# Patient Record
Sex: FEMALE | Race: BLACK OR AFRICAN AMERICAN | NOT HISPANIC OR LATINO | Employment: UNEMPLOYED | URBAN - METROPOLITAN AREA
[De-identification: names, ages, dates, MRNs, and addresses within clinical notes are randomized per-mention and may not be internally consistent; named-entity substitution may affect disease eponyms.]

---

## 2018-03-06 ENCOUNTER — HOSPITAL ENCOUNTER (EMERGENCY)
Facility: HOSPITAL | Age: 23
Discharge: HOME | End: 2018-03-07
Attending: EMERGENCY MEDICINE
Payer: MEDICAID

## 2018-03-06 DIAGNOSIS — D69.6 THROMBOCYTOPENIA: ICD-10-CM

## 2018-03-06 DIAGNOSIS — D64.9 ANEMIA, UNSPECIFIED TYPE: Primary | ICD-10-CM

## 2018-03-06 LAB
ABO + RH BLD: NORMAL
ACANTHOCYTES BLD QL SMEAR: PRESENT
ALBUMIN SERPL BCP-MCNC: 4 G/DL
ALP SERPL-CCNC: 48 U/L
ALT SERPL W/O P-5'-P-CCNC: 9 U/L
ANION GAP SERPL CALC-SCNC: 10 MMOL/L
ANISOCYTOSIS BLD QL SMEAR: SLIGHT
APTT BLDCRRT: 24.7 SEC
AST SERPL-CCNC: 8 U/L
BASOPHILS # BLD AUTO: 0.05 K/UL
BASOPHILS NFR BLD: 0.3 %
BILIRUB SERPL-MCNC: 0.3 MG/DL
BLD GP AB SCN CELLS X3 SERPL QL: NORMAL
BLD PROD TYP BPU: NORMAL
BLOOD UNIT EXPIRATION DATE: NORMAL
BLOOD UNIT TYPE CODE: 9500
BLOOD UNIT TYPE: NORMAL
BUN SERPL-MCNC: 11 MG/DL
CALCIUM SERPL-MCNC: 9 MG/DL
CHLORIDE SERPL-SCNC: 109 MMOL/L
CK SERPL-CCNC: 54 U/L
CO2 SERPL-SCNC: 23 MMOL/L
CODING SYSTEM: NORMAL
CREAT SERPL-MCNC: 0.8 MG/DL
DACRYOCYTES BLD QL SMEAR: ABNORMAL
DAT IGG-SP REAG RBC-IMP: NORMAL
DIFFERENTIAL METHOD: ABNORMAL
DISPENSE STATUS: NORMAL
EOSINOPHIL # BLD AUTO: 0.1 K/UL
EOSINOPHIL NFR BLD: 0.3 %
ERYTHROCYTE [DISTWIDTH] IN BLOOD BY AUTOMATED COUNT: 19.9 %
EST. GFR  (AFRICAN AMERICAN): >60 ML/MIN/1.73 M^2
EST. GFR  (NON AFRICAN AMERICAN): >60 ML/MIN/1.73 M^2
GLUCOSE SERPL-MCNC: 109 MG/DL
HCT VFR BLD AUTO: 25.3 %
HGB BLD-MCNC: 6.5 G/DL
HGB S BLD QL SOLY: NEGATIVE
HYPOCHROMIA BLD QL SMEAR: ABNORMAL
INR PPP: 1.1
LDH SERPL L TO P-CCNC: 283 U/L
LYMPHOCYTES # BLD AUTO: 4.8 K/UL
LYMPHOCYTES NFR BLD: 27.6 %
MCH RBC QN AUTO: 14.5 PG
MCHC RBC AUTO-ENTMCNC: 25.7 G/DL
MCV RBC AUTO: 57 FL
MONOCYTES # BLD AUTO: 1 K/UL
MONOCYTES NFR BLD: 5.8 %
NEUTROPHILS # BLD AUTO: 11.4 K/UL
NEUTROPHILS NFR BLD: 66.8 %
NUM UNITS TRANS PACKED RBC: NORMAL
OVALOCYTES BLD QL SMEAR: ABNORMAL
PLATELET # BLD AUTO: 66 K/UL
PLATELET BLD QL SMEAR: ABNORMAL
PMV BLD AUTO: ABNORMAL FL
POIKILOCYTOSIS BLD QL SMEAR: SLIGHT
POLYCHROMASIA BLD QL SMEAR: ABNORMAL
POTASSIUM SERPL-SCNC: 3.3 MMOL/L
PROT SERPL-MCNC: 7.4 G/DL
PROTHROMBIN TIME: 11.1 SEC
RBC # BLD AUTO: 4.48 M/UL
RETICS/RBC NFR AUTO: 2.8 %
SCHISTOCYTES BLD QL SMEAR: PRESENT
SODIUM SERPL-SCNC: 142 MMOL/L
SPHEROCYTES BLD QL SMEAR: ABNORMAL
STOMATOCYTES BLD QL SMEAR: PRESENT
TARGETS BLD QL SMEAR: ABNORMAL
WBC # BLD AUTO: 17.28 K/UL

## 2018-03-06 PROCEDURE — 85610 PROTHROMBIN TIME: CPT

## 2018-03-06 PROCEDURE — 85025 COMPLETE CBC W/AUTO DIFF WBC: CPT

## 2018-03-06 PROCEDURE — 86850 RBC ANTIBODY SCREEN: CPT

## 2018-03-06 PROCEDURE — 85660 RBC SICKLE CELL TEST: CPT

## 2018-03-06 PROCEDURE — 82746 ASSAY OF FOLIC ACID SERUM: CPT

## 2018-03-06 PROCEDURE — 80053 COMPREHEN METABOLIC PANEL: CPT

## 2018-03-06 PROCEDURE — 83540 ASSAY OF IRON: CPT

## 2018-03-06 PROCEDURE — P9016 RBC LEUKOCYTES REDUCED: HCPCS

## 2018-03-06 PROCEDURE — 82607 VITAMIN B-12: CPT

## 2018-03-06 PROCEDURE — 83615 LACTATE (LD) (LDH) ENZYME: CPT

## 2018-03-06 PROCEDURE — 85730 THROMBOPLASTIN TIME PARTIAL: CPT

## 2018-03-06 PROCEDURE — 99283 EMERGENCY DEPT VISIT LOW MDM: CPT

## 2018-03-06 PROCEDURE — 82550 ASSAY OF CK (CPK): CPT

## 2018-03-06 PROCEDURE — 86880 COOMBS TEST DIRECT: CPT

## 2018-03-06 PROCEDURE — 36415 COLL VENOUS BLD VENIPUNCTURE: CPT

## 2018-03-06 PROCEDURE — 85045 AUTOMATED RETICULOCYTE COUNT: CPT

## 2018-03-06 PROCEDURE — 86920 COMPATIBILITY TEST SPIN: CPT

## 2018-03-06 RX ORDER — PREDNISONE 20 MG/1
80 TABLET ORAL DAILY
Status: ON HOLD | COMMUNITY
End: 2018-08-22 | Stop reason: HOSPADM

## 2018-03-06 RX ORDER — HYDROCODONE BITARTRATE AND ACETAMINOPHEN 500; 5 MG/1; MG/1
TABLET ORAL
Status: DISCONTINUED | OUTPATIENT
Start: 2018-03-06 | End: 2018-03-07 | Stop reason: HOSPADM

## 2018-03-07 VITALS
BODY MASS INDEX: 43.32 KG/M2 | OXYGEN SATURATION: 100 % | TEMPERATURE: 98 F | RESPIRATION RATE: 18 BRPM | HEART RATE: 87 BPM | SYSTOLIC BLOOD PRESSURE: 147 MMHG | HEIGHT: 62 IN | DIASTOLIC BLOOD PRESSURE: 65 MMHG | WEIGHT: 235.44 LBS

## 2018-03-07 LAB
FOLATE SERPL-MCNC: 5.9 NG/ML
IRON SERPL-MCNC: 15 UG/DL
SATURATED IRON: 3 %
TOTAL IRON BINDING CAPACITY: 462 UG/DL
TRANSFERRIN SERPL-MCNC: 312 MG/DL
VIT B12 SERPL-MCNC: 353 PG/ML

## 2018-03-07 NOTE — ED NOTES
Pt. Noted sitting up in bed resting to comfort, respirations even and unlabored, remains on continuous monitor, blood infusing as ordered. No signs of allergic reaction noted. Tolerates infusion well. Plan of care continued.

## 2018-03-07 NOTE — ED NOTES
IV discontinued upon discharge. Catheter intact, pt. Tolerates well. Dressing applied to site. No active bleeding noted.

## 2018-03-07 NOTE — ED PROVIDER NOTES
SCRIBE #1 NOTE: I, Corinne Mack, am scribing for, and in the presence of, Miah Lambert Jr., MD. I have scribed the HPI, ROS, and PEx.     SCRIBE #2 NOTE: I, Kaden Escalante, am scribing for, and in the presence of,  Apoorva Munguia MD. I have scribed the remaining portions of the note not scribed by Scribe #1.     History      Chief Complaint   Patient presents with    Abnormal Lab     hx of thrombocytopenia. states platelets and hct low.       Review of patient's allergies indicates:  No Known Allergies     HPI   HPI    3/6/2018, 7:21 PM   History obtained from the patient      History of Present Illness: Mari Etienne is a 22 y.o. female patient with PMHx of thrombocytopenia who presents to the Emergency Department for further evaluation of abnormal labs. Pt reports she had blood work done last week and it resulted yesterday. Pt was instructed by her PCP to present to the ED after the pt's platelets and HCT were low. Pt otherwise has no complaints at this time. Patient denies any fever, chills, fatigue, diaphoresis, CP, SOB, N/V, back pain, neck pain, pallor, HA, dizziness, numbness, weakness, and all other sxs at this time. No prior Tx reported. No further complaints or concerns at this time.     Arrival mode: Personal vehicle      PCP: Ifeanyi Tello NP       Past Medical History:  Past Medical History:   Diagnosis Date    Thrombocytopenia        Past Surgical History:  History reviewed. No pertinent surgical history.      Family History:  History reviewed. No pertinent family history.    Social History:  Social History     Social History Main Topics    Smoking status: Not on file    Smokeless tobacco: Not on file    Alcohol use Not on file    Drug use: Unknown    Sexual activity: Not on file       ROS   Review of Systems   Constitutional: Negative for chills, diaphoresis, fatigue and fever.   Respiratory: Negative for cough and shortness of breath.    Cardiovascular: Negative for chest pain  "and leg swelling.   Gastrointestinal: Negative for nausea and vomiting.   Musculoskeletal: Negative for back pain, neck pain and neck stiffness.   Skin: Negative for pallor, rash and wound.   Neurological: Negative for dizziness, weakness, light-headedness, numbness and headaches.   All other systems reviewed and are negative.    Physical Exam      Initial Vitals [03/06/18 1913]   BP Pulse Resp Temp SpO2   (!) 176/92 (!) 124 20 97.6 °F (36.4 °C) 100 %      MAP       120          Physical Exam  Nursing Notes and Vital Signs Reviewed.  Constitutional: Patient is in no apparent distress. Well-developed and well-nourished. Obese.  Head: Atraumatic. Normocephalic.  Eyes: PERRL. EOM intact. Conjunctivae are pale. No scleral icterus.  ENT: Mucous membranes are moist. Oropharynx is clear and symmetric.    Neck: Supple. Full ROM. No lymphadenopathy.  Cardiovascular: Tachycardic. Regular rhythm. No murmurs, rubs, or gallops. Distal pulses are 2+ and symmetric.  Pulmonary/Chest: No respiratory distress. Clear to auscultation bilaterally. No wheezing or rales.  Abdominal: Soft and non-distended.  There is no tenderness.  No rebound, guarding, or rigidity.   Musculoskeletal: Moves all extremities. No obvious deformities. No edema. No calf tenderness.  Skin: Warm and dry.  Neurological:  Alert, awake, and appropriate.  Normal speech.  No acute focal neurological deficits are appreciated.  Psychiatric: Normal affect. Good eye contact. Appropriate in content.    ED Course    Procedures  ED Vital Signs:  Vitals:    03/06/18 1913 03/06/18 2000 03/06/18 2005 03/06/18 2010   BP: (!) 176/92 130/60 (!) 153/64 (!) 152/69   Pulse: (!) 124 86 92 99   Resp: 20      Temp: 97.6 °F (36.4 °C)      TempSrc: Oral      SpO2: 100%      Weight: 106.8 kg (235 lb 7.2 oz)      Height: 5' 2" (1.575 m)       03/06/18 2245 03/06/18 2350 03/06/18 2355 03/07/18 0015   BP: 135/75 135/75 124/67 124/67   Pulse: 99 99 87 97   Resp: 18  18 18   Temp: 98.6 °F (37 " °C)  99.1 °F (37.3 °C) 98.7 °F (37.1 °C)   TempSrc: Oral  Oral Oral   SpO2: 100% 100% 100% 100%   Weight:       Height:        03/07/18 0030 03/07/18 0045 03/07/18 0100 03/07/18 0130   BP: 136/72 137/77 127/66 137/73   Pulse: 92 94 94 85   Resp: 18 18 18 18   Temp: 99 °F (37.2 °C) 98.8 °F (37.1 °C) 98.6 °F (37 °C) 98.4 °F (36.9 °C)   TempSrc: Oral Oral  Oral   SpO2: 100% 100% 100% 100%   Weight:       Height:        03/07/18 0200   BP: (!) 147/65   Pulse: 87   Resp: 18   Temp: 98.3 °F (36.8 °C)   TempSrc: Oral   SpO2: 100%   Weight:    Height:        Abnormal Lab Results:  Labs Reviewed   CBC W/ AUTO DIFFERENTIAL - Abnormal; Notable for the following:        Result Value    WBC 17.28 (*)     Hemoglobin 6.5 (*)     Hematocrit 25.3 (*)     MCV 57 (*)     MCH 14.5 (*)     MCHC 25.7 (*)     RDW 19.9 (*)     Platelets 66 (*)     Gran # (ANC) 11.4 (*)     Platelet Estimate Decreased (*)     All other components within normal limits   COMPREHENSIVE METABOLIC PANEL - Abnormal; Notable for the following:     Potassium 3.3 (*)     Alkaline Phosphatase 48 (*)     AST 8 (*)     ALT 9 (*)     All other components within normal limits   RETICULOCYTES - Abnormal; Notable for the following:     Retic 2.8 (*)     All other components within normal limits   LACTATE DEHYDROGENASE - Abnormal; Notable for the following:      (*)     All other components within normal limits   PROTIME-INR   APTT   CK   SICKLE CELL SCREEN   FOLATE   VITAMIN B12   IRON AND TIBC   TYPE & SCREEN   DIRECT ANTIGLOBULIN TEST   PREPARE RBC SOFT        All Lab Results:  Results for orders placed or performed during the hospital encounter of 03/06/18   CBC auto differential   Result Value Ref Range    WBC 17.28 (H) 3.90 - 12.70 K/uL    RBC 4.48 4.00 - 5.40 M/uL    Hemoglobin 6.5 (L) 12.0 - 16.0 g/dL    Hematocrit 25.3 (L) 37.0 - 48.5 %    MCV 57 (L) 82 - 98 fL    MCH 14.5 (L) 27.0 - 31.0 pg    MCHC 25.7 (L) 32.0 - 36.0 g/dL    RDW 19.9 (H) 11.5 - 14.5 %     Platelets 66 (L) 150 - 350 K/uL    MPV SEE COMMENT 9.2 - 12.9 fL    Gran # (ANC) 11.4 (H) 1.8 - 7.7 K/uL    Lymph # 4.8 1.0 - 4.8 K/uL    Mono # 1.0 0.3 - 1.0 K/uL    Eos # 0.1 0.0 - 0.5 K/uL    Baso # 0.05 0.00 - 0.20 K/uL    Gran% 66.8 38.0 - 73.0 %    Lymph% 27.6 18.0 - 48.0 %    Mono% 5.8 4.0 - 15.0 %    Eosinophil% 0.3 0.0 - 8.0 %    Basophil% 0.3 0.0 - 1.9 %    Platelet Estimate Decreased (A)     Aniso Slight     Poik Slight     Poly Occasional     Hypo Moderate     Ovalocytes Occasional     Target Cells Occasional     Tear Drop Cells Occasional     Stomatocytes Present     Spherocytes Occasional     Acanthocytes Present     Schistocytes Present     Differential Method Automated    Comprehensive metabolic panel   Result Value Ref Range    Sodium 142 136 - 145 mmol/L    Potassium 3.3 (L) 3.5 - 5.1 mmol/L    Chloride 109 95 - 110 mmol/L    CO2 23 23 - 29 mmol/L    Glucose 109 70 - 110 mg/dL    BUN, Bld 11 6 - 20 mg/dL    Creatinine 0.8 0.5 - 1.4 mg/dL    Calcium 9.0 8.7 - 10.5 mg/dL    Total Protein 7.4 6.0 - 8.4 g/dL    Albumin 4.0 3.5 - 5.2 g/dL    Total Bilirubin 0.3 0.1 - 1.0 mg/dL    Alkaline Phosphatase 48 (L) 55 - 135 U/L    AST 8 (L) 10 - 40 U/L    ALT 9 (L) 10 - 44 U/L    Anion Gap 10 8 - 16 mmol/L    eGFR if African American >60 >60 mL/min/1.73 m^2    eGFR if non African American >60 >60 mL/min/1.73 m^2   Protime-INR   Result Value Ref Range    Prothrombin Time 11.1 9.0 - 12.5 sec    INR 1.1 0.8 - 1.2   APTT   Result Value Ref Range    aPTT 24.7 21.0 - 32.0 sec   Reticulocytes   Result Value Ref Range    Retic 2.8 (H) 0.5 - 2.5 %   Lactate dehydrogenase   Result Value Ref Range     (H) 110 - 260 U/L   CK   Result Value Ref Range    CPK 54 20 - 180 U/L   Sickle cell screen   Result Value Ref Range    Sickle Cell Screen Negative Negative   Type & Screen   Result Value Ref Range    Group & Rh O POS     Indirect Richard NEG    Direct antiglobulin test   Result Value Ref Range    Direct Richard (ADELA)  NEG    Prepare RBC 1 Unit   Result Value Ref Range    UNIT NUMBER [id number]     PRODUCT CODE R9763V20     DISPENSE STATUS TRANSFUSED     CODING SYSTEM [id number]     Unit Blood Type Code 9500     Unit Blood Type O NEG     Unit Expiration [id number]          Imaging Results:  Imaging Results    None                 The Emergency Provider reviewed the vital signs and test results, which are outlined above.    ED Discussion     8:00 PM: Dr. Lambert transfers care of pt to Dr. Munguia, pending lab results.    9:51 PM: Dr. Munguia re-evaluated pt. Pt is resting comfortably and is in no acute distress.  Pt denies any sxs at this time.  D/w pt all pertinent results. D/w pt any concerns expressed at this time. Answered all questions. Pt expresses understanding at this time.    9:55 PM: Re-evaluated pt. I have discussed test results, shared treatment plan, and the need for admission with patient and family at bedside. Pt and family express understanding at this time and agree with all information. All questions answered. Pt and family have no further questions or concerns at this time. Pt is ready for admit.    2:01 AM: Reassessed pt at this time. Pt states her condition has improved at this time. Pt is laying comfortably in ED bed and in NAD. Pt is awake, alert, and oriented. Discussed with pt all pertinent ED information and results. Discussed pt dx and plan of tx. Gave pt all f/u and return to the ED instructions. All questions and concerns were addressed at this time. Pt expresses understanding of information and instructions, and is comfortable with plan to discharge. Pt is stable for discharge.    I discussed with patient and/or family/caretaker that evaluation in the ED does not suggest any emergent or life threatening medical conditions requiring immediate intervention beyond what was provided in the ED, and I believe patient is safe for discharge.  Regardless, an unremarkable evaluation in the ED does not preclude  the development or presence of a serious of life threatening condition. As such, patient was instructed to return immediately for any worsening or change in current symptoms.      ED Medication(s):  Medications - No data to display    Discharge Medication List as of 3/7/2018  1:34 AM          Follow-up Information     [facility]  HEMATOLOGY/ONCOLOGY In 2 days.    Specialty:  Hematology and Oncology  Contact information:  [address] [place] [address] [id number] [phone]           [facility] - .    Specialty:  Emergency Medicine  Why:  As needed, If symptoms worsen  Contact information:  [address] [place] [id number] [phone]                   Medical Decision Making    Medical Decision Making:   Clinical Tests:   Lab Tests: Reviewed and Ordered           Scribe Attestation:   Scribe #1: I performed the above scribed service and the documentation accurately describes the services I performed. I attest to the accuracy of the note.    Attending:   Physician Attestation Statement for Scribe #1: I, Miah Lambert Jr., MD, personally performed the services described in this documentation, as scribed by Corinne Mack, in my presence, and it is both accurate and complete.       Scribe Attestation:   Scribe #2: I performed the above scribed service and the documentation accurately describes the services I performed. I attest to the accuracy of the note.    Attending Attestation:           Physician Attestation for Scribe:    Physician Attestation Statement for Scribe #2: I, Apoorva Munguia MD, reviewed documentation, as scribed by Kaden Escalante in my presence, and it is both accurate and complete. I also acknowledge and confirm the content of the note done by Scribe #1.          Clinical Impression       ICD-10-CM ICD-9-CM   1. Anemia, unspecified type D64.9 285.9   2. Thrombocytopenia D69.6 287.5       Disposition:   Disposition: Discharged  Condition:  Stable         Si SUZY Munguia MD  03/07/18 9160

## 2018-03-07 NOTE — ED NOTES
X2 attempts to establish IV access, unsuccessful. Request IV support/access from charge nurse Neil Ching. Plan of care continued.

## 2018-03-07 NOTE — ED NOTES
Transfusion continues to infuse as ordered. ~75 ml remain to be infused. Pt. Denies any concerns at this time. Plan of care continued.

## 2018-03-07 NOTE — ED NOTES
Pt. Presents to ED AAXO3 with male visitor at bedside with complaint of abnormal lab values (platelets) per her provider and was sent to the ED for further evaluation. Pt. Denies any symptoms at this time. Denies Cp, SOB, HA or dizziness. BBS CTA, skin warm and dry, intact, noted bruising throughout. Last platelet transfusion Jan 2017. Pt. Positioned to comfort, awaiting MD assessment. Plan of care continued.

## 2018-03-07 NOTE — ED NOTES
Consent for blood transfusion signed by patient and placed onto chart. MD signature noted also. Plan of care continued.

## 2018-08-20 ENCOUNTER — HOSPITAL ENCOUNTER (INPATIENT)
Facility: HOSPITAL | Age: 23
LOS: 2 days | Discharge: HOME | DRG: 812 | End: 2018-08-22
Attending: EMERGENCY MEDICINE | Admitting: INTERNAL MEDICINE
Payer: MEDICAID

## 2018-08-20 DIAGNOSIS — D64.9 SYMPTOMATIC ANEMIA: Primary | ICD-10-CM

## 2018-08-20 DIAGNOSIS — R42 ORTHOSTATIC DIZZINESS: ICD-10-CM

## 2018-08-20 DIAGNOSIS — Z86.2 HISTORY OF ITP: ICD-10-CM

## 2018-08-20 PROBLEM — D69.3 ACUTE ITP: Status: ACTIVE | Noted: 2018-08-20

## 2018-08-20 PROBLEM — D69.3 CHRONIC ITP (IDIOPATHIC THROMBOCYTOPENIC PURPURA): Status: ACTIVE | Noted: 2018-08-20

## 2018-08-20 LAB
ABO + RH BLD: NORMAL
ACANTHOCYTES BLD QL SMEAR: PRESENT
ALBUMIN SERPL BCP-MCNC: 4.1 G/DL
ALP SERPL-CCNC: 49 U/L
ALT SERPL W/O P-5'-P-CCNC: 10 U/L
ANION GAP SERPL CALC-SCNC: 11 MMOL/L
ANISOCYTOSIS BLD QL SMEAR: ABNORMAL
AST SERPL-CCNC: 11 U/L
B-HCG UR QL: NEGATIVE
BACTERIA #/AREA URNS HPF: ABNORMAL /HPF
BASOPHILS # BLD AUTO: 0.03 K/UL
BASOPHILS NFR BLD: 0.3 %
BILIRUB SERPL-MCNC: 0.5 MG/DL
BILIRUB UR QL STRIP: ABNORMAL
BLD GP AB SCN CELLS X3 SERPL QL: NORMAL
BUN SERPL-MCNC: 11 MG/DL
CALCIUM SERPL-MCNC: 9.4 MG/DL
CHLORIDE SERPL-SCNC: 108 MMOL/L
CLARITY UR: CLEAR
CO2 SERPL-SCNC: 19 MMOL/L
COLOR UR: YELLOW
CREAT SERPL-MCNC: 0.8 MG/DL
DACRYOCYTES BLD QL SMEAR: ABNORMAL
DIFFERENTIAL METHOD: ABNORMAL
EOSINOPHIL # BLD AUTO: 0 K/UL
EOSINOPHIL NFR BLD: 0.2 %
ERYTHROCYTE [DISTWIDTH] IN BLOOD BY AUTOMATED COUNT: 19.8 %
EST. GFR  (AFRICAN AMERICAN): >60 ML/MIN/1.73 M^2
EST. GFR  (NON AFRICAN AMERICAN): >60 ML/MIN/1.73 M^2
GLUCOSE SERPL-MCNC: 108 MG/DL
GLUCOSE UR QL STRIP: NEGATIVE
HCT VFR BLD AUTO: 18.2 %
HGB BLD-MCNC: 4.6 G/DL
HGB UR QL STRIP: ABNORMAL
HYALINE CASTS #/AREA URNS LPF: 0 /LPF
HYPOCHROMIA BLD QL SMEAR: ABNORMAL
KETONES UR QL STRIP: NEGATIVE
LEUKOCYTE ESTERASE UR QL STRIP: ABNORMAL
LYMPHOCYTES # BLD AUTO: 1.8 K/UL
LYMPHOCYTES NFR BLD: 16.5 %
MCH RBC QN AUTO: 14.9 PG
MCHC RBC AUTO-ENTMCNC: 25.3 G/DL
MCV RBC AUTO: 59 FL
MICROSCOPIC COMMENT: ABNORMAL
MONOCYTES # BLD AUTO: 0.9 K/UL
MONOCYTES NFR BLD: 8 %
NEUTROPHILS # BLD AUTO: 8.3 K/UL
NEUTROPHILS NFR BLD: 76.4 %
NITRITE UR QL STRIP: NEGATIVE
PH UR STRIP: 7 [PH] (ref 5–8)
PLATELET # BLD AUTO: 26 K/UL
PLATELET BLD QL SMEAR: ABNORMAL
PMV BLD AUTO: ABNORMAL FL
POIKILOCYTOSIS BLD QL SMEAR: SLIGHT
POLYCHROMASIA BLD QL SMEAR: ABNORMAL
POTASSIUM SERPL-SCNC: 3.8 MMOL/L
PROT SERPL-MCNC: 7.4 G/DL
PROT UR QL STRIP: ABNORMAL
RBC # BLD AUTO: 3.09 M/UL
RBC #/AREA URNS HPF: 30 /HPF (ref 0–4)
SODIUM SERPL-SCNC: 138 MMOL/L
SP GR UR STRIP: 1.02 (ref 1–1.03)
SQUAMOUS #/AREA URNS HPF: 20 /HPF
URN SPEC COLLECT METH UR: ABNORMAL
UROBILINOGEN UR STRIP-ACNC: 1 EU/DL
WBC # BLD AUTO: 11.05 K/UL
WBC #/AREA URNS HPF: 8 /HPF (ref 0–5)

## 2018-08-20 PROCEDURE — 36430 TRANSFUSION BLD/BLD COMPNT: CPT

## 2018-08-20 PROCEDURE — 25000003 PHARM REV CODE 250: Performed by: NURSE PRACTITIONER

## 2018-08-20 PROCEDURE — 36415 COLL VENOUS BLD VENIPUNCTURE: CPT

## 2018-08-20 PROCEDURE — 86922 COMPATIBILITY TEST ANTIGLOB: CPT

## 2018-08-20 PROCEDURE — 86901 BLOOD TYPING SEROLOGIC RH(D): CPT

## 2018-08-20 PROCEDURE — 86905 BLOOD TYPING RBC ANTIGENS: CPT

## 2018-08-20 PROCEDURE — 83540 ASSAY OF IRON: CPT

## 2018-08-20 PROCEDURE — 81000 URINALYSIS NONAUTO W/SCOPE: CPT

## 2018-08-20 PROCEDURE — 81025 URINE PREGNANCY TEST: CPT

## 2018-08-20 PROCEDURE — 85025 COMPLETE CBC W/AUTO DIFF WBC: CPT

## 2018-08-20 PROCEDURE — 11000001 HC ACUTE MED/SURG PRIVATE ROOM

## 2018-08-20 PROCEDURE — 99285 EMERGENCY DEPT VISIT HI MDM: CPT | Mod: 25

## 2018-08-20 PROCEDURE — 80053 COMPREHEN METABOLIC PANEL: CPT

## 2018-08-20 PROCEDURE — 63600175 PHARM REV CODE 636 W HCPCS: Performed by: NURSE PRACTITIONER

## 2018-08-20 PROCEDURE — 86870 RBC ANTIBODY IDENTIFICATION: CPT

## 2018-08-20 PROCEDURE — 86880 COOMBS TEST DIRECT: CPT

## 2018-08-20 PROCEDURE — 82728 ASSAY OF FERRITIN: CPT

## 2018-08-20 RX ORDER — HYDROCODONE BITARTRATE AND ACETAMINOPHEN 500; 5 MG/1; MG/1
TABLET ORAL
Status: DISCONTINUED | OUTPATIENT
Start: 2018-08-20 | End: 2018-08-22 | Stop reason: HOSPADM

## 2018-08-20 RX ORDER — FAMOTIDINE 20 MG/1
20 TABLET, FILM COATED ORAL 2 TIMES DAILY
Status: DISCONTINUED | OUTPATIENT
Start: 2018-08-20 | End: 2018-08-22 | Stop reason: HOSPADM

## 2018-08-20 RX ORDER — DEXAMETHASONE 4 MG/1
40 TABLET ORAL DAILY
Status: DISCONTINUED | OUTPATIENT
Start: 2018-08-20 | End: 2018-08-22 | Stop reason: HOSPADM

## 2018-08-20 RX ADMIN — IRON SUCROSE 200 MG: 20 INJECTION, SOLUTION INTRAVENOUS at 09:08

## 2018-08-20 RX ADMIN — DEXAMETHASONE 40 MG: 4 TABLET ORAL at 09:08

## 2018-08-20 RX ADMIN — FAMOTIDINE 20 MG: 20 TABLET ORAL at 09:08

## 2018-08-20 NOTE — SUBJECTIVE & OBJECTIVE
Past Medical History:   Diagnosis Date    Thrombocytopenia        History reviewed. No pertinent surgical history.    Review of patient's allergies indicates:  No Known Allergies    No current facility-administered medications on file prior to encounter.      Current Outpatient Medications on File Prior to Encounter   Medication Sig    predniSONE (DELTASONE) 20 MG tablet Take 80 mg by mouth once daily.     Family History     Reviewed with none pertinent        Tobacco Use    Smoking status: Not on file   Substance and Sexual Activity    Alcohol use: Not on file    Drug use: Not on file    Sexual activity: Not on file     Review of Systems   Constitutional: Positive for activity change and fatigue. Negative for appetite change, chills, fever and unexpected weight change.   HENT: Negative for congestion, postnasal drip, sinus pressure, sore throat and trouble swallowing.    Eyes: Negative for redness, itching and visual disturbance.   Respiratory: Positive for shortness of breath. Negative for cough.    Cardiovascular: Negative for chest pain, palpitations and leg swelling.   Gastrointestinal: Negative for abdominal distention, abdominal pain, diarrhea, nausea and vomiting.   Endocrine: Positive for polydipsia. Negative for cold intolerance and heat intolerance.   Genitourinary: Positive for vaginal bleeding (menstrual cycle). Negative for difficulty urinating, dysuria, flank pain, frequency and urgency.   Musculoskeletal: Negative for arthralgias, back pain and myalgias.   Skin: Negative for color change and wound.   Allergic/Immunologic: Negative for environmental allergies and food allergies.   Neurological: Positive for dizziness, weakness and light-headedness. Negative for syncope.   Hematological: Bruises/bleeds easily.   Psychiatric/Behavioral: Negative for behavioral problems, decreased concentration and sleep disturbance. The patient is not nervous/anxious.      Objective:     Vital Signs (Most  Recent):  Temp: 98.8 °F (37.1 °C) (08/20/18 1845)  Pulse: (!) 122 (08/20/18 1845)  Resp: 17 (08/20/18 1845)  BP: (!) 141/74 (08/20/18 1845)  SpO2: 100 % (08/20/18 1845) Vital Signs (24h Range):  Temp:  [98.8 °F (37.1 °C)] 98.8 °F (37.1 °C)  Pulse:  [110-130] 122  Resp:  [17-22] 17  SpO2:  [100 %] 100 %  BP: (134-155)/(74-93) 141/74     Weight: 101.2 kg (223 lb 1.7 oz)  Body mass index is 40.81 kg/m².    Physical Exam   Constitutional: She is oriented to person, place, and time. She appears well-developed and well-nourished.   HENT:   Head: Normocephalic.   Nose: Nose normal.   Mouth/Throat: Oropharynx is clear and moist.   Eyes: Conjunctivae are normal.   Neck: Normal range of motion. Neck supple.   Cardiovascular: Normal rate, regular rhythm, normal heart sounds and intact distal pulses. Exam reveals no friction rub.   No murmur heard.  Pulmonary/Chest: Effort normal and breath sounds normal. No respiratory distress. She has no wheezes. She has no rales.   Abdominal: Soft. She exhibits no distension. There is no tenderness.   Genitourinary:   Genitourinary Comments: Deferred-menstrual cycle in progress   Musculoskeletal: Normal range of motion.   Neurological: She is alert and oriented to person, place, and time.   Skin: Skin is warm and dry.   Petechiae scattered to BUE/BLE.  Ecchymosis to bilateral ankles.             Significant Labs:   CBC:   Recent Labs   Lab  08/20/18   1451   WBC  11.05   HGB  4.6*   HCT  18.2*   PLT  26*     CMP:   Recent Labs   Lab  08/20/18   1451   NA  138   K  3.8   CL  108   CO2  19*   GLU  108   BUN  11   CREATININE  0.8   CALCIUM  9.4   PROT  7.4   ALBUMIN  4.1   BILITOT  0.5   ALKPHOS  49*   AST  11   ALT  10   ANIONGAP  11   EGFRNONAA  >60       Significant Imaging:   Imaging Results    None

## 2018-08-20 NOTE — ASSESSMENT & PLAN NOTE
-h/o  -Platelets 26  -Heme/Onc consulted- MD discussed case with Dr. Cota (Heme/Onc)  -will transfuse platelets if < 20  -will repeat CBC in am   -Decadron initiated

## 2018-08-20 NOTE — ASSESSMENT & PLAN NOTE
-in the setting of ITP and menstrual cycle  -H/H 4.6/18.2  -Type and screen   -2 units of PRBCs tranfused  -+ orthostatics  -will repeat H/H post transfusion and consider additional units if needed  -IV Venofer  -iron studies results pending

## 2018-08-20 NOTE — HPI
Mari Etienne is a 22 y.o. female patient w/ a PMHx of thrombocytopenia and ITP presents to the Emergency Department for generalized weakness which progressively worsened over 10 days. Pt also c/o lightheadedness, fatigue, weakness, dizziness, polydipsia, activity change, SOB, and bruising. Pt states she frequently receives blood transfusions and denies history of reaction. Pt also reports vaginal bleeding due to menstrual cycle x 4 days.  Symptoms are constant and moderate in severity. No mitigating or exacerbating factors reported. Patient denies any fever, chills, diaphoresis, abdominal pain, N/V/D, dysuria, headache, LOC, numbness, myalgias, and all other sxs at this time. No prior Tx included. No further complaints or concerns at this time.

## 2018-08-20 NOTE — ED PROVIDER NOTES
"SCRIBE #1 NOTE: I, Marjorie Garrido, am scribing for, and in the presence of, Houston Sullivan Jr., MD. I have scribed the HPI, ROS, and PEx.     SCRIBE #2 NOTE: I, Corinne Mack, am scribing for, and in the presence of,  Carlos Morel MD. I have scribed the remaining portions of the note not scribed by Scribe #1.     History      Chief Complaint   Patient presents with    Weakness     Pt states, "I have been feeling weak and I have petechiae all over."       Review of patient's allergies indicates:  No Known Allergies     HPI   HPI    8/20/2018, 2:26 PM   History obtained from the patient      History of Present Illness: Mari Etienne is a 22 y.o. female patient w/ a PMHx of thrombocytopenia and ITP presents to the Emergency Department for generalized weakness which onset suddenly. Pt also c/o lightheadedness, polydipsia, and petechiae to her bilateral arms and legs. Pt states she frequently receives blood transfusions. Symptoms are constant and moderate in severity. No mitigating or exacerbating factors reported. Patient denies any fever, chills, diaphoresis, abdominal pain, N/V/D, dysuria, headache, LOC, numbness, myalgias, and all other sxs at this time. No prior Tx included. No further complaints or concerns at this time.         Arrival mode: Personal vehicle      PCP: Ifeanyi Tello NP       Past Medical History:  Past Medical History:   Diagnosis Date    Thrombocytopenia        Past Surgical History:  History reviewed. No pertinent surgical history.      Family History:  History reviewed. No pertinent family history.    Social History:  Social History     Tobacco Use    Smoking status: Never Smoker   Substance and Sexual Activity    Alcohol use: No     Frequency: Never    Drug use: Not on file    Sexual activity: Not on file       ROS   Review of Systems   Constitutional: Negative for chills, diaphoresis, fatigue and fever.   HENT: Negative for congestion and sore throat.    Respiratory: Negative " for shortness of breath.    Cardiovascular: Negative for chest pain.   Gastrointestinal: Negative for abdominal pain, constipation, diarrhea, nausea and vomiting.   Endocrine: Positive for polydipsia.   Genitourinary: Negative for dysuria.   Musculoskeletal: Negative for back pain and myalgias.        (+) petechiae to bilateral arms and legs   Skin: Negative for rash.   Neurological: Positive for weakness (generalized) and light-headedness. Negative for dizziness, syncope, numbness and headaches.   Hematological: Does not bruise/bleed easily.     Physical Exam      Initial Vitals   BP Pulse Resp Temp SpO2   08/20/18 1418 08/20/18 1418 08/20/18 1418 08/20/18 1845 08/20/18 1418   (!) 151/88 (!) 129 (!) 22 98.8 °F (37.1 °C) 100 %      MAP       --                 Physical Exam  Nursing Notes and Vital Signs Reviewed.  Constitutional: Patient is in no acute distress. Well-developed and well-nourished.  Head: Atraumatic. Normocephalic.  Eyes: PERRL. EOM intact. Conjunctivae are not pale. No scleral icterus.  ENT: Mucous membranes are moist. Oropharynx is clear and symmetric.    Neck: Supple. Full ROM. No lymphadenopathy.  Cardiovascular: Regular rate. Regular rhythm. No murmurs, rubs, or gallops.   Pulmonary/Chest: No respiratory distress. Clear to auscultation bilaterally. No wheezing or rales.  Abdominal: Soft and non-distended.  There is no tenderness.  No rebound, guarding, or rigidity.   Musculoskeletal: Moves all extremities. No obvious deformities.  Skin: Warm and dry. Petechiae to L bicep/tricep area.  Neurological:  Alert, awake, and appropriate.  Normal speech.  No acute focal neurological deficits are appreciated.  Psychiatric: Normal affect. Good eye contact. Appropriate in content.    ED Course    Critical Care  Date/Time: 8/20/2018 5:33 PM  Performed by: Carlos Morel MD  Authorized by: Carlos Morel MD   Direct patient critical care time: 12 minutes  Additional history critical care time: 6  "minutes  Ordering / reviewing critical care time: 5 minutes  Documentation critical care time: 8 minutes  Consulting other physicians critical care time: 4 minutes  Total critical care time (exclusive of procedural time) : 35 minutes  Critical care time was exclusive of separately billable procedures and treating other patients and teaching time.  Critical care was necessary to treat or prevent imminent or life-threatening deterioration of the following conditions: symptomatic anemia.  Critical care was time spent personally by me on the following activities: blood draw for specimens, development of treatment plan with patient or surrogate, discussions with consultants, evaluation of patient's response to treatment, examination of patient, obtaining history from patient or surrogate, ordering and performing treatments and interventions, ordering and review of laboratory studies, pulse oximetry, re-evaluation of patient's condition and review of old charts.  Subsequent provider of critical care: I assumed direction of critical care for this patient from another provider of my specialty.        ED Vital Signs:  Vitals:    08/20/18 1418 08/20/18 1725 08/20/18 1816 08/20/18 1818   BP: (!) 151/88 134/82 (!) 151/80 (!) 155/88   Pulse: (!) 129 110 110 (!) 125   Resp: (!) 22 20     Temp:       TempSrc:       SpO2: 100% 100%     Weight: 101.2 kg (223 lb 1.7 oz)      Height: 5' 2" (1.575 m)       08/20/18 1820 08/20/18 1845 08/20/18 1902   BP: (!) 150/93 (!) 141/74 138/76   Pulse: (!) 130 (!) 122 (!) 118   Resp:  17 18   Temp:  98.8 °F (37.1 °C) 98.7 °F (37.1 °C)   TempSrc:   Oral   SpO2:  100% 100%   Weight:      Height:          Abnormal Lab Results:  Labs Reviewed   CBC W/ AUTO DIFFERENTIAL - Abnormal; Notable for the following components:       Result Value    RBC 3.09 (*)     Hemoglobin 4.6 (*)     Hematocrit 18.2 (*)     MCV 59 (*)     MCH 14.9 (*)     MCHC 25.3 (*)     RDW 19.8 (*)     Platelets 26 (*)     Gran # (ANC) " 8.3 (*)     Gran% 76.4 (*)     Lymph% 16.5 (*)     Platelet Estimate Decreased (*)     All other components within normal limits    Narrative:        HGB, HCT, PLT critical result(s) called and verbal readback   obtained from MARGARETH RUIZ, 08/20/2018 16:02   COMPREHENSIVE METABOLIC PANEL - Abnormal; Notable for the following components:    CO2 19 (*)     Alkaline Phosphatase 49 (*)     All other components within normal limits   URINALYSIS - Abnormal; Notable for the following components:    Protein, UA 1+ (*)     Bilirubin (UA) 1+ (*)     Occult Blood UA 3+ (*)     Leukocytes, UA 1+ (*)     All other components within normal limits   URINALYSIS MICROSCOPIC - Abnormal; Notable for the following components:    RBC, UA 30 (*)     WBC, UA 8 (*)     All other components within normal limits   PREGNANCY TEST, URINE RAPID   TYPE & SCREEN   ANTIBODY IDENTIFICATION   PREPARE RBC SOFT        All Lab Results:  Results for orders placed or performed during the hospital encounter of 08/20/18   CBC auto differential   Result Value Ref Range    WBC 11.05 3.90 - 12.70 K/uL    RBC 3.09 (L) 4.00 - 5.40 M/uL    Hemoglobin 4.6 (LL) 12.0 - 16.0 g/dL    Hematocrit 18.2 (LL) 37.0 - 48.5 %    MCV 59 (L) 82 - 98 fL    MCH 14.9 (L) 27.0 - 31.0 pg    MCHC 25.3 (L) 32.0 - 36.0 g/dL    RDW 19.8 (H) 11.5 - 14.5 %    Platelets 26 (LL) 150 - 350 K/uL    MPV SEE COMMENT 9.2 - 12.9 fL    Gran # (ANC) 8.3 (H) 1.8 - 7.7 K/uL    Lymph # 1.8 1.0 - 4.8 K/uL    Mono # 0.9 0.3 - 1.0 K/uL    Eos # 0.0 0.0 - 0.5 K/uL    Baso # 0.03 0.00 - 0.20 K/uL    Gran% 76.4 (H) 38.0 - 73.0 %    Lymph% 16.5 (L) 18.0 - 48.0 %    Mono% 8.0 4.0 - 15.0 %    Eosinophil% 0.2 0.0 - 8.0 %    Basophil% 0.3 0.0 - 1.9 %    Platelet Estimate Decreased (A)     Aniso Moderate     Poik Slight     Poly Occasional     Hypo Marked     Tear Drop Cells Occasional     Acanthocytes Present     Differential Method Automated    Comprehensive metabolic panel   Result Value Ref Range    Sodium  138 136 - 145 mmol/L    Potassium 3.8 3.5 - 5.1 mmol/L    Chloride 108 95 - 110 mmol/L    CO2 19 (L) 23 - 29 mmol/L    Glucose 108 70 - 110 mg/dL    BUN, Bld 11 6 - 20 mg/dL    Creatinine 0.8 0.5 - 1.4 mg/dL    Calcium 9.4 8.7 - 10.5 mg/dL    Total Protein 7.4 6.0 - 8.4 g/dL    Albumin 4.1 3.5 - 5.2 g/dL    Total Bilirubin 0.5 0.1 - 1.0 mg/dL    Alkaline Phosphatase 49 (L) 55 - 135 U/L    AST 11 10 - 40 U/L    ALT 10 10 - 44 U/L    Anion Gap 11 8 - 16 mmol/L    eGFR if African American >60 >60 mL/min/1.73 m^2    eGFR if non African American >60 >60 mL/min/1.73 m^2   Urinalysis   Result Value Ref Range    Specimen UA Urine, Clean Catch     Color, UA Yellow Yellow, Straw, Tati    Appearance, UA Clear Clear    pH, UA 7.0 5.0 - 8.0    Specific Gravity, UA 1.020 1.005 - 1.030    Protein, UA 1+ (A) Negative    Glucose, UA Negative Negative    Ketones, UA Negative Negative    Bilirubin (UA) 1+ (A) Negative    Occult Blood UA 3+ (A) Negative    Nitrite, UA Negative Negative    Urobilinogen, UA 1.0 <2.0 EU/dL    Leukocytes, UA 1+ (A) Negative   Pregnancy, urine rapid   Result Value Ref Range    Preg Test, Ur Negative    Urinalysis Microscopic   Result Value Ref Range    RBC, UA 30 (H) 0 - 4 /hpf    WBC, UA 8 (H) 0 - 5 /hpf    Bacteria, UA Rare None-Occ /hpf    Squam Epithel, UA 20 /hpf    Hyaline Casts, UA 0 0-1/lpf /lpf    Microscopic Comment SEE COMMENT    Type & Screen   Result Value Ref Range    Group & Rh O POS     Indirect Richard POS        Imaging Results:  Imaging Results    None             The Emergency Provider reviewed the vital signs and test results, which are outlined above.    ED Discussion     4:00 PM: Dr. Sullivan transfers care of pt to Dr. Morel, pending lab results.    5:26 PM: Re-evaluated pt. I have discussed test results, shared treatment plan, and the need for admission with patient and family at bedside. Pt and family express understanding at this time and agree with all information. All questions  answered. Pt and family have no further questions or concerns at this time. Pt is ready for admit.    5:31 PM: Discussed case with Betina Silva NP (Hospital Medicine). Dr. Borjas agrees with current care and management of pt and accepts admission.   Admitting Service: Hospital medicine   Admitting Physician: Dr. Borjas  Admit to: In-pt Med-Surg        ED Medication(s):  Medications   0.9%  NaCl infusion (for blood administration) (not administered)   dexamethasone tablet 40 mg (40 mg Oral Given 8/20/18 2115)   famotidine tablet 20 mg (20 mg Oral Given 8/20/18 2115)   iron sucrose (VENOFER) 200 mg in sodium chloride 0.9% 100 mL IVPB (200 mg Intravenous New Bag 8/20/18 2115)       Current Discharge Medication List                Medical Decision Making    Medical Decision Making:   Clinical Tests:   Lab Tests: Ordered and Reviewed           Scribe Attestation:   Scribe #1: I performed the above scribed service and the documentation accurately describes the services I performed. I attest to the accuracy of the note.    Attending:   Physician Attestation Statement for Scribe #1: I, Houston Sullivan Jr., MD, personally performed the services described in this documentation, as scribed by Marjorie Garrido, in my presence, and it is both accurate and complete.       Scribe Attestation:   Scribe #2: I performed the above scribed service and the documentation accurately describes the services I performed. I attest to the accuracy of the note.    Attending Attestation:           Physician Attestation for Scribe:    Physician Attestation Statement for Scribe #2: I, Carlos Morel MD, reviewed documentation, as scribed by Corinne Mack in my presence, and it is both accurate and complete. I also acknowledge and confirm the content of the note done by Scribe #1.          Clinical Impression       ICD-10-CM ICD-9-CM   1. Symptomatic anemia D64.9 285.9   2. History of ITP Z86.2 V12.3   3. Orthostatic dizziness R42 780.4        Disposition:   Disposition: Admitted  Condition: Fair         Carlos Morel MD  08/20/18 3339

## 2018-08-20 NOTE — ED NOTES
Level of Consciousness: The patient is awake, alert, and oriented with appropriate affect and speech; oriented to person, place and time.  Appearance: Sitting up in bed with no acute distress noted. Clothing and hygiene are clean and worn appropriately.  Skin: Skin is warm and dry with good skin turgor; intact; color consistent with ethnicity.  Mucous membranes are moist.   Musculoskeletal: Moves all extremities well in full range of motion. No obvious deformities or swelling noted.  Respiratory: Airway open and patent, respirations spontaneous, even and unlabored. No accessory muscles in use.   Cardiac: Tachycardiac, good pulses palpated peripherally, capillary refill < 3 seconds.  Abdomen: Soft, non-tender to palpation. No distention noted.  Neurologic: PERRLA, face exhibits symmetrical expression, hand grasps equal and even bilaterally, normal sensation noted to all extremities and face when touched by a finger.

## 2018-08-21 LAB
ALBUMIN SERPL BCP-MCNC: 4.2 G/DL
ALP SERPL-CCNC: 51 U/L
ALT SERPL W/O P-5'-P-CCNC: 10 U/L
ANION GAP SERPL CALC-SCNC: 13 MMOL/L
ANISOCYTOSIS BLD QL SMEAR: ABNORMAL
AST SERPL-CCNC: 16 U/L
BASOPHILS # BLD AUTO: 0.04 K/UL
BASOPHILS NFR BLD: 0.3 %
BILIRUB SERPL-MCNC: 0.4 MG/DL
BLD PROD TYP BPU: NORMAL
BLD PROD TYP BPU: NORMAL
BLOOD GROUP ANTIBODIES SERPL: NORMAL
BLOOD UNIT EXPIRATION DATE: NORMAL
BLOOD UNIT EXPIRATION DATE: NORMAL
BLOOD UNIT TYPE CODE: 5100
BLOOD UNIT TYPE CODE: 5100
BLOOD UNIT TYPE: NORMAL
BLOOD UNIT TYPE: NORMAL
BUN SERPL-MCNC: 12 MG/DL
CALCIUM SERPL-MCNC: 9.7 MG/DL
CHLORIDE SERPL-SCNC: 109 MMOL/L
CO2 SERPL-SCNC: 14 MMOL/L
CODING SYSTEM: NORMAL
CODING SYSTEM: NORMAL
CREAT SERPL-MCNC: 0.8 MG/DL
DACRYOCYTES BLD QL SMEAR: ABNORMAL
DAT IGG-SP REAG RBC-IMP: NORMAL
DIFFERENTIAL METHOD: ABNORMAL
DISPENSE STATUS: NORMAL
DISPENSE STATUS: NORMAL
EOSINOPHIL # BLD AUTO: 0 K/UL
EOSINOPHIL NFR BLD: 0.1 %
ERYTHROCYTE [DISTWIDTH] IN BLOOD BY AUTOMATED COUNT: 19.7 %
EST. GFR  (AFRICAN AMERICAN): >60 ML/MIN/1.73 M^2
EST. GFR  (NON AFRICAN AMERICAN): >60 ML/MIN/1.73 M^2
FERRITIN SERPL-MCNC: 6 NG/ML
GLUCOSE SERPL-MCNC: 140 MG/DL
HCT VFR BLD AUTO: 19.2 %
HCT VFR BLD AUTO: 29.2 %
HGB BLD-MCNC: 4.9 G/DL
HGB BLD-MCNC: 8.4 G/DL
HYPOCHROMIA BLD QL SMEAR: ABNORMAL
IRON SERPL-MCNC: 16 UG/DL
LYMPHOCYTES # BLD AUTO: 1 K/UL
LYMPHOCYTES NFR BLD: 7.1 %
MCH RBC QN AUTO: 14.8 PG
MCHC RBC AUTO-ENTMCNC: 25.5 G/DL
MCV RBC AUTO: 58 FL
MONOCYTES # BLD AUTO: 0.1 K/UL
MONOCYTES NFR BLD: 0.8 %
NEUTROPHILS # BLD AUTO: 12.6 K/UL
NEUTROPHILS NFR BLD: 93.2 %
OVALOCYTES BLD QL SMEAR: ABNORMAL
PLATELET # BLD AUTO: 34 K/UL
PLATELET BLD QL SMEAR: ABNORMAL
PMV BLD AUTO: ABNORMAL FL
POIKILOCYTOSIS BLD QL SMEAR: ABNORMAL
POTASSIUM SERPL-SCNC: 4.9 MMOL/L
PROT SERPL-MCNC: 7.8 G/DL
RBC # BLD AUTO: 3.31 M/UL
SATURATED IRON: 3 %
SODIUM SERPL-SCNC: 136 MMOL/L
STOMATOCYTES BLD QL SMEAR: PRESENT
TARGETS BLD QL SMEAR: ABNORMAL
TOTAL IRON BINDING CAPACITY: 480 UG/DL
TRANS ERYTHROCYTES VOL PATIENT: NORMAL ML
TRANS ERYTHROCYTES VOL PATIENT: NORMAL ML
TRANSFERRIN SERPL-MCNC: 324 MG/DL
TRANSFERRIN SERPL-MCNC: 324 MG/DL
WBC # BLD AUTO: 13.72 K/UL

## 2018-08-21 PROCEDURE — 36430 TRANSFUSION BLD/BLD COMPNT: CPT

## 2018-08-21 PROCEDURE — 85014 HEMATOCRIT: CPT

## 2018-08-21 PROCEDURE — 36415 COLL VENOUS BLD VENIPUNCTURE: CPT

## 2018-08-21 PROCEDURE — 63600175 PHARM REV CODE 636 W HCPCS: Performed by: NURSE PRACTITIONER

## 2018-08-21 PROCEDURE — 25000003 PHARM REV CODE 250: Performed by: NURSE PRACTITIONER

## 2018-08-21 PROCEDURE — 85025 COMPLETE CBC W/AUTO DIFF WBC: CPT

## 2018-08-21 PROCEDURE — 99232 SBSQ HOSP IP/OBS MODERATE 35: CPT | Mod: ,,, | Performed by: INTERNAL MEDICINE

## 2018-08-21 PROCEDURE — 11000001 HC ACUTE MED/SURG PRIVATE ROOM

## 2018-08-21 PROCEDURE — 80053 COMPREHEN METABOLIC PANEL: CPT

## 2018-08-21 PROCEDURE — 85018 HEMOGLOBIN: CPT

## 2018-08-21 PROCEDURE — P9021 RED BLOOD CELLS UNIT: HCPCS

## 2018-08-21 RX ADMIN — FAMOTIDINE 20 MG: 20 TABLET ORAL at 09:08

## 2018-08-21 RX ADMIN — DEXAMETHASONE 40 MG: 4 TABLET ORAL at 08:08

## 2018-08-21 RX ADMIN — FAMOTIDINE 20 MG: 20 TABLET ORAL at 08:08

## 2018-08-21 NOTE — ED NOTES
Patient in NAD. Given blankets and socks. Denies needs at this time. Call light in reach. Pt updated on POC and she verbalizes understanding.

## 2018-08-21 NOTE — PLAN OF CARE
Problem: Patient Care Overview  Goal: Plan of Care Review  Outcome: Ongoing (interventions implemented as appropriate)  Fall prevention precautions maintained, pt remained free of falls throughout shift, call bell and personal items within reach, pt to get 2 units RBC (has antibodies and waiting for blood from main campus). 24 hour chart check completed. Will continue to monitor

## 2018-08-21 NOTE — H&P
"[facility] Hospital Medicine  History & Physical    Patient Name: Mari Etienne  MRN: [id number]  Admission Date: 8/20/2018  Attending Physician: Hussein Borjas MD   Primary Care Provider: Ifeanyi Tello NP         Patient information was obtained from patient and ER records.     Subjective:     Principal Problem: Symptomatic Anemia     Chief Complaint:   Chief Complaint   Patient presents with    Weakness     Pt states, "I have been feeling weak and I have petechiae all over."        HPI: Mari Etienne is a 22 y.o. female patient w/ a PMHx of thrombocytopenia and ITP presents to the Emergency Department for generalized weakness which progressively worsened over 10 days. Pt also c/o lightheadedness, fatigue, weakness, dizziness, polydipsia, activity change, SOB, and bruising. Pt states she frequently receives blood transfusions and denies history of reaction.  Pt also reports vaginal bleeding due to menstrual cycle x 4 days.  Symptoms are constant and moderate in severity. No mitigating or exacerbating factors reported. Patient denies any fever, chills, diaphoresis, abdominal pain, N/V/D, dysuria, headache, LOC, numbness, myalgias, and all other sxs at this time. No prior Tx included. No further complaints or concerns at this time.         Past Medical History:   Diagnosis Date    Thrombocytopenia        History reviewed. No pertinent surgical history.    Review of patient's allergies indicates:  No Known Allergies    No current facility-administered medications on file prior to encounter.      Current Outpatient Medications on File Prior to Encounter   Medication Sig    predniSONE (DELTASONE) 20 MG tablet Take 80 mg by mouth once daily.     Family History     Reviewed with none pertinent        Tobacco Use    Smoking status: Not on file   Substance and Sexual Activity    Alcohol use: Not on file    Drug use: Not on file    Sexual activity: Not on file     Review " of Systems   Constitutional: Positive for activity change and fatigue. Negative for appetite change, chills, fever and unexpected weight change.   HENT: Negative for congestion, postnasal drip, sinus pressure, sore throat and trouble swallowing.    Eyes: Negative for redness, itching and visual disturbance.   Respiratory: Positive for shortness of breath. Negative for cough.    Cardiovascular: Negative for chest pain, palpitations and leg swelling.   Gastrointestinal: Negative for abdominal distention, abdominal pain, diarrhea, nausea and vomiting.   Endocrine: Positive for polydipsia. Negative for cold intolerance and heat intolerance.   Genitourinary: Positive for vaginal bleeding (menstrual cycle). Negative for difficulty urinating, dysuria, flank pain, frequency and urgency.   Musculoskeletal: Negative for arthralgias, back pain and myalgias.   Skin: Negative for color change and wound.   Allergic/Immunologic: Negative for environmental allergies and food allergies.   Neurological: Positive for dizziness, weakness and light-headedness. Negative for syncope.   Hematological: Bruises/bleeds easily.   Psychiatric/Behavioral: Negative for behavioral problems, decreased concentration and sleep disturbance. The patient is not nervous/anxious.      Objective:     Vital Signs (Most Recent):  Temp: 98.8 °F (37.1 °C) (08/20/18 1845)  Pulse: (!) 122 (08/20/18 1845)  Resp: 17 (08/20/18 1845)  BP: (!) 141/74 (08/20/18 1845)  SpO2: 100 % (08/20/18 1845) Vital Signs (24h Range):  Temp:  [98.8 °F (37.1 °C)] 98.8 °F (37.1 °C)  Pulse:  [110-130] 122  Resp:  [17-22] 17  SpO2:  [100 %] 100 %  BP: (134-155)/(74-93) 141/74     Weight: 101.2 kg (223 lb 1.7 oz)  Body mass index is 40.81 kg/m².    Physical Exam   Constitutional: She is oriented to person, place, and time. She appears well-developed and well-nourished.   HENT:   Head: Normocephalic.   Nose: Nose normal.   Mouth/Throat: Oropharynx is clear and moist.   Eyes: Conjunctivae  are normal.   Neck: Normal range of motion. Neck supple.   Cardiovascular: Normal rate, regular rhythm, normal heart sounds and intact distal pulses. Exam reveals no friction rub.   No murmur heard.  Pulmonary/Chest: Effort normal and breath sounds normal. No respiratory distress. She has no wheezes. She has no rales.   Abdominal: Soft. She exhibits no distension. There is no tenderness.   Genitourinary:   Genitourinary Comments: Deferred-menstrual cycle in progress   Musculoskeletal: Normal range of motion.   Neurological: She is alert and oriented to person, place, and time.   Skin: Skin is warm and dry.   Petechiae scattered to BUE/BLE.  Ecchymosis to bilateral ankles.             Significant Labs:   CBC:   Recent Labs   Lab  08/20/18   1451   WBC  11.05   HGB  4.6*   HCT  18.2*   PLT  26*     CMP:   Recent Labs   Lab  08/20/18   1451   NA  138   K  3.8   CL  108   CO2  19*   GLU  108   BUN  11   CREATININE  0.8   CALCIUM  9.4   PROT  7.4   ALBUMIN  4.1   BILITOT  0.5   ALKPHOS  49*   AST  11   ALT  10   ANIONGAP  11   EGFRNONAA  >60       Significant Imaging:   Imaging Results    None       Assessment/Plan:     Chronic ITP (idiopathic thrombocytopenia)    -h/o  -Platelets 26  -Heme/Onc consulted- MD discussed case with Dr. Cota (Heme/Onc)  -will transfuse platelets if < 20  -will repeat CBC in am   -Decadron initiated           Symptomatic anemia    -in the setting of ITP and menstrual cycle  -H/H 4.6/18.2  -Type and screen   -2 units of PRBCs tranfused  -+ orthostatics  -will repeat H/H post transfusion and consider additional units if needed  -IV Venofer  -iron studies results pending             VTE Risk Mitigation (From admission, onward)        Ordered     IP VTE HIGH RISK PATIENT  Once      08/20/18 1814     Place sequential compression device  Until discontinued      08/20/18 1814             Betina Silva NP  Department of Hospital Medicine   [facility]

## 2018-08-21 NOTE — PLAN OF CARE
Problem: Patient Care Overview  Goal: Plan of Care Review  Outcome: Ongoing (interventions implemented as appropriate)  Fall precautions maintained, pt free from injuries/fall.  Repositions independently.  Ambulates with stand by assist due to some generalized weakness.  Denies pain, nausea, vomiting. No s/s bleeding. Pt is currently on day 7 of period.  Currently receiving 1 unit of blood, total RBC to given - 2 units.  POC and meds discussed with pt, pt verbalized understanding.  Side rails x 2, bed locked and low, phone and call light w/in reach.  Chart check done. Will cont to monitor.

## 2018-08-21 NOTE — PLAN OF CARE
CM met with patient at the bedside to assess for discharge needs.  Patient states that she was independent and has help at home.  She does not anticipate any discharge needs at this time.  CM provided a transitional care folder, information on advanced directives, information on pharmacy bedside delivery, and discharge planning begins on admission with contact information for any needs/questions.     D/C Plan:  Home with no needs      [employer] [id number] [place], [place] [address] [facility]  [address] [facility] [address]  Phone: [phone] Fax: [phone]    Ifeanyi Tello NP  Payor: MEDICAID / Plan: [facility] COMMUNITY PLAN [facility] ([place] MEDICAID) / Product Type: Managed Medicaid /      08/21/18 1320   Discharge Assessment   Assessment Type Discharge Planning Assessment   Confirmed/corrected address and phone number on facesheet? Yes   Assessment information obtained from? Patient;Medical Record   Expected Length of Stay (days) (TBD)   Communicated expected length of stay with patient/caregiver yes   Prior to hospitilization cognitive status: Alert/Oriented   Prior to hospitalization functional status: Independent   Current cognitive status: Alert/Oriented   Current Functional Status: Independent   Facility Arrived From: home   Lives With other (see comments)   Able to Return to Prior Arrangements yes   Is patient able to care for self after discharge? Yes   Who are your caregiver(s) and their phone number(s)? Sravan Kaur, friend [phone]   Patient's perception of discharge disposition home or selfcare   Readmission Within The Last 30 Days no previous admission in last 30 days   Patient currently being followed by outpatient case management? No   Patient currently receives any other outside agency services? No   Equipment Currently Used at Home none   Do you have any problems affording any of your prescribed medications? No   Is the patient taking  medications as prescribed? yes   Does the patient have transportation home? Yes   Transportation Available family or friend will provide   Dialysis Name and Scheduled days NA   Does the patient receive services at the Coumadin Clinic? (NA)   Discharge Plan A Home   Patient/Family In Agreement With Plan yes

## 2018-08-21 NOTE — HPI
22-year-old female admitted to the hospital with pancytopenia profound anemia MCV of 52 and low platelet count reportedly has been diagnosed with ITP since the age of 12 states that she has run out of her prescription of prednisone previously taking 20 mg of prednisone per day I was asked the patient for further evaluation

## 2018-08-21 NOTE — CONSULTS
[facility] -   Hematology/Oncology  Consult Note    Patient Name: Mari Etienne  MRN: [id number]  Admission Date: 8/20/2018  Hospital Length of Stay: 1 days  Code Status: Full Code   Attending Provider: Hussein Borjas MD  Consulting Provider: Nash Cota MD  Primary Care Physician: Ifeanyi Tello NP  Principal Problem:<principal problem not specified>    Consults  Subjective:     HPI:  22-year-old female admitted to the hospital with pancytopenia profound anemia MCV of 52 and low platelet count reportedly has been diagnosed with ITP since the age of 12 states that she has run out of her prescription of prednisone previously taking 20 mg of prednisone per day I was asked the patient for further evaluation    Oncology Treatment Plan:   [No treatment plan]    Medications:  Continuous Infusions:  Scheduled Meds:   dexamethasone  40 mg Oral Daily    famotidine  20 mg Oral BID     PRN Meds:sodium chloride     Review of patient's allergies indicates:  No Known Allergies     Past Medical History:   Diagnosis Date    Thrombocytopenia      History reviewed. No pertinent surgical history.  Family History     None        Tobacco Use    Smoking status: Never Smoker   Substance and Sexual Activity    Alcohol use: No     Frequency: Never    Drug use: Not on file    Sexual activity: Not on file       Review of Systems   Constitutional: Positive for activity change, appetite change and fatigue. Negative for chills, diaphoresis, fever and unexpected weight change.   HENT: Negative for congestion, dental problem, drooling, ear discharge, ear pain, facial swelling, hearing loss, mouth sores, nosebleeds, postnasal drip, rhinorrhea, sinus pressure, sneezing, sore throat, tinnitus, trouble swallowing and voice change.    Eyes: Negative for photophobia, pain, discharge, redness, itching and visual disturbance.   Respiratory: Negative for cough, choking, chest tightness, shortness of breath,  wheezing and stridor.    Cardiovascular: Negative for chest pain, palpitations and leg swelling.   Gastrointestinal: Negative for abdominal distention, abdominal pain, anal bleeding, blood in stool, constipation, diarrhea, nausea, rectal pain and vomiting.   Endocrine: Negative for cold intolerance, heat intolerance, polydipsia, polyphagia and polyuria.   Genitourinary: Positive for menstrual problem and vaginal bleeding. Negative for decreased urine volume, difficulty urinating, dyspareunia, dysuria, enuresis, flank pain, frequency, genital sores, hematuria, pelvic pain, urgency, vaginal discharge and vaginal pain.   Musculoskeletal: Negative for arthralgias, back pain, gait problem, joint swelling, myalgias, neck pain and neck stiffness.   Skin: Negative for color change, pallor and rash.   Allergic/Immunologic: Negative for environmental allergies, food allergies and immunocompromised state.   Neurological: Positive for weakness. Negative for dizziness, tremors, seizures, syncope, facial asymmetry, speech difficulty, light-headedness, numbness and headaches.   Hematological: Negative for adenopathy. Does not bruise/bleed easily.   Psychiatric/Behavioral: Positive for dysphoric mood. Negative for agitation, behavioral problems, confusion, decreased concentration, hallucinations, self-injury, sleep disturbance and suicidal ideas. The patient is nervous/anxious. The patient is not hyperactive.      Objective:     Vital Signs (Most Recent):  Temp: 98.5 °F (36.9 °C) (08/21/18 0411)  Pulse: (!) 112 (08/21/18 0411)  Resp: 16 (08/21/18 0411)  BP: 136/77 (08/21/18 0411)  SpO2: 100 % (08/21/18 0411) Vital Signs (24h Range):  Temp:  [98.5 °F (36.9 °C)-98.9 °F (37.2 °C)] 98.5 °F (36.9 °C)  Pulse:  [110-130] 112  Resp:  [16-22] 16  SpO2:  [100 %] 100 %  BP: (130-155)/(64-93) 136/77     Weight: 101.2 kg (223 lb 1.7 oz)  Body mass index is 40.81 kg/m².  Body surface area is 2.1 meters squared.      Intake/Output Summary (Last  24 hours) at 8/21/2018 0846  Last data filed at 8/21/2018 0500  Gross per 24 hour   Intake 700 ml   Output --   Net 700 ml       Physical Exam   Constitutional: She is oriented to person, place, and time. She appears well-developed and well-nourished. She appears distressed.   HENT:   Head: Normocephalic and atraumatic.   Right Ear: External ear normal.   Left Ear: External ear normal.   Nose: Nose normal. Right sinus exhibits no maxillary sinus tenderness and no frontal sinus tenderness. Left sinus exhibits no maxillary sinus tenderness and no frontal sinus tenderness.   Mouth/Throat: Oropharynx is clear and moist. No oropharyngeal exudate.   Eyes: Conjunctivae, EOM and lids are normal. Pupils are equal, round, and reactive to light. Right eye exhibits no discharge. Left eye exhibits no discharge. Right conjunctiva is not injected. Right conjunctiva has no hemorrhage. Left conjunctiva is not injected. Left conjunctiva has no hemorrhage. No scleral icterus.   Neck: Normal range of motion. Neck supple. No JVD present. No tracheal deviation present. No thyromegaly present.   Cardiovascular: Normal rate, regular rhythm, normal heart sounds and intact distal pulses.   Pulmonary/Chest: Effort normal and breath sounds normal. No stridor. No respiratory distress. She exhibits no tenderness.   Abdominal: Soft. Bowel sounds are normal. She exhibits no distension and no mass. There is no splenomegaly or hepatomegaly. There is no tenderness. There is no rebound.   Musculoskeletal: Normal range of motion. She exhibits no edema or tenderness.   Lymphadenopathy:     She has no cervical adenopathy.     She has no axillary adenopathy.        Right: No supraclavicular adenopathy present.        Left: No supraclavicular adenopathy present.   Neurological: She is alert and oriented to person, place, and time. No cranial nerve deficit. Coordination normal.   Skin: Skin is dry. No rash noted. She is not diaphoretic. No erythema.    Psychiatric: She has a normal mood and affect. Her behavior is normal. Judgment and thought content normal.   Vitals reviewed.      Significant Labs:   BMP:   Recent Labs   Lab  08/20/18   1451  08/21/18   0428   GLU  108  140*   NA  138  136   K  3.8  4.9   CL  108  109   CO2  19*  14*   BUN  11  12   CREATININE  0.8  0.8   CALCIUM  9.4  9.7   , CBC:   Recent Labs   Lab  08/20/18   1451  08/21/18   0428   WBC  11.05  13.72*   HGB  4.6*  4.9*   HCT  18.2*  19.2*   PLT  26*  34*    and CMP:   Recent Labs   Lab  08/20/18   1451  08/21/18   0428   NA  138  136   K  3.8  4.9   CL  108  109   CO2  19*  14*   GLU  108  140*   BUN  11  12   CREATININE  0.8  0.8   CALCIUM  9.4  9.7   PROT  7.4  7.8   ALBUMIN  4.1  4.2   BILITOT  0.5  0.4   ALKPHOS  49*  51*   AST  11  16   ALT  10  10   ANIONGAP  11  13   EGFRNONAA  >60  >60       Diagnostic Results:  I have reviewed all pertinent imaging results/findings within the past 24 hours.  None    Assessment/Plan:     Chronic ITP (idiopathic thrombocytopenia)    Reported history of ITP since age 12 spoke to emergency room last night recommend patient be treated with dexamethasone 40 mg q.day x4 days with CBCs daily        Symptomatic anemia    Reported heavy periods hemoglobin 4.9 iron status to be checked agree with treatment with Venofer 200 mg 1 time dose continue to follow            Thank you for your consult. I will follow-up with patient. Please contact us if you have any additional questions.    Nash Cota MD  Hematology/Oncology  [facility] -

## 2018-08-21 NOTE — ASSESSMENT & PLAN NOTE
Reported heavy periods hemoglobin 4.9 iron status to be checked agree with treatment with Venofer 200 mg 1 time dose continue to follow

## 2018-08-21 NOTE — PROGRESS NOTES
Have spoken with Lexy in lab several times throughout shift, still waiting for blood from main campus due to multiple antibodies. Lab will notify floor when blood has arrived. Pt is in stable condition and asymptomatic

## 2018-08-21 NOTE — SUBJECTIVE & OBJECTIVE
Oncology Treatment Plan:   [No treatment plan]    Medications:  Continuous Infusions:  Scheduled Meds:   dexamethasone  40 mg Oral Daily    famotidine  20 mg Oral BID     PRN Meds:sodium chloride     Review of patient's allergies indicates:  No Known Allergies     Past Medical History:   Diagnosis Date    Thrombocytopenia      History reviewed. No pertinent surgical history.  Family History     None        Tobacco Use    Smoking status: Never Smoker   Substance and Sexual Activity    Alcohol use: No     Frequency: Never    Drug use: Not on file    Sexual activity: Not on file       Review of Systems   Constitutional: Positive for activity change, appetite change and fatigue. Negative for chills, diaphoresis, fever and unexpected weight change.   HENT: Negative for congestion, dental problem, drooling, ear discharge, ear pain, facial swelling, hearing loss, mouth sores, nosebleeds, postnasal drip, rhinorrhea, sinus pressure, sneezing, sore throat, tinnitus, trouble swallowing and voice change.    Eyes: Negative for photophobia, pain, discharge, redness, itching and visual disturbance.   Respiratory: Negative for cough, choking, chest tightness, shortness of breath, wheezing and stridor.    Cardiovascular: Negative for chest pain, palpitations and leg swelling.   Gastrointestinal: Negative for abdominal distention, abdominal pain, anal bleeding, blood in stool, constipation, diarrhea, nausea, rectal pain and vomiting.   Endocrine: Negative for cold intolerance, heat intolerance, polydipsia, polyphagia and polyuria.   Genitourinary: Positive for menstrual problem and vaginal bleeding. Negative for decreased urine volume, difficulty urinating, dyspareunia, dysuria, enuresis, flank pain, frequency, genital sores, hematuria, pelvic pain, urgency, vaginal discharge and vaginal pain.   Musculoskeletal: Negative for arthralgias, back pain, gait problem, joint swelling, myalgias, neck pain and neck stiffness.   Skin:  Negative for color change, pallor and rash.   Allergic/Immunologic: Negative for environmental allergies, food allergies and immunocompromised state.   Neurological: Positive for weakness. Negative for dizziness, tremors, seizures, syncope, facial asymmetry, speech difficulty, light-headedness, numbness and headaches.   Hematological: Negative for adenopathy. Does not bruise/bleed easily.   Psychiatric/Behavioral: Positive for dysphoric mood. Negative for agitation, behavioral problems, confusion, decreased concentration, hallucinations, self-injury, sleep disturbance and suicidal ideas. The patient is nervous/anxious. The patient is not hyperactive.      Objective:     Vital Signs (Most Recent):  Temp: 98.5 °F (36.9 °C) (08/21/18 0411)  Pulse: (!) 112 (08/21/18 0411)  Resp: 16 (08/21/18 0411)  BP: 136/77 (08/21/18 0411)  SpO2: 100 % (08/21/18 0411) Vital Signs (24h Range):  Temp:  [98.5 °F (36.9 °C)-98.9 °F (37.2 °C)] 98.5 °F (36.9 °C)  Pulse:  [110-130] 112  Resp:  [16-22] 16  SpO2:  [100 %] 100 %  BP: (130-155)/(64-93) 136/77     Weight: 101.2 kg (223 lb 1.7 oz)  Body mass index is 40.81 kg/m².  Body surface area is 2.1 meters squared.      Intake/Output Summary (Last 24 hours) at 8/21/2018 0846  Last data filed at 8/21/2018 0500  Gross per 24 hour   Intake 700 ml   Output --   Net 700 ml       Physical Exam   Constitutional: She is oriented to person, place, and time. She appears well-developed and well-nourished. She appears distressed.   HENT:   Head: Normocephalic and atraumatic.   Right Ear: External ear normal.   Left Ear: External ear normal.   Nose: Nose normal. Right sinus exhibits no maxillary sinus tenderness and no frontal sinus tenderness. Left sinus exhibits no maxillary sinus tenderness and no frontal sinus tenderness.   Mouth/Throat: Oropharynx is clear and moist. No oropharyngeal exudate.   Eyes: Conjunctivae, EOM and lids are normal. Pupils are equal, round, and reactive to light. Right eye  exhibits no discharge. Left eye exhibits no discharge. Right conjunctiva is not injected. Right conjunctiva has no hemorrhage. Left conjunctiva is not injected. Left conjunctiva has no hemorrhage. No scleral icterus.   Neck: Normal range of motion. Neck supple. No JVD present. No tracheal deviation present. No thyromegaly present.   Cardiovascular: Normal rate, regular rhythm, normal heart sounds and intact distal pulses.   Pulmonary/Chest: Effort normal and breath sounds normal. No stridor. No respiratory distress. She exhibits no tenderness.   Abdominal: Soft. Bowel sounds are normal. She exhibits no distension and no mass. There is no splenomegaly or hepatomegaly. There is no tenderness. There is no rebound.   Musculoskeletal: Normal range of motion. She exhibits no edema or tenderness.   Lymphadenopathy:     She has no cervical adenopathy.     She has no axillary adenopathy.        Right: No supraclavicular adenopathy present.        Left: No supraclavicular adenopathy present.   Neurological: She is alert and oriented to person, place, and time. No cranial nerve deficit. Coordination normal.   Skin: Skin is dry. No rash noted. She is not diaphoretic. No erythema.   Psychiatric: She has a normal mood and affect. Her behavior is normal. Judgment and thought content normal.   Vitals reviewed.      Significant Labs:   BMP:   Recent Labs   Lab  08/20/18   1451  08/21/18   0428   GLU  108  140*   NA  138  136   K  3.8  4.9   CL  108  109   CO2  19*  14*   BUN  11  12   CREATININE  0.8  0.8   CALCIUM  9.4  9.7   , CBC:   Recent Labs   Lab  08/20/18   1451  08/21/18   0428   WBC  11.05  13.72*   HGB  4.6*  4.9*   HCT  18.2*  19.2*   PLT  26*  34*    and CMP:   Recent Labs   Lab  08/20/18   1451  08/21/18   0428   NA  138  136   K  3.8  4.9   CL  108  109   CO2  19*  14*   GLU  108  140*   BUN  11  12   CREATININE  0.8  0.8   CALCIUM  9.4  9.7   PROT  7.4  7.8   ALBUMIN  4.1  4.2   BILITOT  0.5  0.4   ALKPHOS  49*  51*    AST  11  16   ALT  10  10   ANIONGAP  11  13   EGFRNONAA  >60  >60       Diagnostic Results:  I have reviewed all pertinent imaging results/findings within the past 24 hours.  None

## 2018-08-22 ENCOUNTER — TELEPHONE (OUTPATIENT)
Dept: HEMATOLOGY/ONCOLOGY | Facility: CLINIC | Age: 23
End: 2018-08-22

## 2018-08-22 VITALS
DIASTOLIC BLOOD PRESSURE: 66 MMHG | HEART RATE: 78 BPM | BODY MASS INDEX: 41.06 KG/M2 | HEIGHT: 62 IN | WEIGHT: 223.13 LBS | RESPIRATION RATE: 16 BRPM | SYSTOLIC BLOOD PRESSURE: 116 MMHG | TEMPERATURE: 98 F | OXYGEN SATURATION: 100 %

## 2018-08-22 DIAGNOSIS — D69.3 CHRONIC ITP (IDIOPATHIC THROMBOCYTOPENIA): Primary | ICD-10-CM

## 2018-08-22 DIAGNOSIS — D64.9 SYMPTOMATIC ANEMIA: ICD-10-CM

## 2018-08-22 LAB
ACANTHOCYTES BLD QL SMEAR: PRESENT
ALBUMIN SERPL BCP-MCNC: 3.7 G/DL
ALP SERPL-CCNC: 46 U/L
ALT SERPL W/O P-5'-P-CCNC: 7 U/L
ANION GAP SERPL CALC-SCNC: 8 MMOL/L
ANISOCYTOSIS BLD QL SMEAR: ABNORMAL
AST SERPL-CCNC: 7 U/L
BASOPHILS # BLD AUTO: 0.02 K/UL
BASOPHILS NFR BLD: 0.1 %
BILIRUB SERPL-MCNC: 0.4 MG/DL
BUN SERPL-MCNC: 14 MG/DL
CALCIUM SERPL-MCNC: 9.2 MG/DL
CHLORIDE SERPL-SCNC: 108 MMOL/L
CO2 SERPL-SCNC: 22 MMOL/L
CREAT SERPL-MCNC: 0.7 MG/DL
DACRYOCYTES BLD QL SMEAR: ABNORMAL
DIFFERENTIAL METHOD: ABNORMAL
EOSINOPHIL # BLD AUTO: 0 K/UL
EOSINOPHIL NFR BLD: 0 %
ERYTHROCYTE [DISTWIDTH] IN BLOOD BY AUTOMATED COUNT: 28.4 %
EST. GFR  (AFRICAN AMERICAN): >60 ML/MIN/1.73 M^2
EST. GFR  (NON AFRICAN AMERICAN): >60 ML/MIN/1.73 M^2
GLUCOSE SERPL-MCNC: 111 MG/DL
HCT VFR BLD AUTO: 27.3 %
HGB BLD-MCNC: 7.8 G/DL
HYPOCHROMIA BLD QL SMEAR: ABNORMAL
LYMPHOCYTES # BLD AUTO: 2.1 K/UL
LYMPHOCYTES NFR BLD: 8.4 %
MCH RBC QN AUTO: 19.8 PG
MCHC RBC AUTO-ENTMCNC: 28.6 G/DL
MCV RBC AUTO: 69 FL
MONOCYTES # BLD AUTO: 1.9 K/UL
MONOCYTES NFR BLD: 7.7 %
NEUTROPHILS # BLD AUTO: 21.1 K/UL
NEUTROPHILS NFR BLD: 85 %
OVALOCYTES BLD QL SMEAR: ABNORMAL
PLATELET # BLD AUTO: 48 K/UL
PMV BLD AUTO: ABNORMAL FL
POIKILOCYTOSIS BLD QL SMEAR: ABNORMAL
POLYCHROMASIA BLD QL SMEAR: ABNORMAL
POTASSIUM SERPL-SCNC: 4.2 MMOL/L
PROT SERPL-MCNC: 6.8 G/DL
RBC # BLD AUTO: 3.94 M/UL
SODIUM SERPL-SCNC: 138 MMOL/L
STOMATOCYTES BLD QL SMEAR: PRESENT
TARGETS BLD QL SMEAR: ABNORMAL
WBC # BLD AUTO: 25.17 K/UL

## 2018-08-22 PROCEDURE — 63600175 PHARM REV CODE 636 W HCPCS: Performed by: NURSE PRACTITIONER

## 2018-08-22 PROCEDURE — 85025 COMPLETE CBC W/AUTO DIFF WBC: CPT

## 2018-08-22 PROCEDURE — 36415 COLL VENOUS BLD VENIPUNCTURE: CPT

## 2018-08-22 PROCEDURE — 25000003 PHARM REV CODE 250: Performed by: NURSE PRACTITIONER

## 2018-08-22 PROCEDURE — 99232 SBSQ HOSP IP/OBS MODERATE 35: CPT | Mod: ,,, | Performed by: INTERNAL MEDICINE

## 2018-08-22 PROCEDURE — 80053 COMPREHEN METABOLIC PANEL: CPT

## 2018-08-22 RX ORDER — SYRING-NEEDL,DISP,INSUL,0.3 ML 29 G X1/2"
296 SYRINGE, EMPTY DISPOSABLE MISCELLANEOUS ONCE
Status: COMPLETED | OUTPATIENT
Start: 2018-08-22 | End: 2018-08-22

## 2018-08-22 RX ORDER — DOCUSATE SODIUM 100 MG/1
100 CAPSULE, LIQUID FILLED ORAL 2 TIMES DAILY
Refills: 0 | COMMUNITY
Start: 2018-08-22 | End: 2018-09-25 | Stop reason: ALTCHOICE

## 2018-08-22 RX ORDER — FERROUS SULFATE 325(65) MG
325 TABLET ORAL
Refills: 0 | COMMUNITY
Start: 2018-08-22 | End: 2018-09-25 | Stop reason: ALTCHOICE

## 2018-08-22 RX ORDER — DEXAMETHASONE 4 MG/1
40 TABLET ORAL DAILY
Qty: 10 TABLET | Refills: 0 | Status: SHIPPED | OUTPATIENT
Start: 2018-08-23 | End: 2018-08-24

## 2018-08-22 RX ADMIN — DEXAMETHASONE 40 MG: 4 TABLET ORAL at 08:08

## 2018-08-22 RX ADMIN — FAMOTIDINE 20 MG: 20 TABLET ORAL at 08:08

## 2018-08-22 RX ADMIN — Medication 296 ML: at 09:08

## 2018-08-22 NOTE — SUBJECTIVE & OBJECTIVE
Interval History:  Patient is feeling better counts improved    Oncology Treatment Plan:   [No treatment plan]    Medications:  Continuous Infusions:  Scheduled Meds:   dexamethasone  40 mg Oral Daily    famotidine  20 mg Oral BID     PRN Meds:sodium chloride     Review of Systems   Constitutional: Negative for activity change, appetite change, chills, diaphoresis, fatigue, fever and unexpected weight change.   HENT: Negative for congestion, dental problem, drooling, ear discharge, ear pain, facial swelling, hearing loss, mouth sores, nosebleeds, postnasal drip, rhinorrhea, sinus pressure, sneezing, sore throat, tinnitus, trouble swallowing and voice change.    Eyes: Negative for photophobia, pain, discharge, redness, itching and visual disturbance.   Respiratory: Negative for cough, choking, chest tightness, shortness of breath, wheezing and stridor.    Cardiovascular: Negative for chest pain, palpitations and leg swelling.   Gastrointestinal: Negative for abdominal distention, abdominal pain, anal bleeding, blood in stool, constipation, diarrhea, nausea, rectal pain and vomiting.   Endocrine: Negative for cold intolerance, heat intolerance, polydipsia, polyphagia and polyuria.   Genitourinary: Negative for decreased urine volume, difficulty urinating, dyspareunia, dysuria, enuresis, flank pain, frequency, genital sores, hematuria, menstrual problem, pelvic pain, urgency, vaginal bleeding, vaginal discharge and vaginal pain.   Musculoskeletal: Negative for arthralgias, back pain, gait problem, joint swelling, myalgias, neck pain and neck stiffness.   Skin: Negative for color change, pallor and rash.   Allergic/Immunologic: Negative for environmental allergies, food allergies and immunocompromised state.   Neurological: Negative for dizziness, tremors, seizures, syncope, facial asymmetry, speech difficulty, weakness, light-headedness, numbness and headaches.   Hematological: Negative for adenopathy. Does not  bruise/bleed easily.   Psychiatric/Behavioral: Positive for dysphoric mood. Negative for agitation, behavioral problems, confusion, decreased concentration, hallucinations, self-injury, sleep disturbance and suicidal ideas. The patient is nervous/anxious. The patient is not hyperactive.      Objective:     Vital Signs (Most Recent):  Temp: 98.3 °F (36.8 °C) (08/22/18 0714)  Pulse: 78 (08/22/18 0714)  Resp: 16 (08/22/18 0714)  BP: 116/66 (08/22/18 0714)  SpO2: 100 % (08/22/18 0714) Vital Signs (24h Range):  Temp:  [97.9 °F (36.6 °C)-98.9 °F (37.2 °C)] 98.3 °F (36.8 °C)  Pulse:  [] 78  Resp:  [16-20] 16  SpO2:  [99 %-100 %] 100 %  BP: (106-142)/(56-93) 116/66     Weight: 101.2 kg (223 lb 1.7 oz)  Body mass index is 40.81 kg/m².  Body surface area is 2.1 meters squared.      Intake/Output Summary (Last 24 hours) at 8/22/2018 0759  Last data filed at 8/22/2018 0638  Gross per 24 hour   Intake 1822.84 ml   Output --   Net 1822.84 ml       Physical Exam   Constitutional: She is oriented to person, place, and time. She appears well-developed and well-nourished. She appears distressed.   HENT:   Head: Normocephalic and atraumatic.   Right Ear: External ear normal.   Left Ear: External ear normal.   Nose: Nose normal. Right sinus exhibits no maxillary sinus tenderness and no frontal sinus tenderness. Left sinus exhibits no maxillary sinus tenderness and no frontal sinus tenderness.   Mouth/Throat: Oropharynx is clear and moist. No oropharyngeal exudate.   Eyes: Conjunctivae, EOM and lids are normal. Pupils are equal, round, and reactive to light. Right eye exhibits no discharge. Left eye exhibits no discharge. Right conjunctiva is not injected. Right conjunctiva has no hemorrhage. Left conjunctiva is not injected. Left conjunctiva has no hemorrhage. No scleral icterus.   Neck: Normal range of motion. Neck supple. No JVD present. No tracheal deviation present. No thyromegaly present.   Cardiovascular: Normal rate and  regular rhythm.   Pulmonary/Chest: Effort normal. No stridor. No respiratory distress. She exhibits no tenderness.   Abdominal: Soft. She exhibits no distension and no mass. There is no splenomegaly or hepatomegaly. There is no tenderness. There is no rebound.   Musculoskeletal: Normal range of motion. She exhibits no edema or tenderness.   Lymphadenopathy:     She has no cervical adenopathy.     She has no axillary adenopathy.        Right: No supraclavicular adenopathy present.        Left: No supraclavicular adenopathy present.   Neurological: She is alert and oriented to person, place, and time. No cranial nerve deficit. Coordination normal.   Skin: Skin is dry. No rash noted. She is not diaphoretic. No erythema.   Psychiatric: She has a normal mood and affect. Her behavior is normal. Judgment and thought content normal.   Vitals reviewed.      Significant Labs:   BMP:   Recent Labs   Lab  08/20/18   1451  08/21/18   0428  08/22/18   0534   GLU  108  140*  111*   NA  138  136  138   K  3.8  4.9  4.2   CL  108  109  108   CO2  19*  14*  22*   BUN  11  12  14   CREATININE  0.8  0.8  0.7   CALCIUM  9.4  9.7  9.2   , CBC:   Recent Labs   Lab  08/20/18   1451  08/21/18   0428  08/21/18   1825  08/22/18   0534   WBC  11.05  13.72*   --   25.17*   HGB  4.6*  4.9*  8.4*  7.8*   HCT  18.2*  19.2*  29.2*  27.3*   PLT  26*  34*   --   48*    and CMP:   Recent Labs   Lab  08/20/18   1451  08/21/18   0428  08/22/18   0534   NA  138  136  138   K  3.8  4.9  4.2   CL  108  109  108   CO2  19*  14*  22*   GLU  108  140*  111*   BUN  11  12  14   CREATININE  0.8  0.8  0.7   CALCIUM  9.4  9.7  9.2   PROT  7.4  7.8  6.8   ALBUMIN  4.1  4.2  3.7   BILITOT  0.5  0.4  0.4   ALKPHOS  49*  51*  46*   AST  11  16  7*   ALT  10  10  7*   ANIONGAP  11  13  8   EGFRNONAA  >60  >60  >60       Diagnostic Results:  I have reviewed and interpreted all pertinent imaging results/findings within the past 24 hours.

## 2018-08-22 NOTE — ASSESSMENT & PLAN NOTE
-in the setting of ITP and menstrual cycle  -H/H 4.6/18.2 on admission  -Type and screen   -2 units of PRBCs tranfused  -+ orthostatics  -will repeat H/H post transfusion and consider additional units if needed  -IV Venofer  -iron studies results pending

## 2018-08-22 NOTE — DISCHARGE SUMMARY
[facility]  Hospital Medicine  Discharge Summary      Patient Name: Mari Etienne  MRN: [id number]  Admission Date: 8/20/2018  Hospital Length of Stay: 2 days  Discharge Date and Time: 8/22/2018 10:51 AM  Attending Physician: Virginie Cortes MD   Discharging Provider: George Wells NP  Primary Care Provider: Ifeanyi Tello NP      HPI:   Mari Etienne is a 22 y.o. female patient w/ a PMHx of thrombocytopenia and ITP presents to the Emergency Department for generalized weakness which progressively worsened over 10 days. Pt also c/o lightheadedness, fatigue, weakness, dizziness, polydipsia, activity change, SOB, and bruising. Pt states she frequently receives blood transfusions and denies history of reaction.  Pt also reports vaginal bleeding due to menstrual cycle x 4 days.  Symptoms are constant and moderate in severity. No mitigating or exacerbating factors reported. Patient denies any fever, chills, diaphoresis, abdominal pain, N/V/D, dysuria, headache, LOC, numbness, myalgias, and all other sxs at this time. No prior Tx included. No further complaints or concerns at this time.         * No surgery found *      Hospital Course:   Ms Etienne is a 22 year old female who presented to [facility] with symptomatic anemia related to chronic ITP. She reported running out of her prednisone medication.  Patient Hemoglobin level on admission was 4.6, with symptoms of fatigue and shortness of breath on exertion.  Consulted Hematology/Oncology, who recommend Dexamethasone 40 mg X 4 days.  She was transfused 2 units of PRBC and received Venofer 200 mg X 1. Iron studies were also ordered. Today platelet count increased to 48,000, HGB 7.8.   She has been evaluated today by Hematology/Oncology, received 3 doses of dexamethasone and will be discharged with one dose  for total of four. She will follow up with Dr Cota in 3-5 days for CBC and further evaluation. Patient reports have  constipation, magnesium citrate given.  She was seen, examined and deemed suitable for discharge.       Consults:     No new Assessment & Plan notes have been filed under this hospital service since the last note was generated.  Service: Hospital Medicine    Final Active Diagnoses:    Diagnosis Date Noted POA    PRINCIPAL PROBLEM:  Symptomatic anemia [D64.9] 08/20/2018 Yes    Chronic ITP (idiopathic thrombocytopenia) [D69.3] 08/20/2018 Yes      Problems Resolved During this Admission:       Discharged Condition: stable    Disposition: Home or Self Care    Follow Up:  Follow-up Information     Nash Cota MD. Schedule an appointment as soon as possible for a visit in 3 days.    Specialty:  Hematology and Oncology  Why:  hospital follow up to recheck complete blood count   Contact information:  [address]  [place] [address] [phone]                 Patient Instructions:      Diet Adult Regular     Activity as tolerated       Significant Diagnostic Studies: Labs:   CMP   Recent Labs   Lab  08/20/18   1451  08/21/18   0428  08/22/18   0534   NA  138  136  138   K  3.8  4.9  4.2   CL  108  109  108   CO2  19*  14*  22*   GLU  108  140*  111*   BUN  11  12  14   CREATININE  0.8  0.8  0.7   CALCIUM  9.4  9.7  9.2   PROT  7.4  7.8  6.8   ALBUMIN  4.1  4.2  3.7   BILITOT  0.5  0.4  0.4   ALKPHOS  49*  51*  46*   AST  11  16  7*   ALT  10  10  7*   ANIONGAP  11  13  8   ESTGFRAFRICA  >60  >60  >60   EGFRNONAA  >60  >60  >60    and CBC   Recent Labs   Lab  08/20/18   1451  08/21/18   0428  08/21/18   1825  08/22/18   0534   WBC  11.05  13.72*   --   25.17*   HGB  4.6*  4.9*  8.4*  7.8*   HCT  18.2*  19.2*  29.2*  27.3*   PLT  26*  34*   --   48*       Pending Diagnostic Studies:     None         Medications:  Reconciled Home Medications:      Medication List      START taking these medications    dexamethasone 4 MG Tab  Commonly known as:  DECADRON  Take 10 tablets (40 mg total) by mouth once daily. for  1 day     docusate sodium 100 MG capsule  Commonly known as:  COLACE  Take 1 capsule (100 mg total) by mouth 2 (two) times daily.     ferrous sulfate 325 mg (65 mg iron) Tab tablet  Take 1 tablet (325 mg total) by mouth daily with breakfast.        STOP taking these medications    predniSONE 20 MG tablet  Commonly known as:  DELTASONE            Indwelling Lines/Drains at time of discharge:   Lines/Drains/Airways          None          Time spent on the discharge of patient: 40 minutes  Patient was seen and examined on the date of discharge and determined to be suitable for discharge.         George Wells NP  Department of Hospital Medicine  [facility]

## 2018-08-22 NOTE — ASSESSMENT & PLAN NOTE
Reported history of ITP since age 12 spoke to emergency room last night recommend patient be treated with dexamethasone 40 mg q.day x4 days with CBCs daily.  08/22/2018 platelet count greater than 40,000 continue with dexamethasone 40 mg daily x4 days intravenous iron given recommend follow-up with me in the next 3-5 days with repeat CBC

## 2018-08-22 NOTE — NURSING
Discharge instructions, medications, and appointments reviewed with patient and fiance. Work excuse provided. Both verbalized understanding.    Patient aware that staff will call for f/u with Dr Cota in 3 days. No further needs at this time.

## 2018-08-22 NOTE — TELEPHONE ENCOUNTER
----- Message from Diane Iyer RN sent at 8/22/2018  9:30 AM CDT -----  Patient needs an appt with Dr. oCta in 3 days for hospital follow up and to recheck CBC. Pt is getting discharged today. Pt is aware that she will be contacted. Thank you!

## 2018-08-22 NOTE — PROGRESS NOTES
[facility]   Hematology/Oncology  Progress Note    Patient Name: Mari Etienne  Admission Date: 8/20/2018  Hospital Length of Stay: 2 days  Code Status: Full Code     Subjective:     HPI:  22-year-old female admitted to the hospital with pancytopenia profound anemia MCV of 52 and low platelet count reportedly has been diagnosed with ITP since the age of 12 states that she has run out of her prescription of prednisone previously taking 20 mg of prednisone per day I was asked the patient for further evaluation    Interval History:  Patient is feeling better counts improved    Oncology Treatment Plan:   [No treatment plan]    Medications:  Continuous Infusions:  Scheduled Meds:   dexamethasone  40 mg Oral Daily    famotidine  20 mg Oral BID     PRN Meds:sodium chloride     Review of Systems   Constitutional: Negative for activity change, appetite change, chills, diaphoresis, fatigue, fever and unexpected weight change.   HENT: Negative for congestion, dental problem, drooling, ear discharge, ear pain, facial swelling, hearing loss, mouth sores, nosebleeds, postnasal drip, rhinorrhea, sinus pressure, sneezing, sore throat, tinnitus, trouble swallowing and voice change.    Eyes: Negative for photophobia, pain, discharge, redness, itching and visual disturbance.   Respiratory: Negative for cough, choking, chest tightness, shortness of breath, wheezing and stridor.    Cardiovascular: Negative for chest pain, palpitations and leg swelling.   Gastrointestinal: Negative for abdominal distention, abdominal pain, anal bleeding, blood in stool, constipation, diarrhea, nausea, rectal pain and vomiting.   Endocrine: Negative for cold intolerance, heat intolerance, polydipsia, polyphagia and polyuria.   Genitourinary: Negative for decreased urine volume, difficulty urinating, dyspareunia, dysuria, enuresis, flank pain, frequency, genital sores, hematuria, menstrual problem, pelvic pain, urgency,  vaginal bleeding, vaginal discharge and vaginal pain.   Musculoskeletal: Negative for arthralgias, back pain, gait problem, joint swelling, myalgias, neck pain and neck stiffness.   Skin: Negative for color change, pallor and rash.   Allergic/Immunologic: Negative for environmental allergies, food allergies and immunocompromised state.   Neurological: Negative for dizziness, tremors, seizures, syncope, facial asymmetry, speech difficulty, weakness, light-headedness, numbness and headaches.   Hematological: Negative for adenopathy. Does not bruise/bleed easily.   Psychiatric/Behavioral: Positive for dysphoric mood. Negative for agitation, behavioral problems, confusion, decreased concentration, hallucinations, self-injury, sleep disturbance and suicidal ideas. The patient is nervous/anxious. The patient is not hyperactive.      Objective:     Vital Signs (Most Recent):  Temp: 98.3 °F (36.8 °C) (08/22/18 0714)  Pulse: 78 (08/22/18 0714)  Resp: 16 (08/22/18 0714)  BP: 116/66 (08/22/18 0714)  SpO2: 100 % (08/22/18 0714) Vital Signs (24h Range):  Temp:  [97.9 °F (36.6 °C)-98.9 °F (37.2 °C)] 98.3 °F (36.8 °C)  Pulse:  [] 78  Resp:  [16-20] 16  SpO2:  [99 %-100 %] 100 %  BP: (106-142)/(56-93) 116/66     Weight: 101.2 kg (223 lb 1.7 oz)  Body mass index is 40.81 kg/m².  Body surface area is 2.1 meters squared.      Intake/Output Summary (Last 24 hours) at 8/22/2018 0759  Last data filed at 8/22/2018 0638  Gross per 24 hour   Intake 1822.84 ml   Output --   Net 1822.84 ml       Physical Exam   Constitutional: She is oriented to person, place, and time. She appears well-developed and well-nourished. She appears distressed.   HENT:   Head: Normocephalic and atraumatic.   Right Ear: External ear normal.   Left Ear: External ear normal.   Nose: Nose normal. Right sinus exhibits no maxillary sinus tenderness and no frontal sinus tenderness. Left sinus exhibits no maxillary sinus tenderness and no frontal sinus tenderness.    Mouth/Throat: Oropharynx is clear and moist. No oropharyngeal exudate.   Eyes: Conjunctivae, EOM and lids are normal. Pupils are equal, round, and reactive to light. Right eye exhibits no discharge. Left eye exhibits no discharge. Right conjunctiva is not injected. Right conjunctiva has no hemorrhage. Left conjunctiva is not injected. Left conjunctiva has no hemorrhage. No scleral icterus.   Neck: Normal range of motion. Neck supple. No JVD present. No tracheal deviation present. No thyromegaly present.   Cardiovascular: Normal rate and regular rhythm.   Pulmonary/Chest: Effort normal. No stridor. No respiratory distress. She exhibits no tenderness.   Abdominal: Soft. She exhibits no distension and no mass. There is no splenomegaly or hepatomegaly. There is no tenderness. There is no rebound.   Musculoskeletal: Normal range of motion. She exhibits no edema or tenderness.   Lymphadenopathy:     She has no cervical adenopathy.     She has no axillary adenopathy.        Right: No supraclavicular adenopathy present.        Left: No supraclavicular adenopathy present.   Neurological: She is alert and oriented to person, place, and time. No cranial nerve deficit. Coordination normal.   Skin: Skin is dry. No rash noted. She is not diaphoretic. No erythema.   Psychiatric: She has a normal mood and affect. Her behavior is normal. Judgment and thought content normal.   Vitals reviewed.      Significant Labs:   BMP:   Recent Labs   Lab  08/20/18   1451  08/21/18   0428  08/22/18   0534   GLU  108  140*  111*   NA  138  136  138   K  3.8  4.9  4.2   CL  108  109  108   CO2  19*  14*  22*   BUN  11  12  14   CREATININE  0.8  0.8  0.7   CALCIUM  9.4  9.7  9.2   , CBC:   Recent Labs   Lab  08/20/18   1451  08/21/18   0428  08/21/18   1825  08/22/18   0534   WBC  11.05  13.72*   --   25.17*   HGB  4.6*  4.9*  8.4*  7.8*   HCT  18.2*  19.2*  29.2*  27.3*   PLT  26*  34*   --   48*    and CMP:   Recent Labs   Lab  08/20/18   1451   08/21/18   0428  08/22/18   0534   NA  138  136  138   K  3.8  4.9  4.2   CL  108  109  108   CO2  19*  14*  22*   GLU  108  140*  111*   BUN  11  12  14   CREATININE  0.8  0.8  0.7   CALCIUM  9.4  9.7  9.2   PROT  7.4  7.8  6.8   ALBUMIN  4.1  4.2  3.7   BILITOT  0.5  0.4  0.4   ALKPHOS  49*  51*  46*   AST  11  16  7*   ALT  10  10  7*   ANIONGAP  11  13  8   EGFRNONAA  >60  >60  >60       Diagnostic Results:  I have reviewed and interpreted all pertinent imaging results/findings within the past 24 hours.    Assessment/Plan:     * Symptomatic anemia    Reported heavy periods hemoglobin 4.9 iron status to be checked agree with treatment with Venofer 200 mg 1 time dose continue to follow        Chronic ITP (idiopathic thrombocytopenia)    Reported history of ITP since age 12 spoke to emergency room last night recommend patient be treated with dexamethasone 40 mg q.day x4 days with CBCs daily.  08/22/2018 platelet count greater than 40,000 continue with dexamethasone 40 mg daily x4 days intravenous iron given recommend follow-up with me in the next 3-5 days with repeat CBC            Thank you for your consult. I will follow-up with patient. Please contact us if you have any additional questions.     Nash Cota MD  Hematology/Oncology  [facility]

## 2018-08-22 NOTE — PROGRESS NOTES
[facility] Hospital Medicine  Progress Note    Patient Name: Mari Etienne  MRN: [id number]  Patient Class: IP- Inpatient   Admission Date: 8/20/2018  Length of Stay: 1 days  Attending Physician: Hussein Borjas MD  Primary Care Provider: Ifeanyi Tello NP        Subjective:     Principal Problem:Symptomatic anemia    HPI:  Mari Etienne is a 22 y.o. female patient w/ a PMHx of thrombocytopenia and ITP presents to the Emergency Department for generalized weakness which progressively worsened over 10 days. Pt also c/o lightheadedness, fatigue, weakness, dizziness, polydipsia, activity change, SOB, and bruising. Pt states she frequently receives blood transfusions and denies history of reaction.  Pt also reports vaginal bleeding due to menstrual cycle x 4 days.  Symptoms are constant and moderate in severity. No mitigating or exacerbating factors reported. Patient denies any fever, chills, diaphoresis, abdominal pain, N/V/D, dysuria, headache, LOC, numbness, myalgias, and all other sxs at this time. No prior Tx included. No further complaints or concerns at this time.         Hospital Course:  Admitted for symptomatic anemia related to chronic ITP.  Hemoglobin level on admission was 4.6 with symptoms of fatigue and shortness of breath on exertion.  Consulted Hematology.  Started daily Dexamethasone 40 mg by tablet.  Transfuse 2 units PRBC.  Transfusion delayed due to difficulty with crossmatch.  Ordered Iron studies and 200 mg Venofer.    Interval History:  Mild shortness of breath and fatigue.    Review of Systems   Constitutional: Positive for fatigue. Negative for chills and fever.   HENT: Negative for congestion and sore throat.    Eyes: Negative for visual disturbance.   Respiratory: Positive for shortness of breath. Negative for cough and wheezing.    Cardiovascular: Negative for chest pain, palpitations and leg swelling.   Gastrointestinal: Negative for abdominal  pain, blood in stool, constipation, diarrhea, nausea and vomiting.   Genitourinary: Negative for dysuria and hematuria.   Musculoskeletal: Negative for arthralgias and back pain.   Skin: Negative for rash and wound.   Neurological: Negative for dizziness, weakness, light-headedness and numbness.   Hematological: Negative for adenopathy.     Objective:     Vital Signs (Most Recent):  Temp: 98.3 °F (36.8 °C) (08/21/18 1938)  Pulse: 103 (08/21/18 1938)  Resp: 18 (08/21/18 1938)  BP: (!) 125/59 (08/21/18 1938)  SpO2: 100 % (08/21/18 1938) Vital Signs (24h Range):  Temp:  [98.1 °F (36.7 °C)-98.9 °F (37.2 °C)] 98.3 °F (36.8 °C)  Pulse:  [100-120] 103  Resp:  [16-20] 18  SpO2:  [99 %-100 %] 100 %  BP: (117-142)/(58-93) 125/59     Weight: 101.2 kg (223 lb 1.7 oz)  Body mass index is 40.81 kg/m².    Intake/Output Summary (Last 24 hours) at 8/21/2018 2007  Last data filed at 8/21/2018 1800  Gross per 24 hour   Intake 1922.84 ml   Output --   Net 1922.84 ml      Physical Exam   Constitutional: She is oriented to person, place, and time. She appears well-developed and well-nourished. No distress.   HENT:   Head: Normocephalic and atraumatic.   Mouth/Throat: Oropharynx is clear and moist.   Eyes: Conjunctivae and EOM are normal. Pupils are equal, round, and reactive to light.   Neck: Neck supple. No JVD present. No thyromegaly present.   Cardiovascular: Normal rate and regular rhythm. Exam reveals no gallop and no friction rub.   No murmur heard.  Pulmonary/Chest: Effort normal and breath sounds normal. She has no wheezes. She has no rales.   Abdominal: Soft. Bowel sounds are normal. She exhibits no distension. There is no tenderness. There is no rebound and no guarding.   Musculoskeletal: Normal range of motion. She exhibits no edema or deformity.   Lymphadenopathy:     She has no cervical adenopathy.   Neurological: She is alert and oriented to person, place, and time. She has normal reflexes.   Skin: Skin is warm and dry. No  rash noted.   Scattered pale lower extremity ecchymosis.   Psychiatric: She has a normal mood and affect. Her behavior is normal. Judgment and thought content normal.   Nursing note and vitals reviewed.      Significant Labs: All pertinent labs within the past 24 hours have been reviewed.    Significant Imaging: I have reviewed all pertinent imaging results/findings within the past 24 hours.    Assessment/Plan:      * Symptomatic anemia    -in the setting of ITP and menstrual cycle  -H/H 4.6/18.2 on admission  -Type and screen   -2 units of PRBCs tranfused  -+ orthostatics  -will repeat H/H post transfusion and consider additional units if needed  -IV Venofer  -iron studies results pending         Chronic ITP (idiopathic thrombocytopenia)    -h/o  -Platelets 26  -Heme/Onc consulted- MD discussed case with Dr. Cota (Heme/Onc)  -will transfuse platelets if < 20  -will repeat CBC in am   -Decadron initiated           VTE Risk Mitigation (From admission, onward)        Ordered     IP VTE HIGH RISK PATIENT  Once      08/20/18 1814     Place sequential compression device  Until discontinued      08/20/18 1814              Hussein Borjas MD  Department of Hospital Medicine   [facility]

## 2018-08-22 NOTE — PLAN OF CARE
Problem: Patient Care Overview  Goal: Plan of Care Review  Outcome: Ongoing (interventions implemented as appropriate)  Fall prevention precautions maintained, pt remained free of falls throughout shift, call bell and personal items within reach, pt reports feeling much better after blood transfusion. 24 hour chart check completed. Will continue to monitor

## 2018-08-22 NOTE — PLAN OF CARE
08/22/18 1311   Final Note   Assessment Type Final Discharge Note   Discharge Disposition Home   Hospital Follow Up  Appt(s) scheduled? (Office will call paitent per primary nurse)   Right Care Referral Info   Post Acute Recommendation No Care

## 2018-08-22 NOTE — HOSPITAL COURSE
Ms Etienne is a 22 year old female who presented to [facility] with symptomatic anemia related to chronic ITP. She reported running out of her prednisone medication.  Patient Hemoglobin level on admission was 4.6, with symptoms of fatigue and shortness of breath on exertion.  Consulted Hematology/Oncology, who recommend Dexamethasone 40 mg X 4 days.  She was transfused 2 units of PRBC and received Venofer 200 mg X 1. Iron studies were also ordered. Today platelet count increased to 48,000, HGB 7.8.   She has been evaluated today by Hematology/Oncology, received 3 doses of dexamethasone and will be discharged with one dose  for total of four. She will follow up with Dr Cota in 3-5 days for CBC and further evaluation. Patient reports have constipation, magnesium citrate given.  She was seen, examined and deemed suitable for discharge.

## 2018-09-25 ENCOUNTER — HOSPITAL ENCOUNTER (INPATIENT)
Facility: HOSPITAL | Age: 23
LOS: 2 days | Discharge: HOME | DRG: 813 | End: 2018-09-27
Attending: EMERGENCY MEDICINE | Admitting: HOSPITALIST
Payer: MEDICAID

## 2018-09-25 ENCOUNTER — OFFICE VISIT (OUTPATIENT)
Dept: HEMATOLOGY/ONCOLOGY | Facility: CLINIC | Age: 23
DRG: 813 | End: 2018-09-25
Payer: MEDICAID

## 2018-09-25 VITALS
HEART RATE: 117 BPM | BODY MASS INDEX: 41.94 KG/M2 | HEIGHT: 62 IN | TEMPERATURE: 99 F | OXYGEN SATURATION: 100 % | RESPIRATION RATE: 20 BRPM | SYSTOLIC BLOOD PRESSURE: 112 MMHG | WEIGHT: 227.94 LBS | DIASTOLIC BLOOD PRESSURE: 78 MMHG

## 2018-09-25 DIAGNOSIS — D69.3 ACUTE ITP: ICD-10-CM

## 2018-09-25 DIAGNOSIS — D50.0 IRON DEFICIENCY ANEMIA DUE TO CHRONIC BLOOD LOSS: ICD-10-CM

## 2018-09-25 DIAGNOSIS — D69.3 CHRONIC ITP (IDIOPATHIC THROMBOCYTOPENIA): Primary | ICD-10-CM

## 2018-09-25 DIAGNOSIS — D64.9 SYMPTOMATIC ANEMIA: ICD-10-CM

## 2018-09-25 PROBLEM — D50.9 IRON DEFICIENCY ANEMIA: Status: ACTIVE | Noted: 2018-09-25

## 2018-09-25 LAB
ALBUMIN SERPL BCP-MCNC: 4.2 G/DL
ALP SERPL-CCNC: 51 U/L
ALT SERPL W/O P-5'-P-CCNC: 12 U/L
ANION GAP SERPL CALC-SCNC: 14 MMOL/L
ANISOCYTOSIS BLD QL SMEAR: ABNORMAL
APTT BLDCRRT: 27.3 SEC
AST SERPL-CCNC: 14 U/L
BACTERIA #/AREA URNS HPF: ABNORMAL /HPF
BASOPHILS # BLD AUTO: 0.03 K/UL
BASOPHILS NFR BLD: 0.3 %
BILIRUB SERPL-MCNC: 0.5 MG/DL
BILIRUB UR QL STRIP: ABNORMAL
BUN SERPL-MCNC: 9 MG/DL
BURR CELLS BLD QL SMEAR: ABNORMAL
CALCIUM SERPL-MCNC: 9.2 MG/DL
CHLORIDE SERPL-SCNC: 107 MMOL/L
CLARITY UR: CLEAR
CO2 SERPL-SCNC: 18 MMOL/L
COLOR UR: YELLOW
CREAT SERPL-MCNC: 0.7 MG/DL
DACRYOCYTES BLD QL SMEAR: ABNORMAL
DIFFERENTIAL METHOD: ABNORMAL
EOSINOPHIL # BLD AUTO: 0.1 K/UL
EOSINOPHIL NFR BLD: 1.1 %
ERYTHROCYTE [DISTWIDTH] IN BLOOD BY AUTOMATED COUNT: 21 %
EST. GFR  (AFRICAN AMERICAN): >60 ML/MIN/1.73 M^2
EST. GFR  (NON AFRICAN AMERICAN): >60 ML/MIN/1.73 M^2
GLUCOSE SERPL-MCNC: 85 MG/DL
GLUCOSE UR QL STRIP: NEGATIVE
HCT VFR BLD AUTO: 24.4 %
HGB BLD-MCNC: 6.7 G/DL
HGB UR QL STRIP: ABNORMAL
HYALINE CASTS #/AREA URNS LPF: 0 /LPF
HYPOCHROMIA BLD QL SMEAR: ABNORMAL
INR PPP: 1.1
KETONES UR QL STRIP: ABNORMAL
LDH SERPL L TO P-CCNC: 434 U/L
LEUKOCYTE ESTERASE UR QL STRIP: NEGATIVE
LYMPHOCYTES # BLD AUTO: 1.8 K/UL
LYMPHOCYTES NFR BLD: 17.3 %
MCH RBC QN AUTO: 17.9 PG
MCHC RBC AUTO-ENTMCNC: 27.5 G/DL
MCV RBC AUTO: 65 FL
MICROSCOPIC COMMENT: ABNORMAL
MONOCYTES # BLD AUTO: 0.8 K/UL
MONOCYTES NFR BLD: 7.4 %
NEUTROPHILS # BLD AUTO: 7.6 K/UL
NEUTROPHILS NFR BLD: 74.9 %
NITRITE UR QL STRIP: NEGATIVE
OVALOCYTES BLD QL SMEAR: ABNORMAL
PH UR STRIP: 6 [PH] (ref 5–8)
PLATELET # BLD AUTO: 7 K/UL
PLATELET BLD QL SMEAR: ABNORMAL
PMV BLD AUTO: ABNORMAL FL
POIKILOCYTOSIS BLD QL SMEAR: SLIGHT
POLYCHROMASIA BLD QL SMEAR: ABNORMAL
POTASSIUM SERPL-SCNC: 3.8 MMOL/L
PROT SERPL-MCNC: 7.4 G/DL
PROT UR QL STRIP: ABNORMAL
PROTHROMBIN TIME: 11.1 SEC
RBC # BLD AUTO: 3.74 M/UL
RBC #/AREA URNS HPF: >100 /HPF (ref 0–4)
SODIUM SERPL-SCNC: 139 MMOL/L
SP GR UR STRIP: >=1.03 (ref 1–1.03)
SQUAMOUS #/AREA URNS HPF: 3 /HPF
URN SPEC COLLECT METH UR: ABNORMAL
UROBILINOGEN UR STRIP-ACNC: 1 EU/DL
WBC # BLD AUTO: 10.29 K/UL
WBC #/AREA URNS HPF: 0 /HPF (ref 0–5)

## 2018-09-25 PROCEDURE — 83615 LACTATE (LD) (LDH) ENZYME: CPT | Mod: 91

## 2018-09-25 PROCEDURE — 99213 OFFICE O/P EST LOW 20 MIN: CPT | Mod: PBBFAC,PO,25 | Performed by: INTERNAL MEDICINE

## 2018-09-25 PROCEDURE — 86850 RBC ANTIBODY SCREEN: CPT

## 2018-09-25 PROCEDURE — 99284 EMERGENCY DEPT VISIT MOD MDM: CPT | Mod: 27

## 2018-09-25 PROCEDURE — 85730 THROMBOPLASTIN TIME PARTIAL: CPT

## 2018-09-25 PROCEDURE — 63600175 PHARM REV CODE 636 W HCPCS: Performed by: EMERGENCY MEDICINE

## 2018-09-25 PROCEDURE — 81000 URINALYSIS NONAUTO W/SCOPE: CPT

## 2018-09-25 PROCEDURE — 86880 COOMBS TEST DIRECT: CPT

## 2018-09-25 PROCEDURE — 86870 RBC ANTIBODY IDENTIFICATION: CPT

## 2018-09-25 PROCEDURE — 80053 COMPREHEN METABOLIC PANEL: CPT

## 2018-09-25 PROCEDURE — 86922 COMPATIBILITY TEST ANTIGLOB: CPT

## 2018-09-25 PROCEDURE — 85610 PROTHROMBIN TIME: CPT

## 2018-09-25 PROCEDURE — 99215 OFFICE O/P EST HI 40 MIN: CPT | Mod: S$PBB,,, | Performed by: INTERNAL MEDICINE

## 2018-09-25 PROCEDURE — 85025 COMPLETE CBC W/AUTO DIFF WBC: CPT | Mod: 91

## 2018-09-25 PROCEDURE — 99999 PR PBB SHADOW E&M-EST. PATIENT-LVL III: CPT | Mod: PBBFAC,,, | Performed by: INTERNAL MEDICINE

## 2018-09-25 PROCEDURE — 11000001 HC ACUTE MED/SURG PRIVATE ROOM

## 2018-09-25 RX ORDER — SODIUM CHLORIDE 0.9 % (FLUSH) 0.9 %
10 SYRINGE (ML) INJECTION
Status: CANCELLED | OUTPATIENT
Start: 2018-10-01

## 2018-09-25 RX ORDER — IBUPROFEN 200 MG
24 TABLET ORAL
Status: DISCONTINUED | OUTPATIENT
Start: 2018-09-25 | End: 2018-09-27 | Stop reason: HOSPADM

## 2018-09-25 RX ORDER — HEPARIN 100 UNIT/ML
500 SYRINGE INTRAVENOUS
Status: CANCELLED | OUTPATIENT
Start: 2018-09-25

## 2018-09-25 RX ORDER — GLUCAGON 1 MG
1 KIT INJECTION
Status: DISCONTINUED | OUTPATIENT
Start: 2018-09-25 | End: 2018-09-27 | Stop reason: HOSPADM

## 2018-09-25 RX ORDER — ONDANSETRON 2 MG/ML
4 INJECTION INTRAMUSCULAR; INTRAVENOUS EVERY 8 HOURS PRN
Status: DISCONTINUED | OUTPATIENT
Start: 2018-09-25 | End: 2018-09-27 | Stop reason: HOSPADM

## 2018-09-25 RX ORDER — IBUPROFEN 200 MG
16 TABLET ORAL
Status: DISCONTINUED | OUTPATIENT
Start: 2018-09-25 | End: 2018-09-27 | Stop reason: HOSPADM

## 2018-09-25 RX ORDER — ACETAMINOPHEN 325 MG/1
650 TABLET ORAL
Status: CANCELLED | OUTPATIENT
Start: 2018-10-01

## 2018-09-25 RX ORDER — HEPARIN 100 UNIT/ML
500 SYRINGE INTRAVENOUS
Status: CANCELLED | OUTPATIENT
Start: 2018-10-02

## 2018-09-25 RX ORDER — HEPARIN 100 UNIT/ML
500 SYRINGE INTRAVENOUS
Status: CANCELLED | OUTPATIENT
Start: 2018-10-01

## 2018-09-25 RX ORDER — SODIUM CHLORIDE 0.9 % (FLUSH) 0.9 %
10 SYRINGE (ML) INJECTION
Status: CANCELLED | OUTPATIENT
Start: 2018-09-25

## 2018-09-25 RX ORDER — MEPERIDINE HYDROCHLORIDE 25 MG/ML
25 INJECTION INTRAMUSCULAR; INTRAVENOUS; SUBCUTANEOUS
Status: CANCELLED | OUTPATIENT
Start: 2018-10-01

## 2018-09-25 RX ORDER — SODIUM CHLORIDE 0.9 % (FLUSH) 0.9 %
10 SYRINGE (ML) INJECTION
Status: CANCELLED | OUTPATIENT
Start: 2018-10-02

## 2018-09-25 RX ORDER — FAMOTIDINE 10 MG/ML
20 INJECTION INTRAVENOUS
Status: CANCELLED | OUTPATIENT
Start: 2018-10-01

## 2018-09-25 RX ORDER — DEXAMETHASONE 4 MG/1
40 TABLET ORAL
Status: COMPLETED | OUTPATIENT
Start: 2018-09-25 | End: 2018-09-25

## 2018-09-25 RX ORDER — DEXAMETHASONE 4 MG/1
40 TABLET ORAL DAILY
Status: COMPLETED | OUTPATIENT
Start: 2018-09-26 | End: 2018-09-26

## 2018-09-25 RX ORDER — SODIUM CHLORIDE 0.9 % (FLUSH) 0.9 %
5 SYRINGE (ML) INJECTION
Status: DISCONTINUED | OUTPATIENT
Start: 2018-09-25 | End: 2018-09-27 | Stop reason: HOSPADM

## 2018-09-25 RX ADMIN — DEXAMETHASONE 40 MG: 4 TABLET ORAL at 10:09

## 2018-09-25 NOTE — PROGRESS NOTES
Subjective:       Patient ID: Mari Etienne is a 22 y.o. female.    Chief Complaint: Coagulation Disorder; Results; and Anemia    HPI 22-year-old female history of ITP patient treated with 4 days of dexamethasone worse response with platelet count of [id number] discharge patient returns for evaluation and treatment recommended    Past Medical History:   Diagnosis Date    Thrombocytopenia      History reviewed. No pertinent family history.  Social History     Socioeconomic History    Marital status: Single     Spouse name: Not on file    Number of children: Not on file    Years of education: Not on file    Highest education level: Not on file   Social Needs    Financial resource strain: Not on file    Food insecurity - worry: Not on file    Food insecurity - inability: Not on file    Transportation needs - medical: Not on file    Transportation needs - non-medical: Not on file   Occupational History    Not on file   Tobacco Use    Smoking status: Never Smoker   Substance and Sexual Activity    Alcohol use: No     Frequency: Never    Drug use: Not on file    Sexual activity: Not on file   Other Topics Concern    Not on file   Social History Narrative    Not on file     History reviewed. No pertinent surgical history.    Labs:  Lab Results   Component Value Date    WBC 25.17 (H) 08/22/2018    HGB 7.8 (L) 08/22/2018    HCT 27.3 (L) 08/22/2018    MCV 69 (L) 08/22/2018    PLT 48 (L) 08/22/2018     BMP  Lab Results   Component Value Date     08/22/2018    K 4.2 08/22/2018     08/22/2018    CO2 22 (L) 08/22/2018    BUN 14 08/22/2018    CREATININE 0.7 08/22/2018    CALCIUM 9.2 08/22/2018    ANIONGAP 8 08/22/2018    ESTGFRAFRICA >60 08/22/2018    EGFRNONAA >60 08/22/2018     Lab Results   Component Value Date    ALT 7 (L) 08/22/2018    AST 7 (L) 08/22/2018    ALKPHOS 46 (L) 08/22/2018    BILITOT 0.4 08/22/2018       Lab Results   Component Value Date    IRON 16 (L) 08/20/2018    TIBC 480  (H) 08/20/2018    FERRITIN 6 (L) 08/20/2018     Lab Results   Component Value Date    OQILNOPI39 353 03/06/2018     Lab Results   Component Value Date    FOLATE 5.9 03/06/2018     No results found for: TSH      Review of Systems   Constitutional: Positive for fatigue. Negative for activity change, appetite change, chills, diaphoresis, fever and unexpected weight change.   HENT: Negative for congestion, dental problem, drooling, ear discharge, ear pain, facial swelling, hearing loss, mouth sores, nosebleeds, postnasal drip, rhinorrhea, sinus pressure, sneezing, sore throat, tinnitus, trouble swallowing and voice change.    Eyes: Negative for photophobia, pain, discharge, redness, itching and visual disturbance.   Respiratory: Negative for cough, choking, chest tightness, shortness of breath, wheezing and stridor.    Cardiovascular: Negative for chest pain, palpitations and leg swelling.   Gastrointestinal: Negative for abdominal distention, abdominal pain, anal bleeding, blood in stool, constipation, diarrhea, nausea, rectal pain and vomiting.   Endocrine: Negative for cold intolerance, heat intolerance, polydipsia, polyphagia and polyuria.   Genitourinary: Positive for menstrual problem and vaginal bleeding. Negative for decreased urine volume, difficulty urinating, dyspareunia, dysuria, enuresis, flank pain, frequency, genital sores, hematuria, pelvic pain, urgency, vaginal discharge and vaginal pain.   Musculoskeletal: Negative for arthralgias, back pain, gait problem, joint swelling, myalgias, neck pain and neck stiffness.   Skin: Positive for rash. Negative for color change and pallor.   Allergic/Immunologic: Negative for environmental allergies, food allergies and immunocompromised state.   Neurological: Positive for weakness. Negative for dizziness, tremors, seizures, syncope, facial asymmetry, speech difficulty, light-headedness, numbness and headaches.   Hematological: Negative for adenopathy. Does not  bruise/bleed easily.   Psychiatric/Behavioral: Negative for agitation, behavioral problems, confusion, decreased concentration, dysphoric mood, hallucinations, self-injury, sleep disturbance and suicidal ideas. The patient is not nervous/anxious and is not hyperactive.        Objective:      Physical Exam   Constitutional: She is oriented to person, place, and time. She appears well-developed and well-nourished. She appears distressed.   HENT:   Head: Normocephalic and atraumatic.   Right Ear: External ear normal.   Left Ear: External ear normal.   Nose: Nose normal. Right sinus exhibits no maxillary sinus tenderness and no frontal sinus tenderness. Left sinus exhibits no maxillary sinus tenderness and no frontal sinus tenderness.   Mouth/Throat: Oropharynx is clear and moist. No oropharyngeal exudate.   Eyes: Conjunctivae, EOM and lids are normal. Pupils are equal, round, and reactive to light. Right eye exhibits no discharge. Left eye exhibits no discharge. Right conjunctiva is not injected. Right conjunctiva has no hemorrhage. Left conjunctiva is not injected. Left conjunctiva has no hemorrhage. No scleral icterus.   Neck: Normal range of motion. Neck supple. No JVD present. No tracheal deviation present. No thyromegaly present.   Cardiovascular: Normal rate and regular rhythm.   Pulmonary/Chest: Effort normal. No stridor. No respiratory distress. She exhibits no tenderness.   Abdominal: Soft. She exhibits no distension and no mass. There is no splenomegaly or hepatomegaly. There is no tenderness. There is no rebound.   Musculoskeletal: Normal range of motion. She exhibits no edema or tenderness.   Lymphadenopathy:     She has no cervical adenopathy.     She has no axillary adenopathy.        Right: No supraclavicular adenopathy present.        Left: No supraclavicular adenopathy present.   Neurological: She is alert and oriented to person, place, and time. No cranial nerve deficit. Coordination normal.   Skin:  Skin is dry. No rash noted. She is not diaphoretic. No erythema.   Psychiatric: She has a normal mood and affect. Her behavior is normal. Judgment and thought content normal.   Vitals reviewed.          Assessment:      1. Chronic ITP (idiopathic thrombocytopenia)    2. Symptomatic anemia    3. Iron deficiency anemia due to chronic blood loss           Plan:   Laboratory studies checked today including HIV status hepatitis status most likely will need Rituxan orders have been placed in addition profoundly iron deficient will most likely need intravenous iron not tolerant of oral iron orders written reviewed return 48 hr for my review him communicate results through patient portal 40 min face-to-face time coordination of care [id number]          Nash Cota Jr, MD FACP

## 2018-09-26 LAB
ABO + RH BLD: NORMAL
ALBUMIN SERPL BCP-MCNC: 4.1 G/DL
ANION GAP SERPL CALC-SCNC: 11 MMOL/L
ANISOCYTOSIS BLD QL SMEAR: ABNORMAL
BASO STIPL BLD QL SMEAR: ABNORMAL
BASOPHILS # BLD AUTO: 0.02 K/UL
BASOPHILS NFR BLD: 0.2 %
BLD GP AB SCN CELLS X3 SERPL QL: NORMAL
BLD PROD TYP BPU: NORMAL
BLOOD GROUP ANTIBODIES SERPL: NORMAL
BLOOD UNIT EXPIRATION DATE: NORMAL
BLOOD UNIT TYPE CODE: 5100
BLOOD UNIT TYPE: NORMAL
BUN SERPL-MCNC: 12 MG/DL
CALCIUM SERPL-MCNC: 9.8 MG/DL
CHLORIDE SERPL-SCNC: 109 MMOL/L
CO2 SERPL-SCNC: 18 MMOL/L
CODING SYSTEM: NORMAL
CREAT SERPL-MCNC: 0.8 MG/DL
DACRYOCYTES BLD QL SMEAR: ABNORMAL
DAT IGG-SP REAG RBC-IMP: NORMAL
DIFFERENTIAL METHOD: ABNORMAL
DISPENSE STATUS: NORMAL
EOSINOPHIL # BLD AUTO: 0 K/UL
EOSINOPHIL NFR BLD: 0.1 %
ERYTHROCYTE [DISTWIDTH] IN BLOOD BY AUTOMATED COUNT: 21 %
EST. GFR  (AFRICAN AMERICAN): >60 ML/MIN/1.73 M^2
EST. GFR  (NON AFRICAN AMERICAN): >60 ML/MIN/1.73 M^2
GLUCOSE SERPL-MCNC: 150 MG/DL
HCT VFR BLD AUTO: 25.6 %
HGB BLD-MCNC: 7 G/DL
HYPOCHROMIA BLD QL SMEAR: ABNORMAL
LYMPHOCYTES # BLD AUTO: 0.7 K/UL
LYMPHOCYTES NFR BLD: 5.9 %
MAGNESIUM SERPL-MCNC: 2.3 MG/DL
MCH RBC QN AUTO: 17.9 PG
MCHC RBC AUTO-ENTMCNC: 27.3 G/DL
MCV RBC AUTO: 66 FL
MONOCYTES # BLD AUTO: 0.1 K/UL
MONOCYTES NFR BLD: 0.7 %
NEUTROPHILS # BLD AUTO: 11.1 K/UL
NEUTROPHILS NFR BLD: 94.1 %
NUM UNITS TRANS PACKED RBC: NORMAL
OVALOCYTES BLD QL SMEAR: ABNORMAL
PHOSPHATE SERPL-MCNC: 3.2 MG/DL
PHOSPHATE SERPL-MCNC: 3.2 MG/DL
PLATELET # BLD AUTO: 15 K/UL
PLATELET BLD QL SMEAR: ABNORMAL
PMV BLD AUTO: ABNORMAL FL
POIKILOCYTOSIS BLD QL SMEAR: SLIGHT
POLYCHROMASIA BLD QL SMEAR: ABNORMAL
POTASSIUM SERPL-SCNC: 4.6 MMOL/L
RBC # BLD AUTO: 3.91 M/UL
SCHISTOCYTES BLD QL SMEAR: PRESENT
SODIUM SERPL-SCNC: 138 MMOL/L
STOMATOCYTES BLD QL SMEAR: PRESENT
TARGETS BLD QL SMEAR: ABNORMAL
WBC # BLD AUTO: 11.86 K/UL

## 2018-09-26 PROCEDURE — 85025 COMPLETE CBC W/AUTO DIFF WBC: CPT

## 2018-09-26 PROCEDURE — 86902 BLOOD TYPE ANTIGEN DONOR EA: CPT

## 2018-09-26 PROCEDURE — 36415 COLL VENOUS BLD VENIPUNCTURE: CPT

## 2018-09-26 PROCEDURE — 25000003 PHARM REV CODE 250: Performed by: NURSE PRACTITIONER

## 2018-09-26 PROCEDURE — 80069 RENAL FUNCTION PANEL: CPT

## 2018-09-26 PROCEDURE — 86901 BLOOD TYPING SEROLOGIC RH(D): CPT

## 2018-09-26 PROCEDURE — 86039 ANTINUCLEAR ANTIBODIES (ANA): CPT

## 2018-09-26 PROCEDURE — 86850 RBC ANTIBODY SCREEN: CPT

## 2018-09-26 PROCEDURE — 86900 BLOOD TYPING SEROLOGIC ABO: CPT

## 2018-09-26 PROCEDURE — P9016 RBC LEUKOCYTES REDUCED: HCPCS

## 2018-09-26 PROCEDURE — 11000001 HC ACUTE MED/SURG PRIVATE ROOM

## 2018-09-26 PROCEDURE — 63600175 PHARM REV CODE 636 W HCPCS: Performed by: HOSPITALIST

## 2018-09-26 PROCEDURE — 86038 ANTINUCLEAR ANTIBODIES: CPT

## 2018-09-26 PROCEDURE — 99233 SBSQ HOSP IP/OBS HIGH 50: CPT | Mod: ,,, | Performed by: INTERNAL MEDICINE

## 2018-09-26 PROCEDURE — 86922 COMPATIBILITY TEST ANTIGLOB: CPT

## 2018-09-26 PROCEDURE — 86235 NUCLEAR ANTIGEN ANTIBODY: CPT | Mod: 59

## 2018-09-26 PROCEDURE — 83735 ASSAY OF MAGNESIUM: CPT

## 2018-09-26 RX ORDER — PANTOPRAZOLE SODIUM 40 MG/1
40 TABLET, DELAYED RELEASE ORAL DAILY
Status: DISCONTINUED | OUTPATIENT
Start: 2018-09-26 | End: 2018-09-27 | Stop reason: HOSPADM

## 2018-09-26 RX ORDER — HYDROCODONE BITARTRATE AND ACETAMINOPHEN 500; 5 MG/1; MG/1
TABLET ORAL
Status: DISCONTINUED | OUTPATIENT
Start: 2018-09-26 | End: 2018-09-27 | Stop reason: HOSPADM

## 2018-09-26 RX ADMIN — DEXAMETHASONE 40 MG: 4 TABLET ORAL at 08:09

## 2018-09-26 RX ADMIN — PANTOPRAZOLE SODIUM 40 MG: 40 TABLET, DELAYED RELEASE ORAL at 11:09

## 2018-09-26 NOTE — ASSESSMENT & PLAN NOTE
- Hb 6.7  - 2/2 iron deficiency from acute blood loss from menstruation  - type and screen  - follow up hem/onc when appropriate to transfuse

## 2018-09-26 NOTE — PLAN OF CARE
Problem: Patient Care Overview  Goal: Plan of Care Review  Outcome: Ongoing (interventions implemented as appropriate)  Pt remains free from injury and falls. Fall and bleeding precautions in place. Independent. Tolerating regular diet. Denies pain and nausea. Meds given as ordered. Pt able to verbalize wants and needs. Bed in low, locked position, call light and personal items within reach of pt. POC discussed with pt. Verbalized understanding. VSS. Will continue to monitor.

## 2018-09-26 NOTE — ASSESSMENT & PLAN NOTE
- Plt > 10 k   - 4 on IV steroid   - monitor CBC daily  - ABD US did not show any HSM  - HIV and hepatitis panel pending

## 2018-09-26 NOTE — HPI
22F h/o ITP and anemia  sent by Dr. Cota (hem/onc) to ER for abnormal labs. Pt had routine labs done with Dr. Cota today which showed low platelet level. Symptoms are constant and moderate in severity. No mitigating or exacerbating factors reported. Associated sxs include bruising and petechia to bilateral upper and lower extremeties.  Pt reports being admitted to hospital last month for anemia and was given 2 units of blood. Pt reports she is on her menstrual period at this time. Patient denies any CP, SOB, fever, chills, n/v/d, and all other sxs at this time. Prior Tx includes prednisone. No further complaints or concerns at this time.  In ER, patient is tachycardic.  She was given 1L bolus by ER.  Platelet count is 7,000.  Hem/onc consulted and recommends giving patient decadron 40mg IV daily, first dose now.  Hospital medicine called for admission.    Hematology/oncology consulted for further management of ITP.

## 2018-09-26 NOTE — H&P
[facility] Hospital Medicine  History & Physical    Patient Name: Mari Etienne  MRN: [id number]  Admission Date: 9/25/2018  Attending Physician: Jayson Downs MD  Primary Care Provider: Ifeanyi Tello NP         Patient information was obtained from patient and ER records.     Subjective:     Principal Problem:Acute ITP    Chief Complaint:   Chief Complaint   Patient presents with    Abnormal Lab     pt instructed to come to ED for low platelet level. Hx of ITP        HPI: 22F h/o ITP and anemia  sent by Dr. Cota (hem/onc) to ER for abnormal labs. Pt had routine labs done with Dr. Cota today which showed low platelet level. Symptoms are constant and moderate in severity. No mitigating or exacerbating factors reported. Associated sxs include bruising and petechia to bilateral upper and lower extremeties.  Pt reports being admitted to hospital last month for anemia and was given 2 units of blood. Pt reports she is on her menstrual period at this time. Patient denies any CP, SOB, fever, chills, n/v/d, and all other sxs at this time. Prior Tx includes prednisone. No further complaints or concerns at this time.  In ER, patient is tachycardic.  She was given 1L bolus by ER.  Platelet count is 7,000.  Hem/onc consulted and recommends giving patient decadron 40mg IV daily, first dose now.  Hospital medicine called for admission.        Past Medical History:   Diagnosis Date    Thrombocytopenia        History reviewed. No pertinent surgical history.    Review of patient's allergies indicates:   Allergen Reactions    Ibuprofen        No current facility-administered medications on file prior to encounter.      Current Outpatient Medications on File Prior to Encounter   Medication Sig    [DISCONTINUED] docusate sodium (COLACE) 100 MG capsule Take 1 capsule (100 mg total) by mouth 2 (two) times daily.    [DISCONTINUED] ferrous sulfate 325 mg (65 mg iron) Tab tablet Take 1 tablet (325 mg  total) by mouth daily with breakfast.     Family History     Reviewed and not Pertinent           Tobacco Use    Smoking status: Never Smoker    Smokeless tobacco: Never Used   Substance and Sexual Activity    Alcohol use: No     Frequency: Never    Drug use: No    Sexual activity: Not on file     Review of Systems   Constitutional: Negative for chills, diaphoresis, fatigue, fever and unexpected weight change.   HENT: Negative for congestion, facial swelling, sore throat and trouble swallowing.    Eyes: Negative for photophobia, redness and visual disturbance.   Respiratory: Negative for apnea, cough, chest tightness, shortness of breath and wheezing.    Cardiovascular: Negative for chest pain, palpitations and leg swelling.   Gastrointestinal: Negative for abdominal distention, abdominal pain, blood in stool, constipation, diarrhea, nausea and vomiting.   Endocrine: Negative for polydipsia, polyphagia and polyuria.   Genitourinary: Positive for vaginal bleeding. Negative for difficulty urinating, dysuria, flank pain, frequency, hematuria and urgency.   Musculoskeletal: Negative for arthralgias, back pain, joint swelling, myalgias and neck stiffness.   Skin: Positive for color change (petecchiae of body and extremities). Negative for pallor and rash.   Allergic/Immunologic: Negative for immunocompromised state.   Neurological: Negative for dizziness, tremors, syncope, weakness, light-headedness and headaches.   Psychiatric/Behavioral: Negative for agitation, confusion and suicidal ideas.   All other systems reviewed and are negative.    Objective:     Vital Signs (Most Recent):  Temp: 98.2 °F (36.8 °C) (09/25/18 2232)  Pulse: 101 (09/25/18 2232)  Resp: 18 (09/25/18 2232)  BP: 138/70 (09/25/18 2232)  SpO2: 100 % (09/25/18 2232) Vital Signs (24h Range):  Temp:  [98.2 °F (36.8 °C)-99.6 °F (37.6 °C)] 98.2 °F (36.8 °C)  Pulse:  [101-117] 101  Resp:  [18-20] 18  SpO2:  [100 %] 100 %  BP: (112-138)/(70-83) 138/70      Weight: 103.1 kg (227 lb 4.7 oz)  Body mass index is 41.57 kg/m².    Physical Exam   Constitutional: She is oriented to person, place, and time. She appears well-developed and well-nourished. No distress.   HENT:   Head: Normocephalic and atraumatic.   Mouth/Throat: Oropharynx is clear and moist.   Eyes: Conjunctivae and EOM are normal. Pupils are equal, round, and reactive to light. No scleral icterus.   Neck: Normal range of motion. Neck supple. No JVD present. No thyromegaly present.   Cardiovascular: Normal rate, regular rhythm and intact distal pulses. Exam reveals no gallop and no friction rub.   No murmur heard.  Pulmonary/Chest: Effort normal and breath sounds normal. No respiratory distress. She has no wheezes. She has no rales. She exhibits no tenderness.   Abdominal: Soft. Bowel sounds are normal. She exhibits no distension and no mass. There is no tenderness. There is no guarding.   Musculoskeletal: Normal range of motion. She exhibits no edema, tenderness or deformity.   Neurological: She is alert and oriented to person, place, and time. No cranial nerve deficit.   Skin: Skin is warm and dry. Capillary refill takes 2 to 3 seconds. Rash (petecchiae and ecchymosis of bilateral upper/lower extremities) noted. She is not diaphoretic. No erythema.   Psychiatric: She has a normal mood and affect.   Nursing note and vitals reviewed.        CRANIAL NERVES     CN III, IV, VI   Pupils are equal, round, and reactive to light.  Extraocular motions are normal.        Significant Labs:   CBC:   Recent Labs   Lab  09/25/18   1705  09/25/18   2105   WBC  9.03  10.29   HGB  6.8*  6.7*   HCT  24.5*  24.4*   PLT  6*  7*     CMP:   Recent Labs   Lab  09/25/18   2105   NA  139   K  3.8   CL  107   CO2  18*   GLU  85   BUN  9   CREATININE  0.7   CALCIUM  9.2   PROT  7.4   ALBUMIN  4.2   BILITOT  0.5   ALKPHOS  51*   AST  14   ALT  12   ANIONGAP  14   EGFRNONAA  >60     Lactic Acid: No results for input(s): LACTATE in the  last 48 hours.  Magnesium: No results for input(s): MG in the last 48 hours.  Troponin: No results for input(s): TROPONINI in the last 48 hours.  Urine Studies: No results for input(s): COLORU, APPEARANCEUA, PHUR, SPECGRAV, PROTEINUA, GLUCUA, KETONESU, BILIRUBINUA, OCCULTUA, NITRITE, UROBILINOGEN, LEUKOCYTESUR, RBCUA, WBCUA, BACTERIA, SQUAMEPITHEL, HYALINECASTS in the last 48 hours.    Invalid input(s): WRIGHTSUR  All pertinent labs within the past 24 hours have been reviewed.    Significant Imaging: I have reviewed all pertinent imaging results/findings within the past 24 hours.   Imaging Results    None           Assessment/Plan:     * Acute ITP    - Plt 7  - 40 mg decadron IV daily per Oncology, first dose now  - monitor CBC daily            Iron deficiency anemia    - Hb 6.7  - 2/2 iron deficiency from acute blood loss from menstruation  - type and screen  - follow up hem/onc when appropriate to transfuse            VTE Risk Mitigation (From admission, onward)        Ordered     Reason for No Pharmacological VTE Prophylaxis  Once      09/25/18 2147     Place sequential compression device  Until discontinued      09/25/18 2147     IP VTE HIGH RISK PATIENT  Once      09/25/18 2147             Jayson Downs MD  Department of Hospital Medicine   [facility]

## 2018-09-26 NOTE — SUBJECTIVE & OBJECTIVE
Past Medical History:   Diagnosis Date    Thrombocytopenia        History reviewed. No pertinent surgical history.    Review of patient's allergies indicates:   Allergen Reactions    Ibuprofen        No current facility-administered medications on file prior to encounter.      Current Outpatient Medications on File Prior to Encounter   Medication Sig    [DISCONTINUED] docusate sodium (COLACE) 100 MG capsule Take 1 capsule (100 mg total) by mouth 2 (two) times daily.    [DISCONTINUED] ferrous sulfate 325 mg (65 mg iron) Tab tablet Take 1 tablet (325 mg total) by mouth daily with breakfast.     Family History     Reviewed and not Pertinent           Tobacco Use    Smoking status: Never Smoker    Smokeless tobacco: Never Used   Substance and Sexual Activity    Alcohol use: No     Frequency: Never    Drug use: No    Sexual activity: Not on file     Review of Systems   Constitutional: Negative for chills, diaphoresis, fatigue, fever and unexpected weight change.   HENT: Negative for congestion, facial swelling, sore throat and trouble swallowing.    Eyes: Negative for photophobia, redness and visual disturbance.   Respiratory: Negative for apnea, cough, chest tightness, shortness of breath and wheezing.    Cardiovascular: Negative for chest pain, palpitations and leg swelling.   Gastrointestinal: Negative for abdominal distention, abdominal pain, blood in stool, constipation, diarrhea, nausea and vomiting.   Endocrine: Negative for polydipsia, polyphagia and polyuria.   Genitourinary: Positive for vaginal bleeding. Negative for difficulty urinating, dysuria, flank pain, frequency, hematuria and urgency.   Musculoskeletal: Negative for arthralgias, back pain, joint swelling, myalgias and neck stiffness.   Skin: Positive for color change (petecchiae of body and extremities). Negative for pallor and rash.   Allergic/Immunologic: Negative for immunocompromised state.   Neurological: Negative for dizziness, tremors,  syncope, weakness, light-headedness and headaches.   Psychiatric/Behavioral: Negative for agitation, confusion and suicidal ideas.   All other systems reviewed and are negative.    Objective:     Vital Signs (Most Recent):  Temp: 98.2 °F (36.8 °C) (09/25/18 2232)  Pulse: 101 (09/25/18 2232)  Resp: 18 (09/25/18 2232)  BP: 138/70 (09/25/18 2232)  SpO2: 100 % (09/25/18 2232) Vital Signs (24h Range):  Temp:  [98.2 °F (36.8 °C)-99.6 °F (37.6 °C)] 98.2 °F (36.8 °C)  Pulse:  [101-117] 101  Resp:  [18-20] 18  SpO2:  [100 %] 100 %  BP: (112-138)/(70-83) 138/70     Weight: 103.1 kg (227 lb 4.7 oz)  Body mass index is 41.57 kg/m².    Physical Exam   Constitutional: She is oriented to person, place, and time. She appears well-developed and well-nourished. No distress.   HENT:   Head: Normocephalic and atraumatic.   Mouth/Throat: Oropharynx is clear and moist.   Eyes: Conjunctivae and EOM are normal. Pupils are equal, round, and reactive to light. No scleral icterus.   Neck: Normal range of motion. Neck supple. No JVD present. No thyromegaly present.   Cardiovascular: Normal rate, regular rhythm and intact distal pulses. Exam reveals no gallop and no friction rub.   No murmur heard.  Pulmonary/Chest: Effort normal and breath sounds normal. No respiratory distress. She has no wheezes. She has no rales. She exhibits no tenderness.   Abdominal: Soft. Bowel sounds are normal. She exhibits no distension and no mass. There is no tenderness. There is no guarding.   Musculoskeletal: Normal range of motion. She exhibits no edema, tenderness or deformity.   Neurological: She is alert and oriented to person, place, and time. No cranial nerve deficit.   Skin: Skin is warm and dry. Capillary refill takes 2 to 3 seconds. Rash (petecchiae and ecchymosis of bilateral upper/lower extremities) noted. She is not diaphoretic. No erythema.   Psychiatric: She has a normal mood and affect.   Nursing note and vitals reviewed.        CRANIAL NERVES      CN III, IV, VI   Pupils are equal, round, and reactive to light.  Extraocular motions are normal.        Significant Labs:   CBC:   Recent Labs   Lab  09/25/18   1705  09/25/18   2105   WBC  9.03  10.29   HGB  6.8*  6.7*   HCT  24.5*  24.4*   PLT  6*  7*     CMP:   Recent Labs   Lab  09/25/18   2105   NA  139   K  3.8   CL  107   CO2  18*   GLU  85   BUN  9   CREATININE  0.7   CALCIUM  9.2   PROT  7.4   ALBUMIN  4.2   BILITOT  0.5   ALKPHOS  51*   AST  14   ALT  12   ANIONGAP  14   EGFRNONAA  >60     Lactic Acid: No results for input(s): LACTATE in the last 48 hours.  Magnesium: No results for input(s): MG in the last 48 hours.  Troponin: No results for input(s): TROPONINI in the last 48 hours.  Urine Studies: No results for input(s): COLORU, APPEARANCEUA, PHUR, SPECGRAV, PROTEINUA, GLUCUA, KETONESU, BILIRUBINUA, OCCULTUA, NITRITE, UROBILINOGEN, LEUKOCYTESUR, RBCUA, WBCUA, BACTERIA, SQUAMEPITHEL, HYALINECASTS in the last 48 hours.    Invalid input(s): WRIGHTSUR  All pertinent labs within the past 24 hours have been reviewed.    Significant Imaging: I have reviewed all pertinent imaging results/findings within the past 24 hours.   Imaging Results    None

## 2018-09-26 NOTE — ASSESSMENT & PLAN NOTE
09/26/18 - Hemoglobin today 7.0 (6.7).  Patient denies shortness of breath, chest pain, heart palpitations, or lightheadedness/dizziness.  She complains of fatigue.  She states that she has had light menstrual bleeding on/off for the past 2 weeks.  Dr. Cota ordered iron studies that were drawn yesterday with clinic appointment.  Will transfuse iron accordingly once resulted.  - CBC daily  - Venofer 200 mg IV if iron studies warrant upon resulting  - Monitor for increased bleeding.

## 2018-09-26 NOTE — SUBJECTIVE & OBJECTIVE
Oncology Treatment Plan:   OP RITUXIMAB QW    Medications:  Continuous Infusions:  Scheduled Meds:  PRN Meds:dextrose 50%, dextrose 50%, glucagon (human recombinant), glucose, glucose, ondansetron, promethazine (PHENERGAN) IVPB, sodium chloride 0.9%     Review of patient's allergies indicates:   Allergen Reactions    Ibuprofen         Past Medical History:   Diagnosis Date    Thrombocytopenia      History reviewed. No pertinent surgical history.  Family History     None        Tobacco Use    Smoking status: Never Smoker    Smokeless tobacco: Never Used   Substance and Sexual Activity    Alcohol use: No     Frequency: Never    Drug use: No    Sexual activity: Not on file       Review of Systems   Constitutional: Positive for fatigue. Negative for chills and fever.   HENT: Negative for mouth sores and nosebleeds.    Respiratory: Negative for chest tightness and shortness of breath.    Cardiovascular: Negative for chest pain and palpitations.   Gastrointestinal: Negative for abdominal pain, anal bleeding, blood in stool, nausea and vomiting.   Endocrine: Negative for cold intolerance and heat intolerance.   Genitourinary: Positive for menstrual problem and vaginal bleeding. Negative for dysuria and hematuria.   Skin: Positive for rash. Negative for pallor.   Neurological: Negative for dizziness, syncope and light-headedness.   Hematological: Negative for adenopathy. Bruises/bleeds easily.   Psychiatric/Behavioral: Negative for confusion and decreased concentration. The patient is nervous/anxious.      Objective:     Vital Signs (Most Recent):  Temp: 98.5 °F (36.9 °C) (09/26/18 0756)  Pulse: 96 (09/26/18 0756)  Resp: 17 (09/26/18 0756)  BP: 130/83 (09/26/18 0756)  SpO2: 99 % (09/26/18 0756) Vital Signs (24h Range):  Temp:  [98.2 °F (36.8 °C)-99.6 °F (37.6 °C)] 98.5 °F (36.9 °C)  Pulse:  [] 96  Resp:  [17-20] 17  SpO2:  [99 %-100 %] 99 %  BP: (112-138)/(63-83) 130/83     Weight: 103 kg (227 lb 1.2 oz)  Body  mass index is 41.53 kg/m².  Body surface area is 2.12 meters squared.      Intake/Output Summary (Last 24 hours) at 9/26/2018 0954  Last data filed at 9/26/2018 0817  Gross per 24 hour   Intake 120 ml   Output --   Net 120 ml       Physical Exam   Constitutional: She is oriented to person, place, and time. Vital signs are normal. She appears well-developed and well-nourished.  Non-toxic appearance. She does not have a sickly appearance. She appears ill. No distress.   HENT:   Head: Normocephalic and atraumatic.   Eyes: Conjunctivae, EOM and lids are normal. No scleral icterus.   Cardiovascular: Normal rate, regular rhythm, S1 normal, S2 normal and normal heart sounds. Exam reveals no gallop and no friction rub.   No murmur heard.  Pulmonary/Chest: Effort normal and breath sounds normal. No accessory muscle usage or stridor. No apnea, no tachypnea and no bradypnea. No respiratory distress. She has no wheezes. She has no rhonchi. She has no rales.   Abdominal: Soft. Normal appearance.   Musculoskeletal: Normal range of motion.   Neurological: She is alert and oriented to person, place, and time.   Skin: Skin is warm, dry and intact. Bruising and petechiae (arms and legs) noted.   Psychiatric: Her speech is normal and behavior is normal. Judgment and thought content normal. Her mood appears anxious. Cognition and memory are normal. She is attentive.   Vitals reviewed.      Significant Labs:   BMP:   Recent Labs   Lab  09/25/18 2105 09/26/18   0459   GLU  85  150*   NA  139  138   K  3.8  4.6   CL  107  109   CO2  18*  18*   BUN  9  12   CREATININE  0.7  0.8   CALCIUM  9.2  9.8   MG   --   2.3   , CBC:   Recent Labs   Lab  09/25/18   1705  09/25/18 2105 09/26/18   0459   WBC  9.03  10.29  11.86   HGB  6.8*  6.7*  7.0*   HCT  24.5*  24.4*  25.6*   PLT  6*  7*  15*   , CMP:   Recent Labs   Lab  09/25/18 2105 09/26/18   0459   NA  139  138   K  3.8  4.6   CL  107  109   CO2  18*  18*   GLU  85  150*   BUN  9  12    CREATININE  0.7  0.8   CALCIUM  9.2  9.8   PROT  7.4   --    ALBUMIN  4.2  4.1   BILITOT  0.5   --    ALKPHOS  51*   --    AST  14   --    ALT  12   --    ANIONGAP  14  11   EGFRNONAA  >60  >60   , Coagulation:   Recent Labs   Lab  09/25/18 2105   INR  1.1   APTT  27.3   , LDH: No results for input(s): LDHCSF, BFSOURCE in the last 48 hours., LFTs:   Recent Labs   Lab  09/25/18 2105 09/26/18   0459   ALT  12   --    AST  14   --    ALKPHOS  51*   --    BILITOT  0.5   --    PROT  7.4   --    ALBUMIN  4.2  4.1   , Reticulocytes: No results for input(s): RETIC in the last 48 hours., Urine Studies:   Recent Labs   Lab  09/25/18   2249   COLORU  Yellow   APPEARANCEUA  Clear   PHUR  6.0   SPECGRAV  >=1.030*   PROTEINUA  1+*   GLUCUA  Negative   KETONESU  1+*   BILIRUBINUA  1+*   OCCULTUA  3+*   NITRITE  Negative   UROBILINOGEN  1.0   LEUKOCYTESUR  Negative   RBCUA  >100*   WBCUA  0   BACTERIA  Rare   SQUAMEPITHEL  3   HYALINECASTS  0    and All pertinent labs from the last 24 hours have been reviewed.    Diagnostic Results:  I have reviewed all pertinent imaging results/findings within the past 24 hours.

## 2018-09-26 NOTE — CONSULTS
[facility] -   Hematology/Oncology  Consult Note    Patient Name: Mari Etienne  MRN: [id number]  Admission Date: 9/25/2018  Hospital Length of Stay: 1 days  Code Status: Full Code   Attending Provider: Champ Randhawa, *  Consulting Provider: Nelida Johns NP  Primary Care Physician: Ifeanyi Tello NP  Principal Problem:Acute ITP    Consults  Subjective:     HPI:  22F h/o ITP and anemia  sent by Dr. Cota (hem/onc) to ER for abnormal labs. Pt had routine labs done with Dr. Cota today which showed low platelet level. Symptoms are constant and moderate in severity. No mitigating or exacerbating factors reported. Associated sxs include bruising and petechia to bilateral upper and lower extremeties.  Pt reports being admitted to hospital last month for anemia and was given 2 units of blood. Pt reports she is on her menstrual period at this time. Patient denies any CP, SOB, fever, chills, n/v/d, and all other sxs at this time. Prior Tx includes prednisone. No further complaints or concerns at this time.  In ER, patient is tachycardic.  She was given 1L bolus by ER.  Platelet count is 7,000.  Hem/onc consulted and recommends giving patient decadron 40mg IV daily, first dose now.  Hospital medicine called for admission.    Hematology/oncology consulted for further management of ITP.      Oncology Treatment Plan:   OP RITUXIMAB QW    Medications:  Continuous Infusions:  Scheduled Meds:  PRN Meds:dextrose 50%, dextrose 50%, glucagon (human recombinant), glucose, glucose, ondansetron, promethazine (PHENERGAN) IVPB, sodium chloride 0.9%     Review of patient's allergies indicates:   Allergen Reactions    Ibuprofen         Past Medical History:   Diagnosis Date    Thrombocytopenia      History reviewed. No pertinent surgical history.  Family History     None        Tobacco Use    Smoking status: Never Smoker    Smokeless tobacco: Never Used   Substance and Sexual Activity    Alcohol  use: No     Frequency: Never    Drug use: No    Sexual activity: Not on file       Review of Systems   Constitutional: Positive for fatigue. Negative for chills and fever.   HENT: Negative for mouth sores and nosebleeds.    Respiratory: Negative for chest tightness and shortness of breath.    Cardiovascular: Negative for chest pain and palpitations.   Gastrointestinal: Negative for abdominal pain, anal bleeding, blood in stool, nausea and vomiting.   Endocrine: Negative for cold intolerance and heat intolerance.   Genitourinary: Positive for menstrual problem and vaginal bleeding. Negative for dysuria and hematuria.   Skin: Positive for rash. Negative for pallor.   Neurological: Negative for dizziness, syncope and light-headedness.   Hematological: Negative for adenopathy. Bruises/bleeds easily.   Psychiatric/Behavioral: Negative for confusion and decreased concentration. The patient is nervous/anxious.      Objective:     Vital Signs (Most Recent):  Temp: 98.5 °F (36.9 °C) (09/26/18 0756)  Pulse: 96 (09/26/18 0756)  Resp: 17 (09/26/18 0756)  BP: 130/83 (09/26/18 0756)  SpO2: 99 % (09/26/18 0756) Vital Signs (24h Range):  Temp:  [98.2 °F (36.8 °C)-99.6 °F (37.6 °C)] 98.5 °F (36.9 °C)  Pulse:  [] 96  Resp:  [17-20] 17  SpO2:  [99 %-100 %] 99 %  BP: (112-138)/(63-83) 130/83     Weight: 103 kg (227 lb 1.2 oz)  Body mass index is 41.53 kg/m².  Body surface area is 2.12 meters squared.      Intake/Output Summary (Last 24 hours) at 9/26/2018 0954  Last data filed at 9/26/2018 0817  Gross per 24 hour   Intake 120 ml   Output --   Net 120 ml       Physical Exam   Constitutional: She is oriented to person, place, and time. Vital signs are normal. She appears well-developed and well-nourished.  Non-toxic appearance. She does not have a sickly appearance. She appears ill. No distress.   HENT:   Head: Normocephalic and atraumatic.   Eyes: Conjunctivae, EOM and lids are normal. No scleral icterus.   Cardiovascular:  Normal rate, regular rhythm, S1 normal, S2 normal and normal heart sounds. Exam reveals no gallop and no friction rub.   No murmur heard.  Pulmonary/Chest: Effort normal and breath sounds normal. No accessory muscle usage or stridor. No apnea, no tachypnea and no bradypnea. No respiratory distress. She has no wheezes. She has no rhonchi. She has no rales.   Abdominal: Soft. Normal appearance.   Musculoskeletal: Normal range of motion.   Neurological: She is alert and oriented to person, place, and time.   Skin: Skin is warm, dry and intact. Bruising and petechiae (arms and legs) noted.   Psychiatric: Her speech is normal and behavior is normal. Judgment and thought content normal. Her mood appears anxious. Cognition and memory are normal. She is attentive.   Vitals reviewed.      Significant Labs:   BMP:   Recent Labs   Lab  09/25/18 2105 09/26/18 0459   GLU  85  150*   NA  139  138   K  3.8  4.6   CL  107  109   CO2  18*  18*   BUN  9  12   CREATININE  0.7  0.8   CALCIUM  9.2  9.8   MG   --   2.3   , CBC:   Recent Labs   Lab  09/25/18   1705  09/25/18 2105 09/26/18 0459   WBC  9.03  10.29  11.86   HGB  6.8*  6.7*  7.0*   HCT  24.5*  24.4*  25.6*   PLT  6*  7*  15*   , CMP:   Recent Labs   Lab  09/25/18 2105 09/26/18 0459   NA  139  138   K  3.8  4.6   CL  107  109   CO2  18*  18*   GLU  85  150*   BUN  9  12   CREATININE  0.7  0.8   CALCIUM  9.2  9.8   PROT  7.4   --    ALBUMIN  4.2  4.1   BILITOT  0.5   --    ALKPHOS  51*   --    AST  14   --    ALT  12   --    ANIONGAP  14  11   EGFRNONAA  >60  >60   , Coagulation:   Recent Labs   Lab  09/25/18 2105   INR  1.1   APTT  27.3   , LDH: No results for input(s): LDHCSF, BFSOURCE in the last 48 hours., LFTs:   Recent Labs   Lab  09/25/18 2105 09/26/18 0459   ALT  12   --    AST  14   --    ALKPHOS  51*   --    BILITOT  0.5   --    PROT  7.4   --    ALBUMIN  4.2  4.1   , Reticulocytes: No results for input(s): RETIC in the last 48 hours., Urine  Studies:   Recent Labs   Lab  09/25/18   2249   COLORU  Yellow   APPEARANCEUA  Clear   PHUR  6.0   SPECGRAV  >=1.030*   PROTEINUA  1+*   GLUCUA  Negative   KETONESU  1+*   BILIRUBINUA  1+*   OCCULTUA  3+*   NITRITE  Negative   UROBILINOGEN  1.0   LEUKOCYTESUR  Negative   RBCUA  >100*   WBCUA  0   BACTERIA  Rare   SQUAMEPITHEL  3   HYALINECASTS  0    and All pertinent labs from the last 24 hours have been reviewed.    Diagnostic Results:  I have reviewed all pertinent imaging results/findings within the past 24 hours.    Assessment/Plan:     * Acute ITP    Platelet count today 15K, up from yesterday 7K.  She has received 2 doses of dexamethasone 40mg.  Dr. Cota ordered HIV testing as well as hepatitis panel.  Awaiting results  - Will order ADRIANA today  - Complete US of abdomen  - Possible Rituxan  -Continue dexamethasone 40 mg PO daily X 4 days  - Add PPI       Iron deficiency anemia    09/26/18 - Hemoglobin today 7.0 (6.7).  Patient denies shortness of breath, chest pain, heart palpitations, or lightheadedness/dizziness.  She complains of fatigue.  She states that she has had light menstrual bleeding on/off for the past 2 weeks.  Dr. Cota ordered iron studies that were drawn yesterday with clinic appointment.  Will transfuse iron accordingly once resulted.  - CBC daily  - Venofer 200 mg IV if iron studies warrant upon resulting  - Monitor for increased bleeding.    - Transfuse 1 unit PRBCs            Agree with assessment and plan.  Check plt count in am and home if continues to improve.       Thank you for your consult. I will follow-up with patient. Please contact us if you have any additional questions.    Nelida Johns NP  Hematology/Oncology  [facility]

## 2018-09-26 NOTE — ASSESSMENT & PLAN NOTE
Platelet count today 15K, up from yesterday 7K.  She has received 2 doses of dexamethasone 40mg.  Dr. Cota ordered HIV testing as well as hepatitis panel.  Awaiting results  - Will order ADRIANA today  - Complete US of abdomen  - Possible Rituxan  -Continue dexamethasone 40 mg PO daily

## 2018-09-26 NOTE — HOSPITAL COURSE
23 y/o aaf admitted from Dr Cota office  with a dx of ITP . She was started on IV steroid .  The abdominal US did not show  Any HSM .  The H/H  Remain low and 1 PRBC was ordered .  The H/H  Remain stable . The PLT went up to 20 k .  The case was discussed with Hem/onc which agree to d/c home today  On prednisone 40 mg  Po x 4 days . Pt was seen and examined at bedside .  She was determined to be suitable for d/c

## 2018-09-26 NOTE — PLAN OF CARE
Problem: Patient Care Overview  Goal: Plan of Care Review  Outcome: Ongoing (interventions implemented as appropriate)  Pt had no adverse events during shift. Applied ice to swollen area on R forearm. Pt denies pain at this time. Bleeding precautions maintained. Pt ambulates to bathroom and repositions in bed independently. VSS. Chart reviewed, will continue to monitor.

## 2018-09-26 NOTE — ED PROVIDER NOTES
SCRIBE #1 NOTE: I, Drea Lim, am scribing for, and in the presence of, Apoorva Munguia MD. I have scribed the entire note.      History      Chief Complaint   Patient presents with    Abnormal Lab     pt instructed to come to ED for low platelet level. Hx of ITP       Review of patient's allergies indicates:   Allergen Reactions    Ibuprofen         HPI   HPI    9/25/2018, 8:22 PM   History obtained from the patient      History of Present Illness: Mari Etienne is a 22 y.o. female patient with hx of ITP and anemia who presents to the Emergency Department for abnormal labs. Pt had routine labs done with Dr. Cota today and was instructed to come to ED for low platelet level. Symptoms are constant and moderate in severity. No mitigating or exacerbating factors reported. Associated sxs include bruising and petechia to bilateral upper and lower extremeties.  Pt reports being admitted to hospital last month for anemia and was given 2 units of blood. Pt reports she is on her menstrual period at this time. Patient denies any CP, SOB, fever, chills, n/v/d, and all other sxs at this time. Prior Tx includes prednisone. No further complaints or concerns at this time.         Arrival mode: Personal vehicle    PCP: Ifeanyi Tello NP       Past Medical History:  Past Medical History:   Diagnosis Date    Thrombocytopenia        Past Surgical History:  History reviewed. No pertinent surgical history.      Family History:  History reviewed. No pertinent family history.    Social History:  Social History     Tobacco Use    Smoking status: Never Smoker    Smokeless tobacco: Never Used   Substance and Sexual Activity    Alcohol use: No     Frequency: Never    Drug use: No    Sexual activity: Not given       ROS   Review of Systems   Constitutional: Negative for chills and fever.   HENT: Negative for sore throat.    Respiratory: Negative for shortness of breath.    Cardiovascular: Negative for chest pain.    Gastrointestinal: Negative for abdominal pain, diarrhea, nausea and vomiting.   Genitourinary: Negative for dysuria.   Musculoskeletal: Negative for back pain and neck pain.   Skin: Negative for rash.        (+) Bruising and petechia to extremities    Neurological: Negative for dizziness, syncope, weakness, numbness and headaches.   Hematological: Does not bruise/bleed easily.   All other systems reviewed and are negative.    Physical Exam      Initial Vitals [09/25/18 1904]   BP Pulse Resp Temp SpO2   133/71 110 18 99.6 °F (37.6 °C) 100 %      MAP       --          Physical Exam  Nursing Notes and Vital Signs Reviewed.  Constitutional: Patient is in no acute distress. Well-developed and well-nourished.  Head: Atraumatic. Normocephalic.  Eyes: PERRL. EOM intact. Conjunctivae are pale. No scleral icterus.  ENT: Mucous membranes are moist. Oropharynx is clear and symmetric.    Neck: Supple. Full ROM. No lymphadenopathy.  Cardiovascular: Regular rate. Regular rhythm. No murmurs, rubs, or gallops. Distal pulses are 2+ and symmetric.  Pulmonary/Chest: No respiratory distress. Clear to auscultation bilaterally. No wheezing or rales.  Abdominal: Soft and non-distended.  There is no tenderness.  No rebound, guarding, or rigidity. Good bowel sounds.  Genitourinary: No CVA tenderness  Musculoskeletal: Moves all extremities. No obvious deformities. No edema. No calf tenderness.  Skin: Warm and dry. Ecchymosis and petechiae to abdomen and bilateral upper/lower extremities.   Neurological:  Alert, awake, and appropriate.  Normal speech.  No acute focal neurological deficits are appreciated.  Psychiatric: Normal affect. Good eye contact. Appropriate in content.    ED Course    Procedures  ED Vital Signs:  Vitals:    09/25/18 1904 09/25/18 2216 09/25/18 2228 09/25/18 2232   BP: 133/71 135/83  138/70   Pulse: 110 105  101   Resp: 18 18 18   Temp: 99.6 °F (37.6 °C) 98.9 °F (37.2 °C)  98.2 °F (36.8 °C)   TempSrc: Oral Oral  Oral  "  SpO2: 100% 100%  100%   Weight: 103.1 kg (227 lb 4.7 oz)  103 kg (227 lb 1.2 oz)    Height: 5' 2" (1.575 m)       09/26/18 0318   BP: 135/63   Pulse: 97   Resp: 20   Temp: 98.5 °F (36.9 °C)   TempSrc: Oral   SpO2: 99%   Weight:    Height:        Abnormal Lab Results:  Labs Reviewed   COMPREHENSIVE METABOLIC PANEL - Abnormal; Notable for the following components:       Result Value    CO2 18 (*)     Alkaline Phosphatase 51 (*)     All other components within normal limits   LACTATE DEHYDROGENASE - Abnormal; Notable for the following components:     (*)     All other components within normal limits   PROTIME-INR   APTT   TYPE & SCREEN        All Lab Results:  Results for orders placed or performed during the hospital encounter of 09/25/18   CBC auto differential   Result Value Ref Range    WBC 10.29 3.90 - 12.70 K/uL    RBC 3.74 (L) 4.00 - 5.40 M/uL    Hemoglobin 6.7 (L) 12.0 - 16.0 g/dL    Hematocrit 24.4 (L) 37.0 - 48.5 %    MCV 65 (L) 82 - 98 fL    MCH 17.9 (L) 27.0 - 31.0 pg    MCHC 27.5 (L) 32.0 - 36.0 g/dL    RDW 21.0 (H) 11.5 - 14.5 %    Platelets 7 (LL) 150 - 350 K/uL    MPV SEE COMMENT 9.2 - 12.9 fL    Gran # (ANC) 7.6 1.8 - 7.7 K/uL    Lymph # 1.8 1.0 - 4.8 K/uL    Mono # 0.8 0.3 - 1.0 K/uL    Eos # 0.1 0.0 - 0.5 K/uL    Baso # 0.03 0.00 - 0.20 K/uL    Gran% 74.9 (H) 38.0 - 73.0 %    Lymph% 17.3 (L) 18.0 - 48.0 %    Mono% 7.4 4.0 - 15.0 %    Eosinophil% 1.1 0.0 - 8.0 %    Basophil% 0.3 0.0 - 1.9 %    Platelet Estimate Decreased (A)     Aniso Moderate     Poik Slight     Poly Occasional     Hypo Moderate     Ovalocytes Occasional     Tear Drop Cells Occasional     Amelia Cells Occasional     Differential Method Automated    Comprehensive metabolic panel   Result Value Ref Range    Sodium 139 136 - 145 mmol/L    Potassium 3.8 3.5 - 5.1 mmol/L    Chloride 107 95 - 110 mmol/L    CO2 18 (L) 23 - 29 mmol/L    Glucose 85 70 - 110 mg/dL    BUN, Bld 9 6 - 20 mg/dL    Creatinine 0.7 0.5 - 1.4 mg/dL    Calcium " 9.2 8.7 - 10.5 mg/dL    Total Protein 7.4 6.0 - 8.4 g/dL    Albumin 4.2 3.5 - 5.2 g/dL    Total Bilirubin 0.5 0.1 - 1.0 mg/dL    Alkaline Phosphatase 51 (L) 55 - 135 U/L    AST 14 10 - 40 U/L    ALT 12 10 - 44 U/L    Anion Gap 14 8 - 16 mmol/L    eGFR if African American >60 >60 mL/min/1.73 m^2    eGFR if non African American >60 >60 mL/min/1.73 m^2   Protime-INR   Result Value Ref Range    Prothrombin Time 11.1 9.0 - 12.5 sec    INR 1.1 0.8 - 1.2   APTT   Result Value Ref Range    aPTT 27.3 21.0 - 32.0 sec   Urinalysis   Result Value Ref Range    Specimen UA Urine, Clean Catch     Color, UA Yellow Yellow, Straw, Tati    Appearance, UA Clear Clear    pH, UA 6.0 5.0 - 8.0    Specific Gravity, UA >=1.030 (A) 1.005 - 1.030    Protein, UA 1+ (A) Negative    Glucose, UA Negative Negative    Ketones, UA 1+ (A) Negative    Bilirubin (UA) 1+ (A) Negative    Occult Blood UA 3+ (A) Negative    Nitrite, UA Negative Negative    Urobilinogen, UA 1.0 <2.0 EU/dL    Leukocytes, UA Negative Negative   Lactate dehydrogenase   Result Value Ref Range     (H) 110 - 260 U/L   Urinalysis Microscopic   Result Value Ref Range    RBC, UA >100 (H) 0 - 4 /hpf    WBC, UA 0 0 - 5 /hpf    Bacteria, UA Rare None-Occ /hpf    Squam Epithel, UA 3 /hpf    Hyaline Casts, UA 0 0-1/lpf /lpf    Microscopic Comment SEE COMMENT          Imaging Results:  Imaging Results    None                 The Emergency Provider reviewed the vital signs and test results, which are outlined above.    ED Discussion     9:34 PM: Dr. Munguia discussed the pt's case with Dr. Donato (Hem/Onc) who recommends admitting pt to hospital and 40 mg of decadron per day.    9:38 PM: Discussed case with Dr. Downs ([facility]). Dr. Downs agrees with current care and management of pt and accepts admission.   Admitting Service: Hospital medicine   Admitting Physician: Dr. Downs  Admit to: Med/Surg    9:38 PM: Re-evaluated pt. I have discussed test results, shared treatment plan,  and the need for admission with patient at bedside. Pt express understanding at this time and agree with all information. All questions answered. Pt have no further questions or concerns at this time. Pt is ready for admit.      ED Medication(s):  Medications   sodium chloride 0.9% flush 5 mL (not administered)   glucose chewable tablet 16 g (not administered)   glucose chewable tablet 24 g (not administered)   dextrose 50% injection 12.5 g (not administered)   dextrose 50% injection 25 g (not administered)   glucagon (human recombinant) injection 1 mg (not administered)   ondansetron injection 4 mg (not administered)   promethazine (PHENERGAN) 6.25 mg in dextrose 5 % 50 mL IVPB (not administered)   dexamethasone tablet 40 mg (not administered)   dexamethasone tablet 40 mg (40 mg Oral Given 9/25/18 2210)             Medical Decision Making    Medical Decision Making:   Clinical Tests:   Lab Tests: Ordered and Reviewed           Scribe Attestation:   Scribe #1: I performed the above scribed service and the documentation accurately describes the services I performed. I attest to the accuracy of the note.    Attending:   Physician Attestation Statement for Scribe #1: I, Apoorva Munguia MD, personally performed the services described in this documentation, as scribed by Drea Lim, in my presence, and it is both accurate and complete.          Clinical Impression       ICD-10-CM ICD-9-CM   1. Acute ITP D69.3 287.31       Disposition:   Disposition: Admitted  Condition: Fair         Apoorva Munguia MD  09/26/18 0554

## 2018-09-26 NOTE — PLAN OF CARE
Pt remains free from injury and falls. Fall and bleeding precautions in place. Independent. Tolerating regular diet. Denies pain and nausea. Meds given as ordered. Ultrasound of abd completed. Pt to receive 1 unit PRBC's when ready. Pt able to verbalize wants and needs. Bed in low, locked position, call light and personal items within reach of pt. POC discussed with pt. Verbalized understanding. VSS. Will continue to monitor.

## 2018-09-26 NOTE — PLAN OF CARE
Sw met with pt at bedside to complete assessment. No family was at bedside. Sw explained role/purpose of visit. Transitional navigator was provided to pt. Sw placed contact information on white board. Pt denies any dc needs at this time. Pt's pcp is Ifeanyi Tello NP. Pt uses     [employer] [id number] [id number] [facility]  [address] [facility] [address]  Phone: [phone] Fax: [phone]       09/26/18 1500   Discharge Assessment   Assessment Type Discharge Planning Assessment   Confirmed/corrected address and phone number on facesheet? Yes   Assessment information obtained from? Patient   Prior to hospitilization cognitive status: Alert/Oriented   Prior to hospitalization functional status: Independent   Current cognitive status: Alert/Oriented   Current Functional Status: Independent   Lives With significant other   Able to Return to Prior Arrangements yes   Is patient able to care for self after discharge? Yes   Patient's perception of discharge disposition home or selfcare   Patient currently being followed by outpatient case management? No   Patient currently receives any other outside agency services? No   Equipment Currently Used at Home none   Do you have any problems affording any of your prescribed medications? No   Is the patient taking medications as prescribed? yes   Does the patient have transportation home? Yes   Transportation Available family or friend will provide   Does the patient receive services at the [facility]? No   Discharge Plan A Home with family   Discharge Plan B Home with family   Patient/Family In Agreement With Plan yes

## 2018-09-26 NOTE — HPI
22F h/o ITP and anemia sent by Dr. Cota (hem/onc) to ER for abnormal labs. Pt had routine labs done with Dr. Cota today which showed low platelet level. Symptoms are constant and moderate in severity. No mitigating or exacerbating factors reported. Associated sxs include bruising and petechia to bilateral upper and lower extremeties.  Pt reports being admitted to hospital last month for anemia and was given 2 units of blood. Pt reports she is on her menstrual period at this time. Patient denies any CP, SOB, fever, chills, n/v/d, and all other sxs at this time. Prior Tx includes prednisone. No further complaints or concerns at this time. In ER, patient is tachycardic.  She was given 1L bolus by ER.  Platelet count is 7,000.  Hem/onc consulted and recommends giving patient decadron 40mg IV daily, first dose now.  Hospital medicine called for admission.

## 2018-09-26 NOTE — PROGRESS NOTES
[facility] Hospital Medicine  Progress Note    Patient Name: Mari Etienne  MRN: [id number]  Patient Class: IP- Inpatient   Admission Date: 9/25/2018  Length of Stay: 1 days  Attending Physician: Champ Randhawa, *  Primary Care Provider: Ifeanyi Tello NP        Subjective:     Principal Problem:Acute ITP    HPI:  22F h/o ITP and anemia  sent by Dr. Cota (hem/onc) to ER for abnormal labs. Pt had routine labs done with Dr. Cota today which showed low platelet level. Symptoms are constant and moderate in severity. No mitigating or exacerbating factors reported. Associated sxs include bruising and petechia to bilateral upper and lower extremeties.  Pt reports being admitted to hospital last month for anemia and was given 2 units of blood. Pt reports she is on her menstrual period at this time. Patient denies any CP, SOB, fever, chills, n/v/d, and all other sxs at this time. Prior Tx includes prednisone. No further complaints or concerns at this time.  In ER, patient is tachycardic.  She was given 1L bolus by ER.  Platelet count is 7,000.  Hem/onc consulted and recommends giving patient decadron 40mg IV daily, first dose now.  Hospital medicine called for admission.        Hospital Course:  21 y/o aaf admitted from Dr Cota office  with a dx of ITP . She was started on IV steroid .  The abdominal US did not show  Any HSM .  The H/H  Remain low and 1 PRBC was ordered .     Interval History: Pt was seen and examined at bedside . She denies any complaint for today .     Review of Systems   Constitutional: Positive for fatigue. Negative for chills and fever.   HENT: Negative for mouth sores and nosebleeds.    Respiratory: Negative for chest tightness and shortness of breath.    Cardiovascular: Negative for chest pain and palpitations.   Gastrointestinal: Negative for abdominal pain, anal bleeding, blood in stool, nausea and vomiting.   Endocrine: Negative for cold intolerance and  heat intolerance.   Genitourinary: Positive for menstrual problem and vaginal bleeding. Negative for dysuria and hematuria.   Skin: Positive for rash. Negative for pallor.   Neurological: Negative for dizziness, syncope and light-headedness.   Hematological: Negative for adenopathy. Bruises/bleeds easily.   Psychiatric/Behavioral: Negative for confusion and decreased concentration. The patient is nervous/anxious.      Objective:     Vital Signs (Most Recent):  Temp: 98.6 °F (37 °C) (09/26/18 1213)  Pulse: 102 (09/26/18 1213)  Resp: 16 (09/26/18 1213)  BP: 119/62 (09/26/18 1213)  SpO2: 100 % (09/26/18 1213) Vital Signs (24h Range):  Temp:  [98.2 °F (36.8 °C)-99.6 °F (37.6 °C)] 98.6 °F (37 °C)  Pulse:  [] 102  Resp:  [16-20] 16  SpO2:  [99 %-100 %] 100 %  BP: (112-138)/(62-83) 119/62     Weight: 103 kg (227 lb 1.2 oz)  Body mass index is 41.53 kg/m².    Intake/Output Summary (Last 24 hours) at 9/26/2018 1505  Last data filed at 9/26/2018 1451  Gross per 24 hour   Intake 240 ml   Output --   Net 240 ml      Physical Exam   Constitutional: She is oriented to person, place, and time. Vital signs are normal. She appears well-developed and well-nourished.  Non-toxic appearance. She does not have a sickly appearance. She appears ill. No distress.   HENT:   Head: Normocephalic and atraumatic.   Eyes: Conjunctivae, EOM and lids are normal. No scleral icterus.   Cardiovascular: Normal rate, regular rhythm, S1 normal, S2 normal and normal heart sounds. Exam reveals no gallop and no friction rub.   No murmur heard.  Pulmonary/Chest: Effort normal and breath sounds normal. No accessory muscle usage or stridor. No apnea, no tachypnea and no bradypnea. No respiratory distress. She has no wheezes. She has no rhonchi. She has no rales.   Abdominal: Soft. Normal appearance.   Musculoskeletal: Normal range of motion.   Neurological: She is alert and oriented to person, place, and time.   Skin: Skin is warm, dry and intact.  Bruising and petechiae (arms and legs) noted.   Psychiatric: Her speech is normal and behavior is normal. Judgment and thought content normal. Her mood appears anxious. Cognition and memory are normal. She is attentive.   Vitals reviewed.      Significant Labs: All pertinent labs within the past 24 hours have been reviewed.    Significant Imaging: I have reviewed all pertinent imaging results/findings within the past 24 hours.    Assessment/Plan:      * Acute ITP    - Plt > 10 k   - 4 on IV steroid   - monitor CBC daily  - ABD US did not show any HSM  - HIV and hepatitis panel pending             Iron deficiency anemia    - Hb  7   - 2/2 iron deficiency from acute blood loss from menstruation  - transfuse 1 PRBC             VTE Risk Mitigation (From admission, onward)        Ordered     Reason for No Pharmacological VTE Prophylaxis  Once      09/25/18 2147     Place sequential compression device  Until discontinued      09/25/18 2147     IP VTE HIGH RISK PATIENT  Once      09/25/18 2147              Champ Randhawa MD  Department of Hospital Medicine   [facility]

## 2018-09-26 NOTE — SUBJECTIVE & OBJECTIVE
Interval History: Pt was seen and examined at bedside . She denies any complaint for today .     Review of Systems   Constitutional: Positive for fatigue. Negative for chills and fever.   HENT: Negative for mouth sores and nosebleeds.    Respiratory: Negative for chest tightness and shortness of breath.    Cardiovascular: Negative for chest pain and palpitations.   Gastrointestinal: Negative for abdominal pain, anal bleeding, blood in stool, nausea and vomiting.   Endocrine: Negative for cold intolerance and heat intolerance.   Genitourinary: Positive for menstrual problem and vaginal bleeding. Negative for dysuria and hematuria.   Skin: Positive for rash. Negative for pallor.   Neurological: Negative for dizziness, syncope and light-headedness.   Hematological: Negative for adenopathy. Bruises/bleeds easily.   Psychiatric/Behavioral: Negative for confusion and decreased concentration. The patient is nervous/anxious.      Objective:     Vital Signs (Most Recent):  Temp: 98.6 °F (37 °C) (09/26/18 1213)  Pulse: 102 (09/26/18 1213)  Resp: 16 (09/26/18 1213)  BP: 119/62 (09/26/18 1213)  SpO2: 100 % (09/26/18 1213) Vital Signs (24h Range):  Temp:  [98.2 °F (36.8 °C)-99.6 °F (37.6 °C)] 98.6 °F (37 °C)  Pulse:  [] 102  Resp:  [16-20] 16  SpO2:  [99 %-100 %] 100 %  BP: (112-138)/(62-83) 119/62     Weight: 103 kg (227 lb 1.2 oz)  Body mass index is 41.53 kg/m².    Intake/Output Summary (Last 24 hours) at 9/26/2018 1505  Last data filed at 9/26/2018 1451  Gross per 24 hour   Intake 240 ml   Output --   Net 240 ml      Physical Exam   Constitutional: She is oriented to person, place, and time. Vital signs are normal. She appears well-developed and well-nourished.  Non-toxic appearance. She does not have a sickly appearance. She appears ill. No distress.   HENT:   Head: Normocephalic and atraumatic.   Eyes: Conjunctivae, EOM and lids are normal. No scleral icterus.   Cardiovascular: Normal rate, regular rhythm, S1 normal,  S2 normal and normal heart sounds. Exam reveals no gallop and no friction rub.   No murmur heard.  Pulmonary/Chest: Effort normal and breath sounds normal. No accessory muscle usage or stridor. No apnea, no tachypnea and no bradypnea. No respiratory distress. She has no wheezes. She has no rhonchi. She has no rales.   Abdominal: Soft. Normal appearance.   Musculoskeletal: Normal range of motion.   Neurological: She is alert and oriented to person, place, and time.   Skin: Skin is warm, dry and intact. Bruising and petechiae (arms and legs) noted.   Psychiatric: Her speech is normal and behavior is normal. Judgment and thought content normal. Her mood appears anxious. Cognition and memory are normal. She is attentive.   Vitals reviewed.      Significant Labs: All pertinent labs within the past 24 hours have been reviewed.    Significant Imaging: I have reviewed all pertinent imaging results/findings within the past 24 hours.

## 2018-09-27 VITALS
WEIGHT: 227.06 LBS | OXYGEN SATURATION: 100 % | TEMPERATURE: 98 F | SYSTOLIC BLOOD PRESSURE: 124 MMHG | HEIGHT: 62 IN | BODY MASS INDEX: 41.78 KG/M2 | HEART RATE: 81 BPM | DIASTOLIC BLOOD PRESSURE: 70 MMHG | RESPIRATION RATE: 17 BRPM

## 2018-09-27 LAB
ALBUMIN SERPL BCP-MCNC: 3.8 G/DL
ANA SER QL IF: POSITIVE
ANA TITR SER IF: NORMAL {TITER}
ANION GAP SERPL CALC-SCNC: 10 MMOL/L
ANISOCYTOSIS BLD QL SMEAR: SLIGHT
BASOPHILS # BLD AUTO: 0.01 K/UL
BASOPHILS NFR BLD: 0 %
BUN SERPL-MCNC: 12 MG/DL
CALCIUM SERPL-MCNC: 9.2 MG/DL
CHLORIDE SERPL-SCNC: 108 MMOL/L
CO2 SERPL-SCNC: 19 MMOL/L
CREAT SERPL-MCNC: 0.7 MG/DL
DACRYOCYTES BLD QL SMEAR: ABNORMAL
DIFFERENTIAL METHOD: ABNORMAL
EOSINOPHIL # BLD AUTO: 0 K/UL
EOSINOPHIL NFR BLD: 0 %
ERYTHROCYTE [DISTWIDTH] IN BLOOD BY AUTOMATED COUNT: 23.3 %
EST. GFR  (AFRICAN AMERICAN): >60 ML/MIN/1.73 M^2
EST. GFR  (NON AFRICAN AMERICAN): >60 ML/MIN/1.73 M^2
GLUCOSE SERPL-MCNC: 204 MG/DL
HCT VFR BLD AUTO: 27.1 %
HGB BLD-MCNC: 7.9 G/DL
HYPOCHROMIA BLD QL SMEAR: ABNORMAL
LYMPHOCYTES # BLD AUTO: 1.1 K/UL
LYMPHOCYTES NFR BLD: 5.2 %
MAGNESIUM SERPL-MCNC: 1.9 MG/DL
MCH RBC QN AUTO: 20.4 PG
MCHC RBC AUTO-ENTMCNC: 29.2 G/DL
MCV RBC AUTO: 70 FL
MONOCYTES # BLD AUTO: 1.5 K/UL
MONOCYTES NFR BLD: 6.9 %
NEUTROPHILS # BLD AUTO: 18.9 K/UL
NEUTROPHILS NFR BLD: 87.9 %
OVALOCYTES BLD QL SMEAR: ABNORMAL
PHOSPHATE SERPL-MCNC: 3.2 MG/DL
PHOSPHATE SERPL-MCNC: 3.2 MG/DL
PLATELET # BLD AUTO: 20 K/UL
PMV BLD AUTO: ABNORMAL FL
POIKILOCYTOSIS BLD QL SMEAR: ABNORMAL
POLYCHROMASIA BLD QL SMEAR: ABNORMAL
POTASSIUM SERPL-SCNC: 4 MMOL/L
RBC # BLD AUTO: 3.87 M/UL
SCHISTOCYTES BLD QL SMEAR: PRESENT
SODIUM SERPL-SCNC: 137 MMOL/L
SPHEROCYTES BLD QL SMEAR: ABNORMAL
STOMATOCYTES BLD QL SMEAR: PRESENT
TARGETS BLD QL SMEAR: ABNORMAL
WBC # BLD AUTO: 21.55 K/UL

## 2018-09-27 PROCEDURE — 63600175 PHARM REV CODE 636 W HCPCS: Performed by: INTERNAL MEDICINE

## 2018-09-27 PROCEDURE — 85025 COMPLETE CBC W/AUTO DIFF WBC: CPT

## 2018-09-27 PROCEDURE — 80069 RENAL FUNCTION PANEL: CPT

## 2018-09-27 PROCEDURE — 25000003 PHARM REV CODE 250: Performed by: NURSE PRACTITIONER

## 2018-09-27 PROCEDURE — 83735 ASSAY OF MAGNESIUM: CPT

## 2018-09-27 PROCEDURE — 99233 SBSQ HOSP IP/OBS HIGH 50: CPT | Mod: ,,, | Performed by: INTERNAL MEDICINE

## 2018-09-27 PROCEDURE — 36415 COLL VENOUS BLD VENIPUNCTURE: CPT

## 2018-09-27 PROCEDURE — 36430 TRANSFUSION BLD/BLD COMPNT: CPT

## 2018-09-27 RX ORDER — PANTOPRAZOLE SODIUM 40 MG/1
40 TABLET, DELAYED RELEASE ORAL DAILY
Status: DISCONTINUED | OUTPATIENT
Start: 2018-09-27 | End: 2018-09-27 | Stop reason: SDUPTHER

## 2018-09-27 RX ORDER — DEXAMETHASONE 4 MG/1
40 TABLET ORAL DAILY
Qty: 10 TABLET | Refills: 0 | Status: SHIPPED | OUTPATIENT
Start: 2018-09-27 | End: 2018-09-28

## 2018-09-27 RX ORDER — DEXAMETHASONE 4 MG/1
40 TABLET ORAL DAILY
Status: COMPLETED | OUTPATIENT
Start: 2018-09-27 | End: 2018-09-27

## 2018-09-27 RX ADMIN — DEXAMETHASONE 40 MG: 4 TABLET ORAL at 09:09

## 2018-09-27 RX ADMIN — PANTOPRAZOLE SODIUM 40 MG: 40 TABLET, DELAYED RELEASE ORAL at 08:09

## 2018-09-27 NOTE — DISCHARGE SUMMARY
[facility] Hospital Medicine  Discharge Summary      Patient Name: Mari Etienne  MRN: [id number]  Admission Date: 9/25/2018  Hospital Length of Stay: 2 days  Discharge Date and Time:  09/27/2018 9:10 AM  Attending Physician: Champ Randhawa, *   Discharging Provider: Champ Randhawa MD  Primary Care Provider: Ifeanyi Tello NP      HPI:   22F h/o ITP and anemia  sent by Dr. Cota (hem/onc) to ER for abnormal labs. Pt had routine labs done with Dr. Cota today which showed low platelet level. Symptoms are constant and moderate in severity. No mitigating or exacerbating factors reported. Associated sxs include bruising and petechia to bilateral upper and lower extremeties.  Pt reports being admitted to hospital last month for anemia and was given 2 units of blood. Pt reports she is on her menstrual period at this time. Patient denies any CP, SOB, fever, chills, n/v/d, and all other sxs at this time. Prior Tx includes prednisone. No further complaints or concerns at this time.  In ER, patient is tachycardic.  She was given 1L bolus by ER.  Platelet count is 7,000.  Hem/onc consulted and recommends giving patient decadron 40mg IV daily, first dose now.  Hospital medicine called for admission.        * No surgery found *      Hospital Course:   23 y/o aaf admitted from Dr Cota office  with a dx of ITP . She was started on IV steroid .  The abdominal US did not show  Any HSM .  The H/H  Remain low and 1 PRBC was ordered .  The H/H  Remain stable . The PLT went up to 20 k .  The case was discussed with Hem/onc which agree to d/c home today  On prednisone 40 mg  Po x 4 days . Pt was seen and examined at bedside .  She was determined to be suitable for d/c      Consults:   Consults (From admission, onward)        Status Ordering Provider     Inpatient consult to Hematology/Oncology  Once     Provider:  Bob Fuentes Jr., MD    Acknowledged ABIGAIL JACOBSON     Inpatient  consult to Hospitalist  Once     Provider:  Jayson Downs MD    Acknowledged LUISANA LANDRY.          * Acute ITP    - Plt > 10 k   - Dexamethasone 40 mg po  X 4 days   - monitor CBC daily  - ABD US did not show any HSM  - HIV and hepatitis panel pending             Iron deficiency anemia    - Hb  7   - 2/2 iron deficiency from acute blood loss from menstruation  - S/P 1 PRBC             Final Active Diagnoses:    Diagnosis Date Noted POA    PRINCIPAL PROBLEM:  Acute ITP [D69.3] 09/25/2018 Yes    Iron deficiency anemia [D50.9] 09/25/2018 Yes      Problems Resolved During this Admission:       Discharged Condition: stable    Disposition: Home or Self Care    Follow Up:  Follow-up Information     Ifeanyi Tello NP In 1 week.    Specialty:  Family Medicine  Contact information:  [address]  [place] [address] [phone]             Nash Cota MD In 1 week.    Specialty:  Hematology and Oncology  Contact information:  [address] [address] [phone]                 Patient Instructions:      Diet Cardiac     Notify your health care provider if you experience any of the following:  temperature >100.4     Activity as tolerated       Significant Diagnostic Studies: Labs:   BMP:   Recent Labs   Lab  09/25/18   2105  09/26/18   0459  09/27/18   0525   GLU  85  150*  204*   NA  139  138  137   K  3.8  4.6  4.0   CL  107  109  108   CO2  18*  18*  19*   BUN  9  12  12   CREATININE  0.7  0.8  0.7   CALCIUM  9.2  9.8  9.2   MG   --   2.3  1.9       Pending Diagnostic Studies:     Procedure Component Value Units Date/Time    ADRIANA [[id number]] Collected:  09/26/18 1023    Order Status:  Sent Lab Status:  In process Updated:  09/26/18 1434    Specimen:  Blood          Medications:  Reconciled Home Medications:      Medication List      START taking these medications    dexamethasone 4 MG Tab  Commonly known as:  DECADRON  Take 10 tablets (40 mg total) by mouth once daily. for 1 dose             Indwelling Lines/Drains at time of discharge:   Lines/Drains/Airways          None          Time spent on the discharge of patient: 35 minutes  Patient was seen and examined on the date of discharge and determined to be suitable for discharge.         Champ Randhawa MD  Department of Hospital Medicine  [facility]

## 2018-09-27 NOTE — PLAN OF CARE
Problem: Patient Care Overview  Goal: Plan of Care Review  Outcome: Ongoing (interventions implemented as appropriate)  Pt had no adverse events during shift. Administered 1u PRBC per order. Pt tolerated well w/ stable VS. Pt denies pain and nausea. Monitored for bleeding. Chart reviewed, will continue to monitor.

## 2018-09-27 NOTE — NURSING
3rd IV attempted by [facility] with no success. MILADIS Johns NP with [facility] made aware. IV Venofer d/c'ed. NP to set up appt for infusion tomorrow after her appt with Dr. Cota.

## 2018-09-27 NOTE — SUBJECTIVE & OBJECTIVE
Interval History:  Uneventful evening.  Patient states that she only has dark colored blood on toilet paper after wiping due to old menstrual blood but none is on her pad.      Oncology Treatment Plan:   OP RITUXIMAB QW    Medications:  Continuous Infusions:  Scheduled Meds:   iron sucrose (VENOFER) IVPB  200 mg Intravenous Once    pantoprazole  40 mg Oral Daily     PRN Meds:sodium chloride, dextrose 50%, dextrose 50%, glucagon (human recombinant), glucose, glucose, ondansetron, promethazine (PHENERGAN) IVPB, sodium chloride 0.9%     Review of patient's allergies indicates:   Allergen Reactions    Ibuprofen         Past Medical History:   Diagnosis Date    Thrombocytopenia      History reviewed. No pertinent surgical history.  Family History     None        Tobacco Use    Smoking status: Never Smoker    Smokeless tobacco: Never Used   Substance and Sexual Activity    Alcohol use: No     Frequency: Never    Drug use: No    Sexual activity: Not on file       Review of Systems   Constitutional: Positive for fatigue. Negative for chills and fever.   HENT: Negative for mouth sores and nosebleeds.    Respiratory: Negative for chest tightness and shortness of breath.    Cardiovascular: Negative for chest pain and palpitations.   Gastrointestinal: Negative for abdominal pain, anal bleeding, blood in stool, nausea and vomiting.   Endocrine: Negative for cold intolerance and heat intolerance.   Genitourinary: Positive for menstrual problem and vaginal bleeding (slight only on toilet paper when wiping). Negative for dysuria and hematuria.   Skin: Positive for rash. Negative for pallor.   Neurological: Negative for dizziness, syncope and light-headedness.   Hematological: Negative for adenopathy. Bruises/bleeds easily.   Psychiatric/Behavioral: Negative for confusion and decreased concentration. The patient is nervous/anxious.      Objective:     Vital Signs (Most Recent):  Temp: 97.8 °F (36.6 °C) (09/27/18  0730)  Pulse: 81 (09/27/18 0730)  Resp: 17 (09/27/18 0730)  BP: 124/70 (09/27/18 0730)  SpO2: 100 % (09/27/18 0730) Vital Signs (24h Range):  Temp:  [97.8 °F (36.6 °C)-98.7 °F (37.1 °C)] 97.8 °F (36.6 °C)  Pulse:  [] 81  Resp:  [16-18] 17  SpO2:  [99 %-100 %] 100 %  BP: (119-162)/(62-92) 124/70     Weight: 103 kg (227 lb 1.2 oz)  Body mass index is 41.53 kg/m².  Body surface area is 2.12 meters squared.      Intake/Output Summary (Last 24 hours) at 9/27/2018 0946  Last data filed at 9/27/2018 0836  Gross per 24 hour   Intake 600 ml   Output --   Net 600 ml       Physical Exam   Constitutional: She is oriented to person, place, and time. Vital signs are normal. She appears well-developed and well-nourished.  Non-toxic appearance. She does not have a sickly appearance. She appears ill. No distress.   HENT:   Head: Normocephalic and atraumatic.   Eyes: Conjunctivae, EOM and lids are normal. No scleral icterus.   Cardiovascular: Normal rate, regular rhythm, S1 normal, S2 normal and normal heart sounds. Exam reveals no gallop and no friction rub.   No murmur heard.  Pulmonary/Chest: Effort normal and breath sounds normal. No accessory muscle usage or stridor. No apnea, no tachypnea and no bradypnea. No respiratory distress. She has no wheezes. She has no rhonchi. She has no rales.   Abdominal: Soft. Normal appearance.   Musculoskeletal: Normal range of motion.   Neurological: She is alert and oriented to person, place, and time.   Skin: Skin is warm, dry and intact. Bruising and petechiae (arms and legs) noted.   Psychiatric: Her speech is normal and behavior is normal. Judgment and thought content normal. Her mood appears anxious. Cognition and memory are normal. She is attentive.   Vitals reviewed.      Significant Labs:   BMP:   Recent Labs   Lab  09/25/18   3605  09/26/18   5399  09/27/18   0525   GLU  85  150*  204*   NA  139  138  137   K  3.8  4.6  4.0   CL  107  109  108   CO2  18*  18*  19*   BUN  9  12   12   CREATININE  0.7  0.8  0.7   CALCIUM  9.2  9.8  9.2   MG   --   2.3  1.9   , CBC:   Recent Labs   Lab  09/25/18 2105 09/26/18 0459 09/27/18   0525   WBC  10.29  11.86  21.55*   HGB  6.7*  7.0*  7.9*   HCT  24.4*  25.6*  27.1*   PLT  7*  15*  20*   , CMP:   Recent Labs   Lab  09/25/18 2105 09/26/18 0459 09/27/18   0525   NA  139  138  137   K  3.8  4.6  4.0   CL  107  109  108   CO2  18*  18*  19*   GLU  85  150*  204*   BUN  9  12  12   CREATININE  0.7  0.8  0.7   CALCIUM  9.2  9.8  9.2   PROT  7.4   --    --    ALBUMIN  4.2  4.1  3.8   BILITOT  0.5   --    --    ALKPHOS  51*   --    --    AST  14   --    --    ALT  12   --    --    ANIONGAP  14  11  10   EGFRNONAA  >60  >60  >60   , Coagulation:   Recent Labs   Lab  09/25/18 2105   INR  1.1   APTT  27.3   , LDH: No results for input(s): LDHCSF, BFSOURCE in the last 48 hours., LFTs:   Recent Labs   Lab  09/25/18 2105 09/26/18 0459 09/27/18   0525   ALT  12   --    --    AST  14   --    --    ALKPHOS  51*   --    --    BILITOT  0.5   --    --    PROT  7.4   --    --    ALBUMIN  4.2  4.1  3.8   , Reticulocytes: No results for input(s): RETIC in the last 48 hours., Urine Studies:   Recent Labs   Lab  09/25/18 2249   COLORU  Yellow   APPEARANCEUA  Clear   PHUR  6.0   SPECGRAV  >=1.030*   PROTEINUA  1+*   GLUCUA  Negative   KETONESU  1+*   BILIRUBINUA  1+*   OCCULTUA  3+*   NITRITE  Negative   UROBILINOGEN  1.0   LEUKOCYTESUR  Negative   RBCUA  >100*   WBCUA  0   BACTERIA  Rare   SQUAMEPITHEL  3   HYALINECASTS  0    and All pertinent labs from the last 24 hours have been reviewed.    Diagnostic Results:  I have reviewed all pertinent imaging results/findings within the past 24 hours.

## 2018-09-27 NOTE — NURSING
D/C instructions given to pt. Paper RX given to pt to have filled at a pharmacy of her choosing. Pt demonstrated understanding by teach back method. Pt hs follow up appt tomorrow with Dr. Cota and has infusions for 10/3/18 and 10/10/18, where IV venofer will be given. Pt dressed and walked down to awaiting vehicle. No acute distress noted.

## 2018-09-27 NOTE — ASSESSMENT & PLAN NOTE
Platelet count today 15K, up from yesterday 7K.  She has received 2 doses of dexamethasone 40mg.  Dr. Cota ordered HIV testing as well as hepatitis panel.  Awaiting results  - Will order ADRIANA today  - Complete US of abdomen  - Possible Rituxan  -Continue dexamethasone 40 mg PO daily    09/27/18 Platelet count up to 20K (15).  She will receive dexamethasone 40 mg PO today and then again tomorrow.  Waiting on HIV, Hepatitis panel, and ADRIANA results.  US of abdomen negative.  She states only a small amount of old menstrual blood seen on toilet paper after wiping and none on pad.  Reports bleeding greatly slowed.    - Follow up outpatient with Dr. Cota for Rituxan  -Dexamethasone 40 mg PO today and then again tomorrow.  -

## 2018-09-27 NOTE — NURSING
2 nurses attempted to obtain IV access for pt's dose of IV venofer before d/c. Charge nurse made aware. Call into ER for someone to try and start one.

## 2018-09-27 NOTE — PROGRESS NOTES
[facility] -   Hematology/Oncology  Progress Note    Patient Name: Mari Etienne  Admission Date: 9/25/2018  Hospital Length of Stay: 2 days  Code Status: Full Code     Subjective:     HPI:  22F h/o ITP and anemia  sent by Dr. Cota (hem/onc) to ER for abnormal labs. Pt had routine labs done with Dr. Cota today which showed low platelet level. Symptoms are constant and moderate in severity. No mitigating or exacerbating factors reported. Associated sxs include bruising and petechia to bilateral upper and lower extremeties.  Pt reports being admitted to hospital last month for anemia and was given 2 units of blood. Pt reports she is on her menstrual period at this time. Patient denies any CP, SOB, fever, chills, n/v/d, and all other sxs at this time. Prior Tx includes prednisone. No further complaints or concerns at this time.  In ER, patient is tachycardic.  She was given 1L bolus by ER.  Platelet count is 7,000.  Hem/onc consulted and recommends giving patient decadron 40mg IV daily, first dose now.  Hospital medicine called for admission.    Hematology/oncology consulted for further management of ITP.      Interval History:  Uneventful evening.  Patient states that she only has dark colored blood on toilet paper after wiping due to old menstrual blood but none is on her pad.      Oncology Treatment Plan:   OP RITUXIMAB QW    Medications:  Continuous Infusions:  Scheduled Meds:   iron sucrose (VENOFER) IVPB  200 mg Intravenous Once    pantoprazole  40 mg Oral Daily     PRN Meds:sodium chloride, dextrose 50%, dextrose 50%, glucagon (human recombinant), glucose, glucose, ondansetron, promethazine (PHENERGAN) IVPB, sodium chloride 0.9%     Review of patient's allergies indicates:   Allergen Reactions    Ibuprofen         Past Medical History:   Diagnosis Date    Thrombocytopenia      History reviewed. No pertinent surgical history.  Family History     None        Tobacco Use     Smoking status: Never Smoker    Smokeless tobacco: Never Used   Substance and Sexual Activity    Alcohol use: No     Frequency: Never    Drug use: No    Sexual activity: Not on file       Review of Systems   Constitutional: Positive for fatigue. Negative for chills and fever.   HENT: Negative for mouth sores and nosebleeds.    Respiratory: Negative for chest tightness and shortness of breath.    Cardiovascular: Negative for chest pain and palpitations.   Gastrointestinal: Negative for abdominal pain, anal bleeding, blood in stool, nausea and vomiting.   Endocrine: Negative for cold intolerance and heat intolerance.   Genitourinary: Positive for menstrual problem and vaginal bleeding (slight only on toilet paper when wiping). Negative for dysuria and hematuria.   Skin: Positive for rash. Negative for pallor.   Neurological: Negative for dizziness, syncope and light-headedness.   Hematological: Negative for adenopathy. Bruises/bleeds easily.   Psychiatric/Behavioral: Negative for confusion and decreased concentration. The patient is nervous/anxious.      Objective:     Vital Signs (Most Recent):  Temp: 97.8 °F (36.6 °C) (09/27/18 0730)  Pulse: 81 (09/27/18 0730)  Resp: 17 (09/27/18 0730)  BP: 124/70 (09/27/18 0730)  SpO2: 100 % (09/27/18 0730) Vital Signs (24h Range):  Temp:  [97.8 °F (36.6 °C)-98.7 °F (37.1 °C)] 97.8 °F (36.6 °C)  Pulse:  [] 81  Resp:  [16-18] 17  SpO2:  [99 %-100 %] 100 %  BP: (119-162)/(62-92) 124/70     Weight: 103 kg (227 lb 1.2 oz)  Body mass index is 41.53 kg/m².  Body surface area is 2.12 meters squared.      Intake/Output Summary (Last 24 hours) at 9/27/2018 0946  Last data filed at 9/27/2018 0836  Gross per 24 hour   Intake 600 ml   Output --   Net 600 ml       Physical Exam   Constitutional: She is oriented to person, place, and time. Vital signs are normal. She appears well-developed and well-nourished.  Non-toxic appearance. She does not have a sickly appearance. She appears  ill. No distress.   HENT:   Head: Normocephalic and atraumatic.   Eyes: Conjunctivae, EOM and lids are normal. No scleral icterus.   Cardiovascular: Normal rate, regular rhythm, S1 normal, S2 normal and normal heart sounds. Exam reveals no gallop and no friction rub.   No murmur heard.  Pulmonary/Chest: Effort normal and breath sounds normal. No accessory muscle usage or stridor. No apnea, no tachypnea and no bradypnea. No respiratory distress. She has no wheezes. She has no rhonchi. She has no rales.   Abdominal: Soft. Normal appearance.   Musculoskeletal: Normal range of motion.   Neurological: She is alert and oriented to person, place, and time.   Skin: Skin is warm, dry and intact. Bruising and petechiae (arms and legs) noted.   Psychiatric: Her speech is normal and behavior is normal. Judgment and thought content normal. Her mood appears anxious. Cognition and memory are normal. She is attentive.   Vitals reviewed.      Significant Labs:   BMP:   Recent Labs   Lab  09/25/18 2105 09/26/18 0459  09/27/18   0525   GLU  85  150*  204*   NA  139  138  137   K  3.8  4.6  4.0   CL  107  109  108   CO2  18*  18*  19*   BUN  9  12  12   CREATININE  0.7  0.8  0.7   CALCIUM  9.2  9.8  9.2   MG   --   2.3  1.9   , CBC:   Recent Labs   Lab  09/25/18 2105 09/26/18 0459  09/27/18   0525   WBC  10.29  11.86  21.55*   HGB  6.7*  7.0*  7.9*   HCT  24.4*  25.6*  27.1*   PLT  7*  15*  20*   , CMP:   Recent Labs   Lab  09/25/18 2105 09/26/18 0459  09/27/18   0525   NA  139  138  137   K  3.8  4.6  4.0   CL  107  109  108   CO2  18*  18*  19*   GLU  85  150*  204*   BUN  9  12  12   CREATININE  0.7  0.8  0.7   CALCIUM  9.2  9.8  9.2   PROT  7.4   --    --    ALBUMIN  4.2  4.1  3.8   BILITOT  0.5   --    --    ALKPHOS  51*   --    --    AST  14   --    --    ALT  12   --    --    ANIONGAP  14  11  10   EGFRNONAA  >60  >60  >60   , Coagulation:   Recent Labs   Lab  09/25/18   2105   INR  1.1   APTT  27.3   , LDH: No  results for input(s): LDHCSF, BFSOURCE in the last 48 hours., LFTs:   Recent Labs   Lab  09/25/18   2105  09/26/18   0459  09/27/18   0525   ALT  12   --    --    AST  14   --    --    ALKPHOS  51*   --    --    BILITOT  0.5   --    --    PROT  7.4   --    --    ALBUMIN  4.2  4.1  3.8   , Reticulocytes: No results for input(s): RETIC in the last 48 hours., Urine Studies:   Recent Labs   Lab  09/25/18   2249   COLORU  Yellow   APPEARANCEUA  Clear   PHUR  6.0   SPECGRAV  >=1.030*   PROTEINUA  1+*   GLUCUA  Negative   KETONESU  1+*   BILIRUBINUA  1+*   OCCULTUA  3+*   NITRITE  Negative   UROBILINOGEN  1.0   LEUKOCYTESUR  Negative   RBCUA  >100*   WBCUA  0   BACTERIA  Rare   SQUAMEPITHEL  3   HYALINECASTS  0    and All pertinent labs from the last 24 hours have been reviewed.    Diagnostic Results:  I have reviewed all pertinent imaging results/findings within the past 24 hours.    Assessment/Plan:     * Acute ITP    Platelet count today 15K, up from yesterday 7K.  She has received 2 doses of dexamethasone 40mg.  Dr. Cota ordered HIV testing as well as hepatitis panel.  Awaiting results  - Will order ADRIANA today  - Complete US of abdomen  - Possible Rituxan  -Continue dexamethasone 40 mg PO daily    09/27/18 Platelet count up to 20K (15).  She will receive dexamethasone 40 mg PO today and then again tomorrow.  Waiting on HIV, Hepatitis panel, and ADRIANA results.  US of abdomen negative.  She states only a small amount of old menstrual blood seen on toilet paper after wiping and none on pad.  Reports bleeding greatly slowed.    - Follow up outpatient with Dr. Cota for Rituxan  -Dexamethasone 40 mg PO today and then again tomorrow.  -         Iron deficiency anemia    09/26/18 - Hemoglobin today 7.0 (6.7).  Patient denies shortness of breath, chest pain, heart palpitations, or lightheadedness/dizziness.  She complains of fatigue.  She states that she has had light menstrual bleeding on/off for the past 2 weeks.  Dr. Cota  ordered iron studies that were drawn yesterday with clinic appointment.  Will transfuse iron accordingly once resulted.  - CBC daily  - Venofer 200 mg IV if iron studies warrant upon resulting  - Monitor for increased bleeding.    09/27/18  Hemoglobin today 7.9 (7.0).  Received 1 unit of PRBCs.  Iron studies resulted showing BAILEY.  She will receive a dose of venofer 200 mg IV today before being discharged and will follow up outpatient with Dr. Cota.                Thank you for your consult. I will follow-up with patient. Please contact us if you have any additional questions.     Nelida Johns NP  Hematology/Oncology  [facility] [place]

## 2018-09-27 NOTE — ASSESSMENT & PLAN NOTE
09/26/18 - Hemoglobin today 7.0 (6.7).  Patient denies shortness of breath, chest pain, heart palpitations, or lightheadedness/dizziness.  She complains of fatigue.  She states that she has had light menstrual bleeding on/off for the past 2 weeks.  Dr. Cota ordered iron studies that were drawn yesterday with clinic appointment.  Will transfuse iron accordingly once resulted.  - CBC daily  - Venofer 200 mg IV if iron studies warrant upon resulting  - Monitor for increased bleeding.    09/27/18  Hemoglobin today 7.9 (7.0).  Received 1 unit of PRBCs.  Iron studies resulted showing BAILEY.  She will receive a dose of venofer 200 mg IV today before being discharged and will follow up outpatient with Dr. Cota.

## 2018-09-28 ENCOUNTER — OFFICE VISIT (OUTPATIENT)
Dept: HEMATOLOGY/ONCOLOGY | Facility: CLINIC | Age: 23
DRG: 813 | End: 2018-09-28
Payer: MEDICAID

## 2018-09-28 ENCOUNTER — PATIENT MESSAGE (OUTPATIENT)
Dept: HEMATOLOGY/ONCOLOGY | Facility: CLINIC | Age: 23
End: 2018-09-28

## 2018-09-28 ENCOUNTER — DOCUMENTATION ONLY (OUTPATIENT)
Dept: INFUSION THERAPY | Facility: HOSPITAL | Age: 23
End: 2018-09-28

## 2018-09-28 VITALS
HEIGHT: 62 IN | RESPIRATION RATE: 20 BRPM | BODY MASS INDEX: 41.34 KG/M2 | DIASTOLIC BLOOD PRESSURE: 93 MMHG | SYSTOLIC BLOOD PRESSURE: 134 MMHG | WEIGHT: 224.63 LBS | HEART RATE: 83 BPM | TEMPERATURE: 98 F | OXYGEN SATURATION: 100 %

## 2018-09-28 DIAGNOSIS — D69.3 CHRONIC ITP (IDIOPATHIC THROMBOCYTOPENIA): Primary | ICD-10-CM

## 2018-09-28 LAB
ANTI SM ANTIBODY: 0.37 EU
ANTI SM/RNP ANTIBODY: 0.67 EU
ANTI-SM INTERPRETATION: NEGATIVE
ANTI-SM/RNP INTERPRETATION: NEGATIVE
ANTI-SSA ANTIBODY: 0.54 EU
ANTI-SSA INTERPRETATION: NEGATIVE
ANTI-SSB ANTIBODY: 0 EU
ANTI-SSB INTERPRETATION: NEGATIVE
DSDNA AB SER-ACNC: NORMAL [IU]/ML

## 2018-09-28 PROCEDURE — 99215 OFFICE O/P EST HI 40 MIN: CPT | Mod: S$PBB,,, | Performed by: INTERNAL MEDICINE

## 2018-09-28 PROCEDURE — 99213 OFFICE O/P EST LOW 20 MIN: CPT | Mod: PBBFAC | Performed by: INTERNAL MEDICINE

## 2018-09-28 PROCEDURE — 99999 PR PBB SHADOW E&M-EST. PATIENT-LVL III: CPT | Mod: PBBFAC,,, | Performed by: INTERNAL MEDICINE

## 2018-09-28 RX ORDER — DEXAMETHASONE 4 MG/1
40 TABLET ORAL DAILY
Qty: 40 TABLET | Refills: 11 | Status: SHIPPED | OUTPATIENT
Start: 2018-09-28 | End: 2018-10-04 | Stop reason: ALTCHOICE

## 2018-09-28 NOTE — PROGRESS NOTES
Patient to receive Rituxan (first dose) on Monday, Oct. 1, 2018. Reviewed medication: potential side effects and prevention, how given, and how medication works. Handout given to patient. Patient asked appropriate questions and verbalized understanding of information given.

## 2018-09-28 NOTE — PROGRESS NOTES
Subjective:       Patient ID: Mari Etienne is a 22 y.o. female.    Chief Complaint: Results (Acute ITP)    HPI 22-year-old female seen urgently recently hospitalized for acute ITP patient presents for further evaluation    Past Medical History:   Diagnosis Date    Thrombocytopenia      History reviewed. No pertinent family history.  Social History     Socioeconomic History    Marital status: Single     Spouse name: Not on file    Number of children: Not on file    Years of education: Not on file    Highest education level: Not on file   Social Needs    Financial resource strain: Not on file    Food insecurity - worry: Not on file    Food insecurity - inability: Not on file    Transportation needs - medical: Not on file    Transportation needs - non-medical: Not on file   Occupational History    Not on file   Tobacco Use    Smoking status: Never Smoker    Smokeless tobacco: Never Used   Substance and Sexual Activity    Alcohol use: No     Frequency: Never    Drug use: No    Sexual activity: Not on file   Other Topics Concern    Not on file   Social History Narrative    Not on file     History reviewed. No pertinent surgical history.    Labs:  Lab Results   Component Value Date    WBC 21.55 (H) 09/27/2018    HGB 7.9 (L) 09/27/2018    HCT 27.1 (L) 09/27/2018    MCV 70 (L) 09/27/2018    PLT 20 (LL) 09/27/2018     BMP  Lab Results   Component Value Date     09/27/2018    K 4.0 09/27/2018     09/27/2018    CO2 19 (L) 09/27/2018    BUN 12 09/27/2018    CREATININE 0.7 09/27/2018    CALCIUM 9.2 09/27/2018    ANIONGAP 10 09/27/2018    ESTGFRAFRICA >60 09/27/2018    EGFRNONAA >60 09/27/2018     Lab Results   Component Value Date    ALT 12 09/25/2018    AST 14 09/25/2018    ALKPHOS 51 (L) 09/25/2018    BILITOT 0.5 09/25/2018       Lab Results   Component Value Date    IRON 18 (L) 09/25/2018    TIBC 411 09/25/2018    FERRITIN 15 (L) 09/25/2018     Lab Results   Component Value Date     IOVBFWQL39 353 03/06/2018     Lab Results   Component Value Date    FOLATE 5.9 03/06/2018     No results found for: TSH      Review of Systems   Constitutional: Positive for activity change, appetite change and fatigue. Negative for chills, diaphoresis, fever and unexpected weight change.   HENT: Negative for congestion, dental problem, drooling, ear discharge, ear pain, facial swelling, hearing loss, mouth sores, nosebleeds, postnasal drip, rhinorrhea, sinus pressure, sneezing, sore throat, tinnitus, trouble swallowing and voice change.    Eyes: Negative for photophobia, pain, discharge, redness, itching and visual disturbance.   Respiratory: Negative for cough, choking, chest tightness, shortness of breath, wheezing and stridor.    Cardiovascular: Negative for chest pain, palpitations and leg swelling.   Gastrointestinal: Negative for abdominal distention, abdominal pain, anal bleeding, blood in stool, constipation, diarrhea, nausea, rectal pain and vomiting.   Endocrine: Negative for cold intolerance, heat intolerance, polydipsia, polyphagia and polyuria.   Genitourinary: Negative for decreased urine volume, difficulty urinating, dyspareunia, dysuria, enuresis, flank pain, frequency, genital sores, hematuria, menstrual problem, pelvic pain, urgency, vaginal bleeding, vaginal discharge and vaginal pain.   Musculoskeletal: Negative for arthralgias, back pain, gait problem, joint swelling, myalgias, neck pain and neck stiffness.   Skin: Negative for color change, pallor and rash.   Allergic/Immunologic: Negative for environmental allergies, food allergies and immunocompromised state.   Neurological: Positive for weakness. Negative for dizziness, tremors, seizures, syncope, facial asymmetry, speech difficulty, light-headedness, numbness and headaches.   Hematological: Negative for adenopathy. Does not bruise/bleed easily.   Psychiatric/Behavioral: Positive for dysphoric mood. Negative for agitation, behavioral  problems, confusion, decreased concentration, hallucinations, self-injury, sleep disturbance and suicidal ideas. The patient is nervous/anxious. The patient is not hyperactive.        Objective:      Physical Exam   Constitutional: She is oriented to person, place, and time. She appears well-developed and well-nourished. She appears distressed.   HENT:   Head: Normocephalic and atraumatic.   Right Ear: External ear normal.   Left Ear: External ear normal.   Nose: Nose normal. Right sinus exhibits no maxillary sinus tenderness and no frontal sinus tenderness. Left sinus exhibits no maxillary sinus tenderness and no frontal sinus tenderness.   Mouth/Throat: Oropharynx is clear and moist. No oropharyngeal exudate.   Eyes: Conjunctivae, EOM and lids are normal. Pupils are equal, round, and reactive to light. Right eye exhibits no discharge. Left eye exhibits no discharge. Right conjunctiva is not injected. Right conjunctiva has no hemorrhage. Left conjunctiva is not injected. Left conjunctiva has no hemorrhage. No scleral icterus.   Neck: Normal range of motion. Neck supple. No JVD present. No tracheal deviation present. No thyromegaly present.   Cardiovascular: Normal rate and regular rhythm.   Pulmonary/Chest: Effort normal. No stridor. No respiratory distress. She exhibits no tenderness.   Abdominal: Soft. She exhibits no distension and no mass. There is no splenomegaly or hepatomegaly. There is no tenderness. There is no rebound.   Musculoskeletal: Normal range of motion. She exhibits no edema or tenderness.   Lymphadenopathy:     She has no cervical adenopathy.     She has no axillary adenopathy.        Right: No supraclavicular adenopathy present.        Left: No supraclavicular adenopathy present.   Neurological: She is alert and oriented to person, place, and time. No cranial nerve deficit. Coordination normal.   Skin: Skin is dry. No rash noted. She is not diaphoretic. No erythema.   Psychiatric: She has a  normal mood and affect. Her behavior is normal. Judgment and thought content normal.   Vitals reviewed.          Assessment:      1. Chronic ITP (idiopathic thrombocytopenia)           Plan:     Patient with recurrent exacerbations of ITP extensive conversation with patient nursing staff would recommend patient be started on Rituxan ASAP as well as continue with dexamethasone pulse.  Patient will also be treated with intravenous iron also orders have been written reviewed will see back day 1st of Rituxan consent form signed explain nature to her will feel much better once hemoglobin is increased stabilization with platelet count.  Pulse dexamethasone appears to be working at this particular 0.40 min face-to-face time coordination of care 1400-14 40 risk complications of Rituxan therapy explained consent form signed      Nash Cota Jr, MD FACP

## 2018-09-28 NOTE — PLAN OF CARE
09/28/18 0817   Final Note   Assessment Type Final Discharge Note   Discharge Disposition Home   Right Care Referral Info   Post Acute Recommendation No Care

## 2018-10-01 ENCOUNTER — INFUSION (OUTPATIENT)
Dept: INFUSION THERAPY | Facility: HOSPITAL | Age: 23
End: 2018-10-01
Attending: INTERNAL MEDICINE
Payer: MEDICAID

## 2018-10-01 ENCOUNTER — OFFICE VISIT (OUTPATIENT)
Dept: HEMATOLOGY/ONCOLOGY | Facility: CLINIC | Age: 23
End: 2018-10-01
Payer: MEDICAID

## 2018-10-01 VITALS
HEART RATE: 86 BPM | BODY MASS INDEX: 42.52 KG/M2 | HEIGHT: 62 IN | OXYGEN SATURATION: 100 % | DIASTOLIC BLOOD PRESSURE: 88 MMHG | WEIGHT: 231.06 LBS | TEMPERATURE: 98 F | SYSTOLIC BLOOD PRESSURE: 124 MMHG

## 2018-10-01 VITALS — SYSTOLIC BLOOD PRESSURE: 131 MMHG | HEART RATE: 86 BPM | DIASTOLIC BLOOD PRESSURE: 92 MMHG | OXYGEN SATURATION: 100 %

## 2018-10-01 DIAGNOSIS — D69.3 CHRONIC ITP (IDIOPATHIC THROMBOCYTOPENIA): Primary | ICD-10-CM

## 2018-10-01 DIAGNOSIS — D50.9 IRON DEFICIENCY ANEMIA: ICD-10-CM

## 2018-10-01 DIAGNOSIS — D50.0 IRON DEFICIENCY ANEMIA DUE TO CHRONIC BLOOD LOSS: ICD-10-CM

## 2018-10-01 DIAGNOSIS — D69.3 ACUTE ITP: ICD-10-CM

## 2018-10-01 PROBLEM — D64.9 SYMPTOMATIC ANEMIA: Status: RESOLVED | Noted: 2018-08-20 | Resolved: 2018-10-01

## 2018-10-01 PROCEDURE — 63600175 PHARM REV CODE 636 W HCPCS: Performed by: INTERNAL MEDICINE

## 2018-10-01 PROCEDURE — 99213 OFFICE O/P EST LOW 20 MIN: CPT | Mod: PBBFAC | Performed by: INTERNAL MEDICINE

## 2018-10-01 PROCEDURE — 25000003 PHARM REV CODE 250: Performed by: INTERNAL MEDICINE

## 2018-10-01 PROCEDURE — 99215 OFFICE O/P EST HI 40 MIN: CPT | Mod: 25,S$PBB,, | Performed by: INTERNAL MEDICINE

## 2018-10-01 PROCEDURE — 96413 CHEMO IV INFUSION 1 HR: CPT

## 2018-10-01 PROCEDURE — 96375 TX/PRO/DX INJ NEW DRUG ADDON: CPT

## 2018-10-01 PROCEDURE — 96415 CHEMO IV INFUSION ADDL HR: CPT

## 2018-10-01 PROCEDURE — 99999 PR PBB SHADOW E&M-EST. PATIENT-LVL III: CPT | Mod: PBBFAC,,, | Performed by: INTERNAL MEDICINE

## 2018-10-01 PROCEDURE — S0028 INJECTION, FAMOTIDINE, 20 MG: HCPCS | Performed by: INTERNAL MEDICINE

## 2018-10-01 PROCEDURE — 96367 TX/PROPH/DG ADDL SEQ IV INF: CPT

## 2018-10-01 RX ORDER — METHYLPREDNISOLONE SOD SUCC 125 MG
125 VIAL (EA) INJECTION
Status: CANCELLED
Start: 2018-10-01

## 2018-10-01 RX ORDER — HEPARIN 100 UNIT/ML
500 SYRINGE INTRAVENOUS
Status: DISCONTINUED | OUTPATIENT
Start: 2018-10-01 | End: 2018-10-01 | Stop reason: HOSPADM

## 2018-10-01 RX ORDER — SODIUM CHLORIDE 0.9 % (FLUSH) 0.9 %
10 SYRINGE (ML) INJECTION
Status: DISCONTINUED | OUTPATIENT
Start: 2018-10-01 | End: 2018-10-01 | Stop reason: HOSPADM

## 2018-10-01 RX ORDER — FAMOTIDINE 10 MG/ML
20 INJECTION INTRAVENOUS
Status: COMPLETED | OUTPATIENT
Start: 2018-10-01 | End: 2018-10-01

## 2018-10-01 RX ORDER — ACETAMINOPHEN 325 MG/1
650 TABLET ORAL
Status: COMPLETED | OUTPATIENT
Start: 2018-10-01 | End: 2018-10-01

## 2018-10-01 RX ORDER — METHYLPREDNISOLONE SOD SUCC 125 MG
125 VIAL (EA) INJECTION
Status: COMPLETED | OUTPATIENT
Start: 2018-10-01 | End: 2018-10-01

## 2018-10-01 RX ORDER — MEPERIDINE HYDROCHLORIDE 25 MG/ML
25 INJECTION INTRAMUSCULAR; INTRAVENOUS; SUBCUTANEOUS
Status: DISCONTINUED | OUTPATIENT
Start: 2018-10-01 | End: 2018-10-01 | Stop reason: HOSPADM

## 2018-10-01 RX ADMIN — METHYLPREDNISOLONE SODIUM SUCCINATE 125 MG: 125 INJECTION, POWDER, FOR SOLUTION INTRAMUSCULAR; INTRAVENOUS at 01:10

## 2018-10-01 RX ADMIN — FAMOTIDINE 20 MG: 10 INJECTION, SOLUTION INTRAVENOUS at 10:10

## 2018-10-01 RX ADMIN — FERUMOXYTOL 510 MG: 510 INJECTION INTRAVENOUS at 11:10

## 2018-10-01 RX ADMIN — ACETAMINOPHEN 650 MG: 325 TABLET ORAL at 10:10

## 2018-10-01 RX ADMIN — RITUXIMAB 799 MG: 10 INJECTION, SOLUTION INTRAVENOUS at 11:10

## 2018-10-01 RX ADMIN — DIPHENHYDRAMINE HYDROCHLORIDE 50 MG: 50 INJECTION, SOLUTION INTRAMUSCULAR; INTRAVENOUS at 10:10

## 2018-10-01 NOTE — NURSING
@ 150ml/hr pt c/o itchy throat.  Stopped infusion. Notified Dr. Cota who ordered Solumedrol 125 mg IVP; gave shortly after and almost immediately symptoms resolved.  Restarted infusion at half rate per protocol 30 min later.  Patient tolerated remainder without issues.

## 2018-10-01 NOTE — PATIENT INSTRUCTIONS
[facility]  [address]  [address] [address]  [phone] phone     [phone] fax  Hours of Operation: Monday- Friday 8:00am- 5:00pm  After hours phone  [phone]  Hematology / Oncology Physicians on call      Dr. Cipriano Jerry    Please call with any concerns regarding your appointment today.  Ferumoxytol injection  What is this medicine?  FERUMOXYTOL is an iron complex. Iron is used to make healthy red blood cells, which carry oxygen and nutrients throughout the body. This medicine is used to treat iron deficiency anemia in people with chronic kidney disease.  How should I use this medicine?  This medicine is for injection into a vein. It is given by a health care professional in a hospital or clinic setting.  Talk to your pediatrician regarding the use of this medicine in children. Special care may be needed.  What side effects may I notice from receiving this medicine?  Side effects that you should report to your doctor or health care professional as soon as possible:  · allergic reactions like skin rash, itching or hives, swelling of the face, lips, or tongue  · breathing problems  · changes in blood pressure  · feeling faint or lightheaded, falls  · fever or chills  · flushing, sweating, or hot feelings  · swelling of the ankles or feet  Side effects that usually do not require medical attention (Report these to your doctor or health care professional if they continue or are bothersome.):  · diarrhea  · headache  · nausea, vomiting  · stomach pain  What may interact with this medicine?  This medicine may interact with the following medications:  · other iron products  What if I miss a dose?  It is important not to miss your dose. Call your doctor or health care professional if you are unable to keep an appointment.  Where should I keep my medicine?  This drug is given in a hospital or clinic and will not be stored at  home.  What should I tell my health care provider before I take this medicine?  They need to know if you have any of these conditions:  · anemia not caused by low iron levels  · high levels of iron in the blood  · magnetic resonance imaging (MRI) test scheduled  · an unusual or allergic reaction to iron, other medicines, foods, dyes, or preservatives  · pregnant or trying to get pregnant  · breast-feeding  What should I watch for while using this medicine?  Visit your doctor or healthcare professional regularly. Tell your doctor or healthcare professional if your symptoms do not start to get better or if they get worse. You may need blood work done while you are taking this medicine.  You may need to follow a special diet. Talk to your doctor. Foods that contain iron include: whole grains/cereals, dried fruits, beans, or peas, leafy green vegetables, and organ meats (liver, kidney).  NOTE:This sheet is a summary. It may not cover all possible information. If you have questions about this medicine, talk to your doctor, pharmacist, or health care provider. Copyright© 2017 [employer]        Rituximab injection  What is this medicine?  RITUXIMAB (ri TUX i mab) is a monoclonal antibody. It is used commonly to treat non-Hodgkin lymphoma and other conditions. It is also used to treat rheumatoid arthritis (RA). In RA, this medicine slows the inflammatory process and help reduce joint pain and swelling. This medicine is often used with other cancer or arthritis medications.  How should I use this medicine?  This medicine is for infusion into a vein. It is administered in a hospital or clinic by a specially trained health care professional.  A special MedGuide will be given to you by the pharmacist with each prescription and refill. Be sure to read this information carefully each time.  Talk to your pediatrician regarding the use of this medicine in children. This medicine is not approved for use in children.  What side  effects may I notice from receiving this medicine?  Side effects that you should report to your doctor or health care professional as soon as possible:  · allergic reactions like skin rash, itching or hives, swelling of the face, lips, or tongue  · low blood counts - this medicine may decrease the number of white blood cells, red blood cells and platelets. You may be at increased risk for infections and bleeding.  · signs of infection - fever or chills, cough, sore throat, pain or difficulty passing urine  · signs of decreased platelets or bleeding - bruising, pinpoint red spots on the skin, black, tarry stools, blood in the urine  · signs of decreased red blood cells - unusually weak or tired, fainting spells, lightheadedness  · breathing problems  · confused, not responsive  · chest pain  · fast, irregular heartbeat  · feeling faint or lightheaded, falls  · mouth sores  · redness, blistering, peeling or loosening of the skin, including inside the mouth  · stomach pain  · swelling of the ankles, feet, or hands  · trouble passing urine or change in the amount of urine  Side effects that usually do not require medical attention (report to your doctor or other health care professional if they continue or are bothersome):  · anxiety  · headache  · loss of appetite  · muscle aches  · nausea  · night sweats  What may interact with this medicine?  · cisplatin  · medicines for blood pressure  · some other medicines for arthritis  · vaccines  What if I miss a dose?  It is important not to miss a dose. Call your doctor or health care professional if you are unable to keep an appointment.  Where should I keep my medicine?  This drug is given in a hospital or clinic and will not be stored at home.  What should I tell my health care provider before I take this medicine?  They need to know if you have any of these conditions:  · blood disorders  · heart disease  · history of hepatitis B  · infection (especially a virus infection  such as chickenpox, cold sores, or herpes)  · irregular heartbeat  · kidney disease  · lung or breathing disease, like asthma  · lupus  · an unusual or allergic reaction to rituximab, mouse proteins, other medicines, foods, dyes, or preservatives  · pregnant or trying to get pregnant  · breast-feeding  What should I watch for while using this medicine?  Report any side effects that you notice during your treatment right away, such as changes in your breathing, fever, chills, dizziness or lightheadedness. These effects are more common with the first dose.  Visit your prescriber or health care professional for checks on your progress. You will need to have regular blood work. Report any other side effects. The side effects of this medicine can continue after you finish your treatment. Continue your course of treatment even though you feel ill unless your doctor tells you to stop.  Call your doctor or health care professional for advice if you get a fever, chills or sore throat, or other symptoms of a cold or flu. Do not treat yourself. This drug decreases your body's ability to fight infections. Try to avoid being around people who are sick.  This medicine may increase your risk to bruise or bleed. Call your doctor or health care professional if you notice any unusual bleeding.  Be careful brushing and flossing your teeth or using a toothpick because you may get an infection or bleed more easily. If you have any dental work done, tell your dentist you are receiving this medicine.  Avoid taking products that contain aspirin, acetaminophen, ibuprofen, naproxen, or ketoprofen unless instructed by your doctor. These medicines may hide a fever.  Do not become pregnant while taking this medicine. Women should inform their doctor if they wish to become pregnant or think they might be pregnant. There is a potential for serious side effects to an unborn child. Talk to your health care professional or pharmacist for more  information. Do not breast-feed an infant while taking this medicine.  NOTE:This sheet is a summary. It may not cover all possible information. If you have questions about this medicine, talk to your doctor, pharmacist, or health care provider. Copyright© 2017 [employer]

## 2018-10-01 NOTE — PLAN OF CARE
"Problem: Patient Care Overview  Goal: Plan of Care Review  Outcome: Ongoing (interventions implemented as appropriate)  "I'm good, no complaints."      "

## 2018-10-01 NOTE — PROGRESS NOTES
Subjective:       Patient ID: Mari Etienne is a 22 y.o. female.    Chief Complaint: Results and Coagulation Disorder    HPI 22-year-old female failure of dexamethasone pulse in x2 patient denies nausea vomiting fevers chills platelet count returns 16,000 today to initiate Rituxan therapy    Past Medical History:   Diagnosis Date    Thrombocytopenia      History reviewed. No pertinent family history.  Social History     Socioeconomic History    Marital status: Single     Spouse name: Not on file    Number of children: Not on file    Years of education: Not on file    Highest education level: Not on file   Social Needs    Financial resource strain: Not on file    Food insecurity - worry: Not on file    Food insecurity - inability: Not on file    Transportation needs - medical: Not on file    Transportation needs - non-medical: Not on file   Occupational History    Not on file   Tobacco Use    Smoking status: Never Smoker    Smokeless tobacco: Never Used   Substance and Sexual Activity    Alcohol use: No     Frequency: Never    Drug use: No    Sexual activity: Not on file   Other Topics Concern    Not on file   Social History Narrative    Not on file     History reviewed. No pertinent surgical history.    Labs:  Lab Results   Component Value Date    WBC 14.63 (H) 10/01/2018    HGB 9.2 (L) 10/01/2018    HCT 32.2 (L) 10/01/2018    MCV 68 (L) 10/01/2018    PLT 16 (LL) 10/01/2018     BMP  Lab Results   Component Value Date     09/27/2018    K 4.0 09/27/2018     09/27/2018    CO2 19 (L) 09/27/2018    BUN 12 09/27/2018    CREATININE 0.7 09/27/2018    CALCIUM 9.2 09/27/2018    ANIONGAP 10 09/27/2018    ESTGFRAFRICA >60 09/27/2018    EGFRNONAA >60 09/27/2018     Lab Results   Component Value Date    ALT 12 09/25/2018    AST 14 09/25/2018    ALKPHOS 51 (L) 09/25/2018    BILITOT 0.5 09/25/2018       Lab Results   Component Value Date    IRON 18 (L) 09/25/2018    TIBC 411 09/25/2018     FERRITIN 15 (L) 09/25/2018     Lab Results   Component Value Date    LEEVRUSO97 353 03/06/2018     Lab Results   Component Value Date    FOLATE 5.9 03/06/2018     No results found for: TSH      Review of Systems   Constitutional: Positive for activity change and fatigue. Negative for appetite change, chills, diaphoresis, fever and unexpected weight change.   HENT: Negative for congestion, dental problem, drooling, ear discharge, ear pain, facial swelling, hearing loss, mouth sores, nosebleeds, postnasal drip, rhinorrhea, sinus pressure, sneezing, sore throat, tinnitus, trouble swallowing and voice change.    Eyes: Negative for photophobia, pain, discharge, redness, itching and visual disturbance.   Respiratory: Negative for cough, choking, chest tightness, shortness of breath, wheezing and stridor.    Cardiovascular: Negative for chest pain, palpitations and leg swelling.   Gastrointestinal: Negative for abdominal distention, abdominal pain, anal bleeding, blood in stool, constipation, diarrhea, nausea, rectal pain and vomiting.   Endocrine: Negative for cold intolerance, heat intolerance, polydipsia, polyphagia and polyuria.   Genitourinary: Negative for decreased urine volume, difficulty urinating, dyspareunia, dysuria, enuresis, flank pain, frequency, genital sores, hematuria, menstrual problem, pelvic pain, urgency, vaginal bleeding, vaginal discharge and vaginal pain.   Musculoskeletal: Negative for arthralgias, back pain, gait problem, joint swelling, myalgias, neck pain and neck stiffness.   Skin: Negative for color change, pallor and rash.        Bruising on skin   Allergic/Immunologic: Negative for environmental allergies, food allergies and immunocompromised state.   Neurological: Positive for weakness. Negative for dizziness, tremors, seizures, syncope, facial asymmetry, speech difficulty, light-headedness, numbness and headaches.   Hematological: Negative for adenopathy. Does not bruise/bleed easily.    Psychiatric/Behavioral: Positive for dysphoric mood. Negative for agitation, behavioral problems, confusion, decreased concentration, hallucinations, self-injury, sleep disturbance and suicidal ideas. The patient is nervous/anxious. The patient is not hyperactive.        Objective:      Physical Exam   Constitutional: She is oriented to person, place, and time. She appears well-developed and well-nourished. She appears distressed.   HENT:   Head: Normocephalic and atraumatic.   Right Ear: External ear normal.   Left Ear: External ear normal.   Nose: Nose normal. Right sinus exhibits no maxillary sinus tenderness and no frontal sinus tenderness. Left sinus exhibits no maxillary sinus tenderness and no frontal sinus tenderness.   Mouth/Throat: Oropharynx is clear and moist. No oropharyngeal exudate.   Eyes: Conjunctivae, EOM and lids are normal. Pupils are equal, round, and reactive to light. Right eye exhibits no discharge. Left eye exhibits no discharge. Right conjunctiva is not injected. Right conjunctiva has no hemorrhage. Left conjunctiva is not injected. Left conjunctiva has no hemorrhage. No scleral icterus.   Neck: Normal range of motion. Neck supple. No JVD present. No tracheal deviation present. No thyromegaly present.   Cardiovascular: Normal rate and regular rhythm.   Pulmonary/Chest: Effort normal. No stridor. No respiratory distress. She exhibits no tenderness.   Abdominal: Soft. She exhibits no distension and no mass. There is no splenomegaly or hepatomegaly. There is no tenderness. There is no rebound.   Musculoskeletal: Normal range of motion. She exhibits no edema or tenderness.   Lymphadenopathy:     She has no cervical adenopathy.     She has no axillary adenopathy.        Right: No supraclavicular adenopathy present.        Left: No supraclavicular adenopathy present.   Neurological: She is alert and oriented to person, place, and time. No cranial nerve deficit. Coordination normal.   Skin: Skin  is dry. No rash noted. She is not diaphoretic. No erythema.   Bruises on extremity   Psychiatric: She has a normal mood and affect. Her behavior is normal. Judgment and thought content normal.   Vitals reviewed.          Assessment:      1. Acute ITP    2. Iron deficiency anemia due to chronic blood loss           Plan:   ITP failure pulse dexamethasone will start on weekly Rituxan return in 3 days with repeat CBC iron studies will be given today hopefully Rituxan will help to increase treatment and goals of therapy you in platelet count          Nash Cota Jr, MD FACP

## 2018-10-04 ENCOUNTER — OFFICE VISIT (OUTPATIENT)
Dept: HEMATOLOGY/ONCOLOGY | Facility: CLINIC | Age: 23
End: 2018-10-04
Payer: MEDICAID

## 2018-10-04 VITALS
SYSTOLIC BLOOD PRESSURE: 129 MMHG | WEIGHT: 227.06 LBS | DIASTOLIC BLOOD PRESSURE: 81 MMHG | HEIGHT: 62 IN | HEART RATE: 91 BPM | BODY MASS INDEX: 41.78 KG/M2 | TEMPERATURE: 98 F | OXYGEN SATURATION: 99 %

## 2018-10-04 DIAGNOSIS — D69.3 ACUTE ITP: ICD-10-CM

## 2018-10-04 DIAGNOSIS — D50.0 IRON DEFICIENCY ANEMIA DUE TO CHRONIC BLOOD LOSS: ICD-10-CM

## 2018-10-04 DIAGNOSIS — D69.3 CHRONIC ITP (IDIOPATHIC THROMBOCYTOPENIA): Primary | ICD-10-CM

## 2018-10-04 PROCEDURE — 99214 OFFICE O/P EST MOD 30 MIN: CPT | Mod: S$PBB,,, | Performed by: INTERNAL MEDICINE

## 2018-10-04 PROCEDURE — 99999 PR PBB SHADOW E&M-EST. PATIENT-LVL III: CPT | Mod: PBBFAC,,, | Performed by: INTERNAL MEDICINE

## 2018-10-04 PROCEDURE — 99213 OFFICE O/P EST LOW 20 MIN: CPT | Mod: PBBFAC | Performed by: INTERNAL MEDICINE

## 2018-10-04 NOTE — PROGRESS NOTES
Subjective:       Patient ID: Mari Etienne is a 22 y.o. female.    Chief Complaint: Results (Thrombocytopenia)    HPI 22-year-old female history of acute ITP.  Documented iron deficiency returns 4 days after 1st dose of Rituxan for repeat CBC    Past Medical History:   Diagnosis Date    Thrombocytopenia      History reviewed. No pertinent family history.  Social History     Socioeconomic History    Marital status: Single     Spouse name: Not on file    Number of children: Not on file    Years of education: Not on file    Highest education level: Not on file   Social Needs    Financial resource strain: Not on file    Food insecurity - worry: Not on file    Food insecurity - inability: Not on file    Transportation needs - medical: Not on file    Transportation needs - non-medical: Not on file   Occupational History    Not on file   Tobacco Use    Smoking status: Never Smoker    Smokeless tobacco: Never Used   Substance and Sexual Activity    Alcohol use: No     Frequency: Never    Drug use: No    Sexual activity: Not on file   Other Topics Concern    Not on file   Social History Narrative    Not on file     History reviewed. No pertinent surgical history.    Labs:  Lab Results   Component Value Date    WBC 18.41 (H) 10/04/2018    HGB 8.7 (L) 10/04/2018    HCT 29.8 (L) 10/04/2018    MCV 70 (L) 10/04/2018    PLT 15 (LL) 10/04/2018     BMP  Lab Results   Component Value Date     09/27/2018    K 4.0 09/27/2018     09/27/2018    CO2 19 (L) 09/27/2018    BUN 12 09/27/2018    CREATININE 0.7 09/27/2018    CALCIUM 9.2 09/27/2018    ANIONGAP 10 09/27/2018    ESTGFRAFRICA >60 09/27/2018    EGFRNONAA >60 09/27/2018     Lab Results   Component Value Date    ALT 12 09/25/2018    AST 14 09/25/2018    ALKPHOS 51 (L) 09/25/2018    BILITOT 0.5 09/25/2018       Lab Results   Component Value Date    IRON 18 (L) 09/25/2018    TIBC 411 09/25/2018    FERRITIN 15 (L) 09/25/2018     Lab Results    Component Value Date    EWRCEUMO29 353 03/06/2018     Lab Results   Component Value Date    FOLATE 5.9 03/06/2018     No results found for: TSH      Review of Systems   Constitutional: Positive for activity change, appetite change and fatigue. Negative for chills, diaphoresis, fever and unexpected weight change.   HENT: Negative for congestion, dental problem, drooling, ear discharge, ear pain, facial swelling, hearing loss, mouth sores, nosebleeds, postnasal drip, rhinorrhea, sinus pressure, sneezing, sore throat, tinnitus, trouble swallowing and voice change.    Eyes: Negative for photophobia, pain, discharge, redness, itching and visual disturbance.   Respiratory: Negative for cough, choking, chest tightness, shortness of breath, wheezing and stridor.    Cardiovascular: Negative for chest pain, palpitations and leg swelling.   Gastrointestinal: Negative for abdominal distention, abdominal pain, anal bleeding, blood in stool, constipation, diarrhea, nausea, rectal pain and vomiting.   Endocrine: Negative for cold intolerance, heat intolerance, polydipsia, polyphagia and polyuria.   Genitourinary: Negative for decreased urine volume, difficulty urinating, dyspareunia, dysuria, enuresis, flank pain, frequency, genital sores, hematuria, menstrual problem, pelvic pain, urgency, vaginal bleeding, vaginal discharge and vaginal pain.   Musculoskeletal: Negative for arthralgias, back pain, gait problem, joint swelling, myalgias, neck pain and neck stiffness.   Skin: Negative for color change, pallor and rash.   Allergic/Immunologic: Negative for environmental allergies, food allergies and immunocompromised state.   Neurological: Positive for weakness. Negative for dizziness, tremors, seizures, syncope, facial asymmetry, speech difficulty, light-headedness, numbness and headaches.   Hematological: Negative for adenopathy. Does not bruise/bleed easily.   Psychiatric/Behavioral: Positive for dysphoric mood. Negative for  agitation, behavioral problems, confusion, decreased concentration, hallucinations, self-injury, sleep disturbance and suicidal ideas. The patient is nervous/anxious. The patient is not hyperactive.        Objective:      Physical Exam   Constitutional: She is oriented to person, place, and time. She appears well-developed and well-nourished. She appears distressed.   HENT:   Head: Normocephalic and atraumatic.   Right Ear: External ear normal.   Left Ear: External ear normal.   Nose: Nose normal. Right sinus exhibits no maxillary sinus tenderness and no frontal sinus tenderness. Left sinus exhibits no maxillary sinus tenderness and no frontal sinus tenderness.   Mouth/Throat: Oropharynx is clear and moist. No oropharyngeal exudate.   Eyes: Conjunctivae, EOM and lids are normal. Pupils are equal, round, and reactive to light. Right eye exhibits no discharge. Left eye exhibits no discharge. Right conjunctiva is not injected. Right conjunctiva has no hemorrhage. Left conjunctiva is not injected. Left conjunctiva has no hemorrhage. No scleral icterus.   Neck: Normal range of motion. Neck supple. No JVD present. No tracheal deviation present. No thyromegaly present.   Cardiovascular: Normal rate and regular rhythm.   Pulmonary/Chest: Effort normal. No stridor. No respiratory distress. She exhibits no tenderness.   Abdominal: Soft. She exhibits no distension and no mass. There is no splenomegaly or hepatomegaly. There is no tenderness. There is no rebound.   Musculoskeletal: Normal range of motion. She exhibits no edema or tenderness.   Lymphadenopathy:     She has no cervical adenopathy.     She has no axillary adenopathy.        Right: No supraclavicular adenopathy present.        Left: No supraclavicular adenopathy present.   Neurological: She is alert and oriented to person, place, and time. No cranial nerve deficit. Coordination normal.   Skin: Skin is dry. No rash noted. She is not diaphoretic. No erythema.    Psychiatric: She has a normal mood and affect. Her behavior is normal. Judgment and thought content normal.   Vitals reviewed.          Assessment:      1. Chronic ITP (idiopathic thrombocytopenia)    2. Acute ITP    3. Iron deficiency anemia due to chronic blood loss           Plan:     The patient's platelet count is stable return in 5 days for 2nd dose of Rituxan told family that usually platelet count will begin to increase if she is going to respond to Rituxan between the 2nd and 3rd dose continue with intravenous iron is ordered.  Discussed implications no active bleeding at present        Nash Cota Jr, MD FACP

## 2018-10-08 ENCOUNTER — OFFICE VISIT (OUTPATIENT)
Dept: HEMATOLOGY/ONCOLOGY | Facility: CLINIC | Age: 23
End: 2018-10-08
Payer: MEDICAID

## 2018-10-08 ENCOUNTER — INFUSION (OUTPATIENT)
Dept: INFUSION THERAPY | Facility: HOSPITAL | Age: 23
End: 2018-10-08
Attending: INTERNAL MEDICINE
Payer: MEDICAID

## 2018-10-08 VITALS
SYSTOLIC BLOOD PRESSURE: 131 MMHG | TEMPERATURE: 99 F | WEIGHT: 228.81 LBS | OXYGEN SATURATION: 100 % | HEART RATE: 94 BPM | HEIGHT: 62 IN | BODY MASS INDEX: 42.11 KG/M2 | DIASTOLIC BLOOD PRESSURE: 92 MMHG

## 2018-10-08 VITALS
HEIGHT: 62 IN | BODY MASS INDEX: 42.11 KG/M2 | DIASTOLIC BLOOD PRESSURE: 89 MMHG | HEART RATE: 97 BPM | SYSTOLIC BLOOD PRESSURE: 138 MMHG | WEIGHT: 228.81 LBS | OXYGEN SATURATION: 100 % | RESPIRATION RATE: 18 BRPM

## 2018-10-08 DIAGNOSIS — D69.3 CHRONIC ITP (IDIOPATHIC THROMBOCYTOPENIA): ICD-10-CM

## 2018-10-08 DIAGNOSIS — D69.3 ACUTE ITP: ICD-10-CM

## 2018-10-08 DIAGNOSIS — D50.9 IRON DEFICIENCY ANEMIA: Primary | ICD-10-CM

## 2018-10-08 DIAGNOSIS — D50.0 IRON DEFICIENCY ANEMIA DUE TO CHRONIC BLOOD LOSS: ICD-10-CM

## 2018-10-08 DIAGNOSIS — D69.3 ACUTE ITP: Primary | ICD-10-CM

## 2018-10-08 PROCEDURE — 96413 CHEMO IV INFUSION 1 HR: CPT

## 2018-10-08 PROCEDURE — 96375 TX/PRO/DX INJ NEW DRUG ADDON: CPT

## 2018-10-08 PROCEDURE — 96415 CHEMO IV INFUSION ADDL HR: CPT

## 2018-10-08 PROCEDURE — 96367 TX/PROPH/DG ADDL SEQ IV INF: CPT

## 2018-10-08 PROCEDURE — 63600175 PHARM REV CODE 636 W HCPCS: Mod: JG | Performed by: INTERNAL MEDICINE

## 2018-10-08 PROCEDURE — S0028 INJECTION, FAMOTIDINE, 20 MG: HCPCS | Performed by: INTERNAL MEDICINE

## 2018-10-08 PROCEDURE — 99215 OFFICE O/P EST HI 40 MIN: CPT | Mod: 25,S$PBB,, | Performed by: INTERNAL MEDICINE

## 2018-10-08 PROCEDURE — 25000003 PHARM REV CODE 250: Performed by: INTERNAL MEDICINE

## 2018-10-08 PROCEDURE — 99999 PR PBB SHADOW E&M-EST. PATIENT-LVL III: CPT | Mod: PBBFAC,,, | Performed by: INTERNAL MEDICINE

## 2018-10-08 PROCEDURE — 99213 OFFICE O/P EST LOW 20 MIN: CPT | Mod: PBBFAC,25 | Performed by: INTERNAL MEDICINE

## 2018-10-08 RX ORDER — SODIUM CHLORIDE 0.9 % (FLUSH) 0.9 %
10 SYRINGE (ML) INJECTION
Status: DISCONTINUED | OUTPATIENT
Start: 2018-10-08 | End: 2018-10-08 | Stop reason: HOSPADM

## 2018-10-08 RX ORDER — ACETAMINOPHEN 325 MG/1
650 TABLET ORAL
Status: CANCELLED | OUTPATIENT
Start: 2018-10-08

## 2018-10-08 RX ORDER — ACETAMINOPHEN 325 MG/1
650 TABLET ORAL
Status: COMPLETED | OUTPATIENT
Start: 2018-10-08 | End: 2018-10-08

## 2018-10-08 RX ORDER — FAMOTIDINE 10 MG/ML
20 INJECTION INTRAVENOUS
Status: COMPLETED | OUTPATIENT
Start: 2018-10-08 | End: 2018-10-08

## 2018-10-08 RX ORDER — SODIUM CHLORIDE 0.9 % (FLUSH) 0.9 %
10 SYRINGE (ML) INJECTION
Status: CANCELLED | OUTPATIENT
Start: 2018-10-08

## 2018-10-08 RX ORDER — METHYLPREDNISOLONE SOD SUCC 125 MG
125 VIAL (EA) INJECTION
Status: COMPLETED | OUTPATIENT
Start: 2018-10-08 | End: 2018-10-08

## 2018-10-08 RX ORDER — METHYLPREDNISOLONE SOD SUCC 125 MG
125 VIAL (EA) INJECTION
Status: CANCELLED
Start: 2018-10-08

## 2018-10-08 RX ORDER — HEPARIN 100 UNIT/ML
500 SYRINGE INTRAVENOUS
Status: CANCELLED | OUTPATIENT
Start: 2018-10-08

## 2018-10-08 RX ORDER — FAMOTIDINE 10 MG/ML
20 INJECTION INTRAVENOUS
Status: CANCELLED | OUTPATIENT
Start: 2018-10-08

## 2018-10-08 RX ORDER — MEPERIDINE HYDROCHLORIDE 25 MG/ML
25 INJECTION INTRAMUSCULAR; INTRAVENOUS; SUBCUTANEOUS
Status: CANCELLED | OUTPATIENT
Start: 2018-10-08 | End: 2018-10-08

## 2018-10-08 RX ADMIN — DIPHENHYDRAMINE HYDROCHLORIDE 50 MG: 50 INJECTION, SOLUTION INTRAMUSCULAR; INTRAVENOUS at 10:10

## 2018-10-08 RX ADMIN — ACETAMINOPHEN 650 MG: 325 TABLET ORAL at 10:10

## 2018-10-08 RX ADMIN — RITUXIMAB 799 MG: 10 INJECTION, SOLUTION INTRAVENOUS at 11:10

## 2018-10-08 RX ADMIN — METHYLPREDNISOLONE SODIUM SUCCINATE 125 MG: 125 INJECTION, POWDER, FOR SOLUTION INTRAMUSCULAR; INTRAVENOUS at 10:10

## 2018-10-08 RX ADMIN — FAMOTIDINE 20 MG: 10 INJECTION, SOLUTION INTRAVENOUS at 11:10

## 2018-10-08 RX ADMIN — FERUMOXYTOL 510 MG: 510 INJECTION INTRAVENOUS at 09:10

## 2018-10-08 NOTE — PROGRESS NOTES
Subjective:       Patient ID: Mari Etienne is a 22 y.o. female.    Chief Complaint: No chief complaint on file.    HPI 22-year-old female ITP patient treated with Rituxan today week 2 patient states the bleeding has decreased platelet count 9000 hemoglobin increased after intravenous iron    Past Medical History:   Diagnosis Date    Thrombocytopenia      History reviewed. No pertinent family history.  Social History     Socioeconomic History    Marital status: Single     Spouse name: Not on file    Number of children: Not on file    Years of education: Not on file    Highest education level: Not on file   Social Needs    Financial resource strain: Not on file    Food insecurity - worry: Not on file    Food insecurity - inability: Not on file    Transportation needs - medical: Not on file    Transportation needs - non-medical: Not on file   Occupational History    Not on file   Tobacco Use    Smoking status: Never Smoker    Smokeless tobacco: Never Used   Substance and Sexual Activity    Alcohol use: No     Frequency: Never    Drug use: No    Sexual activity: Not on file   Other Topics Concern    Not on file   Social History Narrative    Not on file     History reviewed. No pertinent surgical history.    Labs:  Lab Results   Component Value Date    WBC 13.82 (H) 10/08/2018    HGB 9.6 (L) 10/08/2018    HCT 33.5 (L) 10/08/2018    MCV 75 (L) 10/08/2018    PLT 9 (LL) 10/08/2018     BMP  Lab Results   Component Value Date     09/27/2018    K 4.0 09/27/2018     09/27/2018    CO2 19 (L) 09/27/2018    BUN 12 09/27/2018    CREATININE 0.7 09/27/2018    CALCIUM 9.2 09/27/2018    ANIONGAP 10 09/27/2018    ESTGFRAFRICA >60 09/27/2018    EGFRNONAA >60 09/27/2018     Lab Results   Component Value Date    ALT 12 09/25/2018    AST 14 09/25/2018    ALKPHOS 51 (L) 09/25/2018    BILITOT 0.5 09/25/2018       Lab Results   Component Value Date    IRON 18 (L) 09/25/2018    TIBC 411 09/25/2018     FERRITIN 15 (L) 09/25/2018     Lab Results   Component Value Date    [id number] 353 03/06/2018     Lab Results   Component Value Date    FOLATE 5.9 03/06/2018     No results found for: TSH      Review of Systems   Constitutional: Positive for activity change, appetite change and fatigue. Negative for chills, diaphoresis, fever and unexpected weight change.   HENT: Negative for congestion, dental problem, drooling, ear discharge, ear pain, facial swelling, hearing loss, mouth sores, nosebleeds, postnasal drip, rhinorrhea, sinus pressure, sneezing, sore throat, tinnitus, trouble swallowing and voice change.    Eyes: Negative for photophobia, pain, discharge, redness, itching and visual disturbance.   Respiratory: Negative for cough, choking, chest tightness, shortness of breath, wheezing and stridor.    Cardiovascular: Negative for chest pain, palpitations and leg swelling.   Gastrointestinal: Negative for abdominal distention, abdominal pain, anal bleeding, blood in stool, constipation, diarrhea, nausea, rectal pain and vomiting.   Endocrine: Negative for cold intolerance, heat intolerance, polydipsia, polyphagia and polyuria.   Genitourinary: Negative for decreased urine volume, difficulty urinating, dyspareunia, dysuria, enuresis, flank pain, frequency, genital sores, hematuria, menstrual problem, pelvic pain, urgency, vaginal bleeding, vaginal discharge and vaginal pain.   Musculoskeletal: Negative for arthralgias, back pain, gait problem, joint swelling, myalgias, neck pain and neck stiffness.   Skin: Negative for color change, pallor and rash.   Allergic/Immunologic: Negative for environmental allergies, food allergies and immunocompromised state.   Neurological: Negative for dizziness, tremors, seizures, syncope, facial asymmetry, speech difficulty, weakness, light-headedness, numbness and headaches.   Hematological: Negative for adenopathy. Does not bruise/bleed easily.   Psychiatric/Behavioral: Positive for  dysphoric mood. Negative for agitation, behavioral problems, confusion, decreased concentration, hallucinations, self-injury, sleep disturbance and suicidal ideas. The patient is nervous/anxious. The patient is not hyperactive.        Objective:      Physical Exam   Constitutional: She is oriented to person, place, and time. She appears well-developed and well-nourished. She appears distressed.   HENT:   Head: Normocephalic and atraumatic.   Right Ear: External ear normal.   Left Ear: External ear normal.   Nose: Nose normal. Right sinus exhibits no maxillary sinus tenderness and no frontal sinus tenderness. Left sinus exhibits no maxillary sinus tenderness and no frontal sinus tenderness.   Mouth/Throat: Oropharynx is clear and moist. No oropharyngeal exudate.   Eyes: Conjunctivae, EOM and lids are normal. Pupils are equal, round, and reactive to light. Right eye exhibits no discharge. Left eye exhibits no discharge. Right conjunctiva is not injected. Right conjunctiva has no hemorrhage. Left conjunctiva is not injected. Left conjunctiva has no hemorrhage. No scleral icterus.   Neck: Normal range of motion. Neck supple. No JVD present. No tracheal deviation present. No thyromegaly present.   Cardiovascular: Normal rate and regular rhythm.   Pulmonary/Chest: Effort normal. No stridor. No respiratory distress. She exhibits no tenderness.   Abdominal: Soft. She exhibits no distension and no mass. There is no splenomegaly or hepatomegaly. There is no tenderness. There is no rebound.   Musculoskeletal: Normal range of motion. She exhibits no edema or tenderness.   Lymphadenopathy:     She has no cervical adenopathy.     She has no axillary adenopathy.        Right: No supraclavicular adenopathy present.        Left: No supraclavicular adenopathy present.   Neurological: She is alert and oriented to person, place, and time. No cranial nerve deficit. Coordination normal.   Skin: Skin is dry. No rash noted. She is not  diaphoretic. No erythema.   Psychiatric: Her behavior is normal. Judgment and thought content normal. Her mood appears anxious.   Vitals reviewed.          Assessment:      Recurrent ITP      Plan:     Proceed with the 2nd dose of Rituxan today will check CBC back on Thursday return in 1 week for repeat Rituxan dosing hopefully platelet count will begin to increase on between 2nd and 3rd week of Rituxan therapy hemoglobin is definitely increased in patient has an increased sense of well-being with marked improvement in hemoglobin after intravenous iron        Nash Cota Jr, MD FACP

## 2018-10-08 NOTE — PATIENT INSTRUCTIONS
Support Groups/Classes    Support groups and classes are being offered at the   [facility] and [facility]!!    Nutrition Class:  Second Wednesday of each month   at noon at the Cancer [facility].  Metastatic Support Group:  Third Tuesday of each month   at noon at the [facility] [place].  Next Steps Class/Group: Second Thursday and Last Wednesday   of each month at noon at the [facility].  [facility] (Breast Cancer Support Group): First Tuesday of each month   at 5:30pm at the [facility] location.    If you are interested in attending or would like more information please ask our social workers or your nurse!

## 2018-10-08 NOTE — PLAN OF CARE
Problem: Patient Care Overview  Goal: Plan of Care Review  Outcome: Ongoing (interventions implemented as appropriate)  Pt states she is a little tired

## 2018-10-15 ENCOUNTER — OFFICE VISIT (OUTPATIENT)
Dept: HEMATOLOGY/ONCOLOGY | Facility: CLINIC | Age: 23
End: 2018-10-15
Payer: MEDICAID

## 2018-10-15 ENCOUNTER — INFUSION (OUTPATIENT)
Dept: INFUSION THERAPY | Facility: HOSPITAL | Age: 23
End: 2018-10-15
Attending: INTERNAL MEDICINE
Payer: MEDICAID

## 2018-10-15 VITALS — HEART RATE: 102 BPM | SYSTOLIC BLOOD PRESSURE: 134 MMHG | DIASTOLIC BLOOD PRESSURE: 89 MMHG

## 2018-10-15 VITALS
HEIGHT: 62 IN | OXYGEN SATURATION: 100 % | SYSTOLIC BLOOD PRESSURE: 119 MMHG | RESPIRATION RATE: 18 BRPM | WEIGHT: 232.13 LBS | TEMPERATURE: 99 F | HEART RATE: 100 BPM | DIASTOLIC BLOOD PRESSURE: 90 MMHG | BODY MASS INDEX: 42.72 KG/M2

## 2018-10-15 DIAGNOSIS — D69.3 CHRONIC ITP (IDIOPATHIC THROMBOCYTOPENIA): Primary | ICD-10-CM

## 2018-10-15 PROCEDURE — S0028 INJECTION, FAMOTIDINE, 20 MG: HCPCS | Mod: PO | Performed by: INTERNAL MEDICINE

## 2018-10-15 PROCEDURE — 63600175 PHARM REV CODE 636 W HCPCS: Mod: PO | Performed by: INTERNAL MEDICINE

## 2018-10-15 PROCEDURE — 90472 IMMUNIZATION ADMIN EACH ADD: CPT | Mod: ,,, | Performed by: INTERNAL MEDICINE

## 2018-10-15 PROCEDURE — 96415 CHEMO IV INFUSION ADDL HR: CPT | Mod: PO

## 2018-10-15 PROCEDURE — 99215 OFFICE O/P EST HI 40 MIN: CPT | Mod: 25,S$PBB,, | Performed by: INTERNAL MEDICINE

## 2018-10-15 PROCEDURE — 96367 TX/PROPH/DG ADDL SEQ IV INF: CPT | Mod: PO

## 2018-10-15 PROCEDURE — 90734 MENACWYD/MENACWYCRM VACC IM: CPT | Mod: ,,, | Performed by: INTERNAL MEDICINE

## 2018-10-15 PROCEDURE — 90471 IMMUNIZATION ADMIN: CPT | Mod: ,,, | Performed by: INTERNAL MEDICINE

## 2018-10-15 PROCEDURE — 90670 PCV13 VACCINE IM: CPT | Mod: ,,, | Performed by: INTERNAL MEDICINE

## 2018-10-15 PROCEDURE — 25000003 PHARM REV CODE 250: Mod: PO | Performed by: INTERNAL MEDICINE

## 2018-10-15 PROCEDURE — 96375 TX/PRO/DX INJ NEW DRUG ADDON: CPT | Mod: PO

## 2018-10-15 PROCEDURE — 90648 HIB PRP-T VACCINE 4 DOSE IM: CPT | Mod: SL,,, | Performed by: INTERNAL MEDICINE

## 2018-10-15 PROCEDURE — 99213 OFFICE O/P EST LOW 20 MIN: CPT | Mod: PBBFAC,PO,25 | Performed by: INTERNAL MEDICINE

## 2018-10-15 PROCEDURE — 96413 CHEMO IV INFUSION 1 HR: CPT | Mod: PO

## 2018-10-15 PROCEDURE — 99999 PR PBB SHADOW E&M-EST. PATIENT-LVL III: CPT | Mod: PBBFAC,,, | Performed by: INTERNAL MEDICINE

## 2018-10-15 RX ORDER — ACETAMINOPHEN 325 MG/1
650 TABLET ORAL
Status: COMPLETED | OUTPATIENT
Start: 2018-10-15 | End: 2018-10-15

## 2018-10-15 RX ORDER — FAMOTIDINE 20 MG/50ML
20 INJECTION, SOLUTION INTRAVENOUS
Status: COMPLETED | OUTPATIENT
Start: 2018-10-15 | End: 2018-10-15

## 2018-10-15 RX ORDER — SODIUM CHLORIDE 0.9 % (FLUSH) 0.9 %
10 SYRINGE (ML) INJECTION
Status: CANCELLED | OUTPATIENT
Start: 2018-10-15

## 2018-10-15 RX ORDER — HEPARIN 100 UNIT/ML
500 SYRINGE INTRAVENOUS
Status: CANCELLED | OUTPATIENT
Start: 2018-10-15

## 2018-10-15 RX ORDER — FAMOTIDINE 10 MG/ML
20 INJECTION INTRAVENOUS
Status: DISCONTINUED | OUTPATIENT
Start: 2018-10-15 | End: 2018-10-15

## 2018-10-15 RX ORDER — ACETAMINOPHEN 325 MG/1
650 TABLET ORAL
Status: CANCELLED | OUTPATIENT
Start: 2018-10-15

## 2018-10-15 RX ORDER — METHYLPREDNISOLONE SOD SUCC 125 MG
125 VIAL (EA) INJECTION
Status: COMPLETED | OUTPATIENT
Start: 2018-10-15 | End: 2018-10-15

## 2018-10-15 RX ORDER — MEPERIDINE HYDROCHLORIDE 25 MG/ML
25 INJECTION INTRAMUSCULAR; INTRAVENOUS; SUBCUTANEOUS
Status: CANCELLED | OUTPATIENT
Start: 2018-10-15 | End: 2018-10-15

## 2018-10-15 RX ORDER — METHYLPREDNISOLONE SOD SUCC 125 MG
125 VIAL (EA) INJECTION
Status: CANCELLED
Start: 2018-10-15

## 2018-10-15 RX ORDER — FAMOTIDINE 10 MG/ML
20 INJECTION INTRAVENOUS
Status: CANCELLED | OUTPATIENT
Start: 2018-10-15

## 2018-10-15 RX ORDER — SODIUM CHLORIDE 0.9 % (FLUSH) 0.9 %
10 SYRINGE (ML) INJECTION
Status: DISCONTINUED | OUTPATIENT
Start: 2018-10-15 | End: 2018-10-15 | Stop reason: HOSPADM

## 2018-10-15 RX ADMIN — FAMOTIDINE 20 MG: 20 INJECTION, SOLUTION INTRAVENOUS at 09:10

## 2018-10-15 RX ADMIN — DIPHENHYDRAMINE HYDROCHLORIDE 50 MG: 50 INJECTION INTRAMUSCULAR; INTRAVENOUS at 09:10

## 2018-10-15 RX ADMIN — RITUXIMAB 799 MG: 10 INJECTION, SOLUTION INTRAVENOUS at 10:10

## 2018-10-15 RX ADMIN — METHYLPREDNISOLONE SODIUM SUCCINATE 125 MG: 125 INJECTION, POWDER, FOR SOLUTION INTRAMUSCULAR; INTRAVENOUS at 10:10

## 2018-10-15 RX ADMIN — ACETAMINOPHEN 650 MG: 325 TABLET ORAL at 09:10

## 2018-10-15 NOTE — PROGRESS NOTES
Subjective:       Patient ID: Mari Etienne is a 22 y.o. female.    Chief Complaint: Results (Chronic ITP)    HPI 22-year-old female history of ITP patient presents for week 3 of Rituxan states that she is having increasing bruising    Past Medical History:   Diagnosis Date    Thrombocytopenia      History reviewed. No pertinent family history.  Social History     Socioeconomic History    Marital status: Single     Spouse name: Not on file    Number of children: Not on file    Years of education: Not on file    Highest education level: Not on file   Social Needs    Financial resource strain: Not on file    Food insecurity - worry: Not on file    Food insecurity - inability: Not on file    Transportation needs - medical: Not on file    Transportation needs - non-medical: Not on file   Occupational History    Not on file   Tobacco Use    Smoking status: Never Smoker    Smokeless tobacco: Never Used   Substance and Sexual Activity    Alcohol use: No     Frequency: Never    Drug use: No    Sexual activity: Not on file   Other Topics Concern    Not on file   Social History Narrative    Not on file     History reviewed. No pertinent surgical history.    Labs:  Lab Results   Component Value Date    WBC 10.80 10/15/2018    HGB 9.8 (L) 10/15/2018    HCT 33.2 (L) 10/15/2018    MCV 77 (L) 10/15/2018    PLT 6 (LL) 10/15/2018     BMP  Lab Results   Component Value Date     09/27/2018    K 4.0 09/27/2018     09/27/2018    CO2 19 (L) 09/27/2018    BUN 12 09/27/2018    CREATININE 0.7 09/27/2018    CALCIUM 9.2 09/27/2018    ANIONGAP 10 09/27/2018    ESTGFRAFRICA >60 09/27/2018    EGFRNONAA >60 09/27/2018     Lab Results   Component Value Date    ALT 12 09/25/2018    AST 14 09/25/2018    ALKPHOS 51 (L) 09/25/2018    BILITOT 0.5 09/25/2018       Lab Results   Component Value Date    IRON 18 (L) 09/25/2018    TIBC 411 09/25/2018    FERRITIN 15 (L) 09/25/2018     Lab Results   Component Value  Date    [id number] 353 03/06/2018     Lab Results   Component Value Date    FOLATE 5.9 03/06/2018     No results found for: TSH      Review of Systems   Constitutional: Negative for activity change, appetite change, chills, diaphoresis, fatigue, fever and unexpected weight change.   HENT: Negative for congestion, dental problem, drooling, ear discharge, ear pain, facial swelling, hearing loss, mouth sores, nosebleeds, postnasal drip, rhinorrhea, sinus pressure, sneezing, sore throat, tinnitus, trouble swallowing and voice change.    Eyes: Negative for photophobia, pain, discharge, redness, itching and visual disturbance.   Respiratory: Negative for cough, choking, chest tightness, shortness of breath, wheezing and stridor.    Cardiovascular: Negative for chest pain, palpitations and leg swelling.   Gastrointestinal: Negative for abdominal distention, abdominal pain, anal bleeding, blood in stool, constipation, diarrhea, nausea, rectal pain and vomiting.   Endocrine: Negative for cold intolerance, heat intolerance, polydipsia, polyphagia and polyuria.   Genitourinary: Negative for decreased urine volume, difficulty urinating, dyspareunia, dysuria, enuresis, flank pain, frequency, genital sores, hematuria, menstrual problem, pelvic pain, urgency, vaginal bleeding, vaginal discharge and vaginal pain.   Musculoskeletal: Negative for arthralgias, back pain, gait problem, joint swelling, myalgias, neck pain and neck stiffness.   Skin: Negative for color change, pallor and rash.         bruising   Allergic/Immunologic: Negative for environmental allergies, food allergies and immunocompromised state.   Neurological: Negative for dizziness, tremors, seizures, syncope, facial asymmetry, speech difficulty, weakness, light-headedness, numbness and headaches.   Hematological: Negative for adenopathy. Does not bruise/bleed easily.   Psychiatric/Behavioral: Negative for agitation, behavioral problems, confusion, decreased  concentration, dysphoric mood, hallucinations, self-injury, sleep disturbance and suicidal ideas. The patient is not nervous/anxious and is not hyperactive.        Objective:      Physical Exam   Constitutional: She is oriented to person, place, and time. She appears well-developed and well-nourished. She appears distressed.   HENT:   Head: Normocephalic and atraumatic.   Right Ear: External ear normal.   Left Ear: External ear normal.   Nose: Nose normal. Right sinus exhibits no maxillary sinus tenderness and no frontal sinus tenderness. Left sinus exhibits no maxillary sinus tenderness and no frontal sinus tenderness.   Mouth/Throat: Oropharynx is clear and moist. No oropharyngeal exudate.   Eyes: Conjunctivae, EOM and lids are normal. Pupils are equal, round, and reactive to light. Right eye exhibits no discharge. Left eye exhibits no discharge. Right conjunctiva is not injected. Right conjunctiva has no hemorrhage. Left conjunctiva is not injected. Left conjunctiva has no hemorrhage. No scleral icterus.   Neck: Normal range of motion. Neck supple. No JVD present. No tracheal deviation present. No thyromegaly present.   Cardiovascular: Normal rate and regular rhythm.   Pulmonary/Chest: Effort normal. No stridor. No respiratory distress. She exhibits no tenderness.   Abdominal: Soft. She exhibits no distension and no mass. There is no splenomegaly or hepatomegaly. There is no tenderness. There is no rebound.   Musculoskeletal: Normal range of motion. She exhibits no edema or tenderness.   Lymphadenopathy:     She has no cervical adenopathy.     She has no axillary adenopathy.        Right: No supraclavicular adenopathy present.        Left: No supraclavicular adenopathy present.   Neurological: She is alert and oriented to person, place, and time. No cranial nerve deficit. Coordination normal.   Skin: Skin is dry. No rash noted. She is not diaphoretic. No erythema.   Psychiatric: She has a normal mood and affect.  Her behavior is normal. Judgment and thought content normal.   Vitals reviewed.          Assessment:      1. Chronic ITP (idiopathic thrombocytopenia)           Plan:     Hemoglobin has improved after intravenous iron.  At this point I am concerned about recurrent ITP nonresponsive will proceed with 3rd dose of Rituxan ask surgery to see patient for potential splenectomy vaccination today with Prevnar, meningococcus vaccine and H influenzae.  Follow-up with me in 1 week with CBC        Nash Cota Jr, MD FACP

## 2018-10-15 NOTE — PLAN OF CARE
Problem: Patient Care Overview  Goal: Plan of Care Review  Outcome: Ongoing (interventions implemented as appropriate)  Pt. Said she was feeling ok today

## 2018-10-23 ENCOUNTER — TELEPHONE (OUTPATIENT)
Dept: SURGERY | Facility: CLINIC | Age: 23
End: 2018-10-23

## 2018-10-23 ENCOUNTER — TELEPHONE (OUTPATIENT)
Dept: PHARMACY | Facility: CLINIC | Age: 23
End: 2018-10-23

## 2018-10-23 ENCOUNTER — OFFICE VISIT (OUTPATIENT)
Dept: HEMATOLOGY/ONCOLOGY | Facility: CLINIC | Age: 23
End: 2018-10-23
Payer: MEDICAID

## 2018-10-23 ENCOUNTER — INFUSION (OUTPATIENT)
Dept: INFUSION THERAPY | Facility: HOSPITAL | Age: 23
End: 2018-10-23
Attending: INTERNAL MEDICINE
Payer: MEDICAID

## 2018-10-23 VITALS
TEMPERATURE: 98 F | HEIGHT: 62 IN | HEART RATE: 89 BPM | BODY MASS INDEX: 41.86 KG/M2 | OXYGEN SATURATION: 100 % | WEIGHT: 227.5 LBS | SYSTOLIC BLOOD PRESSURE: 125 MMHG | DIASTOLIC BLOOD PRESSURE: 88 MMHG

## 2018-10-23 VITALS — HEART RATE: 87 BPM | DIASTOLIC BLOOD PRESSURE: 96 MMHG | SYSTOLIC BLOOD PRESSURE: 134 MMHG

## 2018-10-23 DIAGNOSIS — D69.3 CHRONIC ITP (IDIOPATHIC THROMBOCYTOPENIA): Primary | ICD-10-CM

## 2018-10-23 LAB
ALBUMIN SERPL BCP-MCNC: 3.7 G/DL
ALP SERPL-CCNC: 44 U/L
ALT SERPL W/O P-5'-P-CCNC: 13 U/L
ANION GAP SERPL CALC-SCNC: 10 MMOL/L
AST SERPL-CCNC: 13 U/L
BILIRUB SERPL-MCNC: 0.4 MG/DL
BUN SERPL-MCNC: 9 MG/DL
CALCIUM SERPL-MCNC: 8.9 MG/DL
CHLORIDE SERPL-SCNC: 110 MMOL/L
CO2 SERPL-SCNC: 22 MMOL/L
CREAT SERPL-MCNC: 0.7 MG/DL
EST. GFR  (AFRICAN AMERICAN): >60 ML/MIN/1.73 M^2
EST. GFR  (NON AFRICAN AMERICAN): >60 ML/MIN/1.73 M^2
GLUCOSE SERPL-MCNC: 99 MG/DL
POTASSIUM SERPL-SCNC: 3.9 MMOL/L
PROT SERPL-MCNC: 6.6 G/DL
SODIUM SERPL-SCNC: 142 MMOL/L

## 2018-10-23 PROCEDURE — 96367 TX/PROPH/DG ADDL SEQ IV INF: CPT

## 2018-10-23 PROCEDURE — 99213 OFFICE O/P EST LOW 20 MIN: CPT | Mod: PBBFAC,25 | Performed by: INTERNAL MEDICINE

## 2018-10-23 PROCEDURE — 96415 CHEMO IV INFUSION ADDL HR: CPT

## 2018-10-23 PROCEDURE — 99999 PR PBB SHADOW E&M-EST. PATIENT-LVL III: CPT | Mod: PBBFAC,,, | Performed by: INTERNAL MEDICINE

## 2018-10-23 PROCEDURE — 96375 TX/PRO/DX INJ NEW DRUG ADDON: CPT

## 2018-10-23 PROCEDURE — S0028 INJECTION, FAMOTIDINE, 20 MG: HCPCS | Performed by: INTERNAL MEDICINE

## 2018-10-23 PROCEDURE — 25000003 PHARM REV CODE 250: Performed by: INTERNAL MEDICINE

## 2018-10-23 PROCEDURE — 80053 COMPREHEN METABOLIC PANEL: CPT

## 2018-10-23 PROCEDURE — 63600175 PHARM REV CODE 636 W HCPCS: Mod: JG | Performed by: INTERNAL MEDICINE

## 2018-10-23 PROCEDURE — 99215 OFFICE O/P EST HI 40 MIN: CPT | Mod: 25,S$PBB,, | Performed by: INTERNAL MEDICINE

## 2018-10-23 PROCEDURE — 96413 CHEMO IV INFUSION 1 HR: CPT

## 2018-10-23 RX ORDER — METHYLPREDNISOLONE SOD SUCC 125 MG
125 VIAL (EA) INJECTION
Status: COMPLETED | OUTPATIENT
Start: 2018-10-23 | End: 2018-10-23

## 2018-10-23 RX ORDER — MEPERIDINE HYDROCHLORIDE 25 MG/ML
25 INJECTION INTRAMUSCULAR; INTRAVENOUS; SUBCUTANEOUS
Status: CANCELLED | OUTPATIENT
Start: 2018-10-23 | End: 2018-10-23

## 2018-10-23 RX ORDER — SODIUM CHLORIDE 0.9 % (FLUSH) 0.9 %
10 SYRINGE (ML) INJECTION
Status: CANCELLED | OUTPATIENT
Start: 2018-10-23

## 2018-10-23 RX ORDER — METHYLPREDNISOLONE SOD SUCC 125 MG
125 VIAL (EA) INJECTION
Status: CANCELLED
Start: 2018-10-23

## 2018-10-23 RX ORDER — FAMOTIDINE 10 MG/ML
20 INJECTION INTRAVENOUS
Status: CANCELLED | OUTPATIENT
Start: 2018-10-23

## 2018-10-23 RX ORDER — ACETAMINOPHEN 325 MG/1
650 TABLET ORAL
Status: COMPLETED | OUTPATIENT
Start: 2018-10-23 | End: 2018-10-23

## 2018-10-23 RX ORDER — ACETAMINOPHEN 325 MG/1
650 TABLET ORAL
Status: CANCELLED | OUTPATIENT
Start: 2018-10-23

## 2018-10-23 RX ORDER — FAMOTIDINE 10 MG/ML
20 INJECTION INTRAVENOUS
Status: COMPLETED | OUTPATIENT
Start: 2018-10-23 | End: 2018-10-23

## 2018-10-23 RX ORDER — HEPARIN 100 UNIT/ML
500 SYRINGE INTRAVENOUS
Status: CANCELLED | OUTPATIENT
Start: 2018-10-23

## 2018-10-23 RX ORDER — SODIUM CHLORIDE 0.9 % (FLUSH) 0.9 %
10 SYRINGE (ML) INJECTION
Status: DISCONTINUED | OUTPATIENT
Start: 2018-10-23 | End: 2018-10-23 | Stop reason: HOSPADM

## 2018-10-23 RX ADMIN — FAMOTIDINE 20 MG: 10 INJECTION, SOLUTION INTRAVENOUS at 10:10

## 2018-10-23 RX ADMIN — DIPHENHYDRAMINE HYDROCHLORIDE 50 MG: 50 INJECTION, SOLUTION INTRAMUSCULAR; INTRAVENOUS at 11:10

## 2018-10-23 RX ADMIN — RITUXIMAB 799 MG: 10 INJECTION, SOLUTION INTRAVENOUS at 11:10

## 2018-10-23 RX ADMIN — ACETAMINOPHEN 650 MG: 325 TABLET ORAL at 10:10

## 2018-10-23 RX ADMIN — METHYLPREDNISOLONE SODIUM SUCCINATE 125 MG: 125 INJECTION, POWDER, FOR SOLUTION INTRAMUSCULAR; INTRAVENOUS at 10:10

## 2018-10-23 NOTE — PROGRESS NOTES
Subjective:       Patient ID: Mari Etienne is a 22 y.o. female.    Chief Complaint: Results (ITP)    HPI 22-year-old female returns for evaluation of ITP cycle 4 day 1 of Rituxan.  Patient has received vaccinations in preparation for potential splenectomy was scheduled to see surgery last week missed appointment    Past Medical History:   Diagnosis Date    Thrombocytopenia      History reviewed. No pertinent family history.  Social History     Socioeconomic History    Marital status: Single     Spouse name: Not on file    Number of children: Not on file    Years of education: Not on file    Highest education level: Not on file   Social Needs    Financial resource strain: Not on file    Food insecurity - worry: Not on file    Food insecurity - inability: Not on file    Transportation needs - medical: Not on file    Transportation needs - non-medical: Not on file   Occupational History    Not on file   Tobacco Use    Smoking status: Never Smoker    Smokeless tobacco: Never Used   Substance and Sexual Activity    Alcohol use: No     Frequency: Never    Drug use: No    Sexual activity: Not on file   Other Topics Concern    Not on file   Social History Narrative    Not on file     History reviewed. No pertinent surgical history.    Labs:  Lab Results   Component Value Date    WBC 6.26 10/23/2018    HGB 9.5 (L) 10/23/2018    HCT 32.1 (L) 10/23/2018    MCV 78 (L) 10/23/2018    PLT 3 (LL) 10/23/2018     BMP  Lab Results   Component Value Date     09/27/2018    K 4.0 09/27/2018     09/27/2018    CO2 19 (L) 09/27/2018    BUN 12 09/27/2018    CREATININE 0.7 09/27/2018    CALCIUM 9.2 09/27/2018    ANIONGAP 10 09/27/2018    ESTGFRAFRICA >60 09/27/2018    EGFRNONAA >60 09/27/2018     Lab Results   Component Value Date    ALT 12 09/25/2018    AST 14 09/25/2018    ALKPHOS 51 (L) 09/25/2018    BILITOT 0.5 09/25/2018       Lab Results   Component Value Date    IRON 18 (L) 09/25/2018    TIBC  411 09/25/2018    FERRITIN 15 (L) 09/25/2018     Lab Results   Component Value Date    [id number] 353 03/06/2018     Lab Results   Component Value Date    FOLATE 5.9 03/06/2018     No results found for: TSH      Review of Systems   Constitutional: Negative for activity change, appetite change, chills, diaphoresis, fatigue, fever and unexpected weight change.   HENT: Negative for congestion, dental problem, drooling, ear discharge, ear pain, facial swelling, hearing loss, mouth sores, nosebleeds, postnasal drip, rhinorrhea, sinus pressure, sneezing, sore throat, tinnitus, trouble swallowing and voice change.    Eyes: Negative for photophobia, pain, discharge, redness, itching and visual disturbance.   Respiratory: Negative for cough, choking, chest tightness, shortness of breath, wheezing and stridor.    Cardiovascular: Negative for chest pain, palpitations and leg swelling.   Gastrointestinal: Negative for abdominal distention, abdominal pain, anal bleeding, blood in stool, constipation, diarrhea, nausea, rectal pain and vomiting.   Endocrine: Negative for cold intolerance, heat intolerance, polydipsia, polyphagia and polyuria.   Genitourinary: Negative for decreased urine volume, difficulty urinating, dyspareunia, dysuria, enuresis, flank pain, frequency, genital sores, hematuria, menstrual problem, pelvic pain, urgency, vaginal bleeding, vaginal discharge and vaginal pain.   Musculoskeletal: Negative for arthralgias, back pain, gait problem, joint swelling, myalgias, neck pain and neck stiffness.   Skin: Negative for color change, pallor and rash.   Allergic/Immunologic: Negative for environmental allergies, food allergies and immunocompromised state.   Neurological: Negative for dizziness, tremors, seizures, syncope, facial asymmetry, speech difficulty, weakness, light-headedness, numbness and headaches.   Hematological: Negative for adenopathy. Does not bruise/bleed easily.   Psychiatric/Behavioral: Positive for  dysphoric mood. Negative for agitation, behavioral problems, confusion, decreased concentration, hallucinations, self-injury, sleep disturbance and suicidal ideas. The patient is nervous/anxious. The patient is not hyperactive.        Objective:      Physical Exam   Constitutional: She is oriented to person, place, and time. She appears well-developed and well-nourished. She appears distressed.   HENT:   Head: Normocephalic and atraumatic.   Right Ear: External ear normal.   Left Ear: External ear normal.   Nose: Nose normal. Right sinus exhibits no maxillary sinus tenderness and no frontal sinus tenderness. Left sinus exhibits no maxillary sinus tenderness and no frontal sinus tenderness.   Mouth/Throat: Oropharynx is clear and moist. No oropharyngeal exudate.   Eyes: Conjunctivae, EOM and lids are normal. Pupils are equal, round, and reactive to light. Right eye exhibits no discharge. Left eye exhibits no discharge. Right conjunctiva is not injected. Right conjunctiva has no hemorrhage. Left conjunctiva is not injected. Left conjunctiva has no hemorrhage. No scleral icterus.   Neck: Normal range of motion. Neck supple. No JVD present. No tracheal deviation present. No thyromegaly present.   Cardiovascular: Normal rate and regular rhythm.   Pulmonary/Chest: Effort normal. No stridor. No respiratory distress. She exhibits no tenderness.   Abdominal: Soft. She exhibits no distension and no mass. There is no splenomegaly or hepatomegaly. There is no tenderness. There is no rebound.   Musculoskeletal: Normal range of motion. She exhibits no edema or tenderness.   Lymphadenopathy:     She has no cervical adenopathy.     She has no axillary adenopathy.        Right: No supraclavicular adenopathy present.        Left: No supraclavicular adenopathy present.   Neurological: She is alert and oriented to person, place, and time. No cranial nerve deficit. Coordination normal.   Skin: Skin is dry. No rash noted. She is not  diaphoretic. No erythema.   Psychiatric: Her behavior is normal. Judgment and thought content normal. Her mood appears anxious. She exhibits a depressed mood.   Vitals reviewed.          Assessment:      1. Chronic ITP (idiopathic thrombocytopenia)           Plan:     Persistent ITP non responsive to Rituxan at this point will contact surgery to reassess latency patient will give dose of Rituxan today I have placed an order for Promacta to start as soon as patient is available with drug to try to increased platelet count.  I to more acceptable level prior to splenectomy discussed implications with patient reviewed information with her CBC CMP ordered today        Nash Cota Jr, MD FACP

## 2018-10-23 NOTE — TELEPHONE ENCOUNTER
----- Message from Rima Pacheco MA sent at 10/23/2018 10:24 AM CDT -----  Patient need a consult ASAP Possibly Monday to discuss splenectomy per Dr Cota.  I think Dr Lara knows about the case.  Thanks for your help/[phone]

## 2018-10-23 NOTE — PLAN OF CARE
"Problem: Patient Care Overview  Goal: Plan of Care Review  Outcome: Ongoing (interventions implemented as appropriate)  "I feel no different, just easily bruised."      "

## 2018-10-23 NOTE — PATIENT INSTRUCTIONS
[facility]  [address]  [address] [address]  [phone] phone     [phone] fax  Hours of Operation: Monday- Friday 8:00am- 5:00pm  After hours phone  [phone]  Hematology / Oncology Physicians on call      Dr. Cipriano Fuentes    Please call with any concerns regarding your appointment today.

## 2018-10-24 NOTE — TELEPHONE ENCOUNTER
DOCUMENTATION ONLY:  Prior authorization for Promacta approved from 10/24/2018 to 10/23/2019  Case ID: [id number]    Co-pay: $3.00    Patient Assistance IS NOT required  KAYLENE

## 2018-10-24 NOTE — TELEPHONE ENCOUNTER
Patient returned call in regards to Promacta initial shipment and consult. Patient would like medication to be shipped 10/24 for patient to receive 10/25. $3.00 copay, obtained CC information. Confirmed address. Patient would like to start medication upon receipt on 10/25.    Initial Promacta consult completed on 10/24. Promacta will be shipped on 10/24 to arrive at patient's home on 10/25 via [employer]. Patient plans to start Promacta on 10/25. Address confirmed, CC on file. Confirmed 2 patient identifiers - name and . Therapy Appropriate.    Patient was counseled on the administration directions:  -Take 2 tablets (50 mg) by mouth once daily on an empty stomach, 1 hour before or 2 hours after a meal.    -Do not chew, crush, or break the tablets.   If possible, patient was instructed tip the tablets from the RX bottle to the cap, and take directly from the cap to the mouth.  Patient may handle the medication with their hands if they wear with a latex or nitrile glove and wash their hands before and after handling the tablets.  - Do not administer concurrently with antacids, foods high in calcium, or minerals (eg, iron, calcium, aluminum, magnesium, selenium, zinc). Do not administer more than one dose within 24 hours.     Patient was counseled on the following possible side effects, which include, but are not limited to: nausea/vomiting; diarrhea, fatigue, headache, hepatotoxicity; upper respiratory infection; muscle aches/pains; rash; urinary tract infections.  Patient was specifically advised regarding the s/sx of blood clots which can occur when platelets get too high    DDI: Medication list reviewed and potential DDIs addressed. Patient is taking Iron supplementation. He is advised to separate this medication from his Promacta by 4hr as previously mentioned. Patient MUST contact myself or provider prior to starting any new OTC, herbal, or prescription drugs to avoid potential DDIs.    Discussed the importance of  staying well hydrated while on therapy. Compliance stressed - patient to take missed doses as soon as remembered, but NOT to take 2 doses in one day. Patient will report questions or concerns to myself or practitioner. Patient verbalizes understanding. Patient plans to start Promacta on 10/25. Consultation included: indication; goals of treatment; administration; storage and handling; side effects; how to handle side effects; the importance of compliance; how to handle missed doses; the importance of laboratory monitoring; the importance of keeping all follow up appointments.  Patient understands to report any medication changes to [facility] and provider. All questions answered and addressed to patients satisfaction.  I will f/u with patient in 1 week from start, and [facility] will contact patient in 3 weeks to coordinate next refill.

## 2018-10-29 ENCOUNTER — OFFICE VISIT (OUTPATIENT)
Dept: SURGERY | Facility: CLINIC | Age: 23
End: 2018-10-29
Payer: MEDICAID

## 2018-10-29 VITALS
DIASTOLIC BLOOD PRESSURE: 89 MMHG | BODY MASS INDEX: 41.91 KG/M2 | HEART RATE: 87 BPM | HEIGHT: 62 IN | TEMPERATURE: 98 F | SYSTOLIC BLOOD PRESSURE: 123 MMHG | WEIGHT: 227.75 LBS

## 2018-10-29 DIAGNOSIS — D69.3 CHRONIC ITP (IDIOPATHIC THROMBOCYTOPENIA): Primary | ICD-10-CM

## 2018-10-29 PROCEDURE — 99204 OFFICE O/P NEW MOD 45 MIN: CPT | Mod: S$PBB,,, | Performed by: SURGERY

## 2018-10-29 PROCEDURE — 99999 PR PBB SHADOW E&M-EST. PATIENT-LVL IV: CPT | Mod: PBBFAC,,, | Performed by: SURGERY

## 2018-10-29 PROCEDURE — 99214 OFFICE O/P EST MOD 30 MIN: CPT | Mod: PBBFAC,PO | Performed by: SURGERY

## 2018-10-29 RX ORDER — ACETAMINOPHEN 10 MG/ML
1000 INJECTION, SOLUTION INTRAVENOUS
Status: CANCELLED | OUTPATIENT
Start: 2018-10-29 | End: 2018-10-29

## 2018-10-29 RX ORDER — LIDOCAINE HYDROCHLORIDE 10 MG/ML
1 INJECTION, SOLUTION EPIDURAL; INFILTRATION; INTRACAUDAL; PERINEURAL ONCE
Status: CANCELLED | OUTPATIENT
Start: 2018-10-29 | End: 2018-10-29

## 2018-10-29 RX ORDER — HYDROCODONE BITARTRATE AND ACETAMINOPHEN 500; 5 MG/1; MG/1
TABLET ORAL
Status: CANCELLED | OUTPATIENT
Start: 2018-10-29

## 2018-10-29 RX ORDER — SODIUM CHLORIDE 9 MG/ML
INJECTION, SOLUTION INTRAVENOUS CONTINUOUS
Status: CANCELLED | OUTPATIENT
Start: 2018-10-29

## 2018-10-29 NOTE — LETTER
October 29, 2018      Nash Cota MD  [address] [facility] [place] [address]           [facility]  [address] [facility] [facility] [place] [address]  Phone: [phone]  Fax: [phone]          Patient: Mari Etienne   MR Number: [id number]   YOB: 1995   Date of Visit: 10/29/2018       Dear Dr. Nash Cota:    Thank you for referring Mari Etienne to me for evaluation. Attached you will find relevant portions of my assessment and plan of care.    If you have questions, please do not hesitate to call me. I look forward to following Mari Etienne along with you.    Sincerely,    Yobani Lara MD    Enclosure  CC:  No Recipients    If you would like to receive this communication electronically, please contact [email or web address] or [phone] to request more information on [employer] access.    For providers and/or their staff who would like to refer a patient to [facility], please contact us through our one-stop-shop provider referral line, [facility], at [phone].    If you feel you have received this communication in error or would no longer like to receive these types of communications, please e-mail [email or web address]

## 2018-10-29 NOTE — H&P (VIEW-ONLY)
History & Physical    SUBJECTIVE:     History of Present Illness:  Patient is a 22 y.o. female referred for chronic ITP. She has failed initial medical therapy currently now of rituxan with continued severe thrombocytopenia. She has undergone immunization in preparation for splenectomy.    Chief Complaint   Patient presents with    Consult       Review of patient's allergies indicates:   Allergen Reactions    Ibuprofen        Current Outpatient Medications   Medication Sig Dispense Refill    eltrombopag (PROMACTA) 25 MG Tab Take 2 tablets (50 mg total) by mouth once daily. 60 tablet 11     Current Facility-Administered Medications   Medication Dose Route Frequency Provider Last Rate Last Dose    meningococcal polysaccharide injection 0.5 mL  0.5 mL Intramuscular Prior to discharge Nash Cota MD           Past Medical History:   Diagnosis Date    Thrombocytopenia      History reviewed. No pertinent surgical history.  History reviewed. No pertinent family history.  Social History     Tobacco Use    Smoking status: Never Smoker    Smokeless tobacco: Never Used   Substance Use Topics    Alcohol use: No     Frequency: Never    Drug use: No        Review of Systems:  Review of Systems   Constitutional: Negative for activity change, appetite change, chills, diaphoresis, fatigue, fever and unexpected weight change.   HENT: Negative for congestion, dental problem, rhinorrhea and sore throat.    Eyes: Negative for visual disturbance.   Respiratory: Negative for cough, chest tightness, shortness of breath and wheezing.    Cardiovascular: Negative for chest pain, palpitations and leg swelling.   Gastrointestinal: Negative for abdominal distention, abdominal pain, constipation, diarrhea, nausea and vomiting.   Endocrine: Negative for cold intolerance, heat intolerance, polydipsia, polyphagia and polyuria.   Genitourinary: Negative for difficulty urinating, dysuria, frequency, hematuria and urgency.  "  Musculoskeletal: Negative for arthralgias, gait problem, myalgias and neck pain.   Skin: Negative for color change, pallor, rash and wound.   Neurological: Negative for dizziness, syncope, weakness, light-headedness, numbness and headaches.   Hematological: Negative for adenopathy. Does not bruise/bleed easily.   Psychiatric/Behavioral: Negative for confusion, decreased concentration and sleep disturbance. The patient is not nervous/anxious.        OBJECTIVE:     Vital Signs (Most Recent)  Temp: 98.4 °F (36.9 °C) (10/29/18 1417)  Pulse: 87 (10/29/18 1417)  BP: 123/89 (10/29/18 1417)  5' 2" (1.575 m)  103.3 kg (227 lb 11.8 oz)     Physical Exam:  Physical Exam   Constitutional: She is oriented to person, place, and time. She appears well-developed and well-nourished. No distress.   HENT:   Head: Normocephalic and atraumatic.   Right Ear: External ear normal.   Left Ear: External ear normal.   Eyes: Conjunctivae and EOM are normal. Pupils are equal, round, and reactive to light. No scleral icterus.   Neck: Normal range of motion. Neck supple. No tracheal deviation present. No thyromegaly present.   Cardiovascular: Normal rate, regular rhythm, normal heart sounds and intact distal pulses. Exam reveals no gallop and no friction rub.   No murmur heard.  Pulmonary/Chest: Effort normal and breath sounds normal. No respiratory distress. She has no wheezes. She has no rales. She exhibits no tenderness.   Abdominal: Soft. Bowel sounds are normal. She exhibits no distension. There is no tenderness. No hernia.   Musculoskeletal: Normal range of motion. She exhibits no edema, tenderness or deformity.   Lymphadenopathy:     She has no cervical adenopathy.   Neurological: She is alert and oriented to person, place, and time.   Skin: Skin is warm and dry. No rash noted. She is not diaphoretic. No erythema. No pallor.   Psychiatric: She has a normal mood and affect. Her behavior is normal. Judgment and thought content normal. "   Vitals reviewed.        ASSESSMENT/PLAN:     21 y/o female with chronic ITP refractory to medical therapy    PLAN:Plan     Splenectomy 11/13/18  Preop: cbc, bmp reviewed, type and screen, pt will need platelet transfusion intraop during splenectomy  Risks and benefits discussed with patient including: pain, bleeding, infection, injury to underlying abdominal organs, possible open surgery, increased risk of infection, need for further procedure, continued ITP, transfusion associated risks

## 2018-10-29 NOTE — PROGRESS NOTES
History & Physical    SUBJECTIVE:     History of Present Illness:  Patient is a 22 y.o. female referred for chronic ITP. She has failed initial medical therapy currently now of rituxan with continued severe thrombocytopenia. She has undergone immunization in preparation for splenectomy.    Chief Complaint   Patient presents with    Consult       Review of patient's allergies indicates:   Allergen Reactions    Ibuprofen        Current Outpatient Medications   Medication Sig Dispense Refill    eltrombopag (PROMACTA) 25 MG Tab Take 2 tablets (50 mg total) by mouth once daily. 60 tablet 11     Current Facility-Administered Medications   Medication Dose Route Frequency Provider Last Rate Last Dose    meningococcal polysaccharide injection 0.5 mL  0.5 mL Intramuscular Prior to discharge Nash Cota MD           Past Medical History:   Diagnosis Date    Thrombocytopenia      History reviewed. No pertinent surgical history.  History reviewed. No pertinent family history.  Social History     Tobacco Use    Smoking status: Never Smoker    Smokeless tobacco: Never Used   Substance Use Topics    Alcohol use: No     Frequency: Never    Drug use: No        Review of Systems:  Review of Systems   Constitutional: Negative for activity change, appetite change, chills, diaphoresis, fatigue, fever and unexpected weight change.   HENT: Negative for congestion, dental problem, rhinorrhea and sore throat.    Eyes: Negative for visual disturbance.   Respiratory: Negative for cough, chest tightness, shortness of breath and wheezing.    Cardiovascular: Negative for chest pain, palpitations and leg swelling.   Gastrointestinal: Negative for abdominal distention, abdominal pain, constipation, diarrhea, nausea and vomiting.   Endocrine: Negative for cold intolerance, heat intolerance, polydipsia, polyphagia and polyuria.   Genitourinary: Negative for difficulty urinating, dysuria, frequency, hematuria and urgency.  "  Musculoskeletal: Negative for arthralgias, gait problem, myalgias and neck pain.   Skin: Negative for color change, pallor, rash and wound.   Neurological: Negative for dizziness, syncope, weakness, light-headedness, numbness and headaches.   Hematological: Negative for adenopathy. Does not bruise/bleed easily.   Psychiatric/Behavioral: Negative for confusion, decreased concentration and sleep disturbance. The patient is not nervous/anxious.        OBJECTIVE:     Vital Signs (Most Recent)  Temp: 98.4 °F (36.9 °C) (10/29/18 1417)  Pulse: 87 (10/29/18 1417)  BP: 123/89 (10/29/18 1417)  5' 2" (1.575 m)  103.3 kg (227 lb 11.8 oz)     Physical Exam:  Physical Exam   Constitutional: She is oriented to person, place, and time. She appears well-developed and well-nourished. No distress.   HENT:   Head: Normocephalic and atraumatic.   Right Ear: External ear normal.   Left Ear: External ear normal.   Eyes: Conjunctivae and EOM are normal. Pupils are equal, round, and reactive to light. No scleral icterus.   Neck: Normal range of motion. Neck supple. No tracheal deviation present. No thyromegaly present.   Cardiovascular: Normal rate, regular rhythm, normal heart sounds and intact distal pulses. Exam reveals no gallop and no friction rub.   No murmur heard.  Pulmonary/Chest: Effort normal and breath sounds normal. No respiratory distress. She has no wheezes. She has no rales. She exhibits no tenderness.   Abdominal: Soft. Bowel sounds are normal. She exhibits no distension. There is no tenderness. No hernia.   Musculoskeletal: Normal range of motion. She exhibits no edema, tenderness or deformity.   Lymphadenopathy:     She has no cervical adenopathy.   Neurological: She is alert and oriented to person, place, and time.   Skin: Skin is warm and dry. No rash noted. She is not diaphoretic. No erythema. No pallor.   Psychiatric: She has a normal mood and affect. Her behavior is normal. Judgment and thought content normal. "   Vitals reviewed.        ASSESSMENT/PLAN:     23 y/o female with chronic ITP refractory to medical therapy    PLAN:Plan     Splenectomy 11/13/18  Preop: cbc, bmp reviewed, type and screen, pt will need platelet transfusion intraop during splenectomy  Risks and benefits discussed with patient including: pain, bleeding, infection, injury to underlying abdominal organs, possible open surgery, increased risk of infection, need for further procedure, continued ITP, transfusion associated risks

## 2018-10-30 ENCOUNTER — OFFICE VISIT (OUTPATIENT)
Dept: HEMATOLOGY/ONCOLOGY | Facility: CLINIC | Age: 23
End: 2018-10-30
Payer: MEDICAID

## 2018-10-30 ENCOUNTER — LAB VISIT (OUTPATIENT)
Dept: LAB | Facility: HOSPITAL | Age: 23
End: 2018-10-30
Attending: INTERNAL MEDICINE
Payer: MEDICAID

## 2018-10-30 ENCOUNTER — TELEPHONE (OUTPATIENT)
Dept: HEMATOLOGY/ONCOLOGY | Facility: CLINIC | Age: 23
End: 2018-10-30

## 2018-10-30 VITALS
DIASTOLIC BLOOD PRESSURE: 96 MMHG | SYSTOLIC BLOOD PRESSURE: 146 MMHG | OXYGEN SATURATION: 100 % | HEART RATE: 86 BPM | WEIGHT: 227.5 LBS | TEMPERATURE: 99 F | BODY MASS INDEX: 41.86 KG/M2 | HEIGHT: 62 IN

## 2018-10-30 DIAGNOSIS — D69.3 ACUTE ITP: Primary | ICD-10-CM

## 2018-10-30 DIAGNOSIS — D69.3 CHRONIC ITP (IDIOPATHIC THROMBOCYTOPENIA): ICD-10-CM

## 2018-10-30 LAB
ALBUMIN SERPL BCP-MCNC: 3.9 G/DL
ALP SERPL-CCNC: 47 U/L
ALT SERPL W/O P-5'-P-CCNC: 15 U/L
ANION GAP SERPL CALC-SCNC: 9 MMOL/L
AST SERPL-CCNC: 17 U/L
BASOPHILS # BLD AUTO: 0.02 K/UL
BASOPHILS NFR BLD: 0.3 %
BILIRUB SERPL-MCNC: 0.3 MG/DL
BUN SERPL-MCNC: 10 MG/DL
CALCIUM SERPL-MCNC: 9 MG/DL
CHLORIDE SERPL-SCNC: 112 MMOL/L
CO2 SERPL-SCNC: 19 MMOL/L
CREAT SERPL-MCNC: 0.7 MG/DL
DIFFERENTIAL METHOD: ABNORMAL
EOSINOPHIL # BLD AUTO: 0.1 K/UL
EOSINOPHIL NFR BLD: 1.1 %
ERYTHROCYTE [DISTWIDTH] IN BLOOD BY AUTOMATED COUNT: 19.5 %
EST. GFR  (AFRICAN AMERICAN): >60 ML/MIN/1.73 M^2
EST. GFR  (NON AFRICAN AMERICAN): >60 ML/MIN/1.73 M^2
GLUCOSE SERPL-MCNC: 100 MG/DL
HCT VFR BLD AUTO: 34.6 %
HGB BLD-MCNC: 10.4 G/DL
LYMPHOCYTES # BLD AUTO: 1.3 K/UL
LYMPHOCYTES NFR BLD: 20.6 %
MCH RBC QN AUTO: 23.3 PG
MCHC RBC AUTO-ENTMCNC: 30.1 G/DL
MCV RBC AUTO: 78 FL
MONOCYTES # BLD AUTO: 0.6 K/UL
MONOCYTES NFR BLD: 8.8 %
NEUTROPHILS # BLD AUTO: 4.5 K/UL
NEUTROPHILS NFR BLD: 69.2 %
PLATELET # BLD AUTO: 2 K/UL
PLATELET BLD QL SMEAR: ABNORMAL
PMV BLD AUTO: ABNORMAL FL
POTASSIUM SERPL-SCNC: 3.8 MMOL/L
PROT SERPL-MCNC: 6.7 G/DL
RBC # BLD AUTO: 4.46 M/UL
SODIUM SERPL-SCNC: 140 MMOL/L
WBC # BLD AUTO: 6.5 K/UL

## 2018-10-30 PROCEDURE — 99213 OFFICE O/P EST LOW 20 MIN: CPT | Mod: PBBFAC | Performed by: INTERNAL MEDICINE

## 2018-10-30 PROCEDURE — 85025 COMPLETE CBC W/AUTO DIFF WBC: CPT

## 2018-10-30 PROCEDURE — 80053 COMPREHEN METABOLIC PANEL: CPT

## 2018-10-30 PROCEDURE — 99999 PR PBB SHADOW E&M-EST. PATIENT-LVL III: CPT | Mod: PBBFAC,,, | Performed by: INTERNAL MEDICINE

## 2018-10-30 PROCEDURE — 99215 OFFICE O/P EST HI 40 MIN: CPT | Mod: S$PBB,,, | Performed by: INTERNAL MEDICINE

## 2018-10-30 RX ORDER — HEPARIN 100 UNIT/ML
500 SYRINGE INTRAVENOUS
Status: CANCELLED | OUTPATIENT
Start: 2018-11-08

## 2018-10-30 RX ORDER — ACETAMINOPHEN 325 MG/1
650 TABLET ORAL
Status: CANCELLED | OUTPATIENT
Start: 2018-11-08

## 2018-10-30 RX ORDER — FAMOTIDINE 10 MG/ML
20 INJECTION INTRAVENOUS
Status: CANCELLED | OUTPATIENT
Start: 2018-11-08

## 2018-10-30 RX ORDER — SODIUM CHLORIDE 0.9 % (FLUSH) 0.9 %
10 SYRINGE (ML) INJECTION
Status: CANCELLED | OUTPATIENT
Start: 2018-11-08

## 2018-10-30 NOTE — PROGRESS NOTES
Subjective:       Patient ID: Mari Etienne is a 22 y.o. female.    Chief Complaint: Results and Coagulation Disorder    HPI 22-year-old female with refractory ITP having failed Rituxan.  Patient is currently day 7 of Promacta 50 mg daily.  Patient is scheduled for surgery on 11/13/2018 for laparoscopic splenectomy    Past Medical History:   Diagnosis Date    Thrombocytopenia      History reviewed. No pertinent family history.  Social History     Socioeconomic History    Marital status: Single     Spouse name: Not on file    Number of children: Not on file    Years of education: Not on file    Highest education level: Not on file   Social Needs    Financial resource strain: Not on file    Food insecurity - worry: Not on file    Food insecurity - inability: Not on file    Transportation needs - medical: Not on file    Transportation needs - non-medical: Not on file   Occupational History    Not on file   Tobacco Use    Smoking status: Never Smoker    Smokeless tobacco: Never Used   Substance and Sexual Activity    Alcohol use: No     Frequency: Never    Drug use: No    Sexual activity: Not on file   Other Topics Concern    Not on file   Social History Narrative    Not on file     History reviewed. No pertinent surgical history.    Labs:  Lab Results   Component Value Date    WBC 6.50 10/30/2018    HGB 10.4 (L) 10/30/2018    HCT 34.6 (L) 10/30/2018    MCV 78 (L) 10/30/2018    PLT 2 (LL) 10/30/2018     BMP  Lab Results   Component Value Date     10/30/2018    K 3.8 10/30/2018     (H) 10/30/2018    CO2 19 (L) 10/30/2018    BUN 10 10/30/2018    CREATININE 0.7 10/30/2018    CALCIUM 9.0 10/30/2018    ANIONGAP 9 10/30/2018    ESTGFRAFRICA >60 10/30/2018    EGFRNONAA >60 10/30/2018     Lab Results   Component Value Date    ALT 15 10/30/2018    AST 17 10/30/2018    ALKPHOS 47 (L) 10/30/2018    BILITOT 0.3 10/30/2018       Lab Results   Component Value Date    IRON 18 (L) 09/25/2018     TIBC 411 09/25/2018    FERRITIN 15 (L) 09/25/2018     Lab Results   Component Value Date    XUPLMPDF85 353 03/06/2018     Lab Results   Component Value Date    FOLATE 5.9 03/06/2018     No results found for: TSH      Review of Systems   Constitutional: Positive for activity change, appetite change and fatigue. Negative for chills, diaphoresis, fever and unexpected weight change.   HENT: Negative for congestion, dental problem, drooling, ear discharge, ear pain, facial swelling, hearing loss, mouth sores, nosebleeds, postnasal drip, rhinorrhea, sinus pressure, sneezing, sore throat, tinnitus, trouble swallowing and voice change.    Eyes: Negative for photophobia, pain, discharge, redness, itching and visual disturbance.   Respiratory: Negative for cough, choking, chest tightness, shortness of breath, wheezing and stridor.    Cardiovascular: Negative for chest pain, palpitations and leg swelling.   Gastrointestinal: Negative for abdominal distention, abdominal pain, anal bleeding, blood in stool, constipation, diarrhea, nausea, rectal pain and vomiting.   Endocrine: Negative for cold intolerance, heat intolerance, polydipsia, polyphagia and polyuria.   Genitourinary: Negative for decreased urine volume, difficulty urinating, dyspareunia, dysuria, enuresis, flank pain, frequency, genital sores, hematuria, menstrual problem, pelvic pain, urgency, vaginal bleeding, vaginal discharge and vaginal pain.   Musculoskeletal: Negative for arthralgias, back pain, gait problem, joint swelling, myalgias, neck pain and neck stiffness.   Skin: Negative for color change, pallor and rash.   Allergic/Immunologic: Negative for environmental allergies, food allergies and immunocompromised state.   Neurological: Positive for weakness. Negative for dizziness, tremors, seizures, syncope, facial asymmetry, speech difficulty, light-headedness, numbness and headaches.   Hematological: Negative for adenopathy. Does not bruise/bleed easily.    Psychiatric/Behavioral: Positive for dysphoric mood. Negative for agitation, behavioral problems, confusion, decreased concentration, hallucinations, self-injury, sleep disturbance and suicidal ideas. The patient is nervous/anxious. The patient is not hyperactive.        Objective:      Physical Exam   Constitutional: She is oriented to person, place, and time. She appears well-developed and well-nourished. She appears distressed.   HENT:   Head: Normocephalic and atraumatic.   Right Ear: External ear normal.   Left Ear: External ear normal.   Nose: Nose normal. Right sinus exhibits no maxillary sinus tenderness and no frontal sinus tenderness. Left sinus exhibits no maxillary sinus tenderness and no frontal sinus tenderness.   Mouth/Throat: Oropharynx is clear and moist. No oropharyngeal exudate.   Eyes: Conjunctivae, EOM and lids are normal. Pupils are equal, round, and reactive to light. Right eye exhibits no discharge. Left eye exhibits no discharge. Right conjunctiva is not injected. Right conjunctiva has no hemorrhage. Left conjunctiva is not injected. Left conjunctiva has no hemorrhage. No scleral icterus.   Neck: Normal range of motion. Neck supple. No JVD present. No tracheal deviation present. No thyromegaly present.   Cardiovascular: Normal rate and regular rhythm.   Pulmonary/Chest: Effort normal. No stridor. No respiratory distress. She exhibits no tenderness.   Abdominal: Soft. She exhibits no distension and no mass. There is no splenomegaly or hepatomegaly. There is no tenderness. There is no rebound.   Musculoskeletal: Normal range of motion. She exhibits no edema or tenderness.   Lymphadenopathy:     She has no cervical adenopathy.     She has no axillary adenopathy.        Right: No supraclavicular adenopathy present.        Left: No supraclavicular adenopathy present.   Neurological: She is alert and oriented to person, place, and time. No cranial nerve deficit. Coordination normal.   Skin: Skin  is dry. No rash noted. She is not diaphoretic. No erythema.   Psychiatric: Her behavior is normal. Judgment and thought content normal. Her mood appears anxious. She exhibits a depressed mood.   Vitals reviewed.          Assessment:      1. Acute ITP           Plan:   Extensive conversation planned surgery 11/13/2018 my recommendations are continue with current dose of Promacta 50 mg daily will repeat CBC in 1 week patient will be treated with 1g per kg of intravenous immunoglobulin which in review of her records have had some success in increasing her platelet count for a short period of time in preparation for surgery on 11/13/2018 orders have been placed in reviewed discussed implications with her and plan of action          Nash Cota Jr, MD FACP

## 2018-11-02 ENCOUNTER — TELEPHONE (OUTPATIENT)
Dept: PHARMACY | Facility: CLINIC | Age: 23
End: 2018-11-02

## 2018-11-08 ENCOUNTER — OFFICE VISIT (OUTPATIENT)
Dept: HEMATOLOGY/ONCOLOGY | Facility: CLINIC | Age: 23
End: 2018-11-08
Payer: MEDICAID

## 2018-11-08 ENCOUNTER — LAB VISIT (OUTPATIENT)
Dept: LAB | Facility: HOSPITAL | Age: 23
End: 2018-11-08
Attending: INTERNAL MEDICINE
Payer: MEDICAID

## 2018-11-08 ENCOUNTER — INFUSION (OUTPATIENT)
Dept: INFUSION THERAPY | Facility: HOSPITAL | Age: 23
End: 2018-11-08
Attending: INTERNAL MEDICINE
Payer: MEDICAID

## 2018-11-08 VITALS
HEIGHT: 62 IN | BODY MASS INDEX: 42.52 KG/M2 | SYSTOLIC BLOOD PRESSURE: 134 MMHG | TEMPERATURE: 98 F | WEIGHT: 231.06 LBS | OXYGEN SATURATION: 99 % | RESPIRATION RATE: 18 BRPM | DIASTOLIC BLOOD PRESSURE: 84 MMHG | HEART RATE: 90 BPM

## 2018-11-08 VITALS
SYSTOLIC BLOOD PRESSURE: 120 MMHG | TEMPERATURE: 99 F | DIASTOLIC BLOOD PRESSURE: 84 MMHG | BODY MASS INDEX: 42.52 KG/M2 | WEIGHT: 231.06 LBS | HEART RATE: 76 BPM | OXYGEN SATURATION: 99 % | RESPIRATION RATE: 18 BRPM | HEIGHT: 62 IN

## 2018-11-08 DIAGNOSIS — D64.9 SYMPTOMATIC ANEMIA: ICD-10-CM

## 2018-11-08 DIAGNOSIS — D50.0 IRON DEFICIENCY ANEMIA DUE TO CHRONIC BLOOD LOSS: ICD-10-CM

## 2018-11-08 DIAGNOSIS — D69.3 CHRONIC ITP (IDIOPATHIC THROMBOCYTOPENIA): ICD-10-CM

## 2018-11-08 DIAGNOSIS — D69.3 ACUTE ITP: Primary | ICD-10-CM

## 2018-11-08 LAB
BASOPHILS # BLD AUTO: 0.02 K/UL
BASOPHILS NFR BLD: 0.3 %
DIFFERENTIAL METHOD: ABNORMAL
EOSINOPHIL # BLD AUTO: 0.1 K/UL
EOSINOPHIL NFR BLD: 1.6 %
ERYTHROCYTE [DISTWIDTH] IN BLOOD BY AUTOMATED COUNT: 17.2 %
HCT VFR BLD AUTO: 36.7 %
HGB BLD-MCNC: 11.2 G/DL
LYMPHOCYTES # BLD AUTO: 1.7 K/UL
LYMPHOCYTES NFR BLD: 23.6 %
MCH RBC QN AUTO: 23.4 PG
MCHC RBC AUTO-ENTMCNC: 30.5 G/DL
MCV RBC AUTO: 77 FL
MONOCYTES # BLD AUTO: 0.6 K/UL
MONOCYTES NFR BLD: 8.3 %
NEUTROPHILS # BLD AUTO: 4.7 K/UL
NEUTROPHILS NFR BLD: 66.3 %
PLATELET # BLD AUTO: 6 K/UL
PLATELET BLD QL SMEAR: ABNORMAL
PMV BLD AUTO: ABNORMAL FL
RBC # BLD AUTO: 4.79 M/UL
WBC # BLD AUTO: 7.07 K/UL

## 2018-11-08 PROCEDURE — S0028 INJECTION, FAMOTIDINE, 20 MG: HCPCS | Mod: PO | Performed by: INTERNAL MEDICINE

## 2018-11-08 PROCEDURE — 85025 COMPLETE CBC W/AUTO DIFF WBC: CPT | Mod: PO

## 2018-11-08 PROCEDURE — 99215 OFFICE O/P EST HI 40 MIN: CPT | Mod: 25,S$PBB,, | Performed by: INTERNAL MEDICINE

## 2018-11-08 PROCEDURE — 36415 COLL VENOUS BLD VENIPUNCTURE: CPT | Mod: PO

## 2018-11-08 PROCEDURE — 99999 PR PBB SHADOW E&M-EST. PATIENT-LVL III: CPT | Mod: PBBFAC,,, | Performed by: INTERNAL MEDICINE

## 2018-11-08 PROCEDURE — 96367 TX/PROPH/DG ADDL SEQ IV INF: CPT | Mod: PO

## 2018-11-08 PROCEDURE — 96368 THER/DIAG CONCURRENT INF: CPT | Mod: PO

## 2018-11-08 PROCEDURE — 96366 THER/PROPH/DIAG IV INF ADDON: CPT | Mod: PO

## 2018-11-08 PROCEDURE — 25000003 PHARM REV CODE 250: Mod: PO | Performed by: INTERNAL MEDICINE

## 2018-11-08 PROCEDURE — 96365 THER/PROPH/DIAG IV INF INIT: CPT | Mod: PO

## 2018-11-08 PROCEDURE — A4216 STERILE WATER/SALINE, 10 ML: HCPCS | Mod: PO | Performed by: INTERNAL MEDICINE

## 2018-11-08 PROCEDURE — 99213 OFFICE O/P EST LOW 20 MIN: CPT | Mod: PBBFAC,PO | Performed by: INTERNAL MEDICINE

## 2018-11-08 PROCEDURE — 63600175 PHARM REV CODE 636 W HCPCS: Mod: PO | Performed by: INTERNAL MEDICINE

## 2018-11-08 RX ORDER — SODIUM CHLORIDE 0.9 % (FLUSH) 0.9 %
10 SYRINGE (ML) INJECTION
Status: DISCONTINUED | OUTPATIENT
Start: 2018-11-08 | End: 2018-11-08 | Stop reason: HOSPADM

## 2018-11-08 RX ORDER — FAMOTIDINE 20 MG/50ML
20 INJECTION, SOLUTION INTRAVENOUS
Status: COMPLETED | OUTPATIENT
Start: 2018-11-08 | End: 2018-11-08

## 2018-11-08 RX ORDER — FAMOTIDINE 10 MG/ML
20 INJECTION INTRAVENOUS
Status: DISCONTINUED | OUTPATIENT
Start: 2018-11-08 | End: 2018-11-08

## 2018-11-08 RX ORDER — ACETAMINOPHEN 325 MG/1
650 TABLET ORAL
Status: COMPLETED | OUTPATIENT
Start: 2018-11-08 | End: 2018-11-08

## 2018-11-08 RX ADMIN — SODIUM CHLORIDE, PRESERVATIVE FREE 10 ML: 5 INJECTION INTRAVENOUS at 09:11

## 2018-11-08 RX ADMIN — ACETAMINOPHEN 650 MG: 325 TABLET ORAL at 09:11

## 2018-11-08 RX ADMIN — FAMOTIDINE 20 MG: 20 INJECTION, SOLUTION INTRAVENOUS at 09:11

## 2018-11-08 RX ADMIN — DIPHENHYDRAMINE HYDROCHLORIDE 50 MG: 50 INJECTION, SOLUTION INTRAMUSCULAR; INTRAVENOUS at 09:11

## 2018-11-08 RX ADMIN — HUMAN IMMUNOGLOBULIN G 105 G: 40 LIQUID INTRAVENOUS at 09:11

## 2018-11-08 NOTE — PLAN OF CARE
"Problem: Patient Care Overview  Goal: Plan of Care Review  Outcome: Ongoing (interventions implemented as appropriate)  Pt. Stated,"I'm doing all right."      "

## 2018-11-08 NOTE — PATIENT INSTRUCTIONS
[facility]  [address]  [phone] phone     [phone] fax  Hours of Operation: Monday- Friday 8:00am- 5:00pm  After hours phone  [phone]  Hematology / Oncology Physicians on call      Dr. Cipriano Fuentes                        Please call with any concerns regarding your appointment today.

## 2018-11-08 NOTE — PROGRESS NOTES
Subjective:       Patient ID: Mari Etienne is a 22 y.o. female.    Chief Complaint: Follow-up; Results; Anemia; and Coagulation Disorder    HPI 22-year-old female history refractory ITP to dexamethasone Rituxan patient is being given I GG in preparation for elective splenectomy next week vaccinations completed ECOG status 1    Past Medical History:   Diagnosis Date    Thrombocytopenia      History reviewed. No pertinent family history.  Social History     Socioeconomic History    Marital status: Single     Spouse name: Not on file    Number of children: Not on file    Years of education: Not on file    Highest education level: Not on file   Social Needs    Financial resource strain: Not on file    Food insecurity - worry: Not on file    Food insecurity - inability: Not on file    Transportation needs - medical: Not on file    Transportation needs - non-medical: Not on file   Occupational History    Not on file   Tobacco Use    Smoking status: Never Smoker    Smokeless tobacco: Never Used   Substance and Sexual Activity    Alcohol use: No     Frequency: Never    Drug use: No    Sexual activity: Not on file   Other Topics Concern    Not on file   Social History Narrative    Not on file     History reviewed. No pertinent surgical history.    Labs:  Lab Results   Component Value Date    WBC 7.07 11/08/2018    HGB 11.2 (L) 11/08/2018    HCT 36.7 (L) 11/08/2018    MCV 77 (L) 11/08/2018    PLT 6 (LL) 11/08/2018     BMP  Lab Results   Component Value Date     10/30/2018    K 3.8 10/30/2018     (H) 10/30/2018    CO2 19 (L) 10/30/2018    BUN 10 10/30/2018    CREATININE 0.7 10/30/2018    CALCIUM 9.0 10/30/2018    ANIONGAP 9 10/30/2018    ESTGFRAFRICA >60 10/30/2018    EGFRNONAA >60 10/30/2018     Lab Results   Component Value Date    ALT 15 10/30/2018    AST 17 10/30/2018    ALKPHOS 47 (L) 10/30/2018    BILITOT 0.3 10/30/2018       Lab Results   Component Value Date    IRON 18 (L)  09/25/2018    TIBC 411 09/25/2018    FERRITIN 15 (L) 09/25/2018     Lab Results   Component Value Date    RJCEDPLF78 353 03/06/2018     Lab Results   Component Value Date    FOLATE 5.9 03/06/2018     No results found for: TSH      Review of Systems   Constitutional: Negative for activity change, appetite change, chills, diaphoresis, fatigue, fever and unexpected weight change.   HENT: Negative for congestion, dental problem, drooling, ear discharge, ear pain, facial swelling, hearing loss, mouth sores, nosebleeds, postnasal drip, rhinorrhea, sinus pressure, sneezing, sore throat, tinnitus, trouble swallowing and voice change.    Eyes: Negative for photophobia, pain, discharge, redness, itching and visual disturbance.   Respiratory: Negative for cough, choking, chest tightness, shortness of breath, wheezing and stridor.    Cardiovascular: Negative for chest pain, palpitations and leg swelling.   Gastrointestinal: Negative for abdominal distention, abdominal pain, anal bleeding, blood in stool, constipation, diarrhea, nausea, rectal pain and vomiting.   Endocrine: Negative for cold intolerance, heat intolerance, polydipsia, polyphagia and polyuria.   Genitourinary: Negative for decreased urine volume, difficulty urinating, dyspareunia, dysuria, enuresis, flank pain, frequency, genital sores, hematuria, menstrual problem, pelvic pain, urgency, vaginal bleeding, vaginal discharge and vaginal pain.   Musculoskeletal: Negative for arthralgias, back pain, gait problem, joint swelling, myalgias, neck pain and neck stiffness.   Skin: Negative for color change, pallor and rash.   Allergic/Immunologic: Negative for environmental allergies, food allergies and immunocompromised state.   Neurological: Negative for dizziness, tremors, seizures, syncope, facial asymmetry, speech difficulty, weakness, light-headedness, numbness and headaches.   Hematological: Negative for adenopathy. Does not bruise/bleed easily.    Psychiatric/Behavioral: Positive for dysphoric mood. Negative for agitation, behavioral problems, confusion, decreased concentration, hallucinations, self-injury, sleep disturbance and suicidal ideas. The patient is nervous/anxious. The patient is not hyperactive.        Objective:      Physical Exam   Constitutional: She is oriented to person, place, and time. She appears well-developed and well-nourished. She appears distressed.   HENT:   Head: Normocephalic and atraumatic.   Right Ear: External ear normal.   Left Ear: External ear normal.   Nose: Nose normal. Right sinus exhibits no maxillary sinus tenderness and no frontal sinus tenderness. Left sinus exhibits no maxillary sinus tenderness and no frontal sinus tenderness.   Mouth/Throat: Oropharynx is clear and moist. No oropharyngeal exudate.   Eyes: Conjunctivae, EOM and lids are normal. Pupils are equal, round, and reactive to light. Right eye exhibits no discharge. Left eye exhibits no discharge. Right conjunctiva is not injected. Right conjunctiva has no hemorrhage. Left conjunctiva is not injected. Left conjunctiva has no hemorrhage. No scleral icterus.   Neck: Normal range of motion. Neck supple. No JVD present. No tracheal deviation present. No thyromegaly present.   Cardiovascular: Normal rate and regular rhythm.   Pulmonary/Chest: Effort normal. No stridor. No respiratory distress. She exhibits no tenderness.   Abdominal: Soft. She exhibits no distension and no mass. There is no splenomegaly or hepatomegaly. There is no tenderness. There is no rebound.   Musculoskeletal: Normal range of motion. She exhibits no edema or tenderness.   Lymphadenopathy:     She has no cervical adenopathy.     She has no axillary adenopathy.        Right: No supraclavicular adenopathy present.        Left: No supraclavicular adenopathy present.   Neurological: She is alert and oriented to person, place, and time. No cranial nerve deficit. Coordination normal.   Skin: Skin  is dry. No rash noted. She is not diaphoretic. No erythema.   Psychiatric: Her behavior is normal. Judgment and thought content normal. Her mood appears anxious. She exhibits a depressed mood.   Vitals reviewed.          Assessment:      1. Acute ITP    2. Chronic ITP (idiopathic thrombocytopenia)    3. Iron deficiency anemia due to chronic blood loss           Plan:     Refractory ITP IgG given previously IgG has worked to increased platelet count 50 60,000 over next several days in preparation for elective splenectomy next week.  Discussed implications with patient she is stop bleeding from her GYN blood loss hemoglobin is 11.3 platelet count of 6000 today        Nash Cota Jr, MD FACP

## 2018-11-09 ENCOUNTER — ANESTHESIA EVENT (OUTPATIENT)
Dept: SURGERY | Facility: HOSPITAL | Age: 23
End: 2018-11-09
Payer: MEDICAID

## 2018-11-09 NOTE — TELEPHONE ENCOUNTER
Third attempt to reach patient to f/u with her after initiating Promacta. No answer, LVM. Will attempt to f/u with patient at next refill.

## 2018-11-12 ENCOUNTER — LAB VISIT (OUTPATIENT)
Dept: LAB | Facility: HOSPITAL | Age: 23
End: 2018-11-12
Attending: STUDENT IN AN ORGANIZED HEALTH CARE EDUCATION/TRAINING PROGRAM
Payer: MEDICAID

## 2018-11-12 DIAGNOSIS — Z01.818 PRE-OP TESTING: ICD-10-CM

## 2018-11-12 DIAGNOSIS — Z01.818 PRE-OP TESTING: Primary | ICD-10-CM

## 2018-11-12 LAB
ABO + RH BLD: NORMAL
BLD GP AB SCN CELLS X3 SERPL QL: NORMAL
BLOOD GROUP ANTIBODIES SERPL: NORMAL

## 2018-11-12 PROCEDURE — 86870 RBC ANTIBODY IDENTIFICATION: CPT

## 2018-11-12 PROCEDURE — 36415 COLL VENOUS BLD VENIPUNCTURE: CPT

## 2018-11-12 PROCEDURE — 86901 BLOOD TYPING SEROLOGIC RH(D): CPT

## 2018-11-12 NOTE — PRE-PROCEDURE INSTRUCTIONS
Pre op instructions reviewed with patient per phone:    To confirm, Your surgeon has instructed you:  Surgery is scheduled 11/13/18 at 1030.  Pre admit office to call afternoon prior to surgery with final arrival time.  If surgery is on Monday, Pre admit office to call Friday afternoon with with final arrival time      Please report to [facility] 1st floor main lobby by 0900.      INSTRUCTIONS IMPORTANT!!!  ¨ No smoking after 12 midnight, the night before surgery.  ¨ No solid food after 12 midnight, but you may have clear liquids up until 3 hours prior to surgery.  This includes: grape, cranberry, and apple juice (not orange, and no coffee.)   ¨ OK to brush teeth, but no gum, candy or mints!    ¨ Take only these medicines with a small swallow of water-morning of surgery.  None    T&S appt 11/12/18 @ 1100. Do not remove the red blood band for any reason.     ____  Do not wear makeup, including mascara.  ____  No powder, lotions or creams to surgical area.  ____  Please remove all jewelry, including piercings and leave at home.  ____  No money or valuables needed. Please leave at home.  ____  Please bring identification and insurance information to hospital.  ____  If going home the same day, arrange for a ride home. You will not be able to   drive if Anesthesia was used.  ____  Children, under 12 years old, must remain in the waiting room with an adult.  They are not allowed in patient areas.  ____  Wear loose fitting clothing. Allow for dressings, bandages.  ____  Stop Aspirin, Ibuprofen, Motrin and Aleve at least 5-7 days before surgery, unless otherwise instructed by your doctor, or the nurse.   You MAY use Tylenol/acetaminophen until day of surgery.  ____  If you take diabetic medication, do not take am of surgery unless instructed by   Doctor.  ____ Stop taking any Fish Oil supplement or any Vitamins that contain Vitamin E at least 5 days prior to surgery.          Bathing Instructions--  The night before surgery and the morning prior to coming to the hospital:   -Do not shave the surgical area.   -Shower and wash your hair and body as usual with your regular soap and shampoo.   -Rinse your hair and body completely.   -Use one packet of hibiclens to wash the surgical site (using your hand) gently for 5 minutes.  Do not scrub you skin too hard.   -Do not use hibiclens on your head, face, or genitals.   -Do not wash with regular soap after you use the hibiclens.   -Rinse your body thoroughly.   -Dry with clean, soft towel.  Do not use lotion, cream, deodorant, or powders on   the surgical site.    Use antibacterial soap in place of hibiclens if your surgery is on the head, face or genitals.         Surgical Site Infection    Prevention of surgical site infections:     -Keep incisions clean and dry.   -Do not soak/submerge incisions in water until completely healed.   -Do not apply lotions, powders, creams, or deodorants to site.   -Always make sure hands are cleaned with antibacterial soap/ alcohol-based   prior to touching the surgical site.  (This includes doctors, nurses, staff, and yourself.)    Signs and symptoms:   -Redness and pain around the area where you had surgery   -Drainage of cloudy fluid from your surgical wound   -Fever over 100.4  I have read or had read and explained to me, and understand the above information.

## 2018-11-13 ENCOUNTER — ANESTHESIA (OUTPATIENT)
Dept: SURGERY | Facility: HOSPITAL | Age: 23
End: 2018-11-13
Payer: MEDICAID

## 2018-11-13 ENCOUNTER — HOSPITAL ENCOUNTER (OUTPATIENT)
Facility: HOSPITAL | Age: 23
Discharge: HOME | End: 2018-11-14
Attending: SURGERY | Admitting: SURGERY
Payer: MEDICAID

## 2018-11-13 DIAGNOSIS — D69.3 CHRONIC ITP (IDIOPATHIC THROMBOCYTOPENIA): Primary | ICD-10-CM

## 2018-11-13 DIAGNOSIS — Z90.81 S/P LAPAROSCOPIC SPLENECTOMY: ICD-10-CM

## 2018-11-13 LAB
ANION GAP SERPL CALC-SCNC: 8 MMOL/L
ANISOCYTOSIS BLD QL SMEAR: SLIGHT
B-HCG UR QL: NEGATIVE
BASOPHILS # BLD AUTO: 0.03 K/UL
BASOPHILS NFR BLD: 0.2 %
BLD PROD TYP BPU: NORMAL
BLD PROD TYP BPU: NORMAL
BLOOD UNIT EXPIRATION DATE: NORMAL
BLOOD UNIT EXPIRATION DATE: NORMAL
BLOOD UNIT TYPE CODE: 5100
BLOOD UNIT TYPE CODE: 8400
BLOOD UNIT TYPE: NORMAL
BLOOD UNIT TYPE: NORMAL
BUN SERPL-MCNC: 6 MG/DL
CALCIUM SERPL-MCNC: 8.7 MG/DL
CHLORIDE SERPL-SCNC: 106 MMOL/L
CO2 SERPL-SCNC: 21 MMOL/L
CODING SYSTEM: NORMAL
CODING SYSTEM: NORMAL
CREAT SERPL-MCNC: 0.7 MG/DL
CTP QC/QA: YES
DACRYOCYTES BLD QL SMEAR: ABNORMAL
DIFFERENTIAL METHOD: ABNORMAL
DISPENSE STATUS: NORMAL
DISPENSE STATUS: NORMAL
EOSINOPHIL # BLD AUTO: 0 K/UL
EOSINOPHIL NFR BLD: 0.1 %
ERYTHROCYTE [DISTWIDTH] IN BLOOD BY AUTOMATED COUNT: 17.2 %
EST. GFR  (AFRICAN AMERICAN): >60 ML/MIN/1.73 M^2
EST. GFR  (NON AFRICAN AMERICAN): >60 ML/MIN/1.73 M^2
GIANT PLATELETS BLD QL SMEAR: PRESENT
GLUCOSE SERPL-MCNC: 127 MG/DL
HCT VFR BLD AUTO: 34.9 %
HGB BLD-MCNC: 10.7 G/DL
HYPOCHROMIA BLD QL SMEAR: ABNORMAL
LYMPHOCYTES # BLD AUTO: 1.8 K/UL
LYMPHOCYTES NFR BLD: 11.2 %
MCH RBC QN AUTO: 23.3 PG
MCHC RBC AUTO-ENTMCNC: 30.7 G/DL
MCV RBC AUTO: 76 FL
MONOCYTES # BLD AUTO: 1.7 K/UL
MONOCYTES NFR BLD: 10.3 %
NEUTROPHILS # BLD AUTO: 12.9 K/UL
NEUTROPHILS NFR BLD: 78.5 %
OVALOCYTES BLD QL SMEAR: ABNORMAL
PLATELET # BLD AUTO: 206 K/UL
PLATELET BLD QL SMEAR: ABNORMAL
PMV BLD AUTO: ABNORMAL FL
POIKILOCYTOSIS BLD QL SMEAR: SLIGHT
POLYCHROMASIA BLD QL SMEAR: ABNORMAL
POTASSIUM SERPL-SCNC: 3.6 MMOL/L
RBC # BLD AUTO: 4.59 M/UL
SODIUM SERPL-SCNC: 135 MMOL/L
SPHEROCYTES BLD QL SMEAR: ABNORMAL
STOMATOCYTES BLD QL SMEAR: PRESENT
TARGETS BLD QL SMEAR: ABNORMAL
TRANS PLATPHERESIS VOL PATIENT: NORMAL ML
TRANS PLATPHERESIS VOL PATIENT: NORMAL ML
WBC # BLD AUTO: 16.42 K/UL

## 2018-11-13 PROCEDURE — 85025 COMPLETE CBC W/AUTO DIFF WBC: CPT

## 2018-11-13 PROCEDURE — 00790 ANES IPER UPR ABD NOS: CPT | Performed by: SURGERY

## 2018-11-13 PROCEDURE — 81025 URINE PREGNANCY TEST: CPT | Performed by: ANESTHESIOLOGY

## 2018-11-13 PROCEDURE — 88305 TISSUE EXAM BY PATHOLOGIST: CPT | Performed by: PATHOLOGY

## 2018-11-13 PROCEDURE — S0020 INJECTION, BUPIVICAINE HYDRO: HCPCS | Performed by: SURGERY

## 2018-11-13 PROCEDURE — 63600175 PHARM REV CODE 636 W HCPCS: Performed by: NURSE ANESTHETIST, CERTIFIED REGISTERED

## 2018-11-13 PROCEDURE — 36000712 HC OR TIME LEV V 1ST 15 MIN: Performed by: SURGERY

## 2018-11-13 PROCEDURE — 88305 TISSUE EXAM BY PATHOLOGIST: CPT | Mod: 26,,, | Performed by: PATHOLOGY

## 2018-11-13 PROCEDURE — 25000003 PHARM REV CODE 250: Performed by: SURGERY

## 2018-11-13 PROCEDURE — 63600175 PHARM REV CODE 636 W HCPCS: Performed by: SURGERY

## 2018-11-13 PROCEDURE — 38120 LAPAROSCOPY SPLENECTOMY: CPT | Mod: 80,,, | Performed by: SURGERY

## 2018-11-13 PROCEDURE — 37000008 HC ANESTHESIA 1ST 15 MINUTES: Performed by: SURGERY

## 2018-11-13 PROCEDURE — 94799 UNLISTED PULMONARY SVC/PX: CPT

## 2018-11-13 PROCEDURE — P9035 PLATELET PHERES LEUKOREDUCED: HCPCS

## 2018-11-13 PROCEDURE — 25000003 PHARM REV CODE 250: Performed by: ANESTHESIOLOGY

## 2018-11-13 PROCEDURE — 25000003 PHARM REV CODE 250: Performed by: NURSE ANESTHETIST, CERTIFIED REGISTERED

## 2018-11-13 PROCEDURE — 71000039 HC RECOVERY, EACH ADD'L HOUR: Performed by: SURGERY

## 2018-11-13 PROCEDURE — 11000001 HC ACUTE MED/SURG PRIVATE ROOM

## 2018-11-13 PROCEDURE — 37000009 HC ANESTHESIA EA ADD 15 MINS: Performed by: SURGERY

## 2018-11-13 PROCEDURE — 80048 BASIC METABOLIC PNL TOTAL CA: CPT

## 2018-11-13 PROCEDURE — 94760 N-INVAS EAR/PLS OXIMETRY 1: CPT

## 2018-11-13 PROCEDURE — 71000033 HC RECOVERY, INTIAL HOUR: Performed by: SURGERY

## 2018-11-13 PROCEDURE — 27201423 OPTIME MED/SURG SUP & DEVICES STERILE SUPPLY: Performed by: SURGERY

## 2018-11-13 PROCEDURE — 36000713 HC OR TIME LEV V EA ADD 15 MIN: Performed by: SURGERY

## 2018-11-13 PROCEDURE — 63600175 PHARM REV CODE 636 W HCPCS: Performed by: ANESTHESIOLOGY

## 2018-11-13 PROCEDURE — 38120 LAPAROSCOPY SPLENECTOMY: CPT | Mod: ,,, | Performed by: SURGERY

## 2018-11-13 RX ORDER — MIDAZOLAM HYDROCHLORIDE 1 MG/ML
INJECTION, SOLUTION INTRAMUSCULAR; INTRAVENOUS
Status: DISCONTINUED | OUTPATIENT
Start: 2018-11-13 | End: 2018-11-13

## 2018-11-13 RX ORDER — OXYCODONE HYDROCHLORIDE 5 MG/1
5 TABLET ORAL ONCE
Status: COMPLETED | OUTPATIENT
Start: 2018-11-13 | End: 2018-11-13

## 2018-11-13 RX ORDER — PROPOFOL 10 MG/ML
VIAL (ML) INTRAVENOUS
Status: DISCONTINUED | OUTPATIENT
Start: 2018-11-13 | End: 2018-11-13

## 2018-11-13 RX ORDER — LIDOCAINE HYDROCHLORIDE 20 MG/ML
INJECTION, SOLUTION EPIDURAL; INFILTRATION; INTRACAUDAL; PERINEURAL
Status: DISCONTINUED | OUTPATIENT
Start: 2018-11-13 | End: 2018-11-13

## 2018-11-13 RX ORDER — ACETAMINOPHEN 10 MG/ML
1000 INJECTION, SOLUTION INTRAVENOUS
Status: DISCONTINUED | OUTPATIENT
Start: 2018-11-13 | End: 2018-11-13 | Stop reason: HOSPADM

## 2018-11-13 RX ORDER — FENTANYL CITRATE 50 UG/ML
INJECTION, SOLUTION INTRAMUSCULAR; INTRAVENOUS
Status: DISCONTINUED | OUTPATIENT
Start: 2018-11-13 | End: 2018-11-13

## 2018-11-13 RX ORDER — ONDANSETRON 2 MG/ML
4 INJECTION INTRAMUSCULAR; INTRAVENOUS EVERY 8 HOURS PRN
Status: DISCONTINUED | OUTPATIENT
Start: 2018-11-13 | End: 2018-11-14 | Stop reason: HOSPADM

## 2018-11-13 RX ORDER — HYDROMORPHONE HYDROCHLORIDE 2 MG/ML
0.2 INJECTION, SOLUTION INTRAMUSCULAR; INTRAVENOUS; SUBCUTANEOUS EVERY 5 MIN PRN
Status: DISCONTINUED | OUTPATIENT
Start: 2018-11-13 | End: 2018-11-13

## 2018-11-13 RX ORDER — ONDANSETRON 2 MG/ML
4 INJECTION INTRAMUSCULAR; INTRAVENOUS DAILY PRN
Status: DISCONTINUED | OUTPATIENT
Start: 2018-11-13 | End: 2019-06-20

## 2018-11-13 RX ORDER — LIDOCAINE HYDROCHLORIDE 10 MG/ML
1 INJECTION, SOLUTION EPIDURAL; INFILTRATION; INTRACAUDAL; PERINEURAL ONCE
Status: DISCONTINUED | OUTPATIENT
Start: 2018-11-13 | End: 2018-11-13 | Stop reason: HOSPADM

## 2018-11-13 RX ORDER — ROCURONIUM BROMIDE 10 MG/ML
INJECTION, SOLUTION INTRAVENOUS
Status: DISCONTINUED | OUTPATIENT
Start: 2018-11-13 | End: 2018-11-13

## 2018-11-13 RX ORDER — MEPERIDINE HYDROCHLORIDE 50 MG/ML
12.5 INJECTION INTRAMUSCULAR; INTRAVENOUS; SUBCUTANEOUS ONCE AS NEEDED
Status: DISCONTINUED | OUTPATIENT
Start: 2018-11-13 | End: 2018-11-13

## 2018-11-13 RX ORDER — HYDROCODONE BITARTRATE AND ACETAMINOPHEN 500; 5 MG/1; MG/1
TABLET ORAL
Status: DISCONTINUED | OUTPATIENT
Start: 2018-11-13 | End: 2018-11-13 | Stop reason: HOSPADM

## 2018-11-13 RX ORDER — SODIUM CHLORIDE, SODIUM LACTATE, POTASSIUM CHLORIDE, CALCIUM CHLORIDE 600; 310; 30; 20 MG/100ML; MG/100ML; MG/100ML; MG/100ML
INJECTION, SOLUTION INTRAVENOUS CONTINUOUS
Status: DISCONTINUED | OUTPATIENT
Start: 2018-11-13 | End: 2018-11-13

## 2018-11-13 RX ORDER — ACETAMINOPHEN 325 MG/1
650 TABLET ORAL EVERY 6 HOURS PRN
Status: DISCONTINUED | OUTPATIENT
Start: 2018-11-13 | End: 2018-11-14 | Stop reason: HOSPADM

## 2018-11-13 RX ORDER — FENTANYL CITRATE 50 UG/ML
25 INJECTION, SOLUTION INTRAMUSCULAR; INTRAVENOUS EVERY 5 MIN PRN
Status: DISCONTINUED | OUTPATIENT
Start: 2018-11-13 | End: 2018-11-13 | Stop reason: HOSPADM

## 2018-11-13 RX ORDER — DIPHENHYDRAMINE HCL 25 MG
25 CAPSULE ORAL EVERY 4 HOURS PRN
Status: DISCONTINUED | OUTPATIENT
Start: 2018-11-13 | End: 2018-11-14 | Stop reason: HOSPADM

## 2018-11-13 RX ORDER — BUPIVACAINE HYDROCHLORIDE 5 MG/ML
INJECTION, SOLUTION EPIDURAL; INTRACAUDAL
Status: DISCONTINUED | OUTPATIENT
Start: 2018-11-13 | End: 2018-11-13 | Stop reason: HOSPADM

## 2018-11-13 RX ORDER — LIDOCAINE HYDROCHLORIDE 10 MG/ML
INJECTION, SOLUTION EPIDURAL; INFILTRATION; INTRACAUDAL; PERINEURAL
Status: DISCONTINUED | OUTPATIENT
Start: 2018-11-13 | End: 2018-11-13 | Stop reason: HOSPADM

## 2018-11-13 RX ORDER — HYDROCODONE BITARTRATE AND ACETAMINOPHEN 5; 325 MG/1; MG/1
2 TABLET ORAL EVERY 4 HOURS PRN
Status: DISCONTINUED | OUTPATIENT
Start: 2018-11-13 | End: 2018-11-14 | Stop reason: HOSPADM

## 2018-11-13 RX ORDER — MORPHINE SULFATE 2 MG/ML
2 INJECTION, SOLUTION INTRAMUSCULAR; INTRAVENOUS
Status: DISCONTINUED | OUTPATIENT
Start: 2018-11-13 | End: 2018-11-14 | Stop reason: HOSPADM

## 2018-11-13 RX ORDER — SUCCINYLCHOLINE CHLORIDE 20 MG/ML
INJECTION INTRAMUSCULAR; INTRAVENOUS
Status: DISCONTINUED | OUTPATIENT
Start: 2018-11-13 | End: 2018-11-13

## 2018-11-13 RX ORDER — GLYCOPYRROLATE 0.2 MG/ML
INJECTION INTRAMUSCULAR; INTRAVENOUS
Status: DISCONTINUED | OUTPATIENT
Start: 2018-11-13 | End: 2018-11-13

## 2018-11-13 RX ORDER — HYDROCODONE BITARTRATE AND ACETAMINOPHEN 5; 325 MG/1; MG/1
2 TABLET ORAL EVERY 4 HOURS PRN
Status: DISCONTINUED | OUTPATIENT
Start: 2018-11-13 | End: 2018-11-13

## 2018-11-13 RX ORDER — INDOCYANINE GREEN AND WATER 25 MG
2.5 KIT INJECTION ONCE
Status: DISCONTINUED | OUTPATIENT
Start: 2018-11-13 | End: 2018-11-13 | Stop reason: HOSPADM

## 2018-11-13 RX ORDER — DIPHENHYDRAMINE HCL 25 MG
25 CAPSULE ORAL EVERY 6 HOURS PRN
Status: DISCONTINUED | OUTPATIENT
Start: 2018-11-13 | End: 2018-11-13

## 2018-11-13 RX ORDER — NEOSTIGMINE METHYLSULFATE 1 MG/ML
INJECTION, SOLUTION INTRAVENOUS
Status: DISCONTINUED | OUTPATIENT
Start: 2018-11-13 | End: 2018-11-13

## 2018-11-13 RX ORDER — SODIUM CHLORIDE 9 MG/ML
INJECTION, SOLUTION INTRAVENOUS CONTINUOUS
Status: DISCONTINUED | OUTPATIENT
Start: 2018-11-13 | End: 2018-11-13

## 2018-11-13 RX ORDER — CEFAZOLIN SODIUM 2 G/50ML
2 SOLUTION INTRAVENOUS
Status: DISCONTINUED | OUTPATIENT
Start: 2018-11-13 | End: 2018-11-13 | Stop reason: HOSPADM

## 2018-11-13 RX ORDER — CLONIDINE HYDROCHLORIDE 0.1 MG/1
0.1 TABLET ORAL EVERY 6 HOURS PRN
Status: DISCONTINUED | OUTPATIENT
Start: 2018-11-13 | End: 2018-11-14 | Stop reason: HOSPADM

## 2018-11-13 RX ORDER — CEFAZOLIN SODIUM 2 G/50ML
2 SOLUTION INTRAVENOUS
Status: COMPLETED | OUTPATIENT
Start: 2018-11-13 | End: 2018-11-14

## 2018-11-13 RX ORDER — ONDANSETRON 2 MG/ML
INJECTION INTRAMUSCULAR; INTRAVENOUS
Status: DISCONTINUED | OUTPATIENT
Start: 2018-11-13 | End: 2018-11-13

## 2018-11-13 RX ORDER — HYDROCODONE BITARTRATE AND ACETAMINOPHEN 5; 325 MG/1; MG/1
1 TABLET ORAL
Status: DISCONTINUED | OUTPATIENT
Start: 2018-11-13 | End: 2018-11-13

## 2018-11-13 RX ORDER — SODIUM CHLORIDE, SODIUM LACTATE, POTASSIUM CHLORIDE, CALCIUM CHLORIDE 600; 310; 30; 20 MG/100ML; MG/100ML; MG/100ML; MG/100ML
INJECTION, SOLUTION INTRAVENOUS CONTINUOUS
Status: DISCONTINUED | OUTPATIENT
Start: 2018-11-13 | End: 2018-11-14 | Stop reason: HOSPADM

## 2018-11-13 RX ORDER — AMOXICILLIN 250 MG
1 CAPSULE ORAL 2 TIMES DAILY
Status: DISCONTINUED | OUTPATIENT
Start: 2018-11-13 | End: 2018-11-14 | Stop reason: HOSPADM

## 2018-11-13 RX ORDER — CHLORHEXIDINE GLUCONATE ORAL RINSE 1.2 MG/ML
10 SOLUTION DENTAL 2 TIMES DAILY
Status: DISCONTINUED | OUTPATIENT
Start: 2018-11-13 | End: 2018-11-14 | Stop reason: HOSPADM

## 2018-11-13 RX ORDER — RAMELTEON 8 MG/1
8 TABLET ORAL NIGHTLY PRN
Status: DISCONTINUED | OUTPATIENT
Start: 2018-11-13 | End: 2018-11-14 | Stop reason: HOSPADM

## 2018-11-13 RX ORDER — SODIUM CHLORIDE, SODIUM LACTATE, POTASSIUM CHLORIDE, CALCIUM CHLORIDE 600; 310; 30; 20 MG/100ML; MG/100ML; MG/100ML; MG/100ML
INJECTION, SOLUTION INTRAVENOUS CONTINUOUS PRN
Status: DISCONTINUED | OUTPATIENT
Start: 2018-11-13 | End: 2018-11-13

## 2018-11-13 RX ADMIN — SUCCINYLCHOLINE CHLORIDE 100 MG: 20 INJECTION, SOLUTION INTRAMUSCULAR; INTRAVENOUS at 12:11

## 2018-11-13 RX ADMIN — CHLORHEXIDINE GLUCONATE 10 ML: 1.2 RINSE ORAL at 08:11

## 2018-11-13 RX ADMIN — STANDARDIZED SENNA CONCENTRATE AND DOCUSATE SODIUM 1 TABLET: 8.6; 5 TABLET, FILM COATED ORAL at 08:11

## 2018-11-13 RX ADMIN — ROCURONIUM BROMIDE 30 MG: 10 INJECTION, SOLUTION INTRAVENOUS at 12:11

## 2018-11-13 RX ADMIN — MORPHINE SULFATE 2 MG: 2 INJECTION, SOLUTION INTRAMUSCULAR; INTRAVENOUS at 07:11

## 2018-11-13 RX ADMIN — HYDROMORPHONE HYDROCHLORIDE 0.2 MG: 2 INJECTION INTRAMUSCULAR; INTRAVENOUS; SUBCUTANEOUS at 04:11

## 2018-11-13 RX ADMIN — SODIUM CHLORIDE, SODIUM LACTATE, POTASSIUM CHLORIDE, AND CALCIUM CHLORIDE: 600; 310; 30; 20 INJECTION, SOLUTION INTRAVENOUS at 12:11

## 2018-11-13 RX ADMIN — CEFAZOLIN SODIUM 2 G: 2 SOLUTION INTRAVENOUS at 08:11

## 2018-11-13 RX ADMIN — HYDROMORPHONE HYDROCHLORIDE 0.2 MG: 2 INJECTION INTRAMUSCULAR; INTRAVENOUS; SUBCUTANEOUS at 05:11

## 2018-11-13 RX ADMIN — FENTANYL CITRATE 50 MCG: 50 INJECTION, SOLUTION INTRAMUSCULAR; INTRAVENOUS at 02:11

## 2018-11-13 RX ADMIN — NEOSTIGMINE METHYLSULFATE 5 MG: 1 INJECTION INTRAVENOUS at 04:11

## 2018-11-13 RX ADMIN — ONDANSETRON 4 MG: 2 INJECTION, SOLUTION INTRAMUSCULAR; INTRAVENOUS at 12:11

## 2018-11-13 RX ADMIN — PROPOFOL 200 MG: 10 INJECTION, EMULSION INTRAVENOUS at 12:11

## 2018-11-13 RX ADMIN — LIDOCAINE HYDROCHLORIDE 60 MG: 20 INJECTION, SOLUTION EPIDURAL; INFILTRATION; INTRACAUDAL; PERINEURAL at 12:11

## 2018-11-13 RX ADMIN — MIDAZOLAM 2 MG: 1 INJECTION INTRAMUSCULAR; INTRAVENOUS at 12:11

## 2018-11-13 RX ADMIN — ROCURONIUM BROMIDE 15 MG: 10 INJECTION, SOLUTION INTRAVENOUS at 01:11

## 2018-11-13 RX ADMIN — ROBINUL 0.8 MG: 0.2 INJECTION INTRAMUSCULAR; INTRAVENOUS at 04:11

## 2018-11-13 RX ADMIN — FENTANYL CITRATE 100 MCG: 50 INJECTION, SOLUTION INTRAMUSCULAR; INTRAVENOUS at 12:11

## 2018-11-13 RX ADMIN — OXYCODONE HYDROCHLORIDE 5 MG: 5 TABLET ORAL at 05:11

## 2018-11-13 RX ADMIN — ROCURONIUM BROMIDE 15 MG: 10 INJECTION, SOLUTION INTRAVENOUS at 02:11

## 2018-11-13 RX ADMIN — ROCURONIUM BROMIDE 5 MG: 10 INJECTION, SOLUTION INTRAVENOUS at 12:11

## 2018-11-13 RX ADMIN — FENTANYL CITRATE 100 MCG: 50 INJECTION, SOLUTION INTRAMUSCULAR; INTRAVENOUS at 03:11

## 2018-11-13 RX ADMIN — FENTANYL CITRATE 50 MCG: 50 INJECTION, SOLUTION INTRAMUSCULAR; INTRAVENOUS at 01:11

## 2018-11-13 RX ADMIN — SODIUM CHLORIDE, SODIUM LACTATE, POTASSIUM CHLORIDE, AND CALCIUM CHLORIDE: .6; .31; .03; .02 INJECTION, SOLUTION INTRAVENOUS at 05:11

## 2018-11-13 RX ADMIN — CEFAZOLIN 2 G: 1 INJECTION, POWDER, FOR SOLUTION INTRAMUSCULAR; INTRAVENOUS at 12:11

## 2018-11-13 NOTE — INTERVAL H&P NOTE
The patient has been examined and the H&P has been reviewed:    I concur with the findings and no changes have occurred since H&P was written.    Anesthesia/Surgery risks, benefits and alternative options discussed and understood by patient/family.          Active Hospital Problems    Diagnosis  POA    Chronic ITP (idiopathic thrombocytopenia) [D69.3]  Yes      Resolved Hospital Problems   No resolved problems to display.

## 2018-11-13 NOTE — ANESTHESIA RELEASE NOTE
"Anesthesia Release from PACU Note    Patient: Mari Etienne    Procedure(s) Performed: Procedure(s) (LRB):  XI ROBOTIC SPLENECTOMY (N/A)    Anesthesia type: general    Post pain: Adequate analgesia    Post assessment: no apparent anesthetic complications, tolerated procedure well and no evidence of recall    Last Vitals:   Visit Vitals  BP (!) 161/98 (BP Location: Left arm, Patient Position: Sitting)   Pulse 86   Temp 36.7 °C (98.1 °F) (Tympanic)   Resp 20   Ht 5' 2" (1.575 m)   Wt 102.5 kg (225 lb 15.5 oz)   LMP 09/01/2018 (Approximate)   SpO2 100%   Breastfeeding? No   BMI 41.33 kg/m²       Post vital signs: stable    Level of consciousness: awake, alert  and oriented    Nausea/Vomiting: no nausea/no vomiting    Complications: none    Airway Patency: patent    Respiratory: unassisted, spontaneous ventilation, room air    Cardiovascular: stable and blood pressure at baseline    Hydration: euvolemic  "

## 2018-11-13 NOTE — ANESTHESIA PREPROCEDURE EVALUATION
11/13/2018  Mari Etienne is a 22 y.o., female.    Anesthesia Evaluation    I have reviewed the Patient Summary Reports.    I have reviewed the Nursing Notes.   I have reviewed the Medications.     Review of Systems  Anesthesia Hx:  No problems with previous Anesthesia  Denies Family Hx of Anesthesia complications.   Denies Personal Hx of Anesthesia complications.   Social:  Non-Smoker    Hematology/Oncology:     Oncology Normal   Hematology Comments: ITP   EENT/Dental:EENT/Dental Normal   Cardiovascular:  Cardiovascular Normal     Pulmonary:  Pulmonary Normal    Renal/:  Renal/ Normal     Hepatic/GI:  Hepatic/GI Normal    Musculoskeletal:  Musculoskeletal Normal    Neurological:  Neurology Normal    Endocrine:  Endocrine Normal        Physical Exam   Airway/Jaw/Neck:  Airway Findings: Mouth Opening: Normal Tongue: Normal  Mallampati: II  TM Distance: Normal, at least 6 cm  Jaw/Neck Findings:  Neck ROM: Normal ROM       Chest/Lungs:  Chest/Lungs Findings: Clear to auscultation, Normal Respiratory Rate     Heart/Vascular:  Heart Findings: Rate: Normal  Rhythm: Regular Rhythm             Anesthesia Plan  Type of Anesthesia, risks & benefits discussed:  Anesthesia Type:  general  Patient's Preference:   Intra-op Monitoring Plan:   Intra-op Monitoring Plan Comments:   Post Op Pain Control Plan:   Post Op Pain Control Plan Comments:   Induction:   IV  Beta Blocker:  Patient is not currently on a Beta-Blocker (No further documentation required).       Informed Consent: Patient understands risks and agrees with Anesthesia plan.  Questions answered. Anesthesia consent signed with patient.  ASA Score: 2     Day of Surgery Review of History & Physical: I have interviewed and examined the patient. I have reviewed the patient's H&P dated:  There are no significant changes.  H&P update referred to the  surgeon.         Ready For Surgery From Anesthesia Perspective.

## 2018-11-13 NOTE — TRANSFER OF CARE
"Anesthesia Transfer of Care Note    Patient: Mari Etienne    Procedure(s) Performed: Procedure(s) (LRB):  XI ROBOTIC SPLENECTOMY (N/A)    Patient location: PACU    Anesthesia Type: general    Transport from OR: Transported from OR on room air with adequate spontaneous ventilation    Post pain: adequate analgesia    Post assessment: no apparent anesthetic complications and tolerated procedure well    Post vital signs: stable    Level of consciousness: awake and responds to stimulation    Nausea/Vomiting: no nausea/vomiting    Complications: none    Transfer of care protocol was followed      Last vitals:   Visit Vitals  BP (!) 161/98 (BP Location: Left arm, Patient Position: Sitting)   Pulse 86   Temp 36.7 °C (98.1 °F) (Tympanic)   Resp 20   Ht 5' 2" (1.575 m)   Wt 102.5 kg (225 lb 15.5 oz)   LMP 09/01/2018 (Approximate)   SpO2 100%   Breastfeeding? No   BMI 41.33 kg/m²     "

## 2018-11-13 NOTE — OP NOTE
[facility]  Surgery Department  Operative Note    SUMMARY     Date of Procedure: 11/13/2018     Procedure: Procedure(s) (LRB):  XI ROBOTIC SPLENECTOMY (N/A)     Surgeon(s) and Role:     * Yobani Lara MD - Primary    Assisting Surgeon:  Dirk German    Pre-Operative Diagnosis: Chronic ITP (idiopathic thrombocytopenia) [D69.3]    Post-Operative Diagnosis: Post-Op Diagnosis Codes:     * Chronic ITP (idiopathic thrombocytopenia) [D69.3]    Anesthesia: General    Technical Procedures Used:  Robotic splenectomy    Description of the Findings of the Procedure: splenectomy    Significant Surgical Tasks Conducted by the Assistant(s), if Applicable: retraction    Complications: No    Estimated Blood Loss (EBL): 350 mL           Implants: * No implants in log *    Specimens:   Specimen (12h ago, onward)    Start     Ordered    11/13/18 1554  Specimen to Pathology - Surgery  Once     Comments:  1.) Spleen (PERM)DX: Chronic ITP (Idiopathic Thrombocytopenia)     Start Status   11/13/18 1554 Collected (11/13/18 1554)       11/13/18 1554                  Condition: Good    Disposition: PACU - hemodynamically stable.    Procedure in Detail:  Patient was brought to the OR and underwent general anesthesia. Her abdomen was prepped and draped in the usual sterile fashion.  She was placed in the right lateral decubitus position approximately 45°.  A supraumbilical incision was made to the right of midline.  Fascia was grasped and elevated and a Veress needle was inserted and insufflation obtained.  An 8 mm Opti View port was placed at this site.  There was no injury noted upon entering the abdomen. Two more 8 mm ports were placed in 1 in the lrft lateral abdomen and 1 in the rightmid abdomen. A 12 mm port was placed in the left mid abdomen. The robot was docked.  The spleen was mobilized from the splenic colic ligament hand peritoneal adhesions were mobilized. Status section was carried to the hilum where the  splenic artery and vein were identified and dissected free.  We divided both these structures with a white load on the robotic stapler. We then continued to mobilize the lateral attachments to the spleen using the vessel sealer.  Short gastric as we progressed medially.  While attempting to control these with clips and the vessel sealer there was some bleeding from the vessels.  A 5 mm assistant port was placed to help assist with retraction. The vessels were then controlled with the vessel sealer and placement of clips.  The remaining short gastrics were divided.  The splenic fossa was irrigated thoroughly noting no further bleeding. The patient was then given platelets for her ITP.  The spleen was placed in a large Endo-Catch bag.  This was brought up through the 12 mm port site the spleen was morcellated in the Endo-Catch bag and removed in pieces taking care not to cause any spillage.  The remaining port sites were then removed under direct visualization. The incisions were injected with local anesthetic and closed with 4 Monocryl. Patient tolerated procedure in stable and satisfactory condition was transferred to recovery postoperatively

## 2018-11-14 VITALS
DIASTOLIC BLOOD PRESSURE: 77 MMHG | TEMPERATURE: 98 F | HEART RATE: 75 BPM | WEIGHT: 226 LBS | RESPIRATION RATE: 18 BRPM | HEIGHT: 62 IN | SYSTOLIC BLOOD PRESSURE: 128 MMHG | BODY MASS INDEX: 41.59 KG/M2 | OXYGEN SATURATION: 99 %

## 2018-11-14 LAB
ANION GAP SERPL CALC-SCNC: 6 MMOL/L
BUN SERPL-MCNC: 5 MG/DL
CALCIUM SERPL-MCNC: 8.6 MG/DL
CHLORIDE SERPL-SCNC: 106 MMOL/L
CO2 SERPL-SCNC: 23 MMOL/L
CREAT SERPL-MCNC: 0.6 MG/DL
ERYTHROCYTE [DISTWIDTH] IN BLOOD BY AUTOMATED COUNT: 17.1 %
EST. GFR  (AFRICAN AMERICAN): >60 ML/MIN/1.73 M^2
EST. GFR  (NON AFRICAN AMERICAN): >60 ML/MIN/1.73 M^2
GLUCOSE SERPL-MCNC: 98 MG/DL
HCT VFR BLD AUTO: 31.4 %
HCT VFR BLD AUTO: 32.4 %
HGB BLD-MCNC: 9.7 G/DL
HGB BLD-MCNC: 9.8 G/DL
MCH RBC QN AUTO: 23.2 PG
MCHC RBC AUTO-ENTMCNC: 30.9 G/DL
MCV RBC AUTO: 75 FL
PLATELET # BLD AUTO: 205 K/UL
PMV BLD AUTO: ABNORMAL FL
POTASSIUM SERPL-SCNC: 3.3 MMOL/L
RBC # BLD AUTO: 4.18 M/UL
SODIUM SERPL-SCNC: 135 MMOL/L
WBC # BLD AUTO: 9.84 K/UL

## 2018-11-14 PROCEDURE — 99900035 HC TECH TIME PER 15 MIN (STAT)

## 2018-11-14 PROCEDURE — G8978 MOBILITY CURRENT STATUS: HCPCS | Mod: CH

## 2018-11-14 PROCEDURE — 36415 COLL VENOUS BLD VENIPUNCTURE: CPT

## 2018-11-14 PROCEDURE — 94799 UNLISTED PULMONARY SVC/PX: CPT

## 2018-11-14 PROCEDURE — 25000003 PHARM REV CODE 250: Performed by: SURGERY

## 2018-11-14 PROCEDURE — 97116 GAIT TRAINING THERAPY: CPT

## 2018-11-14 PROCEDURE — 97161 PT EVAL LOW COMPLEX 20 MIN: CPT

## 2018-11-14 PROCEDURE — 85014 HEMATOCRIT: CPT

## 2018-11-14 PROCEDURE — G8980 MOBILITY D/C STATUS: HCPCS | Mod: CH

## 2018-11-14 PROCEDURE — 85018 HEMOGLOBIN: CPT

## 2018-11-14 PROCEDURE — G8979 MOBILITY GOAL STATUS: HCPCS | Mod: CH

## 2018-11-14 PROCEDURE — 80048 BASIC METABOLIC PNL TOTAL CA: CPT

## 2018-11-14 PROCEDURE — 85027 COMPLETE CBC AUTOMATED: CPT

## 2018-11-14 PROCEDURE — 63600175 PHARM REV CODE 636 W HCPCS: Performed by: SURGERY

## 2018-11-14 RX ORDER — SIMETHICONE 80 MG
1 TABLET,CHEWABLE ORAL 3 TIMES DAILY PRN
Status: DISCONTINUED | OUTPATIENT
Start: 2018-11-14 | End: 2018-11-14 | Stop reason: HOSPADM

## 2018-11-14 RX ORDER — HYDROCODONE BITARTRATE AND ACETAMINOPHEN 5; 325 MG/1; MG/1
2 TABLET ORAL EVERY 6 HOURS PRN
Qty: 30 TABLET | Refills: 0 | Status: SHIPPED | OUTPATIENT
Start: 2018-11-14 | End: 2019-01-11 | Stop reason: ALTCHOICE

## 2018-11-14 RX ADMIN — CEFAZOLIN SODIUM 2 G: 2 SOLUTION INTRAVENOUS at 05:11

## 2018-11-14 RX ADMIN — CHLORHEXIDINE GLUCONATE 10 ML: 1.2 RINSE ORAL at 08:11

## 2018-11-14 RX ADMIN — HYDROCODONE BITARTRATE AND ACETAMINOPHEN 2 TABLET: 5; 325 TABLET ORAL at 07:11

## 2018-11-14 RX ADMIN — HYDROCODONE BITARTRATE AND ACETAMINOPHEN 2 TABLET: 5; 325 TABLET ORAL at 01:11

## 2018-11-14 RX ADMIN — HYDROCODONE BITARTRATE AND ACETAMINOPHEN 2 TABLET: 5; 325 TABLET ORAL at 03:11

## 2018-11-14 RX ADMIN — STANDARDIZED SENNA CONCENTRATE AND DOCUSATE SODIUM 1 TABLET: 8.6; 5 TABLET, FILM COATED ORAL at 08:11

## 2018-11-14 NOTE — ANESTHESIA POSTPROCEDURE EVALUATION
"Anesthesia Post Evaluation    Patient: Mari Etienne    Procedure(s) Performed: Procedure(s) (LRB):  XI ROBOTIC SPLENECTOMY (N/A)    Final Anesthesia Type: general  Patient location during evaluation: PACU  Patient participation: Yes- Able to Participate  Level of consciousness: awake and alert  Post-procedure vital signs: reviewed and stable  Pain management: adequate  Airway patency: patent  PONV status at discharge: No PONV  Anesthetic complications: no      Cardiovascular status: blood pressure returned to baseline  Respiratory status: unassisted and spontaneous ventilation  Hydration status: euvolemic  Follow-up not needed.        Visit Vitals  /66 (BP Location: Left arm, Patient Position: Lying)   Pulse 77   Temp 37.1 °C (98.7 °F) (Oral)   Resp 18   Ht 5' 2" (1.575 m)   Wt 102.5 kg (225 lb 15.5 oz)   LMP 09/01/2018 (Approximate)   SpO2 100%   Breastfeeding? No   BMI 41.33 kg/m²       Pain/Lito Score: Pain Assessment Performed: Yes (11/13/2018  7:15 PM)  Presence of Pain: complains of pain/discomfort (11/13/2018  7:15 PM)  Pain Rating Prior to Med Admin: 8 (11/13/2018  7:55 PM)  Pain Rating Post Med Admin: 9 (11/13/2018  5:38 PM)  Lito Score: 9 (11/13/2018  5:10 PM)        "

## 2018-11-14 NOTE — PLAN OF CARE
Problem: Patient Care Overview  Goal: Plan of Care Review  Outcome: Ongoing (interventions implemented as appropriate)  Pt arrived from PACU at 1736. Pt remained free of injury during shift, stable condition, no acute distress, receiving IV fluids, incisions to abdomen CDI, and will continue to monitor. 12hr chart review performed.

## 2018-11-14 NOTE — PLAN OF CARE
Problem: Patient Care Overview  Goal: Plan of Care Review  Outcome: Ongoing (interventions implemented as appropriate)   IV fluid infusing as ordered by Md. ABT administered as ordered. RN educated patient the importance of body mobility after surgery .   Wily light and personal items within reach. Pt free from injury during shift. Educated pt on side effects of medications administered. Pt verbalized understanding. 24 chart check done.   Will continue to monitor.

## 2018-11-14 NOTE — NURSING
Patient received written and verbal discharge instructions. Patient verbalized understanding. Discharging home.

## 2018-11-14 NOTE — PLAN OF CARE
Met with patient to complete discharge planning assessment. Patient's boyfriend, Sravan Kaur, at bedside for support. Confirmed patient demographics and updated emergency contact list to include patient's mother, Dk Hill [phone], who is providing transportation home from the hospital at time of discharge. Patient reports that she lives in a one story home with her boyfriend who provides any support needed. Patient reports that her mother lives close by as well and provides any additional support when needed. Patient denies any post hospital needs or services at this time and reports that she was informed by both doctors this morning that she would be discharged this evening. MSW provided Transitional Care Folder with information on Advance Directives, Discharge Planning Begins on Admission pamphlet, and [facility] Bedside Delivery pamphlet. Informed patient that on-going CM would be Nithya PEÑA and encouraged patient to contact CM at [phone] if any d/c questions or concerns arise. Patient expressed understanding. Updated patient whiteboard with current d/c plan and CM contact information.         [facility] [address]  [facility] [address]  Phone: [phone] Fax: [phone]    Ifeanyi Tello NP  Payor: MEDICAID / Plan: [facility] ([place] MEDICAID) / Product Type: Managed Medicaid /         11/14/18 1042   Discharge Assessment   Assessment Type Discharge Planning Assessment   Confirmed/corrected address and phone number on facesheet? Yes   Assessment information obtained from? Patient   Communicated expected length of stay with patient/caregiver yes   Prior to hospitilization cognitive status: Alert/Oriented   Prior to hospitalization functional status: Independent   Current cognitive status: Alert/Oriented   Current Functional Status: Independent   Lives With significant other   Able to Return to Prior Arrangements yes   Is  patient able to care for self after discharge? Yes   Who are your caregiver(s) and their phone number(s)? Sravan Kaur, boyfriend [phone]; Dk Hill, mother [phone]   Readmission Within The Last 30 Days no previous admission in last 30 days   Patient currently being followed by outpatient case management? No   Patient currently receives any other outside agency services? No   Equipment Currently Used at Home none   Do you have any problems affording any of your prescribed medications? No   Does the patient have transportation home? Yes   Transportation Available car;family or friend will provide  (mother, Dk Hill)   Does the patient receive services at the [facility]? No   Discharge Plan A Home;Home with family   Patient/Family In Agreement With Plan yes   Does the patient have transportation to healthcare appointments? Yes

## 2018-11-14 NOTE — DISCHARGE SUMMARY
[facility] -   General Surgery  Discharge Summary      Patient Name: Mari Etienne  MRN: [id number]  Admission Date: 11/13/2018  Hospital Length of Stay: 1 days  Discharge Date and Time:  11/14/2018 2:00 PM  Attending Physician: Frances Coronado MD   Discharging Provider: Nithya Shaikh PA-C  Primary Care Provider: Ifeanyi Tello NP    HPI:   No notes on file    Procedure(s) (LRB):  XI ROBOTIC SPLENECTOMY (N/A)      Indwelling Lines/Drains at time of discharge:   Lines/Drains/Airways          None        Hospital Course: .name underwent robotic assisted splenectomy. She had an uneventful post op course. On POD 1 she was examined and found to be fit for discharge.   Consults:   Consults (From admission, onward)        Status Ordering Provider     Consult to Case Management/Social Work  Once     Provider:  (Not yet assigned)    Completed FRANCES CORONADO          Significant Diagnostic Studies: Labs:   CBC   Recent Labs   Lab 11/13/18  1617 11/14/18  0508 11/14/18  1305   WBC 16.42* 9.84  --    HGB 10.7* 9.7* 9.8*   HCT 34.9* 31.4* 32.4*    205  --        Pending Diagnostic Studies:     None        Final Active Diagnoses:    Diagnosis Date Noted POA    PRINCIPAL PROBLEM:  Chronic ITP (idiopathic thrombocytopenia) [D69.3] 08/20/2018 Yes      Problems Resolved During this Admission:      Discharged Condition: good    Disposition:     Follow Up:    Patient Instructions:      Diet Adult Regular     Lifting restrictions   Order Comments: 20lb limit     No driving until:   Order Comments: No longer taking narcotic pain medication     Notify your health care provider if you experience any of the following:  temperature >100.4     Notify your health care provider if you experience any of the following:  persistent nausea and vomiting or diarrhea     Notify your health care provider if you experience any of the following:  severe uncontrolled pain     Notify your health care provider if you  experience any of the following:  redness, tenderness, or signs of infection (pain, swelling, redness, odor or green/yellow discharge around incision site)     Notify your health care provider if you experience any of the following:  difficulty breathing or increased cough     No dressing needed     Activity as tolerated     Medications:  Reconciled Home Medications:      Medication List      START taking these medications    HYDROcodone-acetaminophen 5-325 mg per tablet  Commonly known as:  NORCO  Take 2 tablets by mouth every 6 (six) hours as needed for Pain.     nozaseptin nasal   Commonly known as:  NOZIN  1 each by Each Nare route 2 (two) times daily.        CONTINUE taking these medications    PROMACTA 25 MG Tab  Generic drug:  eltrombopag  Take 2 tablets (50 mg total) by mouth once daily.          Time spent on the discharge of patient: 20 minutes    Nithya Shaikh PA-C  General Surgery  [facility] -

## 2018-11-14 NOTE — PLAN OF CARE
Problem: Patient Care Overview  Goal: Plan of Care Review  Outcome: Outcome(s) achieved Date Met: 11/14/18  Fall precautions maintained. Pt free from falls/injuries.  Patient complains of pain. Pain controlled with PRN meds.  Antibiotics given as prescribed.  Ambulates and repositions independently.   Plan of care and medications discussed with patient.  Patient verbalized understanding.  Bed locked and low, call bell within reach.  Chart check done. Will continue to monitor.

## 2018-11-15 ENCOUNTER — TELEPHONE (OUTPATIENT)
Dept: PHARMACY | Facility: CLINIC | Age: 23
End: 2018-11-15

## 2018-11-15 ENCOUNTER — TELEPHONE (OUTPATIENT)
Dept: HEMATOLOGY/ONCOLOGY | Facility: CLINIC | Age: 23
End: 2018-11-15

## 2018-11-15 NOTE — TELEPHONE ENCOUNTER
Patient uncertain if she should continue Promacta s/p splenectomy. She has medication on hand if she is to continue.

## 2018-11-15 NOTE — TELEPHONE ENCOUNTER
----- Message from Nash Cota MD sent at 11/15/2018  2:09 PM CST -----  Regarding: RE: Promacta status  Stop her medicines and I probably need to see her back in a week or 2 with a CBC  ----- Message -----  From: Yanet Kerns LPN  Sent: 11/15/2018   2:05 PM  To: Nash Cota MD  Subject: FW: Promacta status                                  ----- Message -----  From: Hermilo Fofana, PharmD  Sent: 11/15/2018   1:30 PM  To: Theresa Bolanos, PharmD, Milka Millan, PharmD, #  Subject: Promacta status                                  Patient was uncertain if she should continue Promacta s/p splenectomy.  Please advise.  Thanks!.

## 2018-11-15 NOTE — TELEPHONE ENCOUNTER
Spoke with patient, advised to hold Promacta. Scheduled two week follow-up with labs. Confirmed appointment time and location. Patient verbalized understanding.

## 2018-11-15 NOTE — PT/OT/SLP EVAL
Physical Therapy Evaluation    Patient Name:  Mari Miller   MRN:  [id number]    Recommendations:     Discharge Recommendations:  home   Discharge Equipment Recommendations: none   Barriers to discharge: None    Assessment:     Mari Miller is a 22 y.o. female admitted with a medical diagnosis of Chronic ITP (idiopathic thrombocytopenia).  She presents with the following impairments/functional limitations:   .      Recent Surgery: Procedure(s) (LRB):  XI ROBOTIC SPLENECTOMY (N/A) 1 Day Post-Op    Plan:    PT WILL BE D/C FROM P.T. TO MOBILITY PROGRAM      Plan of Care Reviewed with: patient    Subjective     Communicated with NURSE AND Epic CHART REVIEW prior to session.  Patient found SUP IN BED  upon PT entry to room, agreeable to evaluation.      Chief Complaint: PAIN  Patient comments/goals: GO HOME  Pain/Comfort:  · Pain Rating 1: 5/10  · Location 1: abdomen  · Pain Addressed 1: Pre-medicate for activity  · Pain Rating Post-Intervention 1: 5/10    Patients cultural, spiritual, Adventist conflicts given the current situation:      Living Environment:  PT LIVES WITH BOYFREIND  Prior to admission, patients level of function was IND AND [address].  Patient has the following equipment: none.  DME owned (not currently used): none.  Upon discharge, patient will have assistance from BOY FRIEND.    Objective:     Patient found with: peripheral IV     General Precautions: Standard,     Orthopedic Precautions:N/A   Braces: N/A     Exams:  · RLE ROM: WNL  · RLE Strength: WNL  · LLE ROM: WNL  · LLE Strength: WNL    Functional Mobility:  · PT LOG ROLLED AND SEATED EOB ING. PT STOOD WITH  NO AD AND GT TRAINED X 300' IND. PT RETURNED TO RM T/F TO CHAIR IND. PT LEFT SEATED IN CHAIR WITH ALL NEEDS MET.     AM-PAC 6 CLICK MOBILITY  Total Score:24     Patient left up in chair with call button in reach.    GOALS:   Multidisciplinary Problems     Physical Therapy Goals     Not on file                History:     Past  Medical History:   Diagnosis Date    Encounter for blood transfusion     Thrombocytopenia        Past Surgical History:   Procedure Laterality Date     SECTION      x1    ROBOT-ASSISTED SURGICAL REMOVAL OF SPLEEN USING DA MAXIM XI N/A 2018    Procedure: XI ROBOTIC SPLENECTOMY;  Surgeon: Yobani Lara MD;  Location: [facility];  Service: General;  Laterality: N/A;    XI ROBOTIC SPLENECTOMY N/A 2018    Performed by Yobani Lara MD at [facility] OR       Clinical Decision Making:     History  Co-morbidities and personal factors that may impact the plan of care Examination  Body Structures and Functions, activity limitations and participation restrictions that may impact the plan of care Clinical Presentation   Decision Making/ Complexity Score   Co-morbidities:   [] Time since onset of injury / illness / exacerbation  [] Status of current condition  []Patient's cognitive status and safety concerns    [] Multiple Medical Problems (see med hx)  Personal Factors:   [] Patient's age  [] Prior Level of function   [] Patient's home situation (environment and family support)  [] Patient's level of motivation  [] Expected progression of patient      HISTORY:(criteria)    [] 12310 - no personal factors/history    [] 45131 - has 1-2 personal factor/comorbidity     [] 80627 - has >3 personal factor/comorbidity     Body Regions:  [] Objective examination findings  [] Head     []  Neck  [] Trunk   [] Upper Extremity  [] Lower Extremity    Body Systems:  [] For communication ability, affect, cognition, language, and learning style: the assessment of the ability to make needs known, consciousness, orientation (person, place, and time), expected emotional /behavioral responses, and learning preferences (eg, learning barriers, education  needs)  [] For the neuromuscular system: a general assessment of gross coordinated movement (eg, balance, gait, locomotion, transfers, and transitions) and motor function  (motor  control and motor learning)  [] For the musculoskeletal system: the assessment of gross symmetry, gross range of motion, gross strength, height, and weight  [] For the integumentary system: the assessment of pliability(texture), presence of scar formation, skin color, and skin integrity  [] For cardiovascular/pulmonary system: the assessment of heart rate, respiratory rate, blood pressure, and edema     Activity limitations:    [] Patient's cognitive status and saf ety concerns          [] Status of current condition      [] Weight bearing restriction  [] Cardiopulmunary Restriction    Participation Restrictions:   [] Goals and goal agreement with the patient     [] Rehab potential (prognosis) and probable outcome      Examination of Body System: (criteria)    [] 71762 - addressing 1-2 elements    [] 02785 - addressing a total of 3 or more elements     [] 53536 -  Addressing a total of 4 or more elements         Clinical Presentation: (criteria)  Choose one     On examination of body system using standardized tests and measures patient presents with (CHOOSE ONE) elements from any of the following: body structures and functions, activity limitations, and/or participation restrictions.  Leading to a clinical presentation that is considered (CHOOSE ONE)                              Clinical Decision Making  (Eval Complexity):  Choose One     Time Tracking:     PT Received On: 11/14/18  PT Start Time: 1000     PT Stop Time: 1025  PT Total Time (min): 25 min     Billable Minutes: Evaluation 15 and Gait Training 10      Helen Clemens, PT  11/14/2018

## 2018-11-16 NOTE — PLAN OF CARE
11/16/18 1349   Final Note   Assessment Type Final Discharge Note   Anticipated Discharge Disposition Home   Right Care Referral Info   Post Acute Recommendation No Care

## 2018-11-30 ENCOUNTER — TELEPHONE (OUTPATIENT)
Dept: HEMATOLOGY/ONCOLOGY | Facility: CLINIC | Age: 23
End: 2018-11-30

## 2018-11-30 NOTE — TELEPHONE ENCOUNTER
Attempted to contact patient X3 about missed appointment today with Dr. Cota. No answer, left voicemail with callback number.

## 2018-12-03 NOTE — TELEPHONE ENCOUNTER
Patient was previously instructed to hold Promacta, then f/u in two weeks with labs. Patient no-showed to appt. LVM for patient to reinforce importance of rescheduling appt. Will continue to follow chart for update in Promacta status.

## 2018-12-10 ENCOUNTER — TELEPHONE (OUTPATIENT)
Dept: PHARMACY | Facility: CLINIC | Age: 23
End: 2018-12-10

## 2018-12-10 NOTE — TELEPHONE ENCOUNTER
Attempted to reach patient to follow up regarding Promacta therapy.  She did not show for last appointment and labs.  [facility] has attempted to reach patient 4x for follow up and 2x for refill.  Will attempt 2 more times, then send postcard to address on file.

## 2018-12-18 ENCOUNTER — OFFICE VISIT (OUTPATIENT)
Dept: HEMATOLOGY/ONCOLOGY | Facility: CLINIC | Age: 23
End: 2018-12-18
Payer: MEDICAID

## 2018-12-18 ENCOUNTER — LAB VISIT (OUTPATIENT)
Dept: LAB | Facility: HOSPITAL | Age: 23
End: 2018-12-18
Attending: INTERNAL MEDICINE
Payer: MEDICAID

## 2018-12-18 ENCOUNTER — TELEPHONE (OUTPATIENT)
Dept: HEMATOLOGY/ONCOLOGY | Facility: CLINIC | Age: 23
End: 2018-12-18

## 2018-12-18 ENCOUNTER — PATIENT MESSAGE (OUTPATIENT)
Dept: HEMATOLOGY/ONCOLOGY | Facility: CLINIC | Age: 23
End: 2018-12-18

## 2018-12-18 VITALS
TEMPERATURE: 99 F | WEIGHT: 223.19 LBS | HEART RATE: 89 BPM | HEIGHT: 62 IN | OXYGEN SATURATION: 99 % | DIASTOLIC BLOOD PRESSURE: 78 MMHG | SYSTOLIC BLOOD PRESSURE: 120 MMHG | BODY MASS INDEX: 41.07 KG/M2

## 2018-12-18 DIAGNOSIS — D50.0 IRON DEFICIENCY ANEMIA DUE TO CHRONIC BLOOD LOSS: ICD-10-CM

## 2018-12-18 DIAGNOSIS — D69.3 CHRONIC ITP (IDIOPATHIC THROMBOCYTOPENIA): Primary | ICD-10-CM

## 2018-12-18 DIAGNOSIS — D69.3 CHRONIC ITP (IDIOPATHIC THROMBOCYTOPENIA): ICD-10-CM

## 2018-12-18 DIAGNOSIS — Z90.81 H/O SPLENECTOMY: ICD-10-CM

## 2018-12-18 LAB
ALBUMIN SERPL BCP-MCNC: 3.8 G/DL
ALP SERPL-CCNC: 55 U/L
ALT SERPL W/O P-5'-P-CCNC: 22 U/L
ANION GAP SERPL CALC-SCNC: 9 MMOL/L
ANISOCYTOSIS BLD QL SMEAR: SLIGHT
AST SERPL-CCNC: 19 U/L
BASOPHILS # BLD AUTO: 0.12 K/UL
BASOPHILS NFR BLD: 1.4 %
BILIRUB SERPL-MCNC: 0.3 MG/DL
BUN SERPL-MCNC: 10 MG/DL
CALCIUM SERPL-MCNC: 9.2 MG/DL
CHLORIDE SERPL-SCNC: 108 MMOL/L
CO2 SERPL-SCNC: 21 MMOL/L
CREAT SERPL-MCNC: 0.7 MG/DL
DACRYOCYTES BLD QL SMEAR: ABNORMAL
DIFFERENTIAL METHOD: ABNORMAL
EOSINOPHIL # BLD AUTO: 0.1 K/UL
EOSINOPHIL NFR BLD: 1.5 %
ERYTHROCYTE [DISTWIDTH] IN BLOOD BY AUTOMATED COUNT: 17.1 %
EST. GFR  (AFRICAN AMERICAN): >60 ML/MIN/1.73 M^2
EST. GFR  (NON AFRICAN AMERICAN): >60 ML/MIN/1.73 M^2
FERRITIN SERPL-MCNC: 16 NG/ML
GLUCOSE SERPL-MCNC: 103 MG/DL
HCT VFR BLD AUTO: 33.7 %
HGB BLD-MCNC: 10.1 G/DL
HYPOCHROMIA BLD QL SMEAR: ABNORMAL
IRON SERPL-MCNC: 44 UG/DL
LYMPHOCYTES # BLD AUTO: 2.2 K/UL
LYMPHOCYTES NFR BLD: 25.1 %
MCH RBC QN AUTO: 22.5 PG
MCHC RBC AUTO-ENTMCNC: 30 G/DL
MCV RBC AUTO: 75 FL
MONOCYTES # BLD AUTO: 1.1 K/UL
MONOCYTES NFR BLD: 12.1 %
NEUTROPHILS # BLD AUTO: 5.2 K/UL
NEUTROPHILS NFR BLD: 59.9 %
PLATELET # BLD AUTO: 4 K/UL
PLATELET BLD QL SMEAR: ABNORMAL
PMV BLD AUTO: ABNORMAL FL
POIKILOCYTOSIS BLD QL SMEAR: SLIGHT
POLYCHROMASIA BLD QL SMEAR: ABNORMAL
POTASSIUM SERPL-SCNC: 4 MMOL/L
PROT SERPL-MCNC: 7.4 G/DL
RBC # BLD AUTO: 4.49 M/UL
SATURATED IRON: 10 %
SMUDGE CELLS BLD QL SMEAR: PRESENT
SODIUM SERPL-SCNC: 138 MMOL/L
TARGETS BLD QL SMEAR: ABNORMAL
TOTAL IRON BINDING CAPACITY: 423 UG/DL
TRANSFERRIN SERPL-MCNC: 286 MG/DL
WBC # BLD AUTO: 8.69 K/UL

## 2018-12-18 PROCEDURE — 99999 PR PBB SHADOW E&M-EST. PATIENT-LVL III: CPT | Mod: PBBFAC,,, | Performed by: INTERNAL MEDICINE

## 2018-12-18 PROCEDURE — 99213 OFFICE O/P EST LOW 20 MIN: CPT | Mod: PBBFAC,PO | Performed by: INTERNAL MEDICINE

## 2018-12-18 PROCEDURE — 80053 COMPREHEN METABOLIC PANEL: CPT | Mod: PO

## 2018-12-18 PROCEDURE — 85025 COMPLETE CBC W/AUTO DIFF WBC: CPT | Mod: PO

## 2018-12-18 PROCEDURE — 82728 ASSAY OF FERRITIN: CPT

## 2018-12-18 PROCEDURE — 99214 OFFICE O/P EST MOD 30 MIN: CPT | Mod: S$PBB,,, | Performed by: INTERNAL MEDICINE

## 2018-12-18 PROCEDURE — 83540 ASSAY OF IRON: CPT

## 2018-12-18 PROCEDURE — 36415 COLL VENOUS BLD VENIPUNCTURE: CPT | Mod: PO

## 2018-12-18 NOTE — TELEPHONE ENCOUNTER
Attempted to contact patient to schedule appointment with Dr. Cota for tomorrow per Dr. Cota to discuss lab results. No answer, left detailed voicemail with callback number.

## 2018-12-18 NOTE — PROGRESS NOTES
Subjective:       Patient ID: Mari Miller is a 23 y.o. female.    Chief Complaint: Coagulation Disorder ( ITP) and Results    HPI 23-year-old female refractory ITP status post splenectomy.  Patient returns doing remarkably well feels good    Past Medical History:   Diagnosis Date    Encounter for blood transfusion     Thrombocytopenia      History reviewed. No pertinent family history.  Social History     Socioeconomic History    Marital status: Single     Spouse name: Not on file    Number of children: Not on file    Years of education: Not on file    Highest education level: Not on file   Social Needs    Financial resource strain: Not on file    Food insecurity - worry: Not on file    Food insecurity - inability: Not on file    Transportation needs - medical: Not on file    Transportation needs - non-medical: Not on file   Occupational History    Not on file   Tobacco Use    Smoking status: Never Smoker    Smokeless tobacco: Never Used   Substance and Sexual Activity    Alcohol use: No     Frequency: Never    Drug use: No    Sexual activity: Not on file   Other Topics Concern    Not on file   Social History Narrative    Not on file     Past Surgical History:   Procedure Laterality Date     SECTION      x1    ROBOT-ASSISTED SURGICAL REMOVAL OF SPLEEN USING DA MAXIM XI N/A 2018    Procedure: XI ROBOTIC SPLENECTOMY;  Surgeon: Yobani Lara MD;  Location: [facility] OR;  Service: General;  Laterality: N/A;    XI ROBOTIC SPLENECTOMY N/A 2018    Performed by Yobani Lara MD at [facility] OR       Labs:  Lab Results   Component Value Date    WBC 9.84 2018    HGB 9.8 (L) 2018    HCT 32.4 (L) 2018    MCV 75 (L) 2018     2018     BMP  Lab Results   Component Value Date     (L) 2018    K 3.3 (L) 2018     2018    CO2 23 2018    BUN 5 (L) 2018    CREATININE 0.6 2018    CALCIUM 8.6 (L) 2018    ANIONGAP  6 (L) 11/14/2018    ESTGFRAFRICA >60 11/14/2018    EGFRNONAA >60 11/14/2018     Lab Results   Component Value Date    ALT 15 10/30/2018    AST 17 10/30/2018    ALKPHOS 47 (L) 10/30/2018    BILITOT 0.3 10/30/2018       Lab Results   Component Value Date    IRON 18 (L) 09/25/2018    TIBC 411 09/25/2018    FERRITIN 15 (L) 09/25/2018     Lab Results   Component Value Date    NWEMRGWG60 353 03/06/2018     Lab Results   Component Value Date    FOLATE 5.9 03/06/2018     No results found for: TSH      Review of Systems   Constitutional: Negative for activity change, appetite change, chills, diaphoresis, fatigue, fever and unexpected weight change.   HENT: Negative for congestion, dental problem, drooling, ear discharge, ear pain, facial swelling, hearing loss, mouth sores, nosebleeds, postnasal drip, rhinorrhea, sinus pressure, sneezing, sore throat, tinnitus, trouble swallowing and voice change.    Eyes: Negative for photophobia, pain, discharge, redness, itching and visual disturbance.   Respiratory: Negative for cough, choking, chest tightness, shortness of breath, wheezing and stridor.    Cardiovascular: Negative for chest pain, palpitations and leg swelling.   Gastrointestinal: Negative for abdominal distention, abdominal pain, anal bleeding, blood in stool, constipation, diarrhea, nausea, rectal pain and vomiting.   Endocrine: Negative for cold intolerance, heat intolerance, polydipsia, polyphagia and polyuria.   Genitourinary: Negative for decreased urine volume, difficulty urinating, dyspareunia, dysuria, enuresis, flank pain, frequency, genital sores, hematuria, menstrual problem, pelvic pain, urgency, vaginal bleeding, vaginal discharge and vaginal pain.   Musculoskeletal: Negative for arthralgias, back pain, gait problem, joint swelling, myalgias, neck pain and neck stiffness.   Skin: Negative for color change, pallor and rash.   Allergic/Immunologic: Negative for environmental allergies, food allergies and  immunocompromised state.   Neurological: Negative for dizziness, tremors, seizures, syncope, facial asymmetry, speech difficulty, weakness, light-headedness, numbness and headaches.   Hematological: Negative for adenopathy. Does not bruise/bleed easily.   Psychiatric/Behavioral: Negative for agitation, behavioral problems, confusion, decreased concentration, dysphoric mood, hallucinations, self-injury, sleep disturbance and suicidal ideas. The patient is not nervous/anxious and is not hyperactive.        Objective:      Physical Exam   Constitutional: She is oriented to person, place, and time. She appears well-developed and well-nourished. No distress.   HENT:   Head: Normocephalic and atraumatic.   Right Ear: External ear normal.   Left Ear: External ear normal.   Nose: Nose normal. Right sinus exhibits no maxillary sinus tenderness and no frontal sinus tenderness. Left sinus exhibits no maxillary sinus tenderness and no frontal sinus tenderness.   Mouth/Throat: Oropharynx is clear and moist. No oropharyngeal exudate.   Eyes: Conjunctivae, EOM and lids are normal. Pupils are equal, round, and reactive to light. Right eye exhibits no discharge. Left eye exhibits no discharge. Right conjunctiva is not injected. Right conjunctiva has no hemorrhage. Left conjunctiva is not injected. Left conjunctiva has no hemorrhage. No scleral icterus.   Neck: Normal range of motion. Neck supple. No JVD present. No tracheal deviation present. No thyromegaly present.   Cardiovascular: Normal rate and regular rhythm.   Pulmonary/Chest: Effort normal. No stridor. No respiratory distress. She exhibits no tenderness.   Abdominal: Soft. She exhibits no distension and no mass. There is no splenomegaly or hepatomegaly. There is no tenderness. There is no rebound.   Musculoskeletal: Normal range of motion. She exhibits no edema or tenderness.   Lymphadenopathy:     She has no cervical adenopathy.     She has no axillary adenopathy.         Right: No supraclavicular adenopathy present.        Left: No supraclavicular adenopathy present.   Neurological: She is alert and oriented to person, place, and time. No cranial nerve deficit. Coordination normal.   Skin: Skin is dry. No rash noted. She is not diaphoretic. No erythema.   Psychiatric: She has a normal mood and affect. Her behavior is normal. Judgment and thought content normal.   Vitals reviewed.          Assessment:      1. Chronic ITP (idiopathic thrombocytopenia)    2. Iron deficiency anemia due to chronic blood loss    3. H/O splenectomy           Plan:      patient is doing remarkably well feels good no evidence of any bruising at this point CBC today with iron status.  Than CBCs monthly communicate through portal will see nurse practitioner if patient's platelet counts have remained normal will move to every 2 months with repeat CBCs and then recheck in 6-9 months.  One of fever precautions with splenectomy patient is aware she has temperature more than 100.5 to see healthcare facility urgently vaccinations pre splenectomy noted        Nash Cota Jr, MD FACP

## 2018-12-20 ENCOUNTER — TELEPHONE (OUTPATIENT)
Dept: PHARMACY | Facility: CLINIC | Age: 23
End: 2018-12-20

## 2018-12-26 ENCOUNTER — PATIENT MESSAGE (OUTPATIENT)
Dept: PHARMACY | Facility: CLINIC | Age: 23
End: 2018-12-26

## 2019-01-04 ENCOUNTER — TELEPHONE (OUTPATIENT)
Dept: PHARMACY | Facility: CLINIC | Age: 24
End: 2019-01-04

## 2019-01-04 NOTE — TELEPHONE ENCOUNTER
MELITAM for callback to discuss contacting Dr Pinedo's office to schedule follow up.  She has labwork scheduled on the 18th.  Advised that she call the [facility] to determine if she needs to be seen then or sooner to discuss lab results.  Promacta remains on hold at this time.

## 2019-01-09 ENCOUNTER — TELEPHONE (OUTPATIENT)
Dept: HEMATOLOGY/ONCOLOGY | Facility: CLINIC | Age: 24
End: 2019-01-09

## 2019-01-09 NOTE — TELEPHONE ENCOUNTER
----- Message from Bernarda Clay sent at 1/9/2019  4:03 PM CST -----  Patient needs call back to schedule appointment..[phone] (home)

## 2019-01-10 ENCOUNTER — TELEPHONE (OUTPATIENT)
Dept: HEMATOLOGY/ONCOLOGY | Facility: CLINIC | Age: 24
End: 2019-01-10

## 2019-01-10 NOTE — TELEPHONE ENCOUNTER
Spoke with patient, let her know we were trying to get in touch with her on 12/18/18 to follow-up with Dr. Cota about her labs. Patient stated her phone was broken and she just got it fixed. Scheduled patient for tomorrow with labs before. Confirmed appointment time and location.

## 2019-01-10 NOTE — TELEPHONE ENCOUNTER
----- Message from Glenis Pickett sent at 1/10/2019  1:36 PM CST -----  Contact: self   Returning call., Please call back at  [phone].        Thanks,  Glenis Pickett

## 2019-01-11 ENCOUNTER — LAB VISIT (OUTPATIENT)
Dept: LAB | Facility: HOSPITAL | Age: 24
End: 2019-01-11
Attending: INTERNAL MEDICINE
Payer: MEDICAID

## 2019-01-11 ENCOUNTER — TELEPHONE (OUTPATIENT)
Dept: PHARMACY | Facility: CLINIC | Age: 24
End: 2019-01-11

## 2019-01-11 ENCOUNTER — OFFICE VISIT (OUTPATIENT)
Dept: HEMATOLOGY/ONCOLOGY | Facility: CLINIC | Age: 24
End: 2019-01-11
Payer: MEDICAID

## 2019-01-11 VITALS
SYSTOLIC BLOOD PRESSURE: 134 MMHG | HEART RATE: 92 BPM | WEIGHT: 227.75 LBS | OXYGEN SATURATION: 100 % | RESPIRATION RATE: 20 BRPM | BODY MASS INDEX: 41.91 KG/M2 | DIASTOLIC BLOOD PRESSURE: 81 MMHG | HEIGHT: 62 IN | TEMPERATURE: 99 F

## 2019-01-11 DIAGNOSIS — D69.3 CHRONIC ITP (IDIOPATHIC THROMBOCYTOPENIA): ICD-10-CM

## 2019-01-11 DIAGNOSIS — D69.3 CHRONIC ITP (IDIOPATHIC THROMBOCYTOPENIA): Primary | ICD-10-CM

## 2019-01-11 LAB
ALBUMIN SERPL BCP-MCNC: 4.1 G/DL
ALP SERPL-CCNC: 62 U/L
ALT SERPL W/O P-5'-P-CCNC: 12 U/L
ANION GAP SERPL CALC-SCNC: 11 MMOL/L
AST SERPL-CCNC: 14 U/L
BASOPHILS # BLD AUTO: 0.11 K/UL
BASOPHILS NFR BLD: 0.7 %
BILIRUB SERPL-MCNC: 0.3 MG/DL
BUN SERPL-MCNC: 11 MG/DL
CALCIUM SERPL-MCNC: 9.1 MG/DL
CHLORIDE SERPL-SCNC: 110 MMOL/L
CO2 SERPL-SCNC: 20 MMOL/L
CREAT SERPL-MCNC: 0.8 MG/DL
DACRYOCYTES BLD QL SMEAR: ABNORMAL
DIFFERENTIAL METHOD: ABNORMAL
EOSINOPHIL # BLD AUTO: 0.1 K/UL
EOSINOPHIL NFR BLD: 0.8 %
ERYTHROCYTE [DISTWIDTH] IN BLOOD BY AUTOMATED COUNT: 17 %
EST. GFR  (AFRICAN AMERICAN): >60 ML/MIN/1.73 M^2
EST. GFR  (NON AFRICAN AMERICAN): >60 ML/MIN/1.73 M^2
GLUCOSE SERPL-MCNC: 95 MG/DL
HCT VFR BLD AUTO: 33.8 %
HGB BLD-MCNC: 9.9 G/DL
HYPOCHROMIA BLD QL SMEAR: ABNORMAL
LYMPHOCYTES # BLD AUTO: 3.1 K/UL
LYMPHOCYTES NFR BLD: 20.4 %
MCH RBC QN AUTO: 21.7 PG
MCHC RBC AUTO-ENTMCNC: 29.3 G/DL
MCV RBC AUTO: 74 FL
MONOCYTES # BLD AUTO: 1.5 K/UL
MONOCYTES NFR BLD: 10.1 %
NEUTROPHILS # BLD AUTO: 10.4 K/UL
NEUTROPHILS NFR BLD: 68 %
OVALOCYTES BLD QL SMEAR: ABNORMAL
PLATELET # BLD AUTO: 24 K/UL
PLATELET BLD QL SMEAR: ABNORMAL
PMV BLD AUTO: ABNORMAL FL
POIKILOCYTOSIS BLD QL SMEAR: ABNORMAL
POTASSIUM SERPL-SCNC: 4.1 MMOL/L
PROT SERPL-MCNC: 7.6 G/DL
RBC # BLD AUTO: 4.57 M/UL
SODIUM SERPL-SCNC: 141 MMOL/L
STOMATOCYTES BLD QL SMEAR: PRESENT
TARGETS BLD QL SMEAR: ABNORMAL
WBC # BLD AUTO: 15.32 K/UL

## 2019-01-11 PROCEDURE — 99213 OFFICE O/P EST LOW 20 MIN: CPT | Mod: PBBFAC | Performed by: INTERNAL MEDICINE

## 2019-01-11 PROCEDURE — 85025 COMPLETE CBC W/AUTO DIFF WBC: CPT

## 2019-01-11 PROCEDURE — 80053 COMPREHEN METABOLIC PANEL: CPT

## 2019-01-11 PROCEDURE — 36415 COLL VENOUS BLD VENIPUNCTURE: CPT

## 2019-01-11 PROCEDURE — 99214 OFFICE O/P EST MOD 30 MIN: CPT | Mod: S$PBB,,, | Performed by: INTERNAL MEDICINE

## 2019-01-11 PROCEDURE — 99214 PR OFFICE/OUTPT VISIT, EST, LEVL IV, 30-39 MIN: ICD-10-PCS | Mod: S$PBB,,, | Performed by: INTERNAL MEDICINE

## 2019-01-11 PROCEDURE — 99999 PR PBB SHADOW E&M-EST. PATIENT-LVL III: ICD-10-PCS | Mod: PBBFAC,,, | Performed by: INTERNAL MEDICINE

## 2019-01-11 PROCEDURE — 99999 PR PBB SHADOW E&M-EST. PATIENT-LVL III: CPT | Mod: PBBFAC,,, | Performed by: INTERNAL MEDICINE

## 2019-01-11 NOTE — PROGRESS NOTES
Subjective:       Patient ID: Mari Miller is a 23 y.o. female.    Chief Complaint: Results and Coagulation Disorder    HPI 23-year-old female status post splenectomy patient returns initially 3 weeks ago patient had low platelet count was unable to contact she returns today with repeat CBC for review    Past Medical History:   Diagnosis Date    Encounter for blood transfusion     Thrombocytopenia      History reviewed. No pertinent family history.  Social History     Socioeconomic History    Marital status: Single     Spouse name: Not on file    Number of children: Not on file    Years of education: Not on file    Highest education level: Not on file   Social Needs    Financial resource strain: Not on file    Food insecurity - worry: Not on file    Food insecurity - inability: Not on file    Transportation needs - medical: Not on file    Transportation needs - non-medical: Not on file   Occupational History    Not on file   Tobacco Use    Smoking status: Never Smoker    Smokeless tobacco: Never Used   Substance and Sexual Activity    Alcohol use: No     Frequency: Never    Drug use: No    Sexual activity: Not on file   Other Topics Concern    Not on file   Social History Narrative    Not on file     Past Surgical History:   Procedure Laterality Date     SECTION      x1    XI ROBOTIC SPLENECTOMY N/A 2018    Performed by Yobani Lara MD at [facility] OR       Labs:  Lab Results   Component Value Date    WBC 15.32 (H) 2019    HGB 9.9 (L) 2019    HCT 33.8 (L) 2019    MCV 74 (L) 2019    PLT 24 (LL) 2019     BMP  Lab Results   Component Value Date     2019    K 4.1 2019     2019    CO2 20 (L) 2019    BUN 11 2019    CREATININE 0.8 2019    CALCIUM 9.1 2019    ANIONGAP 11 2019    ESTGFRAFRICA >60 2019    EGFRNONAA >60 2019     Lab Results   Component Value Date    ALT 12 2019    AST  14 01/11/2019    ALKPHOS 62 01/11/2019    BILITOT 0.3 01/11/2019       Lab Results   Component Value Date    IRON 44 12/18/2018    TIBC 423 12/18/2018    FERRITIN 16 (L) 12/18/2018     Lab Results   Component Value Date    PYBLAIKQ73 353 03/06/2018     Lab Results   Component Value Date    FOLATE 5.9 03/06/2018     No results found for: TSH      Review of Systems   Constitutional: Positive for activity change. Negative for appetite change, chills, diaphoresis, fatigue, fever and unexpected weight change.   HENT: Negative for congestion, dental problem, drooling, ear discharge, ear pain, facial swelling, hearing loss, mouth sores, nosebleeds, postnasal drip, rhinorrhea, sinus pressure, sneezing, sore throat, tinnitus, trouble swallowing and voice change.    Eyes: Negative for photophobia, pain, discharge, redness, itching and visual disturbance.   Respiratory: Negative for cough, choking, chest tightness, shortness of breath, wheezing and stridor.    Cardiovascular: Negative for chest pain, palpitations and leg swelling.   Gastrointestinal: Negative for abdominal distention, abdominal pain, anal bleeding, blood in stool, constipation, diarrhea, nausea, rectal pain and vomiting.   Endocrine: Negative for cold intolerance, heat intolerance, polydipsia, polyphagia and polyuria.   Genitourinary: Positive for vaginal bleeding. Negative for decreased urine volume, difficulty urinating, dyspareunia, dysuria, enuresis, flank pain, frequency, genital sores, hematuria, menstrual problem, pelvic pain, urgency, vaginal discharge and vaginal pain.   Musculoskeletal: Negative for arthralgias, back pain, gait problem, joint swelling, myalgias, neck pain and neck stiffness.   Skin: Negative for color change, pallor and rash.   Allergic/Immunologic: Negative for environmental allergies, food allergies and immunocompromised state.   Neurological: Negative for dizziness, tremors, seizures, syncope, facial asymmetry, speech difficulty,  weakness, light-headedness, numbness and headaches.   Hematological: Negative for adenopathy. Does not bruise/bleed easily.   Psychiatric/Behavioral: Negative for agitation, behavioral problems, confusion, decreased concentration, dysphoric mood, hallucinations, self-injury, sleep disturbance and suicidal ideas. The patient is not nervous/anxious and is not hyperactive.        Objective:      Physical Exam   Constitutional: She is oriented to person, place, and time. She appears well-developed and well-nourished. She appears distressed.   HENT:   Head: Normocephalic and atraumatic.   Right Ear: External ear normal.   Left Ear: External ear normal.   Nose: Nose normal. Right sinus exhibits no maxillary sinus tenderness and no frontal sinus tenderness. Left sinus exhibits no maxillary sinus tenderness and no frontal sinus tenderness.   Mouth/Throat: Oropharynx is clear and moist. No oropharyngeal exudate.   Eyes: Conjunctivae, EOM and lids are normal. Pupils are equal, round, and reactive to light. Right eye exhibits no discharge. Left eye exhibits no discharge. Right conjunctiva is not injected. Right conjunctiva has no hemorrhage. Left conjunctiva is not injected. Left conjunctiva has no hemorrhage. No scleral icterus.   Neck: Normal range of motion. Neck supple. No JVD present. No tracheal deviation present. No thyromegaly present.   Cardiovascular: Normal rate, regular rhythm, normal heart sounds and intact distal pulses.   Pulmonary/Chest: Effort normal and breath sounds normal. No stridor. No respiratory distress. She exhibits no tenderness.   Abdominal: Soft. Bowel sounds are normal. She exhibits no distension and no mass. There is no splenomegaly or hepatomegaly. There is no tenderness. There is no rebound.   Musculoskeletal: Normal range of motion. She exhibits no edema or tenderness.   Lymphadenopathy:     She has no cervical adenopathy.     She has no axillary adenopathy.        Right: No supraclavicular  adenopathy present.        Left: No supraclavicular adenopathy present.   Neurological: She is alert and oriented to person, place, and time. No cranial nerve deficit. Coordination normal.   Skin: Skin is dry. No rash noted. She is not diaphoretic. No erythema.   Psychiatric: She has a normal mood and affect. Her behavior is normal. Judgment and thought content normal.   Vitals reviewed.          Assessment:      1. Chronic ITP (idiopathic thrombocytopenia)           Plan:     Patient is status post splenectomy.  At this point her platelet count has risen to 24,000 which is much safer level.   we will reinstitute Promacta 50 mg daily I will check her on a weekly basis to see if there has been any activity obviously if this does not we will may try Nplate.  Discussed situation with her orders written reviewed follow-up in 1 week        Nash Cota Jr, MD FACP

## 2019-01-28 NOTE — TELEPHONE ENCOUNTER
LVM and sent [employer] message in regards to Promacta restart, refill and follow up. Fourth attempt to reach patient. Will send provider a message and mail a postcard to patient's address.

## 2019-03-03 ENCOUNTER — HOSPITAL ENCOUNTER (EMERGENCY)
Facility: HOSPITAL | Age: 24
Discharge: HOME | End: 2019-03-03
Attending: EMERGENCY MEDICINE
Payer: MEDICAID

## 2019-03-03 VITALS
HEIGHT: 62 IN | SYSTOLIC BLOOD PRESSURE: 145 MMHG | WEIGHT: 220.44 LBS | DIASTOLIC BLOOD PRESSURE: 85 MMHG | BODY MASS INDEX: 40.57 KG/M2 | TEMPERATURE: 98 F | OXYGEN SATURATION: 98 % | HEART RATE: 85 BPM | RESPIRATION RATE: 18 BRPM

## 2019-03-03 DIAGNOSIS — G43.909 MIGRAINE WITHOUT STATUS MIGRAINOSUS, NOT INTRACTABLE, UNSPECIFIED MIGRAINE TYPE: Primary | ICD-10-CM

## 2019-03-03 PROCEDURE — 99284 EMERGENCY DEPT VISIT MOD MDM: CPT

## 2019-03-03 RX ORDER — METOCLOPRAMIDE 10 MG/1
10 TABLET ORAL EVERY 6 HOURS
Qty: 30 TABLET | Refills: 0 | Status: SHIPPED | OUTPATIENT
Start: 2019-03-03 | End: 2019-05-28 | Stop reason: ALTCHOICE

## 2019-03-03 RX ORDER — BUTALBITAL, ACETAMINOPHEN AND CAFFEINE 50; 325; 40 MG/1; MG/1; MG/1
1 TABLET ORAL EVERY 4 HOURS PRN
Qty: 12 TABLET | Refills: 0 | Status: SHIPPED | OUTPATIENT
Start: 2019-03-03 | End: 2019-04-02

## 2019-03-03 NOTE — ED PROVIDER NOTES
"Encounter Date: 3/3/2019       History     Chief Complaint   Patient presents with    Headache     nausea, "hot flashes", emesis     The history is provided by the patient.   Headache    This is a new problem. The current episode started in the past 7 days. The problem occurs constantly. The problem has been unchanged. The pain is located in the right unilateral region. The pain does not radiate. The pain quality is similar to prior headaches. The quality of the pain is described as aching. The pain is at a severity of 4/10. Associated symptoms include phonophobia and photophobia. Pertinent negatives include no abdominal pain, abnormal behavior, anorexia, back pain, blurred vision, coughing, dizziness, drainage, ear pain, eye pain, eye redness, eye watering, facial sweating, fever, hearing loss, insomnia, loss of balance, muscle aches, nausea, neck pain, numbness, rhinorrhea, scalp tenderness, seizures, sinus pressure, sore throat, swollen glands, tingling, tinnitus, visual change, vomiting, weakness or weight loss. The symptoms are aggravated by noise and bright light. She has tried nothing for the symptoms. Her past medical history is significant for migraine headaches.     Review of patient's allergies indicates:   Allergen Reactions    Ibuprofen Other (See Comments)     Cannot take due to Blood Disorder     Past Medical History:   Diagnosis Date    Encounter for blood transfusion     Thrombocytopenia      Past Surgical History:   Procedure Laterality Date     SECTION      x1    XI ROBOTIC SPLENECTOMY N/A 2018    Performed by Yob[facility] Lara MD at [facility] OR     No family history on file.  Social History     Tobacco Use    Smoking status: Never Smoker    Smokeless tobacco: Never Used   Substance Use Topics    Alcohol use: No     Frequency: Never    Drug use: No     Review of Systems   Constitutional: Negative for chills, fever and weight loss.   HENT: Negative for ear pain, hearing loss, " rhinorrhea, sinus pressure, sore throat and tinnitus.    Eyes: Positive for photophobia. Negative for blurred vision, pain and redness.   Respiratory: Negative for cough and shortness of breath.    Cardiovascular: Negative for chest pain.   Gastrointestinal: Negative for abdominal pain, anorexia, diarrhea, nausea and vomiting.   Endocrine: Negative for polydipsia and polyphagia.   Genitourinary: Negative for dysuria.   Musculoskeletal: Negative for arthralgias, back pain, myalgias and neck pain.   Skin: Negative for rash.   Neurological: Positive for headaches. Negative for dizziness, tingling, seizures, weakness, numbness and loss of balance.   Hematological: Does not bruise/bleed easily.   Psychiatric/Behavioral: The patient is not nervous/anxious and does not have insomnia.    All other systems reviewed and are negative.      Physical Exam     Initial Vitals [03/03/19 0926]   BP Pulse Resp Temp SpO2   (!) 145/85 85 18 97.7 °F (36.5 °C) 98 %      MAP       --         Physical Exam    Nursing note and vitals reviewed.  Constitutional: Vital signs are normal. She appears well-developed and well-nourished. No distress.   HENT:   Head: Normocephalic and atraumatic.   Right Ear: External ear normal.   Left Ear: External ear normal.   Nose: Nose normal.   Mouth/Throat: Oropharynx is clear and moist.   Eyes: Conjunctivae, EOM and lids are normal. Pupils are equal, round, and reactive to light.   Neck: Normal range of motion and full passive range of motion without pain. Neck supple.   Cardiovascular: Normal rate, regular rhythm, S1 normal, S2 normal, normal heart sounds, intact distal pulses and normal pulses.   Pulmonary/Chest: Breath sounds normal. No respiratory distress. She has no wheezes. She has no rales.   Abdominal: Soft. Normal appearance and bowel sounds are normal. She exhibits no distension. There is no tenderness.   Musculoskeletal: Normal range of motion.   Lymphadenopathy:     She has no cervical  adenopathy.   Neurological: She is alert and oriented to person, place, and time. She has normal strength. No cranial nerve deficit or sensory deficit. Coordination and gait normal.   Skin: Skin is warm, dry and intact.   Psychiatric: She has a normal mood and affect. Her speech is normal and behavior is normal. Judgment and thought content normal. Cognition and memory are normal.         ED Course   Procedures  Labs Reviewed - No data to display       Imaging Results    None                               Clinical Impression:       ICD-10-CM ICD-9-CM   1. Migraine without status migrainosus, not intractable, unspecified migraine type G43.909 346.90         Disposition:   Disposition: Discharged  Condition: Stable                        VANESSA Armas  03/03/19 0934

## 2019-05-23 ENCOUNTER — TELEPHONE (OUTPATIENT)
Dept: PHARMACY | Facility: CLINIC | Age: 24
End: 2019-05-23

## 2019-05-23 ENCOUNTER — TELEPHONE (OUTPATIENT)
Dept: HEMATOLOGY/ONCOLOGY | Facility: CLINIC | Age: 24
End: 2019-05-23

## 2019-05-23 NOTE — TELEPHONE ENCOUNTER
Spoke with patient, scheduled labs and follow-up with Dr. Cota for next week at [facility]. Patient was scheduled too far out. Confirmed labs and follow-up and let her know that Dr. Cota will discuss promacta prescription with her at appointment.

## 2019-05-23 NOTE — TELEPHONE ENCOUNTER
----- Message from Heidi Wiggins PharmD sent at 5/23/2019  1:12 PM CDT -----  Regarding: Promacta - patient contacted  Ms. Miller reached back out to [facility] today to request a refill on her Promacta. We have not sent her a refill since October 2018 (one month supply) however she stated she restarted her Promacta in January. She was unable to verbalize how many doses she had missed or the reasoning why. Based on refill history she has only taken her Promacta for about one month over the past seven months. Her most recent labs were drawn in January.    Would you like for her to have labs prior to [facility] refilling her Promacta? Please let us know how to proceed!    Thank you,  Heidi Wiggins PharmD  [facility]  [phone]     ----- Message -----  From: Heidi Wiggins PharmD  Sent: 1/28/2019   2:27 PM  To: Manfred PortilloD, Milka Millan, ManfredD, #  Subject: Unable to Reach Patient                          Dr. Cota and Staff,    [facility] has been attempting to reach Ms. Miller regarding her Promacta refill. After numerous unsuccessful attempts, we are sending a postcard to the address on file. At this point in time, no further contact will be made from [facility] until patient responds to our mail correspondence.    If there is anything else we can do to further assist, please let us know.     Thanks,  Heidi Wiggins PharmD  [facility]  [phone]

## 2019-05-23 NOTE — TELEPHONE ENCOUNTER
Ms. Miller contacted [facility] today to request a refill on her Promacta. We have not sent her a refill since October 2018 (one month supply) however she stated she restarted her Promacta in January. She stated the reason we were unable to reach her in the past was because her phone was broken. She was unable to verbalize how many doses she had missed or the reasoning why. She stated she would adjust her Promacta dose based on platelets. However, I confirmed the patient is only receiving labs from [facility] and has not had labs since January.    Based on refill history she has only taken her Promacta for about one month over the past seven months. Her most recent labs were drawn in January. Informed her I would reach out to Dr. Cota' office to see if the patient should have labs prior to restarting or if we can go ahead and refill her medication. Will relay response to patient.

## 2019-05-28 ENCOUNTER — LAB VISIT (OUTPATIENT)
Dept: LAB | Facility: HOSPITAL | Age: 24
End: 2019-05-28
Attending: INTERNAL MEDICINE
Payer: MEDICAID

## 2019-05-28 ENCOUNTER — OFFICE VISIT (OUTPATIENT)
Dept: HEMATOLOGY/ONCOLOGY | Facility: CLINIC | Age: 24
End: 2019-05-28
Payer: MEDICAID

## 2019-05-28 VITALS
RESPIRATION RATE: 20 BRPM | DIASTOLIC BLOOD PRESSURE: 104 MMHG | SYSTOLIC BLOOD PRESSURE: 141 MMHG | BODY MASS INDEX: 40.57 KG/M2 | OXYGEN SATURATION: 100 % | HEIGHT: 62 IN | TEMPERATURE: 99 F | WEIGHT: 220.44 LBS | HEART RATE: 87 BPM

## 2019-05-28 DIAGNOSIS — D69.3 CHRONIC ITP (IDIOPATHIC THROMBOCYTOPENIA): Primary | ICD-10-CM

## 2019-05-28 DIAGNOSIS — Z90.81 H/O SPLENECTOMY: ICD-10-CM

## 2019-05-28 DIAGNOSIS — D69.3 CHRONIC ITP (IDIOPATHIC THROMBOCYTOPENIA): ICD-10-CM

## 2019-05-28 DIAGNOSIS — D50.0 IRON DEFICIENCY ANEMIA DUE TO CHRONIC BLOOD LOSS: ICD-10-CM

## 2019-05-28 LAB
BASOPHILS # BLD AUTO: 0.1 K/UL (ref 0–0.2)
BASOPHILS NFR BLD: 0.7 % (ref 0–1.9)
DACRYOCYTES BLD QL SMEAR: ABNORMAL
DIFFERENTIAL METHOD: ABNORMAL
EOSINOPHIL # BLD AUTO: 0.2 K/UL (ref 0–0.5)
EOSINOPHIL NFR BLD: 1.6 % (ref 0–8)
ERYTHROCYTE [DISTWIDTH] IN BLOOD BY AUTOMATED COUNT: 19 % (ref 11.5–14.5)
HCT VFR BLD AUTO: 26.8 % (ref 37–48.5)
HGB BLD-MCNC: 7.4 G/DL (ref 12–16)
HYPOCHROMIA BLD QL SMEAR: ABNORMAL
IMM GRANULOCYTES # BLD AUTO: 0.08 K/UL (ref 0–0.04)
IMM GRANULOCYTES NFR BLD AUTO: 0.6 % (ref 0–0.5)
LYMPHOCYTES # BLD AUTO: 3.7 K/UL (ref 1–4.8)
LYMPHOCYTES NFR BLD: 25.6 % (ref 18–48)
MCH RBC QN AUTO: 17.7 PG (ref 27–31)
MCHC RBC AUTO-ENTMCNC: 27.6 G/DL (ref 32–36)
MCV RBC AUTO: 64 FL (ref 82–98)
MONOCYTES # BLD AUTO: 1.3 K/UL (ref 0.3–1)
MONOCYTES NFR BLD: 9 % (ref 4–15)
NEUTROPHILS # BLD AUTO: 9 K/UL (ref 1.8–7.7)
NEUTROPHILS NFR BLD: 63.1 % (ref 38–73)
NRBC BLD-RTO: 1 /100 WBC
PLATELET # BLD AUTO: 6 K/UL (ref 150–350)
PLATELET BLD QL SMEAR: ABNORMAL
PMV BLD AUTO: ABNORMAL FL (ref 9.2–12.9)
RBC # BLD AUTO: 4.19 M/UL (ref 4–5.4)
SCHISTOCYTES BLD QL SMEAR: PRESENT
TARGETS BLD QL SMEAR: ABNORMAL
WBC # BLD AUTO: 14.26 K/UL (ref 3.9–12.7)

## 2019-05-28 PROCEDURE — 99213 OFFICE O/P EST LOW 20 MIN: CPT | Mod: PBBFAC | Performed by: INTERNAL MEDICINE

## 2019-05-28 PROCEDURE — 99214 OFFICE O/P EST MOD 30 MIN: CPT | Mod: S$PBB,,, | Performed by: INTERNAL MEDICINE

## 2019-05-28 PROCEDURE — 85025 COMPLETE CBC W/AUTO DIFF WBC: CPT

## 2019-05-28 PROCEDURE — 99214 PR OFFICE/OUTPT VISIT, EST, LEVL IV, 30-39 MIN: ICD-10-PCS | Mod: S$PBB,,, | Performed by: INTERNAL MEDICINE

## 2019-05-28 PROCEDURE — 99999 PR PBB SHADOW E&M-EST. PATIENT-LVL III: ICD-10-PCS | Mod: PBBFAC,,, | Performed by: INTERNAL MEDICINE

## 2019-05-28 PROCEDURE — 36415 COLL VENOUS BLD VENIPUNCTURE: CPT

## 2019-05-28 PROCEDURE — 99999 PR PBB SHADOW E&M-EST. PATIENT-LVL III: CPT | Mod: PBBFAC,,, | Performed by: INTERNAL MEDICINE

## 2019-05-28 NOTE — LETTER
May 28, 2019      [facility]  [address]  [place] [address]  Phone: [phone]  Fax: [phone]       Patient: Mari Miller   YOB: 1995  Date of Visit: 05/28/2019    To Whom It May Concern:    Hakeemarleen Vincent was at [facility] on 05/28/2019 with Mari Miller. He may return to work on 5/29/2019 without restrictions. If you have any questions or concerns, or if I can be of further assistance, please do not hesitate to contact me.    Sincerely,              Yanet ALMONTE LPN

## 2019-05-28 NOTE — PROGRESS NOTES
Subjective:       Patient ID: Mari Miller is a 23 y.o. female.    Chief Complaint: Results and Coagulation Disorder    HPI 23-year-old female history ITP status post splenectomy currently on Promacta but patient has right medication.  Patient returns for evaluation increasing fatigue and weakness some bruising    Past Medical History:   Diagnosis Date    Encounter for blood transfusion     Thrombocytopenia      History reviewed. No pertinent family history.  Social History     Socioeconomic History    Marital status: Single     Spouse name: Not on file    Number of children: Not on file    Years of education: Not on file    Highest education level: Not on file   Occupational History    Not on file   Social Needs    Financial resource strain: Not on file    Food insecurity:     Worry: Not on file     Inability: Not on file    Transportation needs:     Medical: Not on file     Non-medical: Not on file   Tobacco Use    Smoking status: Never Smoker    Smokeless tobacco: Never Used   Substance and Sexual Activity    Alcohol use: No     Frequency: Never    Drug use: No    Sexual activity: Not on file   Lifestyle    Physical activity:     Days per week: Not on file     Minutes per session: Not on file    Stress: Not on file   Relationships    Social connections:     Talks on phone: Not on file     Gets together: Not on file     Attends Baptism service: Not on file     Active member of club or organization: Not on file     Attends meetings of clubs or organizations: Not on file     Relationship status: Not on file   Other Topics Concern    Not on file   Social History Narrative    Not on file     Past Surgical History:   Procedure Laterality Date     SECTION      x1    XI ROBOTIC SPLENECTOMY N/A 2018    Performed by Yobani Lara MD at [facility] OR       Labs:  Lab Results   Component Value Date    WBC 14.26 (H) 2019    HGB 7.4 (L) 2019    HCT 26.8 (L) 2019    MCV 64  (L) 05/28/2019    PLT 6 (LL) 05/28/2019     BMP  Lab Results   Component Value Date     01/11/2019    K 4.1 01/11/2019     01/11/2019    CO2 20 (L) 01/11/2019    BUN 11 01/11/2019    CREATININE 0.8 01/11/2019    CALCIUM 9.1 01/11/2019    ANIONGAP 11 01/11/2019    ESTGFRAFRICA >60 01/11/2019    EGFRNONAA >60 01/11/2019     Lab Results   Component Value Date    ALT 12 01/11/2019    AST 14 01/11/2019    ALKPHOS 62 01/11/2019    BILITOT 0.3 01/11/2019       Lab Results   Component Value Date    IRON 44 12/18/2018    TIBC 423 12/18/2018    FERRITIN 16 (L) 12/18/2018     Lab Results   Component Value Date    [id number] 353 03/06/2018     Lab Results   Component Value Date    FOLATE 5.9 03/06/2018     No results found for: TSH      Review of Systems   Constitutional: Positive for fatigue. Negative for activity change, appetite change, chills, diaphoresis, fever and unexpected weight change.   HENT: Negative for congestion, dental problem, drooling, ear discharge, ear pain, facial swelling, hearing loss, mouth sores, nosebleeds, postnasal drip, rhinorrhea, sinus pressure, sneezing, sore throat, tinnitus, trouble swallowing and voice change.    Eyes: Negative for photophobia, pain, discharge, redness, itching and visual disturbance.   Respiratory: Negative for cough, choking, chest tightness, shortness of breath, wheezing and stridor.    Cardiovascular: Negative for chest pain, palpitations and leg swelling.   Gastrointestinal: Negative for abdominal distention, abdominal pain, anal bleeding, blood in stool, constipation, diarrhea, nausea, rectal pain and vomiting.   Endocrine: Negative for cold intolerance, heat intolerance, polydipsia, polyphagia and polyuria.   Genitourinary: Negative for decreased urine volume, difficulty urinating, dyspareunia, dysuria, enuresis, flank pain, frequency, genital sores, hematuria, menstrual problem, pelvic pain, urgency, vaginal bleeding, vaginal discharge and vaginal pain.    Musculoskeletal: Negative for arthralgias, back pain, gait problem, joint swelling, myalgias, neck pain and neck stiffness.   Skin: Negative for color change, pallor and rash.        Bruising on skin   Allergic/Immunologic: Negative for environmental allergies, food allergies and immunocompromised state.   Neurological: Positive for weakness. Negative for dizziness, tremors, seizures, syncope, facial asymmetry, speech difficulty, light-headedness, numbness and headaches.   Hematological: Negative for adenopathy. Does not bruise/bleed easily.   Psychiatric/Behavioral: Positive for dysphoric mood. Negative for agitation, behavioral problems, confusion, decreased concentration, hallucinations, self-injury, sleep disturbance and suicidal ideas. The patient is nervous/anxious. The patient is not hyperactive.        Objective:      Physical Exam   Constitutional: She is oriented to person, place, and time. She appears well-developed and well-nourished. She appears distressed.   HENT:   Head: Normocephalic and atraumatic.   Right Ear: External ear normal.   Left Ear: External ear normal.   Nose: Nose normal. Right sinus exhibits no maxillary sinus tenderness and no frontal sinus tenderness. Left sinus exhibits no maxillary sinus tenderness and no frontal sinus tenderness.   Mouth/Throat: Oropharynx is clear and moist. No oropharyngeal exudate.   Eyes: Pupils are equal, round, and reactive to light. Conjunctivae, EOM and lids are normal. Right eye exhibits no discharge. Left eye exhibits no discharge. Right conjunctiva is not injected. Right conjunctiva has no hemorrhage. Left conjunctiva is not injected. Left conjunctiva has no hemorrhage. No scleral icterus.   Neck: Normal range of motion. Neck supple. No JVD present. No tracheal deviation present. No thyromegaly present.   Cardiovascular: Normal rate and regular rhythm.   Pulmonary/Chest: Effort normal. No stridor. No respiratory distress. She exhibits no tenderness.    Abdominal: Soft. She exhibits no distension and no mass. There is no splenomegaly or hepatomegaly. There is no tenderness. There is no rebound.   Musculoskeletal: Normal range of motion. She exhibits no edema or tenderness.   Lymphadenopathy:     She has no cervical adenopathy.     She has no axillary adenopathy.        Right: No supraclavicular adenopathy present.        Left: No supraclavicular adenopathy present.   Neurological: She is alert and oriented to person, place, and time. No cranial nerve deficit. Coordination normal.   Skin: Skin is dry. No rash noted. She is not diaphoretic. No erythema.   Ecchymoses on hand   Psychiatric: She has a normal mood and affect. Her behavior is normal. Judgment and thought content normal.   Vitals reviewed.          Assessment:      1. Chronic ITP (idiopathic thrombocytopenia)    2. H/O splenectomy    3. Iron deficiency anemia due to chronic blood loss           Plan:   At this point patient has Nubia Promacta which has done well for her refill prescription through [facility] return to 3 weeks nurse practitioner myself CBC iron status to see whether not intravenous iron is warranted.  Strongly urged to maintain on Promacta in definitely patient request OB GYN evaluation general medical GYN care        Nash Cota Jr, MD FACP

## 2019-05-29 NOTE — TELEPHONE ENCOUNTER
Promacta follow up consult completed on . Promacta will be shipped on  to arrive at patient's home on  via [employer]. Patient plans to start Promacta on . Address confirmed, CC on file. Confirmed 2 patient identifiers - name and . Therapy Appropriate.    Patient was counseled on the administration directions:  -Take 2 tablets (50 mg) by mouth once daily on an empty stomach, 1 hour before or 2 hours after a meal.    - Do not administer concurrently with antacids, foods high in calcium, or minerals (eg, iron, calcium, aluminum, magnesium, selenium, zinc); administer eltrombopag at least 2 hours before and 4 hours after. Do not administer more than one dose within 24 hours.     Patient was counseled on the following possible side effects, which include, but are not limited to: nausea/vomiting; diarrhea, fatigue, headache, hepatotoxicity; upper respiratory infection; muscle aches/pains; rash; urinary tract infections.  Patient was specifically advised regarding the s/sx of blood clots which can occur when platelets get too high    Discussed importance of compliance with medication, labs, and appts. Patient reports she stopped taking her Promacta after her splenectomy and is now having creased bruising (plts 6k)    DDI: No other medications being taken    [facility] to call in 1 week after start date and periodically for f/u.

## 2019-05-31 ENCOUNTER — TELEPHONE (OUTPATIENT)
Dept: OBSTETRICS AND GYNECOLOGY | Facility: CLINIC | Age: 24
End: 2019-05-31

## 2019-06-11 ENCOUNTER — LAB VISIT (OUTPATIENT)
Dept: LAB | Facility: HOSPITAL | Age: 24
End: 2019-06-11
Attending: INTERNAL MEDICINE
Payer: MEDICAID

## 2019-06-11 ENCOUNTER — TELEPHONE (OUTPATIENT)
Dept: PHARMACY | Facility: CLINIC | Age: 24
End: 2019-06-11

## 2019-06-11 ENCOUNTER — OFFICE VISIT (OUTPATIENT)
Dept: HEMATOLOGY/ONCOLOGY | Facility: CLINIC | Age: 24
End: 2019-06-11
Payer: MEDICAID

## 2019-06-11 VITALS
TEMPERATURE: 99 F | WEIGHT: 213.38 LBS | HEIGHT: 62 IN | BODY MASS INDEX: 39.27 KG/M2 | SYSTOLIC BLOOD PRESSURE: 139 MMHG | DIASTOLIC BLOOD PRESSURE: 90 MMHG | HEART RATE: 105 BPM | RESPIRATION RATE: 18 BRPM | OXYGEN SATURATION: 98 %

## 2019-06-11 DIAGNOSIS — D50.0 IRON DEFICIENCY ANEMIA DUE TO CHRONIC BLOOD LOSS: Primary | ICD-10-CM

## 2019-06-11 DIAGNOSIS — D69.3 CHRONIC ITP (IDIOPATHIC THROMBOCYTOPENIA): ICD-10-CM

## 2019-06-11 DIAGNOSIS — D50.0 IRON DEFICIENCY ANEMIA DUE TO CHRONIC BLOOD LOSS: ICD-10-CM

## 2019-06-11 LAB
ALBUMIN SERPL BCP-MCNC: 3.8 G/DL (ref 3.5–5.2)
ALP SERPL-CCNC: 62 U/L (ref 55–135)
ALT SERPL W/O P-5'-P-CCNC: 11 U/L (ref 10–44)
ANION GAP SERPL CALC-SCNC: 9 MMOL/L (ref 8–16)
AST SERPL-CCNC: 13 U/L (ref 10–40)
BASOPHILS # BLD AUTO: 0.05 K/UL (ref 0–0.2)
BASOPHILS NFR BLD: 0.5 % (ref 0–1.9)
BILIRUB SERPL-MCNC: 0.2 MG/DL (ref 0.1–1)
BUN SERPL-MCNC: 10 MG/DL (ref 6–20)
CALCIUM SERPL-MCNC: 9.5 MG/DL (ref 8.7–10.5)
CHLORIDE SERPL-SCNC: 108 MMOL/L (ref 95–110)
CO2 SERPL-SCNC: 21 MMOL/L (ref 23–29)
CREAT SERPL-MCNC: 0.7 MG/DL (ref 0.5–1.4)
DACRYOCYTES BLD QL SMEAR: ABNORMAL
DIFFERENTIAL METHOD: ABNORMAL
EOSINOPHIL # BLD AUTO: 0.1 K/UL (ref 0–0.5)
EOSINOPHIL NFR BLD: 1.2 % (ref 0–8)
ERYTHROCYTE [DISTWIDTH] IN BLOOD BY AUTOMATED COUNT: 19.8 % (ref 11.5–14.5)
EST. GFR  (AFRICAN AMERICAN): >60 ML/MIN/1.73 M^2
EST. GFR  (NON AFRICAN AMERICAN): >60 ML/MIN/1.73 M^2
GIANT PLATELETS BLD QL SMEAR: PRESENT
GLUCOSE SERPL-MCNC: 98 MG/DL (ref 70–110)
HCT VFR BLD AUTO: 28.7 % (ref 37–48.5)
HGB BLD-MCNC: 7.6 G/DL (ref 12–16)
HYPOCHROMIA BLD QL SMEAR: ABNORMAL
IMM GRANULOCYTES # BLD AUTO: 0.06 K/UL (ref 0–0.04)
IMM GRANULOCYTES NFR BLD AUTO: 0.6 % (ref 0–0.5)
LYMPHOCYTES # BLD AUTO: 2.4 K/UL (ref 1–4.8)
LYMPHOCYTES NFR BLD: 24 % (ref 18–48)
MCH RBC QN AUTO: 17 PG (ref 27–31)
MCHC RBC AUTO-ENTMCNC: 26.5 G/DL (ref 32–36)
MCV RBC AUTO: 64 FL (ref 82–98)
MONOCYTES # BLD AUTO: 1.5 K/UL (ref 0.3–1)
MONOCYTES NFR BLD: 14.4 % (ref 4–15)
NEUTROPHILS # BLD AUTO: 6.1 K/UL (ref 1.8–7.7)
NEUTROPHILS NFR BLD: 59.9 % (ref 38–73)
NRBC BLD-RTO: 1 /100 WBC
PLATELET # BLD AUTO: 7 K/UL (ref 150–350)
PLATELET BLD QL SMEAR: ABNORMAL
PMV BLD AUTO: ABNORMAL FL (ref 9.2–12.9)
POLYCHROMASIA BLD QL SMEAR: ABNORMAL
POTASSIUM SERPL-SCNC: 3.9 MMOL/L (ref 3.5–5.1)
PROT SERPL-MCNC: 7.5 G/DL (ref 6–8.4)
RBC # BLD AUTO: 4.47 M/UL (ref 4–5.4)
SCHISTOCYTES BLD QL SMEAR: PRESENT
SODIUM SERPL-SCNC: 138 MMOL/L (ref 136–145)
STOMATOCYTES BLD QL SMEAR: PRESENT
TARGETS BLD QL SMEAR: ABNORMAL
WBC # BLD AUTO: 10.15 K/UL (ref 3.9–12.7)

## 2019-06-11 PROCEDURE — 99999 PR PBB SHADOW E&M-EST. PATIENT-LVL III: CPT | Mod: PBBFAC,,, | Performed by: NURSE PRACTITIONER

## 2019-06-11 PROCEDURE — 99214 PR OFFICE/OUTPT VISIT, EST, LEVL IV, 30-39 MIN: ICD-10-PCS | Mod: S$PBB,,, | Performed by: NURSE PRACTITIONER

## 2019-06-11 PROCEDURE — 80053 COMPREHEN METABOLIC PANEL: CPT

## 2019-06-11 PROCEDURE — 85025 COMPLETE CBC W/AUTO DIFF WBC: CPT

## 2019-06-11 PROCEDURE — 99214 OFFICE O/P EST MOD 30 MIN: CPT | Mod: S$PBB,,, | Performed by: NURSE PRACTITIONER

## 2019-06-11 PROCEDURE — 83540 ASSAY OF IRON: CPT

## 2019-06-11 PROCEDURE — 99213 OFFICE O/P EST LOW 20 MIN: CPT | Mod: PBBFAC | Performed by: NURSE PRACTITIONER

## 2019-06-11 PROCEDURE — 82728 ASSAY OF FERRITIN: CPT

## 2019-06-11 PROCEDURE — 36415 COLL VENOUS BLD VENIPUNCTURE: CPT

## 2019-06-11 PROCEDURE — 99999 PR PBB SHADOW E&M-EST. PATIENT-LVL III: ICD-10-PCS | Mod: PBBFAC,,, | Performed by: NURSE PRACTITIONER

## 2019-06-11 NOTE — ASSESSMENT & PLAN NOTE
Iron levels drawn today. F/u replete PRN. Patient reports absent menses for the past several months. If she remains iron deficient may consider repletion and monitoring versus endoscopies. Will follow up results

## 2019-06-11 NOTE — TELEPHONE ENCOUNTER
7 day post start touchbase: LVM for Promacta f/u. Patient had appt today and appears to be starting therapy. RPh will f/u at 1st refill

## 2019-06-11 NOTE — PROGRESS NOTES
Subjective:       Patient ID: Mari Miller is a 23 y.o. female.    Chief Complaint: ITP    HPI: 23 y.o female with ITP unresponsive to Rituxan. Patient currently taking Promacta for treatment of ITP. Patient was seen by Dr. Cota in 1/2019 with recommended 1 week follow up. Patient was recommended to take Promacta daily. However patient was lost to follow up for unknown reasons. Patient presented back to Heme-Onc clinic 5/28/19 with c/o fatigue and increased bruising. Patient had not been taken Promacta as she had run out of her prescription. Platelet count 6 on 5/28/19. Patient was also found to be anemic with hemoglobin 7.4. She presents today for follow up of this to determine if she has iron deficiency. However patient has not yet had labs drawn. Would like to have labs drawn on the way out and follow up via telephone or patient portal. She denies CP, dizziness, SOB, lightheadedness, hematuria, hematochezia, hemoptysis, epistaxis, bowel or urinary complaints, bruising.   Social History     Socioeconomic History    Marital status: Single     Spouse name: Not on file    Number of children: Not on file    Years of education: Not on file    Highest education level: Not on file   Occupational History    Not on file   Social Needs    Financial resource strain: Not on file    Food insecurity:     Worry: Not on file     Inability: Not on file    Transportation needs:     Medical: Not on file     Non-medical: Not on file   Tobacco Use    Smoking status: Never Smoker    Smokeless tobacco: Never Used   Substance and Sexual Activity    Alcohol use: No     Frequency: Never    Drug use: No    Sexual activity: Not on file   Lifestyle    Physical activity:     Days per week: Not on file     Minutes per session: Not on file    Stress: Not on file   Relationships    Social connections:     Talks on phone: Not on file     Gets together: Not on file     Attends Alevism service: Not on file     Active member  of club or organization: Not on file     Attends meetings of clubs or organizations: Not on file     Relationship status: Not on file   Other Topics Concern    Not on file   Social History Narrative    Not on file       Past Medical History:   Diagnosis Date    Encounter for blood transfusion     Thrombocytopenia        No family history on file.    Past Surgical History:   Procedure Laterality Date     SECTION      x1    XI ROBOTIC SPLENECTOMY N/A 2018    Performed by Yobani Lara MD at [facility] OR       Review of Systems   Constitutional: Negative for activity change, appetite change, chills, diaphoresis, fatigue, fever and unexpected weight change.   HENT: Negative for congestion, mouth sores, nosebleeds, sore throat, trouble swallowing and voice change.    Eyes: Negative for photophobia and visual disturbance.   Respiratory: Negative for cough, chest tightness, shortness of breath and wheezing.    Cardiovascular: Negative for chest pain, palpitations and leg swelling.   Gastrointestinal: Negative for abdominal distention, abdominal pain, blood in stool, constipation, diarrhea, nausea and vomiting.   Genitourinary: Negative for difficulty urinating, dysuria and hematuria.   Musculoskeletal: Negative for arthralgias, back pain and myalgias.   Skin: Negative for pallor, rash and wound.   Neurological: Negative for dizziness, syncope, weakness and headaches.   Hematological: Negative for adenopathy. Bruises/bleeds easily.   Psychiatric/Behavioral: The patient is not nervous/anxious.          Medication List with Changes/Refills   Current Medications    ELTROMBOPAG (PROMACTA) 25 MG TAB    Take 2 tablets (50 mg total) by mouth once daily.     Objective:     Vitals:    19 1407   BP: (!) 139/90   Pulse: 105   Resp: 18   Temp: 98.5 °F (36.9 °C)     Lab Results   Component Value Date    WBC 10.15 2019    HGB 7.6 (L) 2019    HCT 28.7 (L) 2019    MCV 64 (L) 2019    PLT 7 (LL)  06/11/2019     Lab Results   Component Value Date    IRON 44 12/18/2018    TIBC 423 12/18/2018    FERRITIN 16 (L) 12/18/2018       Physical Exam   Constitutional: She is oriented to person, place, and time. She appears well-developed and well-nourished. She is cooperative.   HENT:   Head: Normocephalic.   Right Ear: Hearing normal. No drainage.   Left Ear: Hearing normal. No drainage.   Nose: Nose normal.   Mouth/Throat: Oropharynx is clear and moist.   Eyes: Conjunctivae, EOM and lids are normal. Right eye exhibits no discharge. Left eye exhibits no discharge. No scleral icterus.   Neck: Normal range of motion. No thyroid mass present.   Cardiovascular: Normal rate, regular rhythm and normal heart sounds.   No murmur heard.  Pulmonary/Chest: Effort normal and breath sounds normal. No respiratory distress. She has no wheezes. She has no rhonchi. She has no rales.   Abdominal: Soft. Bowel sounds are normal. She exhibits no distension. There is no tenderness.   Genitourinary:   Genitourinary Comments: deferred   Musculoskeletal: Normal range of motion. She exhibits no edema.   Lymphadenopathy:        Head (right side): No submandibular, no preauricular and no posterior auricular adenopathy present.        Head (left side): No submandibular, no preauricular and no posterior auricular adenopathy present.        Right cervical: No superficial cervical adenopathy present.       Left cervical: No superficial cervical adenopathy present.   Neurological: She is alert and oriented to person, place, and time.   Skin: Skin is warm, dry and intact.   Psychiatric: She has a normal mood and affect. Her speech is normal and behavior is normal. Thought content normal.   Vitals reviewed.       Assessment:     Problem List Items Addressed This Visit        Hematology    Chronic ITP (idiopathic thrombocytopenia)     CBC today. Patient would like to be called with results. She states if she were to need a platelet or blood transfusion  she would need to bring her daughter to family/friends first. Continue Promacta 50 mg PO daily. Continue weekly f/u with CBC pending stabilization of labs            Oncology    Iron deficiency anemia     Iron levels drawn today. F/u replete PRN. Patient reports absent menses for the past several months. If she remains iron deficient may consider repletion and monitoring versus endoscopies. Will follow up results                 Plan:     Chronic ITP (idiopathic thrombocytopenia)    Iron deficiency anemia due to chronic blood loss          I will review assessment/plan with collaborating physician     BELA Acevedo

## 2019-06-11 NOTE — ASSESSMENT & PLAN NOTE
CBC today. Patient would like to be called with results. She states if she were to need a platelet or blood transfusion she would need to bring her daughter to family/friends first. Continue Promacta 50 mg PO daily.    F/U 2 weeks with CBC

## 2019-06-12 DIAGNOSIS — D69.3 CHRONIC ITP (IDIOPATHIC THROMBOCYTOPENIA): Primary | ICD-10-CM

## 2019-06-12 LAB
FERRITIN SERPL-MCNC: 4 NG/ML (ref 20–300)
IRON SERPL-MCNC: 13 UG/DL (ref 30–160)
SATURATED IRON: 3 % (ref 20–50)
TOTAL IRON BINDING CAPACITY: 480 UG/DL (ref 250–450)
TRANSFERRIN SERPL-MCNC: 324 MG/DL (ref 200–375)

## 2019-06-14 ENCOUNTER — TELEPHONE (OUTPATIENT)
Dept: HEMATOLOGY/ONCOLOGY | Facility: CLINIC | Age: 24
End: 2019-06-14

## 2019-06-14 RX ORDER — SODIUM CHLORIDE 0.9 % (FLUSH) 0.9 %
10 SYRINGE (ML) INJECTION
Status: CANCELLED | OUTPATIENT
Start: 2019-06-14

## 2019-06-14 RX ORDER — HEPARIN 100 UNIT/ML
5 SYRINGE INTRAVENOUS
Status: CANCELLED | OUTPATIENT
Start: 2019-06-14

## 2019-06-14 RX ORDER — SODIUM CHLORIDE 9 MG/ML
INJECTION, SOLUTION INTRAVENOUS CONTINUOUS
Status: CANCELLED | OUTPATIENT
Start: 2019-06-14

## 2019-06-14 NOTE — TELEPHONE ENCOUNTER
----- Message from Sophy Das NP sent at 6/14/2019  3:50 PM CDT -----  Will you please schedule this patient for IV injectafer x 2. If she can receive the first dose 6/18, she can receive dose two at f/u with me 6/25. Thank you

## 2019-06-19 ENCOUNTER — HOSPITAL ENCOUNTER (INPATIENT)
Facility: HOSPITAL | Age: 24
LOS: 2 days | Discharge: HOME | DRG: 812 | End: 2019-06-21
Attending: EMERGENCY MEDICINE | Admitting: FAMILY MEDICINE
Payer: MEDICAID

## 2019-06-19 DIAGNOSIS — D50.0 IRON DEFICIENCY ANEMIA DUE TO CHRONIC BLOOD LOSS: ICD-10-CM

## 2019-06-19 DIAGNOSIS — D69.3 CHRONIC ITP (IDIOPATHIC THROMBOCYTOPENIA): Primary | ICD-10-CM

## 2019-06-19 DIAGNOSIS — R07.9 CHEST PAIN: ICD-10-CM

## 2019-06-19 DIAGNOSIS — D69.6 THROMBOCYTOPENIA: ICD-10-CM

## 2019-06-19 DIAGNOSIS — D64.9 ANEMIA: ICD-10-CM

## 2019-06-19 PROBLEM — R03.0 ELEVATED BP WITHOUT DIAGNOSIS OF HYPERTENSION: Status: ACTIVE | Noted: 2019-06-19

## 2019-06-19 PROBLEM — R00.0 TACHYCARDIA: Status: ACTIVE | Noted: 2019-06-19

## 2019-06-19 PROBLEM — D72.829 LEUKOCYTOSIS: Status: ACTIVE | Noted: 2019-06-19

## 2019-06-19 LAB
ABO + RH BLD: NORMAL
ALBUMIN SERPL BCP-MCNC: 3.5 G/DL (ref 3.5–5.2)
ALP SERPL-CCNC: 45 U/L (ref 55–135)
ALT SERPL W/O P-5'-P-CCNC: 65 U/L (ref 10–44)
ANION GAP SERPL CALC-SCNC: 10 MMOL/L (ref 8–16)
AST SERPL-CCNC: 45 U/L (ref 10–40)
B-HCG UR QL: NEGATIVE
BASOPHILS # BLD AUTO: 0.06 K/UL (ref 0–0.2)
BASOPHILS NFR BLD: 0.2 % (ref 0–1.9)
BILIRUB SERPL-MCNC: 0.3 MG/DL (ref 0.1–1)
BILIRUB UR QL STRIP: NEGATIVE
BLD GP AB SCN CELLS X3 SERPL QL: NORMAL
BUN SERPL-MCNC: 15 MG/DL (ref 6–20)
CALCIUM SERPL-MCNC: 8.6 MG/DL (ref 8.7–10.5)
CHLORIDE SERPL-SCNC: 107 MMOL/L (ref 95–110)
CLARITY UR: CLEAR
CO2 SERPL-SCNC: 19 MMOL/L (ref 23–29)
COLOR UR: YELLOW
CREAT SERPL-MCNC: 0.7 MG/DL (ref 0.5–1.4)
DACRYOCYTES BLD QL SMEAR: ABNORMAL
DIFFERENTIAL METHOD: ABNORMAL
EOSINOPHIL # BLD AUTO: 0 K/UL (ref 0–0.5)
EOSINOPHIL NFR BLD: 0 % (ref 0–8)
ERYTHROCYTE [DISTWIDTH] IN BLOOD BY AUTOMATED COUNT: 20.7 % (ref 11.5–14.5)
EST. GFR  (AFRICAN AMERICAN): >60 ML/MIN/1.73 M^2
EST. GFR  (NON AFRICAN AMERICAN): >60 ML/MIN/1.73 M^2
GLUCOSE SERPL-MCNC: 122 MG/DL (ref 70–110)
GLUCOSE UR QL STRIP: NEGATIVE
HCG INTACT+B SERPL-ACNC: <1.2 MIU/ML
HCT VFR BLD AUTO: 12.6 % (ref 37–48.5)
HGB BLD-MCNC: 3.4 G/DL (ref 12–16)
HGB UR QL STRIP: NEGATIVE
HYPOCHROMIA BLD QL SMEAR: ABNORMAL
KETONES UR QL STRIP: ABNORMAL
LEUKOCYTE ESTERASE UR QL STRIP: NEGATIVE
LYMPHOCYTES # BLD AUTO: 2.9 K/UL (ref 1–4.8)
LYMPHOCYTES NFR BLD: 8.8 % (ref 18–48)
MCH RBC QN AUTO: 17.7 PG (ref 27–31)
MCHC RBC AUTO-ENTMCNC: 27 G/DL (ref 32–36)
MCV RBC AUTO: 66 FL (ref 82–98)
MONOCYTES # BLD AUTO: 2 K/UL (ref 0.3–1)
MONOCYTES NFR BLD: 5.9 % (ref 4–15)
NEUTROPHILS # BLD AUTO: 28.2 K/UL (ref 1.8–7.7)
NEUTROPHILS NFR BLD: 85.1 % (ref 38–73)
NITRITE UR QL STRIP: NEGATIVE
PH UR STRIP: 6 [PH] (ref 5–8)
PLATELET # BLD AUTO: 7 K/UL (ref 150–350)
PLATELET BLD QL SMEAR: ABNORMAL
PMV BLD AUTO: ABNORMAL FL (ref 9.2–12.9)
POLYCHROMASIA BLD QL SMEAR: ABNORMAL
POTASSIUM SERPL-SCNC: 3.4 MMOL/L (ref 3.5–5.1)
PROT SERPL-MCNC: 6.3 G/DL (ref 6–8.4)
PROT UR QL STRIP: NEGATIVE
RBC # BLD AUTO: 1.92 M/UL (ref 4–5.4)
SCHISTOCYTES BLD QL SMEAR: PRESENT
SODIUM SERPL-SCNC: 136 MMOL/L (ref 136–145)
SP GR UR STRIP: 1.01 (ref 1–1.03)
TARGETS BLD QL SMEAR: ABNORMAL
URN SPEC COLLECT METH UR: ABNORMAL
UROBILINOGEN UR STRIP-ACNC: NEGATIVE EU/DL
WBC # BLD AUTO: 33.17 K/UL (ref 3.9–12.7)

## 2019-06-19 PROCEDURE — 93010 ELECTROCARDIOGRAM REPORT: CPT | Mod: ,,, | Performed by: INTERNAL MEDICINE

## 2019-06-19 PROCEDURE — 36415 COLL VENOUS BLD VENIPUNCTURE: CPT

## 2019-06-19 PROCEDURE — 85025 COMPLETE CBC W/AUTO DIFF WBC: CPT

## 2019-06-19 PROCEDURE — 84702 CHORIONIC GONADOTROPIN TEST: CPT

## 2019-06-19 PROCEDURE — 80053 COMPREHEN METABOLIC PANEL: CPT

## 2019-06-19 PROCEDURE — 86870 RBC ANTIBODY IDENTIFICATION: CPT

## 2019-06-19 PROCEDURE — 81003 URINALYSIS AUTO W/O SCOPE: CPT

## 2019-06-19 PROCEDURE — 86880 COOMBS TEST DIRECT: CPT

## 2019-06-19 PROCEDURE — 25000003 PHARM REV CODE 250: Performed by: EMERGENCY MEDICINE

## 2019-06-19 PROCEDURE — 86901 BLOOD TYPING SEROLOGIC RH(D): CPT

## 2019-06-19 PROCEDURE — 99285 EMERGENCY DEPT VISIT HI MDM: CPT

## 2019-06-19 PROCEDURE — 81025 URINE PREGNANCY TEST: CPT

## 2019-06-19 PROCEDURE — 96360 HYDRATION IV INFUSION INIT: CPT

## 2019-06-19 PROCEDURE — 86922 COMPATIBILITY TEST ANTIGLOB: CPT

## 2019-06-19 PROCEDURE — 93010 EKG 12-LEAD: ICD-10-PCS | Mod: ,,, | Performed by: INTERNAL MEDICINE

## 2019-06-19 PROCEDURE — 93005 ELECTROCARDIOGRAM TRACING: CPT

## 2019-06-19 PROCEDURE — 21400001 HC TELEMETRY ROOM

## 2019-06-19 RX ORDER — HYDROCODONE BITARTRATE AND ACETAMINOPHEN 500; 5 MG/1; MG/1
TABLET ORAL
Status: DISCONTINUED | OUTPATIENT
Start: 2019-06-19 | End: 2019-06-21 | Stop reason: HOSPADM

## 2019-06-19 RX ORDER — PANTOPRAZOLE SODIUM 40 MG/1
40 TABLET, DELAYED RELEASE ORAL DAILY
Status: DISCONTINUED | OUTPATIENT
Start: 2019-06-20 | End: 2019-06-21 | Stop reason: HOSPADM

## 2019-06-19 RX ORDER — FERROUS SULFATE 325(65) MG
325 TABLET, DELAYED RELEASE (ENTERIC COATED) ORAL DAILY
Status: DISCONTINUED | OUTPATIENT
Start: 2019-06-20 | End: 2019-06-19

## 2019-06-19 RX ADMIN — SODIUM CHLORIDE 1000 ML: 0.9 INJECTION, SOLUTION INTRAVENOUS at 07:06

## 2019-06-19 NOTE — ED NOTES
Patient placed in a gown at this time. Placed on continuous cardiac monitor, automatic blood pressure cuff and continuous pulse oximeter. Family member at bedside.

## 2019-06-19 NOTE — ED PROVIDER NOTES
SCRIBE #1 NOTE: I, Jason Knight, am scribing for, and in the presence of, Lisa Moreno Do, MD. I have scribed the entire note.      History      Chief Complaint   Patient presents with    Shortness of Breath     patient with SOB and weakness for 3 days       Review of patient's allergies indicates:   Allergen Reactions    Aspirin      Unable to take because of illness    Ibuprofen Other (See Comments)     Cannot take due to Blood Disorder        HPI   HPI    2019, 6:14 PM   History obtained from the patient      History of Present Illness: Mari Miller is a 23 y.o. female patient with a PMHx of thrombocytopenia who presents to the Emergency Department for SOB, onset 3 days PTA after starting her menstrual period. Symptoms are constant and moderate in severity. No mitigating or exacerbating factors reported. Associated sxs include generalized weakness. Patient denies any fever, chills, n/v, CP, weakness, numbness, headache, dizziness, and all other sxs at this time. No prior Tx reported. No further complaints or concerns at this time.     Arrival mode: EMS    PCP: Ifeanyi Tello NP       Past Medical History:  Past Medical History:   Diagnosis Date    Encounter for blood transfusion     Thrombocytopenia        Past Surgical History:  Past Surgical History:   Procedure Laterality Date     SECTION      x1    XI ROBOTIC SPLENECTOMY N/A 2018    Performed by Yobani Lara MD at [facility] OR         Family History:  History reviewed. No pertinent family history.     Social History:  Social History     Tobacco Use    Smoking status: Never Smoker    Smokeless tobacco: Never Used   Substance and Sexual Activity    Alcohol use: No     Frequency: Never    Drug use: No    Sexual activity: Unknown       ROS   Review of Systems   Constitutional: Negative for chills, diaphoresis, fatigue and fever.   HENT: Negative for sore throat.    Respiratory: Positive for shortness of breath.     Cardiovascular: Negative for chest pain.   Gastrointestinal: Negative for abdominal pain, nausea and vomiting.   Genitourinary: Negative for dysuria.   Musculoskeletal: Negative for back pain.   Skin: Negative for rash and wound.   Neurological: Positive for weakness (generalized).   Hematological: Does not bruise/bleed easily.   All other systems reviewed and are negative.    Physical Exam      Initial Vitals [06/19/19 1750]   BP Pulse Resp Temp SpO2   (!) 115/56 (!) 144 20 98.6 °F (37 °C) 100 %      MAP       --          Physical Exam  Nursing Notes and Vital Signs Reviewed.  Constitutional: Patient is in no acute distress. Well-developed and well-nourished.  Head: Atraumatic. Normocephalic.  Eyes: PERRL. EOM intact. Conjunctivae are not pale. Scleral icterus present.  ENT: Mucous membranes are moist. Oropharynx is clear and symmetric. White tongue.  Neck: Supple. Full ROM. No lymphadenopathy.  Cardiovascular: Tachycardic. Regular rhythm. No murmurs, rubs, or gallops. Distal pulses are 2+ and symmetric.  Pulmonary/Chest: No respiratory distress. Clear to auscultation bilaterally. No wheezing or rales.  Abdominal: Soft and non-distended.  There is no tenderness.  No rebound, guarding, or rigidity. Good bowel sounds.  Genitourinary: No CVA tenderness  Musculoskeletal: Moves all extremities. No obvious deformities. No edema. No calf tenderness. Pallor to bilateral nails beds, cap refill 4-5 seconds.  Skin: Warm and dry.  Neurological:  Alert, awake, and appropriate.  Normal speech.  No acute focal neurological deficits are appreciated.  Psychiatric: Normal affect. Good eye contact. Appropriate in content.    ED Course    Critical Care  Date/Time: 6/19/2019 8:10 PM  Performed by: Lisa Moreno Do, MD  Authorized by: Lisa Moreno Do, MD   Direct patient critical care time: 10 minutes  Additional history critical care time: 5 minutes  Ordering / reviewing critical care time: 5 minutes  Documentation critical care  "time: 5 minutes  Consulting other physicians critical care time: 5 minutes  Consult with family critical care time: 5 minutes  Total critical care time (exclusive of procedural time) : 35 minutes  Critical care time was exclusive of separately billable procedures and treating other patients and teaching time.  Critical care was necessary to treat or prevent imminent or life-threatening deterioration of the following conditions: Anemia, SOB.  Critical care was time spent personally by me on the following activities: blood draw for specimens, development of treatment plan with patient or surrogate, discussions with consultants, interpretation of cardiac output measurements, evaluation of patient's response to treatment, examination of patient, obtaining history from patient or surrogate, ordering and performing treatments and interventions, ordering and review of laboratory studies, ordering and review of radiographic studies, pulse oximetry and re-evaluation of patient's condition.        ED Vital Signs:  Vitals:    06/19/19 1750 06/19/19 1751 06/19/19 1803 06/19/19 1806   BP: (!) 115/56 (!) 115/56 132/65    Pulse: (!) 144 (!) 132 (!) 129 (!) 125   Resp: 20 (!) 21     Temp: 98.6 °F (37 °C)      TempSrc: Oral      SpO2: 100% 100% 100%    Weight: 96.3 kg (212 lb 6.4 oz)      Height: 5' 2" (1.575 m)          Abnormal Lab Results:  Labs Reviewed   CBC W/ AUTO DIFFERENTIAL - Abnormal; Notable for the following components:       Result Value    WBC 33.17 (*)     RBC 1.92 (*)     Hemoglobin 3.4 (*)     Hematocrit 12.6 (*)     Mean Corpuscular Volume 66 (*)     Mean Corpuscular Hemoglobin 17.7 (*)     Mean Corpuscular Hemoglobin Conc 27.0 (*)     RDW 20.7 (*)     Platelets 7 (*)     Gran # (ANC) 28.2 (*)     Mono # 2.0 (*)     Gran% 85.1 (*)     Lymph% 8.8 (*)     Platelet Estimate Decreased (*)     All other components within normal limits    Narrative:      HGB, HCT, & PLT critical result(s) called and verbal readback "   obtained from Terry torre, 06/19/2019 19:44   COMPREHENSIVE METABOLIC PANEL - Abnormal; Notable for the following components:    Potassium 3.4 (*)     CO2 19 (*)     Glucose 122 (*)     Calcium 8.6 (*)     Alkaline Phosphatase 45 (*)     AST 45 (*)     ALT 65 (*)     All other components within normal limits   URINALYSIS, REFLEX TO URINE CULTURE - Abnormal; Notable for the following components:    Ketones, UA Trace (*)     All other components within normal limits    Narrative:     Preferred Collection Type->Urine, Clean Catch   HCG, QUANTITATIVE, PREGNANCY   PREGNANCY TEST, URINE RAPID   TYPE & SCREEN   ANTIBODY IDENTIFICATION   PREPARE RBC SOFT        All Lab Results:  Results for orders placed or performed during the hospital encounter of 06/19/19   CBC auto differential   Result Value Ref Range    WBC 33.17 (H) 3.90 - 12.70 K/uL    RBC 1.92 (L) 4.00 - 5.40 M/uL    Hemoglobin 3.4 (LL) 12.0 - 16.0 g/dL    Hematocrit 12.6 (LL) 37.0 - 48.5 %    Mean Corpuscular Volume 66 (L) 82 - 98 fL    Mean Corpuscular Hemoglobin 17.7 (L) 27.0 - 31.0 pg    Mean Corpuscular Hemoglobin Conc 27.0 (L) 32.0 - 36.0 g/dL    RDW 20.7 (H) 11.5 - 14.5 %    Platelets 7 (LL) 150 - 350 K/uL    MPV SEE COMMENT 9.2 - 12.9 fL    Gran # (ANC) 28.2 (H) 1.8 - 7.7 K/uL    Lymph # 2.9 1.0 - 4.8 K/uL    Mono # 2.0 (H) 0.3 - 1.0 K/uL    Eos # 0.0 0.0 - 0.5 K/uL    Baso # 0.06 0.00 - 0.20 K/uL    Gran% 85.1 (H) 38.0 - 73.0 %    Lymph% 8.8 (L) 18.0 - 48.0 %    Mono% 5.9 4.0 - 15.0 %    Eosinophil% 0.0 0.0 - 8.0 %    Basophil% 0.2 0.0 - 1.9 %    Platelet Estimate Decreased (A)     Poly Occasional     Hypo Marked     Target Cells Moderate     Tear Drop Cells Occasional     Schistocytes Present     Differential Method Automated    Comprehensive metabolic panel   Result Value Ref Range    Sodium 136 136 - 145 mmol/L    Potassium 3.4 (L) 3.5 - 5.1 mmol/L    Chloride 107 95 - 110 mmol/L    CO2 19 (L) 23 - 29 mmol/L    Glucose 122 (H) 70 - 110 mg/dL    BUN,  Bld 15 6 - 20 mg/dL    Creatinine 0.7 0.5 - 1.4 mg/dL    Calcium 8.6 (L) 8.7 - 10.5 mg/dL    Total Protein 6.3 6.0 - 8.4 g/dL    Albumin 3.5 3.5 - 5.2 g/dL    Total Bilirubin 0.3 0.1 - 1.0 mg/dL    Alkaline Phosphatase 45 (L) 55 - 135 U/L    AST 45 (H) 10 - 40 U/L    ALT 65 (H) 10 - 44 U/L    Anion Gap 10 8 - 16 mmol/L    eGFR if African American >60 >60 mL/min/1.73 m^2    eGFR if non African American >60 >60 mL/min/1.73 m^2   hCG, quantitative, pregnancy   Result Value Ref Range    hCG Quant <1.2 See Text mIU/mL   Urinalysis, Reflex to Urine Culture Urine, Clean Catch   Result Value Ref Range    Specimen UA Urine, Catheterized     Color, UA Yellow Yellow, Straw, Tati    Appearance, UA Clear Clear    pH, UA 6.0 5.0 - 8.0    Specific Gravity, UA 1.015 1.005 - 1.030    Protein, UA Negative Negative    Glucose, UA Negative Negative    Ketones, UA Trace (A) Negative    Bilirubin (UA) Negative Negative    Occult Blood UA Negative Negative    Nitrite, UA Negative Negative    Urobilinogen, UA Negative <2.0 EU/dL    Leukocytes, UA Negative Negative   Pregnancy, urine rapid   Result Value Ref Range    Preg Test, Ur Negative    Type & Screen   Result Value Ref Range    Group & Rh O POS     Indirect Richard POS      Imaging Results:  Imaging Results          X-Ray Chest AP Portable (Final result)  Result time 06/19/19 18:49:41    Final result by Mark Donovan MD (06/19/19 18:49:41)                 Impression:      Negative single view chest x-ray.      Electronically signed by: Mark Donovan MD  Date:    06/19/2019  Time:    18:49             Narrative:    EXAMINATION:  XR CHEST AP PORTABLE    CLINICAL HISTORY:  Shortness of breath and weakness., Shortness of breath;    COMPARISON:  None    FINDINGS:  Heart size is normal. The lung fields are clear. No acute cardiopulmonary infiltrate.                               The EKG was ordered, reviewed, and independently interpreted by the ED provider.  Interpretation time:  18:23  Rate: 132 BPM  Rhythm: sinus tachycardia  Interpretation: No acute ST changes. No STEMI.           The Emergency Provider reviewed the vital signs and test results, which are outlined above.    ED Discussion     8:00 PM: Discussed pt's case with Dr. Cota (Hem/Onc) who is very familiar with the patient. He does not recommends platelets, as she is currently as her baseline platelet level and is on medication to maintain that range. Dr. Cota recommends that the patient be admitted to receive 3 units of packed red cells.    8:05 PM: Discussed case with Dr. Bajwa (Hospital Medicine). Dr. Bajwa agrees with current care and management of pt and accepts admission.   Admitting Service: [facility]  Admitting Physician: Dr. Bajwa  Admit to: Med Tele    8:09 PM: Re-evaluated pt. I have discussed test results, shared treatment plan, and the need for admission with patient and family at bedside. Pt and family express understanding at this time and agree with all information. All questions answered. Pt and family have no further questions or concerns at this time. Pt is ready for admit.    ED Medication(s):  Medications   0.9%  NaCl infusion (for blood administration) (has no administration in time range)   pantoprazole EC tablet 40 mg (has no administration in time range)   eltrombopag tablet 50 mg (has no administration in time range)   sodium chloride 0.9% bolus 1,000 mL (0 mLs Intravenous Stopped 6/19/19 2020)     New Prescriptions    No medications on file           Medical Decision Making    Medical Decision Making:   Clinical Tests:   Lab Tests: Ordered and Reviewed  Radiological Study: Ordered and Reviewed  Medical Tests: Ordered and Reviewed           Scribe Attestation:   Scribe #1: I performed the above scribed service and the documentation accurately describes the services I performed. I attest to the accuracy of the note.    Attending:   Physician Attestation Statement for Scribe #1: Lisa NOYOLA  MD Kate, personally performed the services described in this documentation, as scribed by Jason Knight, in my presence, and it is both accurate and complete.          Clinical Impression       ICD-10-CM ICD-9-CM   1. Thrombocytopenia D69.6 287.5   2. Chest pain R07.9 786.50   3. Anemia D64.9 285.9       Disposition:   Disposition: Admitted  Condition: Fair         Lisa Moreno Do, MD  06/19/19 [id number]

## 2019-06-20 LAB
ANION GAP SERPL CALC-SCNC: 9 MMOL/L (ref 8–16)
ANISOCYTOSIS BLD QL SMEAR: SLIGHT
BASOPHILS # BLD AUTO: 0.05 K/UL (ref 0–0.2)
BASOPHILS NFR BLD: 0.2 % (ref 0–1.9)
BLD PROD TYP BPU: NORMAL
BLOOD GROUP ANTIBODIES SERPL: NORMAL
BLOOD UNIT EXPIRATION DATE: NORMAL
BLOOD UNIT TYPE CODE: 5100
BLOOD UNIT TYPE: NORMAL
BUN SERPL-MCNC: 13 MG/DL (ref 6–20)
CALCIUM SERPL-MCNC: 7.9 MG/DL (ref 8.7–10.5)
CHLORIDE SERPL-SCNC: 106 MMOL/L (ref 95–110)
CHOLEST SERPL-MCNC: 95 MG/DL (ref 120–199)
CHOLEST/HDLC SERPL: 5 {RATIO} (ref 2–5)
CO2 SERPL-SCNC: 20 MMOL/L (ref 23–29)
CODING SYSTEM: NORMAL
CREAT SERPL-MCNC: 0.7 MG/DL (ref 0.5–1.4)
DACRYOCYTES BLD QL SMEAR: ABNORMAL
DAT IGG-SP REAG RBC-IMP: NORMAL
DIFFERENTIAL METHOD: ABNORMAL
DISPENSE STATUS: NORMAL
EOSINOPHIL # BLD AUTO: 0 K/UL (ref 0–0.5)
EOSINOPHIL NFR BLD: 0 % (ref 0–8)
ERYTHROCYTE [DISTWIDTH] IN BLOOD BY AUTOMATED COUNT: 21 % (ref 11.5–14.5)
EST. GFR  (AFRICAN AMERICAN): >60 ML/MIN/1.73 M^2
EST. GFR  (NON AFRICAN AMERICAN): >60 ML/MIN/1.73 M^2
FERRITIN SERPL-MCNC: 4 NG/ML (ref 20–300)
FOLATE SERPL-MCNC: 7.9 NG/ML (ref 4–24)
GLUCOSE SERPL-MCNC: 109 MG/DL (ref 70–110)
HCT VFR BLD AUTO: 9.9 % (ref 37–48.5)
HDLC SERPL-MCNC: 19 MG/DL (ref 40–75)
HDLC SERPL: 20 % (ref 20–50)
HGB BLD-MCNC: 2.6 G/DL (ref 12–16)
HYPOCHROMIA BLD QL SMEAR: ABNORMAL
IRON SERPL-MCNC: 11 UG/DL (ref 30–160)
LDLC SERPL CALC-MCNC: 53 MG/DL (ref 63–159)
LYMPHOCYTES # BLD AUTO: 3.5 K/UL (ref 1–4.8)
LYMPHOCYTES NFR BLD: 10.7 % (ref 18–48)
MAGNESIUM SERPL-MCNC: 1.7 MG/DL (ref 1.6–2.6)
MCH RBC QN AUTO: 17.3 PG (ref 27–31)
MCHC RBC AUTO-ENTMCNC: 26.3 G/DL (ref 32–36)
MCV RBC AUTO: 66 FL (ref 82–98)
MONOCYTES # BLD AUTO: 3.2 K/UL (ref 0.3–1)
MONOCYTES NFR BLD: 9.7 % (ref 4–15)
NEUTROPHILS # BLD AUTO: 25.9 K/UL (ref 1.8–7.7)
NEUTROPHILS NFR BLD: 79.4 % (ref 38–73)
NONHDLC SERPL-MCNC: 76 MG/DL
NUM UNITS TRANS PACKED RBC: NORMAL
OVALOCYTES BLD QL SMEAR: ABNORMAL
PHOSPHATE SERPL-MCNC: 3.7 MG/DL (ref 2.7–4.5)
PLATELET # BLD AUTO: 8 K/UL (ref 150–350)
PLATELET BLD QL SMEAR: ABNORMAL
PMV BLD AUTO: ABNORMAL FL (ref 9.2–12.9)
POIKILOCYTOSIS BLD QL SMEAR: SLIGHT
POLYCHROMASIA BLD QL SMEAR: ABNORMAL
POTASSIUM SERPL-SCNC: 3.2 MMOL/L (ref 3.5–5.1)
RBC # BLD AUTO: 1.5 M/UL (ref 4–5.4)
SATURATED IRON: 3 % (ref 20–50)
SCHISTOCYTES BLD QL SMEAR: PRESENT
SODIUM SERPL-SCNC: 135 MMOL/L (ref 136–145)
STOMATOCYTES BLD QL SMEAR: PRESENT
TARGETS BLD QL SMEAR: ABNORMAL
TOTAL IRON BINDING CAPACITY: 400 UG/DL (ref 250–450)
TRANSFERRIN SERPL-MCNC: 270 MG/DL (ref 200–375)
TRIGL SERPL-MCNC: 115 MG/DL (ref 30–150)
TSH SERPL DL<=0.005 MIU/L-ACNC: 2.09 UIU/ML (ref 0.4–4)
VIT B12 SERPL-MCNC: 421 PG/ML (ref 210–950)
WBC # BLD AUTO: 32.63 K/UL (ref 3.9–12.7)

## 2019-06-20 PROCEDURE — 25000003 PHARM REV CODE 250: Performed by: NURSE PRACTITIONER

## 2019-06-20 PROCEDURE — 83540 ASSAY OF IRON: CPT

## 2019-06-20 PROCEDURE — 80048 BASIC METABOLIC PNL TOTAL CA: CPT

## 2019-06-20 PROCEDURE — 82607 VITAMIN B-12: CPT

## 2019-06-20 PROCEDURE — 80061 LIPID PANEL: CPT

## 2019-06-20 PROCEDURE — 85025 COMPLETE CBC W/AUTO DIFF WBC: CPT

## 2019-06-20 PROCEDURE — 25000003 PHARM REV CODE 250: Performed by: INTERNAL MEDICINE

## 2019-06-20 PROCEDURE — 36430 TRANSFUSION BLD/BLD COMPNT: CPT

## 2019-06-20 PROCEDURE — 25000003 PHARM REV CODE 250: Performed by: PHYSICIAN ASSISTANT

## 2019-06-20 PROCEDURE — 94761 N-INVAS EAR/PLS OXIMETRY MLT: CPT

## 2019-06-20 PROCEDURE — 25000003 PHARM REV CODE 250: Performed by: EMERGENCY MEDICINE

## 2019-06-20 PROCEDURE — 84443 ASSAY THYROID STIM HORMONE: CPT

## 2019-06-20 PROCEDURE — 99232 PR SUBSEQUENT HOSPITAL CARE,LEVL II: ICD-10-PCS | Mod: ,,, | Performed by: INTERNAL MEDICINE

## 2019-06-20 PROCEDURE — 83036 HEMOGLOBIN GLYCOSYLATED A1C: CPT

## 2019-06-20 PROCEDURE — 63600175 PHARM REV CODE 636 W HCPCS: Mod: JG | Performed by: INTERNAL MEDICINE

## 2019-06-20 PROCEDURE — P9016 RBC LEUKOCYTES REDUCED: HCPCS

## 2019-06-20 PROCEDURE — 36415 COLL VENOUS BLD VENIPUNCTURE: CPT

## 2019-06-20 PROCEDURE — 99232 SBSQ HOSP IP/OBS MODERATE 35: CPT | Mod: ,,, | Performed by: INTERNAL MEDICINE

## 2019-06-20 PROCEDURE — 27000221 HC OXYGEN, UP TO 24 HOURS

## 2019-06-20 PROCEDURE — 21400001 HC TELEMETRY ROOM

## 2019-06-20 PROCEDURE — 84100 ASSAY OF PHOSPHORUS: CPT

## 2019-06-20 PROCEDURE — 83735 ASSAY OF MAGNESIUM: CPT

## 2019-06-20 PROCEDURE — 82746 ASSAY OF FOLIC ACID SERUM: CPT

## 2019-06-20 PROCEDURE — 82728 ASSAY OF FERRITIN: CPT

## 2019-06-20 RX ORDER — ONDANSETRON 4 MG/1
4 TABLET, ORALLY DISINTEGRATING ORAL EVERY 6 HOURS PRN
Status: DISCONTINUED | OUTPATIENT
Start: 2019-06-20 | End: 2019-06-21 | Stop reason: HOSPADM

## 2019-06-20 RX ADMIN — PANTOPRAZOLE SODIUM 40 MG: 40 TABLET, DELAYED RELEASE ORAL at 09:06

## 2019-06-20 RX ADMIN — SODIUM CHLORIDE, SODIUM LACTATE, POTASSIUM CHLORIDE, AND CALCIUM CHLORIDE 500 ML: .6; .31; .03; .02 INJECTION, SOLUTION INTRAVENOUS at 11:06

## 2019-06-20 RX ADMIN — ONDANSETRON 4 MG: 4 TABLET, ORALLY DISINTEGRATING ORAL at 10:06

## 2019-06-20 RX ADMIN — SODIUM CHLORIDE: 0.9 INJECTION, SOLUTION INTRAVENOUS at 03:06

## 2019-06-20 NOTE — ASSESSMENT & PLAN NOTE
Continue home med  Platelet count is patients baseline per heme/onocology and only recommends blood transfusion at this time. Further consider platelets pending course  Further evaluation/diagnostics/interventions/consults pending course

## 2019-06-20 NOTE — ASSESSMENT & PLAN NOTE
Afebrile, no overt complaints/signs/symptoms of infectious process  Check ua  Chest Xray neg  Likely reactive. Monitor closely for now  Heme/oncology consult  Further evaluation/diagnostics/interventions/consults pending course

## 2019-06-20 NOTE — ASSESSMENT & PLAN NOTE
Related to IVVD  Transfuse  ekg  Further evaluation/diagnostics/interventions/consults pending course

## 2019-06-20 NOTE — ED NOTES
Pt resting in bed.  NAD.  Respirations even, non labored.  Bed locked and in low position.  SR up x 2.  Call light in reach.  Will continue to monitor.

## 2019-06-20 NOTE — ASSESSMENT & PLAN NOTE
Continue Eltrombopag per Hematology.  Expect dose to be 75 mg daily split morning and evening.  History of nausea associated with the medication.  Platelet count is patients baseline per heme/onocology and only recommends blood transfusion at this time.

## 2019-06-20 NOTE — PROGRESS NOTES
[facility] Hospital Medicine  Progress Note    Patient Name: Mari Miller  MRN: [id number]  Patient Class: IP- Inpatient   Admission Date: 6/19/2019  Length of Stay: 1 days  Attending Physician: Hussein Borjas MD  Primary Care Provider: Ifeanyi Tello NP        Subjective:     Principal Problem:Iron deficiency anemia due to chronic blood loss      HPI:  23-year-old female with history of anemia and chronic immune thrombocytopenia presents today with complaint of weakness that started over the past 3 days.  Activity worsens.  Associated symptoms include shortness of breath.  Prior treatment at home therapy.  Patient was seen in the emergency room H&H 3.4/12.6, platelet count 7.  Hematology/oncology was consulted and recommended transfusion of blood and notes patient's platelet count is at baseline.  3 U packed red blood cells ordered.  Hospital Medicine consult.  Patient admitted.    Overview/Hospital Course:  Admitted for evaluation and treatment of symptomatic anemia related to chronic Immune Thrombocytopenic Purpura.  Severe life-threatening anemia with shortness of breath on minimal exertion. Ordered 3 units PRBC for transfusion.  Difficult match due to antibody presence.  Continued home dose of Promacta(Eltrombopag) and consultation with Hematology for assistance in management.    Interval History:  Shortness of breath on minimal exertion.  No signs or reported symptoms of blood loss.    Review of Systems   Constitutional: Negative for chills and fever.   HENT: Negative for congestion and sore throat.    Eyes: Negative for visual disturbance.   Respiratory: Positive for shortness of breath. Negative for cough and wheezing.    Cardiovascular: Negative for chest pain, palpitations and leg swelling.   Gastrointestinal: Negative for abdominal pain, blood in stool, constipation, diarrhea, nausea and vomiting.   Genitourinary: Negative for dysuria and hematuria.   Musculoskeletal:  Negative for arthralgias and back pain.   Skin: Negative for rash and wound.   Neurological: Negative for dizziness, weakness, light-headedness and numbness.   Hematological: Negative for adenopathy.     Objective:     Vital Signs (Most Recent):  Temp: 97.6 °F (36.4 °C) (06/20/19 1318)  Pulse: (!) 141 (06/20/19 1318)  Resp: 18 (06/20/19 1318)  BP: 131/66 (06/20/19 1318)  SpO2: 100 % (06/20/19 1318) Vital Signs (24h Range):  Temp:  [97.4 °F (36.3 °C)-98.9 °F (37.2 °C)] 97.6 °F (36.4 °C)  Pulse:  [] 141  Resp:  [16-26] 18  SpO2:  [100 %] 100 %  BP: (115-147)/(55-67) 131/66     Weight: 99.2 kg (218 lb 11.1 oz)  Body mass index is 40 kg/m².    Intake/Output Summary (Last 24 hours) at 6/20/2019 1336  Last data filed at 6/19/2019 2209  Gross per 24 hour   Intake 1000 ml   Output 500 ml   Net 500 ml      Physical Exam   Constitutional: She is oriented to person, place, and time. She appears well-developed and well-nourished. No distress.   HENT:   Head: Normocephalic and atraumatic.   Mouth/Throat: Oropharynx is clear and moist.   Eyes: Pupils are equal, round, and reactive to light. Conjunctivae and EOM are normal.   Neck: Neck supple. No JVD present. No thyromegaly present.   Cardiovascular: Regular rhythm. Exam reveals no gallop and no friction rub.   No murmur heard.  Tachycardia   Pulmonary/Chest: Effort normal and breath sounds normal. She has no wheezes. She has no rales.   Abdominal: Soft. Bowel sounds are normal. She exhibits no distension. There is no tenderness. There is no rebound and no guarding.   Musculoskeletal: Normal range of motion. She exhibits no edema or deformity.   Lymphadenopathy:     She has no cervical adenopathy.   Neurological: She is alert and oriented to person, place, and time. She has normal reflexes.   Skin: Skin is warm and dry. No rash noted.   Psychiatric: She has a normal mood and affect. Her behavior is normal. Judgment and thought content normal.   Nursing note and vitals  reviewed.      Significant Labs: All pertinent labs within the past 24 hours have been reviewed.    Significant Imaging: I have reviewed all pertinent imaging results/findings within the past 24 hours.      Assessment/Plan:      * Iron deficiency anemia due to chronic blood loss  Type screen and transfuse 3 units.  Prolonged preparation due to antibodies.  Iron/anemia labs in am.  Last recent iron labs low...start daily iron therapy.  Heme/Oncology following.  Dr. Cota manages her outpatient.    Chronic ITP (idiopathic thrombocytopenia)  Continue Eltrombopag per Hematology.  Expect dose to be 75 mg daily split morning and evening.  History of nausea associated with the medication.  Platelet count is patients baseline per heme/onocology and only recommends blood transfusion at this time.    Elevated BP without diagnosis of hypertension  Monitor trends.  Consider daily therapy pending course.    Tachycardia  Related to IVVD and anemia. Transfuse.    Leukocytosis  Afebrile, no overt complaints/signs/symptoms of infectious process  Check ua  Chest Xray neg  Likely reactive. Monitor closely for now  Heme/oncology consult      VTE Risk Mitigation (From admission, onward)        Ordered     Place sequential compression device  Until discontinued      06/19/19 2029     Place SONNY hose  Until discontinued      06/19/19 2029                Hussein Borjas MD  Department of Hospital Medicine   [facility]

## 2019-06-20 NOTE — PLAN OF CARE
Problem: Adult Inpatient Plan of Care  Goal: Plan of Care Review  POC: Discussed plan of care to transfuse 3 units of ordered blood as soon as available. Encouraged pt to practice handwashing after bathroom use; Pt instructed to maintain bedrest until she receives blood and is more stable with mobility.   Goal: Absence of Hospital-Acquired Illness or Injury  Outcome: Ongoing (interventions implemented as appropriate)  Encouraged to practice effective handwashing to reduce the opportunity for hospital -acquired illnesses

## 2019-06-20 NOTE — SIGNIFICANT EVENT
Patient seen and examined in response to a deterioration alert. She remains SOB, but this is unchanged. Her HR increased. Discussed with Dr. Borjas. Will give 500 ml bolus. No need to transfer to the ICU at this time as patient's condition is unchanged.

## 2019-06-20 NOTE — SUBJECTIVE & OBJECTIVE
Interval History:  Shortness of breath on minimal exertion.  No signs or reported symptoms of blood loss.    Review of Systems   Constitutional: Negative for chills and fever.   HENT: Negative for congestion and sore throat.    Eyes: Negative for visual disturbance.   Respiratory: Positive for shortness of breath. Negative for cough and wheezing.    Cardiovascular: Negative for chest pain, palpitations and leg swelling.   Gastrointestinal: Negative for abdominal pain, blood in stool, constipation, diarrhea, nausea and vomiting.   Genitourinary: Negative for dysuria and hematuria.   Musculoskeletal: Negative for arthralgias and back pain.   Skin: Negative for rash and wound.   Neurological: Negative for dizziness, weakness, light-headedness and numbness.   Hematological: Negative for adenopathy.     Objective:     Vital Signs (Most Recent):  Temp: 97.6 °F (36.4 °C) (06/20/19 1318)  Pulse: (!) 141 (06/20/19 1318)  Resp: 18 (06/20/19 1318)  BP: 131/66 (06/20/19 1318)  SpO2: 100 % (06/20/19 1318) Vital Signs (24h Range):  Temp:  [97.4 °F (36.3 °C)-98.9 °F (37.2 °C)] 97.6 °F (36.4 °C)  Pulse:  [] 141  Resp:  [16-26] 18  SpO2:  [100 %] 100 %  BP: (115-147)/(55-67) 131/66     Weight: 99.2 kg (218 lb 11.1 oz)  Body mass index is 40 kg/m².    Intake/Output Summary (Last 24 hours) at 6/20/2019 1336  Last data filed at 6/19/2019 2209  Gross per 24 hour   Intake 1000 ml   Output 500 ml   Net 500 ml      Physical Exam   Constitutional: She is oriented to person, place, and time. She appears well-developed and well-nourished. No distress.   HENT:   Head: Normocephalic and atraumatic.   Mouth/Throat: Oropharynx is clear and moist.   Eyes: Pupils are equal, round, and reactive to light. Conjunctivae and EOM are normal.   Neck: Neck supple. No JVD present. No thyromegaly present.   Cardiovascular: Regular rhythm. Exam reveals no gallop and no friction rub.   No murmur heard.  Tachycardia   Pulmonary/Chest: Effort normal and  breath sounds normal. She has no wheezes. She has no rales.   Abdominal: Soft. Bowel sounds are normal. She exhibits no distension. There is no tenderness. There is no rebound and no guarding.   Musculoskeletal: Normal range of motion. She exhibits no edema or deformity.   Lymphadenopathy:     She has no cervical adenopathy.   Neurological: She is alert and oriented to person, place, and time. She has normal reflexes.   Skin: Skin is warm and dry. No rash noted.   Psychiatric: She has a normal mood and affect. Her behavior is normal. Judgment and thought content normal.   Nursing note and vitals reviewed.      Significant Labs: All pertinent labs within the past 24 hours have been reviewed.    Significant Imaging: I have reviewed all pertinent imaging results/findings within the past 24 hours.

## 2019-06-20 NOTE — H&P
[facility] Hospital Medicine  History & Physical    Patient Name: Mari Miller  MRN: [id number]  Admission Date: 2019  Attending Physician: Danette Bajwa MD   Primary Care Provider: Ifeanyi Tello NP         Patient information was obtained from patient, past medical records and ER records.     Subjective:     Principal Problem:Iron deficiency anemia due to chronic blood loss    Chief Complaint:   Chief Complaint   Patient presents with    Shortness of Breath     patient with SOB and weakness for 3 days        HPI: 23-year-old female with history of anemia and chronic immune thrombocytopenia presents today with complaint of weakness that started over the past 3 days. Anemia associated with menstrual cycles.   Activity worsens.  Associated symptoms include shortness of breath.  Prior treatment at home therapy.  Patient was seen in the emergency room H&H 3.4/12.6, platelet count 7.  Hematology/oncology was consulted and recommended transfusion of blood and notes patient's platelet count is at baseline.  3 U packed red blood cells ordered.  Hospital Medicine consult.  Patient admitted.    Past Medical History:   Diagnosis Date    Encounter for blood transfusion     Thrombocytopenia        Past Surgical History:   Procedure Laterality Date     SECTION      x1    XI ROBOTIC SPLENECTOMY N/A 2018    Performed by Yobani Lara MD at [facility] OR       Review of patient's allergies indicates:   Allergen Reactions    Aspirin      Unable to take because of illness    Ibuprofen Other (See Comments)     Cannot take due to Blood Disorder       Current Facility-Administered Medications on File Prior to Encounter   Medication    meningococcal polysaccharide injection 0.5 mL    ondansetron injection 4 mg     Current Outpatient Medications on File Prior to Encounter   Medication Sig    eltrombopag (PROMACTA) 25 MG Tab Take 2 tablets (50 mg total) by mouth once daily.     Family  History     Reviewed and not Pertinent           Tobacco Use    Smoking status: Never Smoker    Smokeless tobacco: Never Used   Substance and Sexual Activity    Alcohol use: No     Frequency: Never    Drug use: No    Sexual activity: Not on file     Review of Systems   Constitutional: Positive for activity change. Negative for chills, diaphoresis, fatigue and fever.   HENT: Negative for congestion, sore throat and voice change.    Eyes: Negative for photophobia and visual disturbance.   Respiratory: Positive for shortness of breath. Negative for cough, wheezing and stridor.    Cardiovascular: Negative for chest pain and leg swelling.   Gastrointestinal: Negative for abdominal distention, abdominal pain, constipation, diarrhea, nausea and vomiting.   Endocrine: Negative for polydipsia, polyphagia and polyuria.   Genitourinary: Negative for difficulty urinating, dysuria, flank pain, pelvic pain, urgency and vaginal discharge.   Musculoskeletal: Negative for back pain, joint swelling, neck pain and neck stiffness.   Skin: Negative for color change and rash.   Allergic/Immunologic: Negative for immunocompromised state.   Neurological: Negative for dizziness, syncope, weakness, numbness and headaches.   Hematological: Does not bruise/bleed easily.   Psychiatric/Behavioral: Negative for agitation, behavioral problems and confusion.     Objective:     Vital Signs (Most Recent):  Temp: 98.6 °F (37 °C) (06/19/19 1750)  Pulse: (!) 125 (06/19/19 1806)  Resp: (!) 21 (06/19/19 1751)  BP: 132/65 (06/19/19 1803)  SpO2: 100 % (06/19/19 1803) Vital Signs (24h Range):  Temp:  [98.6 °F (37 °C)] 98.6 °F (37 °C)  Pulse:  [125-144] 125  Resp:  [20-21] 21  SpO2:  [100 %] 100 %  BP: (115-132)/(56-65) 132/65     Weight: 96.3 kg (212 lb 6.4 oz)  Body mass index is 38.85 kg/m².    Physical Exam   Constitutional: She is oriented to person, place, and time. She appears well-developed and well-nourished. No distress.   HENT:   Head:  Normocephalic and atraumatic.   Nose: Nose normal.   Pale oral membranes     Eyes: Pupils are equal, round, and reactive to light. Conjunctivae and EOM are normal. No scleral icterus.   Neck: Normal range of motion. Neck supple. No tracheal deviation present.   Cardiovascular: increase rate, regular rhythm, normal heart sounds and intact distal pulses.   No murmur heard.  Pulmonary/Chest: Effort normal and breath sounds normal. No stridor. No respiratory distress. She has no wheezes. She has no rales.   Abdominal: Soft. Bowel sounds are normal. She exhibits no distension. There is no tenderness. There is no guarding.   Genitourinary:   Genitourinary Comments: Check ua     Musculoskeletal: Normal range of motion. She exhibits no edema or deformity.   Neurological: She is alert and oriented to person, place, and time. No cranial nerve deficit.   Skin: Skin is warm and dry. Capillary refill takes 2 to 3 seconds. No rash noted. She is not diaphoretic. There is pallor.   Psychiatric: She has a normal mood and affect. Her behavior is normal. Judgment and thought content normal.   Nursing note and vitals reviewed.        CRANIAL NERVES     CN III, IV, VI   Pupils are equal, round, and reactive to light.  Extraocular motions are normal.        Significant Labs: All pertinent labs within the past 24 hours have been reviewed.  Results for orders placed or performed during the hospital encounter of 06/19/19   CBC auto differential   Result Value Ref Range    WBC 33.17 (H) 3.90 - 12.70 K/uL    RBC 1.92 (L) 4.00 - 5.40 M/uL    Hemoglobin 3.4 (LL) 12.0 - 16.0 g/dL    Hematocrit 12.6 (LL) 37.0 - 48.5 %    Mean Corpuscular Volume 66 (L) 82 - 98 fL    Mean Corpuscular Hemoglobin 17.7 (L) 27.0 - 31.0 pg    Mean Corpuscular Hemoglobin Conc 27.0 (L) 32.0 - 36.0 g/dL    RDW 20.7 (H) 11.5 - 14.5 %    Platelets 7 (LL) 150 - 350 K/uL    MPV SEE COMMENT 9.2 - 12.9 fL    Gran # (ANC) 28.2 (H) 1.8 - 7.7 K/uL    Lymph # 2.9 1.0 - 4.8 K/uL     Mono # 2.0 (H) 0.3 - 1.0 K/uL    Eos # 0.0 0.0 - 0.5 K/uL    Baso # 0.06 0.00 - 0.20 K/uL    Gran% 85.1 (H) 38.0 - 73.0 %    Lymph% 8.8 (L) 18.0 - 48.0 %    Mono% 5.9 4.0 - 15.0 %    Eosinophil% 0.0 0.0 - 8.0 %    Basophil% 0.2 0.0 - 1.9 %    Platelet Estimate Decreased (A)     Poly Occasional     Hypo Marked     Target Cells Moderate     Tear Drop Cells Occasional     Schistocytes Present     Differential Method Automated    Comprehensive metabolic panel   Result Value Ref Range    Sodium 136 136 - 145 mmol/L    Potassium 3.4 (L) 3.5 - 5.1 mmol/L    Chloride 107 95 - 110 mmol/L    CO2 19 (L) 23 - 29 mmol/L    Glucose 122 (H) 70 - 110 mg/dL    BUN, Bld 15 6 - 20 mg/dL    Creatinine 0.7 0.5 - 1.4 mg/dL    Calcium 8.6 (L) 8.7 - 10.5 mg/dL    Total Protein 6.3 6.0 - 8.4 g/dL    Albumin 3.5 3.5 - 5.2 g/dL    Total Bilirubin 0.3 0.1 - 1.0 mg/dL    Alkaline Phosphatase 45 (L) 55 - 135 U/L    AST 45 (H) 10 - 40 U/L    ALT 65 (H) 10 - 44 U/L    Anion Gap 10 8 - 16 mmol/L    eGFR if African American >60 >60 mL/min/1.73 m^2    eGFR if non African American >60 >60 mL/min/1.73 m^2   hCG, quantitative, pregnancy   Result Value Ref Range    hCG Quant <1.2 See Text mIU/mL   Urinalysis, Reflex to Urine Culture Urine, Clean Catch   Result Value Ref Range    Specimen UA Urine, Catheterized     Color, UA Yellow Yellow, Straw, Tati    Appearance, UA Clear Clear    pH, UA 6.0 5.0 - 8.0    Specific Gravity, UA 1.015 1.005 - 1.030    Protein, UA Negative Negative    Glucose, UA Negative Negative    Ketones, UA Trace (A) Negative    Bilirubin (UA) Negative Negative    Occult Blood UA Negative Negative    Nitrite, UA Negative Negative    Urobilinogen, UA Negative <2.0 EU/dL    Leukocytes, UA Negative Negative   Pregnancy, urine rapid   Result Value Ref Range    Preg Test, Ur Negative        Significant Imaging: I have reviewed all pertinent imaging results/findings within the past 24 hours.   Imaging Results          X-Ray Chest AP  Portable (Final result)  Result time 06/19/19 18:49:41    Final result by Mark Donovan MD (06/19/19 18:49:41)                 Impression:      Negative single view chest x-ray.      Electronically signed by: Mark Donovan MD  Date:    06/19/2019  Time:    18:49             Narrative:    EXAMINATION:  XR CHEST AP PORTABLE    CLINICAL HISTORY:  Shortness of breath and weakness., Shortness of breath;    COMPARISON:  None    FINDINGS:  Heart size is normal. The lung fields are clear. No acute cardiopulmonary infiltrate.                              I have personally reviewed the patients labs, imaging, ekg () and discussed the patient case in detail with the Er provider      Assessment/Plan:     * Iron deficiency anemia due to chronic blood loss; symptomatic   Type screen  transfuse 3 units overnight  Iron/anemia labs in am  Last recent iron labs low...start daily iron therapy  Heme/Oncology consult.   Further evaluation/diagnostics/interventions/consults pending course         Leukocytosis  Afebrile, no overt complaints/signs/symptoms of infectious process  Check ua  Chest Xray neg  Likely reactive. Monitor closely for now  Heme/oncology consult  Further evaluation/diagnostics/interventions/consults pending course         Tachycardia  Related to IVVD  Transfuse  ekg  Further evaluation/diagnostics/interventions/consults pending course         Elevated BP without diagnosis of hypertension  Monitor trends  Consider daily therapy pending course  Further evaluation/diagnostics/interventions/consults pending course         Chronic ITP (idiopathic thrombocytopenia)  Continue home med  Platelet count is patients baseline per heme/onocology and only recommends blood transfusion at this time. Further consider platelets pending course  Further evaluation/diagnostics/interventions/consults pending course         VTE Risk Mitigation (From admission, onward)        Ordered     Place sequential compression device   Until discontinued      06/19/19 2029     Place SONNY hose  Until discontinued      06/19/19 2029             Terry Ray NP  Department of Hospital Medicine   [facility]

## 2019-06-20 NOTE — ED NOTES
"Assisted pt to bedside commode. Pt stated, "I am about to pass out" and was laying across the bed. Pt stood up and got back in bed. Will put pt on bedpan. RN notified.  "

## 2019-06-20 NOTE — ASSESSMENT & PLAN NOTE
Afebrile, no overt complaints/signs/symptoms of infectious process  Check ua  Chest Xray neg  Likely reactive. Monitor closely for now  Heme/oncology consult

## 2019-06-20 NOTE — SIGNIFICANT EVENT
Patient Deterioration Alert     Deterioration alert received.  Patient seen and examined, in NAD.  Denies any complaints at present.  VS remain consistent with severe anemia. Patient still awaiting blood. Per primary nurse called lab for update, blood is still in process with main campus due to antibodies.  No acute change in patient status.  Continue current treatment plan.        Terry Ray, NP

## 2019-06-20 NOTE — SUBJECTIVE & OBJECTIVE
Oncology Treatment Plan:   [No treatment plan]    Medications:  Continuous Infusions:  Scheduled Meds:   eltrombopag  50 mg Oral Daily    pantoprazole  40 mg Oral Daily     PRN Meds:sodium chloride     Review of patient's allergies indicates:   Allergen Reactions    Aspirin      Unable to take because of illness    Ibuprofen Other (See Comments)     Cannot take due to Blood Disorder        Past Medical History:   Diagnosis Date    Encounter for blood transfusion     Thrombocytopenia      Past Surgical History:   Procedure Laterality Date     SECTION      x1    XI ROBOTIC SPLENECTOMY N/A 2018    Performed by Yobani Lara MD at [facility] [place]     Family History     None        Tobacco Use    Smoking status: Never Smoker    Smokeless tobacco: Never Used   Substance and Sexual Activity    Alcohol use: No     Frequency: Never    Drug use: No    Sexual activity: Not on file       Review of Systems   Constitutional: Positive for activity change and fatigue. Negative for appetite change, chills, diaphoresis, fever and unexpected weight change.   HENT: Negative for congestion, dental problem, drooling, ear discharge, ear pain, facial swelling, hearing loss, mouth sores, nosebleeds, postnasal drip, rhinorrhea, sinus pressure, sneezing, sore throat, tinnitus, trouble swallowing and voice change.    Eyes: Negative for photophobia, pain, discharge, redness, itching and visual disturbance.   Respiratory: Positive for shortness of breath. Negative for cough, choking, chest tightness, wheezing and stridor.    Cardiovascular: Negative for chest pain, palpitations and leg swelling.   Gastrointestinal: Negative for abdominal distention, abdominal pain, anal bleeding, blood in stool, constipation, diarrhea, nausea, rectal pain and vomiting.   Endocrine: Negative for cold intolerance, heat intolerance, polydipsia, polyphagia and polyuria.   Genitourinary: Negative for decreased urine volume, difficulty urinating,  dyspareunia, dysuria, enuresis, flank pain, frequency, genital sores, hematuria, menstrual problem, pelvic pain, urgency, vaginal bleeding, vaginal discharge and vaginal pain.   Musculoskeletal: Negative for arthralgias, back pain, gait problem, joint swelling, myalgias, neck pain and neck stiffness.   Skin: Negative for color change, pallor and rash.   Allergic/Immunologic: Negative for environmental allergies, food allergies and immunocompromised state.   Neurological: Positive for weakness. Negative for dizziness, tremors, seizures, syncope, facial asymmetry, speech difficulty, light-headedness, numbness and headaches.   Hematological: Negative for adenopathy. Does not bruise/bleed easily.   Psychiatric/Behavioral: Positive for dysphoric mood. Negative for agitation, behavioral problems, confusion, decreased concentration, hallucinations, self-injury, sleep disturbance and suicidal ideas. The patient is nervous/anxious. The patient is not hyperactive.      Objective:     Vital Signs (Most Recent):  Temp: 97.4 °F (36.3 °C) (06/20/19 0723)  Pulse: (!) 127 (06/20/19 0723)  Resp: 16 (06/20/19 0723)  BP: 130/67 (06/20/19 0723)  SpO2: 100 % (06/20/19 0723) Vital Signs (24h Range):  Temp:  [97.4 °F (36.3 °C)-98.9 °F (37.2 °C)] 97.4 °F (36.3 °C)  Pulse:  [122-144] 127  Resp:  [16-26] 16  SpO2:  [100 %] 100 %  BP: (115-147)/(55-67) 130/67     Weight: 99.2 kg (218 lb 11.1 oz)  Body mass index is 40 kg/m².  Body surface area is 2.08 meters squared.      Intake/Output Summary (Last 24 hours) at 6/20/2019 0759  Last data filed at 6/19/2019 2209  Gross per 24 hour   Intake 1000 ml   Output 500 ml   Net 500 ml       Physical Exam   Constitutional: She is oriented to person, place, and time. She appears well-developed and well-nourished. She appears distressed.   HENT:   Head: Normocephalic and atraumatic.   Right Ear: External ear normal.   Left Ear: External ear normal.   Nose: Nose normal. Right sinus exhibits no maxillary  sinus tenderness and no frontal sinus tenderness. Left sinus exhibits no maxillary sinus tenderness and no frontal sinus tenderness.   Mouth/Throat: Oropharynx is clear and moist. No oropharyngeal exudate.   Eyes: Pupils are equal, round, and reactive to light. Conjunctivae, EOM and lids are normal. Right eye exhibits no discharge. Left eye exhibits no discharge. Right conjunctiva is not injected. Right conjunctiva has no hemorrhage. Left conjunctiva is not injected. Left conjunctiva has no hemorrhage. No scleral icterus.   Neck: Normal range of motion. Neck supple. No JVD present. No tracheal deviation present. No thyromegaly present.   Cardiovascular: Normal rate, regular rhythm and normal heart sounds.   Pulmonary/Chest: Effort normal and breath sounds normal. No stridor. No respiratory distress. She exhibits no tenderness.   Abdominal: Soft. Bowel sounds are normal. She exhibits no distension and no mass. There is no splenomegaly or hepatomegaly. There is no tenderness. There is no rebound.   Musculoskeletal: Normal range of motion. She exhibits no edema or tenderness.   Lymphadenopathy:     She has no cervical adenopathy.     She has no axillary adenopathy.        Right: No supraclavicular adenopathy present.        Left: No supraclavicular adenopathy present.   Neurological: She is alert and oriented to person, place, and time. No cranial nerve deficit. Coordination normal.   Skin: Skin is dry. No rash noted. She is not diaphoretic. No erythema.   Psychiatric: She has a normal mood and affect. Her behavior is normal. Judgment and thought content normal.   Vitals reviewed.      Significant Labs:   BMP:   Recent Labs   Lab 06/19/19 1910 06/20/19  0416   * 109    135*   K 3.4* 3.2*    106   CO2 19* 20*   BUN 15 13   CREATININE 0.7 0.7   CALCIUM 8.6* 7.9*   MG  --  1.7   , CBC:   Recent Labs   Lab 06/19/19 1910 06/20/19  0416   WBC 33.17* 32.63*   HGB 3.4* 2.6*   HCT 12.6* 9.9*   PLT 7* 8*   ,  CMP:   Recent Labs   Lab 06/19/19  1910 06/20/19  0416    135*   K 3.4* 3.2*    106   CO2 19* 20*   * 109   BUN 15 13   CREATININE 0.7 0.7   CALCIUM 8.6* 7.9*   PROT 6.3  --    ALBUMIN 3.5  --    BILITOT 0.3  --    ALKPHOS 45*  --    AST 45*  --    ALT 65*  --    ANIONGAP 10 9   EGFRNONAA >60 >60   , Coagulation: No results for input(s): PT, INR, APTT in the last 48 hours. and Haptoglobin: No results for input(s): HAPTOGLOBIN in the last 48 hours.    Diagnostic Results:  I have reviewed and interpreted all pertinent imaging results/findings within the past 24 hours.

## 2019-06-20 NOTE — ASSESSMENT & PLAN NOTE
Monitor trends  Consider daily therapy pending course  Further evaluation/diagnostics/interventions/consults pending course

## 2019-06-20 NOTE — ASSESSMENT & PLAN NOTE
Type screen and transfuse 3 units.  Prolonged preparation due to antibodies.  Iron/anemia labs in am.  Last recent iron labs low...start daily iron therapy.  Heme/Oncology following.  Dr. Cota manages her outpatient.

## 2019-06-20 NOTE — ED NOTES
LAb called to request two more pink top tubes due to patient having antibodies and will need to send to [facility] for identification.

## 2019-06-20 NOTE — ASSESSMENT & PLAN NOTE
Refractory ITP status post multiple treatments.  Patient has had transient responses to intravenous immunoglobulin and to splenectomy.  At this point I have had ask the pharmacy to review her medication intake on Promacta in appears that she has been taking the medicine intermittently.  Would recommend that patient resume Promacta on a daily basis to see whether not this medication will have some response very difficult situation will documented disease she has iron deficiency in treat with intravenous iron.  His no obvious bleeding at the present time but her hemoglobin has fallen to 3.2 she does state that she has not had a particularly heavy.  But this is most obvious source.  If the patient is iron deficient would recommend that patient be given Venofer 200 mg IV piggyback today while in the hospital and will have to have discussion with her in terms of her medication adherence otherwise will need to use injectable outpatient medic patient as a thrombopoetic agent to try to increase platelet count

## 2019-06-20 NOTE — PROGRESS NOTES
Clinical Pharmacy Brief Note: Medication Information     Discussed with Dr. Cota patient's filling history of Promacta (eltrombopag); prescription originally written and filled on 5/28/19.   24 days since medication filled; 48/60 tablets should be used at this point with a remainder of 12 tablets (6 day supply). Patient had 22 tablets in bottle upon arrival on 6/19/19, ~ 11 day supply.   Verified with Bnooki that patient filled medication on 5/28/19; previous fill on 1/23/19 for a 30 day supply which was not refilled.     Thank you for allowing us to participate in this patient's care.    Nkechi Starr, PharmD 6/20/2019 7:39 AM

## 2019-06-20 NOTE — PLAN OF CARE
Problem: Adult Inpatient Plan of Care  Goal: Plan of Care Review  Outcome: Ongoing (interventions implemented as appropriate)  POC reviewed with pt verbalized understanding  Pt remained free from fall, precautions in place  Pt is ST on monitor   VS monitored  Pt on bedrest using bedpan   Pt able to turn self   IV intact blood products infusing at this time  Not other complaints at this time. Call bell in belongings in reach, hourly rounding complete, reminded to call for assist will continue monitor.

## 2019-06-20 NOTE — ED NOTES
Pt report rec'd from Cristiana LOPEZ.  Pt lying in bed in NAD.  States shortness of breath with exertion began Monday and has progressively gotten worse.  Denies any chest pain.  States this has happened in the past when her platelets get low.

## 2019-06-20 NOTE — HPI
23-year-old female with history of anemia and chronic immune thrombocytopenia presents today with complaint of weakness that started over the past 3 days.  Activity worsens.  Associated symptoms include shortness of breath.  Prior treatment at home therapy.  Patient was seen in the emergency room H&H 3.4/12.6, platelet count 7.  Hematology/oncology was consulted and recommended transfusion of blood and notes patient's platelet count is at baseline.  3 U packed red blood cells ordered.  Hospital Medicine consult.  Patient admitted.

## 2019-06-20 NOTE — PLAN OF CARE
06/20/19 1129   Discharge Assessment   Assessment Type Discharge Planning Assessment   Confirmed/corrected address and phone number on facesheet? Yes   Assessment information obtained from? Patient;Caregiver   Prior to hospitilization cognitive status: Alert/Oriented   Prior to hospitalization functional status: Independent   Current cognitive status:   (Nauseated, somnolent.)   Current Functional Status: Needs Assistance   Lives With child(kathryn), dependent;spouse;parent(s)   Able to Return to Prior Arrangements yes   Is patient able to care for self after discharge? Yes   Who are your caregiver(s) and their phone number(s)? Sravan Kaur (fiance) [phone], Dk Hill (mother) [phone]   Patient's perception of discharge disposition home or selfcare   Readmission Within the Last 30 Days no previous admission in last 30 days   Patient currently being followed by outpatient case management? No   Patient currently receives any other outside agency services? No   Equipment Currently Used at Home none   Do you have any problems affording any of your prescribed medications? No   Is the patient taking medications as prescribed? no   If no, which medications is patient not taking? Masood reports that pt takes promacta and sometimes does not remember whether she's taken it due to feeding schedule. He has purchased a pill organizer for her use after DC.   Does the patient have transportation home? Yes   Transportation Anticipated family or friend will provide   Does the patient receive services at the [facility]? No   Discharge Plan A Home with family   DME Needed Upon Discharge  none   Patient/Family in Agreement with Plan yes     Met with pt and her fiance for DC assessment. Pt was nauseated and somnolent, therefore the interview was conducted primarily with her fiancee Sravan Kaur. Per Mr. Kaur, pt mostly receives her medication from [facility] and have therefore elected for bedside delivery.   Vincent stated that he and pt are living with his mother and his 3 year old child. SWer provided a transitional care folder, information on advanced directives, information on pharmacy bedside delivery, and discharge planning begins on admission with contact information for any needs/questions. Regular pharmacy information below.    [address] [place] [id number]  [phone]  Sandeep Chávez LMSW 6/20/2019 11:41 AM

## 2019-06-20 NOTE — HPI
23-year-old female history of refractory ITP status post treatment with prednisone, intravenous immunoglobulin, Rituxan,, splenectomy.  Patient has had transient responses.  Patient has been on Promacta but has been intermittent in taking medication presented to the emergency room last night the hemoglobin of 3.2 and a platelet count of 8000 patient brought her Promacta medication in with her

## 2019-06-20 NOTE — HOSPITAL COURSE
Admitted for evaluation and treatment of symptomatic anemia related to chronic Immune Thrombocytopenic Purpura.  Severe life-threatening anemia with shortness of breath on minimal exertion. Ordered 3 units PRBC for transfusion.  Difficult match due to antibody presence.  Continued home dose of Promacta(Eltrombopag) and consultation with Hematology for assistance in management.  She also received one infusion of IVIg and Iron-Sucrose 200 mg.  Hemoglobin level increased to 6.7 and platelet level increased to 33.  Daily dose of Promacta increased to 75 mg.  Discharge plan to return home on increased daily dose of Promacta and follow up with hematology in the next week.

## 2019-06-20 NOTE — SUBJECTIVE & OBJECTIVE
Interval History: ***    Oncology Treatment Plan:   [No treatment plan]    Medications:  Continuous Infusions:  Scheduled Meds:   eltrombopag  50 mg Oral Daily    pantoprazole  40 mg Oral Daily     PRN Meds:sodium chloride     Review of Systems   Constitutional: Positive for activity change and fatigue. Negative for appetite change, chills, diaphoresis, fever and unexpected weight change.   HENT: Negative for congestion, dental problem, drooling, ear discharge, ear pain, facial swelling, hearing loss, mouth sores, nosebleeds, postnasal drip, rhinorrhea, sinus pressure, sneezing, sore throat, tinnitus, trouble swallowing and voice change.    Eyes: Negative for photophobia, pain, discharge, redness, itching and visual disturbance.   Respiratory: Positive for shortness of breath. Negative for cough, choking, chest tightness, wheezing and stridor.    Cardiovascular: Negative for chest pain, palpitations and leg swelling.   Gastrointestinal: Negative for abdominal distention, abdominal pain, anal bleeding, blood in stool, constipation, diarrhea, nausea, rectal pain and vomiting.   Endocrine: Negative for cold intolerance, heat intolerance, polydipsia, polyphagia and polyuria.   Genitourinary: Negative for decreased urine volume, difficulty urinating, dyspareunia, dysuria, enuresis, flank pain, frequency, genital sores, hematuria, menstrual problem, pelvic pain, urgency, vaginal bleeding, vaginal discharge and vaginal pain.   Musculoskeletal: Negative for arthralgias, back pain, gait problem, joint swelling, myalgias, neck pain and neck stiffness.   Skin: Negative for color change, pallor and rash.   Allergic/Immunologic: Negative for environmental allergies, food allergies and immunocompromised state.   Neurological: Positive for weakness. Negative for dizziness, tremors, seizures, syncope, facial asymmetry, speech difficulty, light-headedness, numbness and headaches.   Hematological: Negative for adenopathy. Does not  bruise/bleed easily.   Psychiatric/Behavioral: Positive for dysphoric mood. Negative for agitation, behavioral problems, confusion, decreased concentration, hallucinations, self-injury, sleep disturbance and suicidal ideas. The patient is nervous/anxious. The patient is not hyperactive.      Objective:     Vital Signs (Most Recent):  Temp: 97.4 °F (36.3 °C) (06/20/19 0723)  Pulse: (!) 127 (06/20/19 0723)  Resp: 16 (06/20/19 0723)  BP: 130/67 (06/20/19 0723)  SpO2: 100 % (06/20/19 0723) Vital Signs (24h Range):  Temp:  [97.4 °F (36.3 °C)-98.9 °F (37.2 °C)] 97.4 °F (36.3 °C)  Pulse:  [122-144] 127  Resp:  [16-26] 16  SpO2:  [100 %] 100 %  BP: (115-147)/(55-67) 130/67     Weight: 99.2 kg (218 lb 11.1 oz)  Body mass index is 40 kg/m².  Body surface area is 2.08 meters squared.      Intake/Output Summary (Last 24 hours) at 6/20/2019 0749  Last data filed at 6/19/2019 2209  Gross per 24 hour   Intake 1000 ml   Output 500 ml   Net 500 ml       Physical Exam   Constitutional: She is oriented to person, place, and time. She appears well-developed and well-nourished. She appears distressed.   HENT:   Head: Normocephalic and atraumatic.   Right Ear: External ear normal.   Left Ear: External ear normal.   Nose: Nose normal. Right sinus exhibits no maxillary sinus tenderness and no frontal sinus tenderness. Left sinus exhibits no maxillary sinus tenderness and no frontal sinus tenderness.   Mouth/Throat: Oropharynx is clear and moist. No oropharyngeal exudate.   Eyes: Pupils are equal, round, and reactive to light. Conjunctivae, EOM and lids are normal. Right eye exhibits no discharge. Left eye exhibits no discharge. Right conjunctiva is not injected. Right conjunctiva has no hemorrhage. Left conjunctiva is not injected. Left conjunctiva has no hemorrhage. No scleral icterus.   Neck: Normal range of motion. Neck supple. No JVD present. No tracheal deviation present. No thyromegaly present.   Cardiovascular: Normal rate and  regular rhythm.   Pulmonary/Chest: Effort normal. No stridor. No respiratory distress. She exhibits no tenderness.   Abdominal: Soft. She exhibits no distension and no mass. There is no splenomegaly or hepatomegaly. There is no tenderness. There is no rebound.   Musculoskeletal: Normal range of motion. She exhibits no edema or tenderness.   Lymphadenopathy:     She has no cervical adenopathy.     She has no axillary adenopathy.        Right: No supraclavicular adenopathy present.        Left: No supraclavicular adenopathy present.   Neurological: She is alert and oriented to person, place, and time. No cranial nerve deficit. Coordination normal.   Skin: Skin is dry. No rash noted. She is not diaphoretic. No erythema.   Psychiatric: She has a normal mood and affect. Her behavior is normal. Judgment and thought content normal.   Vitals reviewed.      Significant Labs:   BMP:   Recent Labs   Lab 06/19/19 1910 06/20/19  0416   * 109    135*   K 3.4* 3.2*    106   CO2 19* 20*   BUN 15 13   CREATININE 0.7 0.7   CALCIUM 8.6* 7.9*   MG  --  1.7   , CBC:   Recent Labs   Lab 06/19/19 1910 06/20/19  0416   WBC 33.17* 32.63*   HGB 3.4* 2.6*   HCT 12.6* 9.9*   PLT 7* 8*   , CMP:   Recent Labs   Lab 06/19/19 1910 06/20/19  0416    135*   K 3.4* 3.2*    106   CO2 19* 20*   * 109   BUN 15 13   CREATININE 0.7 0.7   CALCIUM 8.6* 7.9*   PROT 6.3  --    ALBUMIN 3.5  --    BILITOT 0.3  --    ALKPHOS 45*  --    AST 45*  --    ALT 65*  --    ANIONGAP 10 9   EGFRNONAA >60 >60   , Coagulation: No results for input(s): PT, INR, APTT in the last 48 hours., Haptoglobin: No results for input(s): HAPTOGLOBIN in the last 48 hours., Immunology: No results for input(s): SPEP, GRISELDA, ADRIANA, FREELAMBDALI in the last 48 hours., LDH: No results for input(s): LDHCSF, BFSOURCE in the last 48 hours., LFTs:   Recent Labs   Lab 06/19/19 1910   ALT 65*   AST 45*   ALKPHOS 45*   BILITOT 0.3   PROT 6.3   ALBUMIN 3.5    and  Reticulocytes: No results for input(s): RETIC in the last 48 hours.    Diagnostic Results:  I have reviewed all pertinent imaging results/findings within the past 24 hours.

## 2019-06-20 NOTE — ASSESSMENT & PLAN NOTE
Type screen  transfuse 3 units overnight  Iron/anemia labs in am  Last recent iron labs low...start daily iron therapy  Heme/Oncology consult.   Further evaluation/diagnostics/interventions/consults pending course

## 2019-06-20 NOTE — CONSULTS
[facility] -   Hematology/Oncology  Consult Note    Patient Name: Mari Miller  MRN: [id number]  Admission Date: 2019  Hospital Length of Stay: 1 days  Code Status: Prior   Attending Provider: Hussein Borjas MD  Consulting Provider: Nash Cota MD  Primary Care Physician: Ifeanyi Tello NP  Principal Problem:Iron deficiency anemia due to chronic blood loss    Consults  Subjective:     HPI:  23-year-old female history of refractory ITP status post treatment with prednisone, intravenous immunoglobulin, Rituxan,, splenectomy.  Patient has had transient responses.  Patient has been on Promacta but has been intermittent in taking medication presented to the emergency room last night the hemoglobin of 3.2 and a platelet count of 8000 patient brought her Promacta medication in with her    Oncology Treatment Plan:   [No treatment plan]    Medications:  Continuous Infusions:  Scheduled Meds:   eltrombopag  50 mg Oral Daily    pantoprazole  40 mg Oral Daily     PRN Meds:sodium chloride     Review of patient's allergies indicates:   Allergen Reactions    Aspirin      Unable to take because of illness    Ibuprofen Other (See Comments)     Cannot take due to Blood Disorder        Past Medical History:   Diagnosis Date    Encounter for blood transfusion     Thrombocytopenia      Past Surgical History:   Procedure Laterality Date     SECTION      x1    XI ROBOTIC SPLENECTOMY N/A 2018    Performed by Yobani Lara MD at [facility]     Family History     None        Tobacco Use    Smoking status: Never Smoker    Smokeless tobacco: Never Used   Substance and Sexual Activity    Alcohol use: No     Frequency: Never    Drug use: No    Sexual activity: Not on file       Review of Systems   Constitutional: Positive for activity change and fatigue. Negative for appetite change, chills, diaphoresis, fever and unexpected weight change.   HENT: Negative for congestion, dental  problem, drooling, ear discharge, ear pain, facial swelling, hearing loss, mouth sores, nosebleeds, postnasal drip, rhinorrhea, sinus pressure, sneezing, sore throat, tinnitus, trouble swallowing and voice change.    Eyes: Negative for photophobia, pain, discharge, redness, itching and visual disturbance.   Respiratory: Positive for shortness of breath. Negative for cough, choking, chest tightness, wheezing and stridor.    Cardiovascular: Negative for chest pain, palpitations and leg swelling.   Gastrointestinal: Negative for abdominal distention, abdominal pain, anal bleeding, blood in stool, constipation, diarrhea, nausea, rectal pain and vomiting.   Endocrine: Negative for cold intolerance, heat intolerance, polydipsia, polyphagia and polyuria.   Genitourinary: Negative for decreased urine volume, difficulty urinating, dyspareunia, dysuria, enuresis, flank pain, frequency, genital sores, hematuria, menstrual problem, pelvic pain, urgency, vaginal bleeding, vaginal discharge and vaginal pain.   Musculoskeletal: Negative for arthralgias, back pain, gait problem, joint swelling, myalgias, neck pain and neck stiffness.   Skin: Negative for color change, pallor and rash.   Allergic/Immunologic: Negative for environmental allergies, food allergies and immunocompromised state.   Neurological: Positive for weakness. Negative for dizziness, tremors, seizures, syncope, facial asymmetry, speech difficulty, light-headedness, numbness and headaches.   Hematological: Negative for adenopathy. Does not bruise/bleed easily.   Psychiatric/Behavioral: Positive for dysphoric mood. Negative for agitation, behavioral problems, confusion, decreased concentration, hallucinations, self-injury, sleep disturbance and suicidal ideas. The patient is nervous/anxious. The patient is not hyperactive.      Objective:     Vital Signs (Most Recent):  Temp: 97.4 °F (36.3 °C) (06/20/19 0723)  Pulse: (!) 127 (06/20/19 0723)  Resp: 16 (06/20/19  0723)  BP: 130/67 (06/20/19 0723)  SpO2: 100 % (06/20/19 0723) Vital Signs (24h Range):  Temp:  [97.4 °F (36.3 °C)-98.9 °F (37.2 °C)] 97.4 °F (36.3 °C)  Pulse:  [122-144] 127  Resp:  [16-26] 16  SpO2:  [100 %] 100 %  BP: (115-147)/(55-67) 130/67     Weight: 99.2 kg (218 lb 11.1 oz)  Body mass index is 40 kg/m².  Body surface area is 2.08 meters squared.      Intake/Output Summary (Last 24 hours) at 6/20/2019 0754  Last data filed at 6/19/2019 2209  Gross per 24 hour   Intake 1000 ml   Output 500 ml   Net 500 ml       Physical Exam   Constitutional: She is oriented to person, place, and time. She appears well-developed and well-nourished. She appears distressed.   HENT:   Head: Normocephalic and atraumatic.   Right Ear: External ear normal.   Left Ear: External ear normal.   Nose: Nose normal. Right sinus exhibits no maxillary sinus tenderness and no frontal sinus tenderness. Left sinus exhibits no maxillary sinus tenderness and no frontal sinus tenderness.   Mouth/Throat: Oropharynx is clear and moist. No oropharyngeal exudate.   Eyes: Pupils are equal, round, and reactive to light. Conjunctivae, EOM and lids are normal. Right eye exhibits no discharge. Left eye exhibits no discharge. Right conjunctiva is not injected. Right conjunctiva has no hemorrhage. Left conjunctiva is not injected. Left conjunctiva has no hemorrhage. No scleral icterus.   Neck: Normal range of motion. Neck supple. No JVD present. No tracheal deviation present. No thyromegaly present.   Cardiovascular: Normal rate, regular rhythm and normal heart sounds.   Pulmonary/Chest: Effort normal and breath sounds normal. No stridor. No respiratory distress. She exhibits no tenderness.   Abdominal: Soft. Bowel sounds are normal. She exhibits no distension and no mass. There is no splenomegaly or hepatomegaly. There is no tenderness. There is no rebound.   Musculoskeletal: Normal range of motion. She exhibits no edema or tenderness.   Lymphadenopathy:      She has no cervical adenopathy.     She has no axillary adenopathy.        Right: No supraclavicular adenopathy present.        Left: No supraclavicular adenopathy present.   Neurological: She is alert and oriented to person, place, and time. No cranial nerve deficit. Coordination normal.   Skin: Skin is dry. No rash noted. She is not diaphoretic. No erythema.   Psychiatric: She has a normal mood and affect. Her behavior is normal. Judgment and thought content normal.   Vitals reviewed.      Significant Labs:   BMP:   Recent Labs   Lab 06/19/19 1910 06/20/19  0416   * 109    135*   K 3.4* 3.2*    106   CO2 19* 20*   BUN 15 13   CREATININE 0.7 0.7   CALCIUM 8.6* 7.9*   MG  --  1.7   , CBC:   Recent Labs   Lab 06/19/19 1910 06/20/19  0416   WBC 33.17* 32.63*   HGB 3.4* 2.6*   HCT 12.6* 9.9*   PLT 7* 8*   , CMP:   Recent Labs   Lab 06/19/19 1910 06/20/19  0416    135*   K 3.4* 3.2*    106   CO2 19* 20*   * 109   BUN 15 13   CREATININE 0.7 0.7   CALCIUM 8.6* 7.9*   PROT 6.3  --    ALBUMIN 3.5  --    BILITOT 0.3  --    ALKPHOS 45*  --    AST 45*  --    ALT 65*  --    ANIONGAP 10 9   EGFRNONAA >60 >60   , Coagulation: No results for input(s): PT, INR, APTT in the last 48 hours. and Haptoglobin: No results for input(s): HAPTOGLOBIN in the last 48 hours.    Diagnostic Results:  I have reviewed and interpreted all pertinent imaging results/findings within the past 24 hours.    Assessment/Plan:     Chronic ITP (idiopathic thrombocytopenia)  Refractory ITP status post multiple treatments.  Patient has had transient responses to intravenous immunoglobulin and to splenectomy.  At this point I have had ask the pharmacy to review her medication intake on Promacta in appears that she has been taking the medicine intermittently.  Would recommend that patient resume Promacta on a daily basis to see whether not this medication will have some response very difficult situation will documented  disease she has iron deficiency in treat with intravenous iron.  His no obvious bleeding at the present time but her hemoglobin has fallen to 3.2 she does state that she has not had a particularly heavy.  But this is most obvious source.  If the patient is iron deficient would recommend that patient be given Venofer 200 mg IV piggyback today while in the hospital and will have to have discussion with her in terms of her medication adherence otherwise will need to use injectable outpatient medic patient as a thrombopoetic agent to try to increase platelet count    1243 the patient has not been taking her Promacta as ordered she has had 2 different doses 50 mg tablets and 25 mg tablets initially she is taking them a different rates I have told her in the past 75 mg.  Because of her low platelet count I am concerned of her potential bleeding I will proceed with intravenous immunoglobulin at a g per kg ideal body weight to try to raise her platelet count to a safer level as we try to stabilize her hemoglobin.  I have asked her to bring all of her medicines since for our review  Thank you for your consult. I will follow-up with patient. Please contact us if you have any additional questions.    Nash Cota MD  Hematology/Oncology  [facility] -

## 2019-06-20 NOTE — SUBJECTIVE & OBJECTIVE
Past Medical History:   Diagnosis Date    Encounter for blood transfusion     Thrombocytopenia        Past Surgical History:   Procedure Laterality Date     SECTION      x1    XI ROBOTIC SPLENECTOMY N/A 2018    Performed by Yob[facility] Lara [facility] at [facility]       Review of patient's allergies indicates:   Allergen Reactions    Aspirin      Unable to take because of illness    Ibuprofen Other (See Comments)     Cannot take due to Blood Disorder       Current Facility-Administered Medications on File Prior to Encounter   Medication    meningococcal polysaccharide injection 0.5 mL    ondansetron injection 4 mg     Current Outpatient Medications on File Prior to Encounter   Medication Sig    eltrombopag (PROMACTA) 25 MG Tab Take 2 tablets (50 mg total) by mouth once daily.     Family History     None        Tobacco Use    Smoking status: Never Smoker    Smokeless tobacco: Never Used   Substance and Sexual Activity    Alcohol use: No     Frequency: Never    Drug use: No    Sexual activity: Not on file     Review of Systems   Constitutional: Positive for activity change. Negative for chills, diaphoresis, fatigue and fever.   HENT: Negative for congestion, sore throat and voice change.    Eyes: Negative for photophobia and visual disturbance.   Respiratory: Positive for shortness of breath. Negative for cough, wheezing and stridor.    Cardiovascular: Negative for chest pain and leg swelling.   Gastrointestinal: Negative for abdominal distention, abdominal pain, constipation, diarrhea, nausea and vomiting.   Endocrine: Negative for polydipsia, polyphagia and polyuria.   Genitourinary: Negative for difficulty urinating, dysuria, flank pain, pelvic pain, urgency and vaginal discharge.   Musculoskeletal: Negative for back pain, joint swelling, neck pain and neck stiffness.   Skin: Negative for color change and rash.   Allergic/Immunologic: Negative for immunocompromised state.   Neurological: Negative for  dizziness, syncope, weakness, numbness and headaches.   Hematological: Does not bruise/bleed easily.   Psychiatric/Behavioral: Negative for agitation, behavioral problems and confusion.     Objective:     Vital Signs (Most Recent):  Temp: 98.6 °F (37 °C) (06/19/19 1750)  Pulse: (!) 125 (06/19/19 1806)  Resp: (!) 21 (06/19/19 1751)  BP: 132/65 (06/19/19 1803)  SpO2: 100 % (06/19/19 1803) Vital Signs (24h Range):  Temp:  [98.6 °F (37 °C)] 98.6 °F (37 °C)  Pulse:  [125-144] 125  Resp:  [20-21] 21  SpO2:  [100 %] 100 %  BP: (115-132)/(56-65) 132/65     Weight: 96.3 kg (212 lb 6.4 oz)  Body mass index is 38.85 kg/m².    Physical Exam   Constitutional: She is oriented to person, place, and time. She appears well-developed and well-nourished. No distress.   HENT:   Head: Normocephalic and atraumatic.   Nose: Nose normal.   Pale oral membranes     Eyes: Pupils are equal, round, and reactive to light. Conjunctivae and EOM are normal. No scleral icterus.   Neck: Normal range of motion. Neck supple. No tracheal deviation present.   Cardiovascular: Normal rate, regular rhythm, normal heart sounds and intact distal pulses.   No murmur heard.  Pulmonary/Chest: Effort normal and breath sounds normal. No stridor. No respiratory distress. She has no wheezes. She has no rales.   Abdominal: Soft. Bowel sounds are normal. She exhibits no distension. There is no tenderness. There is no guarding.   Genitourinary:   Genitourinary Comments: Check ua     Musculoskeletal: Normal range of motion. She exhibits no edema or deformity.   Neurological: She is alert and oriented to person, place, and time. No cranial nerve deficit.   Skin: Skin is warm and dry. Capillary refill takes 2 to 3 seconds. No rash noted. She is not diaphoretic. There is pallor.   Psychiatric: She has a normal mood and affect. Her behavior is normal. Judgment and thought content normal.   Nursing note and vitals reviewed.        CRANIAL NERVES     CN III, IV, VI   Pupils  are equal, round, and reactive to light.  Extraocular motions are normal.        Significant Labs: All pertinent labs within the past 24 hours have been reviewed.  Results for orders placed or performed during the hospital encounter of 06/19/19   CBC auto differential   Result Value Ref Range    WBC 33.17 (H) 3.90 - 12.70 K/uL    RBC 1.92 (L) 4.00 - 5.40 M/uL    Hemoglobin 3.4 (LL) 12.0 - 16.0 g/dL    Hematocrit 12.6 (LL) 37.0 - 48.5 %    Mean Corpuscular Volume 66 (L) 82 - 98 fL    Mean Corpuscular Hemoglobin 17.7 (L) 27.0 - 31.0 pg    Mean Corpuscular Hemoglobin Conc 27.0 (L) 32.0 - 36.0 g/dL    RDW 20.7 (H) 11.5 - 14.5 %    Platelets 7 (LL) 150 - 350 K/uL    MPV SEE COMMENT 9.2 - 12.9 fL    Gran # (ANC) 28.2 (H) 1.8 - 7.7 K/uL    Lymph # 2.9 1.0 - 4.8 K/uL    Mono # 2.0 (H) 0.3 - 1.0 K/uL    Eos # 0.0 0.0 - 0.5 K/uL    Baso # 0.06 0.00 - 0.20 K/uL    Gran% 85.1 (H) 38.0 - 73.0 %    Lymph% 8.8 (L) 18.0 - 48.0 %    Mono% 5.9 4.0 - 15.0 %    Eosinophil% 0.0 0.0 - 8.0 %    Basophil% 0.2 0.0 - 1.9 %    Platelet Estimate Decreased (A)     Poly Occasional     Hypo Marked     Target Cells Moderate     Tear Drop Cells Occasional     Schistocytes Present     Differential Method Automated    Comprehensive metabolic panel   Result Value Ref Range    Sodium 136 136 - 145 mmol/L    Potassium 3.4 (L) 3.5 - 5.1 mmol/L    Chloride 107 95 - 110 mmol/L    CO2 19 (L) 23 - 29 mmol/L    Glucose 122 (H) 70 - 110 mg/dL    BUN, Bld 15 6 - 20 mg/dL    Creatinine 0.7 0.5 - 1.4 mg/dL    Calcium 8.6 (L) 8.7 - 10.5 mg/dL    Total Protein 6.3 6.0 - 8.4 g/dL    Albumin 3.5 3.5 - 5.2 g/dL    Total Bilirubin 0.3 0.1 - 1.0 mg/dL    Alkaline Phosphatase 45 (L) 55 - 135 U/L    AST 45 (H) 10 - 40 U/L    ALT 65 (H) 10 - 44 U/L    Anion Gap 10 8 - 16 mmol/L    eGFR if African American >60 >60 mL/min/1.73 m^2    eGFR if non African American >60 >60 mL/min/1.73 m^2   hCG, quantitative, pregnancy   Result Value Ref Range    hCG Quant <1.2 See Text mIU/mL    Urinalysis, Reflex to Urine Culture Urine, Clean Catch   Result Value Ref Range    Specimen UA Urine, Catheterized     Color, UA Yellow Yellow, Straw, Tati    Appearance, UA Clear Clear    pH, UA 6.0 5.0 - 8.0    Specific Gravity, UA 1.015 1.005 - 1.030    Protein, UA Negative Negative    Glucose, UA Negative Negative    Ketones, UA Trace (A) Negative    Bilirubin (UA) Negative Negative    Occult Blood UA Negative Negative    Nitrite, UA Negative Negative    Urobilinogen, UA Negative <2.0 EU/dL    Leukocytes, UA Negative Negative   Pregnancy, urine rapid   Result Value Ref Range    Preg Test, Ur Negative        Significant Imaging: I have reviewed all pertinent imaging results/findings within the past 24 hours.   Imaging Results          X-Ray Chest AP Portable (Final result)  Result time 06/19/19 18:49:41    Final result by Mark Donovan MD (06/19/19 18:49:41)                 Impression:      Negative single view chest x-ray.      Electronically signed by: Mark Donovan MD  Date:    06/19/2019  Time:    18:49             Narrative:    EXAMINATION:  XR CHEST AP PORTABLE    CLINICAL HISTORY:  Shortness of breath and weakness., Shortness of breath;    COMPARISON:  None    FINDINGS:  Heart size is normal. The lung fields are clear. No acute cardiopulmonary infiltrate.

## 2019-06-21 VITALS
HEART RATE: 104 BPM | BODY MASS INDEX: 40.25 KG/M2 | RESPIRATION RATE: 18 BRPM | WEIGHT: 218.69 LBS | OXYGEN SATURATION: 98 % | HEIGHT: 62 IN | DIASTOLIC BLOOD PRESSURE: 64 MMHG | TEMPERATURE: 99 F | SYSTOLIC BLOOD PRESSURE: 119 MMHG

## 2019-06-21 LAB
ANION GAP SERPL CALC-SCNC: 7 MMOL/L (ref 8–16)
BASOPHILS # BLD AUTO: ABNORMAL K/UL (ref 0–0.2)
BASOPHILS NFR BLD: 0 % (ref 0–1.9)
BUN SERPL-MCNC: 11 MG/DL (ref 6–20)
CALCIUM SERPL-MCNC: 7.8 MG/DL (ref 8.7–10.5)
CHLORIDE SERPL-SCNC: 107 MMOL/L (ref 95–110)
CO2 SERPL-SCNC: 19 MMOL/L (ref 23–29)
CREAT SERPL-MCNC: 0.7 MG/DL (ref 0.5–1.4)
DIFFERENTIAL METHOD: ABNORMAL
EOSINOPHIL # BLD AUTO: ABNORMAL K/UL (ref 0–0.5)
EOSINOPHIL NFR BLD: 0 % (ref 0–8)
ERYTHROCYTE [DISTWIDTH] IN BLOOD BY AUTOMATED COUNT: 20.1 % (ref 11.5–14.5)
EST. GFR  (AFRICAN AMERICAN): >60 ML/MIN/1.73 M^2
EST. GFR  (NON AFRICAN AMERICAN): >60 ML/MIN/1.73 M^2
GLUCOSE SERPL-MCNC: 93 MG/DL (ref 70–110)
HBA1C MFR BLD: NORMAL % (ref 4–5.6)
HCT VFR BLD AUTO: 21.7 % (ref 37–48.5)
HGB BLD-MCNC: 6.7 G/DL (ref 12–16)
LYMPHOCYTES # BLD AUTO: ABNORMAL K/UL (ref 1–4.8)
LYMPHOCYTES NFR BLD: 15 % (ref 18–48)
MCH RBC QN AUTO: 24.3 PG (ref 27–31)
MCHC RBC AUTO-ENTMCNC: 30.9 G/DL (ref 32–36)
MCV RBC AUTO: 79 FL (ref 82–98)
MONOCYTES # BLD AUTO: ABNORMAL K/UL (ref 0.3–1)
MONOCYTES NFR BLD: 5 % (ref 4–15)
NEUTROPHILS NFR BLD: 80 % (ref 38–73)
PLATELET # BLD AUTO: 33 K/UL (ref 150–350)
PLATELET BLD QL SMEAR: ABNORMAL
PMV BLD AUTO: ABNORMAL FL (ref 9.2–12.9)
POTASSIUM SERPL-SCNC: 3.6 MMOL/L (ref 3.5–5.1)
RBC # BLD AUTO: 2.76 M/UL (ref 4–5.4)
SODIUM SERPL-SCNC: 133 MMOL/L (ref 136–145)
WBC # BLD AUTO: 21.24 K/UL (ref 3.9–12.7)

## 2019-06-21 PROCEDURE — 25000003 PHARM REV CODE 250: Performed by: NURSE PRACTITIONER

## 2019-06-21 PROCEDURE — 85027 COMPLETE CBC AUTOMATED: CPT

## 2019-06-21 PROCEDURE — 85007 BL SMEAR W/DIFF WBC COUNT: CPT

## 2019-06-21 PROCEDURE — 99233 SBSQ HOSP IP/OBS HIGH 50: CPT | Mod: ,,, | Performed by: INTERNAL MEDICINE

## 2019-06-21 PROCEDURE — 36415 COLL VENOUS BLD VENIPUNCTURE: CPT

## 2019-06-21 PROCEDURE — 63600175 PHARM REV CODE 636 W HCPCS: Performed by: INTERNAL MEDICINE

## 2019-06-21 PROCEDURE — 99233 PR SUBSEQUENT HOSPITAL CARE,LEVL III: ICD-10-PCS | Mod: ,,, | Performed by: INTERNAL MEDICINE

## 2019-06-21 PROCEDURE — 80048 BASIC METABOLIC PNL TOTAL CA: CPT

## 2019-06-21 RX ADMIN — IRON SUCROSE 200 MG: 20 INJECTION, SOLUTION INTRAVENOUS at 10:06

## 2019-06-21 RX ADMIN — PANTOPRAZOLE SODIUM 40 MG: 40 TABLET, DELAYED RELEASE ORAL at 10:06

## 2019-06-21 NOTE — PLAN OF CARE
Problem: Adult Inpatient Plan of Care  Goal: Plan of Care Review  Outcome: Ongoing (interventions implemented as appropriate)  Pt aware of the importance of not ignoring signs and symptoms associated with her chronic thrombocytopenia, I.e., weakness, syncope, dizziness and verbalized that it is important to notify her physician before the disease process exacerbates.

## 2019-06-21 NOTE — ASSESSMENT & PLAN NOTE
Refractory ITP status post multiple treatments.  Patient has had transient responses to intravenous immunoglobulin and to splenectomy.  At this point I have had ask the pharmacy to review her medication intake on Promacta in appears that she has been taking the medicine intermittently.  Would recommend that patient resume Promacta on a daily basis to see whether not this medication will have some response very difficult situation will documented disease she has iron deficiency in treat with intravenous iron.  His no obvious bleeding at the present time but her hemoglobin has fallen to 3.2 she does state that she has not had a particularly heavy.  But this is most obvious source.  If the patient is iron deficient would recommend that patient be given Venofer 200 mg IV piggyback today while in the hospital and will have to have discussion with her in terms of her medication adherence otherwise will need to use injectable outpatient medic patient as a thrombopoetic agent to try to increase platelet count    June 21, 2019  --IGG given on yesterday with good response in platelet count. Platelet count 33.  Hemoglobin 6.7.  Stable for discharge. Should improve with IV iron on board.  --evidence of iron deficiency with saturated iron 3%.  Ferritin 4.  IV Venofer given yesterday.  Patient will have continued follow-up of iron deficiency in Heme-Onc clinic  --increase home dose of Promacta to 75 mg p.o. daily.  --Will see patient on 06/25/2019 for hospital follow-up.  Appointment already made.  Discussed bleeding precautions/anemia signs and symptoms with patient.  She knows to report to ED for any worsening symptoms.

## 2019-06-21 NOTE — SUBJECTIVE & OBJECTIVE
Interval History:  Patient reports feeling much better status post blood transfusion.  SOB resolved.  Patient did receive dose of IGG on yesterday with good response and platelet count.  Platelet count 33.  Hemoglobin 6.7  Oncology Treatment Plan:   [No treatment plan]    Medications:  Continuous Infusions:  Scheduled Meds:   eltrombopag  25 mg Oral Once    [START ON 2019] eltrombopag  75 mg Oral Daily    Immune Globulin G (IGG)-PRO-IGA 10 % injection (Privigen)  1,000 mg/kg (Order-Specific) Intravenous Once    iron sucrose  200 mg Intravenous Q7 Days    pantoprazole  40 mg Oral Daily     PRN Meds:sodium chloride, ondansetron     Review of patient's allergies indicates:   Allergen Reactions    Aspirin      Unable to take because of illness    Ibuprofen Other (See Comments)     Cannot take due to Blood Disorder        Past Medical History:   Diagnosis Date    Encounter for blood transfusion     Thrombocytopenia      Past Surgical History:   Procedure Laterality Date     SECTION      x1    XI ROBOTIC SPLENECTOMY N/A 2018    Performed by Yobani Lara MD at [facility] OR     Family History     None        Tobacco Use    Smoking status: Never Smoker    Smokeless tobacco: Never Used   Substance and Sexual Activity    Alcohol use: No     Frequency: Never    Drug use: No    Sexual activity: Not on file       Review of Systems   Constitutional: Positive for activity change and fatigue. Negative for appetite change, chills, diaphoresis, fever and unexpected weight change.   HENT: Negative for congestion, dental problem, drooling, ear discharge, ear pain, facial swelling, hearing loss, mouth sores, nosebleeds, postnasal drip, rhinorrhea, sinus pressure, sneezing, sore throat, tinnitus, trouble swallowing and voice change.    Eyes: Negative for photophobia, pain, discharge, redness, itching and visual disturbance.   Respiratory: Positive for shortness of breath. Negative for cough, choking, chest  tightness, wheezing and stridor.    Cardiovascular: Negative for chest pain, palpitations and leg swelling.   Gastrointestinal: Negative for abdominal distention, abdominal pain, anal bleeding, blood in stool, constipation, diarrhea, nausea, rectal pain and vomiting.   Endocrine: Negative for cold intolerance, heat intolerance, polydipsia, polyphagia and polyuria.   Genitourinary: Negative for decreased urine volume, difficulty urinating, dyspareunia, dysuria, enuresis, flank pain, frequency, genital sores, hematuria, menstrual problem, pelvic pain, urgency, vaginal bleeding, vaginal discharge and vaginal pain.   Musculoskeletal: Negative for arthralgias, back pain, gait problem, joint swelling, myalgias, neck pain and neck stiffness.   Skin: Negative for color change, pallor and rash.   Allergic/Immunologic: Negative for environmental allergies, food allergies and immunocompromised state.   Neurological: Positive for weakness. Negative for dizziness, tremors, seizures, syncope, facial asymmetry, speech difficulty, light-headedness, numbness and headaches.   Hematological: Negative for adenopathy. Does not bruise/bleed easily.   Psychiatric/Behavioral: Positive for dysphoric mood. Negative for agitation, behavioral problems, confusion, decreased concentration, hallucinations, self-injury, sleep disturbance and suicidal ideas. The patient is nervous/anxious. The patient is not hyperactive.      Objective:     Vital Signs (Most Recent):  Temp: 98.2 °F (36.8 °C) (06/21/19 0815)  Pulse: 107 (06/21/19 0815)  Resp: 18 (06/21/19 0815)  BP: 123/64 (06/21/19 0815)  SpO2: 100 % (06/21/19 0815) Vital Signs (24h Range):  Temp:  [97.5 °F (36.4 °C)-99.5 °F (37.5 °C)] 98.2 °F (36.8 °C)  Pulse:  [100-150] 107  Resp:  [16-19] 18  SpO2:  [99 %-100 %] 100 %  BP: (116-186)/(55-86) 123/64     Weight: 99.2 kg (218 lb 11.1 oz)  Body mass index is 40 kg/m².  Body surface area is 2.08 meters squared.      Intake/Output Summary (Last 24  hours) at 6/21/2019 1124  Last data filed at 6/21/2019 0800  Gross per 24 hour   Intake 1494.67 ml   Output --   Net 1494.67 ml       Physical Exam   Constitutional: She is oriented to person, place, and time. She appears well-developed and well-nourished. She appears distressed.   HENT:   Head: Normocephalic and atraumatic.   Right Ear: External ear normal.   Left Ear: External ear normal.   Nose: Nose normal. Right sinus exhibits no maxillary sinus tenderness and no frontal sinus tenderness. Left sinus exhibits no maxillary sinus tenderness and no frontal sinus tenderness.   Mouth/Throat: Oropharynx is clear and moist. No oropharyngeal exudate.   Eyes: Pupils are equal, round, and reactive to light. Conjunctivae, EOM and lids are normal. Right eye exhibits no discharge. Left eye exhibits no discharge. Right conjunctiva is not injected. Right conjunctiva has no hemorrhage. Left conjunctiva is not injected. Left conjunctiva has no hemorrhage. No scleral icterus.   Neck: Normal range of motion. Neck supple. No JVD present. No tracheal deviation present. No thyromegaly present.   Cardiovascular: Normal rate, regular rhythm and normal heart sounds.   Pulmonary/Chest: Effort normal and breath sounds normal. No stridor. No respiratory distress. She exhibits no tenderness.   Abdominal: Soft. Bowel sounds are normal. She exhibits no distension and no mass. There is no splenomegaly or hepatomegaly. There is no tenderness. There is no rebound.   Musculoskeletal: Normal range of motion. She exhibits no edema or tenderness.   Lymphadenopathy:     She has no cervical adenopathy.     She has no axillary adenopathy.        Right: No supraclavicular adenopathy present.        Left: No supraclavicular adenopathy present.   Neurological: She is alert and oriented to person, place, and time. No cranial nerve deficit. Coordination normal.   Skin: Skin is dry. No rash noted. She is not diaphoretic. No erythema.   Psychiatric: She has a  normal mood and affect. Her behavior is normal. Judgment and thought content normal.   Vitals reviewed.      Significant Labs:   BMP:   Recent Labs   Lab 06/19/19 1910 06/20/19  0416 06/21/19  0548   * 109 93    135* 133*   K 3.4* 3.2* 3.6    106 107   CO2 19* 20* 19*   BUN 15 13 11   CREATININE 0.7 0.7 0.7   CALCIUM 8.6* 7.9* 7.8*   MG  --  1.7  --    , CBC:   Recent Labs   Lab 06/19/19 1910 06/20/19  0416 06/21/19  0846   WBC 33.17* 32.63* 21.24*   HGB 3.4* 2.6* 6.7*   HCT 12.6* 9.9* 21.7*   PLT 7* 8* 33*   , CMP:   Recent Labs   Lab 06/19/19 1910 06/20/19  0416 06/21/19  0548    135* 133*   K 3.4* 3.2* 3.6    106 107   CO2 19* 20* 19*   * 109 93   BUN 15 13 11   CREATININE 0.7 0.7 0.7   CALCIUM 8.6* 7.9* 7.8*   PROT 6.3  --   --    ALBUMIN 3.5  --   --    BILITOT 0.3  --   --    ALKPHOS 45*  --   --    AST 45*  --   --    ALT 65*  --   --    ANIONGAP 10 9 7*   EGFRNONAA >60 >60 >60   , Coagulation: No results for input(s): PT, INR, APTT in the last 48 hours. and Haptoglobin: No results for input(s): HAPTOGLOBIN in the last 48 hours.    Diagnostic Results:  I have reviewed and interpreted all pertinent imaging results/findings within the past 24 hours.

## 2019-06-21 NOTE — NURSING
Pt discharged per MD order. Pt instructions and handout given to pt. Pt instructed to schedule and keep all f/u appointments. Pt verbalizes understanding. IV removed, tele box returned to monitor tech. Pt advised to call out when meds delivered from [facility] and ready to go down.

## 2019-06-21 NOTE — SIGNIFICANT EVENT
Patient Deterioration Alert     Deterioration alert received.  Patient seen and examined, in NAD.  Denies any complaints at present.  VS remain stable and consistent for patient's current condition.  Patient here to receive multiple units of blood.  No acute change in patient status.  Continue current treatment plan.        Terry Ray, NP

## 2019-06-21 NOTE — PROGRESS NOTES
[facility]  Hematology/Oncology  Progress Note    Patient Name: Mari Miller  Admission Date: 2019  Hospital Length of Stay: 2 days  Code Status: Full Code     Subjective:     HPI:  23-year-old female history of refractory ITP status post treatment with prednisone, intravenous immunoglobulin, Rituxan,, splenectomy.  Patient has had transient responses.  Patient has been on Promacta but has been intermittent in taking medication presented to the emergency room last night the hemoglobin of 3.2 and a platelet count of 8000 patient brought her Promacta medication in with her    Interval History:  Patient reports feeling much better status post blood transfusion.  SOB resolved.  Patient did receive dose of IGG on yesterday with good response and platelet count.  Platelet count 33.  Hemoglobin 6.7  Oncology Treatment Plan:   [No treatment plan]    Medications:  Continuous Infusions:  Scheduled Meds:   eltrombopag  25 mg Oral Once    [START ON 2019] eltrombopag  75 mg Oral Daily    Immune Globulin G (IGG)-PRO-IGA 10 % injection (Privigen)  1,000 mg/kg (Order-Specific) Intravenous Once    iron sucrose  200 mg Intravenous Q7 Days    pantoprazole  40 mg Oral Daily     PRN Meds:sodium chloride, ondansetron     Review of patient's allergies indicates:   Allergen Reactions    Aspirin      Unable to take because of illness    Ibuprofen Other (See Comments)     Cannot take due to Blood Disorder        Past Medical History:   Diagnosis Date    Encounter for blood transfusion     Thrombocytopenia      Past Surgical History:   Procedure Laterality Date     SECTION      x1    XI ROBOTIC SPLENECTOMY N/A 2018    Performed by Yobani Lara MD at [facility] OR     Family History     None        Tobacco Use    Smoking status: Never Smoker    Smokeless tobacco: Never Used   Substance and Sexual Activity    Alcohol use: No     Frequency: Never    Drug use: No    Sexual activity:  Not on file       Review of Systems   Constitutional: Positive for activity change and fatigue. Negative for appetite change, chills, diaphoresis, fever and unexpected weight change.   HENT: Negative for congestion, dental problem, drooling, ear discharge, ear pain, facial swelling, hearing loss, mouth sores, nosebleeds, postnasal drip, rhinorrhea, sinus pressure, sneezing, sore throat, tinnitus, trouble swallowing and voice change.    Eyes: Negative for photophobia, pain, discharge, redness, itching and visual disturbance.   Respiratory: Positive for shortness of breath. Negative for cough, choking, chest tightness, wheezing and stridor.    Cardiovascular: Negative for chest pain, palpitations and leg swelling.   Gastrointestinal: Negative for abdominal distention, abdominal pain, anal bleeding, blood in stool, constipation, diarrhea, nausea, rectal pain and vomiting.   Endocrine: Negative for cold intolerance, heat intolerance, polydipsia, polyphagia and polyuria.   Genitourinary: Negative for decreased urine volume, difficulty urinating, dyspareunia, dysuria, enuresis, flank pain, frequency, genital sores, hematuria, menstrual problem, pelvic pain, urgency, vaginal bleeding, vaginal discharge and vaginal pain.   Musculoskeletal: Negative for arthralgias, back pain, gait problem, joint swelling, myalgias, neck pain and neck stiffness.   Skin: Negative for color change, pallor and rash.   Allergic/Immunologic: Negative for environmental allergies, food allergies and immunocompromised state.   Neurological: Positive for weakness. Negative for dizziness, tremors, seizures, syncope, facial asymmetry, speech difficulty, light-headedness, numbness and headaches.   Hematological: Negative for adenopathy. Does not bruise/bleed easily.   Psychiatric/Behavioral: Positive for dysphoric mood. Negative for agitation, behavioral problems, confusion, decreased concentration, hallucinations, self-injury, sleep disturbance and  suicidal ideas. The patient is nervous/anxious. The patient is not hyperactive.      Objective:     Vital Signs (Most Recent):  Temp: 98.2 °F (36.8 °C) (06/21/19 0815)  Pulse: 107 (06/21/19 0815)  Resp: 18 (06/21/19 0815)  BP: 123/64 (06/21/19 0815)  SpO2: 100 % (06/21/19 0815) Vital Signs (24h Range):  Temp:  [97.5 °F (36.4 °C)-99.5 °F (37.5 °C)] 98.2 °F (36.8 °C)  Pulse:  [100-150] 107  Resp:  [16-19] 18  SpO2:  [99 %-100 %] 100 %  BP: (116-186)/(55-86) 123/64     Weight: 99.2 kg (218 lb 11.1 oz)  Body mass index is 40 kg/m².  Body surface area is 2.08 meters squared.      Intake/Output Summary (Last 24 hours) at 6/21/2019 1124  Last data filed at 6/21/2019 0800  Gross per 24 hour   Intake 1494.67 ml   Output --   Net 1494.67 ml       Physical Exam   Constitutional: She is oriented to person, place, and time. She appears well-developed and well-nourished. She appears distressed.   HENT:   Head: Normocephalic and atraumatic.   Right Ear: External ear normal.   Left Ear: External ear normal.   Nose: Nose normal. Right sinus exhibits no maxillary sinus tenderness and no frontal sinus tenderness. Left sinus exhibits no maxillary sinus tenderness and no frontal sinus tenderness.   Mouth/Throat: Oropharynx is clear and moist. No oropharyngeal exudate.   Eyes: Pupils are equal, round, and reactive to light. Conjunctivae, EOM and lids are normal. Right eye exhibits no discharge. Left eye exhibits no discharge. Right conjunctiva is not injected. Right conjunctiva has no hemorrhage. Left conjunctiva is not injected. Left conjunctiva has no hemorrhage. No scleral icterus.   Neck: Normal range of motion. Neck supple. No JVD present. No tracheal deviation present. No thyromegaly present.   Cardiovascular: Normal rate, regular rhythm and normal heart sounds.   Pulmonary/Chest: Effort normal and breath sounds normal. No stridor. No respiratory distress. She exhibits no tenderness.   Abdominal: Soft. Bowel sounds are normal. She  exhibits no distension and no mass. There is no splenomegaly or hepatomegaly. There is no tenderness. There is no rebound.   Musculoskeletal: Normal range of motion. She exhibits no edema or tenderness.   Lymphadenopathy:     She has no cervical adenopathy.     She has no axillary adenopathy.        Right: No supraclavicular adenopathy present.        Left: No supraclavicular adenopathy present.   Neurological: She is alert and oriented to person, place, and time. No cranial nerve deficit. Coordination normal.   Skin: Skin is dry. No rash noted. She is not diaphoretic. No erythema.   Psychiatric: She has a normal mood and affect. Her behavior is normal. Judgment and thought content normal.   Vitals reviewed.      Significant Labs:   BMP:   Recent Labs   Lab 06/19/19 1910 06/20/19 0416 06/21/19  0548   * 109 93    135* 133*   K 3.4* 3.2* 3.6    106 107   CO2 19* 20* 19*   BUN 15 13 11   CREATININE 0.7 0.7 0.7   CALCIUM 8.6* 7.9* 7.8*   MG  --  1.7  --    , CBC:   Recent Labs   Lab 06/19/19 1910 06/20/19 0416 06/21/19  0846   WBC 33.17* 32.63* 21.24*   HGB 3.4* 2.6* 6.7*   HCT 12.6* 9.9* 21.7*   PLT 7* 8* 33*   , CMP:   Recent Labs   Lab 06/19/19 1910 06/20/19 0416 06/21/19  0548    135* 133*   K 3.4* 3.2* 3.6    106 107   CO2 19* 20* 19*   * 109 93   BUN 15 13 11   CREATININE 0.7 0.7 0.7   CALCIUM 8.6* 7.9* 7.8*   PROT 6.3  --   --    ALBUMIN 3.5  --   --    BILITOT 0.3  --   --    ALKPHOS 45*  --   --    AST 45*  --   --    ALT 65*  --   --    ANIONGAP 10 9 7*   EGFRNONAA >60 >60 >60   , Coagulation: No results for input(s): PT, INR, APTT in the last 48 hours. and Haptoglobin: No results for input(s): HAPTOGLOBIN in the last 48 hours.    Diagnostic Results:  I have reviewed and interpreted all pertinent imaging results/findings within the past 24 hours.    Assessment/Plan:     Chronic ITP (idiopathic thrombocytopenia)  Refractory ITP status post multiple treatments.   Patient has had transient responses to intravenous immunoglobulin and to splenectomy.  At this point I have had ask the pharmacy to review her medication intake on Promacta in appears that she has been taking the medicine intermittently.  Would recommend that patient resume Promacta on a daily basis to see whether not this medication will have some response very difficult situation will documented disease she has iron deficiency in treat with intravenous iron.  His no obvious bleeding at the present time but her hemoglobin has fallen to 3.2 she does state that she has not had a particularly heavy.  But this is most obvious source.  If the patient is iron deficient would recommend that patient be given Venofer 200 mg IV piggyback today while in the hospital and will have to have discussion with her in terms of her medication adherence otherwise will need to use injectable outpatient medic patient as a thrombopoetic agent to try to increase platelet count    June 21, 2019  --IGG given on yesterday with good response in platelet count. Platelet count 33.  Hemoglobin 6.7.  Stable for discharge. Should improve with IV iron on board.  --evidence of iron deficiency with saturated iron 3%.  Ferritin 4.  IV Venofer given yesterday.  Patient will have continued follow-up of iron deficiency in Heme-Onc clinic  --increase home dose of Promacta to 75 mg p.o. daily.  --Will see patient on 06/25/2019 for hospital follow-up.  Appointment already made.  Discussed bleeding precautions/anemia signs and symptoms with patient.  She knows to report to ED for any worsening symptoms.        Thank you for your consult. I will follow-up with patient. Please contact us if you have any additional questions.     Sophy Das NP  Hematology/Oncology  [facility]

## 2019-06-21 NOTE — DISCHARGE SUMMARY
[facility] Hospital Medicine  Discharge Summary      Patient Name: Mari Miller  MRN: [id number]  Admission Date: 6/19/2019  Hospital Length of Stay: 2 days  Discharge Date and Time:  06/21/2019 1:18 PM  Attending Physician: Hussein Borjas MD   Discharging Provider: Hussein Borjas MD  Primary Care Provider: Ifeanyi Tello NP      HPI:   23-year-old female with history of anemia and chronic immune thrombocytopenia presents today with complaint of weakness that started over the past 3 days.  Activity worsens.  Associated symptoms include shortness of breath.  Prior treatment at home therapy.  Patient was seen in the emergency room H&H 3.4/12.6, platelet count 7.  Hematology/oncology was consulted and recommended transfusion of blood and notes patient's platelet count is at baseline.  3 U packed red blood cells ordered.  Hospital Medicine consult.  Patient admitted.    * No surgery found *      Hospital Course:   Admitted for evaluation and treatment of symptomatic anemia related to chronic Immune Thrombocytopenic Purpura.  Severe life-threatening anemia with shortness of breath on minimal exertion. Ordered 3 units PRBC for transfusion.  Difficult match due to antibody presence.  Continued home dose of Promacta(Eltrombopag) and consultation with Hematology for assistance in management.  She also received one infusion of IVIg and Iron-Sucrose 200 mg.  Hemoglobin level increased to 6.7 and platelet level increased to 33.  Daily dose of Promacta increased to 75 mg.  Discharge plan to return home on increased daily dose of Promacta and follow up with hematology in the next week.     Consults:     No new Assessment & Plan notes have been filed under this hospital service since the last note was generated.  Service: Hospital Medicine    Final Active Diagnoses:    Diagnosis Date Noted POA    PRINCIPAL PROBLEM:  Iron deficiency anemia due to chronic blood loss [D50.0] 04/17/2017 Yes     Chronic ITP (idiopathic thrombocytopenia) [D69.3] 08/20/2018 Yes    Elevated BP without diagnosis of hypertension [R03.0] 06/19/2019 Yes    Tachycardia [R00.0] 06/19/2019 Yes    Leukocytosis [D72.829] 06/19/2019 Yes      Problems Resolved During this Admission:       Discharged Condition: good    Disposition: Home or Self Care    Follow Up:  Follow-up Information     Nash Cota MD In 1 week.    Specialty:  Hematology and Oncology  Why:  Hospital follow up ITP with symptomatic anemia  Contact information:  [id number]  [place] [address] [phone]                 Patient Instructions:      Diet Adult Regular     Activity as tolerated       Significant Diagnostic Studies: Labs: All labs within the past 24 hours have been reviewed    Pending Diagnostic Studies:     None         Medications:  Reconciled Home Medications:      Medication List      CHANGE how you take these medications    eltrombopag 75 mg Tab  Commonly known as:  PROMACTA  Take 1 tablet (75 mg total) by mouth once daily.  Start taking on:  6/22/2019  What changed:    · medication strength  · how much to take            Indwelling Lines/Drains at time of discharge:   Lines/Drains/Airways          None          Time spent on the discharge of patient: 30 minutes  Patient was seen and examined on the date of discharge and determined to be suitable for discharge.         Hussein Borjas MD  Department of Hospital Medicine  [facility]

## 2019-06-24 ENCOUNTER — TELEPHONE (OUTPATIENT)
Dept: PHARMACY | Facility: CLINIC | Age: 24
End: 2019-06-24

## 2019-06-24 NOTE — TELEPHONE ENCOUNTER
Patient reached for refill of Promacta after dose increase from 50mg/day to 75mg/day. Patient is doing well with increased dose as she has started the new dose already. No issues/side effects to report. No new medications, allergies or health conditions. Promacta 75mg will ship  for delivery on . Copay $0.00 (004). Address confirmed. Two patient identifiers confirmed - name and . Therapy appropriate.

## 2019-06-25 ENCOUNTER — TELEPHONE (OUTPATIENT)
Dept: HEMATOLOGY/ONCOLOGY | Facility: CLINIC | Age: 24
End: 2019-06-25

## 2019-06-25 ENCOUNTER — OFFICE VISIT (OUTPATIENT)
Dept: HEMATOLOGY/ONCOLOGY | Facility: CLINIC | Age: 24
End: 2019-06-25
Payer: MEDICAID

## 2019-06-25 ENCOUNTER — LAB VISIT (OUTPATIENT)
Dept: LAB | Facility: HOSPITAL | Age: 24
End: 2019-06-25
Attending: NURSE PRACTITIONER
Payer: MEDICAID

## 2019-06-25 VITALS
BODY MASS INDEX: 39.92 KG/M2 | OXYGEN SATURATION: 100 % | HEART RATE: 85 BPM | DIASTOLIC BLOOD PRESSURE: 70 MMHG | RESPIRATION RATE: 18 BRPM | HEIGHT: 62 IN | SYSTOLIC BLOOD PRESSURE: 122 MMHG | TEMPERATURE: 99 F | WEIGHT: 216.94 LBS

## 2019-06-25 DIAGNOSIS — D50.0 IRON DEFICIENCY ANEMIA DUE TO CHRONIC BLOOD LOSS: Primary | ICD-10-CM

## 2019-06-25 DIAGNOSIS — D69.3 CHRONIC ITP (IDIOPATHIC THROMBOCYTOPENIA): ICD-10-CM

## 2019-06-25 DIAGNOSIS — D69.3 CHRONIC ITP (IDIOPATHIC THROMBOCYTOPENIA): Primary | ICD-10-CM

## 2019-06-25 LAB
BASOPHILS # BLD AUTO: 0.13 K/UL (ref 0–0.2)
BASOPHILS NFR BLD: 0.6 % (ref 0–1.9)
DIFFERENTIAL METHOD: ABNORMAL
EOSINOPHIL # BLD AUTO: 0.1 K/UL (ref 0–0.5)
EOSINOPHIL NFR BLD: 0.5 % (ref 0–8)
ERYTHROCYTE [DISTWIDTH] IN BLOOD BY AUTOMATED COUNT: 21.1 % (ref 11.5–14.5)
GIANT PLATELETS BLD QL SMEAR: PRESENT
HCT VFR BLD AUTO: 25.9 % (ref 37–48.5)
HGB BLD-MCNC: 7.8 G/DL (ref 12–16)
HYPOCHROMIA BLD QL SMEAR: ABNORMAL
IMM GRANULOCYTES # BLD AUTO: 0.22 K/UL (ref 0–0.04)
IMM GRANULOCYTES NFR BLD AUTO: 1 % (ref 0–0.5)
LYMPHOCYTES # BLD AUTO: 2.9 K/UL (ref 1–4.8)
LYMPHOCYTES NFR BLD: 13.4 % (ref 18–48)
MCH RBC QN AUTO: 24.3 PG (ref 27–31)
MCHC RBC AUTO-ENTMCNC: 30.1 G/DL (ref 32–36)
MCV RBC AUTO: 81 FL (ref 82–98)
MONOCYTES # BLD AUTO: 1.3 K/UL (ref 0.3–1)
MONOCYTES NFR BLD: 6.3 % (ref 4–15)
NEUTROPHILS # BLD AUTO: 16.8 K/UL (ref 1.8–7.7)
NEUTROPHILS NFR BLD: 79.2 % (ref 38–73)
NRBC BLD-RTO: 4 /100 WBC
PLATELET # BLD AUTO: 126 K/UL (ref 150–350)
PLATELET BLD QL SMEAR: ABNORMAL
PMV BLD AUTO: ABNORMAL FL (ref 9.2–12.9)
POLYCHROMASIA BLD QL SMEAR: ABNORMAL
RBC # BLD AUTO: 3.21 M/UL (ref 4–5.4)
WBC # BLD AUTO: 21.2 K/UL (ref 3.9–12.7)

## 2019-06-25 PROCEDURE — 99999 PR PBB SHADOW E&M-EST. PATIENT-LVL III: CPT | Mod: PBBFAC,,, | Performed by: NURSE PRACTITIONER

## 2019-06-25 PROCEDURE — 85025 COMPLETE CBC W/AUTO DIFF WBC: CPT

## 2019-06-25 PROCEDURE — 99213 OFFICE O/P EST LOW 20 MIN: CPT | Mod: PBBFAC | Performed by: NURSE PRACTITIONER

## 2019-06-25 PROCEDURE — 99214 PR OFFICE/OUTPT VISIT, EST, LEVL IV, 30-39 MIN: ICD-10-PCS | Mod: S$PBB,,, | Performed by: NURSE PRACTITIONER

## 2019-06-25 PROCEDURE — 36415 COLL VENOUS BLD VENIPUNCTURE: CPT

## 2019-06-25 PROCEDURE — 99214 OFFICE O/P EST MOD 30 MIN: CPT | Mod: S$PBB,,, | Performed by: NURSE PRACTITIONER

## 2019-06-25 PROCEDURE — 99999 PR PBB SHADOW E&M-EST. PATIENT-LVL III: ICD-10-PCS | Mod: PBBFAC,,, | Performed by: NURSE PRACTITIONER

## 2019-06-25 NOTE — PROGRESS NOTES
Subjective:       Patient ID: Mari Miller is a 23 y.o. female.    Chief Complaint: Chronic ITP    HPI: 23 y.o female with ITP unresponsive to Rituxan. Patient currently taking Promacta for treatment of ITP. Patient was seen by Dr. Cota in 1/2019 with recommended 1 week follow up. Patient was recommended to take Promacta daily. However patient was lost to follow up for unknown reasons. Patient presented back to Heme-Onc clinic 5/28/19 with c/o fatigue and increased bruising. Patient had not been taken Promacta as she had run out of her prescription. Platelet count 6 on 5/28/19. Patient was also found to be anemic with hemoglobin 7.4. She presents today for follow up of this to determine if she has iron deficiency. However patient has not yet had labs drawn. Would like to have labs drawn on the way out and follow up via telephone or patient portal. She denies CP, dizziness, SOB, lightheadedness, hematuria, hematochezia, hemoptysis, epistaxis, bowel or urinary complaints, bruising.     Today's visit:  Patient presents today for follow up. Recent hospitalization for c/o of weakness. Hemoglobin found to be 3.4. Patient transfused 3 units PRBCs. Did receive dose of IGG for ITP. Promacta was increased to 75 mg PO daily which she reports taking without missed doses. She presents today for follow up and lab results.    Social History     Socioeconomic History    Marital status: Single     Spouse name: Not on file    Number of children: Not on file    Years of education: Not on file    Highest education level: Not on file   Occupational History    Not on file   Social Needs    Financial resource strain: Not on file    Food insecurity:     Worry: Not on file     Inability: Not on file    Transportation needs:     Medical: Not on file     Non-medical: Not on file   Tobacco Use    Smoking status: Never Smoker    Smokeless tobacco: Never Used   Substance and Sexual Activity    Alcohol use: No      Frequency: Never    Drug use: No    Sexual activity: Not on file   Lifestyle    Physical activity:     Days per week: Not on file     Minutes per session: Not on file    Stress: Not on file   Relationships    Social connections:     Talks on phone: Not on file     Gets together: Not on file     Attends Worship service: Not on file     Active member of club or organization: Not on file     Attends meetings of clubs or organizations: Not on file     Relationship status: Not on file   Other Topics Concern    Not on file   Social History Narrative    Not on file       Past Medical History:   Diagnosis Date    Encounter for blood transfusion     Thrombocytopenia        History reviewed. No pertinent family history.    Past Surgical History:   Procedure Laterality Date     SECTION      x1    XI ROBOTIC SPLENECTOMY N/A 2018    Performed by Yobani Lara MD at [facility] OR       Review of Systems   Constitutional: Negative for activity change, appetite change, chills, diaphoresis, fatigue, fever and unexpected weight change.   HENT: Negative for congestion, mouth sores, nosebleeds, sore throat, trouble swallowing and voice change.    Eyes: Negative for photophobia and visual disturbance.   Respiratory: Negative for cough, chest tightness, shortness of breath and wheezing.    Cardiovascular: Negative for chest pain, palpitations and leg swelling.   Gastrointestinal: Negative for abdominal distention, abdominal pain, blood in stool, constipation, diarrhea, nausea and vomiting.   Genitourinary: Negative for difficulty urinating, dysuria and hematuria.   Musculoskeletal: Negative for arthralgias, back pain and myalgias.   Skin: Negative for pallor, rash and wound.   Neurological: Negative for dizziness, syncope, weakness and headaches.   Hematological: Negative for adenopathy. Bruises/bleeds easily.   Psychiatric/Behavioral: The patient is not nervous/anxious.          Medication List with Changes/Refills    Current Medications    ELTROMBOPAG (PROMACTA) 75 MG TAB    Take 1 tablet (75 mg total) by mouth once daily.     Objective:     Vitals:    06/25/19 1511   BP: 122/70   Pulse: 85   Resp: 18   Temp: 98.9 °F (37.2 °C)     Lab Results   Component Value Date    WBC 18.79 (H) 06/28/2019    HGB 7.9 (L) 06/28/2019    HCT 27.0 (L) 06/28/2019    MCV 78 (L) 06/28/2019     (H) 06/28/2019     Lab Results   Component Value Date    IRON 11 (L) 06/20/2019    TIBC 400 06/20/2019    FERRITIN 4 (L) 06/20/2019     BMP  Lab Results   Component Value Date     06/28/2019    K 4.5 06/28/2019     06/28/2019    CO2 21 (L) 06/28/2019    BUN 8 06/28/2019    CREATININE 0.7 06/28/2019    CALCIUM 9.1 06/28/2019    ANIONGAP 8 06/28/2019    ESTGFRAFRICA >60 06/28/2019    EGFRNONAA >60 06/28/2019     Lab Results   Component Value Date    ALT 29 06/28/2019    AST 25 06/28/2019    ALKPHOS 51 (L) 06/28/2019    BILITOT 0.2 06/28/2019         Physical Exam   Constitutional: She is oriented to person, place, and time. She appears well-developed and well-nourished. She is cooperative.   HENT:   Head: Normocephalic.   Right Ear: Hearing normal. No drainage.   Left Ear: Hearing normal. No drainage.   Nose: Nose normal.   Mouth/Throat: Oropharynx is clear and moist.   Eyes: Conjunctivae, EOM and lids are normal. Right eye exhibits no discharge. Left eye exhibits no discharge. No scleral icterus.   Neck: Normal range of motion. No thyroid mass present.   Cardiovascular: Normal rate, regular rhythm and normal heart sounds.   No murmur heard.  Pulmonary/Chest: Effort normal and breath sounds normal. No respiratory distress. She has no wheezes. She has no rhonchi. She has no rales.   Abdominal: Soft. Bowel sounds are normal. She exhibits no distension. There is no tenderness.   Genitourinary:   Genitourinary Comments: deferred   Musculoskeletal: Normal range of motion. She exhibits no edema.   Lymphadenopathy:        Head (right side): No  submandibular, no preauricular and no posterior auricular adenopathy present.        Head (left side): No submandibular, no preauricular and no posterior auricular adenopathy present.        Right cervical: No superficial cervical adenopathy present.       Left cervical: No superficial cervical adenopathy present.   Neurological: She is alert and oriented to person, place, and time.   Skin: Skin is warm, dry and intact.   Psychiatric: She has a normal mood and affect. Her speech is normal and behavior is normal. Thought content normal.   Vitals reviewed.       Assessment:     Problem List Items Addressed This Visit        Hematology    Chronic ITP (idiopathic thrombocytopenia) - Primary     Platelet count 126. Hemoglobin 7.8. Patient significantly iron deficient in hospital    Continue Promacta 75 mg PO daily. Have nurse schedule patient for IV INJECTAFER x 2 q 7 days. Will check cbc, cmp with clinic collect prior to 1st injectafer dose. Will see patient back prior to 2nd Injectafer with cbc, cmp      Platelet count <50,000/mm3 (?2 weeks after treatment initiation or a dose increase): Increase daily dose by 25 mg (if taking 12.5 mg once daily, increase dose to 25 mg once daily prior to increasing the dose amount by 25 mg daily); maximum: 75 mg/day  Platelet count ?200,000/mm3 and ?400,000/mm3 (at any time): Reduce daily dose by 25 mg (if taking 25 mg once daily, decrease dose to 12.5 mg once daily); reassess in 2 weeks  Platelet count >400,000/mm3: Withhold dose; assess platelet count twice weekly; when platelet count <150,000/mm3, resume with the daily dose reduced by 25 mg (if taking 25 mg once daily, resume with 12.5 mg once daily)  Platelet count >400,000/mm3 after 2 weeks at the lowest dose: Discontinue treatment           Relevant Orders    Comprehensive metabolic panel    Comprehensive metabolic panel (Completed)    CBC auto differential (Completed)    Comprehensive metabolic panel            Plan:      Chronic ITP (idiopathic thrombocytopenia)  -     Cancel: CBC auto differential; Future; Expected date: 06/25/2019  -     Comprehensive metabolic panel; Future; Expected date: 06/25/2019  -     Cancel: CBC auto differential; Future; Expected date: 06/25/2019  -     Comprehensive metabolic panel; Future; Expected date: 06/25/2019  -     CBC auto differential; Future; Expected date: 06/25/2019  -     Comprehensive metabolic panel; Future; Expected date: 06/25/2019          I will review assessment/plan with collaborating physician     BELA Acevedo

## 2019-06-25 NOTE — TELEPHONE ENCOUNTER
----- Message from Sherlyn Avila LPN sent at 6/14/2019  4:24 PM CDT -----  I called and left a message for her to call, thank you  ----- Message -----  From: Sophy Das NP  Sent: 6/14/2019   3:50 PM  To: Pippa Beckett Staff    Will you please schedule this patient for IV injectafer x 2. If she can receive the first dose 6/18, she can receive dose two at f/u with me 6/25. Thank you

## 2019-06-25 NOTE — ASSESSMENT & PLAN NOTE
Platelet count 126. Hemoglobin 7.8. Patient significantly iron deficient in hospital    Continue Promacta 75 mg PO daily. Have nurse schedule patient for IV INJECTAFER x 2 q 7 days. Will check cbc, cmp with clinic collect prior to 1st injectafer dose. Will see patient back prior to 2nd Injectafer with cbc, cmp      Platelet count <50,000/mm3 (?2 weeks after treatment initiation or a dose increase): Increase daily dose by 25 mg (if taking 12.5 mg once daily, increase dose to 25 mg once daily prior to increasing the dose amount by 25 mg daily); maximum: 75 mg/day  Platelet count ?200,000/mm3 and ?400,000/mm3 (at any time): Reduce daily dose by 25 mg (if taking 25 mg once daily, decrease dose to 12.5 mg once daily); reassess in 2 weeks  Platelet count >400,000/mm3: Withhold dose; assess platelet count twice weekly; when platelet count <150,000/mm3, resume with the daily dose reduced by 25 mg (if taking 25 mg once daily, resume with 12.5 mg once daily)  Platelet count >400,000/mm3 after 2 weeks at the lowest dose: Discontinue treatment

## 2019-06-25 NOTE — TELEPHONE ENCOUNTER
Pt was in hospital at the time NP requested her to have IV Iron, pt was notified by this nurse and she was informed to keep her hospital follow to discuss her needs

## 2019-06-28 ENCOUNTER — INFUSION (OUTPATIENT)
Dept: INFUSION THERAPY | Facility: HOSPITAL | Age: 24
End: 2019-06-28
Attending: NURSE PRACTITIONER
Payer: MEDICAID

## 2019-06-28 VITALS
DIASTOLIC BLOOD PRESSURE: 89 MMHG | WEIGHT: 216.94 LBS | HEART RATE: 84 BPM | TEMPERATURE: 98 F | RESPIRATION RATE: 18 BRPM | OXYGEN SATURATION: 99 % | BODY MASS INDEX: 39.92 KG/M2 | SYSTOLIC BLOOD PRESSURE: 133 MMHG | HEIGHT: 62 IN

## 2019-06-28 DIAGNOSIS — D50.0 IRON DEFICIENCY ANEMIA DUE TO CHRONIC BLOOD LOSS: Primary | ICD-10-CM

## 2019-06-28 PROCEDURE — 63600175 PHARM REV CODE 636 W HCPCS: Mod: JG | Performed by: NURSE PRACTITIONER

## 2019-06-28 PROCEDURE — 25000003 PHARM REV CODE 250: Performed by: NURSE PRACTITIONER

## 2019-06-28 PROCEDURE — 96365 THER/PROPH/DIAG IV INF INIT: CPT

## 2019-06-28 PROCEDURE — A4216 STERILE WATER/SALINE, 10 ML: HCPCS | Performed by: NURSE PRACTITIONER

## 2019-06-28 RX ORDER — SODIUM CHLORIDE 0.9 % (FLUSH) 0.9 %
10 SYRINGE (ML) INJECTION
Status: DISCONTINUED | OUTPATIENT
Start: 2019-06-28 | End: 2019-06-28 | Stop reason: HOSPADM

## 2019-06-28 RX ORDER — SODIUM CHLORIDE 9 MG/ML
INJECTION, SOLUTION INTRAVENOUS CONTINUOUS
Status: CANCELLED | OUTPATIENT
Start: 2019-07-05

## 2019-06-28 RX ORDER — HEPARIN 100 UNIT/ML
5 SYRINGE INTRAVENOUS
Status: CANCELLED | OUTPATIENT
Start: 2019-07-05

## 2019-06-28 RX ORDER — SODIUM CHLORIDE 0.9 % (FLUSH) 0.9 %
10 SYRINGE (ML) INJECTION
Status: CANCELLED | OUTPATIENT
Start: 2019-07-05

## 2019-06-28 RX ADMIN — Medication 10 ML: at 11:06

## 2019-06-28 RX ADMIN — FERRIC CARBOXYMALTOSE INJECTION 750 MG: 50 INJECTION, SOLUTION INTRAVENOUS at 11:06

## 2019-06-28 NOTE — PLAN OF CARE
Problem: Anemia  Goal: Anemia Symptom Improvement  Outcome: Ongoing (interventions implemented as appropriate)  Pt. Had no complaints related to BAILEY today.

## 2019-06-28 NOTE — PLAN OF CARE
Problem: Anemia  Goal: Anemia Symptom Improvement    Intervention: Monitor and Manage Anemia  Pt. Ordered injectafer times two doses.

## 2019-06-28 NOTE — DISCHARGE INSTRUCTIONS
[facility] [place]  [address] [address]  [address]  [phone] phone     [phone] fax  Hours of Operation: Monday- Friday 8:00am- 5:00pm  After hours phone  [phone]  Hematology / Oncology Physicians on call      Dr. Cipriano Calderon      Please call with any concerns regarding your appointment today.

## 2019-06-28 NOTE — PLAN OF CARE
"Problem: Adult Inpatient Plan of Care  Goal: Plan of Care Review  Outcome: Ongoing (interventions implemented as appropriate)  Pt. Stated,"I'm doing good."      "

## 2019-07-05 ENCOUNTER — TELEPHONE (OUTPATIENT)
Dept: HEMATOLOGY/ONCOLOGY | Facility: CLINIC | Age: 24
End: 2019-07-05

## 2019-07-05 ENCOUNTER — PATIENT MESSAGE (OUTPATIENT)
Dept: HEMATOLOGY/ONCOLOGY | Facility: CLINIC | Age: 24
End: 2019-07-05

## 2019-07-05 DIAGNOSIS — D69.3 CHRONIC ITP (IDIOPATHIC THROMBOCYTOPENIA): Primary | ICD-10-CM

## 2019-07-05 DIAGNOSIS — D50.0 IRON DEFICIENCY ANEMIA DUE TO CHRONIC BLOOD LOSS: ICD-10-CM

## 2019-07-05 RX ORDER — FERROUS SULFATE 325(65) MG
325 TABLET ORAL DAILY
Qty: 60 TABLET | Refills: 5 | Status: ON HOLD | OUTPATIENT
Start: 2019-07-05 | End: 2019-09-14 | Stop reason: SDUPTHER

## 2019-07-05 NOTE — TELEPHONE ENCOUNTER
----- Message from Sophy Das NP sent at 7/5/2019  9:17 AM CDT -----  Hello, this patient cancelled her appointment today. She will try and make lab appointment today. Will you please call her to schedule a 1 week appointment with CBC, CMP, iron studies. Thank you

## 2019-07-05 NOTE — TELEPHONE ENCOUNTER
Attempted to contact patient about canceled appointment with Sophy Das today. No answer, left voicemail with callback number.             Appointment canceled for Mari Miller ([id number])   Visit Type: ESTABLISHED PATIENT   Date        Time      Length    Provider                  Department   7/5/2019     9:30 AM  30 mins.  Sophy Das NP      [facility] HEMATOLOGY ONCOLOGY      Reason for Cancellation: Transportation      Patient Comments: Wonder can it be rescheduled for after my iron injection?

## 2019-07-05 NOTE — TELEPHONE ENCOUNTER
Spoke to Sanjuana Mckeon with [facility]. Stated this patients labs that were drawn today hemolyzed and she needs to be redrawn.

## 2019-07-05 NOTE — TELEPHONE ENCOUNTER
Called patient regarding cancelled appointment. Asked patient to please have labs done today as this may result in dosage change in Promacta. Patient also scheduled for #2 Injectafer. She reports constipation, trapped gas, abdominal cramping following first Injectafer infusion. Reassured patient IV iron usually doesn't cause GI related side effects. Patient would like to cancel Injectafer # 2 and try oral iron instead. Prescription sent to [facility]. Needs to be proactive in preventing constipation with oral iron

## 2019-07-08 ENCOUNTER — TELEPHONE (OUTPATIENT)
Dept: PHARMACY | Facility: CLINIC | Age: 24
End: 2019-07-08

## 2019-07-12 ENCOUNTER — TELEPHONE (OUTPATIENT)
Dept: HEMATOLOGY/ONCOLOGY | Facility: CLINIC | Age: 24
End: 2019-07-12

## 2019-07-12 ENCOUNTER — PATIENT MESSAGE (OUTPATIENT)
Dept: HEMATOLOGY/ONCOLOGY | Facility: CLINIC | Age: 24
End: 2019-07-12

## 2019-07-12 NOTE — TELEPHONE ENCOUNTER
Called patient to confirm appointment today this is my 2nd call with no answer so I left messages to return my call reganaing chelle.

## 2019-07-15 NOTE — TELEPHONE ENCOUNTER
Third attempt: LVM for refill for Promacta and to review dose decrease to 50mg. Previous [employer] message was read.

## 2019-07-17 ENCOUNTER — OFFICE VISIT (OUTPATIENT)
Dept: HEMATOLOGY/ONCOLOGY | Facility: CLINIC | Age: 24
End: 2019-07-17
Payer: MEDICAID

## 2019-07-17 VITALS
TEMPERATURE: 98 F | WEIGHT: 208.56 LBS | HEART RATE: 90 BPM | SYSTOLIC BLOOD PRESSURE: 104 MMHG | HEIGHT: 62 IN | OXYGEN SATURATION: 99 % | BODY MASS INDEX: 38.38 KG/M2 | DIASTOLIC BLOOD PRESSURE: 80 MMHG

## 2019-07-17 DIAGNOSIS — D72.829 LEUKOCYTOSIS, UNSPECIFIED TYPE: ICD-10-CM

## 2019-07-17 DIAGNOSIS — R00.0 TACHYCARDIA: ICD-10-CM

## 2019-07-17 DIAGNOSIS — D50.0 IRON DEFICIENCY ANEMIA DUE TO CHRONIC BLOOD LOSS: ICD-10-CM

## 2019-07-17 DIAGNOSIS — R03.0 ELEVATED BP WITHOUT DIAGNOSIS OF HYPERTENSION: ICD-10-CM

## 2019-07-17 DIAGNOSIS — D69.3 CHRONIC ITP (IDIOPATHIC THROMBOCYTOPENIA): Primary | ICD-10-CM

## 2019-07-17 PROCEDURE — 99214 OFFICE O/P EST MOD 30 MIN: CPT | Mod: S$PBB,,, | Performed by: NURSE PRACTITIONER

## 2019-07-17 PROCEDURE — 99999 PR PBB SHADOW E&M-EST. PATIENT-LVL III: CPT | Mod: PBBFAC,,, | Performed by: NURSE PRACTITIONER

## 2019-07-17 PROCEDURE — 99999 PR PBB SHADOW E&M-EST. PATIENT-LVL III: ICD-10-PCS | Mod: PBBFAC,,, | Performed by: NURSE PRACTITIONER

## 2019-07-17 PROCEDURE — 99213 OFFICE O/P EST LOW 20 MIN: CPT | Mod: PBBFAC | Performed by: NURSE PRACTITIONER

## 2019-07-17 PROCEDURE — 99214 PR OFFICE/OUTPT VISIT, EST, LEVL IV, 30-39 MIN: ICD-10-PCS | Mod: S$PBB,,, | Performed by: NURSE PRACTITIONER

## 2019-07-17 NOTE — PROGRESS NOTES
Subjective:       Patient ID: Mari Miller is a 23 y.o. female.    Chief Complaint: Results    23 y.o female with ITP unresponsive to Rituxan. Patient currently taking Promacta for treatment of ITP. Patient was seen by Dr. Cota in 1/2019 with recommended 1 week follow up. Patient was recommended to take Promacta daily. However patient was lost to follow up for unknown reasons. Patient presented back to Heme-Onc clinic 5/28/19 with c/o fatigue and increased bruising. Patient had not been taken Promacta as she had run out of her prescription. Platelet count 6 on 5/28/19. Patient was also found to be anemic with hemoglobin 7.4. She presents today for follow up of this to determine if she has iron deficiency. However patient has not yet had labs drawn. Would like to have labs drawn on the way out and follow up via telephone or patient portal. She denies CP, dizziness, SOB, lightheadedness, hematuria, hematochezia, hemoptysis, epistaxis, bowel or urinary complaints, bruising.     Today's Visit:    Patient is still taking Promacta 75mg daily, specialty pharmacy has attempted to contact patient to reduce the dose of Promacta to 50mg. Patient denies receiving phone calls from pharmacy. Denies any new complaints.     Review of Systems   Constitutional: Positive for fatigue. Negative for chills, diaphoresis, fever and unexpected weight change.   HENT: Negative for congestion, hearing loss and trouble swallowing.    Eyes: Negative for pain, discharge, redness and visual disturbance.   Respiratory: Negative for cough, chest tightness and shortness of breath.    Cardiovascular: Negative for chest pain and palpitations.   Gastrointestinal: Negative for blood in stool, constipation, diarrhea, nausea and vomiting.   Endocrine: Negative for cold intolerance and heat intolerance.   Genitourinary: Negative for difficulty urinating, dyspareunia, flank pain and hematuria.   Musculoskeletal: Negative for arthralgias, back  pain and myalgias.   Skin: Negative.    Neurological: Negative for dizziness, weakness, light-headedness and headaches.   Hematological: Negative for adenopathy. Does not bruise/bleed easily.   Psychiatric/Behavioral: Negative for agitation, behavioral problems and confusion. The patient is nervous/anxious.        Medication List with Changes/Refills   Current Medications    ELTROMBOPAG (PROMACTA) 50 MG TAB    Take 1 tablet (50 mg total) by mouth once daily.    FERROUS SULFATE (FEOSOL) 325 MG (65 MG IRON) TAB TABLET    Take 1 tablet (325 mg total) by mouth once daily.     Objective:     Vitals:    07/17/19 1428   BP: 104/80   Pulse: 90   Temp: 98.2 °F (36.8 °C)     Physical Exam   Constitutional: She is oriented to person, place, and time. She appears well-developed and well-nourished. No distress.   HENT:   Head: Normocephalic and atraumatic.   Right Ear: Hearing and external ear normal.   Left Ear: Hearing and external ear normal.   Nose: No rhinorrhea or sinus tenderness. Right sinus exhibits no maxillary sinus tenderness and no frontal sinus tenderness. Left sinus exhibits no maxillary sinus tenderness and no frontal sinus tenderness.   Mouth/Throat: Uvula is midline, oropharynx is clear and moist and mucous membranes are normal. No oral lesions.   Eyes: Pupils are equal, round, and reactive to light. Conjunctivae are normal. Right eye exhibits no discharge. Left eye exhibits no discharge.   Neck: Normal range of motion. Carotid bruit is not present. No tracheal deviation present. No thyromegaly present.   Cardiovascular: Normal rate, regular rhythm, S1 normal, S2 normal, normal heart sounds and intact distal pulses.   No murmur heard.  Pulses:       Dorsalis pedis pulses are 2+ on the right side, and 2+ on the left side.   Pulmonary/Chest: Effort normal and breath sounds normal. No respiratory distress.   Abdominal: Soft. Bowel sounds are normal. She exhibits no distension and no mass. There is no tenderness.    Musculoskeletal: Normal range of motion. She exhibits no edema.   Lymphadenopathy:     She has no cervical adenopathy.        Right: No supraclavicular adenopathy present.        Left: No supraclavicular adenopathy present.   Neurological: She is alert and oriented to person, place, and time. She has normal strength. No sensory deficit. Coordination and gait normal.   Skin: Skin is warm and dry. Capillary refill takes less than 2 seconds. No rash noted. No erythema. No pallor.   Psychiatric: Her speech is normal and behavior is normal. Judgment and thought content normal. Her mood appears anxious. Cognition and memory are normal. She does not exhibit a depressed mood.   Vitals reviewed.      Assessment:       Problem List Items Addressed This Visit        Cardiac/Vascular    Elevated BP without diagnosis of hypertension    Tachycardia       Hematology    Chronic ITP (idiopathic thrombocytopenia) - Primary    Relevant Orders    CBC auto differential       Oncology    Iron deficiency anemia due to chronic blood loss    Leukocytosis          Plan:       ITP:  --Platelet count: 349K  --Decrease Promacta to 50mg daily, specialty pharmacy will contact patient to schedule delivery.   --Return to clinic in 2 months with CBC  --Patient knows to call clinic and be seen if needed    I will review assessment/plan with collaborating physician Dr. Sunil Calderon.

## 2019-07-17 NOTE — PATIENT INSTRUCTIONS
"  When Your Child Has Idiopathic Thrombocytic Purpura (ITP)     With ITP, routine blood tests may be done to check your child's platelet count.     Idiopathic thrombocytic purpura (ITP) is a blood disorder that affects the platelets. Platelets (also called thrombocytes) are blood cells that help with clotting. Normally, when your child has a cut or bruise, platelets come together to form a clot or "plug" to stop or control bleeding. With ITP, there are not enough platelets. As a result, your child can have more bleeding or bruising than normal. Your child's healthcare provider can evaluate your child and discuss treatment options with you.  What are the types of ITP?  Two types of ITP can occur. They include:  · Acute ITP. This type can last up to 6 months. It occurs more often in toddlers or young children.  · Chronic ITP. This type can last 6 months or longer. It occurs more often in adults, though it can also affect children.  What causes ITP?  ITP is an idiopathic condition. This means the cause is unknown. It is believed to be an autoimmune disorder. This is because the body's immune system attacks normal platelets.  What are the symptoms of ITP?  Symptoms vary for every child. Symptoms may include:  · Small red or purple spots (petechiae) that look like a rash  · Bruising that is often purple (purpura)  · Nosebleeds  · Bleeding gums  · Bleeding in urine or stool  · Bruises in mouth (often called blood blisters)  · Signs of stroke, including trouble with balance and coordination, abnormal walk, memory loss, confusion  How is ITP diagnosed?  The doctor will examine your child. He or she will ask about your child's symptoms and health history. Tests will also be done. Most of the tests require taking a blood sample from a vein in the arm or from a finger or heel. Tests may include:  · A complete blood cell count (CBC) to measure the amounts of different types of cells in the blood. With ITP, the platelet count is " especially important.  · A blood smear to check the sizes and shapes of the blood cells.  · Bone marrow aspiration and biopsy to check for problems with the production of blood cells. With a bone marrow aspiration, a needle is inserted into a bone to collect a sample of the bone marrow fluid and cells. With a bone marrow biopsy, a needle is inserted into a bone to collect a small sample of bone marrow tissue. In either case, the sample is analyzed in a lab.  · Other lab tests may also be done to rule out possible underlying conditions.  How is ITP treated?  Treatment varies depending on your child's platelet count and the severity of your child's symptoms.  · In many cases, no treatment is needed. Your child is monitored by the doctor to manage any bleeding symptoms and to see if platelet counts return to normal on their own.  · Discuss with the doctor how to help your child reduce the risk of bleeding and possible complications. This includes topics such as limiting certain physical activities, and avoiding certain medications.  · If treatment is needed, this can include:  ¨ Intravenous immune globulin (IVIG) or Rh immune globulin to help keep the body from destroying platelets.  ¨ Medications to help raise the platelet count.  ¨ Surgery to remove the spleen, which may be the site of platelet destruction.  What are the long-term concerns?  Most children with ITP recover completely. If your child has chronic ITP, he or she will need ongoing treatments and medical care. Work closely with the doctor to learn how to help your child.  Date Last Reviewed: 4/27/2015  © 2316-5393 [employer]. [address] [place] [address], [place] [address]. All rights reserved. This information is not intended as a substitute for professional medical care. Always follow your healthcare professional's instructions.

## 2019-07-18 NOTE — TELEPHONE ENCOUNTER
Fourth attempt: LVM requesting a call back in regards to Promacta refill and to review dose decrease from 75 mg/day to 50 mg/day.

## 2019-07-26 ENCOUNTER — TELEPHONE (OUTPATIENT)
Dept: PHARMACY | Facility: CLINIC | Age: 24
End: 2019-07-26

## 2019-07-26 NOTE — TELEPHONE ENCOUNTER
4th call attempt. LVM regarding Promacta refill and follow up. Patient last picked up first fill of reduced dose (75 mg --> 50 mg) on 7/5). Will message provider and send postcard to address on file.

## 2019-08-01 ENCOUNTER — DOCUMENTATION ONLY (OUTPATIENT)
Dept: HEMATOLOGY/ONCOLOGY | Facility: CLINIC | Age: 24
End: 2019-08-01

## 2019-08-01 ENCOUNTER — HOSPITAL ENCOUNTER (INPATIENT)
Facility: HOSPITAL | Age: 24
LOS: 2 days | Discharge: HOME | DRG: 813 | End: 2019-08-03
Attending: EMERGENCY MEDICINE | Admitting: INTERNAL MEDICINE
Payer: MEDICAID

## 2019-08-01 DIAGNOSIS — D69.6 THROMBOCYTOPENIA: ICD-10-CM

## 2019-08-01 DIAGNOSIS — D64.9 ANEMIA, UNSPECIFIED TYPE: ICD-10-CM

## 2019-08-01 DIAGNOSIS — R00.0 TACHYCARDIA: ICD-10-CM

## 2019-08-01 DIAGNOSIS — D69.3 CHRONIC ITP (IDIOPATHIC THROMBOCYTOPENIA): Primary | ICD-10-CM

## 2019-08-01 DIAGNOSIS — D69.3 ACUTE ITP: Primary | ICD-10-CM

## 2019-08-01 DIAGNOSIS — R06.02 SOB (SHORTNESS OF BREATH): ICD-10-CM

## 2019-08-01 LAB
ALBUMIN SERPL BCP-MCNC: 3.7 G/DL (ref 3.5–5.2)
ALP SERPL-CCNC: 49 U/L (ref 55–135)
ALT SERPL W/O P-5'-P-CCNC: 17 U/L (ref 10–44)
ANION GAP SERPL CALC-SCNC: 9 MMOL/L (ref 8–16)
APTT BLDCRRT: 22.2 SEC (ref 21–32)
AST SERPL-CCNC: 18 U/L (ref 10–40)
B-HCG UR QL: NEGATIVE
BILIRUB SERPL-MCNC: 0.1 MG/DL (ref 0.1–1)
BILIRUB UR QL STRIP: NEGATIVE
BUN SERPL-MCNC: 17 MG/DL (ref 6–20)
CALCIUM SERPL-MCNC: 8.7 MG/DL (ref 8.7–10.5)
CHLORIDE SERPL-SCNC: 108 MMOL/L (ref 95–110)
CLARITY UR: ABNORMAL
CO2 SERPL-SCNC: 20 MMOL/L (ref 23–29)
COLOR UR: YELLOW
CREAT SERPL-MCNC: 0.6 MG/DL (ref 0.5–1.4)
EST. GFR  (AFRICAN AMERICAN): >60 ML/MIN/1.73 M^2
EST. GFR  (NON AFRICAN AMERICAN): >60 ML/MIN/1.73 M^2
FIBRINOGEN PPP-MCNC: 217 MG/DL (ref 182–366)
GLUCOSE SERPL-MCNC: 101 MG/DL (ref 70–110)
GLUCOSE UR QL STRIP: NEGATIVE
HGB UR QL STRIP: ABNORMAL
HIV 1+2 AB+HIV1 P24 AG SERPL QL IA: NEGATIVE
INR PPP: 1.1 (ref 0.8–1.2)
KETONES UR QL STRIP: NEGATIVE
LEUKOCYTE ESTERASE UR QL STRIP: NEGATIVE
MICROSCOPIC COMMENT: ABNORMAL
NITRITE UR QL STRIP: NEGATIVE
PH UR STRIP: 6 [PH] (ref 5–8)
POTASSIUM SERPL-SCNC: 3.8 MMOL/L (ref 3.5–5.1)
PROT SERPL-MCNC: 6.4 G/DL (ref 6–8.4)
PROT UR QL STRIP: NEGATIVE
PROTHROMBIN TIME: 11.9 SEC (ref 9–12.5)
RBC #/AREA URNS HPF: >100 /HPF (ref 0–4)
SODIUM SERPL-SCNC: 137 MMOL/L (ref 136–145)
SP GR UR STRIP: >=1.03 (ref 1–1.03)
SQUAMOUS #/AREA URNS HPF: 1 /HPF
TROPONIN I SERPL DL<=0.01 NG/ML-MCNC: 0.01 NG/ML (ref 0–0.03)
URN SPEC COLLECT METH UR: ABNORMAL
UROBILINOGEN UR STRIP-ACNC: NEGATIVE EU/DL

## 2019-08-01 PROCEDURE — 84484 ASSAY OF TROPONIN QUANT: CPT

## 2019-08-01 PROCEDURE — 93010 ELECTROCARDIOGRAM REPORT: CPT | Mod: ,,, | Performed by: INTERNAL MEDICINE

## 2019-08-01 PROCEDURE — 81025 URINE PREGNANCY TEST: CPT

## 2019-08-01 PROCEDURE — 96361 HYDRATE IV INFUSION ADD-ON: CPT

## 2019-08-01 PROCEDURE — 93010 EKG 12-LEAD: ICD-10-PCS | Mod: ,,, | Performed by: INTERNAL MEDICINE

## 2019-08-01 PROCEDURE — 85610 PROTHROMBIN TIME: CPT

## 2019-08-01 PROCEDURE — 85730 THROMBOPLASTIN TIME PARTIAL: CPT

## 2019-08-01 PROCEDURE — 80053 COMPREHEN METABOLIC PANEL: CPT

## 2019-08-01 PROCEDURE — 93005 ELECTROCARDIOGRAM TRACING: CPT

## 2019-08-01 PROCEDURE — 85384 FIBRINOGEN ACTIVITY: CPT

## 2019-08-01 PROCEDURE — 21400001 HC TELEMETRY ROOM

## 2019-08-01 PROCEDURE — 96360 HYDRATION IV INFUSION INIT: CPT

## 2019-08-01 PROCEDURE — 81000 URINALYSIS NONAUTO W/SCOPE: CPT

## 2019-08-01 PROCEDURE — 99291 CRITICAL CARE FIRST HOUR: CPT | Mod: 25

## 2019-08-01 PROCEDURE — 86703 HIV-1/HIV-2 1 RESULT ANTBDY: CPT

## 2019-08-01 PROCEDURE — 63600175 PHARM REV CODE 636 W HCPCS: Performed by: EMERGENCY MEDICINE

## 2019-08-01 RX ADMIN — SODIUM CHLORIDE, SODIUM LACTATE, POTASSIUM CHLORIDE, AND CALCIUM CHLORIDE 1000 ML: .6; .31; .03; .02 INJECTION, SOLUTION INTRAVENOUS at 10:08

## 2019-08-01 NOTE — PROGRESS NOTES
Nurse tried several time to contact ms lincoln regarding her lab apt at 4:00pm and her visit with the NP today. There was no answer at time call placed, unable to leave voice message.

## 2019-08-02 LAB
ABO + RH BLD: NORMAL
ACANTHOCYTES BLD QL SMEAR: PRESENT
ACANTHOCYTES BLD QL SMEAR: PRESENT
ANION GAP SERPL CALC-SCNC: 8 MMOL/L (ref 8–16)
ANISOCYTOSIS BLD QL SMEAR: ABNORMAL
ANISOCYTOSIS BLD QL SMEAR: ABNORMAL
BASOPHILS # BLD AUTO: 0.07 K/UL (ref 0–0.2)
BASOPHILS # BLD AUTO: 0.11 K/UL (ref 0–0.2)
BASOPHILS NFR BLD: 0.2 % (ref 0–1.9)
BASOPHILS NFR BLD: 0.3 % (ref 0–1.9)
BLD GP AB SCN CELLS X3 SERPL QL: NORMAL
BLD PROD TYP BPU: NORMAL
BLD PROD TYP BPU: NORMAL
BLOOD GROUP ANTIBODIES SERPL: NORMAL
BLOOD UNIT EXPIRATION DATE: NORMAL
BLOOD UNIT EXPIRATION DATE: NORMAL
BLOOD UNIT TYPE CODE: 5100
BLOOD UNIT TYPE CODE: 5100
BLOOD UNIT TYPE: NORMAL
BLOOD UNIT TYPE: NORMAL
BUN SERPL-MCNC: 14 MG/DL (ref 6–20)
CALCIUM SERPL-MCNC: 8.6 MG/DL (ref 8.7–10.5)
CHLORIDE SERPL-SCNC: 106 MMOL/L (ref 95–110)
CO2 SERPL-SCNC: 20 MMOL/L (ref 23–29)
CODING SYSTEM: NORMAL
CODING SYSTEM: NORMAL
CREAT SERPL-MCNC: 0.7 MG/DL (ref 0.5–1.4)
DIFFERENTIAL METHOD: ABNORMAL
DIFFERENTIAL METHOD: ABNORMAL
DISPENSE STATUS: NORMAL
DISPENSE STATUS: NORMAL
EOSINOPHIL # BLD AUTO: 0 K/UL (ref 0–0.5)
EOSINOPHIL # BLD AUTO: 0 K/UL (ref 0–0.5)
EOSINOPHIL NFR BLD: 0 % (ref 0–8)
EOSINOPHIL NFR BLD: 0 % (ref 0–8)
ERYTHROCYTE [DISTWIDTH] IN BLOOD BY AUTOMATED COUNT: 20.3 % (ref 11.5–14.5)
ERYTHROCYTE [DISTWIDTH] IN BLOOD BY AUTOMATED COUNT: 20.7 % (ref 11.5–14.5)
EST. GFR  (AFRICAN AMERICAN): >60 ML/MIN/1.73 M^2
EST. GFR  (NON AFRICAN AMERICAN): >60 ML/MIN/1.73 M^2
GLUCOSE SERPL-MCNC: 172 MG/DL (ref 70–110)
HCT VFR BLD AUTO: 15.9 % (ref 37–48.5)
HCT VFR BLD AUTO: 16.4 % (ref 37–48.5)
HGB BLD-MCNC: 4.9 G/DL (ref 12–16)
HGB BLD-MCNC: 5 G/DL (ref 12–16)
HYPOCHROMIA BLD QL SMEAR: ABNORMAL
HYPOCHROMIA BLD QL SMEAR: ABNORMAL
LYMPHOCYTES # BLD AUTO: 1.7 K/UL (ref 1–4.8)
LYMPHOCYTES # BLD AUTO: 4.3 K/UL (ref 1–4.8)
LYMPHOCYTES NFR BLD: 11.4 % (ref 18–48)
LYMPHOCYTES NFR BLD: 5.2 % (ref 18–48)
MAGNESIUM SERPL-MCNC: 1.6 MG/DL (ref 1.6–2.6)
MCH RBC QN AUTO: 23.8 PG (ref 27–31)
MCH RBC QN AUTO: 24.6 PG (ref 27–31)
MCHC RBC AUTO-ENTMCNC: 30.5 G/DL (ref 32–36)
MCHC RBC AUTO-ENTMCNC: 30.8 G/DL (ref 32–36)
MCV RBC AUTO: 78 FL (ref 82–98)
MCV RBC AUTO: 80 FL (ref 82–98)
MONOCYTES # BLD AUTO: 0.3 K/UL (ref 0.3–1)
MONOCYTES # BLD AUTO: 2.4 K/UL (ref 0.3–1)
MONOCYTES NFR BLD: 0.9 % (ref 4–15)
MONOCYTES NFR BLD: 6.3 % (ref 4–15)
NEUTROPHILS # BLD AUTO: 30.2 K/UL (ref 1.8–7.7)
NEUTROPHILS # BLD AUTO: 30.9 K/UL (ref 1.8–7.7)
NEUTROPHILS NFR BLD: 84.2 % (ref 38–73)
NEUTROPHILS NFR BLD: 95.8 % (ref 38–73)
NUM UNITS TRANS PACKED RBC: NORMAL
NUM UNITS TRANS PACKED RBC: NORMAL
OVALOCYTES BLD QL SMEAR: ABNORMAL
OVALOCYTES BLD QL SMEAR: ABNORMAL
PLATELET # BLD AUTO: 12 K/UL (ref 150–350)
PLATELET # BLD AUTO: 4 K/UL (ref 150–350)
PLATELET BLD QL SMEAR: ABNORMAL
PLATELET BLD QL SMEAR: ABNORMAL
PMV BLD AUTO: ABNORMAL FL (ref 9.2–12.9)
PMV BLD AUTO: ABNORMAL FL (ref 9.2–12.9)
POIKILOCYTOSIS BLD QL SMEAR: ABNORMAL
POIKILOCYTOSIS BLD QL SMEAR: ABNORMAL
POLYCHROMASIA BLD QL SMEAR: ABNORMAL
POLYCHROMASIA BLD QL SMEAR: ABNORMAL
POTASSIUM SERPL-SCNC: 3.6 MMOL/L (ref 3.5–5.1)
RBC # BLD AUTO: 1.99 M/UL (ref 4–5.4)
RBC # BLD AUTO: 2.1 M/UL (ref 4–5.4)
SCHISTOCYTES BLD QL SMEAR: PRESENT
SCHISTOCYTES BLD QL SMEAR: PRESENT
SODIUM SERPL-SCNC: 134 MMOL/L (ref 136–145)
SPHEROCYTES BLD QL SMEAR: ABNORMAL
SPHEROCYTES BLD QL SMEAR: ABNORMAL
STOMATOCYTES BLD QL SMEAR: PRESENT
STOMATOCYTES BLD QL SMEAR: PRESENT
TARGETS BLD QL SMEAR: ABNORMAL
TARGETS BLD QL SMEAR: ABNORMAL
TSH SERPL DL<=0.005 MIU/L-ACNC: 0.78 UIU/ML (ref 0.4–4)
WBC # BLD AUTO: 32.3 K/UL (ref 3.9–12.7)
WBC # BLD AUTO: 37.73 K/UL (ref 3.9–12.7)

## 2019-08-02 PROCEDURE — 85025 COMPLETE CBC W/AUTO DIFF WBC: CPT | Mod: 91

## 2019-08-02 PROCEDURE — 86850 RBC ANTIBODY SCREEN: CPT

## 2019-08-02 PROCEDURE — 21400001 HC TELEMETRY ROOM

## 2019-08-02 PROCEDURE — 86922 COMPATIBILITY TEST ANTIGLOB: CPT

## 2019-08-02 PROCEDURE — 25000003 PHARM REV CODE 250: Performed by: NURSE PRACTITIONER

## 2019-08-02 PROCEDURE — 99233 SBSQ HOSP IP/OBS HIGH 50: CPT | Mod: ,,, | Performed by: INTERNAL MEDICINE

## 2019-08-02 PROCEDURE — 63600175 PHARM REV CODE 636 W HCPCS: Performed by: NURSE PRACTITIONER

## 2019-08-02 PROCEDURE — P9016 RBC LEUKOCYTES REDUCED: HCPCS

## 2019-08-02 PROCEDURE — 80048 BASIC METABOLIC PNL TOTAL CA: CPT

## 2019-08-02 PROCEDURE — 63600175 PHARM REV CODE 636 W HCPCS: Performed by: EMERGENCY MEDICINE

## 2019-08-02 PROCEDURE — 83735 ASSAY OF MAGNESIUM: CPT

## 2019-08-02 PROCEDURE — 84443 ASSAY THYROID STIM HORMONE: CPT

## 2019-08-02 PROCEDURE — 36430 TRANSFUSION BLD/BLD COMPNT: CPT

## 2019-08-02 PROCEDURE — 94761 N-INVAS EAR/PLS OXIMETRY MLT: CPT

## 2019-08-02 PROCEDURE — 63600175 PHARM REV CODE 636 W HCPCS: Mod: JG | Performed by: EMERGENCY MEDICINE

## 2019-08-02 PROCEDURE — 99233 PR SUBSEQUENT HOSPITAL CARE,LEVL III: ICD-10-PCS | Mod: ,,, | Performed by: INTERNAL MEDICINE

## 2019-08-02 PROCEDURE — 86870 RBC ANTIBODY IDENTIFICATION: CPT

## 2019-08-02 PROCEDURE — 36415 COLL VENOUS BLD VENIPUNCTURE: CPT

## 2019-08-02 RX ORDER — SODIUM CHLORIDE, SODIUM LACTATE, POTASSIUM CHLORIDE, CALCIUM CHLORIDE 600; 310; 30; 20 MG/100ML; MG/100ML; MG/100ML; MG/100ML
INJECTION, SOLUTION INTRAVENOUS CONTINUOUS
Status: DISCONTINUED | OUTPATIENT
Start: 2019-08-02 | End: 2019-08-02

## 2019-08-02 RX ORDER — HYDROCODONE BITARTRATE AND ACETAMINOPHEN 500; 5 MG/1; MG/1
TABLET ORAL
Status: DISCONTINUED | OUTPATIENT
Start: 2019-08-02 | End: 2019-08-03 | Stop reason: HOSPADM

## 2019-08-02 RX ORDER — PREDNISONE 20 MG/1
60 TABLET ORAL
Status: COMPLETED | OUTPATIENT
Start: 2019-08-02 | End: 2019-08-02

## 2019-08-02 RX ORDER — ONDANSETRON 2 MG/ML
4 INJECTION INTRAMUSCULAR; INTRAVENOUS EVERY 8 HOURS PRN
Status: DISCONTINUED | OUTPATIENT
Start: 2019-08-02 | End: 2019-08-03 | Stop reason: HOSPADM

## 2019-08-02 RX ORDER — SODIUM CHLORIDE 0.9 % (FLUSH) 0.9 %
10 SYRINGE (ML) INJECTION
Status: DISCONTINUED | OUTPATIENT
Start: 2019-08-02 | End: 2019-08-03 | Stop reason: HOSPADM

## 2019-08-02 RX ORDER — ACETAMINOPHEN 325 MG/1
650 TABLET ORAL EVERY 6 HOURS PRN
Status: DISCONTINUED | OUTPATIENT
Start: 2019-08-02 | End: 2019-08-03 | Stop reason: HOSPADM

## 2019-08-02 RX ORDER — SODIUM CHLORIDE, SODIUM LACTATE, POTASSIUM CHLORIDE, CALCIUM CHLORIDE 600; 310; 30; 20 MG/100ML; MG/100ML; MG/100ML; MG/100ML
INJECTION, SOLUTION INTRAVENOUS CONTINUOUS
Status: ACTIVE | OUTPATIENT
Start: 2019-08-02 | End: 2019-08-03

## 2019-08-02 RX ORDER — FERROUS SULFATE 325(65) MG
325 TABLET, DELAYED RELEASE (ENTERIC COATED) ORAL DAILY
Status: DISCONTINUED | OUTPATIENT
Start: 2019-08-02 | End: 2019-08-03 | Stop reason: HOSPADM

## 2019-08-02 RX ADMIN — SODIUM CHLORIDE, SODIUM LACTATE, POTASSIUM CHLORIDE, AND CALCIUM CHLORIDE 1000 ML: .6; .31; .03; .02 INJECTION, SOLUTION INTRAVENOUS at 05:08

## 2019-08-02 RX ADMIN — HUMAN IMMUNOGLOBULIN G 70 G: 20 LIQUID INTRAVENOUS at 04:08

## 2019-08-02 RX ADMIN — SODIUM CHLORIDE, SODIUM LACTATE, POTASSIUM CHLORIDE, AND CALCIUM CHLORIDE: 600; 310; 30; 20 INJECTION, SOLUTION INTRAVENOUS at 04:08

## 2019-08-02 RX ADMIN — PREDNISONE 60 MG: 20 TABLET ORAL at 02:08

## 2019-08-02 RX ADMIN — FERROUS SULFATE TAB EC 325 MG (65 MG FE EQUIVALENT) 325 MG: 325 (65 FE) TABLET DELAYED RESPONSE at 09:08

## 2019-08-02 RX ADMIN — ACETAMINOPHEN 650 MG: 325 TABLET ORAL at 06:08

## 2019-08-02 NOTE — ASSESSMENT & PLAN NOTE
- Likely reactive secondary to above.  No evidence infectious process:  Afebrile, UA and CXR unremarkable.  - Follow labs.

## 2019-08-02 NOTE — PROGRESS NOTES
Patient arrived to tele room 213 via ER from ED accompanied by nurse Traylor. Transferred to bed. Heart monitor in place, vital signs taken.   Patient is aaox4 with no distress noted.  Assessment as charted.   Patient has been orientated to room, call light, fall precautions, rounding sheet, and board. No questions or concerns at this time.

## 2019-08-02 NOTE — SUBJECTIVE & OBJECTIVE
Interval History: Patient still with dizziness, tachycardia. Awaiting blood transfusion. Denies any missed Promacta doses. Discussed patient's case with pharmacy who agree to do a pill count check of Promacta  Oncology Treatment Plan:   [No treatment plan]    Medications:  Continuous Infusions:   lactated ringers       Scheduled Meds:   [START ON 8/3/2019] eltrombopag  75 mg Oral Daily    ferrous sulfate  325 mg Oral Daily     PRN Meds:sodium chloride, acetaminophen, ondansetron, promethazine (PHENERGAN) IVPB, sodium chloride 0.9%     Review of patient's allergies indicates:   Allergen Reactions    Aspirin      Unable to take because of illness    Ibuprofen Other (See Comments)     Cannot take due to Blood Disorder        Past Medical History:   Diagnosis Date    Encounter for blood transfusion     Thrombocytopenia      Past Surgical History:   Procedure Laterality Date     SECTION      x1    XI ROBOTIC SPLENECTOMY N/A 2018    Performed by Yobani Lara [facility] at [facility] OR     Family History     None        Tobacco Use    Smoking status: Never Smoker    Smokeless tobacco: Never Used   Substance and Sexual Activity    Alcohol use: No     Frequency: Never    Drug use: No    Sexual activity: Not on file       Review of Systems   Constitutional: Positive for activity change, diaphoresis and fatigue. Negative for appetite change, chills, fever and unexpected weight change.   HENT: Negative for congestion, mouth sores, nosebleeds, sore throat, trouble swallowing and voice change.    Eyes: Negative for photophobia and visual disturbance.   Respiratory: Positive for shortness of breath. Negative for cough, chest tightness and wheezing.    Cardiovascular: Positive for palpitations. Negative for chest pain and leg swelling.   Gastrointestinal: Negative for abdominal distention, abdominal pain, anal bleeding, blood in stool, constipation, diarrhea, nausea, rectal pain and vomiting.   Genitourinary:  Positive for menstrual problem. Negative for difficulty urinating, dysuria and hematuria.   Musculoskeletal: Negative for arthralgias, back pain and myalgias.   Skin: Negative for pallor, rash and wound.   Neurological: Positive for dizziness, weakness and light-headedness. Negative for syncope and headaches.   Hematological: Negative for adenopathy. Bruises/bleeds easily.   Psychiatric/Behavioral: The patient is nervous/anxious.      Objective:     Vital Signs (Most Recent):  Temp: 98.3 °F (36.8 °C) (08/02/19 0715)  Pulse: (!) 143 (08/02/19 1100)  Resp: 14 (08/02/19 0715)  BP: 130/60 (08/02/19 0715)  SpO2: 96 % (08/02/19 0809) Vital Signs (24h Range):  Temp:  [98.3 °F (36.8 °C)-98.8 °F (37.1 °C)] 98.3 °F (36.8 °C)  Pulse:  [121-154] 143  Resp:  [14-22] 14  SpO2:  [96 %-100 %] 96 %  BP: (107-152)/(60-81) 130/60     Weight: 93 kg (205 lb 0.4 oz)  Body mass index is 37.5 kg/m².  Body surface area is 2.02 meters squared.      Intake/Output Summary (Last 24 hours) at 8/2/2019 1127  Last data filed at 8/2/2019 0228  Gross per 24 hour   Intake 1000 ml   Output --   Net 1000 ml       Physical Exam   Constitutional: She is oriented to person, place, and time. She appears well-developed and well-nourished. She is cooperative.   HENT:   Head: Normocephalic.   Right Ear: External ear normal.   Left Ear: External ear normal.   Nose: Nose normal.   Mouth/Throat: Oropharynx is clear and moist.   Eyes: Conjunctivae, EOM and lids are normal. Right eye exhibits no discharge. Left eye exhibits no discharge. No scleral icterus.   Neck: Normal range of motion. No thyroid mass present.   Cardiovascular: Regular rhythm and normal heart sounds. Tachycardia present.   Pulmonary/Chest: Effort normal and breath sounds normal. No respiratory distress. She has no wheezes. She has no rhonchi. She has no rales.   Abdominal: Soft. Bowel sounds are normal. She exhibits no distension. There is no tenderness.   Genitourinary:   Genitourinary  Comments: deferred   Musculoskeletal: Normal range of motion. She exhibits no edema.   Lymphadenopathy:        Head (right side): No submandibular, no preauricular and no posterior auricular adenopathy present.        Head (left side): No submandibular, no preauricular and no posterior auricular adenopathy present.        Right cervical: No superficial cervical adenopathy present.       Left cervical: No superficial cervical adenopathy present.   Neurological: She is alert and oriented to person, place, and time.   Skin: Skin is warm, dry and intact.   Psychiatric: She has a normal mood and affect. Her speech is normal and behavior is normal. Thought content normal.   Vitals reviewed.      Significant Labs:   BMP:   Recent Labs   Lab 08/01/19 2209 08/02/19  0559    172*    134*   K 3.8 3.6    106   CO2 20* 20*   BUN 17 14   CREATININE 0.6 0.7   CALCIUM 8.7 8.6*   MG  --  1.6   , CBC:   Recent Labs   Lab 08/02/19  0213 08/02/19  0559   WBC 37.73* 32.30*   HGB 5.0* 4.9*   HCT 16.4* 15.9*   PLT 4* 12*   , LFTs:   Recent Labs   Lab 08/01/19  2209   ALT 17   AST 18   ALKPHOS 49*   BILITOT 0.1   PROT 6.4   ALBUMIN 3.7    and All pertinent labs from the last 24 hours have been reviewed.    Diagnostic Results:  I have reviewed all pertinent imaging results/findings within the past 24 hours.

## 2019-08-02 NOTE — PLAN OF CARE
Problem: Adult Inpatient Plan of Care  Goal: Plan of Care Review  Outcome: Ongoing (interventions implemented as appropriate)  Pt AAO x 4. VSS.   IV fluids administered per order.   Pt remained free of falls this shift.   Pt has no complaints at this time.  Awaiting arrival of blood products from another location.  Pt's most recent lab values as of 8/2 at 0504: H&H  = 4.9/15.9 and Plts = 12   Educated pt on activity intolerance, keeping activity to a minimum to decrease HR, and maintaining adequate intake.  Plan of care reviewed. Patient verbalizes understanding.  Pt moving/turing independetly. Frequent weight shifting encouraged.  Patient ST on monitor. HR increases when walking to the bathroom, bedside commode added.   Bed low, side rails up x 2, wheels locked, call light in reach.   Significant other is visiting the patient.  Patient instructed to call for assistance.   Hourly rounding completed.   24 hour chart check completed.  Will continue to monitor.

## 2019-08-02 NOTE — PROGRESS NOTES
Pt seen and examined, chart reviewed. 23 y.o. female with a PMHx of ITP, on Promacta 75 mg PO daily as prescribed without missed doses. Last seen in clinic 7/17 with platelet count 349 admitted with severe ABL Anemia with severe Thrombocytopenia. Reports heavy menstruation for 2-3 days. Labs showed Platelet count 4, H&H 5/16.4, WBC 37.73, UA with > 100 RBC, and unremarkable CXR. 1 L IV fluid bolus given with good HR response. In ED she received IVIg + Prednisone x 1 dose given. Now getting blood and platelets. .

## 2019-08-02 NOTE — HPI
Ms. Grady Miller is a 23 y.o. female with a PMHx of ITP, who presented to the emergency department with complaints of shortness of breath that has progressively worsened over the past 3 days.  Associated lightheadedness, bruising, bleeding gums, and melena.  No aggravating or alleviating factors.  Patient states she is currently menstruating.  Denies any chest pain, palpitations, cough, wheezing, PND, abdominal pain or distention, nausea, vomiting, diarrhea, hematemesis, hematochezia, dysuria, hematuria, back pain, HA, syncope, weakness, nose bleeds, hemoptysis, fever or chills.  Initial workup in ED resulted platelets of 4, H&H 5/16.4, WBC 37.73, UA with > 100 RBC, and unremarkable CXR.  Upon arrival to ED, patient notably tachycardic with HR in the 150s (sinus).  1 L IV fluid bolus given with good HR response.  Patient remains hemodynamically stable with HR in 120s.  Case discussed with Hem/Onc, and IVIg + Prednisone x 1 dose given.  Hospital Medicine was called for admission.

## 2019-08-02 NOTE — ED NOTES
Patient complains of  Dizziness, lightheaded and SOB Symptoms have been present for 2 days Patient describes symptoms as continues vaginal  Bleeding and gum bleeding . Previous treatment includes blood and platelet transfusion . Associated symptoms include  Patient denies fever     Level of Consciousness: The patient is awake, alert, and oriented with appropriate affect and speech; oriented to person, place and time.  Appearance: Sitting up in bed  with no acute distress noted. Clothing and hygiene are clean and worn appropriately.  Skin: Skin is warm and dry with good skin turgor; intact; color consistent with ethnicity.  Mucous membranes are dry. Generalized ecchymosis noted    Musculoskeletal: Moves all extremities well in full range of motion. No obvious deformities or swelling noted.  Respiratory: Airway open and patent, respirations spontaneous, even and unlabored. No accessory muscles in use. Breath sounds clear   Cardiac: Regular rate and rhythm, no peripheral edema noted, good pulses palpated peripherally, capillary refill < 3 seconds.  Abdomen: Soft, non-tender to palpation. No distention noted.  Neurologic: PERRLA, face exhibits symmetrical expression, hand grasps equal and even bilaterally, reports normal sensation to all extremities and face.  Psychosocial: calm     Patient verbalized understanding of status and plan of care. Patient changed into hospital gown with assistance. Side rails up x 2, call light in reach, bed low and locked. Cardiac monitor applied to patient; alarms on, audible, and set. at bedside. Will continue to monitor.

## 2019-08-02 NOTE — ASSESSMENT & PLAN NOTE
--Chronically elevated. H/o Splenectomy. WBC is elevated from baseline however, likely induced from steroid dosing on yesterday. WBC trending downward. No evidence or S&S infection. Would hold off on abx for now. Monitor S&S infection closely. Daily CBC

## 2019-08-02 NOTE — H&P
[facility] Hospital Medicine  History & Physical    Patient Name: Mari Miller  MRN: [id number]  Admission Date: 2019  Attending Physician: Rubens Cedeno MD   Primary Care Provider: Ifeanyi Tello NP         Patient information was obtained from patient, past medical records and ER records.     Subjective:     Principal Problem:Acute ITP    Chief Complaint:   Chief Complaint   Patient presents with    Shortness of Breath     patient reports shortness of breath since monday and vaginal bleeding with rash on her arms, dx with ITP        HPI: Ms. Grady Miller is a 23 y.o. female with a PMHx of ITP, who presented to the emergency department with complaints of shortness of breath that has progressively worsened over the past 3 days.  Associated lightheadedness, bruising, bleeding gums, and melena.  No aggravating or alleviating factors.  Patient states she is currently menstruating.  Denies any chest pain, palpitations, cough, wheezing, PND, abdominal pain or distention, nausea, vomiting, diarrhea, hematemesis, hematochezia, dysuria, hematuria, back pain, HA, syncope, weakness, nose bleeds, hemoptysis, fever or chills.  Initial workup in ED resulted platelets of 4, H&H 5/16.4, WBC 37.73, UA with > 100 RBC, and unremarkable CXR.  Upon arrival to ED, patient notably tachycardic with HR in the 150s (sinus).  1 L IV fluid bolus given with good HR response.  Patient remains hemodynamically stable with HR in 120s.  Case discussed with Hem/Onc, and IVIg + Prednisone x 1 dose given.  Hospital Medicine was called for admission.      Past Medical History:   Diagnosis Date    Encounter for blood transfusion     Thrombocytopenia        Past Surgical History:   Procedure Laterality Date     SECTION      x1    XI ROBOTIC SPLENECTOMY N/A 2018    Performed by Yobani Lara MD at [facility] OR       Review of patient's allergies indicates:   Allergen Reactions    Aspirin      Unable  to take because of illness    Ibuprofen Other (See Comments)     Cannot take due to Blood Disorder       No current facility-administered medications on file prior to encounter.      Current Outpatient Medications on File Prior to Encounter   Medication Sig    eltrombopag (PROMACTA) 50 MG Tab Take 1 tablet (50 mg total) by mouth once daily.    ferrous sulfate (FEOSOL) 325 mg (65 mg iron) Tab tablet Take 1 tablet (325 mg total) by mouth once daily.     Family History     Reviewed and not pertinent.        Tobacco Use    Smoking status: Never Smoker    Smokeless tobacco: Never Used   Substance and Sexual Activity    Alcohol use: No     Frequency: Never    Drug use: No    Sexual activity: Not on file     Review of Systems   Constitutional: Negative for chills, diaphoresis, fatigue and fever.   HENT: Negative for congestion, nosebleeds and sore throat.         (+) bleeding gums.   Respiratory: Negative for cough, shortness of breath and wheezing.    Cardiovascular: Negative for chest pain, palpitations and leg swelling.   Gastrointestinal: Positive for blood in stool. Negative for abdominal distention, abdominal pain, anal bleeding, constipation, diarrhea, nausea and vomiting.   Genitourinary: Positive for vaginal bleeding. Negative for dysuria and hematuria.   Musculoskeletal: Negative for arthralgias, back pain and myalgias.   Skin: Negative for pallor, rash and wound.   Neurological: Positive for light-headedness. Negative for dizziness, syncope, weakness, numbness and headaches.   Hematological: Bruises/bleeds easily.   Psychiatric/Behavioral: Negative for confusion. The patient is not nervous/anxious.    All other systems reviewed and are negative.    Objective:     Vital Signs (Most Recent):  Temp: 98.8 °F (37.1 °C) (08/02/19 0423)  Pulse: (!) 134 (08/02/19 0423)  Resp: (!) 22 (08/02/19 0423)  BP: 137/79 (08/02/19 0423)  SpO2: 100 % (08/02/19 0423) Vital Signs (24h Range):  Temp:  [98.5 °F (36.9 °C)-98.8  °F (37.1 °C)] 98.8 °F (37.1 °C)  Pulse:  [129-150] 134  Resp:  [20-22] 22  SpO2:  [98 %-100 %] 100 %  BP: (118-152)/(70-81) 137/79     Weight: 93 kg (205 lb 0.4 oz)  Body mass index is 37.5 kg/m².    Physical Exam   Constitutional: She is oriented to person, place, and time. She appears well-developed and well-nourished. No distress.   HENT:   Head: Normocephalic and atraumatic.   Mouth/Throat: Mucous membranes are normal.   Eyes: Conjunctivae are normal.   PERRL; EOM intact.   Neck: Normal range of motion. Neck supple.   Cardiovascular: Regular rhythm, S1 normal, S2 normal and intact distal pulses.  No extrasystoles are present. Tachycardia present. Exam reveals no gallop and no friction rub.   No murmur heard.  Pulses:       Radial pulses are 2+ on the right side, and 2+ on the left side.        Dorsalis pedis pulses are 2+ on the right side, and 2+ on the left side.        Posterior tibial pulses are 2+ on the right side, and 2+ on the left side.   Pulmonary/Chest: Effort normal and breath sounds normal. No accessory muscle usage. Tachypnea noted. No respiratory distress. She has no wheezes. She has no rhonchi. She has no rales.   Abdominal: Soft. Bowel sounds are normal. She exhibits no distension. There is no tenderness. There is no rebound, no guarding and no CVA tenderness.   Musculoskeletal: Normal range of motion. She exhibits no edema, tenderness or deformity.   Neurological: She is alert and oriented to person, place, and time. No cranial nerve deficit or sensory deficit.   Skin: Skin is warm, dry and intact. Capillary refill takes 2 to 3 seconds. Ecchymosis (Scattered throughout all extremities) and petechiae (Diffuse to all extremities) noted. No rash noted. She is not diaphoretic. No cyanosis or erythema. Nails show no clubbing.   Psychiatric: She has a normal mood and affect. Her speech is normal and behavior is normal. Cognition and memory are normal.   Nursing note and vitals reviewed.           Significant Labs:  Results for orders placed or performed during the hospital encounter of 08/01/19   HIV 1/2 Ag/Ab (4th Gen)   Result Value Ref Range    HIV 1/2 Ag/Ab Negative Negative   Comprehensive metabolic panel   Result Value Ref Range    Sodium 137 136 - 145 mmol/L    Potassium 3.8 3.5 - 5.1 mmol/L    Chloride 108 95 - 110 mmol/L    CO2 20 (L) 23 - 29 mmol/L    Glucose 101 70 - 110 mg/dL    BUN, Bld 17 6 - 20 mg/dL    Creatinine 0.6 0.5 - 1.4 mg/dL    Calcium 8.7 8.7 - 10.5 mg/dL    Total Protein 6.4 6.0 - 8.4 g/dL    Albumin 3.7 3.5 - 5.2 g/dL    Total Bilirubin 0.1 0.1 - 1.0 mg/dL    Alkaline Phosphatase 49 (L) 55 - 135 U/L    AST 18 10 - 40 U/L    ALT 17 10 - 44 U/L    Anion Gap 9 8 - 16 mmol/L    eGFR if African American >60 >60 mL/min/1.73 m^2    eGFR if non African American >60 >60 mL/min/1.73 m^2   APTT   Result Value Ref Range    aPTT 22.2 21.0 - 32.0 sec   Protime-INR   Result Value Ref Range    Prothrombin Time 11.9 9.0 - 12.5 sec    INR 1.1 0.8 - 1.2   Fibrinogen   Result Value Ref Range    Fibrinogen 217 182 - 366 mg/dL   Pregnancy, urine rapid   Result Value Ref Range    Preg Test, Ur Negative    Urinalysis, Reflex to Urine Culture Urine, Clean Catch   Result Value Ref Range    Specimen UA Urine, Clean Catch     Color, UA Yellow Yellow, Straw, Tati    Appearance, UA Cloudy (A) Clear    pH, UA 6.0 5.0 - 8.0    Specific Gravity, UA >=1.030 (A) 1.005 - 1.030    Protein, UA Negative Negative    Glucose, UA Negative Negative    Ketones, UA Negative Negative    Bilirubin (UA) Negative Negative    Occult Blood UA 3+ (A) Negative    Nitrite, UA Negative Negative    Urobilinogen, UA Negative <2.0 EU/dL    Leukocytes, UA Negative Negative   Troponin I   Result Value Ref Range    Troponin I 0.007 0.000 - 0.026 ng/mL   Urinalysis Microscopic   Result Value Ref Range    RBC, UA >100 (H) 0 - 4 /hpf    Squam Epithel, UA 1 /hpf    Microscopic Comment SEE COMMENT    CBC auto differential   Result Value Ref  Range    WBC 37.73 (H) 3.90 - 12.70 K/uL    RBC 2.10 (L) 4.00 - 5.40 M/uL    Hemoglobin 5.0 (LL) 12.0 - 16.0 g/dL    Hematocrit 16.4 (LL) 37.0 - 48.5 %    Mean Corpuscular Volume 78 (L) 82 - 98 fL    Mean Corpuscular Hemoglobin 23.8 (L) 27.0 - 31.0 pg    Mean Corpuscular Hemoglobin Conc 30.5 (L) 32.0 - 36.0 g/dL    RDW 20.3 (H) 11.5 - 14.5 %    Platelets 4 (LL) 150 - 350 K/uL    MPV SEE COMMENT 9.2 - 12.9 fL    Gran # (ANC) 30.9 (H) 1.8 - 7.7 K/uL    Lymph # 4.3 1.0 - 4.8 K/uL    Mono # 2.4 (H) 0.3 - 1.0 K/uL    Eos # 0.0 0.0 - 0.5 K/uL    Baso # 0.11 0.00 - 0.20 K/uL    Gran% 84.2 (H) 38.0 - 73.0 %    Lymph% 11.4 (L) 18.0 - 48.0 %    Mono% 6.3 4.0 - 15.0 %    Eosinophil% 0.0 0.0 - 8.0 %    Basophil% 0.3 0.0 - 1.9 %    Platelet Estimate Decreased (A)     Aniso Moderate     Poik Moderate     Poly Moderate     Hypo Moderate     Ovalocytes Occasional     Target Cells Moderate     Stomatocytes Present     Spherocytes Occasional     Acanthocytes Present     Schistocytes Present     Differential Method Automated       All pertinent labs within the past 24 hours have been reviewed.    Significant Imaging:  Imaging Results          X-Ray Chest PA And Lateral (Final result)  Result time 08/01/19 23:18:19    Final result by Flynn Holguin MD (08/01/19 23:18:19)                 Impression:      1.  Negative for acute process involving the chest.  Negative for interval change.      Electronically signed by: Flynn Holguin MD  Date:    08/01/2019  Time:    23:18             Narrative:    EXAMINATION:  XR CHEST PA AND LATERAL    CLINICAL HISTORY:  Shortness of breath    COMPARISON:  June 19, 2019    FINDINGS:  EKG leads and oxygen tubing overlie the chest.  The lungs are clear. The cardiac silhouette size is normal. The trachea is midline and the mediastinal width is normal. Negative for focal infiltrate, effusion or pneumothorax. Pulmonary vasculature is normal. Negative for osseous abnormalities.                               I  have reviewed all pertinent imaging results/findings within the past 24 hours.     EKG: (personally reviewed)  Sinus tachycardia, nonspecific ST-T abnormality.  No significant change from previous tracings.                Assessment/Plan:     * Acute ITP  - Initial platelets of 4, previously 349 on 7/17.  - IVIg x 1 dose given in ED.  - Continue eltrombopag.   - Hematology consulted.  - Follow labs.    Iron deficiency anemia due to chronic blood loss  - Initial H&H 5/16.4.  Will transfuse 2 units PRBCs.  - Continue iron supplementation.  - Hematology consult.  - Follow labs.    Leukocytosis  - Likely reactive secondary to above.  No evidence infectious process:  Afebrile, UA and CXR unremarkable.  - Follow labs.    Sinus tachycardia  - Likely secondary to IVVD.  Remains hemodynamically stable.  - Check electrolytes and TSH.  - Continue IV hydration. PRBC transfusion as above.  - Monitor telemetry.      VTE Risk Mitigation (From admission, onward)        Ordered     IP VTE HIGH RISK PATIENT  Once      08/02/19 0503     Reason for No Pharmacological VTE Prophylaxis  Once      08/02/19 0503     Place sequential compression device  Until discontinued      08/02/19 0503             Ruth Yung NP  Department of Hospital Medicine   [facility]

## 2019-08-02 NOTE — CONSULTS
[facility] -   Hematology/Oncology  Consult Note    Patient Name: Mari Miller  MRN: [id number]  Admission Date: 8/1/2019  Hospital Length of Stay: 0 days  Code Status: Full Code   Attending Provider: Virginie Cortes MD  Consulting Provider: Sophy Das NP  Primary Care Physician: Ifeanyi Tello NP  Principal Problem:Acute ITP    Consults  Subjective:     HPI:  23 y.o. female with a PMHx of ITP, who presented to the emergency department with complaints of shortness of breath that has progressively worsened over the past 3 days. Associated symptoms include dizziness, lightheadedness, palpitations, bruising.  No aggravating or alleviating factors.  Patient states she is currently approaching the end of her monthly menstruation. Reports heavy menstruation for 2-3 days.  Denies any chest pain, palpitations, cough, wheezing, PND, abdominal pain or distention, nausea, vomiting, diarrhea, hematemesis, hematochezia, dysuria, hematuria, back pain, HA, syncope, weakness, nose bleeds, hemoptysis, fever or chills. ED workup revealed: Platelet count 4, H&H 5/16.4, WBC 37.73, UA with > 100 RBC, and unremarkable CXR. 1 L IV fluid bolus given with good HR response.   In ED she received IVIg + Prednisone x 1 dose given.      Patient reports taking Promacta 75 mg PO daily as prescribed without missed doses. Last seen in clinic 7/17 with platelet count 349    Interval History: Patient still with dizziness, tachycardia. Awaiting blood transfusion. Denies any missed Promacta doses. Discussed patient's case with pharmacy who agree to do a pill count check of Promacta  Oncology Treatment Plan:   [No treatment plan]    Medications:  Continuous Infusions:   lactated ringers       Scheduled Meds:   [START ON 8/3/2019] eltrombopag  75 mg Oral Daily    ferrous sulfate  325 mg Oral Daily     PRN Meds:sodium chloride, acetaminophen, ondansetron, promethazine (PHENERGAN) IVPB, sodium chloride 0.9%     Review of  patient's allergies indicates:   Allergen Reactions    Aspirin      Unable to take because of illness    Ibuprofen Other (See Comments)     Cannot take due to Blood Disorder        Past Medical History:   Diagnosis Date    Encounter for blood transfusion     Thrombocytopenia      Past Surgical History:   Procedure Laterality Date     SECTION      x1    XI ROBOTIC SPLENECTOMY N/A 2018    Performed by Yobnai Lara [facility] at [facility]     Family History     None        Tobacco Use    Smoking status: Never Smoker    Smokeless tobacco: Never Used   Substance and Sexual Activity    Alcohol use: No     Frequency: Never    Drug use: No    Sexual activity: Not on file       Review of Systems   Constitutional: Positive for activity change, diaphoresis and fatigue. Negative for appetite change, chills, fever and unexpected weight change.   HENT: Negative for congestion, mouth sores, nosebleeds, sore throat, trouble swallowing and voice change.    Eyes: Negative for photophobia and visual disturbance.   Respiratory: Positive for shortness of breath. Negative for cough, chest tightness and wheezing.    Cardiovascular: Positive for palpitations. Negative for chest pain and leg swelling.   Gastrointestinal: Negative for abdominal distention, abdominal pain, anal bleeding, blood in stool, constipation, diarrhea, nausea, rectal pain and vomiting.   Genitourinary: Positive for menstrual problem. Negative for difficulty urinating, dysuria and hematuria.   Musculoskeletal: Negative for arthralgias, back pain and myalgias.   Skin: Negative for pallor, rash and wound.   Neurological: Positive for dizziness, weakness and light-headedness. Negative for syncope and headaches.   Hematological: Negative for adenopathy. Bruises/bleeds easily.   Psychiatric/Behavioral: The patient is nervous/anxious.      Objective:     Vital Signs (Most Recent):  Temp: 98.3 °F (36.8 °C) (19 0715)  Pulse: (!) 143 (19 1100)  Resp:  14 (08/02/19 0715)  BP: 130/60 (08/02/19 0715)  SpO2: 96 % (08/02/19 0809) Vital Signs (24h Range):  Temp:  [98.3 °F (36.8 °C)-98.8 °F (37.1 °C)] 98.3 °F (36.8 °C)  Pulse:  [121-154] 143  Resp:  [14-22] 14  SpO2:  [96 %-100 %] 96 %  BP: (107-152)/(60-81) 130/60     Weight: 93 kg (205 lb 0.4 oz)  Body mass index is 37.5 kg/m².  Body surface area is 2.02 meters squared.      Intake/Output Summary (Last 24 hours) at 8/2/2019 1127  Last data filed at 8/2/2019 0228  Gross per 24 hour   Intake 1000 ml   Output --   Net 1000 ml       Physical Exam   Constitutional: She is oriented to person, place, and time. She appears well-developed and well-nourished. She is cooperative.   HENT:   Head: Normocephalic.   Right Ear: External ear normal.   Left Ear: External ear normal.   Nose: Nose normal.   Mouth/Throat: Oropharynx is clear and moist.   Eyes: Conjunctivae, EOM and lids are normal. Right eye exhibits no discharge. Left eye exhibits no discharge. No scleral icterus.   Neck: Normal range of motion. No thyroid mass present.   Cardiovascular: Regular rhythm and normal heart sounds. Tachycardia present.   Pulmonary/Chest: Effort normal and breath sounds normal. No respiratory distress. She has no wheezes. She has no rhonchi. She has no rales.   Abdominal: Soft. Bowel sounds are normal. She exhibits no distension. There is no tenderness.   Genitourinary:   Genitourinary Comments: deferred   Musculoskeletal: Normal range of motion. She exhibits no edema.   Lymphadenopathy:        Head (right side): No submandibular, no preauricular and no posterior auricular adenopathy present.        Head (left side): No submandibular, no preauricular and no posterior auricular adenopathy present.        Right cervical: No superficial cervical adenopathy present.       Left cervical: No superficial cervical adenopathy present.   Neurological: She is alert and oriented to person, place, and time.   Skin: Skin is warm, dry and intact.    Psychiatric: She has a normal mood and affect. Her speech is normal and behavior is normal. Thought content normal.   Vitals reviewed.      Significant Labs:   BMP:   Recent Labs   Lab 08/01/19 2209 08/02/19  0559    172*    134*   K 3.8 3.6    106   CO2 20* 20*   BUN 17 14   CREATININE 0.6 0.7   CALCIUM 8.7 8.6*   MG  --  1.6   , CBC:   Recent Labs   Lab 08/02/19  0213 08/02/19  0559   WBC 37.73* 32.30*   HGB 5.0* 4.9*   HCT 16.4* 15.9*   PLT 4* 12*   , LFTs:   Recent Labs   Lab 08/01/19  2209   ALT 17   AST 18   ALKPHOS 49*   BILITOT 0.1   PROT 6.4   ALBUMIN 3.7    and All pertinent labs from the last 24 hours have been reviewed.    Diagnostic Results:  I have reviewed all pertinent imaging results/findings within the past 24 hours.    Assessment/Plan:     * Acute ITP  August 2, 2019    ---Patient has h/o refractory ITP status post multiple treatments.  She has had transient responses to intravenous immunoglobulin and to splenectomy.  Patient was recently hospitalized in 6/2019 related to her ITP. Patient reports taking Promacta daily as prescribed without missed doses. I did ask the pharmacy to review her Promacta medication refills/med count. Per pharmacy review it does appear that patient has been taking her Promacta on an intermittent basis. She is s/p IVIG/Prednisone in ED on admission. Would like to resume Promacta orally for treatment of her ITP. Patient may have to be placed on injectable agent to treat her ITP if compliance continues to be an issue. Very concerned with patient's clinical outcome without adequate treatment. Again I have stressed the importance of taking Promacta as prescribed to patient. Agree with 2 units PRBCs transfusion. Would benefit from CBC following blood transfusion. May need additional unit PRBCs pending H/H. Monitor S&S bleeding closely. Platelet count 12 today. CBC daily. Due to patient's tachycardia worsening with movement I have asked the nurse to place  BSC in patient's room to decrease amount of ambulation until she is able to receive her PRBCs transfusion. Supportive care    Leukocytosis  --Chronically elevated. H/o Splenectomy. WBC is elevated from baseline however, likely induced from steroid dosing on yesterday. WBC trending downward. No evidence or S&S infection. Would hold off on abx for now. Monitor S&S infection closely. Daily CBC        Thank you for your consult. I will follow-up with patient. Please contact us if you have any additional questions.    Sophy Das NP  Hematology/Oncology  [facility] [place]

## 2019-08-02 NOTE — ASSESSMENT & PLAN NOTE
- Initial platelets of 4, previously 349 on 7/17.  - IVIg x 1 dose given in ED.  - Continue eltrombopag.   - Hematology consulted.  - Follow labs.

## 2019-08-02 NOTE — ASSESSMENT & PLAN NOTE
August 2, 2019    ---Patient has h/o refractory ITP status post multiple treatments.  She has had transient responses to intravenous immunoglobulin and to splenectomy.  Patient was recently hospitalized in 6/2019 related to her ITP. Patient reports taking Promacta daily as prescribed without missed doses. I did ask the pharmacy to review her Promacta medication refills/med count. Per pharmacy review it does appear that patient has been taking her Promacta on an intermittent basis. She is s/p IVIG/Prednisone in ED on admission. Would like to resume Promacta orally for treatment of her ITP. Patient may have to be placed on injectable agent to treat her ITP if compliance continues to be an issue. Very concerned with patient's clinical outcome without adequate treatment. Again I have stressed the importance of taking Promacta as prescribed to patient. Agree with 2 units PRBCs transfusion. Would benefit from CBC following blood transfusion. May need additional unit PRBCs pending H/H. Monitor S&S bleeding closely. Platelet count 12 today. CBC daily. Due to patient's tachycardia worsening with movement I have asked the nurse to place BSC in patient's room to decrease amount of ambulation until she is able to receive her PRBCs transfusion. Supportive care

## 2019-08-02 NOTE — PLAN OF CARE
Problem: Adult Inpatient Plan of Care  Goal: Plan of Care Review  Outcome: Ongoing (interventions implemented as appropriate)  Pt alert and oriented.  Pt remained free of falls during shift. Bed alarm set , call light in reach, room free of clutter, side rails  up x2, pt on telemetry monitor ST, will continue to monitor.

## 2019-08-02 NOTE — ASSESSMENT & PLAN NOTE
- Likely secondary to IVVD.  Remains hemodynamically stable.  - Check electrolytes and TSH.  - Continue IV hydration. PRBC transfusion as above.  - Monitor telemetry.

## 2019-08-02 NOTE — SIGNIFICANT EVENT
Patient Deterioration Alert     Deterioration alert received.  Patient seen and examined <10 minutes ago for admission.  Patient just transferred to telemetry unit from the ED.  Vital signs reviewed.  No acute change in patient's status.  Continue current treatment plan.  See H&P note for details.           Ruth Yung NP

## 2019-08-02 NOTE — HPI
23 y.o. female with a PMHx of ITP, who presented to the emergency department with complaints of shortness of breath that has progressively worsened over the past 3 days. Associated symptoms include dizziness, lightheadedness, palpitations, bruising.  No aggravating or alleviating factors.  Patient states she is currently approaching the end of her monthly menstruation. Reports heavy menstruation for 2-3 days.  Denies any chest pain, palpitations, cough, wheezing, PND, abdominal pain or distention, nausea, vomiting, diarrhea, hematemesis, hematochezia, dysuria, hematuria, back pain, HA, syncope, weakness, nose bleeds, hemoptysis, fever or chills. ED workup revealed: Platelet count 4, H&H 5/16.4, WBC 37.73, UA with > 100 RBC, and unremarkable CXR. 1 L IV fluid bolus given with good HR response.  In ED she received IVIg + Prednisone x 1 dose given.      Patient reports taking Promacta 75 mg PO daily as prescribed without missed doses. Last seen in clinic 7/17 with platelet count 349

## 2019-08-02 NOTE — ASSESSMENT & PLAN NOTE
- Initial H&H 5/16.4.  Will transfuse 2 units PRBCs.  - Continue iron supplementation.  - Hematology consult.  - Follow labs.

## 2019-08-02 NOTE — SUBJECTIVE & OBJECTIVE
Past Medical History:   Diagnosis Date    Encounter for blood transfusion     Thrombocytopenia        Past Surgical History:   Procedure Laterality Date     SECTION      x1    XI ROBOTIC SPLENECTOMY N/A 2018    Performed by Yobani Lara MD at [facility] [place] OR       Review of patient's allergies indicates:   Allergen Reactions    Aspirin      Unable to take because of illness    Ibuprofen Other (See Comments)     Cannot take due to Blood Disorder       No current facility-administered medications on file prior to encounter.      Current Outpatient Medications on File Prior to Encounter   Medication Sig    eltrombopag (PROMACTA) 50 MG Tab Take 1 tablet (50 mg total) by mouth once daily.    ferrous sulfate (FEOSOL) 325 mg (65 mg iron) Tab tablet Take 1 tablet (325 mg total) by mouth once daily.     Family History     Reviewed and not pertinent.        Tobacco Use    Smoking status: Never Smoker    Smokeless tobacco: Never Used   Substance and Sexual Activity    Alcohol use: No     Frequency: Never    Drug use: No    Sexual activity: Not on file     Review of Systems   Constitutional: Negative for chills, diaphoresis, fatigue and fever.   HENT: Negative for congestion, nosebleeds and sore throat.         (+) bleeding gums.   Respiratory: Negative for cough, shortness of breath and wheezing.    Cardiovascular: Negative for chest pain, palpitations and leg swelling.   Gastrointestinal: Positive for blood in stool. Negative for abdominal distention, abdominal pain, anal bleeding, constipation, diarrhea, nausea and vomiting.   Genitourinary: Positive for vaginal bleeding. Negative for dysuria and hematuria.   Musculoskeletal: Negative for arthralgias, back pain and myalgias.   Skin: Negative for pallor, rash and wound.   Neurological: Positive for light-headedness. Negative for dizziness, syncope, weakness, numbness and headaches.   Hematological: Bruises/bleeds easily.   Psychiatric/Behavioral: Negative  for confusion. The patient is not nervous/anxious.    All other systems reviewed and are negative.    Objective:     Vital Signs (Most Recent):  Temp: 98.8 °F (37.1 °C) (08/02/19 0423)  Pulse: (!) 134 (08/02/19 0423)  Resp: (!) 22 (08/02/19 0423)  BP: 137/79 (08/02/19 0423)  SpO2: 100 % (08/02/19 0423) Vital Signs (24h Range):  Temp:  [98.5 °F (36.9 °C)-98.8 °F (37.1 °C)] 98.8 °F (37.1 °C)  Pulse:  [129-150] 134  Resp:  [20-22] 22  SpO2:  [98 %-100 %] 100 %  BP: (118-152)/(70-81) 137/79     Weight: 93 kg (205 lb 0.4 oz)  Body mass index is 37.5 kg/m².    Physical Exam   Constitutional: She is oriented to person, place, and time. She appears well-developed and well-nourished. No distress.   HENT:   Head: Normocephalic and atraumatic.   Mouth/Throat: Mucous membranes are normal.   Eyes: Conjunctivae are normal.   PERRL; EOM intact.   Neck: Normal range of motion. Neck supple.   Cardiovascular: Regular rhythm, S1 normal, S2 normal and intact distal pulses.  No extrasystoles are present. Tachycardia present. Exam reveals no gallop and no friction rub.   No murmur heard.  Pulses:       Radial pulses are 2+ on the right side, and 2+ on the left side.        Dorsalis pedis pulses are 2+ on the right side, and 2+ on the left side.        Posterior tibial pulses are 2+ on the right side, and 2+ on the left side.   Pulmonary/Chest: Effort normal and breath sounds normal. No accessory muscle usage. Tachypnea noted. No respiratory distress. She has no wheezes. She has no rhonchi. She has no rales.   Abdominal: Soft. Bowel sounds are normal. She exhibits no distension. There is no tenderness. There is no rebound, no guarding and no CVA tenderness.   Musculoskeletal: Normal range of motion. She exhibits no edema, tenderness or deformity.   Neurological: She is alert and oriented to person, place, and time. No cranial nerve deficit or sensory deficit.   Skin: Skin is warm, dry and intact. Capillary refill takes 2 to 3 seconds.  Ecchymosis (Scattered throughout all extremities) and petechiae (Diffuse to all extremities) noted. No rash noted. She is not diaphoretic. No cyanosis or erythema. Nails show no clubbing.   Psychiatric: She has a normal mood and affect. Her speech is normal and behavior is normal. Cognition and memory are normal.   Nursing note and vitals reviewed.          Significant Labs:  Results for orders placed or performed during the hospital encounter of 08/01/19   HIV 1/2 Ag/Ab (4th Gen)   Result Value Ref Range    HIV 1/2 Ag/Ab Negative Negative   Comprehensive metabolic panel   Result Value Ref Range    Sodium 137 136 - 145 mmol/L    Potassium 3.8 3.5 - 5.1 mmol/L    Chloride 108 95 - 110 mmol/L    CO2 20 (L) 23 - 29 mmol/L    Glucose 101 70 - 110 mg/dL    BUN, Bld 17 6 - 20 mg/dL    Creatinine 0.6 0.5 - 1.4 mg/dL    Calcium 8.7 8.7 - 10.5 mg/dL    Total Protein 6.4 6.0 - 8.4 g/dL    Albumin 3.7 3.5 - 5.2 g/dL    Total Bilirubin 0.1 0.1 - 1.0 mg/dL    Alkaline Phosphatase 49 (L) 55 - 135 U/L    AST 18 10 - 40 U/L    ALT 17 10 - 44 U/L    Anion Gap 9 8 - 16 mmol/L    eGFR if African American >60 >60 mL/min/1.73 m^2    eGFR if non African American >60 >60 mL/min/1.73 m^2   APTT   Result Value Ref Range    aPTT 22.2 21.0 - 32.0 sec   Protime-INR   Result Value Ref Range    Prothrombin Time 11.9 9.0 - 12.5 sec    INR 1.1 0.8 - 1.2   Fibrinogen   Result Value Ref Range    Fibrinogen 217 182 - 366 mg/dL   Pregnancy, urine rapid   Result Value Ref Range    Preg Test, Ur Negative    Urinalysis, Reflex to Urine Culture Urine, Clean Catch   Result Value Ref Range    Specimen UA Urine, Clean Catch     Color, UA Yellow Yellow, Straw, Tati    Appearance, UA Cloudy (A) Clear    pH, UA 6.0 5.0 - 8.0    Specific Gravity, UA >=1.030 (A) 1.005 - 1.030    Protein, UA Negative Negative    Glucose, UA Negative Negative    Ketones, UA Negative Negative    Bilirubin (UA) Negative Negative    Occult Blood UA 3+ (A) Negative    Nitrite, UA  Negative Negative    Urobilinogen, UA Negative <2.0 EU/dL    Leukocytes, UA Negative Negative   Troponin I   Result Value Ref Range    Troponin I 0.007 0.000 - 0.026 ng/mL   Urinalysis Microscopic   Result Value Ref Range    RBC, UA >100 (H) 0 - 4 /hpf    Squam Epithel, UA 1 /hpf    Microscopic Comment SEE COMMENT    CBC auto differential   Result Value Ref Range    WBC 37.73 (H) 3.90 - 12.70 K/uL    RBC 2.10 (L) 4.00 - 5.40 M/uL    Hemoglobin 5.0 (LL) 12.0 - 16.0 g/dL    Hematocrit 16.4 (LL) 37.0 - 48.5 %    Mean Corpuscular Volume 78 (L) 82 - 98 fL    Mean Corpuscular Hemoglobin 23.8 (L) 27.0 - 31.0 pg    Mean Corpuscular Hemoglobin Conc 30.5 (L) 32.0 - 36.0 g/dL    RDW 20.3 (H) 11.5 - 14.5 %    Platelets 4 (LL) 150 - 350 K/uL    MPV SEE COMMENT 9.2 - 12.9 fL    Gran # (ANC) 30.9 (H) 1.8 - 7.7 K/uL    Lymph # 4.3 1.0 - 4.8 K/uL    Mono # 2.4 (H) 0.3 - 1.0 K/uL    Eos # 0.0 0.0 - 0.5 K/uL    Baso # 0.11 0.00 - 0.20 K/uL    Gran% 84.2 (H) 38.0 - 73.0 %    Lymph% 11.4 (L) 18.0 - 48.0 %    Mono% 6.3 4.0 - 15.0 %    Eosinophil% 0.0 0.0 - 8.0 %    Basophil% 0.3 0.0 - 1.9 %    Platelet Estimate Decreased (A)     Aniso Moderate     Poik Moderate     Poly Moderate     Hypo Moderate     Ovalocytes Occasional     Target Cells Moderate     Stomatocytes Present     Spherocytes Occasional     Acanthocytes Present     Schistocytes Present     Differential Method Automated       All pertinent labs within the past 24 hours have been reviewed.    Significant Imaging:  Imaging Results          X-Ray Chest PA And Lateral (Final result)  Result time 08/01/19 23:18:19    Final result by Flynn Holguin MD (08/01/19 23:18:19)                 Impression:      1.  Negative for acute process involving the chest.  Negative for interval change.      Electronically signed by: Flynn Holguin MD  Date:    08/01/2019  Time:    23:18             Narrative:    EXAMINATION:  XR CHEST PA AND LATERAL    CLINICAL HISTORY:  Shortness of  breath    COMPARISON:  June 19, 2019    FINDINGS:  EKG leads and oxygen tubing overlie the chest.  The lungs are clear. The cardiac silhouette size is normal. The trachea is midline and the mediastinal width is normal. Negative for focal infiltrate, effusion or pneumothorax. Pulmonary vasculature is normal. Negative for osseous abnormalities.                               I have reviewed all pertinent imaging results/findings within the past 24 hours.     EKG: (personally reviewed)  Sinus tachycardia, nonspecific ST-T abnormality.  No significant change from previous tracings.

## 2019-08-03 VITALS
WEIGHT: 205 LBS | TEMPERATURE: 98 F | DIASTOLIC BLOOD PRESSURE: 56 MMHG | OXYGEN SATURATION: 99 % | BODY MASS INDEX: 37.73 KG/M2 | HEART RATE: 98 BPM | RESPIRATION RATE: 16 BRPM | SYSTOLIC BLOOD PRESSURE: 111 MMHG | HEIGHT: 62 IN

## 2019-08-03 LAB
ANION GAP SERPL CALC-SCNC: 5 MMOL/L (ref 8–16)
ANISOCYTOSIS BLD QL SMEAR: ABNORMAL
BASOPHILS # BLD AUTO: 0.07 K/UL (ref 0–0.2)
BASOPHILS NFR BLD: 0.3 % (ref 0–1.9)
BUN SERPL-MCNC: 10 MG/DL (ref 6–20)
CALCIUM SERPL-MCNC: 8.1 MG/DL (ref 8.7–10.5)
CHLORIDE SERPL-SCNC: 109 MMOL/L (ref 95–110)
CO2 SERPL-SCNC: 24 MMOL/L (ref 23–29)
CREAT SERPL-MCNC: 0.7 MG/DL (ref 0.5–1.4)
DACRYOCYTES BLD QL SMEAR: ABNORMAL
DIFFERENTIAL METHOD: ABNORMAL
EOSINOPHIL # BLD AUTO: 0.1 K/UL (ref 0–0.5)
EOSINOPHIL NFR BLD: 0.2 % (ref 0–8)
ERYTHROCYTE [DISTWIDTH] IN BLOOD BY AUTOMATED COUNT: 19.1 % (ref 11.5–14.5)
EST. GFR  (AFRICAN AMERICAN): >60 ML/MIN/1.73 M^2
EST. GFR  (NON AFRICAN AMERICAN): >60 ML/MIN/1.73 M^2
GLUCOSE SERPL-MCNC: 98 MG/DL (ref 70–110)
HCT VFR BLD AUTO: 22.5 % (ref 37–48.5)
HGB BLD-MCNC: 7.3 G/DL (ref 12–16)
HYPOCHROMIA BLD QL SMEAR: ABNORMAL
LYMPHOCYTES # BLD AUTO: 5.1 K/UL (ref 1–4.8)
LYMPHOCYTES NFR BLD: 20.2 % (ref 18–48)
MCH RBC QN AUTO: 27.7 PG (ref 27–31)
MCHC RBC AUTO-ENTMCNC: 32.4 G/DL (ref 32–36)
MCV RBC AUTO: 85 FL (ref 82–98)
MONOCYTES # BLD AUTO: 2.3 K/UL (ref 0.3–1)
MONOCYTES NFR BLD: 8.9 % (ref 4–15)
NEUTROPHILS # BLD AUTO: 17.7 K/UL (ref 1.8–7.7)
NEUTROPHILS NFR BLD: 71.4 % (ref 38–73)
PLATELET # BLD AUTO: 62 K/UL (ref 150–350)
PLATELET BLD QL SMEAR: ABNORMAL
PMV BLD AUTO: ABNORMAL FL (ref 9.2–12.9)
POIKILOCYTOSIS BLD QL SMEAR: ABNORMAL
POLYCHROMASIA BLD QL SMEAR: ABNORMAL
POTASSIUM SERPL-SCNC: 3.3 MMOL/L (ref 3.5–5.1)
RBC # BLD AUTO: 2.64 M/UL (ref 4–5.4)
SCHISTOCYTES BLD QL SMEAR: PRESENT
SODIUM SERPL-SCNC: 138 MMOL/L (ref 136–145)
SPHEROCYTES BLD QL SMEAR: ABNORMAL
STOMATOCYTES BLD QL SMEAR: PRESENT
TARGETS BLD QL SMEAR: ABNORMAL
WBC # BLD AUTO: 25.2 K/UL (ref 3.9–12.7)

## 2019-08-03 PROCEDURE — 99233 PR SUBSEQUENT HOSPITAL CARE,LEVL III: ICD-10-PCS | Mod: ,,, | Performed by: INTERNAL MEDICINE

## 2019-08-03 PROCEDURE — 25000003 PHARM REV CODE 250: Performed by: NURSE PRACTITIONER

## 2019-08-03 PROCEDURE — 85025 COMPLETE CBC W/AUTO DIFF WBC: CPT

## 2019-08-03 PROCEDURE — 94761 N-INVAS EAR/PLS OXIMETRY MLT: CPT

## 2019-08-03 PROCEDURE — 99233 SBSQ HOSP IP/OBS HIGH 50: CPT | Mod: ,,, | Performed by: INTERNAL MEDICINE

## 2019-08-03 PROCEDURE — 36415 COLL VENOUS BLD VENIPUNCTURE: CPT

## 2019-08-03 PROCEDURE — 36430 TRANSFUSION BLD/BLD COMPNT: CPT

## 2019-08-03 PROCEDURE — 25000003 PHARM REV CODE 250: Performed by: INTERNAL MEDICINE

## 2019-08-03 PROCEDURE — 80048 BASIC METABOLIC PNL TOTAL CA: CPT

## 2019-08-03 RX ORDER — POTASSIUM CHLORIDE 750 MG/1
10 TABLET, EXTENDED RELEASE ORAL 3 TIMES DAILY
Status: DISCONTINUED | OUTPATIENT
Start: 2019-08-03 | End: 2019-08-03 | Stop reason: HOSPADM

## 2019-08-03 RX ORDER — LANOLIN ALCOHOL/MO/W.PET/CERES
400 CREAM (GRAM) TOPICAL 2 TIMES DAILY
Status: DISCONTINUED | OUTPATIENT
Start: 2019-08-03 | End: 2019-08-03 | Stop reason: HOSPADM

## 2019-08-03 RX ADMIN — FERROUS SULFATE TAB EC 325 MG (65 MG FE EQUIVALENT) 325 MG: 325 (65 FE) TABLET DELAYED RESPONSE at 08:08

## 2019-08-03 RX ADMIN — MAGNESIUM OXIDE TAB 400 MG (241.3 MG ELEMENTAL MG) 400 MG: 400 (241.3 MG) TAB at 08:08

## 2019-08-03 RX ADMIN — ACETAMINOPHEN 650 MG: 325 TABLET ORAL at 08:08

## 2019-08-03 RX ADMIN — POTASSIUM CHLORIDE 10 MEQ: 750 TABLET, FILM COATED, EXTENDED RELEASE ORAL at 08:08

## 2019-08-03 NOTE — DISCHARGE SUMMARY
[facility] Hospital Medicine  Discharge Summary      Patient Name: Mari Miller  MRN: [id number]  Admission Date: 8/1/2019  Hospital Length of Stay: 1 days  Discharge Date and Time:  08/03/2019 11:01 AM  Attending Physician: Hussein Borjas MD   Discharging Provider: Hussein Borjas MD  Primary Care Provider: Ifeanyi Tello NP      HPI:   Ms. Grady Miller is a 23 y.o. female with a PMHx of ITP, who presented to the emergency department with complaints of shortness of breath that has progressively worsened over the past 3 days.  Associated lightheadedness, bruising, bleeding gums, and melena.  No aggravating or alleviating factors.  Patient states she is currently menstruating.  Denies any chest pain, palpitations, cough, wheezing, PND, abdominal pain or distention, nausea, vomiting, diarrhea, hematemesis, hematochezia, dysuria, hematuria, back pain, HA, syncope, weakness, nose bleeds, hemoptysis, fever or chills.  Initial workup in ED resulted platelets of 4, H&H 5/16.4, WBC 37.73, UA with > 100 RBC, and unremarkable CXR.  Upon arrival to ED, patient notably tachycardic with HR in the 150s (sinus).  1 L IV fluid bolus given with good HR response.  Patient remains hemodynamically stable with HR in 120s.  Case discussed with Hem/Onc, and IVIg + Prednisone x 1 dose given.  Hospital Medicine was called for admission.      * No surgery found *      Hospital Course:   Admitted for evaluation and treatment of symptomatic anemia due to menorrhagia due to exacerbation of chronic ITP.  Hgb 4.9 and platelet level of 4 on admission.  Transfused 2 units PRBC.  Hematology evaluated and ordered Prednisone and IVIg.  Platelets increased to 64 and Hgb increased to 7.3  Symptoms resolved.  Discharge plan to return home and continue medications.  Follow up Monday with Hematology to change Promacta to intermittent subcutaneous form.     Consults:   Consults (From admission, onward)         Status Ordering Provider     Inpatient consult to Hematology/Oncology  Once     Provider:  (Not yet assigned)    Acknowledged KELBY ALVAREZ          No new Assessment & Plan notes have been filed under this hospital service since the last note was generated.  Service: Hospital Medicine    Final Active Diagnoses:    Diagnosis Date Noted POA    PRINCIPAL PROBLEM:  Acute ITP [D69.3] 09/25/2018 Yes    Iron deficiency anemia due to chronic blood loss [D50.0] 04/17/2017 Yes    Sinus tachycardia [R00.0] 06/19/2019 Yes    Leukocytosis [D72.829] 06/19/2019 Yes      Problems Resolved During this Admission:       Discharged Condition: good    Disposition: Home or Self Care    Follow Up:  Follow-up Information     Nash Cota MD On 8/5/2019.    Specialty:  Hematology and Oncology  Why:  Hospital follow up ITP with hemorrhage.  Initiate Subcutaneous Promacta.  Contact information:  [id number]  [place] [address] [phone]                 Patient Instructions:      Diet Adult Regular     Activity as tolerated       Significant Diagnostic Studies: Labs: All labs within the past 24 hours have been reviewed    Pending Diagnostic Studies:     None         Medications:  Reconciled Home Medications:      Medication List      CONTINUE taking these medications    eltrombopag 50 MG Tab  Commonly known as:  PROMACTA  Take 1 tablet (50 mg total) by mouth once daily.     ferrous sulfate 325 mg (65 mg iron) Tab tablet  Commonly known as:  FEOSOL  Take 1 tablet (325 mg total) by mouth once daily.            Indwelling Lines/Drains at time of discharge:   Lines/Drains/Airways          None          Time spent on the discharge of patient: 30 minutes  Patient was seen and examined on the date of discharge and determined to be suitable for discharge.         Hussein Borjas MD  Department of Hospital Medicine  [facility]

## 2019-08-03 NOTE — SUBJECTIVE & OBJECTIVE
Interval History: S/p IVIG, prednisone, 2 units RBC transfusion and 1 unit platelets transfusion.  Reported feeling well.    Oncology Treatment Plan:   [No treatment plan]    Medications:  Continuous Infusions:  Scheduled Meds:   eltrombopag  75 mg Oral Daily    ferrous sulfate  325 mg Oral Daily    magnesium oxide  400 mg Oral BID    potassium chloride  10 mEq Oral TID     PRN Meds:sodium chloride, acetaminophen, ondansetron, promethazine (PHENERGAN) IVPB, sodium chloride 0.9%     Review of Systems   Constitutional: Negative.    HENT: Negative.    Eyes: Negative.    Respiratory: Negative.    Cardiovascular: Negative.    Gastrointestinal: Negative.    Endocrine: Negative.    Genitourinary: Negative.    Musculoskeletal: Negative.    Skin: Negative.    Allergic/Immunologic: Negative.    Neurological: Negative.    Hematological: Negative.    Psychiatric/Behavioral: Negative.      Objective:     Vital Signs (Most Recent):  Temp: 98.1 °F (36.7 °C) (08/03/19 0741)  Pulse: 98 (08/03/19 1100)  Resp: 16 (08/03/19 0741)  BP: (!) 111/56 (08/03/19 0741)  SpO2: 99 % (08/03/19 0741) Vital Signs (24h Range):  Temp:  [98.1 °F (36.7 °C)-99 °F (37.2 °C)] 98.1 °F (36.7 °C)  Pulse:  [] 98  Resp:  [16-18] 16  SpO2:  [99 %-100 %] 99 %  BP: (107-130)/(55-71) 111/56     Weight: 93 kg (205 lb 0.4 oz)  Body mass index is 37.5 kg/m².  Body surface area is 2.02 meters squared.      Intake/Output Summary (Last 24 hours) at 8/3/2019 1304  Last data filed at 8/3/2019 0800  Gross per 24 hour   Intake 2398.92 ml   Output --   Net 2398.92 ml       Physical Exam   Constitutional: She is oriented to person, place, and time. She appears well-developed and well-nourished.   HENT:   Head: Normocephalic and atraumatic.   Eyes: Conjunctivae and EOM are normal.   Neck: Normal range of motion. Neck supple.   Cardiovascular: Normal rate and regular rhythm.   Pulmonary/Chest: Effort normal and breath sounds normal.   Abdominal: Soft. Bowel sounds are  normal.   Musculoskeletal: Normal range of motion.   Neurological: She is alert and oriented to person, place, and time.   Skin: Skin is warm and dry.   Psychiatric: She has a normal mood and affect. Her behavior is normal. Judgment and thought content normal.   Nursing note and vitals reviewed.      Significant Labs:   All pertinent labs from the last 24 hours have been reviewed.    Diagnostic Results:  I have reviewed all pertinent imaging results/findings within the past 24 hours.

## 2019-08-03 NOTE — ASSESSMENT & PLAN NOTE
August 2, 2019    ---Patient has h/o refractory ITP status post multiple treatments.  She has had transient responses to intravenous immunoglobulin and to splenectomy.  Patient was recently hospitalized in 6/2019 related to her ITP. Patient reports taking Promacta daily as prescribed without missed doses. I did ask the pharmacy to review her Promacta medication refills/med count. Per pharmacy review it does appear that patient has been taking her Promacta on an intermittent basis. She is s/p IVIG/Prednisone in ED on admission. Would like to resume Promacta orally for treatment of her ITP. Patient may have to be placed on injectable agent to treat her ITP if compliance continues to be an issue. Very concerned with patient's clinical outcome without adequate treatment. Again I have stressed the importance of taking Promacta as prescribed to patient. Agree with 2 units PRBCs transfusion. Would benefit from CBC following blood transfusion. May need additional unit PRBCs pending H/H. Monitor S&S bleeding closely. Platelet count 12 today. CBC daily. Due to patient's tachycardia worsening with movement I have asked the nurse to place BSC in patient's room to decrease amount of ambulation until she is able to receive her PRBCs transfusion. Supportive care    August 3rd  Continue on eltrombopag daily  Need follow up next week to see Dr. Cota in Hematology

## 2019-08-03 NOTE — PROGRESS NOTES
[facility]   Hematology/Oncology  Progress Note    Patient Name: Mari Miller  Admission Date: 8/1/2019  Hospital Length of Stay: 1 days  Code Status: Full Code     Subjective:     HPI:  23 y.o. female with a PMHx of ITP, who presented to the emergency department with complaints of shortness of breath that has progressively worsened over the past 3 days. Associated symptoms include dizziness, lightheadedness, palpitations, bruising.  No aggravating or alleviating factors.  Patient states she is currently approaching the end of her monthly menstruation. Reports heavy menstruation for 2-3 days.  Denies any chest pain, palpitations, cough, wheezing, PND, abdominal pain or distention, nausea, vomiting, diarrhea, hematemesis, hematochezia, dysuria, hematuria, back pain, HA, syncope, weakness, nose bleeds, hemoptysis, fever or chills. ED workup revealed: Platelet count 4, H&H 5/16.4, WBC 37.73, UA with > 100 RBC, and unremarkable CXR. 1 L IV fluid bolus given with good HR response.   In ED she received IVIg + Prednisone x 1 dose given.      Patient reports taking Promacta 75 mg PO daily as prescribed without missed doses. Last seen in clinic 7/17 with platelet count 349    Interval History: S/p IVIG, prednisone, 2 units RBC transfusion and 1 unit platelets transfusion.  Reported feeling well.    Oncology Treatment Plan:   [No treatment plan]    Medications:  Continuous Infusions:  Scheduled Meds:   eltrombopag  75 mg Oral Daily    ferrous sulfate  325 mg Oral Daily    magnesium oxide  400 mg Oral BID    potassium chloride  10 mEq Oral TID     PRN Meds:sodium chloride, acetaminophen, ondansetron, promethazine (PHENERGAN) IVPB, sodium chloride 0.9%     Review of Systems   Constitutional: Negative.    HENT: Negative.    Eyes: Negative.    Respiratory: Negative.    Cardiovascular: Negative.    Gastrointestinal: Negative.    Endocrine: Negative.    Genitourinary: Negative.    Musculoskeletal:  Negative.    Skin: Negative.    Allergic/Immunologic: Negative.    Neurological: Negative.    Hematological: Negative.    Psychiatric/Behavioral: Negative.      Objective:     Vital Signs (Most Recent):  Temp: 98.1 °F (36.7 °C) (08/03/19 0741)  Pulse: 98 (08/03/19 1100)  Resp: 16 (08/03/19 0741)  BP: (!) 111/56 (08/03/19 0741)  SpO2: 99 % (08/03/19 0741) Vital Signs (24h Range):  Temp:  [98.1 °F (36.7 °C)-99 °F (37.2 °C)] 98.1 °F (36.7 °C)  Pulse:  [] 98  Resp:  [16-18] 16  SpO2:  [99 %-100 %] 99 %  BP: (107-130)/(55-71) 111/56     Weight: 93 kg (205 lb 0.4 oz)  Body mass index is 37.5 kg/m².  Body surface area is 2.02 meters squared.      Intake/Output Summary (Last 24 hours) at 8/3/2019 1304  Last data filed at 8/3/2019 0800  Gross per 24 hour   Intake 2398.92 ml   Output --   Net 2398.92 ml       Physical Exam   Constitutional: She is oriented to person, place, and time. She appears well-developed and well-nourished.   HENT:   Head: Normocephalic and atraumatic.   Eyes: Conjunctivae and EOM are normal.   Neck: Normal range of motion. Neck supple.   Cardiovascular: Normal rate and regular rhythm.   Pulmonary/Chest: Effort normal and breath sounds normal.   Abdominal: Soft. Bowel sounds are normal.   Musculoskeletal: Normal range of motion.   Neurological: She is alert and oriented to person, place, and time.   Skin: Skin is warm and dry.   Psychiatric: She has a normal mood and affect. Her behavior is normal. Judgment and thought content normal.   Nursing note and vitals reviewed.      Significant Labs:   All pertinent labs from the last 24 hours have been reviewed.    Diagnostic Results:  I have reviewed all pertinent imaging results/findings within the past 24 hours.    Assessment/Plan:     * Acute ITP  August 2, 2019    ---Patient has h/o refractory ITP status post multiple treatments.  She has had transient responses to intravenous immunoglobulin and to splenectomy.  Patient was recently hospitalized in  6/2019 related to her ITP. Patient reports taking Promacta daily as prescribed without missed doses. I did ask the pharmacy to review her Promacta medication refills/med count. Per pharmacy review it does appear that patient has been taking her Promacta on an intermittent basis. She is s/p IVIG/Prednisone in ED on admission. Would like to resume Promacta orally for treatment of her ITP. Patient may have to be placed on injectable agent to treat her ITP if compliance continues to be an issue. Very concerned with patient's clinical outcome without adequate treatment. Again I have stressed the importance of taking Promacta as prescribed to patient. Agree with 2 units PRBCs transfusion. Would benefit from CBC following blood transfusion. May need additional unit PRBCs pending H/H. Monitor S&S bleeding closely. Platelet count 12 today. CBC daily. Due to patient's tachycardia worsening with movement I have asked the nurse to place BSC in patient's room to decrease amount of ambulation until she is able to receive her PRBCs transfusion. Supportive care    August 3rd  Continue on eltrombopag daily  Need follow up next week to see Dr. Cota in Hematology    Leukocytosis  --Chronically elevated. H/o Splenectomy. WBC is elevated from baseline however, likely induced from steroid dosing on yesterday. WBC trending downward. No evidence or S&S infection. Would hold off on abx for now. Monitor S&S infection closely. Daily CBC    Iron deficiency anemia due to chronic blood loss  Continue on oral iron as outpatient        Thank you for your consult. I will follow-up with patient. Please contact us if you have any additional questions.     Sunil Calderon MD  Hematology/Oncology  [facility]

## 2019-08-03 NOTE — HOSPITAL COURSE
Admitted for evaluation and treatment of symptomatic anemia due to menorrhagia due to exacerbation of chronic ITP.  Hgb 4.9 and platelet level of 4 on admission.  Transfused 2 units PRBC.  Hematology evaluated and ordered Prednisone and IVIg.  Platelets increased to 64 and Hgb increased to 7.3  Symptoms resolved.  Discharge plan to return home and continue medications.  Follow up Monday with Hematology to change Promacta to intermittent subcutaneous form.

## 2019-08-03 NOTE — NURSING
Discharged order received and reviewed with patient and family. Patient instructed when to take each medication next dose. IV removed, tele removed Patient instructed to call staff when ride arrives to be walked to [facility] for family to transport home.

## 2019-08-03 NOTE — PLAN OF CARE
Problem: Adult Inpatient Plan of Care  Goal: Patient-Specific Goal (Individualization)  Outcome: Ongoing (interventions implemented as appropriate)  Pt AAO x 4.  VSS.  Pt remained afebrile throughout this shift.   2 U of blood administered per order.   Pt tolerated well.  Pt remained free of falls this shift.   Pt c/o of no pain this shift.  Plan of care reviewed. Patient verbalizes understanding.   Pt moving/turing independently. Frequent weight shifting encouraged.  Patient ST on monitor.   Bed low, side rails up x 2, wheels locked, call light in reach.   Bed alarm maintained for safety.   Patient instructed to call for assistance.   Hourly rounding completed.   24 hour chart check completed.  Will continue to monitor.

## 2019-08-06 ENCOUNTER — DOCUMENTATION ONLY (OUTPATIENT)
Dept: HEMATOLOGY/ONCOLOGY | Facility: CLINIC | Age: 24
End: 2019-08-06

## 2019-08-06 DIAGNOSIS — D69.3 CHRONIC ITP (IDIOPATHIC THROMBOCYTOPENIA): ICD-10-CM

## 2019-08-06 NOTE — PROGRESS NOTES
Nurse called ms schroeder regarding her missed apt with Ty in hematology, no answer from number listed in epic, nurse left a message to contact clinic

## 2019-08-07 NOTE — TELEPHONE ENCOUNTER
"Contacted patient in regards to Promacta refill and clinical follow up. The patient previously mentioned she should be taking 75mg rather than 50mg tablets - refill for 75mg strength was sent to [facility] yesterday. The patient only has two days remaining - refill needed by 8/10. Will ship refill 8/8 for patient to receive 8/9. $0 copay, address confirmed. No changes in medications, allergies, health conditions or missed doses.    Administration: Patient confirms taking medication as prescribed: 1 tablet by mouth once daily on an empty stomach before bedtime. Confirmed she avoids consuming dairy-based foods (milk, yogurt, etc) at least four hours prior to Promacta. Patient takes medication without food. Medication is stored at room temp in bedroom.  Adherence: Patient denies missed doses of her Promacta in the past month. We reviewed that she missed her appt with the NP yesterday. She stated her and her boyfriend share a car therefore it is difficult to make morning appts. She also mentioned she was unaware of the appt - advised her to monitor her [facility] portal to keep track of appts.  Side effects/disease state management: Patient denies side effects including nausea or diarrhea. She did report having a migraine that she thought may be potentially related to Promacta. Not a reported side effect and provider mentioned to patient the migraine is likely unrelated.  Pain levels: No pain reported today.  Energy levels: She reported her energy levels are "good".  DDIs: The patient confirmed going to the hospital at the beginning of April and was started on iron supplements. She is aware to take Promacta at least two hours before or four hours after taking iron.    An RPh will continue to f/u every three cycles or as clinically appropriate. Therapy appropriate. Encouraged patient to contact [facility] with any questions/concerns.  "

## 2019-09-03 ENCOUNTER — TELEPHONE (OUTPATIENT)
Dept: PHARMACY | Facility: CLINIC | Age: 24
End: 2019-09-03

## 2019-09-13 ENCOUNTER — HOSPITAL ENCOUNTER (INPATIENT)
Facility: HOSPITAL | Age: 24
LOS: 1 days | Discharge: HOME | DRG: 813 | End: 2019-09-14
Attending: EMERGENCY MEDICINE | Admitting: INTERNAL MEDICINE
Payer: MEDICAID

## 2019-09-13 DIAGNOSIS — D64.9 ANEMIA, UNSPECIFIED TYPE: Primary | ICD-10-CM

## 2019-09-13 DIAGNOSIS — D69.6 THROMBOCYTOPENIA: ICD-10-CM

## 2019-09-13 DIAGNOSIS — D50.0 IRON DEFICIENCY ANEMIA DUE TO CHRONIC BLOOD LOSS: ICD-10-CM

## 2019-09-13 DIAGNOSIS — D69.3 ACUTE ITP: ICD-10-CM

## 2019-09-13 DIAGNOSIS — D69.3 CHRONIC ITP (IDIOPATHIC THROMBOCYTOPENIA): ICD-10-CM

## 2019-09-13 DIAGNOSIS — R06.02 SOB (SHORTNESS OF BREATH): ICD-10-CM

## 2019-09-13 LAB
ABO + RH BLD: NORMAL
ALBUMIN SERPL BCP-MCNC: 4 G/DL (ref 3.5–5.2)
ALP SERPL-CCNC: 49 U/L (ref 55–135)
ALT SERPL W/O P-5'-P-CCNC: 7 U/L (ref 10–44)
ANION GAP SERPL CALC-SCNC: 11 MMOL/L (ref 8–16)
ANISOCYTOSIS BLD QL SMEAR: SLIGHT
APTT BLDCRRT: 24.7 SEC (ref 21–32)
AST SERPL-CCNC: 11 U/L (ref 10–40)
B-HCG UR QL: NEGATIVE
BACTERIA #/AREA URNS HPF: ABNORMAL /HPF
BASOPHILS # BLD AUTO: 0.09 K/UL (ref 0–0.2)
BASOPHILS NFR BLD: 0.3 % (ref 0–1.9)
BILIRUB SERPL-MCNC: 0.4 MG/DL (ref 0.1–1)
BILIRUB UR QL STRIP: NEGATIVE
BLD GP AB SCN CELLS X3 SERPL QL: NORMAL
BLD PROD TYP BPU: NORMAL
BLOOD UNIT EXPIRATION DATE: NORMAL
BLOOD UNIT TYPE CODE: 5100
BLOOD UNIT TYPE: NORMAL
BUN SERPL-MCNC: 10 MG/DL (ref 6–20)
CALCIUM SERPL-MCNC: 8.9 MG/DL (ref 8.7–10.5)
CHLORIDE SERPL-SCNC: 102 MMOL/L (ref 95–110)
CLARITY UR: CLEAR
CO2 SERPL-SCNC: 21 MMOL/L (ref 23–29)
CODING SYSTEM: NORMAL
COLOR UR: YELLOW
CREAT SERPL-MCNC: 0.7 MG/DL (ref 0.5–1.4)
DACRYOCYTES BLD QL SMEAR: ABNORMAL
DIFFERENTIAL METHOD: ABNORMAL
DISPENSE STATUS: NORMAL
EOSINOPHIL # BLD AUTO: 0 K/UL (ref 0–0.5)
EOSINOPHIL NFR BLD: 0 % (ref 0–8)
ERYTHROCYTE [DISTWIDTH] IN BLOOD BY AUTOMATED COUNT: 20 % (ref 11.5–14.5)
EST. GFR  (AFRICAN AMERICAN): >60 ML/MIN/1.73 M^2
EST. GFR  (NON AFRICAN AMERICAN): >60 ML/MIN/1.73 M^2
GLUCOSE SERPL-MCNC: 121 MG/DL (ref 70–110)
GLUCOSE UR QL STRIP: NEGATIVE
HAPTOGLOB SERPL-MCNC: 153 MG/DL (ref 30–250)
HCG INTACT+B SERPL-ACNC: <1.2 MIU/ML
HCT VFR BLD AUTO: 17.1 % (ref 37–48.5)
HGB BLD-MCNC: 5.1 G/DL (ref 12–16)
HGB UR QL STRIP: ABNORMAL
HOWELL-JOLLY BOD BLD QL SMEAR: ABNORMAL
HYPOCHROMIA BLD QL SMEAR: ABNORMAL
INR PPP: 1.1 (ref 0.8–1.2)
KETONES UR QL STRIP: ABNORMAL
LDH SERPL L TO P-CCNC: 418 U/L (ref 110–260)
LEUKOCYTE ESTERASE UR QL STRIP: NEGATIVE
LYMPHOCYTES # BLD AUTO: 2.8 K/UL (ref 1–4.8)
LYMPHOCYTES NFR BLD: 9.4 % (ref 18–48)
MCH RBC QN AUTO: 22.2 PG (ref 27–31)
MCHC RBC AUTO-ENTMCNC: 29.8 G/DL (ref 32–36)
MCV RBC AUTO: 74 FL (ref 82–98)
MICROSCOPIC COMMENT: ABNORMAL
MONOCYTES # BLD AUTO: 2 K/UL (ref 0.3–1)
MONOCYTES NFR BLD: 6.8 % (ref 4–15)
NEUTROPHILS # BLD AUTO: 24.9 K/UL (ref 1.8–7.7)
NEUTROPHILS NFR BLD: 84.4 % (ref 38–73)
NITRITE UR QL STRIP: NEGATIVE
OVALOCYTES BLD QL SMEAR: ABNORMAL
PH UR STRIP: 5 [PH] (ref 5–8)
PLATELET # BLD AUTO: 5 K/UL (ref 150–350)
PLATELET BLD QL SMEAR: ABNORMAL
PMV BLD AUTO: ABNORMAL FL (ref 9.2–12.9)
POIKILOCYTOSIS BLD QL SMEAR: ABNORMAL
POTASSIUM SERPL-SCNC: 3.5 MMOL/L (ref 3.5–5.1)
PROT SERPL-MCNC: 7.2 G/DL (ref 6–8.4)
PROT UR QL STRIP: NEGATIVE
PROTHROMBIN TIME: 11.7 SEC (ref 9–12.5)
RBC # BLD AUTO: 2.3 M/UL (ref 4–5.4)
RBC #/AREA URNS HPF: 0 /HPF (ref 0–4)
SCHISTOCYTES BLD QL SMEAR: PRESENT
SODIUM SERPL-SCNC: 134 MMOL/L (ref 136–145)
SP GR UR STRIP: 1.02 (ref 1–1.03)
SPHEROCYTES BLD QL SMEAR: ABNORMAL
SQUAMOUS #/AREA URNS HPF: 7 /HPF
STOMATOCYTES BLD QL SMEAR: PRESENT
TARGETS BLD QL SMEAR: ABNORMAL
TRANS PLATPHERESIS VOL PATIENT: NORMAL ML
URN SPEC COLLECT METH UR: ABNORMAL
UROBILINOGEN UR STRIP-ACNC: NEGATIVE EU/DL
WBC # BLD AUTO: 29.87 K/UL (ref 3.9–12.7)
WBC #/AREA URNS HPF: 5 /HPF (ref 0–5)

## 2019-09-13 PROCEDURE — 36430 TRANSFUSION BLD/BLD COMPNT: CPT

## 2019-09-13 PROCEDURE — 86901 BLOOD TYPING SEROLOGIC RH(D): CPT

## 2019-09-13 PROCEDURE — 85610 PROTHROMBIN TIME: CPT | Mod: ER

## 2019-09-13 PROCEDURE — 81000 URINALYSIS NONAUTO W/SCOPE: CPT

## 2019-09-13 PROCEDURE — 99291 CRITICAL CARE FIRST HOUR: CPT | Mod: 25

## 2019-09-13 PROCEDURE — 85025 COMPLETE CBC W/AUTO DIFF WBC: CPT

## 2019-09-13 PROCEDURE — 63600175 PHARM REV CODE 636 W HCPCS: Performed by: PHYSICIAN ASSISTANT

## 2019-09-13 PROCEDURE — 93005 ELECTROCARDIOGRAM TRACING: CPT

## 2019-09-13 PROCEDURE — 83615 LACTATE (LD) (LDH) ENZYME: CPT

## 2019-09-13 PROCEDURE — 63600175 PHARM REV CODE 636 W HCPCS: Mod: JG | Performed by: NURSE PRACTITIONER

## 2019-09-13 PROCEDURE — 86922 COMPATIBILITY TEST ANTIGLOB: CPT

## 2019-09-13 PROCEDURE — 99233 SBSQ HOSP IP/OBS HIGH 50: CPT | Mod: ,,, | Performed by: INTERNAL MEDICINE

## 2019-09-13 PROCEDURE — 93010 ELECTROCARDIOGRAM REPORT: CPT | Mod: ,,, | Performed by: INTERNAL MEDICINE

## 2019-09-13 PROCEDURE — 84702 CHORIONIC GONADOTROPIN TEST: CPT

## 2019-09-13 PROCEDURE — 85730 THROMBOPLASTIN TIME PARTIAL: CPT | Mod: ER

## 2019-09-13 PROCEDURE — 25000003 PHARM REV CODE 250: Performed by: NURSE PRACTITIONER

## 2019-09-13 PROCEDURE — 81025 URINE PREGNANCY TEST: CPT

## 2019-09-13 PROCEDURE — 99233 PR SUBSEQUENT HOSPITAL CARE,LEVL III: ICD-10-PCS | Mod: ,,, | Performed by: INTERNAL MEDICINE

## 2019-09-13 PROCEDURE — 63600175 PHARM REV CODE 636 W HCPCS: Performed by: EMERGENCY MEDICINE

## 2019-09-13 PROCEDURE — 36415 COLL VENOUS BLD VENIPUNCTURE: CPT

## 2019-09-13 PROCEDURE — 21400001 HC TELEMETRY ROOM

## 2019-09-13 PROCEDURE — 93010 EKG 12-LEAD: ICD-10-PCS | Mod: ,,, | Performed by: INTERNAL MEDICINE

## 2019-09-13 PROCEDURE — P9035 PLATELET PHERES LEUKOREDUCED: HCPCS

## 2019-09-13 PROCEDURE — 80053 COMPREHEN METABOLIC PANEL: CPT

## 2019-09-13 PROCEDURE — 83010 ASSAY OF HAPTOGLOBIN QUANT: CPT

## 2019-09-13 RX ORDER — DIPHENHYDRAMINE HCL 25 MG
25 CAPSULE ORAL EVERY 6 HOURS PRN
Status: DISCONTINUED | OUTPATIENT
Start: 2019-09-13 | End: 2019-09-15 | Stop reason: HOSPADM

## 2019-09-13 RX ORDER — HYDROCODONE BITARTRATE AND ACETAMINOPHEN 500; 5 MG/1; MG/1
TABLET ORAL
Status: DISCONTINUED | OUTPATIENT
Start: 2019-09-13 | End: 2019-09-15 | Stop reason: HOSPADM

## 2019-09-13 RX ORDER — PREDNISONE 20 MG/1
40 TABLET ORAL DAILY
Status: DISCONTINUED | OUTPATIENT
Start: 2019-09-14 | End: 2019-09-15 | Stop reason: HOSPADM

## 2019-09-13 RX ORDER — PREDNISONE 20 MG/1
40 TABLET ORAL
Status: COMPLETED | OUTPATIENT
Start: 2019-09-13 | End: 2019-09-13

## 2019-09-13 RX ORDER — SODIUM CHLORIDE 9 MG/ML
INJECTION, SOLUTION INTRAVENOUS CONTINUOUS
Status: DISCONTINUED | OUTPATIENT
Start: 2019-09-13 | End: 2019-09-15 | Stop reason: HOSPADM

## 2019-09-13 RX ORDER — ONDANSETRON 2 MG/ML
4 INJECTION INTRAMUSCULAR; INTRAVENOUS EVERY 8 HOURS PRN
Status: DISCONTINUED | OUTPATIENT
Start: 2019-09-13 | End: 2019-09-15 | Stop reason: HOSPADM

## 2019-09-13 RX ORDER — FERROUS SULFATE 325(65) MG
325 TABLET, DELAYED RELEASE (ENTERIC COATED) ORAL DAILY
Status: DISCONTINUED | OUTPATIENT
Start: 2019-09-13 | End: 2019-09-15 | Stop reason: HOSPADM

## 2019-09-13 RX ADMIN — FERROUS SULFATE TAB EC 325 MG (65 MG FE EQUIVALENT) 325 MG: 325 (65 FE) TABLET DELAYED RESPONSE at 12:09

## 2019-09-13 RX ADMIN — PREDNISONE 40 MG: 20 TABLET ORAL at 10:09

## 2019-09-13 RX ADMIN — SODIUM CHLORIDE: 0.9 INJECTION, SOLUTION INTRAVENOUS at 08:09

## 2019-09-13 RX ADMIN — SODIUM CHLORIDE: 0.9 INJECTION, SOLUTION INTRAVENOUS at 11:09

## 2019-09-13 RX ADMIN — HUMAN IMMUNOGLOBULIN G 70 G: 10 LIQUID INTRAVENOUS at 03:09

## 2019-09-13 NOTE — ASSESSMENT & PLAN NOTE
- Initial H&H 5.1/17.1.Will transfuse 2 units PRBCs.  - Continue iron supplementation.  - Hematology consult.  - Follow labs

## 2019-09-13 NOTE — ED PROVIDER NOTES
SCRIBE #1 NOTE: I, Alejo Mensah, am scribing for, and in the presence of, Joni Feliciano MD. I have scribed the entire note.       History     Chief Complaint   Patient presents with    Shortness of Breath     tachycardia, shortness of breath, abdmoninal pain.  pt states this happens when platelets get low     Review of patient's allergies indicates:   Allergen Reactions    Aspirin      Unable to take because of illness    Ibuprofen Other (See Comments)     Cannot take due to Blood Disorder         History of Present Illness     HPI    2019, 7:22 AM  History obtained from the patient      History of Present Illness: Mari Miller is a 23 y.o. female patient with a PMHx of thrombocytopenia who presents to the Emergency Department for evaluation of SOB. Pt also reports that she is also experiencing palpitations, lightheadedness, and sharp abdominal pain. Symptoms are constant and moderate in severity. No mitigating or exacerbating factors reported. Pt states she receives blood transfusion approximately 1x per month and that her sxs are similar to those episodes currently. Patient denies any fever, leg pain/swelling, CP, cough, N/V/D, congestion, rhinorrhea, chills, sore throat, HA, dizziness, and all other sxs at this time. Pt states her last transfusion was in August. No further complaints or concerns at this time.         Arrival mode: Personal vehicle      PCP: Ifeanyi Tello NP        Past Medical History:  Past Medical History:   Diagnosis Date    Encounter for blood transfusion     Thrombocytopenia        Past Surgical History:  Past Surgical History:   Procedure Laterality Date     SECTION      x1    XI ROBOTIC SPLENECTOMY N/A 2018    Performed by Yobani Lara MD at [facility] OR         Family History:  No family history on file.    Social History:  Social History     Tobacco Use    Smoking status: Never Smoker    Smokeless tobacco: Never Used   Substance and Sexual  Activity    Alcohol use: No     Frequency: Never    Drug use: No    Sexual activity: Unknown         Review of Systems     Review of Systems   Constitutional: Negative for chills and fever.   HENT: Negative for congestion, rhinorrhea and sore throat.    Respiratory: Positive for shortness of breath. Negative for cough.    Cardiovascular: Positive for palpitations. Negative for chest pain and leg swelling.   Gastrointestinal: Positive for abdominal pain. Negative for diarrhea, nausea and vomiting.   Genitourinary: Negative for dysuria.   Musculoskeletal: Negative for back pain.   Skin: Negative for rash.   Neurological: Positive for light-headedness. Negative for dizziness, weakness and headaches.   Hematological: Does not bruise/bleed easily.   All other systems reviewed and are negative.       Physical Exam     Initial Vitals [09/13/19 0715]   BP Pulse Resp Temp SpO2   135/64 (!) 155 (!) 22 98.3 °F (36.8 °C) 100 %      MAP       --          Physical Exam  Nursing Notes and Vital Signs Reviewed.  Constitutional: Patient is in no apparent distress. Well-developed and well-nourished.  Head: Atraumatic. Normocephalic.  Eyes: PERRL. EOM intact. Conjunctivae are not pale. No scleral icterus.  ENT: Mucous membranes are moist. Oropharynx is clear and symmetric.    Neck: Supple. Full ROM. No lymphadenopathy.  Cardiovascular: Tachycardic. Regular rhythm. No murmurs, rubs, or gallops. Distal pulses are 2+ and symmetric.  Pulmonary/Chest: No respiratory distress. Clear to auscultation bilaterally. No wheezing or rales.  Abdominal: Soft and non-distended.  There is no tenderness.  No rebound, guarding, or rigidity. Good bowel sounds.  Genitourinary: No CVA tenderness  Musculoskeletal: Moves all extremities. No obvious deformities. No edema.  Skin: Warm and dry.  Neurological:  Alert, awake, and appropriate.  Normal speech.  No acute focal neurological deficits are appreciated.  Psychiatric: Normal affect. Good eye contact.  Appropriate in content.     ED Course   Critical Care  Date/Time: 9/13/2019 9:57 AM  Performed by: Joni Feliciano MD  Authorized by: Joni Feliciano MD   Direct patient critical care time: 17 minutes  Additional history critical care time: 13 minutes  Ordering / reviewing critical care time: 12 minutes  Documentation critical care time: 8 minutes  Consulting other physicians critical care time: 10 minutes  Total critical care time (exclusive of procedural time) : 60 minutes  Critical care time was exclusive of separately billable procedures and treating other patients and teaching time.  Critical care was necessary to treat or prevent imminent or life-threatening deterioration of the following conditions: Thrombocytopenia.  Critical care was time spent personally by me on the following activities: blood draw for specimens, development of treatment plan with patient or surrogate, discussions with consultants, interpretation of cardiac output measurements, evaluation of patient's response to treatment, examination of patient, obtaining history from patient or surrogate, ordering and performing treatments and interventions, ordering and review of laboratory studies, pulse oximetry, re-evaluation of patient's condition and review of old charts.        ED Vital Signs:  Vitals:    09/13/19 0715 09/13/19 0837 09/13/19 0839 09/13/19 0900   BP: 135/64 (!) 123/56 (!) 122/57 122/83   Pulse: (!) 155 (!) 137 (!) 152 (!) 136   Resp: (!) 22   (!) 28   Temp: 98.3 °F (36.8 °C)      TempSrc: Oral      SpO2: 100%   100%   Weight: 94.8 kg (209 lb)       09/13/19 1000   BP:    Pulse: (!) 133   Resp:    Temp:    TempSrc:    SpO2:    Weight:        Abnormal Lab Results:  Labs Reviewed   CBC W/ AUTO DIFFERENTIAL - Abnormal; Notable for the following components:       Result Value    WBC 29.87 (*)     RBC 2.30 (*)     Hemoglobin 5.1 (*)     Hematocrit 17.1 (*)     Mean Corpuscular Volume 74 (*)     Mean Corpuscular Hemoglobin 22.2  (*)     Mean Corpuscular Hemoglobin Conc 29.8 (*)     RDW 20.0 (*)     Platelets 5 (*)     Gran # (ANC) 24.9 (*)     Mono # 2.0 (*)     Gran% 84.4 (*)     Lymph% 9.4 (*)     Platelet Estimate Decreased (*)     All other components within normal limits    Narrative:     H&H, PLT critical result(s) called and verbal readback obtained from   Travon Morrison, 09/13/2019 09:04   COMPREHENSIVE METABOLIC PANEL - Abnormal; Notable for the following components:    Sodium 134 (*)     CO2 21 (*)     Glucose 121 (*)     Alkaline Phosphatase 49 (*)     ALT 7 (*)     All other components within normal limits   URINALYSIS, REFLEX TO URINE CULTURE   APTT   PROTIME-INR   TYPE & SCREEN   PREPARE RBC SOFT   PREPARE PLATELETS (DOSE) SOFT        All Lab Results:  Results for orders placed or performed during the hospital encounter of 09/13/19   CBC auto differential   Result Value Ref Range    WBC 29.87 (H) 3.90 - 12.70 K/uL    RBC 2.30 (L) 4.00 - 5.40 M/uL    Hemoglobin 5.1 (LL) 12.0 - 16.0 g/dL    Hematocrit 17.1 (LL) 37.0 - 48.5 %    Mean Corpuscular Volume 74 (L) 82 - 98 fL    Mean Corpuscular Hemoglobin 22.2 (L) 27.0 - 31.0 pg    Mean Corpuscular Hemoglobin Conc 29.8 (L) 32.0 - 36.0 g/dL    RDW 20.0 (H) 11.5 - 14.5 %    Platelets 5 (LL) 150 - 350 K/uL    MPV SEE COMMENT 9.2 - 12.9 fL    Gran # (ANC) 24.9 (H) 1.8 - 7.7 K/uL    Lymph # 2.8 1.0 - 4.8 K/uL    Mono # 2.0 (H) 0.3 - 1.0 K/uL    Eos # 0.0 0.0 - 0.5 K/uL    Baso # 0.09 0.00 - 0.20 K/uL    Gran% 84.4 (H) 38.0 - 73.0 %    Lymph% 9.4 (L) 18.0 - 48.0 %    Mono% 6.8 4.0 - 15.0 %    Eosinophil% 0.0 0.0 - 8.0 %    Basophil% 0.3 0.0 - 1.9 %    Platelet Estimate Decreased (A)     Aniso Slight     Poik Moderate     Hypo Marked     Ovalocytes Occasional     Target Cells Moderate     Tear Drop Cells Occasional     Stomatocytes Present     Spherocytes Occasional     Schistocytes Present     Tidwell-Whipholt Bodies Occasional     Differential Method Automated    Comprehensive metabolic panel    Result Value Ref Range    Sodium 134 (L) 136 - 145 mmol/L    Potassium 3.5 3.5 - 5.1 mmol/L    Chloride 102 95 - 110 mmol/L    CO2 21 (L) 23 - 29 mmol/L    Glucose 121 (H) 70 - 110 mg/dL    BUN, Bld 10 6 - 20 mg/dL    Creatinine 0.7 0.5 - 1.4 mg/dL    Calcium 8.9 8.7 - 10.5 mg/dL    Total Protein 7.2 6.0 - 8.4 g/dL    Albumin 4.0 3.5 - 5.2 g/dL    Total Bilirubin 0.4 0.1 - 1.0 mg/dL    Alkaline Phosphatase 49 (L) 55 - 135 U/L    AST 11 10 - 40 U/L    ALT 7 (L) 10 - 44 U/L    Anion Gap 11 8 - 16 mmol/L    eGFR if African American >60 >60 mL/min/1.73 m^2    eGFR if non African American >60 >60 mL/min/1.73 m^2          The EKG was ordered, reviewed, and independently interpreted by the ED provider.  Interpretation time: 722  Rate: 161 BPM  Rhythm: sinus tachycardia with short VA  Interpretation: Nonspecific ST abnormality. No STEMI.            The Emergency Provider reviewed the vital signs and test results, which are outlined above.     ED Discussion     9:48 AM: Discussed pt's case with Dr. Sunil Calderon (Hem/Onc) who recommends to transfuse both platelets and RBC, give IVIG, and give prednisone 40mg.    9:49 AM: Re-evaluated pt. I have discussed test results, shared treatment plan, and the need for admission with patient at bedside. Pt express understanding at this time and agree with all information. All questions answered. Pt have no further questions or concerns at this time. Pt is ready for admit.     9:55 AM: Discussed case with VANESSA Villaseñor (Hospital Medicine). Dr. Hauser agrees with current care and management of pt and accepts admission.   Admitting Service: Hospital Medicine  Admitting Physician: Dr. Hauser  Admit to: Tele full admit            Medical Decision Making:   Clinical Tests:   Lab Tests: Ordered and Reviewed  Medical Tests: Ordered and Reviewed           ED Medication(s):  Medications   0.9%  NaCl infusion (for blood administration) (has no administration in time range)   0.9%  NaCl  infusion (for blood administration) (has no administration in time range)   predniSONE tablet 40 mg (has no administration in time range)   0.9%  NaCl infusion (has no administration in time range)       New Prescriptions    No medications on file               Scribe Attestation:   Scribe #1: I performed the above scribed service and the documentation accurately describes the services I performed. I attest to the accuracy of the note.     Attending:   Physician Attestation Statement for Scribe #1: I, Joni Feliciano MD, personally performed the services described in this documentation, as scribed by Alejo Mensah, in my presence, and it is both accurate and complete.           Clinical Impression       ICD-10-CM ICD-9-CM   1. Anemia, unspecified type D64.9 285.9   2. SOB (shortness of breath) R06.02 786.05   3. Chronic ITP (idiopathic thrombocytopenia) D69.3 287.31   4. Thrombocytopenia D69.6 287.5       Disposition:   Disposition: Admitted  Condition: Stable         Joni Feliciano MD  09/13/19 1008

## 2019-09-13 NOTE — ASSESSMENT & PLAN NOTE
09/13/2019 Will get CT abdomen/pelvis to rule out retroperitoneal bleed, send haptoglobin, LDH to rule out hemolysis. Will send HCG to rule out pregnancy prior to CT.

## 2019-09-13 NOTE — HPI
Mrs. Grady Miller is a 23 y.o. female with a PMHx of ITP on eltrombopag, who presented to the emergency department with complaints of shortness of breath that has progressively worsened over the past 3 days.  Associated lower abdominal pain, nausea and bruising. Denies any chest pain, palpitations, cough, wheezing, PND, vomiting, diarrhea, hematemesis, hematochezia, dysuria, hematuria, back pain, HA, syncope, weakness, nose bleeds, hemoptysis, fever or chills.  Initial workup in ED resulted platelets of 5, H&H 5.1/17.1, WBC 29.87. CXR and abdomen xray showed no acute abnormality. UA 3+ occult blood.  Patient reported has slight bleeding from gum.    Hematology has been consulted for help with management.

## 2019-09-13 NOTE — ED NOTES
Blood bank called stating that the pts blood products will be delayed d/t having the anti M antibody. He reports he has put a call into [facility] and will update us accordingly. He also states he is getting the platelets ready now. Walter LOPEZ and Smita KEY NP notified.

## 2019-09-13 NOTE — ASSESSMENT & PLAN NOTE
- Initial platelets of 5  - Continue eltrombopag.   - IVIG x 2 doses/Prednisone  -Transfuse PRBCs/Platelets  - Monitor S&S bleeding closely   - Hematology consulted.  - Follow labs  -Supportive care

## 2019-09-13 NOTE — ASSESSMENT & PLAN NOTE
-Chronically elevated H/o splenectomy   - Afebrile, UA and CXR penidng  - Would hold off on abx for now. Monitor S&S infection closely.   -Daily CBC

## 2019-09-13 NOTE — ASSESSMENT & PLAN NOTE
-Likely secondary to IVVD.  Remains hemodynamically stable.  - Continue IV hydration. PRBC transfusion as above.  - Monitor telemetry

## 2019-09-13 NOTE — ED NOTES
Report given to nurse on Telemetry at this time. Patient will be transferred by RN with telemetry.

## 2019-09-13 NOTE — PROGRESS NOTES
[facility] -   Hematology/Oncology  Progress Note    Patient Name: Mari Miller  Admission Date: 9/13/2019  Hospital Length of Stay: 0 days  Code Status: Full Code     Subjective:     HPI:  Mrs. Grady Miller is a 23 y.o. female with a PMHx of ITP on eltrombopag, who presented to the emergency department with complaints of shortness of breath that has progressively worsened over the past 3 days.  Associated lower abdominal pain, nausea and bruising. Denies any chest pain, palpitations, cough, wheezing, PND, vomiting, diarrhea, hematemesis, hematochezia, dysuria, hematuria, back pain, HA, syncope, weakness, nose bleeds, hemoptysis, fever or chills.  Initial workup in ED resulted platelets of 5, H&H 5.1/17.1, WBC 29.87. CXR and abdomen xray showed no acute abnormality. UA 3+ occult blood.  Patient reported has slight bleeding from gum.    Hematology has been consulted for help with management.    Interval History: Hemodynamically stable, s/p IVIG x 1 of 2, prednisone.      Oncology Treatment Plan:   [No treatment plan]    Medications:  Continuous Infusions:   sodium chloride 0.9% Stopped (09/13/19 1100)     Scheduled Meds:   eltrombopag  75 mg Oral Daily    ferrous sulfate  325 mg Oral Daily    Immune Globulin G (IGG)-PRO-IGA 10 % injection (Privigen)  1,000 mg/kg (Order-Specific) Intravenous Q24H    [START ON 9/14/2019] predniSONE  40 mg Oral Daily     PRN Meds:sodium chloride, sodium chloride, diphenhydrAMINE, ondansetron     Review of Systems   Constitutional: Negative.    HENT: Negative.    Eyes: Negative.    Respiratory: Negative.    Cardiovascular: Negative.    Gastrointestinal: Negative.    Endocrine: Negative.    Genitourinary: Negative.    Musculoskeletal: Negative.    Skin: Negative.    Allergic/Immunologic: Negative.    Neurological: Negative.    Hematological: Negative.    Psychiatric/Behavioral: Negative.      Objective:     Vital Signs (Most Recent):  Temp: 99.5 °F  (37.5 °C) (09/13/19 1700)  Pulse: (!) 136 (09/13/19 1700)  Resp: 16 (09/13/19 1700)  BP: 121/67 (09/13/19 1700)  SpO2: 100 % (09/13/19 1700) Vital Signs (24h Range):  Temp:  [98.3 °F (36.8 °C)-99.9 °F (37.7 °C)] 99.5 °F (37.5 °C)  Pulse:  [127-155] 136  Resp:  [16-34] 16  SpO2:  [100 %] 100 %  BP: (111-149)/(56-83) 121/67     Weight: 94.8 kg (209 lb)  Body mass index is 38.46 kg/m².  Body surface area is 2.03 meters squared.      Intake/Output Summary (Last 24 hours) at 9/13/2019 1746  Last data filed at 9/13/2019 1208  Gross per 24 hour   Intake 292 ml   Output --   Net 292 ml       Physical Exam   Constitutional: She is oriented to person, place, and time. She appears well-developed and well-nourished.   HENT:   Head: Normocephalic and atraumatic.   Eyes: Conjunctivae and EOM are normal.   Neck: Normal range of motion. Neck supple.   Cardiovascular: Normal rate and regular rhythm.   Pulmonary/Chest: Effort normal and breath sounds normal.   Abdominal: Soft. Bowel sounds are normal.   Musculoskeletal: Normal range of motion.   Neurological: She is alert and oriented to person, place, and time.   Skin: Skin is warm and dry.   Psychiatric: She has a normal mood and affect. Her behavior is normal. Judgment and thought content normal.   Nursing note and vitals reviewed.      Significant Labs:   All pertinent labs from the last 24 hours have been reviewed.    Diagnostic Results:  I have reviewed all pertinent imaging results/findings within the past 24 hours.    Assessment/Plan:     * Acute ITP  09/13/2019  Please give IVIG 1gm/kg daily X 2 total.  Prednisone 40 mg daily.  Would transfuse 1 unit platelets, and 2 units RBC.  Patient stated that she has been taking eltrombopag but pharmacy could not account for pills count.  Resume eltrombopag.    Anemia  09/13/2019 Will get CT abdomen/pelvis to rule out retroperitoneal bleed, send haptoglobin, LDH to rule out hemolysis. Will send HCG to rule out pregnancy prior to  CT.        Thank you for your consult. I will follow-up with patient. Please contact us if you have any additional questions.     Sunil Calderon MD  Hematology/Oncology  [facility]

## 2019-09-13 NOTE — NURSING
Patient arrived to unit from ER via stretcher.   Patient in room 254.  Transferred into bed with no assist.   Charge nurse advised of patient arrival.   VS currently stable.   Tele monitor applied.   Patient oriented to room, rounding sheet and call bell.   Bed in lowest position, call light in reach.  Encouraged to notify of all needs.   Will continue to monitor.

## 2019-09-13 NOTE — ED NOTES
Patient c/o weakness and SOB, reports she normally feels this way when she has low platelets.    Patient moved to ED room 04 vis wheelchair, patient assisted onto stretcher and changed into a gown. Patient placed on cardiac monitor, continuous pulse oximetry and automatic blood pressure cuff. Bed placed in low locked position, side rails up x 2, call light is within reach of patient or family, orientation to room and explanation of wait provided to family and patient, alarms set and turned on for monitor and pulse ox, awaiting MD evaluation and orders, will continue to monitor.    Patient identifies self as Mari Miller      LOC: The patient is awake, alert and aware of environment with an appropriate affect, the patient is oriented x 3 and speaking appropriately.  APPEARANCE: Patient resting comfortably and in no acute distress, patient is clean and well groomed, patient's clothing is properly fastened.  SKIN: The skin is warm and dry, color consistent with ethnicity but pale, patient has normal skin turgor and moist but pale mucus membranes, skin intact, no breakdown or bruising noted.  MUSCULOSKELETAL: Patient moving all extremities well, no obvious swelling or deformities noted.  RESPIRATORY: Airway is open and patent, respirations are spontaneous, patient has a normal effort and rate, no accessory muscle use noted.  CARDIAC: Patient has a normal rate and rhythm, no periphreal edema noted, capillary refill < 3 seconds.  ABDOMEN: Soft and non tender to palpation, no distention noted.  NEUROLOGIC: PERRL, 3 mm bilaterally, eyes open spontaneously, behavior appropriate to situation, follows commands, facial expression symmetrical, bilateral hand grasp equal and even, purposeful motor response noted, normal sensation in all extremities when touched with a finger.

## 2019-09-13 NOTE — PLAN OF CARE
Problem: Adult Inpatient Plan of Care  Goal: Plan of Care Review  Outcome: Ongoing (interventions implemented as appropriate)  Patient AAO x4. VSS..   Patient remained afebrile throughout the shift.  Patient remained free of falls this shift.  IV IGG infusing   Plan of care reviewed.  Patient verbalized understanding.  Patient up with assist   Frequent weight shifting encouraged.  Patient ST on monitor.  Bed low, siderails up x2, wheels locked, call light in reach.  Patient instructed to call for assistance.  Hourly rounding completed.  Will continue to monitor.

## 2019-09-13 NOTE — SUBJECTIVE & OBJECTIVE
Interval History: Hemodynamically stable, s/p IVIG x 1 of 2, prednisone.      Oncology Treatment Plan:   [No treatment plan]    Medications:  Continuous Infusions:   sodium chloride 0.9% Stopped (09/13/19 1100)     Scheduled Meds:   eltrombopag  75 mg Oral Daily    ferrous sulfate  325 mg Oral Daily    Immune Globulin G (IGG)-PRO-IGA 10 % injection (Privigen)  1,000 mg/kg (Order-Specific) Intravenous Q24H    [START ON 9/14/2019] predniSONE  40 mg Oral Daily     PRN Meds:sodium chloride, sodium chloride, diphenhydrAMINE, ondansetron     Review of Systems   Constitutional: Negative.    HENT: Negative.    Eyes: Negative.    Respiratory: Negative.    Cardiovascular: Negative.    Gastrointestinal: Negative.    Endocrine: Negative.    Genitourinary: Negative.    Musculoskeletal: Negative.    Skin: Negative.    Allergic/Immunologic: Negative.    Neurological: Negative.    Hematological: Negative.    Psychiatric/Behavioral: Negative.      Objective:     Vital Signs (Most Recent):  Temp: 99.5 °F (37.5 °C) (09/13/19 1700)  Pulse: (!) 136 (09/13/19 1700)  Resp: 16 (09/13/19 1700)  BP: 121/67 (09/13/19 1700)  SpO2: 100 % (09/13/19 1700) Vital Signs (24h Range):  Temp:  [98.3 °F (36.8 °C)-99.9 °F (37.7 °C)] 99.5 °F (37.5 °C)  Pulse:  [127-155] 136  Resp:  [16-34] 16  SpO2:  [100 %] 100 %  BP: (111-149)/(56-83) 121/67     Weight: 94.8 kg (209 lb)  Body mass index is 38.46 kg/m².  Body surface area is 2.03 meters squared.      Intake/Output Summary (Last 24 hours) at 9/13/2019 1746  Last data filed at 9/13/2019 1208  Gross per 24 hour   Intake 292 ml   Output --   Net 292 ml       Physical Exam   Constitutional: She is oriented to person, place, and time. She appears well-developed and well-nourished.   HENT:   Head: Normocephalic and atraumatic.   Eyes: Conjunctivae and EOM are normal.   Neck: Normal range of motion. Neck supple.   Cardiovascular: Normal rate and regular rhythm.   Pulmonary/Chest: Effort normal and breath  sounds normal.   Abdominal: Soft. Bowel sounds are normal.   Musculoskeletal: Normal range of motion.   Neurological: She is alert and oriented to person, place, and time.   Skin: Skin is warm and dry.   Psychiatric: She has a normal mood and affect. Her behavior is normal. Judgment and thought content normal.   Nursing note and vitals reviewed.      Significant Labs:   All pertinent labs from the last 24 hours have been reviewed.    Diagnostic Results:  I have reviewed all pertinent imaging results/findings within the past 24 hours.

## 2019-09-13 NOTE — H&P
[facility] Hospital Medicine  History & Physical    Patient Name: Mari Miller  MRN: [id number]  Admission Date: 2019  Attending Physician: William Hauser MD   Primary Care Provider: Ifeanyi Tello NP         Patient information was obtained from patient and ER records.     Subjective:     Principal Problem:Acute ITP    Chief Complaint:   Chief Complaint   Patient presents with    Shortness of Breath     tachycardia, shortness of breath, abdmoninal pain.  pt states this happens when platelets get low        HPI: Ms. Grady Miller is a 23 y.o. female with a PMHx of ITP, who presented to the emergency department with complaints of shortness of breath that has progressively worsened over the past 3 days.  Associated  abdominal pain, nausea and bruising. Denies any chest pain, palpitations, cough, wheezing, PND, vomiting, diarrhea, hematemesis, hematochezia, dysuria, hematuria, back pain, HA, syncope, weakness, nose bleeds, hemoptysis, fever or chills.  Initial workup in ED resulted platelets of 5, H&H 5.1/17.1, WBC 29.87. UA, CXR, and Abdominal Xray pending. Upon arrival to ED, patient notably tachycardic with HR in the 150s (sinus). Patient remains hemodynamically stable with HR in 130s at this time.  Case discussed with Hem/Onc.who recommends to transfuse both platelets and RBC, give IVIG, and give prednisone 40mg daily. Hospital Medicine was called for admission.     Past Medical History:   Diagnosis Date    Encounter for blood transfusion     Thrombocytopenia        Past Surgical History:   Procedure Laterality Date     SECTION      x1    XI ROBOTIC SPLENECTOMY N/A 2018    Performed by Yobani Lara MD at [facility] OR       Review of patient's allergies indicates:   Allergen Reactions    Aspirin      Unable to take because of illness    Ibuprofen Other (See Comments)     Cannot take due to Blood Disorder       No current facility-administered medications on  file prior to encounter.      Current Outpatient Medications on File Prior to Encounter   Medication Sig    eltrombopag (PROMACTA) 75 mg Tab Take 1 tablet (75 mg total) by mouth once daily.    ferrous sulfate (FEOSOL) 325 mg (65 mg iron) Tab tablet Take 1 tablet (325 mg total) by mouth once daily.     Family History     None        Tobacco Use    Smoking status: Never Smoker    Smokeless tobacco: Never Used   Substance and Sexual Activity    Alcohol use: No     Frequency: Never    Drug use: No    Sexual activity: Not on file     Review of Systems   Constitutional: Negative.  Negative for activity change, appetite change, fatigue and fever.   HENT: Negative.    Eyes: Negative.    Respiratory: Negative.  Negative for cough and shortness of breath.    Cardiovascular: Negative.  Negative for chest pain, palpitations and leg swelling.   Gastrointestinal: Positive for abdominal pain and nausea. Negative for blood in stool, diarrhea and vomiting.   Endocrine: Negative.    Genitourinary: Negative.  Negative for dysuria, frequency, hematuria and urgency.   Musculoskeletal: Negative.    Skin: Positive for rash.   Allergic/Immunologic: Negative.    Neurological: Negative.    Hematological: Bruises/bleeds easily.   Psychiatric/Behavioral: Negative.      Objective:     Vital Signs (Most Recent):  Temp: 98.3 °F (36.8 °C) (09/13/19 0715)  Pulse: (!) 133 (09/13/19 1000)  Resp: (!) 28 (09/13/19 0900)  BP: 122/83 (09/13/19 0900)  SpO2: 100 % (09/13/19 0900) Vital Signs (24h Range):  Temp:  [98.3 °F (36.8 °C)] 98.3 °F (36.8 °C)  Pulse:  [133-155] 133  Resp:  [22-28] 28  SpO2:  [100 %] 100 %  BP: (122-135)/(56-83) 122/83     Weight: 94.8 kg (209 lb)  Body mass index is 38.46 kg/m².    Physical Exam   Constitutional: She is oriented to person, place, and time. She appears well-developed and well-nourished.   HENT:   Head: Normocephalic and atraumatic.   Eyes: Pupils are equal, round, and reactive to light. Conjunctivae and EOM  are normal.   Neck: Normal range of motion. Neck supple.   Cardiovascular: Regular rhythm, normal heart sounds and intact distal pulses. Tachycardia present.   Pulmonary/Chest: Effort normal and breath sounds normal.   Abdominal: Soft. Bowel sounds are normal.   Musculoskeletal: Normal range of motion.   Neurological: She is alert and oriented to person, place, and time. She has normal reflexes.   Skin: Skin is warm and dry. Ecchymosis and petechiae noted.   Ecchymosis (Scattered throughout all extremities) and petechiae (Diffuse to all extremities)    Psychiatric: She has a normal mood and affect. Her behavior is normal. Judgment and thought content normal.   Nursing note and vitals reviewed.       Significant Labs:   BMP:   Recent Labs   Lab 09/13/19  0825   *   *   K 3.5      CO2 21*   BUN 10   CREATININE 0.7   CALCIUM 8.9     CBC:   Recent Labs   Lab 09/13/19  0825   WBC 29.87*   HGB 5.1*   HCT 17.1*   PLT 5*     CMP:   Recent Labs   Lab 09/13/19  0825   *   K 3.5      CO2 21*   *   BUN 10   CREATININE 0.7   CALCIUM 8.9   PROT 7.2   ALBUMIN 4.0   BILITOT 0.4   ALKPHOS 49*   AST 11   ALT 7*   ANIONGAP 11   EGFRNONAA >60     Urine Culture: No results for input(s): LABURIN in the last 48 hours.  Urine Studies: No results for input(s): COLORU, APPEARANCEUA, PHUR, SPECGRAV, PROTEINUA, GLUCUA, KETONESU, BILIRUBINUA, OCCULTUA, NITRITE, UROBILINOGEN, LEUKOCYTESUR, RBCUA, WBCUA, BACTERIA, SQUAMEPITHEL, HYALINECASTS in the last 48 hours.    Invalid input(s): WRIGHTSUR  All pertinent labs within the past 24 hours have been reviewed.    Significant Imaging:   Imaging Results    None       Assessment/Plan:     * Acute ITP  - Initial platelets of 5  - Continue eltrombopag.    - IVIG x 2 doses/Prednisone  -Transfuse PRBCs/Platelets  - Monitor S&S bleeding closely   - Hematology consulted.  - Follow labs  -Supportive care    Leukocytosis  -Chronically elevated H/o splenectomy   - Afebrile,  UA and CXR penidng  - Would hold off on abx for now. Monitor S&S infection closely.   -Daily CBC      Sinus tachycardia   -Likely secondary to IVVD.  Remains hemodynamically stable.  - Continue IV hydration. PRBC transfusion as above.  - Monitor telemetry      Iron deficiency anemia due to chronic blood loss  - Initial H&H 5.1/17.1.Will transfuse 2 units PRBCs.  - Continue iron supplementation.  - Hematology consult.  - Follow labs        VTE Risk Mitigation (From admission, onward)        Ordered     IP VTE HIGH RISK PATIENT  Once      09/13/19 1050     Place sequential compression device  Until discontinued      09/13/19 1050             Smita Lincoln NP  Department of Hospital Medicine   [facility]

## 2019-09-13 NOTE — SUBJECTIVE & OBJECTIVE
Past Medical History:   Diagnosis Date    Encounter for blood transfusion     Thrombocytopenia        Past Surgical History:   Procedure Laterality Date     SECTION      x1    XI ROBOTIC SPLENECTOMY N/A 2018    Performed by Yobani Lara MD at [facility] OR       Review of patient's allergies indicates:   Allergen Reactions    Aspirin      Unable to take because of illness    Ibuprofen Other (See Comments)     Cannot take due to Blood Disorder       No current facility-administered medications on file prior to encounter.      Current Outpatient Medications on File Prior to Encounter   Medication Sig    eltrombopag (PROMACTA) 75 mg Tab Take 1 tablet (75 mg total) by mouth once daily.    ferrous sulfate (FEOSOL) 325 mg (65 mg iron) Tab tablet Take 1 tablet (325 mg total) by mouth once daily.     Family History     None        Tobacco Use    Smoking status: Never Smoker    Smokeless tobacco: Never Used   Substance and Sexual Activity    Alcohol use: No     Frequency: Never    Drug use: No    Sexual activity: Not on file     Review of Systems   Constitutional: Negative.  Negative for activity change, appetite change, fatigue and fever.   HENT: Negative.    Eyes: Negative.    Respiratory: Negative.  Negative for cough and shortness of breath.    Cardiovascular: Negative.  Negative for chest pain, palpitations and leg swelling.   Gastrointestinal: Positive for abdominal pain and nausea. Negative for blood in stool, diarrhea and vomiting.   Endocrine: Negative.    Genitourinary: Negative.  Negative for dysuria, frequency, hematuria and urgency.   Musculoskeletal: Negative.    Skin: Positive for rash.   Allergic/Immunologic: Negative.    Neurological: Negative.    Hematological: Bruises/bleeds easily.   Psychiatric/Behavioral: Negative.      Objective:     Vital Signs (Most Recent):  Temp: 98.3 °F (36.8 °C) (19 0715)  Pulse: (!) 133 (19 1000)  Resp: (!) 28 (19 0900)  BP: 122/83  (09/13/19 0900)  SpO2: 100 % (09/13/19 0900) Vital Signs (24h Range):  Temp:  [98.3 °F (36.8 °C)] 98.3 °F (36.8 °C)  Pulse:  [133-155] 133  Resp:  [22-28] 28  SpO2:  [100 %] 100 %  BP: (122-135)/(56-83) 122/83     Weight: 94.8 kg (209 lb)  Body mass index is 38.46 kg/m².    Physical Exam   Constitutional: She is oriented to person, place, and time. She appears well-developed and well-nourished.   HENT:   Head: Normocephalic and atraumatic.   Eyes: Pupils are equal, round, and reactive to light. Conjunctivae and EOM are normal.   Neck: Normal range of motion. Neck supple.   Cardiovascular: Regular rhythm, normal heart sounds and intact distal pulses. Tachycardia present.   Pulmonary/Chest: Effort normal and breath sounds normal.   Abdominal: Soft. Bowel sounds are normal.   Musculoskeletal: Normal range of motion.   Neurological: She is alert and oriented to person, place, and time. She has normal reflexes.   Skin: Skin is warm and dry. Ecchymosis and petechiae noted.   Ecchymosis (Scattered throughout all extremities) and petechiae (Diffuse to all extremities)    Psychiatric: She has a normal mood and affect. Her behavior is normal. Judgment and thought content normal.   Nursing note and vitals reviewed.       Significant Labs:   BMP:   Recent Labs   Lab 09/13/19  0825   *   *   K 3.5      CO2 21*   BUN 10   CREATININE 0.7   CALCIUM 8.9     CBC:   Recent Labs   Lab 09/13/19  0825   WBC 29.87*   HGB 5.1*   HCT 17.1*   PLT 5*     CMP:   Recent Labs   Lab 09/13/19  0825   *   K 3.5      CO2 21*   *   BUN 10   CREATININE 0.7   CALCIUM 8.9   PROT 7.2   ALBUMIN 4.0   BILITOT 0.4   ALKPHOS 49*   AST 11   ALT 7*   ANIONGAP 11   EGFRNONAA >60     Urine Culture: No results for input(s): LABURIN in the last 48 hours.  Urine Studies: No results for input(s): COLORU, APPEARANCEUA, PHUR, SPECGRAV, PROTEINUA, GLUCUA, KETONESU, BILIRUBINUA, OCCULTUA, NITRITE, UROBILINOGEN, LEUKOCYTESUR, RBCUA,  WBCUA, BACTERIA, SQUAMEPITHEL, HYALINECASTS in the last 48 hours.    Invalid input(s): WRIGHTSUR  All pertinent labs within the past 24 hours have been reviewed.    Significant Imaging:   Imaging Results    None

## 2019-09-13 NOTE — ASSESSMENT & PLAN NOTE
09/13/2019  Please give IVIG 1gm/kg daily X 2 total.  Prednisone 40 mg daily.  Would transfuse 1 unit platelets, and 2 units RBC.  Patient stated that she has been taking eltrombopag but pharmacy could not account for pills count.  Resume eltrombopag.

## 2019-09-13 NOTE — HPI
Ms. Grady Miller is a 23 y.o. female with a PMHx of ITP, who presented to the emergency department with complaints of shortness of breath that has progressively worsened over the past 3 days.  Associated abdominal pain, nausea and bruising. Denies any chest pain, palpitations, cough, wheezing, PND, vomiting, diarrhea, hematemesis, hematochezia, dysuria, hematuria, back pain, HA, syncope, weakness, nose bleeds, hemoptysis, fever or chills.  Initial workup in ED resulted platelets of 5, H&H 5.1/17.1, WBC 29.87. UA, CXR, and Abdominal Xray pending. Upon arrival to ED, patient notably tachycardic with HR in the 150s (sinus). Patient remains hemodynamically stable with HR in 130s at this time.  Case discussed with Hem/Onc.who recommends to transfuse both platelets and RBC, give IVIG, and give prednisone 40mg daily. Hospital Medicine was called for admission.

## 2019-09-13 NOTE — ED NOTES
"Patient tolerated IGG well, rate increased to 1 mg/kg/min at this time. Patient states "I feel better".  "

## 2019-09-14 VITALS
HEART RATE: 125 BPM | HEIGHT: 62 IN | TEMPERATURE: 98 F | SYSTOLIC BLOOD PRESSURE: 127 MMHG | DIASTOLIC BLOOD PRESSURE: 80 MMHG | OXYGEN SATURATION: 99 % | BODY MASS INDEX: 36.87 KG/M2 | RESPIRATION RATE: 18 BRPM | WEIGHT: 200.38 LBS

## 2019-09-14 LAB
ANION GAP SERPL CALC-SCNC: 5 MMOL/L (ref 8–16)
ANISOCYTOSIS BLD QL SMEAR: SLIGHT
ANISOCYTOSIS BLD QL SMEAR: SLIGHT
BASOPHILS # BLD AUTO: 0.07 K/UL (ref 0–0.2)
BASOPHILS # BLD AUTO: ABNORMAL K/UL (ref 0–0.2)
BASOPHILS NFR BLD: 0 % (ref 0–1.9)
BASOPHILS NFR BLD: 0.3 % (ref 0–1.9)
BLD PROD TYP BPU: NORMAL
BLD PROD TYP BPU: NORMAL
BLOOD UNIT EXPIRATION DATE: NORMAL
BLOOD UNIT EXPIRATION DATE: NORMAL
BLOOD UNIT TYPE CODE: 5100
BLOOD UNIT TYPE CODE: 5100
BLOOD UNIT TYPE: NORMAL
BLOOD UNIT TYPE: NORMAL
BUN SERPL-MCNC: 8 MG/DL (ref 6–20)
CALCIUM SERPL-MCNC: 8.1 MG/DL (ref 8.7–10.5)
CHLORIDE SERPL-SCNC: 108 MMOL/L (ref 95–110)
CO2 SERPL-SCNC: 24 MMOL/L (ref 23–29)
CODING SYSTEM: NORMAL
CODING SYSTEM: NORMAL
CREAT SERPL-MCNC: 0.7 MG/DL (ref 0.5–1.4)
DACRYOCYTES BLD QL SMEAR: ABNORMAL
DACRYOCYTES BLD QL SMEAR: ABNORMAL
DIFFERENTIAL METHOD: ABNORMAL
DIFFERENTIAL METHOD: ABNORMAL
DISPENSE STATUS: NORMAL
DISPENSE STATUS: NORMAL
EOSINOPHIL # BLD AUTO: 0 K/UL (ref 0–0.5)
EOSINOPHIL # BLD AUTO: ABNORMAL K/UL (ref 0–0.5)
EOSINOPHIL NFR BLD: 0 % (ref 0–8)
EOSINOPHIL NFR BLD: 0.1 % (ref 0–8)
ERYTHROCYTE [DISTWIDTH] IN BLOOD BY AUTOMATED COUNT: 19.7 % (ref 11.5–14.5)
ERYTHROCYTE [DISTWIDTH] IN BLOOD BY AUTOMATED COUNT: 21.1 % (ref 11.5–14.5)
EST. GFR  (AFRICAN AMERICAN): >60 ML/MIN/1.73 M^2
EST. GFR  (NON AFRICAN AMERICAN): >60 ML/MIN/1.73 M^2
GLUCOSE SERPL-MCNC: 95 MG/DL (ref 70–110)
HCT VFR BLD AUTO: 12.7 % (ref 37–48.5)
HCT VFR BLD AUTO: 26.1 % (ref 37–48.5)
HGB BLD-MCNC: 3.7 G/DL (ref 12–16)
HGB BLD-MCNC: 8 G/DL (ref 12–16)
HYPOCHROMIA BLD QL SMEAR: ABNORMAL
HYPOCHROMIA BLD QL SMEAR: ABNORMAL
LYMPHOCYTES # BLD AUTO: 3.8 K/UL (ref 1–4.8)
LYMPHOCYTES # BLD AUTO: ABNORMAL K/UL (ref 1–4.8)
LYMPHOCYTES NFR BLD: 15.3 % (ref 18–48)
LYMPHOCYTES NFR BLD: 5 % (ref 18–48)
MCH RBC QN AUTO: 21.9 PG (ref 27–31)
MCH RBC QN AUTO: 24.5 PG (ref 27–31)
MCHC RBC AUTO-ENTMCNC: 29.1 G/DL (ref 32–36)
MCHC RBC AUTO-ENTMCNC: 30.7 G/DL (ref 32–36)
MCV RBC AUTO: 75 FL (ref 82–98)
MCV RBC AUTO: 80 FL (ref 82–98)
MONOCYTES # BLD AUTO: 3.4 K/UL (ref 0.3–1)
MONOCYTES # BLD AUTO: ABNORMAL K/UL (ref 0.3–1)
MONOCYTES NFR BLD: 13.9 % (ref 4–15)
MONOCYTES NFR BLD: 2 % (ref 4–15)
NEUTROPHILS # BLD AUTO: 17.2 K/UL (ref 1.8–7.7)
NEUTROPHILS NFR BLD: 71.3 % (ref 38–73)
NEUTROPHILS NFR BLD: 92 % (ref 38–73)
NEUTS BAND NFR BLD MANUAL: 1 %
NUM UNITS TRANS PACKED RBC: NORMAL
NUM UNITS TRANS PACKED RBC: NORMAL
OVALOCYTES BLD QL SMEAR: ABNORMAL
OVALOCYTES BLD QL SMEAR: ABNORMAL
PLATELET # BLD AUTO: 37 K/UL (ref 150–350)
PLATELET # BLD AUTO: 47 K/UL (ref 150–350)
PMV BLD AUTO: ABNORMAL FL (ref 9.2–12.9)
PMV BLD AUTO: ABNORMAL FL (ref 9.2–12.9)
POIKILOCYTOSIS BLD QL SMEAR: SLIGHT
POIKILOCYTOSIS BLD QL SMEAR: SLIGHT
POLYCHROMASIA BLD QL SMEAR: ABNORMAL
POLYCHROMASIA BLD QL SMEAR: ABNORMAL
POTASSIUM SERPL-SCNC: 3.4 MMOL/L (ref 3.5–5.1)
RBC # BLD AUTO: 1.69 M/UL (ref 4–5.4)
RBC # BLD AUTO: 3.27 M/UL (ref 4–5.4)
SCHISTOCYTES BLD QL SMEAR: PRESENT
SODIUM SERPL-SCNC: 137 MMOL/L (ref 136–145)
SPHEROCYTES BLD QL SMEAR: ABNORMAL
SPHEROCYTES BLD QL SMEAR: ABNORMAL
STOMATOCYTES BLD QL SMEAR: PRESENT
STOMATOCYTES BLD QL SMEAR: PRESENT
TARGETS BLD QL SMEAR: ABNORMAL
TARGETS BLD QL SMEAR: ABNORMAL
WBC # BLD AUTO: 23.25 K/UL (ref 3.9–12.7)
WBC # BLD AUTO: 24.47 K/UL (ref 3.9–12.7)

## 2019-09-14 PROCEDURE — 85027 COMPLETE CBC AUTOMATED: CPT

## 2019-09-14 PROCEDURE — 25000003 PHARM REV CODE 250: Performed by: NURSE PRACTITIONER

## 2019-09-14 PROCEDURE — 85007 BL SMEAR W/DIFF WBC COUNT: CPT

## 2019-09-14 PROCEDURE — 85025 COMPLETE CBC W/AUTO DIFF WBC: CPT

## 2019-09-14 PROCEDURE — 36415 COLL VENOUS BLD VENIPUNCTURE: CPT

## 2019-09-14 PROCEDURE — P9016 RBC LEUKOCYTES REDUCED: HCPCS

## 2019-09-14 PROCEDURE — 99233 PR SUBSEQUENT HOSPITAL CARE,LEVL III: ICD-10-PCS | Mod: ,,, | Performed by: INTERNAL MEDICINE

## 2019-09-14 PROCEDURE — 80048 BASIC METABOLIC PNL TOTAL CA: CPT

## 2019-09-14 PROCEDURE — 63600175 PHARM REV CODE 636 W HCPCS: Mod: JG | Performed by: NURSE PRACTITIONER

## 2019-09-14 PROCEDURE — 99233 SBSQ HOSP IP/OBS HIGH 50: CPT | Mod: ,,, | Performed by: INTERNAL MEDICINE

## 2019-09-14 PROCEDURE — 36430 TRANSFUSION BLD/BLD COMPNT: CPT

## 2019-09-14 RX ORDER — FERROUS SULFATE 325(65) MG
325 TABLET ORAL 3 TIMES DAILY
Qty: 60 TABLET | Refills: 5 | Status: SHIPPED | OUTPATIENT
Start: 2019-09-14 | End: 2019-12-17

## 2019-09-14 RX ADMIN — PREDNISONE 40 MG: 20 TABLET ORAL at 08:09

## 2019-09-14 RX ADMIN — FERROUS SULFATE TAB EC 325 MG (65 MG FE EQUIVALENT) 325 MG: 325 (65 FE) TABLET DELAYED RESPONSE at 08:09

## 2019-09-14 RX ADMIN — HUMAN IMMUNOGLOBULIN G 70 G: 10 LIQUID INTRAVENOUS at 04:09

## 2019-09-14 NOTE — DISCHARGE SUMMARY
[facility] Hospital Medicine  Discharge Summary      Patient Name: Mari Miller  MRN: [id number]  Admission Date: 9/13/2019  Hospital Length of Stay: 1 days  Discharge Date and Time:  09/14/2019 5:54 PM  Attending Physician: Terell Alonzo MD   Discharging Provider: Morales Oconnor NP  Primary Care Provider: Ifeanyi Tello NP      HPI:   Ms. Grady Miller is a 23 y.o. female with a PMHx of ITP, who presented to the emergency department with complaints of shortness of breath that has progressively worsened over the past 3 days.  Associated  abdominal pain, nausea and bruising. Denies any chest pain, palpitations, cough, wheezing, PND, vomiting, diarrhea, hematemesis, hematochezia, dysuria, hematuria, back pain, HA, syncope, weakness, nose bleeds, hemoptysis, fever or chills.  Initial workup in ED resulted platelets of 5, H&H 5.1/17.1, WBC 29.87. UA, CXR, and Abdominal Xray pending. Upon arrival to ED, patient notably tachycardic with HR in the 150s (sinus). Patient remains hemodynamically stable with HR in 130s at this time.  Case discussed with Hem/Onc.who recommends to transfuse both platelets and RBC, give IVIG, and give prednisone 40mg daily. Hospital Medicine was called for admission.     * No surgery found *      Hospital Course:   Mari Rasmussen is a 23 year old female who was admitted to [facility] for Acute ITP and Acute Anemia. For Acute ITP she was transfused one platelets. She was given IVIG and prednisone as well. Eltrombopag was continued. For anemia she was transfused 2 units PRBC's.  CT abdomen was negative for retroperitoneal bleed, but has small focus of pelvis fluid collection. She denies a heavy  Menstrual cycle or hematochezia. Haptoglobin normal ruling out hemolysis. Hemoglobin following blood was 8.0. Patient requested to go home today. Case discussed with Dr. Calderon who stated that it was okay for patient to discharge. She will follow up with  Hematology early next week. She will call for the appt. She will continue ferrous sulfate TID until seen by hematologist. Patient seen and examined on date of discharge and deemed suitable.      Consults:   Consults (From admission, onward)        Status Ordering Provider     Inpatient consult to Hematology/Oncology  Once     Provider:  (Not yet assigned)    Acknowledged HANK DOOLEY          No new Assessment & Plan notes have been filed under this hospital service since the last note was generated.  Service: Hospital Medicine    Final Active Diagnoses:    Diagnosis Date Noted POA    PRINCIPAL PROBLEM:  Acute ITP [D69.3] 09/25/2018 Yes    Leukocytosis [D72.829] 06/19/2019 Yes    Sinus tachycardia [R00.0] 06/19/2019 Yes    Iron deficiency anemia due to chronic blood loss [D50.0] 04/17/2017 Yes      Problems Resolved During this Admission:       Discharged Condition: stable    Disposition: Home or Self Care    Follow Up:  Follow-up Information     Ifeanyi Tello NP In 3 days.    Specialty:  Family Medicine  Contact information:  [address]  [place] [address] [phone]             Ayana Boykin NP In 1 week.    Specialty:  Hematology and Oncology  Contact information:  [address] [place] [address] DR Vini PERES [address]  [phone]                 Patient Instructions:      Activity as tolerated       Significant Diagnostic Studies: Labs:   CMP   Recent Labs   Lab 09/13/19 0825 09/14/19 0442   * 137   K 3.5 3.4*    108   CO2 21* 24   * 95   BUN 10 8   CREATININE 0.7 0.7   CALCIUM 8.9 8.1*   PROT 7.2  --    ALBUMIN 4.0  --    BILITOT 0.4  --    ALKPHOS 49*  --    AST 11  --    ALT 7*  --    ANIONGAP 11 5*   ESTGFRAFRICA >60 >60   EGFRNONAA >60 >60    and CBC   Recent Labs   Lab 09/13/19  0825 09/14/19 0442 09/14/19  1552   WBC 29.87* 24.47* 23.25*   HGB 5.1* 3.7* 8.0*   HCT 17.1* 12.7* 26.1*   PLT 5* 37* 47*       Pending Diagnostic Studies:     Procedure Component Value Units  Date/Time    Urinalysis, Reflex to Urine Culture Urine, Clean Catch [[id number]] Collected:  09/13/19 1143    Order Status:  Sent Lab Status:  In process Updated:  09/13/19 1144    Specimen:  Urine          Medications:  Reconciled Home Medications:      Medication List      CHANGE how you take these medications    ferrous sulfate 325 mg (65 mg iron) Tab tablet  Commonly known as:  FEOSOL  Take 1 tablet (325 mg total) by mouth 3 (three) times daily.  What changed:  when to take this        CONTINUE taking these medications    PROMACTA 75 mg Tab  Generic drug:  eltrombopag  Take 1 tablet (75 mg total) by mouth once daily.            Indwelling Lines/Drains at time of discharge:   Lines/Drains/Airways          None          Time spent on the discharge of patient: >35 minutes  Patient was seen and examined on the date of discharge and determined to be suitable for discharge.      Morales Oconnor NP  Department of Hospital Medicine  [facility]

## 2019-09-14 NOTE — PLAN OF CARE
Problem: Adult Inpatient Plan of Care  Goal: Plan of Care Review  Outcome: Ongoing (interventions implemented as appropriate)  Patient AAOX3. Vitals stable. Awaiting blood for transfusion. IV fluids infusing as ordered. No complaints of pain. POC reviewed with patient, verbalized understanding. Will continue to monitor.

## 2019-09-14 NOTE — PROGRESS NOTES
[facility] -   Hematology/Oncology  Progress Note    Patient Name: Mari Miller  Admission Date: 9/13/2019  Hospital Length of Stay: 1 days  Code Status: Full Code     Subjective:     HPI:  Mrs. Grady Miller is a 23 y.o. female with a PMHx of ITP on eltrombopag, who presented to the emergency department with complaints of shortness of breath that has progressively worsened over the past 3 days.  Associated lower abdominal pain, nausea and bruising. Denies any chest pain, palpitations, cough, wheezing, PND, vomiting, diarrhea, hematemesis, hematochezia, dysuria, hematuria, back pain, HA, syncope, weakness, nose bleeds, hemoptysis, fever or chills.  Initial workup in ED resulted platelets of 5, H&H 5.1/17.1, WBC 29.87. CXR and abdomen xray showed no acute abnormality. UA 3+ occult blood.  Patient reported has slight bleeding from gum.    Hematology has been consulted for help with management.    Interval History: Has vaginal spotting. Otherwise no other source of bleeding.  S/p transfusion 2 units RBC, 1 platelets, IVIG x2, prednisone 40 mg daily.    Oncology Treatment Plan:   [No treatment plan]    Medications:  Continuous Infusions:   sodium chloride 0.9% Stopped (09/14/19 0441)     Scheduled Meds:   eltrombopag  75 mg Oral Daily    ferrous sulfate  325 mg Oral Daily    Immune Globulin G (IGG)-PRO-IGA 10 % injection (Privigen)  1,000 mg/kg (Order-Specific) Intravenous Q24H    predniSONE  40 mg Oral Daily     PRN Meds:sodium chloride, sodium chloride, diphenhydrAMINE, ondansetron     Review of Systems   Constitutional: Negative.    HENT: Negative.    Eyes: Negative.    Respiratory: Negative.    Cardiovascular: Negative.    Gastrointestinal: Negative.    Endocrine: Negative.    Genitourinary: Negative.    Musculoskeletal: Negative.    Skin: Negative.    Allergic/Immunologic: Negative.    Neurological: Negative.    Hematological: Negative.    Psychiatric/Behavioral: Negative.       Objective:     Vital Signs (Most Recent):  Temp: 98.3 °F (36.8 °C) (09/14/19 1129)  Pulse: 110 (09/14/19 1300)  Resp: 18 (09/14/19 1129)  BP: 133/67 (09/14/19 1129)  SpO2: 100 % (09/14/19 1129) Vital Signs (24h Range):  Temp:  [97.1 °F (36.2 °C)-99.8 °F (37.7 °C)] 98.3 °F (36.8 °C)  Pulse:  [110-150] 110  Resp:  [16-20] 18  SpO2:  [92 %-100 %] 100 %  BP: ()/(47-79) 133/67     Weight: 90.9 kg (200 lb 6.4 oz)  Body mass index is 36.88 kg/m².  Body surface area is 1.99 meters squared.      Intake/Output Summary (Last 24 hours) at 9/14/2019 1531  Last data filed at 9/14/2019 1000  Gross per 24 hour   Intake 2016.67 ml   Output --   Net 2016.67 ml       Physical Exam   Constitutional: She is oriented to person, place, and time. She appears well-developed and well-nourished.   HENT:   Head: Normocephalic and atraumatic.   Eyes: Conjunctivae and EOM are normal.   Neck: Normal range of motion. Neck supple.   Cardiovascular: Normal rate and regular rhythm.   Pulmonary/Chest: Effort normal and breath sounds normal.   Abdominal: Soft. Bowel sounds are normal.   Musculoskeletal: Normal range of motion.   Neurological: She is alert and oriented to person, place, and time.   Skin: Skin is warm and dry.   Psychiatric: She has a normal mood and affect. Her behavior is normal. Judgment and thought content normal.   Nursing note and vitals reviewed.      Significant Labs:   All pertinent labs from the last 24 hours have been reviewed.    Diagnostic Results:  I have reviewed all pertinent imaging results/findings within the past 24 hours.    Assessment/Plan:     * Acute ITP  09/13/2019  Please give IVIG 1gm/kg daily X 2 total.  Prednisone 40 mg daily.  Would transfuse 1 unit platelets, and 2 units RBC.  Patient stated that she has been taking eltrombopag but pharmacy could not account for pills count.  Resume eltrombopag.    09/14/19  Would check CBC today and in AM. Continue on predisone 40 mg daily, and continue on  eltrombopag.    Anemia  09/13/2019 Will get CT abdomen/pelvis to rule out retroperitoneal bleed, send haptoglobin, LDH to rule out hemolysis. Will send HCG to rule out pregnancy prior to CT.    09/14/2019  CT abdomen pelvis negative for retroperitoneal bleed, has small focus of pelvis fluid collection.  Haptoglobin normal, ruling out hemolysis.  Would check CBC this afternoon and tomorrow morning.  Transfuse if Hgb < 7.0        Thank you for your consult. I will follow-up with patient. Please contact us if you have any additional questions.     Sunil Calderon MD  Hematology/Oncology  [facility]

## 2019-09-14 NOTE — DISCHARGE INSTRUCTIONS
Your appt with Dr. Calderon is scheduled for Monday 9/23/19. I would like you to call the clinic and schedule an appt earlier with Ayana Boykin for next week. (We are not able to override)

## 2019-09-14 NOTE — ASSESSMENT & PLAN NOTE
09/13/2019  Please give IVIG 1gm/kg daily X 2 total.  Prednisone 40 mg daily.  Would transfuse 1 unit platelets, and 2 units RBC.  Patient stated that she has been taking eltrombopag but pharmacy could not account for pills count.  Resume eltrombopag.    09/14/19  Would check CBC today and in AM. Continue on predisone 40 mg daily, and continue on eltrombopag.

## 2019-09-14 NOTE — SUBJECTIVE & OBJECTIVE
Interval History: Has vaginal spotting. Otherwise no other source of bleeding.  S/p transfusion 2 units RBC, 1 platelets, IVIG x2, prednisone 40 mg daily.    Oncology Treatment Plan:   [No treatment plan]    Medications:  Continuous Infusions:   sodium chloride 0.9% Stopped (09/14/19 0441)     Scheduled Meds:   eltrombopag  75 mg Oral Daily    ferrous sulfate  325 mg Oral Daily    Immune Globulin G (IGG)-PRO-IGA 10 % injection (Privigen)  1,000 mg/kg (Order-Specific) Intravenous Q24H    predniSONE  40 mg Oral Daily     PRN Meds:sodium chloride, sodium chloride, diphenhydrAMINE, ondansetron     Review of Systems   Constitutional: Negative.    HENT: Negative.    Eyes: Negative.    Respiratory: Negative.    Cardiovascular: Negative.    Gastrointestinal: Negative.    Endocrine: Negative.    Genitourinary: Negative.    Musculoskeletal: Negative.    Skin: Negative.    Allergic/Immunologic: Negative.    Neurological: Negative.    Hematological: Negative.    Psychiatric/Behavioral: Negative.      Objective:     Vital Signs (Most Recent):  Temp: 98.3 °F (36.8 °C) (09/14/19 1129)  Pulse: 110 (09/14/19 1300)  Resp: 18 (09/14/19 1129)  BP: 133/67 (09/14/19 1129)  SpO2: 100 % (09/14/19 1129) Vital Signs (24h Range):  Temp:  [97.1 °F (36.2 °C)-99.8 °F (37.7 °C)] 98.3 °F (36.8 °C)  Pulse:  [110-150] 110  Resp:  [16-20] 18  SpO2:  [92 %-100 %] 100 %  BP: ()/(47-79) 133/67     Weight: 90.9 kg (200 lb 6.4 oz)  Body mass index is 36.88 kg/m².  Body surface area is 1.99 meters squared.      Intake/Output Summary (Last 24 hours) at 9/14/2019 1531  Last data filed at 9/14/2019 1000  Gross per 24 hour   Intake 2016.67 ml   Output --   Net 2016.67 ml       Physical Exam   Constitutional: She is oriented to person, place, and time. She appears well-developed and well-nourished.   HENT:   Head: Normocephalic and atraumatic.   Eyes: Conjunctivae and EOM are normal.   Neck: Normal range of motion. Neck supple.   Cardiovascular:  Normal rate and regular rhythm.   Pulmonary/Chest: Effort normal and breath sounds normal.   Abdominal: Soft. Bowel sounds are normal.   Musculoskeletal: Normal range of motion.   Neurological: She is alert and oriented to person, place, and time.   Skin: Skin is warm and dry.   Psychiatric: She has a normal mood and affect. Her behavior is normal. Judgment and thought content normal.   Nursing note and vitals reviewed.      Significant Labs:   All pertinent labs from the last 24 hours have been reviewed.    Diagnostic Results:  I have reviewed all pertinent imaging results/findings within the past 24 hours.

## 2019-09-14 NOTE — PLAN OF CARE
Pt presented to hospital with complaints of SOB and abdominal pain and subsequently found to have low platelet levels.  Since being admitted pt has been given blood and reports symptom relief.  The pt is alert and oriented to person, place, and time.  Prior to admission the pt was living in a home with her significant other and independent with ADLs.  The pt does not anticipate any CM needs at this time.  CM provided a transitional care folder, information on advanced directives, information on pharmacy bedside delivery, and discharge planning begins on admission with contact information for any needs/questions.    D/C plan: home        09/14/19 2202   Discharge Assessment   Assessment Type Discharge Planning Assessment   Confirmed/corrected address and phone number on facesheet? Yes   Assessment information obtained from? Patient;Medical Record   Expected Length of Stay (days)   (tbd)   Communicated expected length of stay with patient/caregiver yes   Prior to hospitilization cognitive status: Alert/Oriented   Prior to hospitalization functional status: Independent   Current cognitive status: Alert/Oriented   Current Functional Status: Independent   Facility Arrived From: home    Lives With significant other   Able to Return to Prior Arrangements yes   Is patient able to care for self after discharge? Yes   Who are your caregiver(s) and their phone number(s)? Sravan Kaur, friend: [phone]; Dk Hill, mother: [phone]   Patient's perception of discharge disposition home or selfcare   Readmission Within the Last 30 Days no previous admission in last 30 days   Patient currently being followed by outpatient case management? No   Patient currently receives any other outside agency services? No   Equipment Currently Used at Home none   Do you have any problems affording any of your prescribed medications? No   Is the patient taking medications as prescribed? yes   Does the patient have transportation home?  Yes   Transportation Anticipated family or friend will provide;car, drives self   Does the patient receive services at the [facility]? No   Discharge Plan A Home   Discharge Plan B Home   DME Needed Upon Discharge  none   Patient/Family in Agreement with Plan yes

## 2019-09-14 NOTE — ASSESSMENT & PLAN NOTE
09/13/2019 Will get CT abdomen/pelvis to rule out retroperitoneal bleed, send haptoglobin, LDH to rule out hemolysis. Will send HCG to rule out pregnancy prior to CT.    09/14/2019  CT abdomen pelvis negative for retroperitoneal bleed, has small focus of pelvis fluid collection.  Haptoglobin normal, ruling out hemolysis.  Would check CBC this afternoon and tomorrow morning.  Transfuse if Hgb < 7.0

## 2019-09-17 ENCOUNTER — TELEPHONE (OUTPATIENT)
Dept: HEMATOLOGY/ONCOLOGY | Facility: CLINIC | Age: 24
End: 2019-09-17

## 2019-09-17 NOTE — TELEPHONE ENCOUNTER
----- Message from Ryan Newby sent at 9/17/2019 10:31 AM CDT -----  Contact: PT  Type:  Sooner Appointment Request    Caller is requesting a sooner appointment.  Caller declined first available appointment listed below.  Caller will not accept being placed on the waitlist and is requesting a message be sent to doctor.  Name of Caller: PT   When is the first available appointment? 09/24/19  Symptoms:HOSP FU   Would the patient rather a call back or a response via [facility]? Call back   Best Call Back Number: [phone] (home)   Additional Information: n/a

## 2019-09-17 NOTE — TELEPHONE ENCOUNTER
Pt agreed to keep f/u scheduled for 9/24 with Dr. Calderon. Appt with April cancelled per pt request

## 2019-09-24 ENCOUNTER — OFFICE VISIT (OUTPATIENT)
Dept: HEMATOLOGY/ONCOLOGY | Facility: CLINIC | Age: 24
End: 2019-09-24
Payer: MEDICAID

## 2019-09-24 ENCOUNTER — LAB VISIT (OUTPATIENT)
Dept: LAB | Facility: HOSPITAL | Age: 24
End: 2019-09-24
Attending: INTERNAL MEDICINE
Payer: MEDICAID

## 2019-09-24 VITALS
WEIGHT: 208.56 LBS | TEMPERATURE: 99 F | OXYGEN SATURATION: 100 % | BODY MASS INDEX: 38.38 KG/M2 | DIASTOLIC BLOOD PRESSURE: 82 MMHG | HEART RATE: 88 BPM | SYSTOLIC BLOOD PRESSURE: 122 MMHG | HEIGHT: 62 IN | RESPIRATION RATE: 16 BRPM

## 2019-09-24 DIAGNOSIS — D69.3 CHRONIC ITP (IDIOPATHIC THROMBOCYTOPENIA): Primary | ICD-10-CM

## 2019-09-24 DIAGNOSIS — D50.0 IRON DEFICIENCY ANEMIA DUE TO CHRONIC BLOOD LOSS: ICD-10-CM

## 2019-09-24 DIAGNOSIS — D69.3 CHRONIC ITP (IDIOPATHIC THROMBOCYTOPENIA): ICD-10-CM

## 2019-09-24 LAB
ANISOCYTOSIS BLD QL SMEAR: ABNORMAL
BASOPHILS # BLD AUTO: 0.1 K/UL (ref 0–0.2)
BASOPHILS NFR BLD: 0.6 % (ref 0–1.9)
DIFFERENTIAL METHOD: ABNORMAL
EOSINOPHIL # BLD AUTO: 0.2 K/UL (ref 0–0.5)
EOSINOPHIL NFR BLD: 1.2 % (ref 0–8)
ERYTHROCYTE [DISTWIDTH] IN BLOOD BY AUTOMATED COUNT: 21.8 % (ref 11.5–14.5)
HCT VFR BLD AUTO: 34.8 % (ref 37–48.5)
HGB BLD-MCNC: 10.3 G/DL (ref 12–16)
HOWELL-JOLLY BOD BLD QL SMEAR: ABNORMAL
HYPOCHROMIA BLD QL SMEAR: ABNORMAL
IMM GRANULOCYTES # BLD AUTO: 0.13 K/UL (ref 0–0.04)
IMM GRANULOCYTES NFR BLD AUTO: 0.8 % (ref 0–0.5)
LYMPHOCYTES # BLD AUTO: 2.6 K/UL (ref 1–4.8)
LYMPHOCYTES NFR BLD: 15.5 % (ref 18–48)
MCH RBC QN AUTO: 23.2 PG (ref 27–31)
MCHC RBC AUTO-ENTMCNC: 29.6 G/DL (ref 32–36)
MCV RBC AUTO: 78 FL (ref 82–98)
MONOCYTES # BLD AUTO: 1.6 K/UL (ref 0.3–1)
MONOCYTES NFR BLD: 9.5 % (ref 4–15)
NEUTROPHILS # BLD AUTO: 12.1 K/UL (ref 1.8–7.7)
NEUTROPHILS NFR BLD: 73.2 % (ref 38–73)
NRBC BLD-RTO: 0 /100 WBC
PLATELET # BLD AUTO: 428 K/UL (ref 150–350)
PLATELET BLD QL SMEAR: ABNORMAL
PMV BLD AUTO: ABNORMAL FL (ref 9.2–12.9)
POIKILOCYTOSIS BLD QL SMEAR: ABNORMAL
POLYCHROMASIA BLD QL SMEAR: ABNORMAL
RBC # BLD AUTO: 4.44 M/UL (ref 4–5.4)
SPHEROCYTES BLD QL SMEAR: ABNORMAL
TARGETS BLD QL SMEAR: ABNORMAL
WBC # BLD AUTO: 16.47 K/UL (ref 3.9–12.7)

## 2019-09-24 PROCEDURE — 99214 OFFICE O/P EST MOD 30 MIN: CPT | Mod: S$PBB,,, | Performed by: INTERNAL MEDICINE

## 2019-09-24 PROCEDURE — 99999 PR PBB SHADOW E&M-EST. PATIENT-LVL III: CPT | Mod: PBBFAC,,, | Performed by: INTERNAL MEDICINE

## 2019-09-24 PROCEDURE — 36415 COLL VENOUS BLD VENIPUNCTURE: CPT

## 2019-09-24 PROCEDURE — 99213 OFFICE O/P EST LOW 20 MIN: CPT | Mod: PBBFAC | Performed by: INTERNAL MEDICINE

## 2019-09-24 PROCEDURE — 99999 PR PBB SHADOW E&M-EST. PATIENT-LVL III: ICD-10-PCS | Mod: PBBFAC,,, | Performed by: INTERNAL MEDICINE

## 2019-09-24 PROCEDURE — 99214 PR OFFICE/OUTPT VISIT, EST, LEVL IV, 30-39 MIN: ICD-10-PCS | Mod: S$PBB,,, | Performed by: INTERNAL MEDICINE

## 2019-09-24 PROCEDURE — 85025 COMPLETE CBC W/AUTO DIFF WBC: CPT

## 2019-09-24 NOTE — PROGRESS NOTES
Subjective:       Patient ID: Mari Miller is a 23 y.o. female.    Chief Complaint: Chronic ITP (idiopathic thrombocytopenia) [D69.3]  HPI: We have an opportunity to see Ms. Mari Miller in Hematology Oncology clinic at [facility] on 2019.  Ms. Mari Miller is a 23 y.o. woman with ITP s/p splenectomy on Promacta, also has iron deficiency anemia on oral iron.  Has had multiple previous admissions for platelets < 10k, ? Taking her promacta.     No history exists.     Past Medical History:   Diagnosis Date    Encounter for blood transfusion     Thrombocytopenia      History reviewed. No pertinent family history.  Social History     Socioeconomic History    Marital status: Single     Spouse name: Not on file    Number of children: Not on file    Years of education: Not on file    Highest education level: Not on file   Occupational History    Not on file   Social Needs    Financial resource strain: Not on file    Food insecurity:     Worry: Not on file     Inability: Not on file    Transportation needs:     Medical: Not on file     Non-medical: Not on file   Tobacco Use    Smoking status: Never Smoker    Smokeless tobacco: Never Used   Substance and Sexual Activity    Alcohol use: No     Frequency: Never    Drug use: No    Sexual activity: Not on file   Lifestyle    Physical activity:     Days per week: Not on file     Minutes per session: Not on file    Stress: Not on file   Relationships    Social connections:     Talks on phone: Not on file     Gets together: Not on file     Attends [employer] service: Not on file     Active member of club or organization: Not on file     Attends meetings of clubs or organizations: Not on file     Relationship status: Not on file   Other Topics Concern    Not on file   Social History Narrative    Not on file     Past Surgical History:   Procedure Laterality Date     SECTION      x1    ROBOT-ASSISTED  SURGICAL REMOVAL OF SPLEEN USING DA MAXIM XI N/A 11/13/2018    Procedure: XI ROBOTIC SPLENECTOMY;  Surgeon: Yobani Lara MD;  Location: [facility];  Service: General;  Laterality: N/A;     Current Outpatient Medications   Medication Sig Dispense Refill    eltrombopag (PROMACTA) 75 mg Tab Take 1 tablet (75 mg total) by mouth once daily. 30 tablet 11    ferrous sulfate (FEOSOL) 325 mg (65 mg iron) Tab tablet Take 1 tablet (325 mg total) by mouth 3 (three) times daily. 60 tablet 5     No current facility-administered medications for this visit.        Labs:  Lab Results   Component Value Date    WBC 23.25 (H) 09/14/2019    HGB 8.0 (L) 09/14/2019    HCT 26.1 (L) 09/14/2019    MCV 80 (L) 09/14/2019    PLT 47 (L) 09/14/2019     BMP  Lab Results   Component Value Date     09/14/2019    K 3.4 (L) 09/14/2019     09/14/2019    CO2 24 09/14/2019    BUN 8 09/14/2019    CREATININE 0.7 09/14/2019    CALCIUM 8.1 (L) 09/14/2019    ANIONGAP 5 (L) 09/14/2019    ESTGFRAFRICA >60 09/14/2019    EGFRNONAA >60 09/14/2019     Lab Results   Component Value Date    ALT 7 (L) 09/13/2019    AST 11 09/13/2019    ALKPHOS 49 (L) 09/13/2019    BILITOT 0.4 09/13/2019       Lab Results   Component Value Date    IRON 11 (L) 06/20/2019    TIBC 400 06/20/2019    FERRITIN 4 (L) 06/20/2019     Lab Results   Component Value Date    KSJEANCS03 421 06/20/2019     Lab Results   Component Value Date    FOLATE 7.9 06/20/2019     Lab Results   Component Value Date    TSH 0.775 08/02/2019       I have reviewed the radiology reports and examined the scan/xray images.    Review of Systems   Constitutional: Negative.    HENT: Negative.    Eyes: Negative.    Respiratory: Negative.    Cardiovascular: Negative.    Gastrointestinal: Negative.    Endocrine: Negative.    Genitourinary: Negative.    Musculoskeletal: Negative.    Skin: Negative.    Allergic/Immunologic: Negative.    Neurological: Negative.    Hematological: Negative.    Psychiatric/Behavioral:  Negative.      ECOG SCORE    0 - Fully active-able to carry on all pre-disease performance without restriction            Objective:   There were no vitals filed for this visit.Body mass index is 38.15 kg/m².  Physical Exam   Constitutional: She is oriented to person, place, and time. She appears well-developed and well-nourished.   HENT:   Head: Normocephalic and atraumatic.   Eyes: Conjunctivae and EOM are normal.   Neck: Normal range of motion. Neck supple.   Cardiovascular: Normal rate and regular rhythm.   Pulmonary/Chest: Effort normal and breath sounds normal.   Abdominal: Soft. Bowel sounds are normal.   Musculoskeletal: Normal range of motion.   Neurological: She is alert and oriented to person, place, and time.   Skin: Skin is warm and dry.   Psychiatric: She has a normal mood and affect. Her behavior is normal. Judgment and thought content normal.   Nursing note and vitals reviewed.        Assessment:      1. Chronic ITP (idiopathic thrombocytopenia)    2. Iron deficiency anemia due to chronic blood loss           Plan:     Chronic ITP (idiopathic thrombocytopenia)  Continue on Promacta, platelets levels are 400k.  We discuss Romiplostim if has difficulty with compliance to oral promacta.  However, Romiplostin would require SQ injection in infusion every 1-2 weeks.  Iron deficiency anemia due to chronic blood loss  Due to menses in setting of intermittent very low platelets.  Continue on oral iron.

## 2019-10-01 ENCOUNTER — HOSPITAL ENCOUNTER (INPATIENT)
Facility: HOSPITAL | Age: 24
LOS: 3 days | Discharge: HOME | DRG: 812 | End: 2019-10-04
Attending: EMERGENCY MEDICINE | Admitting: INTERNAL MEDICINE
Payer: MEDICAID

## 2019-10-01 DIAGNOSIS — D69.3 CHRONIC ITP (IDIOPATHIC THROMBOCYTOPENIA): ICD-10-CM

## 2019-10-01 DIAGNOSIS — R06.02 SOB (SHORTNESS OF BREATH): ICD-10-CM

## 2019-10-01 DIAGNOSIS — D69.3 ITP SECONDARY TO INFECTION: Primary | ICD-10-CM

## 2019-10-01 DIAGNOSIS — D64.9 ANEMIA, UNSPECIFIED TYPE: ICD-10-CM

## 2019-10-01 LAB
ABO + RH BLD: NORMAL
ACANTHOCYTES BLD QL SMEAR: PRESENT
ALBUMIN SERPL BCP-MCNC: 3.5 G/DL (ref 3.5–5.2)
ALP SERPL-CCNC: 42 U/L (ref 55–135)
ALT SERPL W/O P-5'-P-CCNC: 9 U/L (ref 10–44)
ANION GAP SERPL CALC-SCNC: 10 MMOL/L (ref 8–16)
ANISOCYTOSIS BLD QL SMEAR: ABNORMAL
APTT BLDCRRT: 25.8 SEC (ref 21–32)
AST SERPL-CCNC: 11 U/L (ref 10–40)
BASOPHILS # BLD AUTO: 0.09 K/UL (ref 0–0.2)
BASOPHILS NFR BLD: 0.4 % (ref 0–1.9)
BILIRUB SERPL-MCNC: 0.2 MG/DL (ref 0.1–1)
BLD GP AB SCN CELLS X3 SERPL QL: NORMAL
BUN SERPL-MCNC: 21 MG/DL (ref 6–20)
CALCIUM SERPL-MCNC: 8.7 MG/DL (ref 8.7–10.5)
CHLORIDE SERPL-SCNC: 107 MMOL/L (ref 95–110)
CO2 SERPL-SCNC: 19 MMOL/L (ref 23–29)
CREAT SERPL-MCNC: 0.7 MG/DL (ref 0.5–1.4)
DACRYOCYTES BLD QL SMEAR: ABNORMAL
DIFFERENTIAL METHOD: ABNORMAL
EOSINOPHIL # BLD AUTO: 0.2 K/UL (ref 0–0.5)
EOSINOPHIL NFR BLD: 0.7 % (ref 0–8)
ERYTHROCYTE [DISTWIDTH] IN BLOOD BY AUTOMATED COUNT: 19.8 % (ref 11.5–14.5)
EST. GFR  (AFRICAN AMERICAN): >60 ML/MIN/1.73 M^2
EST. GFR  (NON AFRICAN AMERICAN): >60 ML/MIN/1.73 M^2
GLUCOSE SERPL-MCNC: 104 MG/DL (ref 70–110)
HCT VFR BLD AUTO: 20.3 % (ref 37–48.5)
HGB BLD-MCNC: 6.4 G/DL (ref 12–16)
HYPOCHROMIA BLD QL SMEAR: ABNORMAL
INR PPP: 1.1 (ref 0.8–1.2)
LDH SERPL L TO P-CCNC: 301 U/L (ref 110–260)
LYMPHOCYTES # BLD AUTO: 3.1 K/UL (ref 1–4.8)
LYMPHOCYTES NFR BLD: 14 % (ref 18–48)
MCH RBC QN AUTO: 24.2 PG (ref 27–31)
MCHC RBC AUTO-ENTMCNC: 31.5 G/DL (ref 32–36)
MCV RBC AUTO: 77 FL (ref 82–98)
MONOCYTES # BLD AUTO: 1.6 K/UL (ref 0.3–1)
MONOCYTES NFR BLD: 6.9 % (ref 4–15)
NEUTROPHILS # BLD AUTO: 17.5 K/UL (ref 1.8–7.7)
NEUTROPHILS NFR BLD: 78.6 % (ref 38–73)
OVALOCYTES BLD QL SMEAR: ABNORMAL
PLATELET # BLD AUTO: 2 K/UL (ref 150–350)
PLATELET BLD QL SMEAR: ABNORMAL
PMV BLD AUTO: ABNORMAL FL (ref 9.2–12.9)
POIKILOCYTOSIS BLD QL SMEAR: ABNORMAL
POLYCHROMASIA BLD QL SMEAR: ABNORMAL
POTASSIUM SERPL-SCNC: 3.6 MMOL/L (ref 3.5–5.1)
PROT SERPL-MCNC: 6.6 G/DL (ref 6–8.4)
PROTHROMBIN TIME: 11.7 SEC (ref 9–12.5)
RBC # BLD AUTO: 2.65 M/UL (ref 4–5.4)
SCHISTOCYTES BLD QL SMEAR: PRESENT
SODIUM SERPL-SCNC: 136 MMOL/L (ref 136–145)
SPHEROCYTES BLD QL SMEAR: ABNORMAL
STOMATOCYTES BLD QL SMEAR: PRESENT
TARGETS BLD QL SMEAR: ABNORMAL
WBC # BLD AUTO: 22.39 K/UL (ref 3.9–12.7)

## 2019-10-01 PROCEDURE — 86922 COMPATIBILITY TEST ANTIGLOB: CPT

## 2019-10-01 PROCEDURE — 80053 COMPREHEN METABOLIC PANEL: CPT

## 2019-10-01 PROCEDURE — 85610 PROTHROMBIN TIME: CPT

## 2019-10-01 PROCEDURE — 36415 COLL VENOUS BLD VENIPUNCTURE: CPT

## 2019-10-01 PROCEDURE — 83615 LACTATE (LD) (LDH) ENZYME: CPT

## 2019-10-01 PROCEDURE — 93010 EKG 12-LEAD: ICD-10-PCS | Mod: ,,, | Performed by: INTERNAL MEDICINE

## 2019-10-01 PROCEDURE — 93010 ELECTROCARDIOGRAM REPORT: CPT | Mod: ,,, | Performed by: INTERNAL MEDICINE

## 2019-10-01 PROCEDURE — 86850 RBC ANTIBODY SCREEN: CPT

## 2019-10-01 PROCEDURE — 93005 ELECTROCARDIOGRAM TRACING: CPT

## 2019-10-01 PROCEDURE — 11000001 HC ACUTE MED/SURG PRIVATE ROOM

## 2019-10-01 PROCEDURE — 85730 THROMBOPLASTIN TIME PARTIAL: CPT

## 2019-10-01 PROCEDURE — 63600175 PHARM REV CODE 636 W HCPCS: Performed by: INTERNAL MEDICINE

## 2019-10-01 PROCEDURE — 99291 CRITICAL CARE FIRST HOUR: CPT | Mod: 25

## 2019-10-01 PROCEDURE — 85025 COMPLETE CBC W/AUTO DIFF WBC: CPT

## 2019-10-01 PROCEDURE — 96374 THER/PROPH/DIAG INJ IV PUSH: CPT

## 2019-10-01 RX ORDER — HYDROCODONE BITARTRATE AND ACETAMINOPHEN 500; 5 MG/1; MG/1
TABLET ORAL
Status: DISCONTINUED | OUTPATIENT
Start: 2019-10-01 | End: 2019-10-04 | Stop reason: HOSPADM

## 2019-10-01 RX ORDER — SODIUM CHLORIDE 9 MG/ML
1000 INJECTION, SOLUTION INTRAVENOUS
Status: COMPLETED | OUTPATIENT
Start: 2019-10-01 | End: 2019-10-01

## 2019-10-01 RX ADMIN — SODIUM CHLORIDE 1000 ML: 0.9 INJECTION, SOLUTION INTRAVENOUS at 11:10

## 2019-10-02 PROBLEM — R06.02 SOB (SHORTNESS OF BREATH): Status: ACTIVE | Noted: 2019-10-02

## 2019-10-02 LAB
ALBUMIN SERPL BCP-MCNC: 2.8 G/DL (ref 3.5–5.2)
ALP SERPL-CCNC: 33 U/L (ref 55–135)
ALT SERPL W/O P-5'-P-CCNC: 7 U/L (ref 10–44)
ANION GAP SERPL CALC-SCNC: 6 MMOL/L (ref 8–16)
ANISOCYTOSIS BLD QL SMEAR: ABNORMAL
AST SERPL-CCNC: 10 U/L (ref 10–40)
B-HCG UR QL: NEGATIVE
BASOPHILS # BLD AUTO: 0.06 K/UL (ref 0–0.2)
BASOPHILS NFR BLD: 0.3 % (ref 0–1.9)
BILIRUB SERPL-MCNC: 0.1 MG/DL (ref 0.1–1)
BILIRUB UR QL STRIP: NEGATIVE
BLD PROD TYP BPU: NORMAL
BLOOD UNIT EXPIRATION DATE: NORMAL
BLOOD UNIT TYPE CODE: 5100
BLOOD UNIT TYPE: NORMAL
BUN SERPL-MCNC: 18 MG/DL (ref 6–20)
CALCIUM SERPL-MCNC: 7.8 MG/DL (ref 8.7–10.5)
CHLORIDE SERPL-SCNC: 109 MMOL/L (ref 95–110)
CLARITY UR: CLEAR
CO2 SERPL-SCNC: 20 MMOL/L (ref 23–29)
CODING SYSTEM: NORMAL
COLOR UR: YELLOW
CREAT SERPL-MCNC: 0.6 MG/DL (ref 0.5–1.4)
DACRYOCYTES BLD QL SMEAR: ABNORMAL
DIFFERENTIAL METHOD: ABNORMAL
DISPENSE STATUS: NORMAL
EOSINOPHIL # BLD AUTO: 0.3 K/UL (ref 0–0.5)
EOSINOPHIL NFR BLD: 1.5 % (ref 0–8)
ERYTHROCYTE [DISTWIDTH] IN BLOOD BY AUTOMATED COUNT: 20.6 % (ref 11.5–14.5)
EST. GFR  (AFRICAN AMERICAN): >60 ML/MIN/1.73 M^2
EST. GFR  (NON AFRICAN AMERICAN): >60 ML/MIN/1.73 M^2
GLUCOSE SERPL-MCNC: 97 MG/DL (ref 70–110)
GLUCOSE UR QL STRIP: NEGATIVE
HCT VFR BLD AUTO: 14.6 % (ref 37–48.5)
HCT VFR BLD AUTO: 20.7 % (ref 37–48.5)
HGB BLD-MCNC: 4.4 G/DL (ref 12–16)
HGB BLD-MCNC: 6.2 G/DL (ref 12–16)
HGB UR QL STRIP: ABNORMAL
HYPOCHROMIA BLD QL SMEAR: ABNORMAL
KETONES UR QL STRIP: ABNORMAL
LEUKOCYTE ESTERASE UR QL STRIP: NEGATIVE
LYMPHOCYTES # BLD AUTO: 3 K/UL (ref 1–4.8)
LYMPHOCYTES NFR BLD: 15 % (ref 18–48)
MCH RBC QN AUTO: 23.9 PG (ref 27–31)
MCHC RBC AUTO-ENTMCNC: 30.1 G/DL (ref 32–36)
MCV RBC AUTO: 79 FL (ref 82–98)
MONOCYTES # BLD AUTO: 1.8 K/UL (ref 0.3–1)
MONOCYTES NFR BLD: 9.2 % (ref 4–15)
NEUTROPHILS # BLD AUTO: 14.5 K/UL (ref 1.8–7.7)
NEUTROPHILS NFR BLD: 74 % (ref 38–73)
NITRITE UR QL STRIP: NEGATIVE
NUM UNITS TRANS PACKED RBC: NORMAL
PH UR STRIP: 6 [PH] (ref 5–8)
PLATELET # BLD AUTO: 1 K/UL (ref 150–350)
PLATELET BLD QL SMEAR: ABNORMAL
PMV BLD AUTO: ABNORMAL FL (ref 9.2–12.9)
POIKILOCYTOSIS BLD QL SMEAR: ABNORMAL
POLYCHROMASIA BLD QL SMEAR: ABNORMAL
POTASSIUM SERPL-SCNC: 3.6 MMOL/L (ref 3.5–5.1)
PROT SERPL-MCNC: 7.4 G/DL (ref 6–8.4)
PROT UR QL STRIP: NEGATIVE
RBC # BLD AUTO: 1.84 M/UL (ref 4–5.4)
SCHISTOCYTES BLD QL SMEAR: PRESENT
SODIUM SERPL-SCNC: 135 MMOL/L (ref 136–145)
SP GR UR STRIP: 1.02 (ref 1–1.03)
TARGETS BLD QL SMEAR: ABNORMAL
TSH SERPL DL<=0.005 MIU/L-ACNC: 3.64 UIU/ML (ref 0.4–4)
URN SPEC COLLECT METH UR: ABNORMAL
UROBILINOGEN UR STRIP-ACNC: NEGATIVE EU/DL
WBC # BLD AUTO: 19.63 K/UL (ref 3.9–12.7)

## 2019-10-02 PROCEDURE — 21400001 HC TELEMETRY ROOM

## 2019-10-02 PROCEDURE — 84443 ASSAY THYROID STIM HORMONE: CPT

## 2019-10-02 PROCEDURE — 81025 URINE PREGNANCY TEST: CPT

## 2019-10-02 PROCEDURE — 99232 PR SUBSEQUENT HOSPITAL CARE,LEVL II: ICD-10-PCS | Mod: ,,, | Performed by: INTERNAL MEDICINE

## 2019-10-02 PROCEDURE — 85027 COMPLETE CBC AUTOMATED: CPT

## 2019-10-02 PROCEDURE — P9016 RBC LEUKOCYTES REDUCED: HCPCS

## 2019-10-02 PROCEDURE — 36415 COLL VENOUS BLD VENIPUNCTURE: CPT

## 2019-10-02 PROCEDURE — 85007 BL SMEAR W/DIFF WBC COUNT: CPT

## 2019-10-02 PROCEDURE — 85014 HEMATOCRIT: CPT

## 2019-10-02 PROCEDURE — 36430 TRANSFUSION BLD/BLD COMPNT: CPT

## 2019-10-02 PROCEDURE — 63600175 PHARM REV CODE 636 W HCPCS: Performed by: INTERNAL MEDICINE

## 2019-10-02 PROCEDURE — 25000003 PHARM REV CODE 250: Performed by: INTERNAL MEDICINE

## 2019-10-02 PROCEDURE — 81003 URINALYSIS AUTO W/O SCOPE: CPT

## 2019-10-02 PROCEDURE — 63600175 PHARM REV CODE 636 W HCPCS: Mod: JG | Performed by: EMERGENCY MEDICINE

## 2019-10-02 PROCEDURE — 99232 SBSQ HOSP IP/OBS MODERATE 35: CPT | Mod: ,,, | Performed by: INTERNAL MEDICINE

## 2019-10-02 PROCEDURE — 80053 COMPREHEN METABOLIC PANEL: CPT

## 2019-10-02 PROCEDURE — 85018 HEMOGLOBIN: CPT

## 2019-10-02 RX ORDER — IBUPROFEN 200 MG
24 TABLET ORAL
Status: DISCONTINUED | OUTPATIENT
Start: 2019-10-02 | End: 2019-10-04 | Stop reason: HOSPADM

## 2019-10-02 RX ORDER — GLUCAGON 1 MG
1 KIT INJECTION
Status: DISCONTINUED | OUTPATIENT
Start: 2019-10-02 | End: 2019-10-04 | Stop reason: HOSPADM

## 2019-10-02 RX ORDER — HYDROCODONE BITARTRATE AND ACETAMINOPHEN 500; 5 MG/1; MG/1
TABLET ORAL
Status: DISCONTINUED | OUTPATIENT
Start: 2019-10-02 | End: 2019-10-04 | Stop reason: HOSPADM

## 2019-10-02 RX ORDER — SODIUM CHLORIDE 0.9 % (FLUSH) 0.9 %
10 SYRINGE (ML) INJECTION
Status: DISCONTINUED | OUTPATIENT
Start: 2019-10-02 | End: 2019-10-04 | Stop reason: HOSPADM

## 2019-10-02 RX ORDER — IBUPROFEN 200 MG
16 TABLET ORAL
Status: DISCONTINUED | OUTPATIENT
Start: 2019-10-02 | End: 2019-10-04 | Stop reason: HOSPADM

## 2019-10-02 RX ORDER — FERROUS SULFATE 325(65) MG
325 TABLET, DELAYED RELEASE (ENTERIC COATED) ORAL 2 TIMES DAILY
Status: DISCONTINUED | OUTPATIENT
Start: 2019-10-02 | End: 2019-10-04 | Stop reason: HOSPADM

## 2019-10-02 RX ADMIN — PREDNISONE 93 MG: 20 TABLET ORAL at 08:10

## 2019-10-02 RX ADMIN — FERROUS SULFATE TAB EC 325 MG (65 MG FE EQUIVALENT) 325 MG: 325 (65 FE) TABLET DELAYED RESPONSE at 08:10

## 2019-10-02 RX ADMIN — HUMAN IMMUNOGLOBULIN G 70 G: 10 LIQUID INTRAVENOUS at 12:10

## 2019-10-02 NOTE — PROGRESS NOTES
[facility] Hospital Medicine  Progress Note    Patient Name: Mari Miller  MRN: [id number]  Patient Class: IP- Inpatient   Admission Date: 10/1/2019  Length of Stay: 0 days  Attending Physician: Yrn Curry MD  Primary Care Provider: Ifeanyi Tello NP        Subjective:     Principal Problem: ITP        HPI:  23 year old woman with history of ITP  on who presented with history of worsening shortness of breath that she noticed on day of presentation . There was associated history of black stools and she also noticed blood clot in the stool yesterday . Since presentation, initial cbc showed-    Component      Latest Ref Rng & Units 10/1/2019 9/24/2019              Hemoglobin      12.0 - 16.0 g/dL 6.4 (L) 10.3 (L)     Component      Latest Ref Rng & Units 10/1/2019 9/24/2019              Platelets      150 - 350 K/uL 2 (LL) 428 (H)   Plan of care was discussed with Oncology and IVIG and transfusion of one unit of packed cells was recommended.    Overview/Hospital Course:  Pt admitted to Inpatient status for symptomatic anemia in the setting of chronic ITP.  Heme/Onc consulted.  H/H improved post transfusion on PRBCs x 2 units.  Leukocytosis improved.  Pt reports compliance with outpatient medications and verbalized symptom improvement.     Interval History: pt states that she is feeling much better today post transfusion.  H/H improved with PRBCs x 1 unit transfused.  Pt reports compliance with prescribed medications.  Leukocytosis improved.  Heme/Onc following.      Review of Systems   Constitutional: Negative for activity change, chills and fatigue.   HENT: Negative for congestion, ear pain, facial swelling, sinus pressure and sore throat.    Eyes: Negative for pain.   Respiratory: Positive for shortness of breath. Negative for apnea, chest tightness and stridor.    Cardiovascular: Positive for palpitations. Negative for chest pain and leg swelling.   Gastrointestinal: Negative for  abdominal distention, abdominal pain, diarrhea and nausea.        Dark stool   Endocrine: Negative for polydipsia and polyphagia.   Genitourinary: Negative for decreased urine volume, difficulty urinating, frequency and genital sores.   Musculoskeletal: Negative for arthralgias and gait problem.   Allergic/Immunologic: Negative for environmental allergies.   Neurological: Positive for light-headedness. Negative for headaches.   Hematological: Negative for adenopathy.   Psychiatric/Behavioral: Negative for agitation, confusion and decreased concentration.     Objective:     Vital Signs (Most Recent):  Temp: 98.3 °F (36.8 °C) (10/02/19 1116)  Pulse: (!) 117 (10/02/19 1300)  Resp: 17 (10/02/19 1116)  BP: 129/68 (10/02/19 1116)  SpO2: 97 % (10/02/19 1116) Vital Signs (24h Range):  Temp:  [98 °F (36.7 °C)-99 °F (37.2 °C)] 98.3 °F (36.8 °C)  Pulse:  [] 117  Resp:  [7-29] 17  SpO2:  [97 %-100 %] 97 %  BP: ()/(51-91) 129/68     Weight: 93.7 kg (206 lb 9.1 oz)  Body mass index is 37.78 kg/m².    Intake/Output Summary (Last 24 hours) at 10/2/2019 1313  Last data filed at 10/2/2019 0216  Gross per 24 hour   Intake --   Output 300 ml   Net -300 ml      Physical Exam   Constitutional: She is oriented to person, place, and time. She appears well-developed and well-nourished.   HENT:   Head: Normocephalic and atraumatic.   Nose: Nose normal.   Mouth/Throat: Oropharynx is clear and moist.   Eyes: Pupils are equal, round, and reactive to light. EOM are normal.   Neck: Normal range of motion. Neck supple. No tracheal deviation present. No thyromegaly present.   Cardiovascular: Normal rate and regular rhythm.   Pulmonary/Chest: No respiratory distress. She has no wheezes. She has no rales.   Abdominal: Soft. Bowel sounds are normal. She exhibits no distension. There is no tenderness.   Genitourinary:   Genitourinary Comments: Deferred    Musculoskeletal: Normal range of motion. She exhibits no edema or tenderness.    Neurological: She is alert and oriented to person, place, and time. She has normal reflexes. No cranial nerve deficit. Coordination normal.   Skin: Skin is warm and dry. No pallor.   Psychiatric: She has a normal mood and affect. Her behavior is normal. Judgment and thought content normal.   Nursing note and vitals reviewed.      Significant Labs:   CBC:   Recent Labs   Lab 10/01/19  2159 10/02/19  0448 10/02/19  1102   WBC 22.39* 19.63*  --    HGB 6.4* 4.4* 6.2*   HCT 20.3* 14.6* 20.7*   PLT 2* 1*  --      CMP:   Recent Labs   Lab 10/01/19  2124 10/02/19  0843    135*   K 3.6 3.6    109   CO2 19* 20*    97   BUN 21* 18   CREATININE 0.7 0.6   CALCIUM 8.7 7.8*   PROT 6.6 7.4   ALBUMIN 3.5 2.8*   BILITOT 0.2 0.1   ALKPHOS 42* 33*   AST 11 10   ALT 9* 7*   ANIONGAP 10 6*   EGFRNONAA >60 >60     Magnesium: No results for input(s): MG in the last 48 hours.    Significant Imaging:   Imaging Results          X-Ray Chest 1 View (Final result)  Result time 10/02/19 07:14:02    Final result by Terry Johnson III, MD (10/02/19 07:14:02)                 Impression:      Negative one view chest x-ray.      Electronically signed by: Terry Johnson  Date:    10/02/2019  Time:    07:14             Narrative:    EXAMINATION:  XR CHEST 1 VIEW    CLINICAL HISTORY:  Shortness of breath    COMPARISON:  September 13    FINDINGS:  Heart size is normal. The lung fields are clear. No acute pulmonary infiltrate.                                Assessment/Plan:      SOB (shortness of breath)  -in the setting of anemia and ITP  -improved after 2 units of PRBCs transfused         Leukocytosis  -likely chronic ,can be seen in post splenectomy patients   -trending down   -will monitor     Sinus tachycardia  Related to acute condition/blood loss  - TSH pending   - PRBCs x 2 units transfused   -will consider empiric beta blockers should TC persists     Iron deficiency anemia due to chronic blood loss  -H/H 6.2/20.7 post  transfusion of 1 unit of PRBCs  -additional unit of PRBCs transfused on 10/2/19  -iron supplementation continued   -repeat CBC in am     Chronic ITP (idiopathic thrombocytopenia)  Case discussed with Dr Bowden  -will give IVIG , transfuse one unit of packed cells .  Will add prednisone at 1mg/kg po   -H/H 6.2/20.7 and Plt 1   Continue home regime of  Eltrombopag .  Will monitor closely for bleeding   Heme/Oncology consult       VTE Risk Mitigation (From admission, onward)         Ordered     Reason for No Pharmacological VTE Prophylaxis  Once      10/02/19 0016     IP VTE HIGH RISK PATIENT  Once      10/02/19 0016                      Betina Silva NP  Department of Hospital Medicine   [facility]

## 2019-10-02 NOTE — PLAN OF CARE
Pt AAO x 4.  VSS.  Blood administered per order.   Pt remained free of falls this shift.   Pt has no complaints at this time.   Educated pt on medication adherence, reactions from receiving blood, & increasing activity tolerance.  Plan of care reviewed. Patient verbalizes understanding.  Pt moving/turing independetly. Frequent weight shifting encouraged.  Patient ST on monitor.   Bed low, side rails up x 2, wheels locked, call light in reach.   Family is not visiting the patient.  Patient instructed to call for assistance.   Hourly rounding completed.   24 hour chart check completed.  Will continue to monitor.

## 2019-10-02 NOTE — ASSESSMENT & PLAN NOTE
Case discussed with Dr Bowden  -will give IVIG , transfuse one unit of packed cells .  Will add prednisone at 1mg/kg po   -H/H 6.2/20.7 and Plt 1   Continue home regime of  Eltrombopag .  Will monitor closely for bleeding   Heme/Oncology consult

## 2019-10-02 NOTE — ED PROVIDER NOTES
SCRIBE #1 NOTE: I, Artem Vallejo, am scribing for, and in the presence of, Apoorva Munguia MD. I have scribed the entire note.      History      Chief Complaint   Patient presents with    Shortness of Breath     patient reports some shortness of breath, weakness and some blood in stool.       Review of patient's allergies indicates:   Allergen Reactions    Aspirin      Unable to take because of illness    Ibuprofen Other (See Comments)     Cannot take due to Blood Disorder        HPI   HPI    10/1/2019, 9:09 PM   History obtained from the patient      History of Present Illness: Mari Miller is a 23 y.o. female patient with a PMHx of thrombocytopenia who presents to the Emergency Department for SOB which onset gradually 1 day PTA. Pt states she is taking iron pills TID and has black stool. Pt states there was a blood clot in stool yesterday. Symptoms are constant and moderate in severity. Sxs are exacerbated with movement. Associated sxs include weakness, and dizziness (standing up). Patient denies any fever, chills, congestion, rhinorrhea, sore throat, cough, CP, leg pain/swelling,  N/V/D, dizziness, HA, and all other sxs at this time. No prior Tx included. Pt was admitted ED on  for iron deficiency anemia due to chronic blood loss, chronic ITP, thrombocytopenia and SOB. Pt saw Dr. Calderon (Hematology and Oncology) on . LNMP was 3 weeks ago. No further complaints or concerns at this time.       Arrival mode: Personal vehicle    PCP: Ifeanyi Tello NP       Past Medical History:  Past Medical History:   Diagnosis Date    Encounter for blood transfusion     Thrombocytopenia        Past Surgical History:  Past Surgical History:   Procedure Laterality Date     SECTION      x1    ROBOT-ASSISTED SURGICAL REMOVAL OF SPLEEN USING DA MAXIM XI N/A 2018    Procedure: XI ROBOTIC SPLENECTOMY;  Surgeon: Yobani Lara MD;  Location: [facility];  Service: General;  Laterality: N/A;          Family History:  History reviewed. No pertinent family history.    Social History:  Social History     Tobacco Use    Smoking status: Never Smoker    Smokeless tobacco: Never Used   Substance and Sexual Activity    Alcohol use: No     Frequency: Never    Drug use: No    Sexual activity: Unknown       ROS   Review of Systems   Constitutional: Negative for chills and fever.   HENT: Negative for congestion, rhinorrhea and sore throat.    Respiratory: Positive for shortness of breath. Negative for cough.    Cardiovascular: Negative for chest pain and leg swelling.   Gastrointestinal: Positive for blood in stool. Negative for diarrhea, nausea and vomiting.   Genitourinary: Negative for dysuria.   Musculoskeletal: Negative for back pain.        (-) leg pain     Skin: Negative for rash.   Neurological: Positive for dizziness (when standing) and weakness. Negative for headaches.   Hematological: Does not bruise/bleed easily.   All other systems reviewed and are negative.    Physical Exam      Initial Vitals [10/01/19 2049]   BP Pulse Resp Temp SpO2   (!) 122/91 95 20 98.9 °F (37.2 °C) 100 %      MAP       --          Physical Exam  Nursing Notes and Vital Signs Reviewed.  Constitutional: Patient is in no acute distress. Well-developed and well-nourished.  Head: Atraumatic. Normocephalic.  Eyes: PERRL. EOM intact. Conjunctivae are not pale. No scleral icterus.  ENT: Mucous membranes are moist. Oropharynx is clear and symmetric.    Neck: Supple. Full ROM. No lymphadenopathy.  Cardiovascular: Tachycardic. No murmurs, rubs, or gallops. Distal pulses are 2+ and symmetric.  Pulmonary/Chest: No respiratory distress. Clear to auscultation bilaterally. No wheezing or rales.  Abdominal: Soft and non-distended.  There is no tenderness.  No rebound, guarding, or rigidity. Good bowel sounds.  Genitourinary: No CVA tenderness  Musculoskeletal: Moves all extremities. No obvious deformities. No edema. No calf tenderness.  Skin:  "Warm and dry. Ecchymosis on R proximal Forearm.   Neurological:  Alert, awake, and appropriate.  Normal speech.  No acute focal neurological deficits are appreciated.  Psychiatric: Normal affect. Good eye contact. Appropriate in content.    ED Course    Critical Care  Date/Time: 10/1/2019 10:51 PM  Performed by: Apoorva Munguia MD  Authorized by: Apoorva Munguia MD   Direct patient critical care time: 10 minutes  Additional history critical care time: 10 minutes  Ordering / reviewing critical care time: 5 minutes  Documentation critical care time: 5 minutes  Consulting other physicians critical care time: 5 minutes  Total critical care time (exclusive of procedural time) : 35 minutes  Critical care time was exclusive of separately billable procedures and treating other patients and teaching time.  Critical care was necessary to treat or prevent imminent or life-threatening deterioration of the following conditions: SOB.  Critical care was time spent personally by me on the following activities: blood draw for specimens, development of treatment plan with patient or surrogate, discussions with consultants, interpretation of cardiac output measurements, evaluation of patient's response to treatment, examination of patient, obtaining history from patient or surrogate, ordering and performing treatments and interventions, ordering and review of radiographic studies, ordering and review of laboratory studies, pulse oximetry, re-evaluation of patient's condition and review of old charts.        ED Vital Signs:  Vitals:    10/01/19 2049 10/01/19 2133 10/01/19 2216 10/01/19 2231   BP: (!) 122/91 111/62 (!) 91/52 94/64   Pulse: 95 (!) 144 (!) 137 (!) 138   Resp: 20 (!) 7 19 (!) 26   Temp: 98.9 °F (37.2 °C)      TempSrc: Oral      SpO2: 100% 100% 100%    Weight: 93.1 kg (205 lb 2.2 oz)      Height: 5' 2" (1.575 m)       10/01/19 2248 10/02/19 0026 10/02/19 0031 10/02/19 0047   BP: 97/62 (!) 106/58 (!) 105/56 105/65   Pulse: (!) " "170 (!) 131 (!) 133 (!) 133   Resp: (!) 29 (!) 28 (!) 26 (!) 27   Temp:       TempSrc:       SpO2: 100% 100% 100% 100%   Weight:       Height:        10/02/19 0102 10/02/19 0116 10/02/19 0131 10/02/19 0216   BP: 112/68 113/71 112/65 114/68   Pulse: (!) 130 (!) 128 (!) 132 (!) 138   Resp: (!) 24 (!) 24 (!) 29 20   Temp:    98.5 °F (36.9 °C)   TempSrc:    Oral   SpO2: 100% 100% 100% 100%   Weight:    93.7 kg (206 lb 9.1 oz)   Height:    5' 2" (1.575 m)    10/02/19 0320 10/02/19 0334 10/02/19 0431   BP:  (!) 119/52 (!) 99/51   Pulse: (!) 133 (!) 123 (!) 120   Resp:  20 20   Temp:  98.4 °F (36.9 °C) 98.5 °F (36.9 °C)   TempSrc:  Oral Oral   SpO2:  98% 100%   Weight:      Height:          Abnormal Lab Results:  Labs Reviewed   COMPREHENSIVE METABOLIC PANEL - Abnormal; Notable for the following components:       Result Value    CO2 19 (*)     BUN, Bld 21 (*)     Alkaline Phosphatase 42 (*)     ALT 9 (*)     All other components within normal limits   LACTATE DEHYDROGENASE - Abnormal; Notable for the following components:     (*)     All other components within normal limits   CBC W/ AUTO DIFFERENTIAL - Abnormal; Notable for the following components:    WBC 22.39 (*)     RBC 2.65 (*)     Hemoglobin 6.4 (*)     Hematocrit 20.3 (*)     Mean Corpuscular Volume 77 (*)     Mean Corpuscular Hemoglobin 24.2 (*)     Mean Corpuscular Hemoglobin Conc 31.5 (*)     RDW 19.8 (*)     Platelets 2 (*)     Gran # (ANC) 17.5 (*)     Mono # 1.6 (*)     Gran% 78.6 (*)     Lymph% 14.0 (*)     Platelet Estimate Decreased (*)     All other components within normal limits    Narrative:     PLT  critical result(s) called and verbal readback obtained from   SG RIVERA RN, 10/01/2019 22:19   PROTIME-INR   APTT   TYPE & SCREEN   PREPARE RBC SOFT        All Lab Results:  Results for orders placed or performed during the hospital encounter of 10/01/19   Comprehensive metabolic panel   Result Value Ref Range    Sodium 136 136 - 145 mmol/L    " Potassium 3.6 3.5 - 5.1 mmol/L    Chloride 107 95 - 110 mmol/L    CO2 19 (L) 23 - 29 mmol/L    Glucose 104 70 - 110 mg/dL    BUN, Bld 21 (H) 6 - 20 mg/dL    Creatinine 0.7 0.5 - 1.4 mg/dL    Calcium 8.7 8.7 - 10.5 mg/dL    Total Protein 6.6 6.0 - 8.4 g/dL    Albumin 3.5 3.5 - 5.2 g/dL    Total Bilirubin 0.2 0.1 - 1.0 mg/dL    Alkaline Phosphatase 42 (L) 55 - 135 U/L    AST 11 10 - 40 U/L    ALT 9 (L) 10 - 44 U/L    Anion Gap 10 8 - 16 mmol/L    eGFR if African American >60 >60 mL/min/1.73 m^2    eGFR if non African American >60 >60 mL/min/1.73 m^2   Protime-INR   Result Value Ref Range    Prothrombin Time 11.7 9.0 - 12.5 sec    INR 1.1 0.8 - 1.2   APTT   Result Value Ref Range    aPTT 25.8 21.0 - 32.0 sec   Urinalysis, Reflex to Urine Culture Urine, Clean Catch   Result Value Ref Range    Specimen UA Urine, Clean Catch     Color, UA Yellow Yellow, Straw, Tati    Appearance, UA Clear Clear    pH, UA 6.0 5.0 - 8.0    Specific Gravity, UA 1.020 1.005 - 1.030    Protein, UA Negative Negative    Glucose, UA Negative Negative    Ketones, UA 1+ (A) Negative    Bilirubin (UA) Negative Negative    Occult Blood UA Trace (A) Negative    Nitrite, UA Negative Negative    Urobilinogen, UA Negative <2.0 EU/dL    Leukocytes, UA Negative Negative   Pregnancy, urine rapid (UPT)   Result Value Ref Range    Preg Test, Ur Negative    Lactate dehydrogenase   Result Value Ref Range     (H) 110 - 260 U/L   CBC auto differential   Result Value Ref Range    WBC 22.39 (H) 3.90 - 12.70 K/uL    RBC 2.65 (L) 4.00 - 5.40 M/uL    Hemoglobin 6.4 (L) 12.0 - 16.0 g/dL    Hematocrit 20.3 (L) 37.0 - 48.5 %    Mean Corpuscular Volume 77 (L) 82 - 98 fL    Mean Corpuscular Hemoglobin 24.2 (L) 27.0 - 31.0 pg    Mean Corpuscular Hemoglobin Conc 31.5 (L) 32.0 - 36.0 g/dL    RDW 19.8 (H) 11.5 - 14.5 %    Platelets 2 (LL) 150 - 350 K/uL    MPV SEE COMMENT 9.2 - 12.9 fL    Gran # (ANC) 17.5 (H) 1.8 - 7.7 K/uL    Lymph # 3.1 1.0 - 4.8 K/uL    Mono # 1.6  (H) 0.3 - 1.0 K/uL    Eos # 0.2 0.0 - 0.5 K/uL    Baso # 0.09 0.00 - 0.20 K/uL    Gran% 78.6 (H) 38.0 - 73.0 %    Lymph% 14.0 (L) 18.0 - 48.0 %    Mono% 6.9 4.0 - 15.0 %    Eosinophil% 0.7 0.0 - 8.0 %    Basophil% 0.4 0.0 - 1.9 %    Platelet Estimate Decreased (A)     Aniso Moderate     Poik Moderate     Poly Occasional     Hypo Occasional     Ovalocytes Occasional     Target Cells Moderate     Tear Drop Cells Occasional     Stomatocytes Present     Spherocytes Occasional     Acanthocytes Present     Schistocytes Present     Differential Method Automated    Type & Screen   Result Value Ref Range    Group & Rh O POS     Indirect Richard NEG          Imaging Results:  Imaging Results          X-Ray Chest 1 View (In process)                The EKG was ordered, reviewed, and independently interpreted by the ED provider.  Interpretation time: 2125  Rate: 146 BPM  Rhythm: sinus tachycardia  Interpretation: Possible L atrial enlargement. Nonspecific St and T wave abnormality. Abnormal ECG. No STEMI.         The Emergency Provider reviewed the vital signs and test results, which are outlined above.    ED Discussion       10:50 PM: Discussed pt's case with Dr. Bowden (Oncology) who recommends IV Ig and pRBC transfusion 1 unit.    10:54 PM: Discussed case with Ruth Yung NP (Hospital Medicine). Dr. Cedeno agrees with current care and management of pt and accepts admission.   Admitting Service: Hospital Medicine  Admitting Physician: Dr. Cedeno  Admit to: Med-tele    10:56 PM: Re-evaluated pt. I have discussed test results, shared treatment plan, and the need for admission with patient and family at bedside. Pt and family express understanding at this time and agree with all information. All questions answered. Pt and family have no further questions or concerns at this time. Pt is ready for admit.    ED Medication(s):  Medications   0.9%  NaCl infusion (for blood administration) (has no administration in time range)    eltrombopag tablet 75 mg (has no administration in time range)   ferrous sulfate EC tablet 325 mg (has no administration in time range)   sodium chloride 0.9% flush 10 mL (has no administration in time range)   glucose chewable tablet 16 g (has no administration in time range)   glucose chewable tablet 24 g (has no administration in time range)   glucagon (human recombinant) injection 1 mg (has no administration in time range)   dextrose 10% (D10W) Bolus (has no administration in time range)   dextrose 10% (D10W) Bolus (has no administration in time range)   predniSONE tablet 93 mg (has no administration in time range)   Immune Globulin G (IGG)-PRO-IGA 10 % injection (Privigen) 10 % injection 70 g (70 g Intravenous New Bag 10/2/19 0017)   0.9%  NaCl infusion (1,000 mLs Intravenous New Bag 10/1/19 2340)             Medical Decision Making    Medical Decision Making:   Clinical Tests:   Lab Tests: Reviewed and Ordered  Radiological Study: Reviewed and Ordered  Medical Tests: Ordered and Reviewed           Scribe Attestation:   Scribe #1: I performed the above scribed service and the documentation accurately describes the services I performed. I attest to the accuracy of the note.    Attending:   Physician Attestation Statement for Scribe #1: I, Apoorva Munguia MD, personally performed the services described in this documentation, as scribed by , in my presence, and it is both accurate and complete.          Clinical Impression       ICD-10-CM ICD-9-CM   1. ITP secondary to infection D69.59 287.49   2. SOB (shortness of breath) R06.02 786.05   3. Anemia, unspecified type D64.9 285.9       Disposition:   Disposition: Other (Med-tle)  Condition: Fair         Apoorva Munguia MD  10/02/19 8362

## 2019-10-02 NOTE — ED NOTES
IGG infusion increased to 1mg/kg/min.  Pt remains free of any complains or signs of transfusion reaction.

## 2019-10-02 NOTE — HOSPITAL COURSE
Pt admitted to Inpatient status for symptomatic anemia in the setting of chronic ITP.  Heme/Onc consulted.  H/H improved post transfusion on PRBCs x 2 units.  Leukocytosis improved.  Pt reports compliance with outpatient medications and verbalized symptom improvement. 10/03: Patient reports she feels better today. No s/s bleeding noted. Hgb 6.0, platelets  2.0. Discussed with Heme/onc who will transfuse 1U pRBC. CBC in AM.10/04: Platelets increased to 27, Hgb 8.1, Hct 27.0. Discussed with Heme/onc -  Pt will continue dexamethasone 40mg/day X 2 additional days and f/u with Dr. Calderon in 1 week. Patient was seen and examined today and deemed stable for discharge home.

## 2019-10-02 NOTE — SUBJECTIVE & OBJECTIVE
Past Medical History:   Diagnosis Date    Encounter for blood transfusion     Thrombocytopenia        Past Surgical History:   Procedure Laterality Date     SECTION      x1    ROBOT-ASSISTED SURGICAL REMOVAL OF SPLEEN USING DA MAXIM XI N/A 2018    Procedure: XI ROBOTIC SPLENECTOMY;  Surgeon: Yobani Lara MD;  Location: [facility];  Service: General;  Laterality: N/A;       Review of patient's allergies indicates:   Allergen Reactions    Aspirin      Unable to take because of illness    Ibuprofen Other (See Comments)     Cannot take due to Blood Disorder       No current facility-administered medications on file prior to encounter.      Current Outpatient Medications on File Prior to Encounter   Medication Sig    eltrombopag (PROMACTA) 75 mg Tab Take 1 tablet (75 mg total) by mouth once daily.    ferrous sulfate (FEOSOL) 325 mg (65 mg iron) Tab tablet Take 1 tablet (325 mg total) by mouth 3 (three) times daily.     Family History     None        Tobacco Use    Smoking status: Never Smoker    Smokeless tobacco: Never Used   Substance and Sexual Activity    Alcohol use: No     Frequency: Never    Drug use: No    Sexual activity: Not on file     Review of Systems   Constitutional: Negative for chills and fatigue.   HENT: Negative for congestion, ear pain, facial swelling, sinus pressure and sore throat.    Eyes: Negative for pain.   Respiratory: Positive for shortness of breath. Negative for apnea, chest tightness and stridor.    Cardiovascular: Negative for chest pain, palpitations and leg swelling.   Gastrointestinal: Negative for abdominal distention, abdominal pain, diarrhea and nausea.        Dark stool   Endocrine: Negative for polydipsia and polyphagia.   Genitourinary: Negative for decreased urine volume, difficulty urinating, frequency and genital sores.   Musculoskeletal: Negative for arthralgias and gait problem.   Neurological: Negative for light-headedness and headaches.    Hematological: Negative for adenopathy.   Psychiatric/Behavioral: Negative for agitation, confusion and decreased concentration.     Objective:     Vital Signs (Most Recent):  Temp: 98.9 °F (37.2 °C) (10/01/19 2049)  Pulse: (!) 170 (10/01/19 2248)  Resp: (!) 29 (10/01/19 2248)  BP: 97/62 (10/01/19 2248)  SpO2: 100 % (10/01/19 2248) Vital Signs (24h Range):  Temp:  [98.9 °F (37.2 °C)] 98.9 °F (37.2 °C)  Pulse:  [] 170  Resp:  [7-29] 29  SpO2:  [100 %] 100 %  BP: ()/(52-91) 97/62     Weight: 93.1 kg (205 lb 2.2 oz)  Body mass index is 37.52 kg/m².    Physical Exam   Constitutional: She is oriented to person, place, and time. She appears well-developed and well-nourished.   HENT:   Head: Normocephalic and atraumatic.   Eyes: Pupils are equal, round, and reactive to light. EOM are normal.   Neck: Normal range of motion. Neck supple. No tracheal deviation present. No thyromegaly present.   Cardiovascular: Normal rate and regular rhythm.   Pulmonary/Chest: No respiratory distress. She has no wheezes. She has no rales.   Abdominal: Soft. Bowel sounds are normal. She exhibits no distension. There is no tenderness.   Neurological: She is alert and oriented to person, place, and time. She has normal reflexes. No cranial nerve deficit. Coordination normal.   Skin: Skin is warm and dry. No pallor.   Psychiatric: She has a normal mood and affect. Judgment and thought content normal.   Nursing note and vitals reviewed.        CRANIAL NERVES     CN III, IV, VI   Pupils are equal, round, and reactive to light.  Extraocular motions are normal.        Significant Labs:   BMP:   Recent Labs   Lab 10/01/19  2124         K 3.6      CO2 19*   BUN 21*   CREATININE 0.7   CALCIUM 8.7     CBC:   Recent Labs   Lab 10/01/19  2159   WBC 22.39*   HGB 6.4*   HCT 20.3*   PLT 2*     All pertinent labs within the past 24 hours have been reviewed.    Significant Imaging: I have reviewed and interpreted all pertinent  imaging results/findings within the past 24 hours.

## 2019-10-02 NOTE — HPI
"23 y.o. female with ITP, who presented to the emergency department with complaints of shortness of breath which onset the day PTA. Patient also reports bowel movement that she "thought may have been a clot." Reports dark stools since taking iron pills TID.  Associated symptoms include dizziness, lightheadedness, palpitations, bruising, generalized petechiae.  No aggravating or alleviating factors.  At present patient denies any hematuria, hematochezia, bowel or urinary complaints, SOB, dizziness, CP, lightheadedness, syncope.     ED workup revealed Hemoglobin 6.4, platelet count 2, CXR unremarkable. EKG sinus tachycardia. This am Hemoglobin 4.4 (drawn prior to completion of blood transfusion). Patient is now s/p 1 unit PRBCs. Hemoglobin following transfusion 6.2.     Patient's ITP has been treated in the past with prednisone, intravenous immunoglobulin, Rituxan,, splenectomy.  Patient has had transient responses.  Patient takes Promacta 75 mg PO daily. She denies any missed doses. Of note in the past pharmacy reviews of prescriptions during previous hospitalizations have noted inconsistent prescription refill pickups from patient.    "

## 2019-10-02 NOTE — H&P
[facility] Hospital Medicine  History & Physical    Patient Name: Mari Miller  MRN: [id number]  Admission Date: 10/1/2019  Attending Physician: Rubens Cedeno MD   Primary Care Provider: Ifeanyi Tello NP         Patient information was obtained from patient, past medical records and ER records.     Subjective:     Principal Problem:<principal problem not specified>    Chief Complaint:   Chief Complaint   Patient presents with    Shortness of Breath     patient reports some shortness of breath, weakness and some blood in stool.        HPI:   23 year old woman with history of ITP  on who presented with history of worsening shortness of breath that she noticed on day of presentation . There was associated history of black stools and she also noticed blood clot in the stool yesterday . Since presentation, initial cbc showed-    Component      Latest Ref Rng & Units 10/1/2019 2019              Hemoglobin      12.0 - 16.0 g/dL 6.4 (L) 10.3 (L)     Component      Latest Ref Rng & Units 10/1/2019 2019              Platelets      150 - 350 K/uL 2 (LL) 428 (H)   Plan of care was discussed with Oncology and IVIG and transfusion of one unit of packed cells was recommended.    Past Medical History:   Diagnosis Date    Encounter for blood transfusion     Thrombocytopenia        Past Surgical History:   Procedure Laterality Date     SECTION      x1    ROBOT-ASSISTED SURGICAL REMOVAL OF SPLEEN USING DA MAXIM XI N/A 2018    Procedure: XI ROBOTIC SPLENECTOMY;  Surgeon: Yobani Lara MD;  Location: [facility];  Service: General;  Laterality: N/A;       Review of patient's allergies indicates:   Allergen Reactions    Aspirin      Unable to take because of illness    Ibuprofen Other (See Comments)     Cannot take due to Blood Disorder       No current facility-administered medications on file prior to encounter.      Current Outpatient Medications on File Prior to Encounter    Medication Sig    eltrombopag (PROMACTA) 75 mg Tab Take 1 tablet (75 mg total) by mouth once daily.    ferrous sulfate (FEOSOL) 325 mg (65 mg iron) Tab tablet Take 1 tablet (325 mg total) by mouth 3 (three) times daily.     Family History     None        Tobacco Use    Smoking status: Never Smoker    Smokeless tobacco: Never Used   Substance and Sexual Activity    Alcohol use: No     Frequency: Never    Drug use: No    Sexual activity: Not on file     Review of Systems   Constitutional: Negative for chills and fatigue.   HENT: Negative for congestion, ear pain, facial swelling, sinus pressure and sore throat.    Eyes: Negative for pain.   Respiratory: Positive for shortness of breath. Negative for apnea, chest tightness and stridor.    Cardiovascular: Negative for chest pain, palpitations and leg swelling.   Gastrointestinal: Negative for abdominal distention, abdominal pain, diarrhea and nausea.        Dark stool   Endocrine: Negative for polydipsia and polyphagia.   Genitourinary: Negative for decreased urine volume, difficulty urinating, frequency and genital sores.   Musculoskeletal: Negative for arthralgias and gait problem.   Neurological: Negative for light-headedness and headaches.   Hematological: Negative for adenopathy.   Psychiatric/Behavioral: Negative for agitation, confusion and decreased concentration.     Objective:     Vital Signs (Most Recent):  Temp: 98.9 °F (37.2 °C) (10/01/19 2049)  Pulse: (!) 170 (10/01/19 2248)  Resp: (!) 29 (10/01/19 2248)  BP: 97/62 (10/01/19 2248)  SpO2: 100 % (10/01/19 2248) Vital Signs (24h Range):  Temp:  [98.9 °F (37.2 °C)] 98.9 °F (37.2 °C)  Pulse:  [] 170  Resp:  [7-29] 29  SpO2:  [100 %] 100 %  BP: ()/(52-91) 97/62     Weight: 93.1 kg (205 lb 2.2 oz)  Body mass index is 37.52 kg/m².    Physical Exam   Constitutional: She is oriented to person, place, and time. She appears well-developed and well-nourished.   HENT:   Head: Normocephalic and  atraumatic.   Eyes: Pupils are equal, round, and reactive to light. EOM are normal.   Neck: Normal range of motion. Neck supple. No tracheal deviation present. No thyromegaly present.   Cardiovascular: Normal rate and regular rhythm.   Pulmonary/Chest: No respiratory distress. She has no wheezes. She has no rales.   Abdominal: Soft. Bowel sounds are normal. She exhibits no distension. There is no tenderness.   Neurological: She is alert and oriented to person, place, and time. She has normal reflexes. No cranial nerve deficit. Coordination normal.   Skin: Skin is warm and dry. No pallor.   Psychiatric: She has a normal mood and affect. Judgment and thought content normal.   Nursing note and vitals reviewed.        CRANIAL NERVES     CN III, IV, VI   Pupils are equal, round, and reactive to light.  Extraocular motions are normal.        Significant Labs:   BMP:   Recent Labs   Lab 10/01/19  2124         K 3.6      CO2 19*   BUN 21*   CREATININE 0.7   CALCIUM 8.7     CBC:   Recent Labs   Lab 10/01/19  2159   WBC 22.39*   HGB 6.4*   HCT 20.3*   PLT 2*     All pertinent labs within the past 24 hours have been reviewed.    Significant Imaging: I have reviewed and interpreted all pertinent imaging results/findings within the past 24 hours.    Assessment/Plan:     Leukocytosis    This is chronic ,can be seen in post splenectomy patients -will monitor     Sinus tachycardia    Related to acute condition/blood loss, will check TSH , if still elevated after transfusion, will consider empiric beta blockers     Iron deficiency anemia due to chronic blood loss    Will transfuse one unit of packed cell and monitor CBC in AM      Chronic ITP (idiopathic thrombocytopenia)    Case discussed with Dr Bowden  -will give IVIG , transfuse one unit of packed cells .  Will add prednisone at 1mg/kg po , will monitor CBC in AM  Continue home regime of  Eltrombopag .  Will monitor closely for bleeding   Oncology consult        VTE Risk Mitigation (From admission, onward)    None             Rubens Cedeno MD  Department of Hospital Medicine   [facility]

## 2019-10-02 NOTE — SUBJECTIVE & OBJECTIVE
Interval History: Generalized petechiae noted. Patient reports improvement in SOB, dizziness following blood transfusion. Patient received IVIG 1mg/kg overnight. On prednisone 1mg/kg daily. Also taking Promacta 75 mg PO daily. Receiving additional unit of PRBCs today  Oncology Treatment Plan:   [No treatment plan]    Medications:  Continuous Infusions:  Scheduled Meds:   eltrombopag  75 mg Oral Daily    ferrous sulfate  325 mg Oral BID    predniSONE  1 mg/kg Oral Daily     PRN Meds:sodium chloride, sodium chloride, Dextrose 10% Bolus, Dextrose 10% Bolus, glucagon (human recombinant), glucose, glucose, sodium chloride 0.9%     Review of patient's allergies indicates:   Allergen Reactions    Aspirin      Unable to take because of illness    Ibuprofen Other (See Comments)     Cannot take due to Blood Disorder        Past Medical History:   Diagnosis Date    Encounter for blood transfusion     Thrombocytopenia      Past Surgical History:   Procedure Laterality Date     SECTION      x1    ROBOT-ASSISTED SURGICAL REMOVAL OF SPLEEN USING DA MAXIM XI N/A 2018    Procedure: XI ROBOTIC SPLENECTOMY;  Surgeon: Yobani Lara MD;  Location: [facility];  Service: General;  Laterality: N/A;     Family History     None        Tobacco Use    Smoking status: Never Smoker    Smokeless tobacco: Never Used   Substance and Sexual Activity    Alcohol use: No     Frequency: Never    Drug use: No    Sexual activity: Not on file       Review of Systems   Constitutional: Negative for activity change, appetite change, chills, diaphoresis, fatigue, fever and unexpected weight change.   HENT: Negative for congestion, mouth sores, nosebleeds, sore throat, trouble swallowing and voice change.    Eyes: Negative for photophobia, discharge and visual disturbance.   Respiratory: Negative for cough, chest tightness, shortness of breath and wheezing.    Cardiovascular: Negative for chest pain, palpitations and leg swelling.    Gastrointestinal: Negative for abdominal distention, abdominal pain, anal bleeding, blood in stool, constipation, diarrhea, nausea and vomiting.   Genitourinary: Negative for difficulty urinating, dysuria, hematuria and vaginal bleeding.   Musculoskeletal: Negative for arthralgias, back pain and myalgias.   Skin: Positive for rash (petechiae ). Negative for pallor and wound.   Neurological: Negative for dizziness, syncope, weakness and headaches.   Hematological: Negative for adenopathy. Does not bruise/bleed easily.   Psychiatric/Behavioral: The patient is not nervous/anxious.      Objective:     Vital Signs (Most Recent):  Temp: 98.3 °F (36.8 °C) (10/02/19 1116)  Pulse: (!) 117 (10/02/19 1300)  Resp: 17 (10/02/19 1116)  BP: 129/68 (10/02/19 1116)  SpO2: 97 % (10/02/19 1116) Vital Signs (24h Range):  Temp:  [98 °F (36.7 °C)-99 °F (37.2 °C)] 98.3 °F (36.8 °C)  Pulse:  [] 117  Resp:  [7-29] 17  SpO2:  [97 %-100 %] 97 %  BP: ()/(51-91) 129/68     Weight: 93.7 kg (206 lb 9.1 oz)  Body mass index is 37.78 kg/m².  Body surface area is 2.02 meters squared.      Intake/Output Summary (Last 24 hours) at 10/2/2019 1316  Last data filed at 10/2/2019 0216  Gross per 24 hour   Intake --   Output 300 ml   Net -300 ml       Physical Exam   Constitutional: She is oriented to person, place, and time. She appears well-developed and well-nourished. She is cooperative.   HENT:   Head: Normocephalic.   Right Ear: External ear normal.   Left Ear: External ear normal.   Nose: Nose normal.   Mouth/Throat: Oropharynx is clear and moist.   Eyes: Conjunctivae, EOM and lids are normal. Right eye exhibits no discharge. Left eye exhibits no discharge. No scleral icterus.   Neck: Normal range of motion. No thyroid mass present.   Cardiovascular: Regular rhythm and normal heart sounds. Tachycardia present.   No murmur heard.  Pulmonary/Chest: Effort normal and breath sounds normal. No respiratory distress. She has no wheezes. She  has no rhonchi. She has no rales.   Abdominal: Soft. Bowel sounds are normal. She exhibits no distension. There is no tenderness.   Genitourinary:   Genitourinary Comments: deferred   Musculoskeletal: Normal range of motion. She exhibits no edema.   Lymphadenopathy:        Head (right side): No submandibular, no preauricular and no posterior auricular adenopathy present.        Head (left side): No submandibular, no preauricular and no posterior auricular adenopathy present.        Right cervical: No superficial cervical adenopathy present.       Left cervical: No superficial cervical adenopathy present.   Neurological: She is alert and oriented to person, place, and time.   Skin: Skin is warm, dry and intact. Petechiae noted.   Psychiatric: She has a normal mood and affect. Her speech is normal and behavior is normal. Thought content normal.   Vitals reviewed.      Significant Labs:   BMP:   Recent Labs   Lab 10/01/19  2124 10/02/19  0843    97    135*   K 3.6 3.6    109   CO2 19* 20*   BUN 21* 18   CREATININE 0.7 0.6   CALCIUM 8.7 7.8*   , CBC:   Recent Labs   Lab 10/01/19  2159 10/02/19  0448 10/02/19  1102   WBC 22.39* 19.63*  --    HGB 6.4* 4.4* 6.2*   HCT 20.3* 14.6* 20.7*   PLT 2* 1*  --    , LFTs:   Recent Labs   Lab 10/01/19  2124 10/02/19  0843   ALT 9* 7*   AST 11 10   ALKPHOS 42* 33*   BILITOT 0.2 0.1   PROT 6.6 7.4   ALBUMIN 3.5 2.8*    and All pertinent labs from the last 24 hours have been reviewed.    Diagnostic Results:  I have reviewed all pertinent imaging results/findings within the past 24 hours.

## 2019-10-02 NOTE — SUBJECTIVE & OBJECTIVE
Interval History: pt states that she is feeling much better today post transfusion.  H/H improved with PRBCs x 1 unit transfused.  Pt reports compliance with prescribed medications.  Leukocytosis improved.  Heme/Onc following.      Review of Systems   Constitutional: Negative for activity change, chills and fatigue.   HENT: Negative for congestion, ear pain, facial swelling, sinus pressure and sore throat.    Eyes: Negative for pain.   Respiratory: Positive for shortness of breath. Negative for apnea, chest tightness and stridor.    Cardiovascular: Positive for palpitations. Negative for chest pain and leg swelling.   Gastrointestinal: Negative for abdominal distention, abdominal pain, diarrhea and nausea.        Dark stool   Endocrine: Negative for polydipsia and polyphagia.   Genitourinary: Negative for decreased urine volume, difficulty urinating, frequency and genital sores.   Musculoskeletal: Negative for arthralgias and gait problem.   Allergic/Immunologic: Negative for environmental allergies.   Neurological: Positive for light-headedness. Negative for headaches.   Hematological: Negative for adenopathy.   Psychiatric/Behavioral: Negative for agitation, confusion and decreased concentration.     Objective:     Vital Signs (Most Recent):  Temp: 98.3 °F (36.8 °C) (10/02/19 1116)  Pulse: (!) 117 (10/02/19 1300)  Resp: 17 (10/02/19 1116)  BP: 129/68 (10/02/19 1116)  SpO2: 97 % (10/02/19 1116) Vital Signs (24h Range):  Temp:  [98 °F (36.7 °C)-99 °F (37.2 °C)] 98.3 °F (36.8 °C)  Pulse:  [] 117  Resp:  [7-29] 17  SpO2:  [97 %-100 %] 97 %  BP: ()/(51-91) 129/68     Weight: 93.7 kg (206 lb 9.1 oz)  Body mass index is 37.78 kg/m².    Intake/Output Summary (Last 24 hours) at 10/2/2019 1313  Last data filed at 10/2/2019 0216  Gross per 24 hour   Intake --   Output 300 ml   Net -300 ml      Physical Exam   Constitutional: She is oriented to person, place, and time. She appears well-developed and  well-nourished.   HENT:   Head: Normocephalic and atraumatic.   Nose: Nose normal.   Mouth/Throat: Oropharynx is clear and moist.   Eyes: Pupils are equal, round, and reactive to light. EOM are normal.   Neck: Normal range of motion. Neck supple. No tracheal deviation present. No thyromegaly present.   Cardiovascular: Normal rate and regular rhythm.   Pulmonary/Chest: No respiratory distress. She has no wheezes. She has no rales.   Abdominal: Soft. Bowel sounds are normal. She exhibits no distension. There is no tenderness.   Genitourinary:   Genitourinary Comments: Deferred    Musculoskeletal: Normal range of motion. She exhibits no edema or tenderness.   Neurological: She is alert and oriented to person, place, and time. She has normal reflexes. No cranial nerve deficit. Coordination normal.   Skin: Skin is warm and dry. No pallor.   Psychiatric: She has a normal mood and affect. Her behavior is normal. Judgment and thought content normal.   Nursing note and vitals reviewed.      Significant Labs:   CBC:   Recent Labs   Lab 10/01/19  2159 10/02/19  0448 10/02/19  1102   WBC 22.39* 19.63*  --    HGB 6.4* 4.4* 6.2*   HCT 20.3* 14.6* 20.7*   PLT 2* 1*  --      CMP:   Recent Labs   Lab 10/01/19  2124 10/02/19  0843    135*   K 3.6 3.6    109   CO2 19* 20*    97   BUN 21* 18   CREATININE 0.7 0.6   CALCIUM 8.7 7.8*   PROT 6.6 7.4   ALBUMIN 3.5 2.8*   BILITOT 0.2 0.1   ALKPHOS 42* 33*   AST 11 10   ALT 9* 7*   ANIONGAP 10 6*   EGFRNONAA >60 >60     Magnesium: No results for input(s): MG in the last 48 hours.    Significant Imaging:   Imaging Results          X-Ray Chest 1 View (Final result)  Result time 10/02/19 07:14:02    Final result by Terry Johnson III, MD (10/02/19 07:14:02)                 Impression:      Negative one view chest x-ray.      Electronically signed by: Terry Johnson  Date:    10/02/2019  Time:    07:14             Narrative:    EXAMINATION:  XR CHEST 1 VIEW    CLINICAL  HISTORY:  Shortness of breath    COMPARISON:  September 13    FINDINGS:  Heart size is normal. The lung fields are clear. No acute pulmonary infiltrate.

## 2019-10-02 NOTE — ED NOTES
Pt does not exhibit any adverse symptoms since starting the IGG. Pt does not complain of any changes in health since starting the IGG

## 2019-10-02 NOTE — ASSESSMENT & PLAN NOTE
October 2, 2019  --Patient has had frequent hospitalizations related to her ITP. Patient usually responds well to IVIG and steroids. Patient reports taking Promacta 75 mg PO daily without missed doses although this has been questionable in the past.  I have asked pharmacy to review patient's prescription refills to determine if patient has refilling medication appropriately.  I have also asked pharmacy to obtain Promacta pill count.  I did discuss possibility of treating her ITP with injectable medication which would require her outpatient clinic visit every 1-2 weeks.  Patient seems reluctant to this and present time. Hemoglobin 6.2 status post 1 unit PRBCs.  Agree with additional 1 unit PRBCs transfusion.  Platelet count 1.  Would hold off on any platelet transfusion unless patient has signs and symptoms of active bleeding. Administer additional dose of IVIG 1 mg/kg 24 hr following last dose.  Continue oral iron supplementation for history of iron deficiency.  Check iron studies with a.m. Labs. Daily CBC.  Transfuse PRBCs p.r.n. for maintain hemoglobin < 8.  Supportive care

## 2019-10-02 NOTE — ASSESSMENT & PLAN NOTE
-H/H 6.2/20.7 post transfusion of 1 unit of PRBCs  -additional unit of PRBCs transfused on 10/2/19  -iron supplementation continued   -repeat CBC in am

## 2019-10-02 NOTE — ASSESSMENT & PLAN NOTE
Case discussed with Dr Bowden  -will give IVIG , transfuse one unit of packed cells .  Will add prednisone at 1mg/kg po , will monitor CBC in AM  Continue home regime of  Eltrombopag .  Will monitor closely for bleeding   Oncology consult

## 2019-10-02 NOTE — ASSESSMENT & PLAN NOTE
Related to acute condition/blood loss, will check TSH , if still elevated after transfusion, will consider empiric beta blockers

## 2019-10-02 NOTE — CONSULTS
"[facility] -   Hematology/Oncology  Consult Note    Patient Name: Mari Miller  MRN: [id number]  Admission Date: 10/1/2019  Hospital Length of Stay: 0 days  Code Status: Full Code   Attending Provider: Yrn Curry MD  Consulting Provider: Sophy Das NP  Primary Care Physician: Ifeanyi Tello NP  Principal Problem:<principal problem not specified>    Consults  Subjective:     HPI:  23 y.o. female with ITP, who presented to the emergency department with complaints of shortness of breath which onset the day PTA. Patient also reports bowel movement that she "thought may have been a clot." Reports dark stools since taking iron pills TID.  Associated symptoms include dizziness, lightheadedness, palpitations, bruising, generalized petechiae.  No aggravating or alleviating factors.  At present patient denies any hematuria, hematochezia, bowel or urinary complaints, SOB, dizziness, CP, lightheadedness, syncope.     ED workup revealed Hemoglobin 6.4, platelet count 2, CXR unremarkable. EKG sinus tachycardia. This am Hemoglobin 4.4 (drawn prior to completion of blood transfusion). Patient is now s/p 1 unit PRBCs. Hemoglobin following transfusion 6.2.     Patient's ITP has been treated in the past with prednisone, intravenous immunoglobulin, Rituxan,, splenectomy.  Patient has had transient responses.  P atient takes Promacta 75 mg PO daily. She denies any missed doses. Of note in the past pharmacy reviews of prescriptions during previous hospitalizations have noted inconsistent prescription refill pickups from patient.      Interval History: Generalized petechiae noted. Patient reports improvement in SOB, dizziness following blood transfusion. Patient received IVIG 1mg/kg overnight. On prednisone 1mg/kg daily. Also taking Promacta 75 mg PO daily. Receiving additional unit of PRBCs today  Oncology Treatment Plan:   [No treatment plan]    Medications:  Continuous Infusions:  Scheduled " Meds:   eltrombopag  75 mg Oral Daily    ferrous sulfate  325 mg Oral BID    predniSONE  1 mg/kg Oral Daily     PRN Meds:sodium chloride, sodium chloride, Dextrose 10% Bolus, Dextrose 10% Bolus, glucagon (human recombinant), glucose, glucose, sodium chloride 0.9%     Review of patient's allergies indicates:   Allergen Reactions    Aspirin      Unable to take because of illness    Ibuprofen Other (See Comments)     Cannot take due to Blood Disorder        Past Medical History:   Diagnosis Date    Encounter for blood transfusion     Thrombocytopenia      Past Surgical History:   Procedure Laterality Date     SECTION      x1    ROBOT-ASSISTED SURGICAL REMOVAL OF SPLEEN USING DA MAXIM XI N/A 2018    Procedure: XI ROBOTIC SPLENECTOMY;  Surgeon: Yobani Lara MD;  Location: [facility];  Service: General;  Laterality: N/A;     Family History     None        Tobacco Use    Smoking status: Never Smoker    Smokeless tobacco: Never Used   Substance and Sexual Activity    Alcohol use: No     Frequency: Never    Drug use: No    Sexual activity: Not on file       Review of Systems   Constitutional: Negative for activity change, appetite change, chills, diaphoresis, fatigue, fever and unexpected weight change.   HENT: Negative for congestion, mouth sores, nosebleeds, sore throat, trouble swallowing and voice change.    Eyes: Negative for photophobia, discharge and visual disturbance.   Respiratory: Negative for cough, chest tightness, shortness of breath and wheezing.    Cardiovascular: Negative for chest pain, palpitations and leg swelling.   Gastrointestinal: Negative for abdominal distention, abdominal pain, anal bleeding, blood in stool, constipation, diarrhea, nausea and vomiting.   Genitourinary: Negative for difficulty urinating, dysuria, hematuria and vaginal bleeding.   Musculoskeletal: Negative for arthralgias, back pain and myalgias.   Skin: Positive for rash (petechiae ). Negative for pallor  and wound.   Neurological: Negative for dizziness, syncope, weakness and headaches.   Hematological: Negative for adenopathy. Does not bruise/bleed easily.   Psychiatric/Behavioral: The patient is not nervous/anxious.      Objective:     Vital Signs (Most Recent):  Temp: 98.3 °F (36.8 °C) (10/02/19 1116)  Pulse: (!) 117 (10/02/19 1300)  Resp: 17 (10/02/19 1116)  BP: 129/68 (10/02/19 1116)  SpO2: 97 % (10/02/19 1116) Vital Signs (24h Range):  Temp:  [98 °F (36.7 °C)-99 °F (37.2 °C)] 98.3 °F (36.8 °C)  Pulse:  [] 117  Resp:  [7-29] 17  SpO2:  [97 %-100 %] 97 %  BP: ()/(51-91) 129/68     Weight: 93.7 kg (206 lb 9.1 oz)  Body mass index is 37.78 kg/m².  Body surface area is 2.02 meters squared.      Intake/Output Summary (Last 24 hours) at 10/2/2019 1316  Last data filed at 10/2/2019 0216  Gross per 24 hour   Intake --   Output 300 ml   Net -300 ml       Physical Exam   Constitutional: She is oriented to person, place, and time. She appears well-developed and well-nourished. She is cooperative.   HENT:   Head: Normocephalic.   Right Ear: External ear normal.   Left Ear: External ear normal.   Nose: Nose normal.   Mouth/Throat: Oropharynx is clear and moist.   Eyes: Conjunctivae, EOM and lids are normal. Right eye exhibits no discharge. Left eye exhibits no discharge. No scleral icterus.   Neck: Normal range of motion. No thyroid mass present.   Cardiovascular: Regular rhythm and normal heart sounds. Tachycardia present.   No murmur heard.  Pulmonary/Chest: Effort normal and breath sounds normal. No respiratory distress. She has no wheezes. She has no rhonchi. She has no rales.   Abdominal: Soft. Bowel sounds are normal. She exhibits no distension. There is no tenderness.   Genitourinary:   Genitourinary Comments: deferred   Musculoskeletal: Normal range of motion. She exhibits no edema.   Lymphadenopathy:        Head (right side): No submandibular, no preauricular and no posterior auricular adenopathy  present.        Head (left side): No submandibular, no preauricular and no posterior auricular adenopathy present.        Right cervical: No superficial cervical adenopathy present.       Left cervical: No superficial cervical adenopathy present.   Neurological: She is alert and oriented to person, place, and time.   Skin: Skin is warm, dry and intact. Petechiae noted.   Psychiatric: She has a normal mood and affect. Her speech is normal and behavior is normal. Thought content normal.   Vitals reviewed.      Significant Labs:   BMP:   Recent Labs   Lab 10/01/19  2124 10/02/19  0843    97    135*   K 3.6 3.6    109   CO2 19* 20*   BUN 21* 18   CREATININE 0.7 0.6   CALCIUM 8.7 7.8*   , CBC:   Recent Labs   Lab 10/01/19  2159 10/02/19  0448 10/02/19  1102   WBC 22.39* 19.63*  --    HGB 6.4* 4.4* 6.2*   HCT 20.3* 14.6* 20.7*   PLT 2* 1*  --    , LFTs:   Recent Labs   Lab 10/01/19  2124 10/02/19  0843   ALT 9* 7*   AST 11 10   ALKPHOS 42* 33*   BILITOT 0.2 0.1   PROT 6.6 7.4   ALBUMIN 3.5 2.8*    and All pertinent labs from the last 24 hours have been reviewed.    Diagnostic Results:  I have reviewed all pertinent imaging results/findings within the past 24 hours.    Assessment/Plan:     Chronic ITP (idiopathic thrombocytopenia)  October 2, 2019  --Patient has had frequent hospitalizations related to her ITP. Patient usually responds well to IVIG and steroids. Patient reports taking Promacta 75 mg PO daily without missed doses although this has been questionable in the past.  I have asked pharmacy to review patient's prescription refills to determine if patient has refilling medication appropriately.  I have also asked pharmacy to obtain Promacta pill count.  I did discuss possibility of treating her ITP with injectable medication which would require her outpatient clinic visit every 1-2 weeks.  Patient seems reluctant to this and present time. Hemoglobin 6.2 status post 1 unit PRBCs.  Agree with  additional 1 unit PRBCs transfusion.  Platelet count 1.  Would hold off on any platelet transfusion unless patient has signs and symptoms of active bleeding. Administer additional dose of IVIG 1 mg/kg 24 hr following last dose.  Continue oral iron supplementation for history of iron deficiency.  Check iron studies with a.m. Labs. Daily CBC.  Transfuse PRBCs p.r.n. for maintain hemoglobin < 8.  Supportive care        Thank you for your consult. I will follow-up with patient. Please contact us if you have any additional questions.    Sophy Das NP  Hematology/Oncology  [facility] [place]

## 2019-10-02 NOTE — HPI
23 year old woman with history of ITP  on who presented with history of worsening shortness of breath that she noticed on day of presentation . There was associated history of black stools and she also noticed blood clot in the stool yesterday . Since presentation, initial cbc showed-    Component      Latest Ref Rng & Units 10/1/2019 9/24/2019              Hemoglobin      12.0 - 16.0 g/dL 6.4 (L) 10.3 (L)     Component      Latest Ref Rng & Units 10/1/2019 9/24/2019              Platelets      150 - 350 K/uL 2 (LL) 428 (H)   Plan of care was discussed with Oncology and IVIG and transfusion of one unit of packed cells was recommended.

## 2019-10-02 NOTE — ASSESSMENT & PLAN NOTE
Related to acute condition/blood loss  - TSH pending   - PRBCs x 2 units transfused   -will consider empiric beta blockers should TC persists

## 2019-10-03 LAB
ACANTHOCYTES BLD QL SMEAR: PRESENT
ANISOCYTOSIS BLD QL SMEAR: ABNORMAL
BASOPHILS # BLD AUTO: 0.08 K/UL (ref 0–0.2)
BASOPHILS # BLD AUTO: 0.11 K/UL (ref 0–0.2)
BASOPHILS NFR BLD: 0.5 % (ref 0–1.9)
BASOPHILS NFR BLD: 0.7 % (ref 0–1.9)
BLD PROD TYP BPU: NORMAL
BLD PROD TYP BPU: NORMAL
BLOOD UNIT EXPIRATION DATE: NORMAL
BLOOD UNIT EXPIRATION DATE: NORMAL
BLOOD UNIT TYPE CODE: 5100
BLOOD UNIT TYPE CODE: 5100
BLOOD UNIT TYPE: NORMAL
BLOOD UNIT TYPE: NORMAL
BURR CELLS BLD QL SMEAR: ABNORMAL
CODING SYSTEM: NORMAL
CODING SYSTEM: NORMAL
DACRYOCYTES BLD QL SMEAR: ABNORMAL
DIFFERENTIAL METHOD: ABNORMAL
DIFFERENTIAL METHOD: ABNORMAL
DISPENSE STATUS: NORMAL
DISPENSE STATUS: NORMAL
EOSINOPHIL # BLD AUTO: 0.1 K/UL (ref 0–0.5)
EOSINOPHIL # BLD AUTO: 0.2 K/UL (ref 0–0.5)
EOSINOPHIL NFR BLD: 0.6 % (ref 0–8)
EOSINOPHIL NFR BLD: 1 % (ref 0–8)
ERYTHROCYTE [DISTWIDTH] IN BLOOD BY AUTOMATED COUNT: 19.6 % (ref 11.5–14.5)
ERYTHROCYTE [DISTWIDTH] IN BLOOD BY AUTOMATED COUNT: 19.7 % (ref 11.5–14.5)
FERRITIN SERPL-MCNC: 39 NG/ML (ref 20–300)
HCT VFR BLD AUTO: 19.4 % (ref 37–48.5)
HCT VFR BLD AUTO: 19.8 % (ref 37–48.5)
HGB BLD-MCNC: 5.9 G/DL (ref 12–16)
HGB BLD-MCNC: 6 G/DL (ref 12–16)
HYPOCHROMIA BLD QL SMEAR: ABNORMAL
IMM GRANULOCYTES # BLD AUTO: 0.11 K/UL (ref 0–0.04)
IMM GRANULOCYTES # BLD AUTO: 0.12 K/UL (ref 0–0.04)
IMM GRANULOCYTES NFR BLD AUTO: 0.7 % (ref 0–0.5)
IMM GRANULOCYTES NFR BLD AUTO: 0.8 % (ref 0–0.5)
IRON SERPL-MCNC: 50 UG/DL (ref 30–160)
LYMPHOCYTES # BLD AUTO: 2.4 K/UL (ref 1–4.8)
LYMPHOCYTES # BLD AUTO: 2.6 K/UL (ref 1–4.8)
LYMPHOCYTES NFR BLD: 14.3 % (ref 18–48)
LYMPHOCYTES NFR BLD: 17 % (ref 18–48)
MCH RBC QN AUTO: 25.5 PG (ref 27–31)
MCH RBC QN AUTO: 25.8 PG (ref 27–31)
MCHC RBC AUTO-ENTMCNC: 30.3 G/DL (ref 32–36)
MCHC RBC AUTO-ENTMCNC: 30.4 G/DL (ref 32–36)
MCV RBC AUTO: 84 FL (ref 82–98)
MCV RBC AUTO: 85 FL (ref 82–98)
MONOCYTES # BLD AUTO: 1.7 K/UL (ref 0.3–1)
MONOCYTES # BLD AUTO: 2 K/UL (ref 0.3–1)
MONOCYTES NFR BLD: 10.8 % (ref 4–15)
MONOCYTES NFR BLD: 12.1 % (ref 4–15)
MPV, BLUE TOP: ABNORMAL FL (ref 9.2–12.9)
NEUTROPHILS # BLD AUTO: 10.9 K/UL (ref 1.8–7.7)
NEUTROPHILS # BLD AUTO: 11.8 K/UL (ref 1.8–7.7)
NEUTROPHILS NFR BLD: 70.3 % (ref 38–73)
NEUTROPHILS NFR BLD: 71.2 % (ref 38–73)
NRBC BLD-RTO: 1 /100 WBC
NRBC BLD-RTO: 1 /100 WBC
NUM UNITS TRANS PACKED RBC: NORMAL
NUM UNITS TRANS PACKED RBC: NORMAL
OVALOCYTES BLD QL SMEAR: ABNORMAL
PATH REV BLD -IMP: NORMAL
PATH REV BLD -IMP: NORMAL
PLATELET # BLD AUTO: 1 K/UL (ref 150–350)
PLATELET # BLD AUTO: 2 K/UL (ref 150–350)
PLATELET BLD QL SMEAR: ABNORMAL
PLATELET, BLUE TOP: 2 K/UL (ref 150–350)
PMV BLD AUTO: ABNORMAL FL (ref 9.2–12.9)
PMV BLD AUTO: ABNORMAL FL (ref 9.2–12.9)
POIKILOCYTOSIS BLD QL SMEAR: ABNORMAL
POLYCHROMASIA BLD QL SMEAR: ABNORMAL
RBC # BLD AUTO: 2.29 M/UL (ref 4–5.4)
RBC # BLD AUTO: 2.35 M/UL (ref 4–5.4)
SATURATED IRON: 17 % (ref 20–50)
SCHISTOCYTES BLD QL SMEAR: PRESENT
SPHEROCYTES BLD QL SMEAR: ABNORMAL
STOMATOCYTES BLD QL SMEAR: PRESENT
TARGETS BLD QL SMEAR: ABNORMAL
TOTAL IRON BINDING CAPACITY: 295 UG/DL (ref 250–450)
TRANSFERRIN SERPL-MCNC: 199 MG/DL (ref 200–375)
WBC # BLD AUTO: 15.5 K/UL (ref 3.9–12.7)
WBC # BLD AUTO: 16.48 K/UL (ref 3.9–12.7)

## 2019-10-03 PROCEDURE — 25000003 PHARM REV CODE 250: Performed by: INTERNAL MEDICINE

## 2019-10-03 PROCEDURE — 83540 ASSAY OF IRON: CPT

## 2019-10-03 PROCEDURE — 36415 COLL VENOUS BLD VENIPUNCTURE: CPT

## 2019-10-03 PROCEDURE — 21400001 HC TELEMETRY ROOM

## 2019-10-03 PROCEDURE — 99232 PR SUBSEQUENT HOSPITAL CARE,LEVL II: ICD-10-PCS | Mod: ,,, | Performed by: INTERNAL MEDICINE

## 2019-10-03 PROCEDURE — P9016 RBC LEUKOCYTES REDUCED: HCPCS

## 2019-10-03 PROCEDURE — 99232 SBSQ HOSP IP/OBS MODERATE 35: CPT | Mod: ,,, | Performed by: INTERNAL MEDICINE

## 2019-10-03 PROCEDURE — 36430 TRANSFUSION BLD/BLD COMPNT: CPT

## 2019-10-03 PROCEDURE — 85049 AUTOMATED PLATELET COUNT: CPT

## 2019-10-03 PROCEDURE — 63600175 PHARM REV CODE 636 W HCPCS: Mod: JG | Performed by: NURSE PRACTITIONER

## 2019-10-03 PROCEDURE — 85025 COMPLETE CBC W/AUTO DIFF WBC: CPT | Mod: 91

## 2019-10-03 PROCEDURE — 63600175 PHARM REV CODE 636 W HCPCS: Performed by: NURSE PRACTITIONER

## 2019-10-03 PROCEDURE — 82728 ASSAY OF FERRITIN: CPT

## 2019-10-03 PROCEDURE — 85060 PATHOLOGIST REVIEW: ICD-10-PCS | Mod: ,,, | Performed by: PATHOLOGY

## 2019-10-03 PROCEDURE — 85060 BLOOD SMEAR INTERPRETATION: CPT | Mod: ,,, | Performed by: PATHOLOGY

## 2019-10-03 RX ORDER — HYDROCODONE BITARTRATE AND ACETAMINOPHEN 500; 5 MG/1; MG/1
TABLET ORAL
Status: DISCONTINUED | OUTPATIENT
Start: 2019-10-03 | End: 2019-10-04 | Stop reason: HOSPADM

## 2019-10-03 RX ORDER — DEXAMETHASONE 4 MG/1
40 TABLET ORAL DAILY
Status: DISCONTINUED | OUTPATIENT
Start: 2019-10-03 | End: 2019-10-04 | Stop reason: HOSPADM

## 2019-10-03 RX ORDER — DEXAMETHASONE 4 MG/1
40 TABLET ORAL DAILY
Status: DISCONTINUED | OUTPATIENT
Start: 2019-10-03 | End: 2019-10-03

## 2019-10-03 RX ADMIN — HUMAN IMMUNOGLOBULIN G 70 G: 10 LIQUID INTRAVENOUS at 01:10

## 2019-10-03 RX ADMIN — FERROUS SULFATE TAB EC 325 MG (65 MG FE EQUIVALENT) 325 MG: 325 (65 FE) TABLET DELAYED RESPONSE at 08:10

## 2019-10-03 RX ADMIN — FERROUS SULFATE TAB EC 325 MG (65 MG FE EQUIVALENT) 325 MG: 325 (65 FE) TABLET DELAYED RESPONSE at 09:10

## 2019-10-03 RX ADMIN — DEXAMETHASONE 40 MG: 4 TABLET ORAL at 11:10

## 2019-10-03 NOTE — SUBJECTIVE & OBJECTIVE
Interval History: Patient reports she feels better today. No s/s bleeding noted. Hgb 6.0, platelets  2.0. Discussed with Heme/onc who will transfuse 1U pRBC. CBC in AM.    Review of Systems   Constitutional: Negative for activity change, chills and fatigue.   HENT: Negative for congestion, ear pain, facial swelling, sinus pressure and sore throat.    Eyes: Negative for pain.   Respiratory: Negative for apnea, chest tightness, shortness of breath and stridor.    Cardiovascular: Negative for chest pain and leg swelling.   Gastrointestinal: Negative for abdominal distention, abdominal pain, diarrhea and nausea.   Endocrine: Negative for polydipsia and polyphagia.   Genitourinary: Negative for decreased urine volume, difficulty urinating, frequency and genital sores.   Musculoskeletal: Negative for arthralgias and gait problem.   Allergic/Immunologic: Negative for environmental allergies.   Neurological: Negative for light-headedness and headaches.   Hematological: Negative for adenopathy.   Psychiatric/Behavioral: Negative for agitation, confusion and decreased concentration.     Objective:     Vital Signs (Most Recent):  Temp: 97.8 °F (36.6 °C) (10/03/19 1132)  Pulse: (!) 119 (10/03/19 1132)  Resp: 18 (10/03/19 1132)  BP: 103/64 (10/03/19 1132)  SpO2: 100 % (10/03/19 1132) Vital Signs (24h Range):  Temp:  [97.8 °F (36.6 °C)-99.3 °F (37.4 °C)] 97.8 °F (36.6 °C)  Pulse:  [100-138] 119  Resp:  [16-18] 18  SpO2:  [97 %-100 %] 100 %  BP: (103-145)/(56-79) 103/64     Weight: 93.7 kg (206 lb 9.1 oz)  Body mass index is 37.78 kg/m².    Intake/Output Summary (Last 24 hours) at 10/3/2019 1304  Last data filed at 10/3/2019 0600  Gross per 24 hour   Intake 1774.17 ml   Output --   Net 1774.17 ml      Physical Exam   Constitutional: She is oriented to person, place, and time. She appears well-developed and well-nourished.   HENT:   Head: Normocephalic and atraumatic.   Nose: Nose normal.   Eyes: Pupils are equal, round, and  reactive to light. EOM are normal. No scleral icterus.   Neck: Normal range of motion. Neck supple. No tracheal deviation present.   Cardiovascular: Normal rate, regular rhythm, normal heart sounds and intact distal pulses. Exam reveals no gallop and no friction rub.   No murmur heard.  Pulmonary/Chest: Effort normal and breath sounds normal. No respiratory distress. She has no wheezes. She has no rales.   Abdominal: Soft. Bowel sounds are normal. She exhibits no distension. There is no tenderness.   Genitourinary:   Genitourinary Comments: Deferred    Musculoskeletal: Normal range of motion. She exhibits no edema or tenderness.   Neurological: She is alert and oriented to person, place, and time. She has normal reflexes. No cranial nerve deficit. Coordination normal.   Skin: Skin is warm and dry. Capillary refill takes 2 to 3 seconds. No pallor.   Psychiatric: She has a normal mood and affect. Her behavior is normal. Judgment and thought content normal.   Nursing note and vitals reviewed.      Significant Labs:   Recent Lab Results       10/03/19  1049   10/03/19  1048   10/03/19  0511        Platelet Count, Blue Top   2  Comment:  Plt critical result(s) called and verbal readback obtained from   Darshana Lara RN, 10/03/2019 12:18         MPV, Blue Top   SEE COMMENT  Comment:  Result not available.       Acanthocytes Present         Aniso Moderate         Baso # 0.11   0.08     Basophil% 0.7   0.5     Jasper Cells Occasional         Differential Method Automated   Automated     Eos # 0.2   0.1     Eosinophil% 1.0   0.6     Gran # (ANC) 11.8   10.9     Gran% 71.2   70.3     Hematocrit 19.8  Comment:  Hct, Plt critical result(s) called and verbal readback obtained from   Darshana Lara RN, 10/03/2019 11:13     19.4  Comment:  Results confirmed, test repeated  HGB, HCT, AND PLT count critical result(s) called and verbal readback   obtained from Sonia Manjarrez RN, 10/03/2019 05:51       Hemoglobin 6.0    5.9  Comment:  Results confirmed, test repeated  HGB, HCT, AND PLT count critical result(s) called and verbal readback   obtained from Sonia Manjarrez RN, 10/03/2019 05:51       Hypo Moderate         Immature Grans (Abs) 0.11  Comment:  Mild elevation in immature granulocytes is non specific and   can be seen in a variety of conditions including stress response,   acute inflammation, trauma and pregnancy. Correlation with other   laboratory and clinical findings is essential.     0.12  Comment:  Mild elevation in immature granulocytes is non specific and   can be seen in a variety of conditions including stress response,   acute inflammation, trauma and pregnancy. Correlation with other   laboratory and clinical findings is essential.       Immature Granulocytes 0.7   0.8     Lymph # 2.4   2.6     Lymph% 14.3   17.0     MCH 25.5   25.8     MCHC 30.3   30.4     MCV 84   85  Comment:  Results confirmed, test repeated     Mono # 2.0   1.7     Mono% 12.1   10.8     MPV SEE COMMENT  Comment:  Result not available.   SEE COMMENT  Comment:  Result not available.     nRBC 1   1     Ovalocytes Occasional         Pathologist Review Review required         Platelet Estimate Decreased         Platelets 2  Comment:  Hct, Plt critical result(s) called and verbal readback obtained from   Darshana Lara RN, 10/03/2019 11:13     1  Comment:  Results confirmed, test repeated  HGB, HCT, AND PLT count critical result(s) called and verbal readback   obtained from Sonia Manjarrez RN, 10/03/2019 05:51       Poik Moderate         Poly Occasional         RBC 2.35   2.29     RDW 19.7   19.6     Schistocytes Present         Spherocytes Occasional         Stomatocytes Present         Target Cells Moderate         Tear Drop Cells Occasional         WBC 16.48   15.50         All pertinent labs within the past 24 hours have been reviewed.    Significant Imaging: I have reviewed all pertinent imaging results/findings within the past 24 hours.

## 2019-10-03 NOTE — SUBJECTIVE & OBJECTIVE
Interval History: No acute events overnight. Patient denies any bleeding. Platelet count remains unchanged despite IVIG x 2 doses and Prednisone 1mg/kg. Will check blue top platelet count and have pathologist review smear for any evidence of platelet clumping.   Oncology Treatment Plan:   [No treatment plan]    Medications:  Continuous Infusions:  Scheduled Meds:   dexAMETHasone  40 mg Oral Daily    eltrombopag  75 mg Oral Daily    ferrous sulfate  325 mg Oral BID     PRN Meds:sodium chloride, sodium chloride, sodium chloride, Dextrose 10% Bolus, Dextrose 10% Bolus, glucagon (human recombinant), glucose, glucose, sodium chloride 0.9%     Review of patient's allergies indicates:   Allergen Reactions    Aspirin      Unable to take because of illness    Ibuprofen Other (See Comments)     Cannot take due to Blood Disorder        Past Medical History:   Diagnosis Date    Encounter for blood transfusion     Thrombocytopenia      Past Surgical History:   Procedure Laterality Date     SECTION      x1    ROBOT-ASSISTED SURGICAL REMOVAL OF SPLEEN USING DA MAXIM XI N/A 2018    Procedure: XI ROBOTIC SPLENECTOMY;  Surgeon: Yobani Lara MD;  Location: [facility];  Service: General;  Laterality: N/A;     Family History     None        Tobacco Use    Smoking status: Never Smoker    Smokeless tobacco: Never Used   Substance and Sexual Activity    Alcohol use: No     Frequency: Never    Drug use: No    Sexual activity: Not on file       Review of Systems   Constitutional: Negative for activity change, appetite change, chills, diaphoresis, fatigue, fever and unexpected weight change.   HENT: Negative for congestion, mouth sores, nosebleeds, sore throat, trouble swallowing and voice change.    Eyes: Negative for photophobia, discharge and visual disturbance.   Respiratory: Negative for cough, chest tightness, shortness of breath and wheezing.    Cardiovascular: Negative for chest pain, palpitations and leg  swelling.   Gastrointestinal: Negative for abdominal distention, abdominal pain, anal bleeding, blood in stool, constipation, diarrhea, nausea and vomiting.   Genitourinary: Negative for difficulty urinating, dysuria, hematuria and vaginal bleeding.   Musculoskeletal: Negative for arthralgias, back pain and myalgias.   Skin: Positive for rash (petechiae ). Negative for pallor and wound.   Neurological: Negative for dizziness, syncope, weakness and headaches.   Hematological: Negative for adenopathy. Does not bruise/bleed easily.   Psychiatric/Behavioral: The patient is not nervous/anxious.      Objective:     Vital Signs (Most Recent):  Temp: 98.5 °F (36.9 °C) (10/03/19 0705)  Pulse: 101 (10/03/19 0705)  Resp: 16 (10/03/19 0705)  BP: (!) 145/68 (10/03/19 0705)  SpO2: 97 % (10/03/19 0705) Vital Signs (24h Range):  Temp:  [97.8 °F (36.6 °C)-99.3 °F (37.4 °C)] 98.5 °F (36.9 °C)  Pulse:  [100-138] 101  Resp:  [16-18] 16  SpO2:  [97 %-100 %] 97 %  BP: (110-145)/(56-79) 145/68     Weight: 93.7 kg (206 lb 9.1 oz)  Body mass index is 37.78 kg/m².  Body surface area is 2.02 meters squared.      Intake/Output Summary (Last 24 hours) at 10/3/2019 1102  Last data filed at 10/3/2019 0600  Gross per 24 hour   Intake 2014.17 ml   Output 450 ml   Net 1564.17 ml       Physical Exam   Constitutional: She is oriented to person, place, and time. She appears well-developed and well-nourished. She is cooperative.   HENT:   Head: Normocephalic.   Right Ear: External ear normal.   Left Ear: External ear normal.   Nose: Nose normal.   Mouth/Throat: Oropharynx is clear and moist.   Eyes: Conjunctivae, EOM and lids are normal. Right eye exhibits no discharge. Left eye exhibits no discharge. No scleral icterus.   Neck: Normal range of motion. No thyroid mass present.   Cardiovascular: Regular rhythm and normal heart sounds. Tachycardia present.   No murmur heard.  Pulmonary/Chest: Effort normal and breath sounds normal. No respiratory  distress. She has no wheezes. She has no rhonchi. She has no rales.   Abdominal: Soft. Bowel sounds are normal. She exhibits no distension. There is no tenderness.   Genitourinary:   Genitourinary Comments: deferred   Musculoskeletal: Normal range of motion. She exhibits no edema.   Lymphadenopathy:        Head (right side): No submandibular, no preauricular and no posterior auricular adenopathy present.        Head (left side): No submandibular, no preauricular and no posterior auricular adenopathy present.        Right cervical: No superficial cervical adenopathy present.       Left cervical: No superficial cervical adenopathy present.   Neurological: She is alert and oriented to person, place, and time.   Skin: Skin is warm, dry and intact. Petechiae noted.   Psychiatric: She has a normal mood and affect. Her speech is normal and behavior is normal. Thought content normal.   Vitals reviewed.      Significant Labs:   BMP:   Recent Labs   Lab 10/01/19  2124 10/02/19  0843    97    135*   K 3.6 3.6    109   CO2 19* 20*   BUN 21* 18   CREATININE 0.7 0.6   CALCIUM 8.7 7.8*   , CBC:   Recent Labs   Lab 10/01/19  2159 10/02/19  0448 10/02/19  1102 10/03/19  0511   WBC 22.39* 19.63*  --  15.50*   HGB 6.4* 4.4* 6.2* 5.9*   HCT 20.3* 14.6* 20.7* 19.4*   PLT 2* 1*  --  1*   , LFTs:   Recent Labs   Lab 10/01/19  2124 10/02/19  0843   ALT 9* 7*   AST 11 10   ALKPHOS 42* 33*   BILITOT 0.2 0.1   PROT 6.6 7.4   ALBUMIN 3.5 2.8*    and All pertinent labs from the last 24 hours have been reviewed.    Diagnostic Results:  I have reviewed all pertinent imaging results/findings within the past 24 hours.

## 2019-10-03 NOTE — PROGRESS NOTES
"[facility] -   Hematology/Oncology  Progress Note    Patient Name: Mari Miller  Admission Date: 10/1/2019  Hospital Length of Stay: 1 days  Code Status: Full Code     Subjective:     HPI:  23 y.o. female with ITP, who presented to the emergency department with complaints of shortness of breath which onset the day PTA. Patient also reports bowel movement that she "thought may have been a clot." Reports dark stools since taking iron pills TID.  Associated symptoms include dizziness, lightheadedness, palpitations, bruising, generalized petechiae.  No aggravating or alleviating factors.  At present patient denies any hematuria, hematochezia, bowel or urinary complaints, SOB, dizziness, CP, lightheadedness, syncope.     ED workup revealed Hemoglobin 6.4, platelet count 2, CXR unremarkable. EKG sinus tachycardia. This am Hemoglobin 4.4 (drawn prior to completion of blood transfusion). Patient is now s/p 1 unit PRBCs. Hemoglobin following transfusion 6.2.     Patient's ITP has been treated in the past with prednisone, intravenous immunoglobulin, Rituxan,, splenectomy.  Patient has had transient responses.  P atient takes Promacta 75 mg PO daily. She denies any missed doses. Of note in the past pharmacy reviews of prescriptions during previous hospitalizations have noted inconsistent prescription refill pickups from patient.      Interval History: No acute events overnight. Patient denies any bleeding. Platelet count remains unchanged despite IVIG x 2 doses and Prednisone 1mg/kg. Will check blue top platelet count and have pathologist review smear for any evidence of platelet clumping.   Oncology Treatment Plan:   [No treatment plan]    Medications:  Continuous Infusions:  Scheduled Meds:   dexAMETHasone  40 mg Oral Daily    eltrombopag  75 mg Oral Daily    ferrous sulfate  325 mg Oral BID     PRN Meds:sodium chloride, sodium chloride, sodium chloride, Dextrose 10% Bolus, Dextrose 10% Bolus, " glucagon (human recombinant), glucose, glucose, sodium chloride 0.9%     Review of patient's allergies indicates:   Allergen Reactions    Aspirin      Unable to take because of illness    Ibuprofen Other (See Comments)     Cannot take due to Blood Disorder        Past Medical History:   Diagnosis Date    Encounter for blood transfusion     Thrombocytopenia      Past Surgical History:   Procedure Laterality Date     SECTION      x1    ROBOT-ASSISTED SURGICAL REMOVAL OF SPLEEN USING DA MAXIM XI N/A 2018    Procedure: XI ROBOTIC SPLENECTOMY;  Surgeon: Yobani Lara MD;  Location: [facility];  Service: General;  Laterality: N/A;     Family History     None        Tobacco Use    Smoking status: Never Smoker    Smokeless tobacco: Never Used   Substance and Sexual Activity    Alcohol use: No     Frequency: Never    Drug use: No    Sexual activity: Not on file       Review of Systems   Constitutional: Negative for activity change, appetite change, chills, diaphoresis, fatigue, fever and unexpected weight change.   HENT: Negative for congestion, mouth sores, nosebleeds, sore throat, trouble swallowing and voice change.    Eyes: Negative for photophobia, discharge and visual disturbance.   Respiratory: Negative for cough, chest tightness, shortness of breath and wheezing.    Cardiovascular: Negative for chest pain, palpitations and leg swelling.   Gastrointestinal: Negative for abdominal distention, abdominal pain, anal bleeding, blood in stool, constipation, diarrhea, nausea and vomiting.   Genitourinary: Negative for difficulty urinating, dysuria, hematuria and vaginal bleeding.   Musculoskeletal: Negative for arthralgias, back pain and myalgias.   Skin: Positive for rash (petechiae ). Negative for pallor and wound.   Neurological: Negative for dizziness, syncope, weakness and headaches.   Hematological: Negative for adenopathy. Does not bruise/bleed easily.   Psychiatric/Behavioral: The patient is not  nervous/anxious.      Objective:     Vital Signs (Most Recent):  Temp: 98.5 °F (36.9 °C) (10/03/19 0705)  Pulse: 101 (10/03/19 0705)  Resp: 16 (10/03/19 0705)  BP: (!) 145/68 (10/03/19 0705)  SpO2: 97 % (10/03/19 0705) Vital Signs (24h Range):  Temp:  [97.8 °F (36.6 °C)-99.3 °F (37.4 °C)] 98.5 °F (36.9 °C)  Pulse:  [100-138] 101  Resp:  [16-18] 16  SpO2:  [97 %-100 %] 97 %  BP: (110-145)/(56-79) 145/68     Weight: 93.7 kg (206 lb 9.1 oz)  Body mass index is 37.78 kg/m².  Body surface area is 2.02 meters squared.      Intake/Output Summary (Last 24 hours) at 10/3/2019 1102  Last data filed at 10/3/2019 0600  Gross per 24 hour   Intake 2014.17 ml   Output 450 ml   Net 1564.17 ml       Physical Exam   Constitutional: She is oriented to person, place, and time. She appears well-developed and well-nourished. She is cooperative.   HENT:   Head: Normocephalic.   Right Ear: External ear normal.   Left Ear: External ear normal.   Nose: Nose normal.   Mouth/Throat: Oropharynx is clear and moist.   Eyes: Conjunctivae, EOM and lids are normal. Right eye exhibits no discharge. Left eye exhibits no discharge. No scleral icterus.   Neck: Normal range of motion. No thyroid mass present.   Cardiovascular: Regular rhythm and normal heart sounds. Tachycardia present.   No murmur heard.  Pulmonary/Chest: Effort normal and breath sounds normal. No respiratory distress. She has no wheezes. She has no rhonchi. She has no rales.   Abdominal: Soft. Bowel sounds are normal. She exhibits no distension. There is no tenderness.   Genitourinary:   Genitourinary Comments: deferred   Musculoskeletal: Normal range of motion. She exhibits no edema.   Lymphadenopathy:        Head (right side): No submandibular, no preauricular and no posterior auricular adenopathy present.        Head (left side): No submandibular, no preauricular and no posterior auricular adenopathy present.        Right cervical: No superficial cervical adenopathy present.        Left cervical: No superficial cervical adenopathy present.   Neurological: She is alert and oriented to person, place, and time.   Skin: Skin is warm, dry and intact. Petechiae noted.   Psychiatric: She has a normal mood and affect. Her speech is normal and behavior is normal. Thought content normal.   Vitals reviewed.      Significant Labs:   BMP:   Recent Labs   Lab 10/01/19  2124 10/02/19  0843    97    135*   K 3.6 3.6    109   CO2 19* 20*   BUN 21* 18   CREATININE 0.7 0.6   CALCIUM 8.7 7.8*   , CBC:   Recent Labs   Lab 10/01/19  2159 10/02/19  0448 10/02/19  1102 10/03/19  0511   WBC 22.39* 19.63*  --  15.50*   HGB 6.4* 4.4* 6.2* 5.9*   HCT 20.3* 14.6* 20.7* 19.4*   PLT 2* 1*  --  1*   , LFTs:   Recent Labs   Lab 10/01/19  2124 10/02/19  0843   ALT 9* 7*   AST 11 10   ALKPHOS 42* 33*   BILITOT 0.2 0.1   PROT 6.6 7.4   ALBUMIN 3.5 2.8*    and All pertinent labs from the last 24 hours have been reviewed.    Diagnostic Results:  I have reviewed all pertinent imaging results/findings within the past 24 hours.    Assessment/Plan:     * Chronic ITP (idiopathic thrombocytopenia)  October 2, 2019  --Patient has had frequent hospitalizations related to her ITP. Patient usually responds well to IVIG and steroids. Patient reports taking Promacta 75 mg PO daily without missed doses although this has been questionable in the past.  I have asked pharmacy to review patient's prescription refills to determine if patient has refilling medication appropriately.  I have also asked pharmacy to obtain Promacta pill count.  I did discuss possibility of treating her ITP with injectable medication which would require her outpatient clinic visit every 1-2 weeks.  Patient seems reluctant to this and present time. Hemoglobin 6.2 status post 1 unit PRBCs.  Agree with additional 1 unit PRBCs transfusion.  Platelet count 1.  Would hold off on any platelet transfusion unless patient has signs and symptoms of active  bleeding. Administer additional dose of IVIG 1 mg/kg 24 hr following last dose.  Continue oral iron supplementation for history of iron deficiency.  Check iron studies with a.m. Labs. Daily CBC.  Transfuse PRBCs p.r.n. for maintain hemoglobin < 8.  Supportive care    October 3, 2019  --s/p IVIG x 2 doses. Platelet count relatively unchanged. Will try Decadron 40 mg PO daily x 4 days. Per pharmacy review her Promacta prescription has been filled appropriately. Awaiting pill count. ITP may no longer be responsive to Promacta. Likely will need alternate therapy outpatient in infusion clinic. Patient will discuss alternate therapy with Hematologist in the outpatient setting. May try Nplate for treatment of her refractory ITP.   --Try Decadron 40 mg PO daily x 4 days. Continue Promacta 75 mg PO daily for now  --Blue top platelet count today  --Pathologist review of smear to identify any platelet clumping if present  --Monitor closely for S&S bleeding  --hold any platelet transfusion unless actively bleeding  --Hemoglobin 5.9. Transfuse additional 1 unit leukoreduced PRBCs   --Iron studies pending. F/u results. If remains iron deficient despite oral iron supplementation will give dose of Venofer  --daily CBC   --Supportive care    Leukocytosis  October 3, 2019  --chronically elevated. No S&S infection. Monitor S&S infection closely. Daily CBC          Thank you for your consult. I will follow-up with patient. Please contact us if you have any additional questions.     Sophy Das NP  Hematology/Oncology  [facility] -

## 2019-10-03 NOTE — ASSESSMENT & PLAN NOTE
October 2, 2019  --Patient has had frequent hospitalizations related to her ITP. Patient usually responds well to IVIG and steroids. Patient reports taking Promacta 75 mg PO daily without missed doses although this has been questionable in the past.  I have asked pharmacy to review patient's prescription refills to determine if patient has refilling medication appropriately.  I have also asked pharmacy to obtain Promacta pill count.  I did discuss possibility of treating her ITP with injectable medication which would require her outpatient clinic visit every 1-2 weeks.  Patient seems reluctant to this and present time. Hemoglobin 6.2 status post 1 unit PRBCs.  Agree with additional 1 unit PRBCs transfusion.  Platelet count 1.  Would hold off on any platelet transfusion unless patient has signs and symptoms of active bleeding. Administer additional dose of IVIG 1 mg/kg 24 hr following last dose.  Continue oral iron supplementation for history of iron deficiency.  Check iron studies with a.m. Labs. Daily CBC.  Transfuse PRBCs p.r.n. for maintain hemoglobin < 8.  Supportive care    October 3, 2019  --s/p IVIG x 2 doses. Platelet count relatively unchanged. Will try Decadron 40 mg PO daily x 4 days. Per pharmacy review her Promacta prescription has been filled appropriately. Awaiting pill count. ITP may no longer be responsive to Promacta. Likely will need alternate therapy outpatient in infusion clinic. Patient will discuss alternate therapy with Hematologist in the outpatient setting. May try Nplate for treatment of her refractory ITP.   --Try Decadron 40 mg PO daily x 4 days. Continue Promacta 75 mg PO daily for now  --Blue top platelet count today  --Pathologist review of smear to identify any platelet clumping if present  --Monitor closely for S&S bleeding  --hold any platelet transfusion unless actively bleeding  --Hemoglobin 5.9. Transfuse additional 1 unit leukoreduced PRBCs   --Iron studies pending. F/u  results. If remains iron deficient despite oral iron supplementation will give dose of Venofer  --daily CBC   --Supportive care

## 2019-10-03 NOTE — ASSESSMENT & PLAN NOTE
Related to acute condition/blood loss  - TSH pending   - PRBCs x 2 units transfused   -will consider empiric beta blockers should TC persists   10/03:  --TSH 3.638  --Hgb 6.0  --transfuse 1u pRBC

## 2019-10-03 NOTE — ASSESSMENT & PLAN NOTE
Case discussed with Dr Bowden  -will give IVIG , transfuse one unit of packed cells .  Will add prednisone at 1mg/kg po   -H/H 6.2/20.7 and Plt 1   Continue home regime of  Eltrombopag .  Will monitor closely for bleeding   Heme/Oncology consult   10/03:  --H/H 6.0-19.8  --transfuse 1u pRBC today  --heme/onc following

## 2019-10-03 NOTE — PLAN OF CARE
Blood transfusion  infusing as ordered by MD. No s/s of adverse reaction noted at this time . Patient is tolerating well. Bed at lowest position with breaks locked. All the safety measured in place. Call light within reach.

## 2019-10-03 NOTE — ASSESSMENT & PLAN NOTE
-likely chronic ,can be seen in post splenectomy patients   -trending down   -will monitor   10/03:  --trending down

## 2019-10-03 NOTE — ASSESSMENT & PLAN NOTE
October 3, 2019  --chronically elevated. No S&S infection. Monitor S&S infection closely. Daily CBC

## 2019-10-03 NOTE — ASSESSMENT & PLAN NOTE
-in the setting of anemia and ITP  -improved after 2 units of PRBCs transfused   10/03:  --resolved

## 2019-10-03 NOTE — PROGRESS NOTES
[facility] Hospital Medicine  Progress Note    Patient Name: Mari Miller  MRN: [id number]  Patient Class: IP- Inpatient   Admission Date: 10/1/2019  Length of Stay: 1 days  Attending Physician: Yrn Curry MD  Primary Care Provider: Ifeanyi Tello NP        Subjective:     Principal Problem:Chronic ITP (idiopathic thrombocytopenia)        HPI:  23 year old woman with history of ITP  on who presented with history of worsening shortness of breath that she noticed on day of presentation . There was associated history of black stools and she also noticed blood clot in the stool yesterday . Since presentation, initial cbc showed-    Component      Latest Ref Rng & Units 10/1/2019 9/24/2019              Hemoglobin      12.0 - 16.0 g/dL 6.4 (L) 10.3 (L)     Component      Latest Ref Rng & Units 10/1/2019 9/24/2019              Platelets      150 - 350 K/uL 2 (LL) 428 (H)   Plan of care was discussed with Oncology and IVIG and transfusion of one unit of packed cells was recommended.    Overview/Hospital Course:  Pt admitted to Inpatient status for symptomatic anemia in the setting of chronic ITP.  Heme/Onc consulted.  H/H improved post transfusion on PRBCs x 2 units.  Leukocytosis improved.  Pt reports compliance with outpatient medications and verbalized symptom improvement.     Interval History: Patient reports she feels better today. No s/s bleeding noted. Hgb 6.0, platelets  2.0. Discussed with Heme/onc who will transfuse 1U pRBC. CBC in AM.    Review of Systems   Constitutional: Negative for activity change, chills and fatigue.   HENT: Negative for congestion, ear pain, facial swelling, sinus pressure and sore throat.    Eyes: Negative for pain.   Respiratory: Negative for apnea, chest tightness, shortness of breath and stridor.    Cardiovascular: Negative for chest pain and leg swelling.   Gastrointestinal: Negative for abdominal distention, abdominal pain, diarrhea and nausea.    Endocrine: Negative for polydipsia and polyphagia.   Genitourinary: Negative for decreased urine volume, difficulty urinating, frequency and genital sores.   Musculoskeletal: Negative for arthralgias and gait problem.   Allergic/Immunologic: Negative for environmental allergies.   Neurological: Negative for light-headedness and headaches.   Hematological: Negative for adenopathy.   Psychiatric/Behavioral: Negative for agitation, confusion and decreased concentration.     Objective:     Vital Signs (Most Recent):  Temp: 97.8 °F (36.6 °C) (10/03/19 1132)  Pulse: (!) 119 (10/03/19 1132)  Resp: 18 (10/03/19 1132)  BP: 103/64 (10/03/19 1132)  SpO2: 100 % (10/03/19 1132) Vital Signs (24h Range):  Temp:  [97.8 °F (36.6 °C)-99.3 °F (37.4 °C)] 97.8 °F (36.6 °C)  Pulse:  [100-138] 119  Resp:  [16-18] 18  SpO2:  [97 %-100 %] 100 %  BP: (103-145)/(56-79) 103/64     Weight: 93.7 kg (206 lb 9.1 oz)  Body mass index is 37.78 kg/m².    Intake/Output Summary (Last 24 hours) at 10/3/2019 1304  Last data filed at 10/3/2019 0600  Gross per 24 hour   Intake 1774.17 ml   Output --   Net 1774.17 ml      Physical Exam   Constitutional: She is oriented to person, place, and time. She appears well-developed and well-nourished.   HENT:   Head: Normocephalic and atraumatic.   Nose: Nose normal.   Eyes: Pupils are equal, round, and reactive to light. EOM are normal. No scleral icterus.   Neck: Normal range of motion. Neck supple. No tracheal deviation present.   Cardiovascular: Normal rate, regular rhythm, normal heart sounds and intact distal pulses. Exam reveals no gallop and no friction rub.   No murmur heard.  Pulmonary/Chest: Effort normal and breath sounds normal. No respiratory distress. She has no wheezes. She has no rales.   Abdominal: Soft. Bowel sounds are normal. She exhibits no distension. There is no tenderness.   Genitourinary:   Genitourinary Comments: Deferred    Musculoskeletal: Normal range of motion. She exhibits no edema  or tenderness.   Neurological: She is alert and oriented to person, place, and time. She has normal reflexes. No cranial nerve deficit. Coordination normal.   Skin: Skin is warm and dry. Capillary refill takes 2 to 3 seconds. No pallor.   Psychiatric: She has a normal mood and affect. Her behavior is normal. Judgment and thought content normal.   Nursing note and vitals reviewed.      Significant Labs:   Recent Lab Results       10/03/19  1049   10/03/19  1048   10/03/19  0511        Platelet Count, Blue Top   2  Comment:  Plt critical result(s) called and verbal readback obtained from   Darshana Lara RN, 10/03/2019 12:18         MPV, Blue Top   SEE COMMENT  Comment:  Result not available.       Acanthocytes Present         Aniso Moderate         Baso # 0.11   0.08     Basophil% 0.7   0.5     Sacul Cells Occasional         Differential Method Automated   Automated     Eos # 0.2   0.1     Eosinophil% 1.0   0.6     Gran # (ANC) 11.8   10.9     Gran% 71.2   70.3     Hematocrit 19.8  Comment:  Hct, Plt critical result(s) called and verbal readback obtained from   Darshana Lara RN, 10/03/2019 11:13     19.4  Comment:  Results confirmed, test repeated  HGB, HCT, AND PLT count critical result(s) called and verbal readback   obtained from Sonia Manjarrez RN, 10/03/2019 05:51       Hemoglobin 6.0   5.9  Comment:  Results confirmed, test repeated  HGB, HCT, AND PLT count critical result(s) called and verbal readback   obtained from Sonia Manjarrez RN, 10/03/2019 05:51       Hypo Moderate         Immature Grans (Abs) 0.11  Comment:  Mild elevation in immature granulocytes is non specific and   can be seen in a variety of conditions including stress response,   acute inflammation, trauma and pregnancy. Correlation with other   laboratory and clinical findings is essential.     0.12  Comment:  Mild elevation in immature granulocytes is non specific and   can be seen in a variety of conditions including stress response,   acute  inflammation, trauma and pregnancy. Correlation with other   laboratory and clinical findings is essential.       Immature Granulocytes 0.7   0.8     Lymph # 2.4   2.6     Lymph% 14.3   17.0     MCH 25.5   25.8     MCHC 30.3   30.4     MCV 84   85  Comment:  Results confirmed, test repeated     Mono # 2.0   1.7     Mono% 12.1   10.8     MPV SEE COMMENT  Comment:  Result not available.   SEE COMMENT  Comment:  Result not available.     nRBC 1   1     Ovalocytes Occasional         Pathologist Review Review required         Platelet Estimate Decreased         Platelets 2  Comment:  Hct, Plt critical result(s) called and verbal readback obtained from   Darshana Lara RN, 10/03/2019 11:13     1  Comment:  Results confirmed, test repeated  HGB, HCT, AND PLT count critical result(s) called and verbal readback   obtained from Sonia Manjarrez RN, 10/03/2019 05:51       Poik Moderate         Poly Occasional         RBC 2.35   2.29     RDW 19.7   19.6     Schistocytes Present         Spherocytes Occasional         Stomatocytes Present         Target Cells Moderate         Tear Drop Cells Occasional         WBC 16.48   15.50         All pertinent labs within the past 24 hours have been reviewed.    Significant Imaging: I have reviewed all pertinent imaging results/findings within the past 24 hours.      Assessment/Plan:      * Chronic ITP (idiopathic thrombocytopenia)  Case discussed with Dr Bowden  -will give IVIG , transfuse one unit of packed cells .  Will add prednisone at 1mg/kg po   -H/H 6.2/20.7 and Plt 1   Continue home regime of  Eltrombopag .  Will monitor closely for bleeding   Heme/Oncology consult   10/03:  --H/H 6.0-19.8  --transfuse 1u pRBC today  --heme/onc following        SOB (shortness of breath)  -in the setting of anemia and ITP  -improved after 2 units of PRBCs transfused   10/03:  --resolved        Leukocytosis  -likely chronic ,can be seen in post splenectomy patients   -trending down   -will monitor    10/03:  --trending down    Sinus tachycardia  Related to acute condition/blood loss  - TSH pending   - PRBCs x 2 units transfused   -will consider empiric beta blockers should TC persists   10/03:  --TSH 3.638  --Hgb 6.0  --transfuse 1u pRBC    Iron deficiency anemia due to chronic blood loss  -H/H 6.2/20.7 post transfusion of 1 unit of PRBCs  -additional unit of PRBCs transfused on 10/2/19  -iron supplementation continued   -repeat CBC in am       VTE Risk Mitigation (From admission, onward)         Ordered     Reason for No Pharmacological VTE Prophylaxis  Once      10/02/19 0016     IP VTE HIGH RISK PATIENT  Once      10/02/19 0016                      BELA Lawson  Department of Hospital Medicine   [facility]

## 2019-10-03 NOTE — PLAN OF CARE
10/03/19 1626   Discharge Assessment   Assessment Type Discharge Planning Assessment   Confirmed/corrected address and phone number on facesheet? Yes   Assessment information obtained from? Patient   Prior to hospitilization cognitive status: Alert/Oriented   Prior to hospitalization functional status: Independent   Current cognitive status: Alert/Oriented   Current Functional Status: Independent   Lives With significant other   Able to Return to Prior Arrangements yes   Is patient able to care for self after discharge? Yes   Who are your caregiver(s) and their phone number(s)? Sravan Kaur (fiance) [phone], Dk Hill (mother) [phone]   Patient's perception of discharge disposition home or selfcare   Readmission Within the Last 30 Days no previous admission in last 30 days;previous discharge plan unsuccessful   Patient currently being followed by outpatient case management? No   Patient currently receives any other outside agency services? No   Equipment Currently Used at Home none   Do you have any problems affording any of your prescribed medications? No   Is the patient taking medications as prescribed? yes   Does the patient have transportation home? Yes   Transportation Anticipated family or friend will provide   Does the patient receive services at the Coumadin Clinic? No   Discharge Plan A Home with family   DME Needed Upon Discharge  none   Patient/Family in Agreement with Plan yes     Met with pt at bedside for DC assessment. Pt stated that she is feeling better. Pt reported that she lives with her cesar Hinson (see above for contact). Pt is unlikely to have any DC needs. SWer provided a transitional care folder, information on advanced directives, information on pharmacy bedside delivery, and discharge planning begins on admission with contact information for any needs/questions. Pt opted for bedside medication delivery. Pt's typical pharmacy is the [facility]. Information  below.    [address] [place] [address]  [phone]  Sandeep Chávez LMSW 10/3/2019 4:32 PM

## 2019-10-03 NOTE — PLAN OF CARE
Pt AAO x 4.  VSS.  Meds administered per order.   Pt remained free of falls this shift.   Pt has no complaints at this time. Awaiting an additional unit of blood.   Educated pt on complications of receiving blood and managing ITP.  Plan of care reviewed. Patient verbalizes understanding.  Pt moving/turing independetly. Frequent weight shifting encouraged.  Patient ST on monitor.   Bed low, side rails up x 2, wheels locked, call light in reach.   Family is not currently visiting the patient.  Patient instructed to call for assistance.   Hourly rounding completed.   24 hour chart check completed.  Will continue to monitor.

## 2019-10-04 VITALS
TEMPERATURE: 98 F | DIASTOLIC BLOOD PRESSURE: 61 MMHG | HEIGHT: 62 IN | BODY MASS INDEX: 38.59 KG/M2 | WEIGHT: 209.69 LBS | OXYGEN SATURATION: 100 % | SYSTOLIC BLOOD PRESSURE: 98 MMHG | HEART RATE: 111 BPM | RESPIRATION RATE: 18 BRPM

## 2019-10-04 PROBLEM — R06.02 SOB (SHORTNESS OF BREATH): Status: RESOLVED | Noted: 2019-10-02 | Resolved: 2019-10-04

## 2019-10-04 LAB
ANISOCYTOSIS BLD QL SMEAR: ABNORMAL
BASOPHILS # BLD AUTO: 0.06 K/UL (ref 0–0.2)
BASOPHILS NFR BLD: 0.3 % (ref 0–1.9)
DIFFERENTIAL METHOD: ABNORMAL
EOSINOPHIL # BLD AUTO: 0 K/UL (ref 0–0.5)
EOSINOPHIL NFR BLD: 0 % (ref 0–8)
ERYTHROCYTE [DISTWIDTH] IN BLOOD BY AUTOMATED COUNT: 19.9 % (ref 11.5–14.5)
HCT VFR BLD AUTO: 27 % (ref 37–48.5)
HGB BLD-MCNC: 8.1 G/DL (ref 12–16)
HYPOCHROMIA BLD QL SMEAR: ABNORMAL
IMM GRANULOCYTES # BLD AUTO: 0.27 K/UL (ref 0–0.04)
IMM GRANULOCYTES NFR BLD AUTO: 1.1 % (ref 0–0.5)
LYMPHOCYTES # BLD AUTO: 2 K/UL (ref 1–4.8)
LYMPHOCYTES NFR BLD: 8.6 % (ref 18–48)
MCH RBC QN AUTO: 27.4 PG (ref 27–31)
MCHC RBC AUTO-ENTMCNC: 30 G/DL (ref 32–36)
MCV RBC AUTO: 91 FL (ref 82–98)
MONOCYTES # BLD AUTO: 1.4 K/UL (ref 0.3–1)
MONOCYTES NFR BLD: 5.8 % (ref 4–15)
NEUTROPHILS # BLD AUTO: 20 K/UL (ref 1.8–7.7)
NEUTROPHILS NFR BLD: 84.2 % (ref 38–73)
NRBC BLD-RTO: 1 /100 WBC
PLATELET # BLD AUTO: 27 K/UL (ref 150–350)
PLATELET BLD QL SMEAR: ABNORMAL
PMV BLD AUTO: ABNORMAL FL (ref 9.2–12.9)
POIKILOCYTOSIS BLD QL SMEAR: ABNORMAL
POLYCHROMASIA BLD QL SMEAR: ABNORMAL
RBC # BLD AUTO: 2.96 M/UL (ref 4–5.4)
TARGETS BLD QL SMEAR: ABNORMAL
WBC # BLD AUTO: 23.68 K/UL (ref 3.9–12.7)

## 2019-10-04 PROCEDURE — 99232 SBSQ HOSP IP/OBS MODERATE 35: CPT | Mod: ,,, | Performed by: INTERNAL MEDICINE

## 2019-10-04 PROCEDURE — 85025 COMPLETE CBC W/AUTO DIFF WBC: CPT

## 2019-10-04 PROCEDURE — 36415 COLL VENOUS BLD VENIPUNCTURE: CPT

## 2019-10-04 PROCEDURE — 25000003 PHARM REV CODE 250: Performed by: INTERNAL MEDICINE

## 2019-10-04 PROCEDURE — 99232 PR SUBSEQUENT HOSPITAL CARE,LEVL II: ICD-10-PCS | Mod: ,,, | Performed by: INTERNAL MEDICINE

## 2019-10-04 PROCEDURE — 63600175 PHARM REV CODE 636 W HCPCS: Performed by: NURSE PRACTITIONER

## 2019-10-04 RX ORDER — DEXAMETHASONE 4 MG/1
40 TABLET ORAL DAILY
Qty: 20 TABLET | Refills: 0 | Status: SHIPPED | OUTPATIENT
Start: 2019-10-05 | End: 2019-10-07

## 2019-10-04 RX ADMIN — FERROUS SULFATE TAB EC 325 MG (65 MG FE EQUIVALENT) 325 MG: 325 (65 FE) TABLET DELAYED RESPONSE at 08:10

## 2019-10-04 RX ADMIN — DEXAMETHASONE 40 MG: 4 TABLET ORAL at 08:10

## 2019-10-04 NOTE — SUBJECTIVE & OBJECTIVE
Interval History: No evidence of platelet clumping on pathologist review. Platelet count 27 today. Patient denies any abnormal bleeding. Vitals signs reviewed. HR normal  Oncology Treatment Plan:   [No treatment plan]    Medications:  Continuous Infusions:  Scheduled Meds:   dexAMETHasone  40 mg Oral Daily    eltrombopag  75 mg Oral Daily    ferrous sulfate  325 mg Oral BID     PRN Meds:sodium chloride, sodium chloride, sodium chloride, Dextrose 10% Bolus, Dextrose 10% Bolus, glucagon (human recombinant), glucose, glucose, sodium chloride 0.9%     Review of patient's allergies indicates:   Allergen Reactions    Aspirin      Unable to take because of illness    Ibuprofen Other (See Comments)     Cannot take due to Blood Disorder        Past Medical History:   Diagnosis Date    Encounter for blood transfusion     Thrombocytopenia      Past Surgical History:   Procedure Laterality Date     SECTION      x1    ROBOT-ASSISTED SURGICAL REMOVAL OF SPLEEN USING DA MAXIM XI N/A 2018    Procedure: XI ROBOTIC SPLENECTOMY;  Surgeon: Yobani Lara MD;  Location: [facility];  Service: General;  Laterality: N/A;     Family History     None        Tobacco Use    Smoking status: Never Smoker    Smokeless tobacco: Never Used   Substance and Sexual Activity    Alcohol use: No     Frequency: Never    Drug use: No    Sexual activity: Not on file       Review of Systems   Constitutional: Negative for activity change, appetite change, chills, diaphoresis, fatigue, fever and unexpected weight change.   HENT: Negative for congestion, mouth sores, nosebleeds, sore throat, trouble swallowing and voice change.    Eyes: Negative for photophobia, discharge and visual disturbance.   Respiratory: Negative for cough, chest tightness, shortness of breath and wheezing.    Cardiovascular: Negative for chest pain, palpitations and leg swelling.   Gastrointestinal: Negative for abdominal distention, abdominal pain, anal bleeding,  blood in stool, constipation, diarrhea, nausea and vomiting.   Genitourinary: Negative for difficulty urinating, dysuria, hematuria and vaginal bleeding.   Musculoskeletal: Negative for arthralgias, back pain and myalgias.   Skin: Positive for rash (petechiae ). Negative for pallor and wound.   Neurological: Negative for dizziness, syncope, weakness and headaches.   Hematological: Negative for adenopathy. Does not bruise/bleed easily.   Psychiatric/Behavioral: The patient is not nervous/anxious.      Objective:     Vital Signs (Most Recent):  Temp: 97.7 °F (36.5 °C) (10/04/19 0725)  Pulse: 91 (10/04/19 0725)  Resp: 18 (10/04/19 0725)  BP: 98/61 (10/04/19 0725)  SpO2: 100 % (10/04/19 0725) Vital Signs (24h Range):  Temp:  [96.2 °F (35.7 °C)-98.9 °F (37.2 °C)] 97.7 °F (36.5 °C)  Pulse:  [] 91  Resp:  [16-18] 18  SpO2:  [95 %-100 %] 100 %  BP: ()/(58-74) 98/61     Weight: 95.1 kg (209 lb 10.5 oz)  Body mass index is 38.35 kg/m².  Body surface area is 2.04 meters squared.      Intake/Output Summary (Last 24 hours) at 10/4/2019 1019  Last data filed at 10/4/2019 0400  Gross per 24 hour   Intake 686.67 ml   Output 300 ml   Net 386.67 ml       Physical Exam   Constitutional: She is oriented to person, place, and time. She appears well-developed and well-nourished. She is cooperative.   HENT:   Head: Normocephalic.   Right Ear: External ear normal.   Left Ear: External ear normal.   Nose: Nose normal.   Mouth/Throat: Oropharynx is clear and moist.   Eyes: Conjunctivae, EOM and lids are normal. Right eye exhibits no discharge. Left eye exhibits no discharge. No scleral icterus.   Neck: Normal range of motion. No thyroid mass present.   Cardiovascular: Regular rhythm and normal heart sounds. Tachycardia present.   No murmur heard.  Pulmonary/Chest: Effort normal and breath sounds normal. No respiratory distress. She has no wheezes. She has no rhonchi. She has no rales.   Abdominal: Soft. Bowel sounds are normal.  She exhibits no distension. There is no tenderness.   Genitourinary:   Genitourinary Comments: deferred   Musculoskeletal: Normal range of motion. She exhibits no edema.   Lymphadenopathy:        Head (right side): No submandibular, no preauricular and no posterior auricular adenopathy present.        Head (left side): No submandibular, no preauricular and no posterior auricular adenopathy present.        Right cervical: No superficial cervical adenopathy present.       Left cervical: No superficial cervical adenopathy present.   Neurological: She is alert and oriented to person, place, and time.   Skin: Skin is warm, dry and intact. Petechiae noted.   Psychiatric: She has a normal mood and affect. Her speech is normal and behavior is normal. Thought content normal.   Vitals reviewed.      Significant Labs:   BMP:   No results for input(s): GLU, NA, K, CL, CO2, BUN, CREATININE, CALCIUM, MG in the last 48 hours., CBC:   Recent Labs   Lab 10/03/19  0511 10/03/19  1049 10/04/19  0445   WBC 15.50* 16.48* 23.68*   HGB 5.9* 6.0* 8.1*   HCT 19.4* 19.8* 27.0*   PLT 1* 2* 27*   , LFTs:   No results for input(s): ALT, AST, ALKPHOS, BILITOT, PROT, ALBUMIN in the last 48 hours. and All pertinent labs from the last 24 hours have been reviewed.    Diagnostic Results:  I have reviewed all pertinent imaging results/findings within the past 24 hours.

## 2019-10-04 NOTE — ASSESSMENT & PLAN NOTE
October 2, 2019  --Patient has had frequent hospitalizations related to her ITP. Patient usually responds well to IVIG and steroids. Patient reports taking Promacta 75 mg PO daily without missed doses although this has been questionable in the past.  I have asked pharmacy to review patient's prescription refills to determine if patient has refilling medication appropriately.  I have also asked pharmacy to obtain Promacta pill count.  I did discuss possibility of treating her ITP with injectable medication which would require her outpatient clinic visit every 1-2 weeks.  Patient seems reluctant to this and present time. Hemoglobin 6.2 status post 1 unit PRBCs.  Agree with additional 1 unit PRBCs transfusion.  Platelet count 1.  Would hold off on any platelet transfusion unless patient has signs and symptoms of active bleeding. Administer additional dose of IVIG 1 mg/kg 24 hr following last dose.  Continue oral iron supplementation for history of iron deficiency.  Check iron studies with a.m. Labs. Daily CBC.  Transfuse PRBCs p.r.n. for maintain hemoglobin < 8.  Supportive care    October 3, 2019  --s/p IVIG x 2 doses. Platelet count relatively unchanged. Will try Decadron 40 mg PO daily x 4 days. Per pharmacy review her Promacta prescription has been filled appropriately. Awaiting pill count. ITP may no longer be responsive to Promacta. Likely will need alternate therapy outpatient in infusion clinic. Patient will discuss alternate therapy with Hematologist in the outpatient setting. May try Nplate for treatment of her refractory ITP.   --Try Decadron 40 mg PO daily x 4 days. Continue Promacta 75 mg PO daily for now  --Blue top platelet count today  --Pathologist review of smear to identify any platelet clumping if present  --Monitor closely for S&S bleeding  --hold any platelet transfusion unless actively bleeding  --Hemoglobin 5.9. Transfuse additional 1 unit leukoreduced PRBCs   --Iron studies pending. F/u  results. If remains iron deficient despite oral iron supplementation will give dose of Venofer  --daily CBC   --Supportive care    October 4, 2019  --platelet count 27. No evidence of platelet clumping  --Hemoglobin 8.1. No need for additional blood transfusion  --WBC elevated. Likely steroid induced. No S&S infection. Patient afebrile  --Iron levels improved with oral iron supplementation. Continue oral iron outpatient  --Per pharmacy review patient appears to be taking Promacta without missed doses. She will continue Promacta 75 mg PO daily in the outpatient setting until she follows up with Dr. Cota in the outpatient Heme-Onc clinic to determine further treatment recommendations in regards to her ITP. Possible Nplate alternate therapy  --Continue Decadron 40 mg PO daily x 2 more days   --Discussed bleeding precautions with patient  --Will have nurse arrange outpatient heme-onc hospital follow up

## 2019-10-04 NOTE — PROGRESS NOTES
"[facility] -   Hematology/Oncology  Progress Note    Patient Name: Mari Miller  Admission Date: 10/1/2019  Hospital Length of Stay: 2 days  Code Status: Full Code     Subjective:     HPI:  23 y.o. female with ITP, who presented to the emergency department with complaints of shortness of breath which onset the day PTA. Patient also reports bowel movement that she "thought may have been a clot." Reports dark stools since taking iron pills TID.  Associated symptoms include dizziness, lightheadedness, palpitations, bruising, generalized petechiae.  No aggravating or alleviating factors.  At present patient denies any hematuria, hematochezia, bowel or urinary complaints, SOB, dizziness, CP, lightheadedness, syncope.     ED workup revealed Hemoglobin 6.4, platelet count 2, CXR unremarkable. EKG sinus tachycardia. This am Hemoglobin 4.4 (drawn prior to completion of blood transfusion). Patient is now s/p 1 unit PRBCs. Hemoglobin following transfusion 6.2.     Patient's ITP has been treated in the past with prednisone, intravenous immunoglobulin, Rituxan,, splenectomy.  Patient has had transient responses.  P atient takes Promacta 75 mg PO daily. She denies any missed doses. Of note in the past pharmacy reviews of prescriptions during previous hospitalizations have noted inconsistent prescription refill pickups from patient.      Interval History: No evidence of platelet clumping on pathologist review. Platelet count 27 today. Patient denies any abnormal bleeding. Vitals signs reviewed. HR normal  Oncology Treatment Plan:   [No treatment plan]    Medications:  Continuous Infusions:  Scheduled Meds:   dexAMETHasone  40 mg Oral Daily    eltrombopag  75 mg Oral Daily    ferrous sulfate  325 mg Oral BID     PRN Meds:sodium chloride, sodium chloride, sodium chloride, Dextrose 10% Bolus, Dextrose 10% Bolus, glucagon (human recombinant), glucose, glucose, sodium chloride 0.9%     Review of patient's " allergies indicates:   Allergen Reactions    Aspirin      Unable to take because of illness    Ibuprofen Other (See Comments)     Cannot take due to Blood Disorder        Past Medical History:   Diagnosis Date    Encounter for blood transfusion     Thrombocytopenia      Past Surgical History:   Procedure Laterality Date     SECTION      x1    ROBOT-ASSISTED SURGICAL REMOVAL OF SPLEEN USING DA MAXIM XI N/A 2018    Procedure: XI ROBOTIC SPLENECTOMY;  Surgeon: Yobani Lara MD;  Location: [facility];  Service: General;  Laterality: N/A;     Family History     None        Tobacco Use    Smoking status: Never Smoker    Smokeless tobacco: Never Used   Substance and Sexual Activity    Alcohol use: No     Frequency: Never    Drug use: No    Sexual activity: Not on file       Review of Systems   Constitutional: Negative for activity change, appetite change, chills, diaphoresis, fatigue, fever and unexpected weight change.   HENT: Negative for congestion, mouth sores, nosebleeds, sore throat, trouble swallowing and voice change.    Eyes: Negative for photophobia, discharge and visual disturbance.   Respiratory: Negative for cough, chest tightness, shortness of breath and wheezing.    Cardiovascular: Negative for chest pain, palpitations and leg swelling.   Gastrointestinal: Negative for abdominal distention, abdominal pain, anal bleeding, blood in stool, constipation, diarrhea, nausea and vomiting.   Genitourinary: Negative for difficulty urinating, dysuria, hematuria and vaginal bleeding.   Musculoskeletal: Negative for arthralgias, back pain and myalgias.   Skin: Positive for rash (petechiae ). Negative for pallor and wound.   Neurological: Negative for dizziness, syncope, weakness and headaches.   Hematological: Negative for adenopathy. Does not bruise/bleed easily.   Psychiatric/Behavioral: The patient is not nervous/anxious.      Objective:     Vital Signs (Most Recent):  Temp: 97.7 °F (36.5 °C)  (10/04/19 0725)  Pulse: 91 (10/04/19 0725)  Resp: 18 (10/04/19 0725)  BP: 98/61 (10/04/19 0725)  SpO2: 100 % (10/04/19 0725) Vital Signs (24h Range):  Temp:  [96.2 °F (35.7 °C)-98.9 °F (37.2 °C)] 97.7 °F (36.5 °C)  Pulse:  [] 91  Resp:  [16-18] 18  SpO2:  [95 %-100 %] 100 %  BP: ()/(58-74) 98/61     Weight: 95.1 kg (209 lb 10.5 oz)  Body mass index is 38.35 kg/m².  Body surface area is 2.04 meters squared.      Intake/Output Summary (Last 24 hours) at 10/4/2019 1019  Last data filed at 10/4/2019 0400  Gross per 24 hour   Intake 686.67 ml   Output 300 ml   Net 386.67 ml       Physical Exam   Constitutional: She is oriented to person, place, and time. She appears well-developed and well-nourished. She is cooperative.   HENT:   Head: Normocephalic.   Right Ear: External ear normal.   Left Ear: External ear normal.   Nose: Nose normal.   Mouth/Throat: Oropharynx is clear and moist.   Eyes: Conjunctivae, EOM and lids are normal. Right eye exhibits no discharge. Left eye exhibits no discharge. No scleral icterus.   Neck: Normal range of motion. No thyroid mass present.   Cardiovascular: Regular rhythm and normal heart sounds. Tachycardia present.   No murmur heard.  Pulmonary/Chest: Effort normal and breath sounds normal. No respiratory distress. She has no wheezes. She has no rhonchi. She has no rales.   Abdominal: Soft. Bowel sounds are normal. She exhibits no distension. There is no tenderness.   Genitourinary:   Genitourinary Comments: deferred   Musculoskeletal: Normal range of motion. She exhibits no edema.   Lymphadenopathy:        Head (right side): No submandibular, no preauricular and no posterior auricular adenopathy present.        Head (left side): No submandibular, no preauricular and no posterior auricular adenopathy present.        Right cervical: No superficial cervical adenopathy present.       Left cervical: No superficial cervical adenopathy present.   Neurological: She is alert and  oriented to person, place, and time.   Skin: Skin is warm, dry and intact. Petechiae noted.   Psychiatric: She has a normal mood and affect. Her speech is normal and behavior is normal. Thought content normal.   Vitals reviewed.      Significant Labs:   BMP:   No results for input(s): GLU, NA, K, CL, CO2, BUN, CREATININE, CALCIUM, MG in the last 48 hours., CBC:   Recent Labs   Lab 10/03/19  0511 10/03/19  1049 10/04/19  0445   WBC 15.50* 16.48* 23.68*   HGB 5.9* 6.0* 8.1*   HCT 19.4* 19.8* 27.0*   PLT 1* 2* 27*   , LFTs:   No results for input(s): ALT, AST, ALKPHOS, BILITOT, PROT, ALBUMIN in the last 48 hours. and All pertinent labs from the last 24 hours have been reviewed.    Diagnostic Results:  I have reviewed all pertinent imaging results/findings within the past 24 hours.    Assessment/Plan:     * Chronic ITP (idiopathic thrombocytopenia)  October 2, 2019  --Patient has had frequent hospitalizations related to her ITP. Patient usually responds well to IVIG and steroids. Patient reports taking Promacta 75 mg PO daily without missed doses although this has been questionable in the past.  I have asked pharmacy to review patient's prescription refills to determine if patient has refilling medication appropriately.  I have also asked pharmacy to obtain Promacta pill count.  I did discuss possibility of treating her ITP with injectable medication which would require her outpatient clinic visit every 1-2 weeks.  Patient seems reluctant to this and present time. Hemoglobin 6.2 status post 1 unit PRBCs.  Agree with additional 1 unit PRBCs transfusion.  Platelet count 1.  Would hold off on any platelet transfusion unless patient has signs and symptoms of active bleeding. Administer additional dose of IVIG 1 mg/kg 24 hr following last dose.  Continue oral iron supplementation for history of iron deficiency.  Check iron studies with a.m. Labs. Daily CBC.  Transfuse PRBCs p.r.n. for maintain hemoglobin < 8.  Supportive  care    October 3, 2019  --s/p IVIG x 2 doses. Platelet count relatively unchanged. Will try Decadron 40 mg PO daily x 4 days. Per pharmacy review her Promacta prescription has been filled appropriately. Awaiting pill count. ITP may no longer be responsive to Promacta. Likely will need alternate therapy outpatient in infusion clinic. Patient will discuss alternate therapy with Hematologist in the outpatient setting. May try Nplate for treatment of her refractory ITP.   --Try Decadron 40 mg PO daily x 4 days. Continue Promacta 75 mg PO daily for now  --Blue top platelet count today  --Pathologist review of smear to identify any platelet clumping if present  --Monitor closely for S&S bleeding  --hold any platelet transfusion unless actively bleeding  --Hemoglobin 5.9. Transfuse additional 1 unit leukoreduced PRBCs   --Iron studies pending. F/u results. If remains iron deficient despite oral iron supplementation will give dose of Venofer  --daily CBC   --Supportive care    October 4, 2019  --platelet count 27. No evidence of platelet clumping  --Hemoglobin 8.1. No need for additional blood transfusion  --WBC elevated. Likely steroid induced. No S&S infection. Patient afebrile  --Iron levels improved with oral iron supplementation. Continue oral iron outpatient  --Per pharmacy review patient appears to be taking Promacta without missed doses. She will continue Promacta 75 mg PO daily in the outpatient setting until she follows up with Dr. Cota in the outpatient Heme-Onc clinic to determine further treatment recommendations in regards to her ITP. Possible Nplate alternate therapy  --Continue Decadron 40 mg PO daily x 2 more days   --Discussed bleeding precautions with patient  --Will have nurse arrange outpatient heme-onc hospital follow up     Leukocytosis  October 3, 2019  --chronically elevated. No S&S infection. Monitor S&S infection closely. Daily CBC          Thank you for your consult. I will follow-up with  patient. Please contact us if you have any additional questions.     Sophy Das NP  Hematology/Oncology  [facility]

## 2019-10-04 NOTE — DISCHARGE SUMMARY
[facility] Hospital Medicine  Discharge Summary      Patient Name: Mari Miller  MRN: [id number]  Admission Date: 10/1/2019  Hospital Length of Stay: 2 days  Discharge Date and Time:  10/04/2019 10:11 AM  Attending Physician: Yrn Curry MD   Discharging Provider: BELA Lawson  Primary Care Provider: Ifeanyi Tello NP      HPI:   23 year old woman with history of ITP  on who presented with history of worsening shortness of breath that she noticed on day of presentation . There was associated history of black stools and she also noticed blood clot in the stool yesterday . Since presentation, initial cbc showed-    Component      Latest Ref Rng & Units 10/1/2019 9/24/2019              Hemoglobin      12.0 - 16.0 g/dL 6.4 (L) 10.3 (L)     Component      Latest Ref Rng & Units 10/1/2019 9/24/2019              Platelets      150 - 350 K/uL 2 (LL) 428 (H)   Plan of care was discussed with Oncology and IVIG and transfusion of one unit of packed cells was recommended.    * No surgery found *      Hospital Course:   Pt admitted to Inpatient status for symptomatic anemia in the setting of chronic ITP.  Heme/Onc consulted.  H/H improved post transfusion on PRBCs x 2 units.  Leukocytosis improved.  Pt reports compliance with outpatient medications and verbalized symptom improvement. 10/03: Patient reports she feels better today. No s/s bleeding noted. Hgb 6.0, platelets  2.0. Discussed with Heme/onc who will transfuse 1U pRBC. CBC in AM.10/04: Platelets increased to 27, Hgb 8.1, Hct 27.0. Discussed with Heme/onc -  Pt will continue dexamethasone 40mg/day X 2 additional days and f/u with Dr. Calderon in 1 week. Patient was seen and examined today and deemed stable for discharge home.     Consults:   Consults (From admission, onward)        Status Ordering Provider     Inpatient consult to Hematology  Once     Provider:  Zack Bowden MD    Acknowledged LUISANA LANDRY      Inpatient consult to Hospitalist  Once     Provider:  Rubens Cedeno MD    Acknowledged LUISANA LANDRY.          No new Assessment & Plan notes have been filed under this hospital service since the last note was generated.  Service: Hospital Medicine    Final Active Diagnoses:    Diagnosis Date Noted POA    PRINCIPAL PROBLEM:  Chronic ITP (idiopathic thrombocytopenia) [D69.3] 08/20/2018 Yes    Leukocytosis [D72.829] 06/19/2019 Yes    Sinus tachycardia [R00.0] 06/19/2019 Yes    Iron deficiency anemia due to chronic blood loss [D50.0] 04/17/2017 Yes      Problems Resolved During this Admission:    Diagnosis Date Noted Date Resolved POA    SOB (shortness of breath) [R06.02] 10/02/2019 10/04/2019 Yes       Discharged Condition: stable    Disposition: Home or Self Care    Follow Up:  Follow-up Information     Sunil Calderon MD In 1 week.    Specialty:  Hematology and Oncology  Why:  hospital follow up  Contact information:  [address]  [place] [address] [phone]                 Patient Instructions:      Diet Adult Regular     Notify your health care provider if you experience any of the following:  temperature >100.4     Notify your health care provider if you experience any of the following:   Order Comments: bleeding     Notify your health care provider if you experience any of the following:  difficulty breathing or increased cough     Activity as tolerated       Significant Diagnostic Studies: Labs:   BMP: No results for input(s): GLU, NA, K, CL, CO2, BUN, CREATININE, CALCIUM, MG in the last 48 hours., CMP No results for input(s): NA, K, CL, CO2, GLU, BUN, CREATININE, CALCIUM, PROT, ALBUMIN, BILITOT, ALKPHOS, AST, ALT, ANIONGAP, ESTGFRAFRICA, EGFRNONAA in the last 48 hours., CBC   Recent Labs   Lab 10/03/19  0511 10/03/19  1049 10/04/19  0445   WBC 15.50* 16.48* 23.68*   HGB 5.9* 6.0* 8.1*   HCT 19.4* 19.8* 27.0*   PLT 1* 2* 27*    and All labs within the past 24 hours have been  reviewed    Pending Diagnostic Studies:     None         Medications:  Reconciled Home Medications:      Medication List      START taking these medications    dexAMETHasone 4 MG Tab  Commonly known as:  DECADRON  Take 10 tablets (40 mg total) by mouth once daily. for 2 days  Start taking on:  October 5, 2019        CONTINUE taking these medications    ferrous sulfate 325 mg (65 mg iron) Tab tablet  Commonly known as:  FEOSOL  Take 1 tablet (325 mg total) by mouth 3 (three) times daily.     PROMACTA 75 mg Tab  Generic drug:  eltrombopag  Take 1 tablet (75 mg total) by mouth once daily.            Indwelling Lines/Drains at time of discharge:   Lines/Drains/Airways     None                 Time spent on the discharge of patient: 60 minutes  Patient was seen and examined on the date of discharge and determined to be suitable for discharge.         USZY Lawson-FELIX  Department of Hospital Medicine  [facility]

## 2019-10-04 NOTE — NURSING
Discharged order received and reviewed with patient and family. Patient instructed when to take each medication next dose. Prescriptions received. IV removed, tele removed. Patient declined assistance with dressing by staff. Patient transported self to front for family to transport home.

## 2019-10-04 NOTE — PLAN OF CARE
Pt received her second unit of blood to address her ITP. Pt stable; Will check next H&H.  Bed in low position with side rails up X2. Will continue to monitor.

## 2019-10-05 LAB
BLD PROD TYP BPU: NORMAL
BLOOD UNIT EXPIRATION DATE: NORMAL
BLOOD UNIT TYPE CODE: 5100
BLOOD UNIT TYPE: NORMAL
CODING SYSTEM: NORMAL
DISPENSE STATUS: NORMAL
NUM UNITS TRANS PACKED RBC: NORMAL

## 2019-10-11 ENCOUNTER — OFFICE VISIT (OUTPATIENT)
Dept: HEMATOLOGY/ONCOLOGY | Facility: CLINIC | Age: 24
End: 2019-10-11
Payer: MEDICAID

## 2019-10-11 ENCOUNTER — LAB VISIT (OUTPATIENT)
Dept: LAB | Facility: HOSPITAL | Age: 24
End: 2019-10-11
Attending: INTERNAL MEDICINE
Payer: MEDICAID

## 2019-10-11 VITALS
TEMPERATURE: 99 F | SYSTOLIC BLOOD PRESSURE: 113 MMHG | WEIGHT: 213.63 LBS | HEART RATE: 82 BPM | DIASTOLIC BLOOD PRESSURE: 71 MMHG | OXYGEN SATURATION: 100 % | BODY MASS INDEX: 39.31 KG/M2 | HEIGHT: 62 IN

## 2019-10-11 DIAGNOSIS — Z90.81 H/O SPLENECTOMY: ICD-10-CM

## 2019-10-11 DIAGNOSIS — D69.3 CHRONIC ITP (IDIOPATHIC THROMBOCYTOPENIA): Primary | ICD-10-CM

## 2019-10-11 DIAGNOSIS — D50.0 IRON DEFICIENCY ANEMIA DUE TO CHRONIC BLOOD LOSS: ICD-10-CM

## 2019-10-11 DIAGNOSIS — D69.3 CHRONIC ITP (IDIOPATHIC THROMBOCYTOPENIA): ICD-10-CM

## 2019-10-11 LAB
ALBUMIN SERPL BCP-MCNC: 3.2 G/DL (ref 3.5–5.2)
ALP SERPL-CCNC: 44 U/L (ref 55–135)
ALT SERPL W/O P-5'-P-CCNC: 17 U/L (ref 10–44)
ANION GAP SERPL CALC-SCNC: 11 MMOL/L (ref 8–16)
AST SERPL-CCNC: 17 U/L (ref 10–40)
BASOPHILS # BLD AUTO: 0.03 K/UL (ref 0–0.2)
BASOPHILS NFR BLD: 0.2 % (ref 0–1.9)
BILIRUB SERPL-MCNC: 0.3 MG/DL (ref 0.1–1)
BUN SERPL-MCNC: 11 MG/DL (ref 6–20)
CALCIUM SERPL-MCNC: 9 MG/DL (ref 8.7–10.5)
CHLORIDE SERPL-SCNC: 104 MMOL/L (ref 95–110)
CO2 SERPL-SCNC: 22 MMOL/L (ref 23–29)
CREAT SERPL-MCNC: 0.7 MG/DL (ref 0.5–1.4)
DIFFERENTIAL METHOD: ABNORMAL
EOSINOPHIL # BLD AUTO: 0.3 K/UL (ref 0–0.5)
EOSINOPHIL NFR BLD: 1.5 % (ref 0–8)
ERYTHROCYTE [DISTWIDTH] IN BLOOD BY AUTOMATED COUNT: 18.9 % (ref 11.5–14.5)
EST. GFR  (AFRICAN AMERICAN): >60 ML/MIN/1.73 M^2
EST. GFR  (NON AFRICAN AMERICAN): >60 ML/MIN/1.73 M^2
GLUCOSE SERPL-MCNC: 81 MG/DL (ref 70–110)
HCT VFR BLD AUTO: 30.4 % (ref 37–48.5)
HGB BLD-MCNC: 9 G/DL (ref 12–16)
IMM GRANULOCYTES # BLD AUTO: 0.17 K/UL (ref 0–0.04)
IMM GRANULOCYTES NFR BLD AUTO: 1 % (ref 0–0.5)
LYMPHOCYTES # BLD AUTO: 3 K/UL (ref 1–4.8)
LYMPHOCYTES NFR BLD: 17.4 % (ref 18–48)
MCH RBC QN AUTO: 25.6 PG (ref 27–31)
MCHC RBC AUTO-ENTMCNC: 29.6 G/DL (ref 32–36)
MCV RBC AUTO: 86 FL (ref 82–98)
MONOCYTES # BLD AUTO: 1.1 K/UL (ref 0.3–1)
MONOCYTES NFR BLD: 6.2 % (ref 4–15)
NEUTROPHILS # BLD AUTO: 12.8 K/UL (ref 1.8–7.7)
NEUTROPHILS NFR BLD: 73.7 % (ref 38–73)
NRBC BLD-RTO: 1 /100 WBC
PLATELET # BLD AUTO: 611 K/UL (ref 150–350)
PMV BLD AUTO: 11.3 FL (ref 9.2–12.9)
POTASSIUM SERPL-SCNC: 4.4 MMOL/L (ref 3.5–5.1)
PROT SERPL-MCNC: 7.3 G/DL (ref 6–8.4)
RBC # BLD AUTO: 3.52 M/UL (ref 4–5.4)
SODIUM SERPL-SCNC: 137 MMOL/L (ref 136–145)
WBC # BLD AUTO: 17.31 K/UL (ref 3.9–12.7)

## 2019-10-11 PROCEDURE — 99214 OFFICE O/P EST MOD 30 MIN: CPT | Mod: S$PBB,,, | Performed by: INTERNAL MEDICINE

## 2019-10-11 PROCEDURE — 99213 OFFICE O/P EST LOW 20 MIN: CPT | Mod: PBBFAC | Performed by: INTERNAL MEDICINE

## 2019-10-11 PROCEDURE — 36415 COLL VENOUS BLD VENIPUNCTURE: CPT

## 2019-10-11 PROCEDURE — 80053 COMPREHEN METABOLIC PANEL: CPT

## 2019-10-11 PROCEDURE — 99999 PR PBB SHADOW E&M-EST. PATIENT-LVL III: ICD-10-PCS | Mod: PBBFAC,,, | Performed by: INTERNAL MEDICINE

## 2019-10-11 PROCEDURE — 99214 PR OFFICE/OUTPT VISIT, EST, LEVL IV, 30-39 MIN: ICD-10-PCS | Mod: S$PBB,,, | Performed by: INTERNAL MEDICINE

## 2019-10-11 PROCEDURE — 99999 PR PBB SHADOW E&M-EST. PATIENT-LVL III: CPT | Mod: PBBFAC,,, | Performed by: INTERNAL MEDICINE

## 2019-10-11 NOTE — PROGRESS NOTES
Subjective:       Patient ID: Mari Miller is a 23 y.o. female.    Chief Complaint: Results (ITP)    HPI 23-year-old female with refractory ITP patient has treated in the past with pulse dexamethasone; Rituxan and splenectomy with little success.  Patient had been placed on Promacta in platelet count controlled.  She had previously discontinue Promacta but states that she has been much more diligent in taking in fact recent hospitalization note states that pill count through pharmacy demonstrating that she was taking medication.  Patient presented with profound anemia from GYN blood loss.  Was given 2 doses of high-dose immunoglobulin 1 gram/kilogram during hospitalization as well as dexamethasone she remains on her dosing of Promacta and returns for follow-up today    Past Medical History:   Diagnosis Date    Encounter for blood transfusion     Thrombocytopenia      History reviewed. No pertinent family history.  Social History     Socioeconomic History    Marital status: Single     Spouse name: Not on file    Number of children: Not on file    Years of education: Not on file    Highest education level: Not on file   Occupational History    Not on file   Social Needs    Financial resource strain: Not on file    Food insecurity:     Worry: Not on file     Inability: Not on file    Transportation needs:     Medical: Not on file     Non-medical: Not on file   Tobacco Use    Smoking status: Never Smoker    Smokeless tobacco: Never Used   Substance and Sexual Activity    Alcohol use: No     Frequency: Never    Drug use: No    Sexual activity: Not on file   Lifestyle    Physical activity:     Days per week: Not on file     Minutes per session: Not on file    Stress: Not on file   Relationships    Social connections:     Talks on phone: Not on file     Gets together: Not on file     Attends Latter day service: Not on file     Active member of club or organization: Not on file     Attends  meetings of clubs or organizations: Not on file     Relationship status: Not on file   Other Topics Concern    Not on file   Social History Narrative    Not on file     Past Surgical History:   Procedure Laterality Date     SECTION      x1    ROBOT-ASSISTED SURGICAL REMOVAL OF SPLEEN USING DA MAXIM XI N/A 2018    Procedure: XI ROBOTIC SPLENECTOMY;  Surgeon: Yobani Lara MD;  Location: [facility];  Service: General;  Laterality: N/A;       Labs:  Lab Results   Component Value Date    WBC 17.31 (H) 10/11/2019    HGB 9.0 (L) 10/11/2019    HCT 30.4 (L) 10/11/2019    MCV 86 10/11/2019     (H) 10/11/2019     BMP  Lab Results   Component Value Date     10/11/2019    K 4.4 10/11/2019     10/11/2019    CO2 22 (L) 10/11/2019    BUN 11 10/11/2019    CREATININE 0.7 10/11/2019    CALCIUM 9.0 10/11/2019    ANIONGAP 11 10/11/2019    ESTGFRAFRICA >60 10/11/2019    EGFRNONAA >60 10/11/2019     Lab Results   Component Value Date    ALT 17 10/11/2019    AST 17 10/11/2019    ALKPHOS 44 (L) 10/11/2019    BILITOT 0.3 10/11/2019       Lab Results   Component Value Date    IRON 50 10/03/2019    TIBC 295 10/03/2019    FERRITIN 39 10/03/2019     Lab Results   Component Value Date    KRAGIOSU30 421 2019     Lab Results   Component Value Date    FOLATE 7.9 2019     Lab Results   Component Value Date    TSH 3.638 10/02/2019         Review of Systems   Constitutional: Positive for fatigue. Negative for activity change, appetite change, chills, diaphoresis, fever and unexpected weight change.   HENT: Negative for congestion, dental problem, drooling, ear discharge, ear pain, facial swelling, hearing loss, mouth sores, nosebleeds, postnasal drip, rhinorrhea, sinus pressure, sneezing, sore throat, tinnitus, trouble swallowing and voice change.    Eyes: Negative for photophobia, pain, discharge, redness, itching and visual disturbance.   Respiratory: Negative for cough, choking, chest tightness,  shortness of breath, wheezing and stridor.    Cardiovascular: Negative for chest pain, palpitations and leg swelling.   Gastrointestinal: Negative for abdominal distention, abdominal pain, anal bleeding, blood in stool, constipation, diarrhea, nausea, rectal pain and vomiting.   Endocrine: Negative for cold intolerance, heat intolerance, polydipsia, polyphagia and polyuria.   Genitourinary: Negative for decreased urine volume, difficulty urinating, dyspareunia, dysuria, enuresis, flank pain, frequency, genital sores, hematuria, menstrual problem, pelvic pain, urgency, vaginal bleeding, vaginal discharge and vaginal pain.   Musculoskeletal: Negative for arthralgias, back pain, gait problem, joint swelling, myalgias, neck pain and neck stiffness.   Skin: Negative for color change, pallor and rash.   Allergic/Immunologic: Negative for environmental allergies, food allergies and immunocompromised state.   Neurological: Positive for weakness. Negative for dizziness, tremors, seizures, syncope, facial asymmetry, speech difficulty, light-headedness, numbness and headaches.   Hematological: Negative for adenopathy. Does not bruise/bleed easily.   Psychiatric/Behavioral: Positive for dysphoric mood. Negative for agitation, behavioral problems, confusion, decreased concentration, hallucinations, self-injury, sleep disturbance and suicidal ideas. The patient is nervous/anxious. The patient is not hyperactive.        Objective:      Physical Exam   Constitutional: She is oriented to person, place, and time. She appears well-developed and well-nourished. She appears distressed.   HENT:   Head: Normocephalic and atraumatic.   Right Ear: External ear normal.   Left Ear: External ear normal.   Nose: Nose normal. Right sinus exhibits no maxillary sinus tenderness and no frontal sinus tenderness. Left sinus exhibits no maxillary sinus tenderness and no frontal sinus tenderness.   Mouth/Throat: Oropharynx is clear and moist. No  oropharyngeal exudate.   Eyes: Pupils are equal, round, and reactive to light. Conjunctivae, EOM and lids are normal. Right eye exhibits no discharge. Left eye exhibits no discharge. Right conjunctiva is not injected. Right conjunctiva has no hemorrhage. Left conjunctiva is not injected. Left conjunctiva has no hemorrhage. No scleral icterus.   Neck: Normal range of motion. Neck supple. No JVD present. No tracheal deviation present. No thyromegaly present.   Cardiovascular: Normal rate and regular rhythm.   Pulmonary/Chest: Effort normal. No stridor. No respiratory distress. She exhibits no tenderness.   Abdominal: Soft. She exhibits no distension and no mass. There is no splenomegaly or hepatomegaly. There is no tenderness. There is no rebound.   Musculoskeletal: Normal range of motion. She exhibits no edema or tenderness.   Lymphadenopathy:     She has no cervical adenopathy.     She has no axillary adenopathy.        Right: No supraclavicular adenopathy present.        Left: No supraclavicular adenopathy present.   Neurological: She is alert and oriented to person, place, and time. No cranial nerve deficit. Coordination normal.   Skin: Skin is dry. No rash noted. She is not diaphoretic. No erythema.   Psychiatric: Her behavior is normal. Judgment and thought content normal. Her mood appears anxious.   Vitals reviewed.          Assessment:      1. Chronic ITP (idiopathic thrombocytopenia)    2. H/O splenectomy    3. Iron deficiency anemia due to chronic blood loss           Plan:       Patient returns today with dramatic improvement in platelet count of over [id number].  At this point I am not sure what is causing the dramatic rebound.  Would recommend careful follow-up with weekly CBCs.  She has responded most consistently to intravenous immunoglobulin and may need to be on a 3-4 week basis for this.  She has been reluctant to return on a weekly basis for an injection with Nplate.  At this point would recommend  careful follow-up for review      Nash Cota Jr, MD FACP

## 2019-10-14 NOTE — PLAN OF CARE
10/14/19 1823   Final Note   Assessment Type Final Discharge Note   Anticipated Discharge Disposition Home   Right Care Referral Info   Post Acute Recommendation No Care

## 2019-10-15 ENCOUNTER — TELEPHONE (OUTPATIENT)
Dept: HEMATOLOGY/ONCOLOGY | Facility: CLINIC | Age: 24
End: 2019-10-15

## 2019-10-15 ENCOUNTER — LAB VISIT (OUTPATIENT)
Dept: LAB | Facility: HOSPITAL | Age: 24
End: 2019-10-15
Attending: INTERNAL MEDICINE
Payer: MEDICAID

## 2019-10-15 ENCOUNTER — OFFICE VISIT (OUTPATIENT)
Dept: HEMATOLOGY/ONCOLOGY | Facility: CLINIC | Age: 24
End: 2019-10-15
Payer: MEDICAID

## 2019-10-15 VITALS
TEMPERATURE: 99 F | HEIGHT: 62 IN | HEART RATE: 81 BPM | OXYGEN SATURATION: 100 % | BODY MASS INDEX: 39.6 KG/M2 | DIASTOLIC BLOOD PRESSURE: 80 MMHG | SYSTOLIC BLOOD PRESSURE: 122 MMHG | WEIGHT: 215.19 LBS

## 2019-10-15 DIAGNOSIS — Z90.81 H/O SPLENECTOMY: ICD-10-CM

## 2019-10-15 DIAGNOSIS — D50.0 IRON DEFICIENCY ANEMIA DUE TO CHRONIC BLOOD LOSS: ICD-10-CM

## 2019-10-15 DIAGNOSIS — D69.3 CHRONIC ITP (IDIOPATHIC THROMBOCYTOPENIA): Primary | ICD-10-CM

## 2019-10-15 DIAGNOSIS — D69.3 CHRONIC ITP (IDIOPATHIC THROMBOCYTOPENIA): ICD-10-CM

## 2019-10-15 LAB
ALBUMIN SERPL BCP-MCNC: 3.1 G/DL (ref 3.5–5.2)
ALP SERPL-CCNC: 47 U/L (ref 55–135)
ALT SERPL W/O P-5'-P-CCNC: 15 U/L (ref 10–44)
ANION GAP SERPL CALC-SCNC: 7 MMOL/L (ref 8–16)
ANISOCYTOSIS BLD QL SMEAR: SLIGHT
AST SERPL-CCNC: 11 U/L (ref 10–40)
BASOPHILS # BLD AUTO: 0.06 K/UL (ref 0–0.2)
BASOPHILS NFR BLD: 0.4 % (ref 0–1.9)
BILIRUB SERPL-MCNC: 0.1 MG/DL (ref 0.1–1)
BUN SERPL-MCNC: 9 MG/DL (ref 6–20)
CALCIUM SERPL-MCNC: 8.6 MG/DL (ref 8.7–10.5)
CHLORIDE SERPL-SCNC: 109 MMOL/L (ref 95–110)
CO2 SERPL-SCNC: 23 MMOL/L (ref 23–29)
CREAT SERPL-MCNC: 0.6 MG/DL (ref 0.5–1.4)
DIFFERENTIAL METHOD: ABNORMAL
EOSINOPHIL # BLD AUTO: 0.1 K/UL (ref 0–0.5)
EOSINOPHIL NFR BLD: 0.7 % (ref 0–8)
ERYTHROCYTE [DISTWIDTH] IN BLOOD BY AUTOMATED COUNT: 19.4 % (ref 11.5–14.5)
EST. GFR  (AFRICAN AMERICAN): >60 ML/MIN/1.73 M^2
EST. GFR  (NON AFRICAN AMERICAN): >60 ML/MIN/1.73 M^2
FERRITIN SERPL-MCNC: 35 NG/ML (ref 20–300)
GLUCOSE SERPL-MCNC: 92 MG/DL (ref 70–110)
HCT VFR BLD AUTO: 29.7 % (ref 37–48.5)
HGB BLD-MCNC: 9.1 G/DL (ref 12–16)
HYPOCHROMIA BLD QL SMEAR: ABNORMAL
IMM GRANULOCYTES # BLD AUTO: 0.11 K/UL (ref 0–0.04)
IMM GRANULOCYTES NFR BLD AUTO: 0.7 % (ref 0–0.5)
IRON SERPL-MCNC: 52 UG/DL (ref 30–160)
LYMPHOCYTES # BLD AUTO: 2.9 K/UL (ref 1–4.8)
LYMPHOCYTES NFR BLD: 18.2 % (ref 18–48)
MCH RBC QN AUTO: 25.9 PG (ref 27–31)
MCHC RBC AUTO-ENTMCNC: 30.6 G/DL (ref 32–36)
MCV RBC AUTO: 85 FL (ref 82–98)
MONOCYTES # BLD AUTO: 1.3 K/UL (ref 0.3–1)
MONOCYTES NFR BLD: 7.8 % (ref 4–15)
NEUTROPHILS # BLD AUTO: 11.7 K/UL (ref 1.8–7.7)
NEUTROPHILS NFR BLD: 72.9 % (ref 38–73)
NRBC BLD-RTO: 0 /100 WBC
PLATELET # BLD AUTO: 663 K/UL (ref 150–350)
PLATELET BLD QL SMEAR: ABNORMAL
PMV BLD AUTO: 11.1 FL (ref 9.2–12.9)
POIKILOCYTOSIS BLD QL SMEAR: SLIGHT
POTASSIUM SERPL-SCNC: 4.3 MMOL/L (ref 3.5–5.1)
PROT SERPL-MCNC: 7 G/DL (ref 6–8.4)
RBC # BLD AUTO: 3.51 M/UL (ref 4–5.4)
SATURATED IRON: 14 % (ref 20–50)
SODIUM SERPL-SCNC: 139 MMOL/L (ref 136–145)
TARGETS BLD QL SMEAR: ABNORMAL
TOTAL IRON BINDING CAPACITY: 366 UG/DL (ref 250–450)
TRANSFERRIN SERPL-MCNC: 247 MG/DL (ref 200–375)
WBC # BLD AUTO: 16.08 K/UL (ref 3.9–12.7)

## 2019-10-15 PROCEDURE — 82728 ASSAY OF FERRITIN: CPT

## 2019-10-15 PROCEDURE — 99999 PR PBB SHADOW E&M-EST. PATIENT-LVL III: ICD-10-PCS | Mod: PBBFAC,,, | Performed by: INTERNAL MEDICINE

## 2019-10-15 PROCEDURE — 99213 OFFICE O/P EST LOW 20 MIN: CPT | Mod: PBBFAC | Performed by: INTERNAL MEDICINE

## 2019-10-15 PROCEDURE — 80053 COMPREHEN METABOLIC PANEL: CPT

## 2019-10-15 PROCEDURE — 85025 COMPLETE CBC W/AUTO DIFF WBC: CPT

## 2019-10-15 PROCEDURE — 99999 PR PBB SHADOW E&M-EST. PATIENT-LVL III: CPT | Mod: PBBFAC,,, | Performed by: INTERNAL MEDICINE

## 2019-10-15 PROCEDURE — 83540 ASSAY OF IRON: CPT

## 2019-10-15 PROCEDURE — 99214 OFFICE O/P EST MOD 30 MIN: CPT | Mod: S$PBB,,, | Performed by: INTERNAL MEDICINE

## 2019-10-15 PROCEDURE — 36415 COLL VENOUS BLD VENIPUNCTURE: CPT

## 2019-10-15 PROCEDURE — 99214 PR OFFICE/OUTPT VISIT, EST, LEVL IV, 30-39 MIN: ICD-10-PCS | Mod: S$PBB,,, | Performed by: INTERNAL MEDICINE

## 2019-10-15 NOTE — TELEPHONE ENCOUNTER
----- Message from Sakina Swan MD sent at 10/15/2019  1:37 PM CDT -----  Please note that in 2 weeks should have RV, labs and possible IVIG infusion (accidentally typed promacta rather than IVIG)

## 2019-10-15 NOTE — PROGRESS NOTES
Subjective:      DATE OF VISIT: 10/15/2019   ?   ?   Patient ID:?Mari Miller is a 23 y.o. female.?? MR#: [id number]   ?   PRIMARY PROVIDER:  Dr. Cota  ?   CHIEF COMPLAINT:  Chronic ITP?????  ?   CURRENT TREATMENT:  Eltrombopag     PAST TREATMENT:  Steroids, splenectomy 11/2018, eltrombopag (Promacta) and prednisone taper, IVIG x 2 10/2/19 and 10/3/19     HPI    Today I had the pleasure meeting Ms. Rasmussen, 23-year-old with history of refractory ITP status post steroids, splenectomy in November 2018, and this year monthly hospitalizations with platelet counts as low as 1-5000.  Since December 2018 she has been treated with daily eltrombopag.  During most recent hospitalization she received 2 infusions of IVIG on 10/02/2019 and 10/03/2019 with excellent response.  During her last follow-up with Dr. Cota last week in followup her platelet count eliana from 1K during hospitalization to >600 K. she she denies any issues with bleeding or bruising.  She is otherwise in stable clinical condition has no new symptoms or concerns.    Review of Systems    ?   A comprehensive 14-point review of systems was reviewed with patient and was negative other than as specified above.   ?     Objective:      Physical Exam      ?   Vitals:    10/15/19 1036   BP: 122/80   Pulse: 81   Temp: 98.8 °F (37.1 °C)      ?   ECOG:?  0   General appearance: Generally well appearing, in no acute distress.   Head, eyes, ears, nose, and throat: Oropharynx clear with moist mucous membranes.   Cardiovascular: Regular rate and rhythm, S1, S2, no audible murmurs.   Respiratory: Lungs clear to auscultation bilaterally.   Abdomen: nontender, nondistended.   Extremities: Warm, without edema.   Neurologic: Alert and oriented. Grossly normal strength, coordination, and gait.   Skin: No rashes, ecchymoses or petechial lesion.   Psychiatric: normal mood and affect, conversant and appropriate    ?   Laboratory:  ?   Lab Visit on 10/15/2019    Component Date Value Ref Range Status    WBC 10/15/2019 16.08* 3.90 - 12.70 K/uL Final    RBC 10/15/2019 3.51* 4.00 - 5.40 M/uL Final    Hemoglobin 10/15/2019 9.1* 12.0 - 16.0 g/dL Final    Hematocrit 10/15/2019 29.7* 37.0 - 48.5 % Final    Mean Corpuscular Volume 10/15/2019 85  82 - 98 fL Final    Mean Corpuscular Hemoglobin 10/15/2019 25.9* 27.0 - 31.0 pg Final    Mean Corpuscular Hemoglobin Conc 10/15/2019 30.6* 32.0 - 36.0 g/dL Final    RDW 10/15/2019 19.4* 11.5 - 14.5 % Final    Platelets 10/15/2019 663* 150 - 350 K/uL Final    MPV 10/15/2019 11.1  9.2 - 12.9 fL Final    Sodium 10/15/2019 139  136 - 145 mmol/L Final    Potassium 10/15/2019 4.3  3.5 - 5.1 mmol/L Final    Chloride 10/15/2019 109  95 - 110 mmol/L Final    CO2 10/15/2019 23  23 - 29 mmol/L Final    Glucose 10/15/2019 92  70 - 110 mg/dL Final    BUN, Bld 10/15/2019 9  6 - 20 mg/dL Final    Creatinine 10/15/2019 0.6  0.5 - 1.4 mg/dL Final    Calcium 10/15/2019 8.6* 8.7 - 10.5 mg/dL Final    Total Protein 10/15/2019 7.0  6.0 - 8.4 g/dL Final    Albumin 10/15/2019 3.1* 3.5 - 5.2 g/dL Final    Total Bilirubin 10/15/2019 0.1  0.1 - 1.0 mg/dL Final    Alkaline Phosphatase 10/15/2019 47* 55 - 135 U/L Final    AST 10/15/2019 11  10 - 40 U/L Final    ALT 10/15/2019 15  10 - 44 U/L Final    Anion Gap 10/15/2019 7* 8 - 16 mmol/L Final    eGFR if African American 10/15/2019 >60  >60 mL/min/1.73 m^2 Final    eGFR if non African American 10/15/2019 >60  >60 mL/min/1.73 m^2 Final      ?   Platelets   Date Value Ref Range Status   10/15/2019 663 (H) 150 - 350 K/uL Final   10/11/2019 611 (H) 150 - 350 K/uL Final   10/04/2019 27 (LL) 150 - 350 K/uL Final     Comment:     Results confirmed, test repeated  PLT COUNT critical result(s) called and verbal readback obtained from   RUBEN TOUSSAINT RN, 10/04/2019 05:56     10/03/2019 2 (LL) 150 - 350 K/uL Final     Comment:     Hct, Plt critical result(s) called and verbal readback obtained from    Darshana Lara RN, 10/03/2019 11:13     10/03/2019 1 (LL) 150 - 350 K/uL Final     Comment:     Results confirmed, test repeated  HGB, HCT, AND PLT count critical result(s) called and verbal readback   obtained from Sonia Manjarrez RN, 10/03/2019 05:51     10/02/2019 1 (LL) 150 - 350 K/uL Final     Comment:     H/H, PLT   critical result(s) called and verbal readback obtained   from SHAMIKA MCDOWELL RN, 10/02/2019 06:36     10/01/2019 2 (LL) 150 - 350 K/uL Final     Comment:     Results confirmed, test repeated  PLT  critical result(s) called and verbal readback obtained from   SG RIVERA RN, 10/01/2019 22:19     09/24/2019 428 (H) 150 - 350 K/uL Final   09/14/2019 47 (L) 150 - 350 K/uL Final   09/14/2019 37 (LL) 150 - 350 K/uL Final     Comment:     H&H, PLT critical result(s) called and verbal readback obtained from   Bob Lara, 09/14/2019 07:36     09/13/2019 5 (LL) 150 - 350 K/uL Final     Comment:     H&H, PLT critical result(s) called and verbal readback obtained from   Travon Morrison, 09/13/2019 09:04     08/03/2019 62 (L) 150 - 350 K/uL Final   08/02/2019 12 (LL) 150 - 350 K/uL Final     Comment:     Hgb, Hct, Plt critical result(s) called and verbal readback obtained   from Darshana Lara RN , 08/02/2019 07:07     08/02/2019 4 (LL) 150 - 350 K/uL Final     Comment:     HGB, HCT, PLT critical result(s) called and verbal readback obtained   from nurse Layton Jimenez, 08/02/2019 02:35     07/17/2019 349 150 - 350 K/uL Final   06/28/2019 375 (H) 150 - 350 K/uL Final   06/25/2019 126 (L) 150 - 350 K/uL Final   06/21/2019 33 (LL) 150 - 350 K/uL Final     Comment:     PLT critical result(s) called and verbal readback obtained from   JOSEPH SARGENT RN, 06/21/2019 13:09     06/20/2019 8 (LL) 150 - 350 K/uL Final     Comment:     HGB, HCT, & PLT   critical result(s) called and verbal readback   obtained from Katie Price RN, 06/20/2019 05:17     06/19/2019 7 (LL) 150 - 350 K/uL Final      Comment:     HGB, HCT, & PLT critical result(s) called and verbal readback   obtained from Terry torre, 06/19/2019 19:44         ?   Assessment/Plan:       1. Chronic ITP (idiopathic thrombocytopenia)    2. Iron deficiency anemia due to chronic blood loss          Plan:     # chronic ITP:  Rebound in platelet count following recent hospitalization with a IVIG.  She may require maintenance dosing of IVIG.  She has sustained platelet counts over 600 on recheck today.  We I have recommended weekly CBC and pending counts may consider re-dose IVIG 1 month from her last.    # iron deficiency anemia:  For the last than she has been on 3 times daily oral iron.  Iron deficiency anemia noted on 06/11/2019 labs with 3% iron saturation and ferritin 4 with improvement on 10/03/2019 labs with 17% saturation and ferritin 39.  Will recheck iron indices when she returns in 1 week for CBC as well to assess for continued response to oral iron.  Recommended this time continuing 3 times daily oral iron which she is tolerating well.    Follow-Up:   Weekly CBC (including iron indices next week); 2 week with RV and labs and possible IVIG infusion.

## 2019-10-15 NOTE — PATIENT INSTRUCTIONS
- cbc and iron studies ordered (Fe, TIBC, ferritin) next week.  - 2 weeks labs and RV with Dr. Cota for possible IVIG infusion

## 2019-10-22 ENCOUNTER — TELEPHONE (OUTPATIENT)
Dept: HEMATOLOGY/ONCOLOGY | Facility: CLINIC | Age: 24
End: 2019-10-22

## 2019-10-23 ENCOUNTER — TELEPHONE (OUTPATIENT)
Dept: HEMATOLOGY/ONCOLOGY | Facility: CLINIC | Age: 24
End: 2019-10-23

## 2019-10-23 ENCOUNTER — INFUSION (OUTPATIENT)
Dept: INFUSION THERAPY | Facility: HOSPITAL | Age: 24
End: 2019-10-23
Attending: INTERNAL MEDICINE
Payer: MEDICAID

## 2019-10-23 ENCOUNTER — OFFICE VISIT (OUTPATIENT)
Dept: HEMATOLOGY/ONCOLOGY | Facility: CLINIC | Age: 24
End: 2019-10-23
Payer: MEDICAID

## 2019-10-23 VITALS
TEMPERATURE: 98 F | RESPIRATION RATE: 18 BRPM | DIASTOLIC BLOOD PRESSURE: 85 MMHG | SYSTOLIC BLOOD PRESSURE: 139 MMHG | HEART RATE: 90 BPM

## 2019-10-23 VITALS
HEART RATE: 88 BPM | HEIGHT: 62 IN | OXYGEN SATURATION: 100 % | TEMPERATURE: 99 F | BODY MASS INDEX: 38.54 KG/M2 | DIASTOLIC BLOOD PRESSURE: 78 MMHG | WEIGHT: 209.44 LBS | SYSTOLIC BLOOD PRESSURE: 115 MMHG

## 2019-10-23 DIAGNOSIS — D69.3 ACUTE ITP: ICD-10-CM

## 2019-10-23 DIAGNOSIS — D69.3 CHRONIC ITP (IDIOPATHIC THROMBOCYTOPENIA): Primary | ICD-10-CM

## 2019-10-23 DIAGNOSIS — D69.3 CHRONIC ITP (IDIOPATHIC THROMBOCYTOPENIA): ICD-10-CM

## 2019-10-23 LAB
BLD PROD TYP BPU: NORMAL
BLOOD UNIT EXPIRATION DATE: NORMAL
BLOOD UNIT TYPE CODE: 6200
BLOOD UNIT TYPE: NORMAL
CODING SYSTEM: NORMAL
DISPENSE STATUS: NORMAL
TRANS PLATPHERESIS VOL PATIENT: NORMAL ML

## 2019-10-23 PROCEDURE — P9035 PLATELET PHERES LEUKOREDUCED: HCPCS

## 2019-10-23 PROCEDURE — 99214 OFFICE O/P EST MOD 30 MIN: CPT | Mod: S$PBB,,, | Performed by: INTERNAL MEDICINE

## 2019-10-23 PROCEDURE — 99999 PR PBB SHADOW E&M-EST. PATIENT-LVL IV: CPT | Mod: PBBFAC,,, | Performed by: INTERNAL MEDICINE

## 2019-10-23 PROCEDURE — 99999 PR PBB SHADOW E&M-EST. PATIENT-LVL IV: ICD-10-PCS | Mod: PBBFAC,,, | Performed by: INTERNAL MEDICINE

## 2019-10-23 PROCEDURE — 99214 OFFICE O/P EST MOD 30 MIN: CPT | Mod: PBBFAC,25 | Performed by: INTERNAL MEDICINE

## 2019-10-23 PROCEDURE — 99214 PR OFFICE/OUTPT VISIT, EST, LEVL IV, 30-39 MIN: ICD-10-PCS | Mod: S$PBB,,, | Performed by: INTERNAL MEDICINE

## 2019-10-23 PROCEDURE — 36430 TRANSFUSION BLD/BLD COMPNT: CPT

## 2019-10-23 RX ORDER — DEXAMETHASONE 4 MG/1
4 TABLET ORAL EVERY 6 HOURS
Qty: 40 TABLET | Refills: 0 | Status: SHIPPED | OUTPATIENT
Start: 2019-10-23 | End: 2019-11-02

## 2019-10-23 RX ORDER — HYDROCODONE BITARTRATE AND ACETAMINOPHEN 500; 5 MG/1; MG/1
TABLET ORAL ONCE
Status: CANCELLED | OUTPATIENT
Start: 2019-10-23 | End: 2019-10-23

## 2019-10-23 RX ORDER — HYDROCODONE BITARTRATE AND ACETAMINOPHEN 500; 5 MG/1; MG/1
TABLET ORAL ONCE
Status: DISCONTINUED | OUTPATIENT
Start: 2019-10-23 | End: 2019-10-23 | Stop reason: HOSPADM

## 2019-10-23 NOTE — PLAN OF CARE
Patient tolerated platelet well until the very end of the transfusion with drops left to go in; patient started scratching scalp/head; states this exact same thing has happened before at the ending of the platelets; itching only to scalp; iv flushed with normal saline; vss, iv discontinued with unit intact. Patient states she have to go back to clinic to see dr chavez after leaving infusion today. Ambulate off the unit in no apparent distress. No complaints at discharge

## 2019-10-23 NOTE — PLAN OF CARE
Ambulatory to outpatient infusion for scheduled platelet administration 1 unit. Alert, oriented, plan of care reviewed with patient. Aware to notify nurse of any problems or complaints.

## 2019-10-23 NOTE — PROGRESS NOTES
Subjective:      DATE OF VISIT: 10/23/2019   ?   ?   Patient ID:?Mari Miller is a 23 y.o. female.?? MR#: [id number]   ?   PRIMARY PROVIDER:  Dr. Cota  ?   CHIEF COMPLAINT:  Acute decline in platelets, 6 K  ?   CURRENT TREATMENT:  Eltrombopag     PAST TREATMENT:  Steroids, splenectomy 11/2018, eltrombopag (Promacta) and prednisone taper, IVIG x 2 10/2/19 and 10/3/19     HPI    She return for repeat CBC yesterday which was notable for platelet count down to 6 K. She has developed petechial lesions in mouth with trace gum bleeding.  She is currently on her menstrual cycle with some increase in bleeding.  She denies other symptoms including lightheadedness, dizziness, fatigue.      Review of Systems    ?   A comprehensive 14-point review of systems was reviewed with patient and was negative other than as specified above.   ?     Objective:      Physical Exam      ?   Vitals:    10/23/19 0755   BP: 115/78   Pulse: 88   Temp: 98.5 °F (36.9 °C)      ?   ECOG:?  0   General appearance: Generally well appearing, in no acute distress.   Head, eyes, ears, nose, and throat:  Oropharynx with petechial lesion and blood clots  Cardiovascular: Regular rate and rhythm, S1, S2, no audible murmurs.   Respiratory: Lungs clear to auscultation bilaterally.   Abdomen: nontender, nondistended.   Extremities: Warm, without edema.   Neurologic: Alert and oriented. Grossly normal strength, coordination, and gait.   Skin: No rashes, ecchymoses or petechial lesion.   Psychiatric: normal mood and affect, conversant and appropriate    ?   Laboratory:  ?   No visits with results within 1 Day(s) from this visit.   Latest known visit with results is:   Lab Visit on 10/22/2019   Component Date Value Ref Range Status    Iron 10/22/2019 84  30 - 160 ug/dL Final    Transferrin 10/22/2019 302  200 - 375 mg/dL Final    TIBC 10/22/2019 447  250 - 450 ug/dL Final    Saturated Iron 10/22/2019 19* 20 - 50 % Final    Ferritin 10/22/2019 50   20.0 - 300.0 ng/mL Final    WBC 10/22/2019 11.51  3.90 - 12.70 K/uL Final    RBC 10/22/2019 3.94* 4.00 - 5.40 M/uL Final    Hemoglobin 10/22/2019 10.1* 12.0 - 16.0 g/dL Final    Hematocrit 10/22/2019 33.0* 37.0 - 48.5 % Final    Mean Corpuscular Volume 10/22/2019 84  82 - 98 fL Final    Mean Corpuscular Hemoglobin 10/22/2019 25.6* 27.0 - 31.0 pg Final    Mean Corpuscular Hemoglobin Conc 10/22/2019 30.6* 32.0 - 36.0 g/dL Final    RDW 10/22/2019 17.3* 11.5 - 14.5 % Final    Platelets 10/22/2019 6* 150 - 350 K/uL Final    MPV 10/22/2019 SEE COMMENT  9.2 - 12.9 fL Final    Immature Granulocytes 10/22/2019 0.6* 0.0 - 0.5 % Final    Gran # (ANC) 10/22/2019 8.5* 1.8 - 7.7 K/uL Final    Immature Grans (Abs) 10/22/2019 0.07* 0.00 - 0.04 K/uL Final    Lymph # 10/22/2019 2.1  1.0 - 4.8 K/uL Final    Mono # 10/22/2019 0.8  0.3 - 1.0 K/uL Final    Eos # 10/22/2019 0.1  0.0 - 0.5 K/uL Final    Baso # 10/22/2019 0.08  0.00 - 0.20 K/uL Final    nRBC 10/22/2019 0  0 /100 WBC Final    Gran% 10/22/2019 73.9* 38.0 - 73.0 % Final    Lymph% 10/22/2019 17.8* 18.0 - 48.0 % Final    Mono% 10/22/2019 6.9  4.0 - 15.0 % Final    Eosinophil% 10/22/2019 0.7  0.0 - 8.0 % Final    Basophil% 10/22/2019 0.7  0.0 - 1.9 % Final    Platelet Estimate 10/22/2019 Decreased*  Final    Aniso 10/22/2019 Moderate   Final    Poik 10/22/2019 Slight   Final    Poly 10/22/2019 Occasional   Final    Hypo 10/22/2019 Moderate   Final    Target Cells 10/22/2019 Occasional   Final    Tear Drop Cells 10/22/2019 Occasional   Final    Spherocytes 10/22/2019 Occasional   Final    Differential Method 10/22/2019 Automated   Final      ?   Platelets   Date Value Ref Range Status   10/22/2019 6 (LL) 150 - 350 K/uL Final     Comment:     Plt critical result(s) called and verbal readback obtained from   Marielos Koenig, 10/22/2019 15:35     10/15/2019 663 (H) 150 - 350 K/uL Final   10/11/2019 611 (H) 150 - 350 K/uL Final   10/04/2019 27 (LL) 150 -  350 K/uL Final     Comment:     Results confirmed, test repeated  PLT COUNT critical result(s) called and verbal readback obtained from   RUBEN TOUSSAINT RN, 10/04/2019 05:56     10/03/2019 2 (LL) 150 - 350 K/uL Final     Comment:     Hct, Plt critical result(s) called and verbal readback obtained from   Darshana Lara RN, 10/03/2019 11:13     10/03/2019 1 (LL) 150 - 350 K/uL Final     Comment:     Results confirmed, test repeated  HGB, HCT, AND PLT count critical result(s) called and verbal readback   obtained from Sonia Manjarrez RN, 10/03/2019 05:51     10/02/2019 1 (LL) 150 - 350 K/uL Final     Comment:     H/H, PLT   critical result(s) called and verbal readback obtained   from SHAMIKA MCDOWELL RN, 10/02/2019 06:36     10/01/2019 2 (LL) 150 - 350 K/uL Final     Comment:     Results confirmed, test repeated  PLT  critical result(s) called and verbal readback obtained from   SG RIVERA RN, 10/01/2019 22:19     09/24/2019 428 (H) 150 - 350 K/uL Final   09/14/2019 47 (L) 150 - 350 K/uL Final   09/14/2019 37 (LL) 150 - 350 K/uL Final     Comment:     H&H, PLT critical result(s) called and verbal readback obtained from   Bob Lara, 09/14/2019 07:36     09/13/2019 5 (LL) 150 - 350 K/uL Final     Comment:     H&H, PLT critical result(s) called and verbal readback obtained from   Travon Morrison, 09/13/2019 09:04     08/03/2019 62 (L) 150 - 350 K/uL Final   08/02/2019 12 (LL) 150 - 350 K/uL Final     Comment:     Hgb, Hct, Plt critical result(s) called and verbal readback obtained   from Darshana Lara RN , 08/02/2019 07:07     08/02/2019 4 (LL) 150 - 350 K/uL Final     Comment:     HGB, HCT, PLT critical result(s) called and verbal readback obtained   from nurse Layton Jimenez, 08/02/2019 02:35     07/17/2019 349 150 - 350 K/uL Final   06/28/2019 375 (H) 150 - 350 K/uL Final   06/25/2019 126 (L) 150 - 350 K/uL Final   06/21/2019 33 (LL) 150 - 350 K/uL Final     Comment:     PLT critical result(s) called and verbal  readback obtained from   JOSEPH SARGENT RN, 06/21/2019 13:09     06/20/2019 8 (LL) 150 - 350 K/uL Final     Comment:     HGB, HCT, & PLT   critical result(s) called and verbal readback   obtained from Katie Price RN, 06/20/2019 05:17         ?   Assessment/Plan:       1. Chronic ITP (idiopathic thrombocytopenia)    2. Acute ITP          Plan:     # acute on chronic ITP: history of refractory ITP status post steroids, splenectomy in November 2018, and this year monthly hospitalizations with platelet counts as low as 1-5000.  Since December 2018 she has been treated with daily eltrombopag.  During most recent hospitalization she received 2 infusions of IVIG on 10/02/2019 and 10/03/2019 with excellent response.  During her last follow-up with Dr. Cota last week in followup her platelet count eliana from 1K during hospitalization to >600 K.  she is now 0 again refractory with platelet count 6 K.  Will transfuse 1 pack platelets and re-dose IVIG tomorrow.  I also discussed with her possibility of Nplate which was per previously discussed as an option but she was reluctant given weekly treatments; tentative Nplate Friday 10/25/2019.  I have also ordered steroid burst dexamethasone 40 mg daily x4 days to start today 10/23/2019.      # iron deficiency anemia:  For the last than she has been on 3 times daily oral iron.  Iron deficiency anemia noted on 06/11/2019 labs with 3% iron saturation and ferritin 4 with improvement on 10/03/2019 labs with 17% saturation and ferritin 39.  Recheck of iron indices 10/22/2019 continues show iron deficiency and will plan to reach is injected for after resolution of acute thrombocytopenia.    Follow-Up:   Platelet transfusion today  IVIG 10/24/2019.  Recheck CBC on 10/25/2019.

## 2019-10-23 NOTE — NURSING
1020 Platelet transfusion ending; patient observed scratching her head. Nurse asked patient if she was itching and she said yes; only to scalp; remainder of platelets in the tube discontinued and iv site flushed with normal saline. Message sent to dr chavez.

## 2019-10-23 NOTE — PATIENT INSTRUCTIONS
1 unit plt transfusion today now  Try to get ivig x 2 days ASAP please discuss with authorization/infusion  - rv Fri with labs   inquiry re disability pt asking about

## 2019-10-23 NOTE — NURSING
1022 Immediately after flushing iv patient not scratching at all; states the itching went away. Patient reports this same thing happened to her during an emergency room visit.

## 2019-10-23 NOTE — TELEPHONE ENCOUNTER
----- Message from Sakina Swan MD sent at 10/23/2019 11:02 AM CDT -----  Please call patient to let her know I would like her to take pulse steroid dexamethasone 40 mg (10, 4 mg tablets) starting today for 4 days total.  Please let me know which pharmacy she would like

## 2019-10-24 ENCOUNTER — INFUSION (OUTPATIENT)
Dept: INFUSION THERAPY | Facility: HOSPITAL | Age: 24
End: 2019-10-24
Payer: MEDICAID

## 2019-10-24 ENCOUNTER — SOCIAL WORK (OUTPATIENT)
Dept: HEMATOLOGY/ONCOLOGY | Facility: CLINIC | Age: 24
End: 2019-10-24

## 2019-10-24 VITALS
HEART RATE: 89 BPM | SYSTOLIC BLOOD PRESSURE: 130 MMHG | TEMPERATURE: 99 F | RESPIRATION RATE: 16 BRPM | OXYGEN SATURATION: 100 % | DIASTOLIC BLOOD PRESSURE: 84 MMHG

## 2019-10-24 DIAGNOSIS — D69.3 ACUTE ITP: Primary | ICD-10-CM

## 2019-10-24 PROCEDURE — 63600175 PHARM REV CODE 636 W HCPCS: Mod: JG | Performed by: INTERNAL MEDICINE

## 2019-10-24 PROCEDURE — 96365 THER/PROPH/DIAG IV INF INIT: CPT

## 2019-10-24 PROCEDURE — 96366 THER/PROPH/DIAG IV INF ADDON: CPT

## 2019-10-24 RX ORDER — SODIUM CHLORIDE 0.9 % (FLUSH) 0.9 %
10 SYRINGE (ML) INJECTION
Status: CANCELLED | OUTPATIENT
Start: 2019-10-25

## 2019-10-24 RX ORDER — HEPARIN 100 UNIT/ML
5 SYRINGE INTRAVENOUS
Status: CANCELLED | OUTPATIENT
Start: 2019-10-25

## 2019-10-24 RX ORDER — SODIUM CHLORIDE 9 MG/ML
INJECTION, SOLUTION INTRAVENOUS CONTINUOUS
Status: CANCELLED | OUTPATIENT
Start: 2019-10-25

## 2019-10-24 RX ADMIN — HUMAN IMMUNOGLOBULIN G 95 G: 40 LIQUID INTRAVENOUS at 08:10

## 2019-10-24 NOTE — DISCHARGE INSTRUCTIONS
[facility]  [address] [address]  [address]  [phone] phone     [phone] fax  Hours of Operation: Monday- Friday 8:00am- 5:00pm  After hours phone  [phone]  Hematology / Oncology Physicians on call      Dr. Cipriano Calderon    Please call with any concerns regarding your appointment today.FALL PREVENTION   Falls often occur due to slipping, tripping or losing your balance. Here are ways to reduce your risk of falling again.   Was there anything that caused your fall that can be fixed, removed or replaced?   Make your home safe by keeping walkways clear of objects you may trip over.   Use non-slip pads under rugs.   Do not walk in poorly lit areas.   Do not stand on chairs or wobbly ladders.   Use caution when reaching overhead or looking upward. This position can cause a loss of balance.   Be sure your shoes fit properly, have non-slip bottoms and are in good condition.   Be cautious when going up and down stairs, curbs, and when walking on uneven sidewalks.   If your balance is poor, consider using a cane or walker.   If your fall was related to alcohol use, stop or limit alcohol intake.   If your fall was related to use of sleeping medicines, talk to your doctor about this. You may need to reduce your dosage at bedtime if you awaken during the night to go to the bathroom.   To reduce the need for nighttime bathroom trips:   Avoid drinking fluids for several hours before going to bed   Empty your bladder before going to bed   Men can keep a urinal at the bedside   © 3949-1124 [employer], [address] [place] [address], [place] [address]. All rights reserved. This information is not intended as a substitute for professional medical care. Always follow your healthcare professional's instructions.  WAYS TO HELP PREVENT INFECTION         WASH YOUR HANDS OFTEN DURING THE DAY, ESPECIALLY BEFORE YOU EAT, AFTER USING THE  BATHROOM, AND AFTER TOUCHING ANIMALS     STAY AWAY FROM PEOPLE WHO HAVE ILLNESSES YOU CAN CATCH; SUCH AS COLDS, FLU, CHICKEN POX     TRY TO AVOID CROWDS     STAY AWAY FROM CHILDREN WHO RECENTLY HAVE RECEIVED LIVE VIRUS VACCINES     MAINTAIN GOOD MOUTH CARE     DO NOT SQUEEZE OR SCRATCH PIMPLES     CLEAN CUTS & SCRAPES RIGHT AWAY AND DAILY UNTIL HEALED WITH WARM WATER, SOAP & AN ANTISEPTIC     AVOID CONTACT WITH LITTER BOXES, BIRD CAGES, & FISH TANKS     AVOID STANDING WATER, IE., BIRD BATHS, FLOWER POTS/VASES, OR HUMIDIFIERS     WEAR GLOVES WHEN GARDENING OR CLEANING UP AFTER OTHERS, ESPECIALLY BABIES & SMALL CHILDREN     DO NOT EAT RAW FISH, SEAFOOD, MEAT, OR EGGS

## 2019-10-24 NOTE — PROGRESS NOTES
Met with pt who was referred to SW by clinic for help with disability. Pt is unable to get/hold a job due to frequent medical visits and hospitalizations. It does not appear that she has had a job up to this point so would not qualify for Social Security disability. However, she might qualify for SSI as income (she has no income at this time). She also has a child that she wants to support (gets help through her parents now).     Provided pt with information on applying for SSI. Provided her with SW contact info to call for any additional assistance through the process, particularly as it pertains to medical documentation. SW will f/u further as needed/requested.

## 2019-10-25 ENCOUNTER — OFFICE VISIT (OUTPATIENT)
Dept: HEMATOLOGY/ONCOLOGY | Facility: CLINIC | Age: 24
End: 2019-10-25
Payer: MEDICAID

## 2019-10-25 ENCOUNTER — INFUSION (OUTPATIENT)
Dept: INFUSION THERAPY | Facility: HOSPITAL | Age: 24
End: 2019-10-25
Attending: INTERNAL MEDICINE
Payer: MEDICAID

## 2019-10-25 VITALS
DIASTOLIC BLOOD PRESSURE: 84 MMHG | SYSTOLIC BLOOD PRESSURE: 127 MMHG | OXYGEN SATURATION: 100 % | BODY MASS INDEX: 37.98 KG/M2 | HEART RATE: 77 BPM | WEIGHT: 207.69 LBS | TEMPERATURE: 98 F

## 2019-10-25 VITALS — DIASTOLIC BLOOD PRESSURE: 80 MMHG | HEART RATE: 80 BPM | SYSTOLIC BLOOD PRESSURE: 134 MMHG

## 2019-10-25 DIAGNOSIS — D69.3 ACUTE ITP: ICD-10-CM

## 2019-10-25 DIAGNOSIS — D50.0 IRON DEFICIENCY ANEMIA DUE TO CHRONIC BLOOD LOSS: Primary | ICD-10-CM

## 2019-10-25 DIAGNOSIS — D69.3 ACUTE ITP: Primary | ICD-10-CM

## 2019-10-25 PROCEDURE — 99999 PR PBB SHADOW E&M-EST. PATIENT-LVL III: CPT | Mod: PBBFAC,,, | Performed by: INTERNAL MEDICINE

## 2019-10-25 PROCEDURE — 63600175 PHARM REV CODE 636 W HCPCS: Mod: JG | Performed by: INTERNAL MEDICINE

## 2019-10-25 PROCEDURE — 99214 OFFICE O/P EST MOD 30 MIN: CPT | Mod: S$PBB,,, | Performed by: INTERNAL MEDICINE

## 2019-10-25 PROCEDURE — 96366 THER/PROPH/DIAG IV INF ADDON: CPT

## 2019-10-25 PROCEDURE — 96365 THER/PROPH/DIAG IV INF INIT: CPT

## 2019-10-25 PROCEDURE — 99214 PR OFFICE/OUTPT VISIT, EST, LEVL IV, 30-39 MIN: ICD-10-PCS | Mod: S$PBB,,, | Performed by: INTERNAL MEDICINE

## 2019-10-25 PROCEDURE — 99999 PR PBB SHADOW E&M-EST. PATIENT-LVL III: ICD-10-PCS | Mod: PBBFAC,,, | Performed by: INTERNAL MEDICINE

## 2019-10-25 PROCEDURE — 99213 OFFICE O/P EST LOW 20 MIN: CPT | Mod: PBBFAC | Performed by: INTERNAL MEDICINE

## 2019-10-25 RX ORDER — HEPARIN 100 UNIT/ML
5 SYRINGE INTRAVENOUS
Status: CANCELLED | OUTPATIENT
Start: 2019-11-01

## 2019-10-25 RX ORDER — SODIUM CHLORIDE 0.9 % (FLUSH) 0.9 %
10 SYRINGE (ML) INJECTION
Status: DISCONTINUED | OUTPATIENT
Start: 2019-10-25 | End: 2019-10-25 | Stop reason: HOSPADM

## 2019-10-25 RX ORDER — SODIUM CHLORIDE 9 MG/ML
INJECTION, SOLUTION INTRAVENOUS CONTINUOUS
Status: CANCELLED | OUTPATIENT
Start: 2019-11-01

## 2019-10-25 RX ORDER — SODIUM CHLORIDE 0.9 % (FLUSH) 0.9 %
10 SYRINGE (ML) INJECTION
Status: CANCELLED | OUTPATIENT
Start: 2019-11-01

## 2019-10-25 RX ADMIN — HUMAN IMMUNOGLOBULIN G 95 G: 40 LIQUID INTRAVENOUS at 10:10

## 2019-10-25 NOTE — PROGRESS NOTES
Subjective:      DATE OF VISIT: 10/25/2019   ?   ?   Patient ID:?Mari Miller is a 23 y.o. female.?? MR#: [id number]   ?  ?   CHIEF COMPLAINT:  Acute ITP  ?   CURRENT TREATMENT:  Eltrombopag     PAST TREATMENT:  Steroids, splenectomy 11/2018, eltrombopag (Promacta) and prednisone taper, IVIG x 2 10/2/19 and 10/3/19     She is doing well today with resolution of oral mucosal bleeding. She has had mild bruising on forearms and petechial rash on shins.  Otherwise no evidence of bleeding. She has been taking dexamethasone for the past couple days.  She received platelet transfusion on Wednesday and IVIG on Thursday.      Review of Systems    ?   A comprehensive 14-point review of systems was reviewed with patient and was negative other than as specified above.   ?     Objective:      Physical Exam      ?   Vitals:    10/25/19 0824   BP: 127/84   Pulse: 77   Temp: 98.3 °F (36.8 °C)      ?   ECOG:?  0   General appearance: Generally well appearing, in no acute distress.   Head, eyes, ears, nose, and throat:  Oropharynx clear without petechiae a.  Abdomen: nontender, nondistended.   Extremities: Warm, without edema.   Neurologic: Alert and oriented. Grossly normal strength, coordination, and gait.   Skin:  Ecchymosis on forearm left greater than right, petechial lesion on bilateral shins.    Psychiatric: normal mood and affect, conversant and appropriate    ?   Laboratory:  ?   Platelets   Date Value Ref Range Status   10/25/2019 110 (L) 150 - 350 K/uL Final     Comment:     Results confirmed, test repeated   10/22/2019 6 (LL) 150 - 350 K/uL Final     Comment:     Plt critical result(s) called and verbal readback obtained from   Marielos Koenig, 10/22/2019 15:35     10/15/2019 663 (H) 150 - 350 K/uL Final   10/11/2019 611 (H) 150 - 350 K/uL Final   10/04/2019 27 (LL) 150 - 350 K/uL Final     Comment:     Results confirmed, test repeated  PLT COUNT critical result(s) called and verbal readback obtained from    RUBEN TOUSSAINT RN, 10/04/2019 05:56     10/03/2019 2 (LL) 150 - 350 K/uL Final     Comment:     Hct, Plt critical result(s) called and verbal readback obtained from   Darshana Lara RN, 10/03/2019 11:13     10/03/2019 1 (LL) 150 - 350 K/uL Final     Comment:     Results confirmed, test repeated  HGB, HCT, AND PLT count critical result(s) called and verbal readback   obtained from Sonia Manjarrez RN, 10/03/2019 05:51     10/02/2019 1 (LL) 150 - 350 K/uL Final     Comment:     H/H, PLT   critical result(s) called and verbal readback obtained   from SHAMIKA MCDOWELL RN, 10/02/2019 06:36     10/01/2019 2 (LL) 150 - 350 K/uL Final     Comment:     Results confirmed, test repeated  PLT  critical result(s) called and verbal readback obtained from   SG RIVERA RN, 10/01/2019 22:19     09/24/2019 428 (H) 150 - 350 K/uL Final         Lab Visit on 10/25/2019   Component Date Value Ref Range Status    WBC 10/25/2019 26.06* 3.90 - 12.70 K/uL Final    RBC 10/25/2019 3.24* 4.00 - 5.40 M/uL Final    Hemoglobin 10/25/2019 8.3* 12.0 - 16.0 g/dL Final    Hematocrit 10/25/2019 27.8* 37.0 - 48.5 % Final    Mean Corpuscular Volume 10/25/2019 86  82 - 98 fL Final    Mean Corpuscular Hemoglobin 10/25/2019 25.6* 27.0 - 31.0 pg Final    Mean Corpuscular Hemoglobin Conc 10/25/2019 29.9* 32.0 - 36.0 g/dL Final    RDW 10/25/2019 18.1* 11.5 - 14.5 % Final    Platelets 10/25/2019 110* 150 - 350 K/uL Final    MPV 10/25/2019 SEE COMMENT  9.2 - 12.9 fL Final    Immature Granulocytes 10/25/2019 1.0* 0.0 - 0.5 % Final    Gran # (ANC) 10/25/2019 24.5* 1.8 - 7.7 K/uL Final    Immature Grans (Abs) 10/25/2019 0.25* 0.00 - 0.04 K/uL Final    Lymph # 10/25/2019 0.9* 1.0 - 4.8 K/uL Final    Mono # 10/25/2019 0.4  0.3 - 1.0 K/uL Final    Eos # 10/25/2019 0.0  0.0 - 0.5 K/uL Final    Baso # 10/25/2019 0.04  0.00 - 0.20 K/uL Final    nRBC 10/25/2019 0  0 /100 WBC Final    Gran% 10/25/2019 93.8* 38.0 - 73.0 % Final    Lymph%  10/25/2019 3.5* 18.0 - 48.0 % Final    Mono% 10/25/2019 1.5* 4.0 - 15.0 % Final    Eosinophil% 10/25/2019 0.0  0.0 - 8.0 % Final    Basophil% 10/25/2019 0.2  0.0 - 1.9 % Final    Platelet Estimate 10/25/2019 Decreased*  Final    Aniso 10/25/2019 Slight   Final    Poik 10/25/2019 Slight   Final    Poly 10/25/2019 Occasional   Final    Hypo 10/25/2019 Moderate   Final    Target Cells 10/25/2019 Occasional   Final    Tear Drop Cells 10/25/2019 Occasional   Final    Spherocytes 10/25/2019 Occasional   Final    Differential Method 10/25/2019 Automated   Final      ?   Platelets   Date Value Ref Range Status   10/25/2019 110 (L) 150 - 350 K/uL Final     Comment:     Results confirmed, test repeated   10/22/2019 6 (LL) 150 - 350 K/uL Final     Comment:     Plt critical result(s) called and verbal readback obtained from   Marielos Koenig, 10/22/2019 15:35     10/15/2019 663 (H) 150 - 350 K/uL Final   10/11/2019 611 (H) 150 - 350 K/uL Final   10/04/2019 27 (LL) 150 - 350 K/uL Final     Comment:     Results confirmed, test repeated  PLT COUNT critical result(s) called and verbal readback obtained from   RUBEN TOUSSAINT RN, 10/04/2019 05:56     10/03/2019 2 (LL) 150 - 350 K/uL Final     Comment:     Hct, Plt critical result(s) called and verbal readback obtained from   Darshana Lara RN, 10/03/2019 11:13     10/03/2019 1 (LL) 150 - 350 K/uL Final     Comment:     Results confirmed, test repeated  HGB, HCT, AND PLT count critical result(s) called and verbal readback   obtained from Sonia Manjarrez RN, 10/03/2019 05:51     10/02/2019 1 (LL) 150 - 350 K/uL Final     Comment:     H/H, PLT   critical result(s) called and verbal readback obtained   from SHAMIKA MCDOWELL RN, 10/02/2019 06:36     10/01/2019 2 (LL) 150 - 350 K/uL Final     Comment:     Results confirmed, test repeated  PLT  critical result(s) called and verbal readback obtained from   SG RIVERA RN, 10/01/2019 22:19     09/24/2019 428 (H) 150 - 350 K/uL  Final   09/14/2019 47 (L) 150 - 350 K/uL Final   09/14/2019 37 (LL) 150 - 350 K/uL Final     Comment:     H&H, PLT critical result(s) called and verbal readback obtained from   Bob Lara, 09/14/2019 07:36     09/13/2019 5 (LL) 150 - 350 K/uL Final     Comment:     H&H, PLT critical result(s) called and verbal readback obtained from   Travon Morrison, 09/13/2019 09:04     08/03/2019 62 (L) 150 - 350 K/uL Final   08/02/2019 12 (LL) 150 - 350 K/uL Final     Comment:     Hgb, Hct, Plt critical result(s) called and verbal readback obtained   from Darshana Lara RN , 08/02/2019 07:07     08/02/2019 4 (LL) 150 - 350 K/uL Final     Comment:     HGB, HCT, PLT critical result(s) called and verbal readback obtained   from nurse Layton Jimenez, 08/02/2019 02:35     07/17/2019 349 150 - 350 K/uL Final   06/28/2019 375 (H) 150 - 350 K/uL Final   06/25/2019 126 (L) 150 - 350 K/uL Final   06/21/2019 33 (LL) 150 - 350 K/uL Final     Comment:     PLT critical result(s) called and verbal readback obtained from   JOSEPH SARGENT RN, 06/21/2019 13:09         ?   Assessment/Plan:       1. Acute ITP          Plan:     # acute on chronic ITP: history of refractory ITP status post steroids, splenectomy in November 2018, and this year monthly hospitalizations with platelet counts as low as 1-5000.  Since December 2018 she has been treated with daily eltrombopag.  During most recent hospitalization she received 2 infusions of IVIG on 10/02/2019 and 10/03/2019 with excellent response.  During her last follow-up with Dr. Cota last week in followup her platelet count eliana from 1K during hospitalization to >600 K.     With acute drop 2 platelet counts 6 K an oral mucosal bleeding we gave 1pack platelets on 10/23/2019 and received IVIG 1000 mg on 10/24/2019 and will give 2nd dose today.  Platelet count improved from 6 K to 110 K. She is continued on dexamethasone 40 mg x4 days with day 3 today 10/25/2019.  I will see her on Monday  10/28/2019 for repeat CBC.     I also discussed with her possibility of Nplate which was per previously discussed as an option but she was reluctant given weekly treatments.    # iron deficiency anemia:  For the last than she has been on 3 times daily oral iron.  Iron deficiency anemia noted on 06/11/2019 labs with 3% iron saturation and ferritin 4 with improvement on 10/03/2019 labs with 17% saturation and ferritin 39.  Recheck of iron indices 10/22/2019 continues show iron deficiency and will plan to reach is injected for after resolution of acute thrombocytopenia.    Follow-Up:   IVIG today  Recheck CBC on 10/28/2019.

## 2019-10-25 NOTE — PATIENT INSTRUCTIONS
Discharge Instructions for Immune Thrombocytopenia Purpura (ITP)  Your healthcare provider has diagnosed you with immune thrombocytopenic purpura (ITP). ITP is also called idiopathic thrombocytopenic purpura. ITP is a blood disorder that causes your immune system to destroy your platelets. Platelets are cells that help stop bleeding. If your body doesn't have enough platelets, your risk for bleeding goes up. Here's what you can do at home to lower your risk.  Medicine and medical care  Here are tips to follow:  · Avoid taking the following medicines, which make it harder for your blood to clot (unless directed to by your healthcare provider):  ¨ Aspirin  ¨ Ibuprofen or other NSAIDs (nonsteroidal anti-inflammatory drugs)  ¨ Warfarin  · Dont take any other medicine without checking with your healthcare provider first. This includes over-the-counter medicines and any herbal remedies or supplements.  · Take all medicines exactly as directed.  · Limit your alcohol intake. Alcohol can make it harder for your blood to clot and put you at risk for accidents.  · Keep all follow-up appointments. Your healthcare provider will need to check your blood platelet count closely.   · Tell your dentist or other healthcare providers that you have ITP prior to any procedures.  Lower your risk for bleeding  Recommendations to lower your risk include:   · Speak to your healthcare provider before engaging in any sports or athletic activities that carry a risk of injury.  · Do what you can to avoid bruising or bumping yourself.  · Use an electric razor when shaving. Be careful when using sharp items such as nail trimmers or knives.  · Blow your nose very gently to prevent nosebleeds.  · Use a cool steam vaporizer to keep the air inside your home moist enough to prevent nosebleeds.  · Wear hard-soled shoes when outside.  · Use gloves and wear long pants when gardening or doing other activities where your skin could get scratched.  · If  you have problems with gum bleeding, use a sponge toothbrush (instead of one with bristles). Ask your healthcare provider or dentist where you can get one.  Follow-up  Make a follow-up appointment as directed by our staff.  When to call your healthcare provider  Call your healthcare provider right away if you have any of the following:  · Easy bruising  · Bleeding for no apparent reason, heavy bleeding, or bleeding that lasts longer than usual  · Tiny areas of pinpoint bleeding on (or just under) the skin of the arms or legs  · Blood in your urine or stool  · Bleeding from your nose or gums  · Heavier than usual menstrual bleeding for women  · Head trauma or injury or any significant injury  · Headaches, confusion, or changes in your vision  · Stiff neck   Date Last Reviewed: 5/1/2016  © 5048-0083 [employer]. [address] [place] [address], [place] [address]. All rights reserved. This information is not intended as a substitute for professional medical care. Always follow your healthcare professional's instructions.

## 2019-10-25 NOTE — DISCHARGE INSTRUCTIONS
[facility]  [address] [address]  [address]  [phone] phone     [phone] fax  Hours of Operation: Monday- Friday 8:00am- 5:00pm  After hours phone  [phone]  Hematology / Oncology Physicians on call      VERN Bell Dr[address] Dr., Dr., Dr., NP Sydney Prescott, NP Tyesha Taylor, NP    Please call with any concerns regarding your appointment today.

## 2019-10-29 ENCOUNTER — OFFICE VISIT (OUTPATIENT)
Dept: HEMATOLOGY/ONCOLOGY | Facility: CLINIC | Age: 24
End: 2019-10-29
Payer: MEDICAID

## 2019-10-29 ENCOUNTER — LAB VISIT (OUTPATIENT)
Dept: LAB | Facility: HOSPITAL | Age: 24
End: 2019-10-29
Attending: INTERNAL MEDICINE
Payer: MEDICAID

## 2019-10-29 VITALS
DIASTOLIC BLOOD PRESSURE: 72 MMHG | SYSTOLIC BLOOD PRESSURE: 100 MMHG | OXYGEN SATURATION: 100 % | HEART RATE: 68 BPM | BODY MASS INDEX: 39.92 KG/M2 | WEIGHT: 218.25 LBS | TEMPERATURE: 98 F

## 2019-10-29 DIAGNOSIS — D69.3 ACUTE ITP: Primary | ICD-10-CM

## 2019-10-29 DIAGNOSIS — D69.3 ACUTE ITP: ICD-10-CM

## 2019-10-29 LAB
BASOPHILS # BLD AUTO: 0.05 K/UL (ref 0–0.2)
BASOPHILS NFR BLD: 0.3 % (ref 0–1.9)
DIFFERENTIAL METHOD: ABNORMAL
EOSINOPHIL # BLD AUTO: 0.2 K/UL (ref 0–0.5)
EOSINOPHIL NFR BLD: 0.9 % (ref 0–8)
ERYTHROCYTE [DISTWIDTH] IN BLOOD BY AUTOMATED COUNT: 18.4 % (ref 11.5–14.5)
HCT VFR BLD AUTO: 32.7 % (ref 37–48.5)
HGB BLD-MCNC: 9.8 G/DL (ref 12–16)
IMM GRANULOCYTES # BLD AUTO: 0.22 K/UL (ref 0–0.04)
IMM GRANULOCYTES NFR BLD AUTO: 1.3 % (ref 0–0.5)
LYMPHOCYTES # BLD AUTO: 5.4 K/UL (ref 1–4.8)
LYMPHOCYTES NFR BLD: 31.6 % (ref 18–48)
MCH RBC QN AUTO: 25.1 PG (ref 27–31)
MCHC RBC AUTO-ENTMCNC: 30 G/DL (ref 32–36)
MCV RBC AUTO: 84 FL (ref 82–98)
MONOCYTES # BLD AUTO: 2.1 K/UL (ref 0.3–1)
MONOCYTES NFR BLD: 12.2 % (ref 4–15)
NEUTROPHILS # BLD AUTO: 9.1 K/UL (ref 1.8–7.7)
NEUTROPHILS NFR BLD: 53.7 % (ref 38–73)
NRBC BLD-RTO: 1 /100 WBC
PLATELET # BLD AUTO: 1128 K/UL (ref 150–350)
PMV BLD AUTO: 11.6 FL (ref 9.2–12.9)
RBC # BLD AUTO: 3.9 M/UL (ref 4–5.4)
WBC # BLD AUTO: 16.92 K/UL (ref 3.9–12.7)

## 2019-10-29 PROCEDURE — 99214 OFFICE O/P EST MOD 30 MIN: CPT | Mod: S$PBB,,, | Performed by: INTERNAL MEDICINE

## 2019-10-29 PROCEDURE — 99213 OFFICE O/P EST LOW 20 MIN: CPT | Mod: PBBFAC | Performed by: INTERNAL MEDICINE

## 2019-10-29 PROCEDURE — 99999 PR PBB SHADOW E&M-EST. PATIENT-LVL III: ICD-10-PCS | Mod: PBBFAC,,, | Performed by: INTERNAL MEDICINE

## 2019-10-29 PROCEDURE — 99999 PR PBB SHADOW E&M-EST. PATIENT-LVL III: CPT | Mod: PBBFAC,,, | Performed by: INTERNAL MEDICINE

## 2019-10-29 PROCEDURE — 99214 PR OFFICE/OUTPT VISIT, EST, LEVL IV, 30-39 MIN: ICD-10-PCS | Mod: S$PBB,,, | Performed by: INTERNAL MEDICINE

## 2019-10-29 PROCEDURE — 36415 COLL VENOUS BLD VENIPUNCTURE: CPT

## 2019-10-29 NOTE — LETTER
October 29, 2019        Nash Cota MD  [address]  [facility] [address]              [facility]  [address] [address] [address]  Phone: [phone]  Fax: [phone]   Patient: Mari Miller   MR Number: [id number]   YOB: 1995   Date of Visit: 10/29/2019     Dear Dr. Cota,     {[id number]}    Sincerely,      Sakina Swan MD            CC  No Recipients    Enclosure

## 2019-10-30 NOTE — PROGRESS NOTES
Subjective:      DATE OF VISIT: 10/29/2019   ?   ?   Patient ID:?Mari Miller is a 23 y.o. female.?? MR#: [id number]   ?  ?   CHIEF COMPLAINT:  Acute ITP  ?   CURRENT TREATMENT:  Eltrombopag     PAST TREATMENT:  Steroids, splenectomy 11/2018, eltrombopag (Promacta) and prednisone taper, IVIG x 2 10/2/19 and 10/3/19     INTERVAL EVENTS:    She completed steroid burst on Sunday day for 4.  She received IV IgG last Thursday and Friday.  She has not had further evidence of mucosal bleeding or petechial lesion.  She is feeling well in no new issues or concerns.    Review of Systems    ?   A comprehensive 14-point review of systems was reviewed with patient and was negative other than as specified above.   ?     Objective:      Physical Exam      ?   Vitals:    10/29/19 1141   BP: 100/72   Pulse: 68   Temp: 97.7 °F (36.5 °C)      ?   ECOG:?  0   General appearance: Generally well appearing, in no acute distress.   Head, eyes, ears, nose, and throat:  Oropharynx clear without petechiae or bleeding.  Abdomen: nontender, nondistended.   Extremities: Warm, without edema.   Neurologic: Alert and oriented. Grossly normal strength, coordination, and gait.   Skin:  Forearms with resolved ecchymoses and no petechial lesion on shins.  Psychiatric: normal mood and affect, conversant and appropriate    ?   Laboratory:  ?   Platelets   Date Value Ref Range Status   10/29/2019 1,128 (HH) 150 - 350 K/uL Final     Comment:     PLT critical result(s) called and verbal readback obtained from MINDA SINGH RN , 10/29/2019 11:36     10/25/2019 110 (L) 150 - 350 K/uL Final     Comment:     Results confirmed, test repeated   10/22/2019 6 (LL) 150 - 350 K/uL Final     Comment:     Plt critical result(s) called and verbal readback obtained from   Marielos Koenig, 10/22/2019 15:35     10/15/2019 663 (H) 150 - 350 K/uL Final   10/11/2019 611 (H) 150 - 350 K/uL Final   10/04/2019 27 (LL) 150 - 350 K/uL Final     Comment:     Results  confirmed, test repeated  PLT COUNT critical result(s) called and verbal readback obtained from   RUBEN TOUSSAINT RN, 10/04/2019 05:56     10/03/2019 2 (LL) 150 - 350 K/uL Final     Comment:     Hct, Plt critical result(s) called and verbal readback obtained from   Darshana Lara RN, 10/03/2019 11:13     10/03/2019 1 (LL) 150 - 350 K/uL Final     Comment:     Results confirmed, test repeated  HGB, HCT, AND PLT count critical result(s) called and verbal readback   obtained from Sonia Manjarrez RN, 10/03/2019 05:51     10/02/2019 1 (LL) 150 - 350 K/uL Final     Comment:     H/H, PLT   critical result(s) called and verbal readback obtained   from SHAMIKA MCDOWELL RN, 10/02/2019 06:36     10/01/2019 2 (LL) 150 - 350 K/uL Final     Comment:     Results confirmed, test repeated  PLT  critical result(s) called and verbal readback obtained from   SG RIVERA RN, 10/01/2019 22:19           Lab Visit on 10/29/2019   Component Date Value Ref Range Status    WBC 10/29/2019 16.92* 3.90 - 12.70 K/uL Final    RBC 10/29/2019 3.90* 4.00 - 5.40 M/uL Final    Hemoglobin 10/29/2019 9.8* 12.0 - 16.0 g/dL Final    Hematocrit 10/29/2019 32.7* 37.0 - 48.5 % Final    Mean Corpuscular Volume 10/29/2019 84  82 - 98 fL Final    Mean Corpuscular Hemoglobin 10/29/2019 25.1* 27.0 - 31.0 pg Final    Mean Corpuscular Hemoglobin Conc 10/29/2019 30.0* 32.0 - 36.0 g/dL Final    RDW 10/29/2019 18.4* 11.5 - 14.5 % Final    Platelets 10/29/2019 1,128* 150 - 350 K/uL Final    MPV 10/29/2019 11.6  9.2 - 12.9 fL Final    Immature Granulocytes 10/29/2019 1.3* 0.0 - 0.5 % Final    Gran # (ANC) 10/29/2019 9.1* 1.8 - 7.7 K/uL Final    Immature Grans (Abs) 10/29/2019 0.22* 0.00 - 0.04 K/uL Final    Lymph # 10/29/2019 5.4* 1.0 - 4.8 K/uL Final    Mono # 10/29/2019 2.1* 0.3 - 1.0 K/uL Final    Eos # 10/29/2019 0.2  0.0 - 0.5 K/uL Final    Baso # 10/29/2019 0.05  0.00 - 0.20 K/uL Final    nRBC 10/29/2019 1* 0 /100 WBC Final    Gran%  10/29/2019 53.7  38.0 - 73.0 % Final    Lymph% 10/29/2019 31.6  18.0 - 48.0 % Final    Mono% 10/29/2019 12.2  4.0 - 15.0 % Final    Eosinophil% 10/29/2019 0.9  0.0 - 8.0 % Final    Basophil% 10/29/2019 0.3  0.0 - 1.9 % Final    Differential Method 10/29/2019 Automated   Final      ?   Platelets   Date Value Ref Range Status   10/29/2019 1,128 (HH) 150 - 350 K/uL Final     Comment:     PLT critical result(s) called and verbal readback obtained from MINDA SINGH RN , 10/29/2019 11:36     10/25/2019 110 (L) 150 - 350 K/uL Final     Comment:     Results confirmed, test repeated   10/22/2019 6 (LL) 150 - 350 K/uL Final     Comment:     Plt critical result(s) called and verbal readback obtained from   Marielos Koenig, 10/22/2019 15:35     10/15/2019 663 (H) 150 - 350 K/uL Final   10/11/2019 611 (H) 150 - 350 K/uL Final   10/04/2019 27 (LL) 150 - 350 K/uL Final     Comment:     Results confirmed, test repeated  PLT COUNT critical result(s) called and verbal readback obtained from   RUBEN TOUSSAINT RN, 10/04/2019 05:56     10/03/2019 2 (LL) 150 - 350 K/uL Final     Comment:     Hct, Plt critical result(s) called and verbal readback obtained from   Darshana Lara RN, 10/03/2019 11:13     10/03/2019 1 (LL) 150 - 350 K/uL Final     Comment:     Results confirmed, test repeated  HGB, HCT, AND PLT count critical result(s) called and verbal readback   obtained from Sonia Manjarrez RN, 10/03/2019 05:51     10/02/2019 1 (LL) 150 - 350 K/uL Final     Comment:     H/H, PLT   critical result(s) called and verbal readback obtained   from SHAMIKA MCDOWELL RN, 10/02/2019 06:36     10/01/2019 2 (LL) 150 - 350 K/uL Final     Comment:     Results confirmed, test repeated  PLT  critical result(s) called and verbal readback obtained from   SG RIVERA RN, 10/01/2019 22:19     09/24/2019 428 (H) 150 - 350 K/uL Final   09/14/2019 47 (L) 150 - 350 K/uL Final   09/14/2019 37 (LL) 150 - 350 K/uL Final     Comment:     H&H, PLT critical  result(s) called and verbal readback obtained from   Bob Lara, 09/14/2019 07:36     09/13/2019 5 (LL) 150 - 350 K/uL Final     Comment:     H&H, PLT critical result(s) called and verbal readback obtained from   Travon Morrison, 09/13/2019 09:04     08/03/2019 62 (L) 150 - 350 K/uL Final   08/02/2019 12 (LL) 150 - 350 K/uL Final     Comment:     Hgb, Hct, Plt critical result(s) called and verbal readback obtained   from Darshana Lara RN , 08/02/2019 07:07     08/02/2019 4 (LL) 150 - 350 K/uL Final     Comment:     HGB, HCT, PLT critical result(s) called and verbal readback obtained   from nurse Layton Jimenez, 08/02/2019 02:35     07/17/2019 349 150 - 350 K/uL Final   06/28/2019 375 (H) 150 - 350 K/uL Final   06/25/2019 126 (L) 150 - 350 K/uL Final       ?   Assessment/Plan:       1. Acute ITP          Plan:     # acute on chronic ITP: history of refractory ITP status post steroids, splenectomy in November 2018, and this year monthly hospitalizations with platelet counts as low as 1-5K.  Since December 2018 she has been treated with daily eltrombopag.  During most recent hospitalization she received 2 infusions of IVIG on 10/02/2019 and 10/03/2019 with excellent response.  During her last follow-up with Dr. Cota last week in followup her platelet count eliana from 1K during hospitalization to >600 K.     With acute drop 2 platelet counts 6 K an oral mucosal bleeding we gave 1pack platelets on 10/23/2019 and received IVIG 1000 mg on 10/24/2019 and 10/25/2019.  Platelet count improved from 6 K to 110 K. She was continued on dexamethasone 40 mg x4 days with day 5 today 10/27/2019.  Platelet count currently 1128.  She has had a robust response in the setting of multiple concomitant therapy although of unclear duration.  I will have her come back on Friday for repeat CBC.     She does note headache which can be mild to moderate although has noticed increased frequency over this last year since January 2018 and notes is  when she started therapy with eltrombopag.  At this time of unstable platelet count I am hesitant to change her current regimen but given frequent episodes of acute ITP while on this medication is unclear if it has been helpful to her.    # iron deficiency anemia:  For the last than she has been on 3 times daily oral iron.  Iron deficiency anemia noted on 06/11/2019 labs with 3% iron saturation and ferritin 4 with improvement on 10/03/2019 labs with 17% saturation and ferritin 39.  Recheck of iron indices 10/22/2019 continues to show iron deficiency and will plan to restart injectafer after resolution of acute thrombocytopenia.    Follow-Up:   On Friday with repeat CBC.

## 2019-11-01 ENCOUNTER — TELEPHONE (OUTPATIENT)
Dept: HEMATOLOGY/ONCOLOGY | Facility: CLINIC | Age: 24
End: 2019-11-01

## 2019-11-05 ENCOUNTER — TELEPHONE (OUTPATIENT)
Dept: PHARMACY | Facility: CLINIC | Age: 24
End: 2019-11-05

## 2019-11-05 NOTE — TELEPHONE ENCOUNTER
Refill call regarding Promacta at [facility]. Will prepare for shipment on  to arrive  with pt consent. Copay 0.00. Patient has not reported any new allergies/ side effects. Pt taking medication(s) as directed with no questions or concerns for [facility]. Also no new medications. and Address verified.   ~6 days on hand.

## 2019-11-11 ENCOUNTER — INFUSION (OUTPATIENT)
Dept: INFUSION THERAPY | Facility: HOSPITAL | Age: 24
End: 2019-11-11
Attending: INTERNAL MEDICINE
Payer: MEDICAID

## 2019-11-11 ENCOUNTER — OFFICE VISIT (OUTPATIENT)
Dept: HEMATOLOGY/ONCOLOGY | Facility: CLINIC | Age: 24
End: 2019-11-11
Payer: MEDICAID

## 2019-11-11 VITALS
TEMPERATURE: 99 F | SYSTOLIC BLOOD PRESSURE: 145 MMHG | HEART RATE: 79 BPM | RESPIRATION RATE: 18 BRPM | DIASTOLIC BLOOD PRESSURE: 93 MMHG

## 2019-11-11 VITALS
HEART RATE: 90 BPM | HEIGHT: 62 IN | WEIGHT: 212.5 LBS | TEMPERATURE: 98 F | DIASTOLIC BLOOD PRESSURE: 90 MMHG | OXYGEN SATURATION: 100 % | SYSTOLIC BLOOD PRESSURE: 150 MMHG | BODY MASS INDEX: 39.11 KG/M2

## 2019-11-11 VITALS — SYSTOLIC BLOOD PRESSURE: 131 MMHG | DIASTOLIC BLOOD PRESSURE: 87 MMHG | HEART RATE: 90 BPM

## 2019-11-11 DIAGNOSIS — D69.6 THROMBOCYTOPENIA: ICD-10-CM

## 2019-11-11 DIAGNOSIS — D69.3 ACUTE ITP: Primary | ICD-10-CM

## 2019-11-11 DIAGNOSIS — D69.3 ACUTE ITP: ICD-10-CM

## 2019-11-11 DIAGNOSIS — Z90.81 H/O SPLENECTOMY: ICD-10-CM

## 2019-11-11 DIAGNOSIS — D50.0 IRON DEFICIENCY ANEMIA DUE TO CHRONIC BLOOD LOSS: ICD-10-CM

## 2019-11-11 DIAGNOSIS — D50.0 IRON DEFICIENCY ANEMIA DUE TO CHRONIC BLOOD LOSS: Primary | ICD-10-CM

## 2019-11-11 LAB
BLD PROD TYP BPU: NORMAL
BLD PROD TYP BPU: NORMAL
BLOOD UNIT EXPIRATION DATE: NORMAL
BLOOD UNIT EXPIRATION DATE: NORMAL
BLOOD UNIT TYPE CODE: 6200
BLOOD UNIT TYPE CODE: 8400
BLOOD UNIT TYPE: NORMAL
BLOOD UNIT TYPE: NORMAL
CODING SYSTEM: NORMAL
CODING SYSTEM: NORMAL
DISPENSE STATUS: NORMAL
DISPENSE STATUS: NORMAL
NUM UNITS TRANS WBC-POOR PLATPHERESIS: NORMAL
NUM UNITS TRANS WBC-POOR PLATPHERESIS: NORMAL

## 2019-11-11 PROCEDURE — 99999 PR PBB SHADOW E&M-EST. PATIENT-LVL III: ICD-10-PCS | Mod: PBBFAC,,, | Performed by: INTERNAL MEDICINE

## 2019-11-11 PROCEDURE — 96365 THER/PROPH/DIAG IV INF INIT: CPT

## 2019-11-11 PROCEDURE — 96366 THER/PROPH/DIAG IV INF ADDON: CPT

## 2019-11-11 PROCEDURE — 63600175 PHARM REV CODE 636 W HCPCS: Mod: JG | Performed by: INTERNAL MEDICINE

## 2019-11-11 PROCEDURE — P9037 PLATE PHERES LEUKOREDU IRRAD: HCPCS

## 2019-11-11 PROCEDURE — 99213 OFFICE O/P EST LOW 20 MIN: CPT | Mod: PBBFAC,25 | Performed by: INTERNAL MEDICINE

## 2019-11-11 PROCEDURE — 36430 TRANSFUSION BLD/BLD COMPNT: CPT

## 2019-11-11 PROCEDURE — 99999 PR PBB SHADOW E&M-EST. PATIENT-LVL III: CPT | Mod: PBBFAC,,, | Performed by: INTERNAL MEDICINE

## 2019-11-11 PROCEDURE — 99215 PR OFFICE/OUTPT VISIT, EST, LEVL V, 40-54 MIN: ICD-10-PCS | Mod: S$PBB,,, | Performed by: INTERNAL MEDICINE

## 2019-11-11 PROCEDURE — 99215 OFFICE O/P EST HI 40 MIN: CPT | Mod: S$PBB,,, | Performed by: INTERNAL MEDICINE

## 2019-11-11 PROCEDURE — 25000003 PHARM REV CODE 250: Performed by: INTERNAL MEDICINE

## 2019-11-11 RX ORDER — HYDROCODONE BITARTRATE AND ACETAMINOPHEN 500; 5 MG/1; MG/1
TABLET ORAL ONCE
Status: CANCELLED | OUTPATIENT
Start: 2019-11-11 | End: 2019-11-11

## 2019-11-11 RX ORDER — HYDROCODONE BITARTRATE AND ACETAMINOPHEN 500; 5 MG/1; MG/1
TABLET ORAL ONCE
Status: DISCONTINUED | OUTPATIENT
Start: 2019-11-11 | End: 2019-11-11 | Stop reason: HOSPADM

## 2019-11-11 RX ORDER — DIPHENHYDRAMINE HCL 25 MG
25 CAPSULE ORAL
Status: CANCELLED | OUTPATIENT
Start: 2019-11-11

## 2019-11-11 RX ORDER — DIPHENHYDRAMINE HCL 25 MG
25 CAPSULE ORAL
Status: COMPLETED | OUTPATIENT
Start: 2019-11-11 | End: 2019-11-11

## 2019-11-11 RX ORDER — FAMOTIDINE 10 MG/ML
20 INJECTION INTRAVENOUS 2 TIMES DAILY
Status: DISCONTINUED | OUTPATIENT
Start: 2019-11-11 | End: 2019-12-17

## 2019-11-11 RX ORDER — FAMOTIDINE 10 MG/ML
20 INJECTION INTRAVENOUS 2 TIMES DAILY
Status: DISCONTINUED | OUTPATIENT
Start: 2019-11-11 | End: 2019-11-11

## 2019-11-11 RX ORDER — SODIUM CHLORIDE 0.9 % (FLUSH) 0.9 %
10 SYRINGE (ML) INJECTION
Status: DISCONTINUED | OUTPATIENT
Start: 2019-11-11 | End: 2019-11-11 | Stop reason: HOSPADM

## 2019-11-11 RX ORDER — SODIUM CHLORIDE 9 MG/ML
INJECTION, SOLUTION INTRAVENOUS CONTINUOUS
Status: CANCELLED | OUTPATIENT
Start: 2019-11-15

## 2019-11-11 RX ORDER — SODIUM CHLORIDE 0.9 % (FLUSH) 0.9 %
10 SYRINGE (ML) INJECTION
Status: CANCELLED | OUTPATIENT
Start: 2019-11-15

## 2019-11-11 RX ORDER — FAMOTIDINE 20 MG/1
20 TABLET, FILM COATED ORAL ONCE
Status: COMPLETED | OUTPATIENT
Start: 2019-11-11 | End: 2019-11-11

## 2019-11-11 RX ORDER — DEXAMETHASONE 4 MG/1
40 TABLET ORAL DAILY
Qty: 40 TABLET | Refills: 0 | Status: SHIPPED | OUTPATIENT
Start: 2019-11-11 | End: 2019-11-15

## 2019-11-11 RX ORDER — HEPARIN 100 UNIT/ML
5 SYRINGE INTRAVENOUS
Status: CANCELLED | OUTPATIENT
Start: 2019-11-15

## 2019-11-11 RX ADMIN — FAMOTIDINE 20 MG: 20 TABLET ORAL at 01:11

## 2019-11-11 RX ADMIN — DIPHENHYDRAMINE HYDROCHLORIDE 25 MG: 25 CAPSULE ORAL at 01:11

## 2019-11-11 RX ADMIN — HUMAN IMMUNOGLOBULIN G 95 G: 40 LIQUID INTRAVENOUS at 09:11

## 2019-11-11 NOTE — PLAN OF CARE
Ambulatory to outpatient infusion for scheduled administration of 1 unit irradiated platelets. Patient alert, oriented, plan of care reviewed with patient. Verbalized under-standing. Iv left hand intact.

## 2019-11-11 NOTE — PLAN OF CARE
Patient tolerated platelet transfusion well; no adverse reactions noted. Iv discontinued with unit intact. Discharge follow up information reviewed. Ambulatory from infusion room in no apparent distress. No complaints voiced.

## 2019-11-11 NOTE — PROGRESS NOTES
Subjective:      DATE OF VISIT: 11/11/2019   ?   ?   Patient ID:?Mari Miller is a 23 y.o. female.?? MR#: [id number]   ?  ?   CHIEF COMPLAINT:  Acute ITP  ?   CURRENT TREATMENT:  Eltrombopag     PAST TREATMENT:  Steroids, splenectomy 11/2018, eltrombopag (Promacta) and prednisone taper, IVIG x 2 10/2/19 and 10/3/19     INTERVAL EVENTS:    Ms. Rasmussen had appointment last week but missed in rescheduled to today.  Yesterday she started developing recurrent gum bleeding/clotting.  No other evidence of bleeding in stool or otherwise.  She last received IVIG about 3 weeks ago and at that time also completed steroid burst.  She has been continuing on daily Promacta.  No new concerning headaches or other neurologic symptoms.    Review of Systems    ?   A comprehensive 14-point review of systems was reviewed with patient and was negative other than as specified above.   ?     Objective:      Physical Exam      ?   Vitals:    11/11/19 0805   BP: (!) 150/90   Pulse: 90   Temp: 97.7 °F (36.5 °C)      ?   ECOG:?  0   General appearance: Generally well appearing, in no acute distress.   Head, eyes, ears, nose, and throat:  Oropharynx clear without petechiae or bleeding.  Abdomen: nontender, nondistended.   Extremities: Warm, without edema.   Neurologic: Alert and oriented. Grossly normal strength, coordination, and gait.   Skin:  Forearms with resolved ecchymoses and no petechial lesion on shins.  Psychiatric: normal mood and affect, conversant and appropriate    ?   Laboratory:  ?   Platelets   Date Value Ref Range Status   11/11/2019 0 (LL) 150 - 350 K/uL Final     Comment:     Plt critical result(s) called and verbal readback obtained from   Marielos Swan LPN by [facility] 11/11/2019 08:01     10/29/2019 1,128 (HH) 150 - 350 K/uL Final     Comment:     PLT critical result(s) called and verbal readback obtained from MINDA SINGH RN , 10/29/2019 11:36     10/25/2019 110 (L) 150 - 350 K/uL Final     Comment:      Results confirmed, test repeated   10/22/2019 6 (LL) 150 - 350 K/uL Final     Comment:     Plt critical result(s) called and verbal readback obtained from   Marielos Koenig, 10/22/2019 15:35     10/15/2019 663 (H) 150 - 350 K/uL Final   10/11/2019 611 (H) 150 - 350 K/uL Final   10/04/2019 27 (LL) 150 - 350 K/uL Final     Comment:     Results confirmed, test repeated  PLT COUNT critical result(s) called and verbal readback obtained from   RUBEN TOUSSAINT RN, 10/04/2019 05:56     10/03/2019 2 (LL) 150 - 350 K/uL Final     Comment:     Hct, Plt critical result(s) called and verbal readback obtained from   Darshana Lara RN, 10/03/2019 11:13     10/03/2019 1 (LL) 150 - 350 K/uL Final     Comment:     Results confirmed, test repeated  HGB, HCT, AND PLT count critical result(s) called and verbal readback   obtained from Sonia Manjarrez RN, 10/03/2019 05:51     10/02/2019 1 (LL) 150 - 350 K/uL Final     Comment:     H/H, PLT   critical result(s) called and verbal readback obtained   from SHAMIKA MCDOWELL RN, 10/02/2019 06:36           Lab Visit on 11/11/2019   Component Date Value Ref Range Status    WBC 11/11/2019 9.82  3.90 - 12.70 K/uL Final    RBC 11/11/2019 3.86* 4.00 - 5.40 M/uL Final    Hemoglobin 11/11/2019 9.3* 12.0 - 16.0 g/dL Final    Hematocrit 11/11/2019 30.3* 37.0 - 48.5 % Final    Mean Corpuscular Volume 11/11/2019 79* 82 - 98 fL Final    Mean Corpuscular Hemoglobin 11/11/2019 24.1* 27.0 - 31.0 pg Final    Mean Corpuscular Hemoglobin Conc 11/11/2019 30.7* 32.0 - 36.0 g/dL Final    RDW 11/11/2019 17.3* 11.5 - 14.5 % Final    Platelets 11/11/2019 0* 150 - 350 K/uL Final    MPV 11/11/2019 SEE COMMENT  9.2 - 12.9 fL Final    Immature Granulocytes 11/11/2019 0.3  0.0 - 0.5 % Final    Gran # (ANC) 11/11/2019 6.4  1.8 - 7.7 K/uL Final    Immature Grans (Abs) 11/11/2019 0.03  0.00 - 0.04 K/uL Final    Lymph # 11/11/2019 2.2  1.0 - 4.8 K/uL Final    Mono # 11/11/2019 1.0  0.3 - 1.0 K/uL Final    Eos #  11/11/2019 0.1  0.0 - 0.5 K/uL Final    Baso # 11/11/2019 0.08  0.00 - 0.20 K/uL Final    nRBC 11/11/2019 0  0 /100 WBC Final    Gran% 11/11/2019 65.6  38.0 - 73.0 % Final    Lymph% 11/11/2019 22.4  18.0 - 48.0 % Final    Mono% 11/11/2019 10.1  4.0 - 15.0 % Final    Eosinophil% 11/11/2019 0.8  0.0 - 8.0 % Final    Basophil% 11/11/2019 0.8  0.0 - 1.9 % Final    Differential Method 11/11/2019 Automated   Final      ?   Platelets   Date Value Ref Range Status   11/11/2019 0 (LL) 150 - 350 K/uL Final     Comment:     Plt critical result(s) called and verbal readback obtained from   Marielos Swan LPN by [facility] 11/11/2019 08:01     10/29/2019 1,128 (HH) 150 - 350 K/uL Final     Comment:     PLT critical result(s) called and verbal readback obtained from MINDA SINGH RN , 10/29/2019 11:36     10/25/2019 110 (L) 150 - 350 K/uL Final     Comment:     Results confirmed, test repeated   10/22/2019 6 (LL) 150 - 350 K/uL Final     Comment:     Plt critical result(s) called and verbal readback obtained from   Marielos Koenig, 10/22/2019 15:35     10/15/2019 663 (H) 150 - 350 K/uL Final   10/11/2019 611 (H) 150 - 350 K/uL Final   10/04/2019 27 (LL) 150 - 350 K/uL Final     Comment:     Results confirmed, test repeated  PLT COUNT critical result(s) called and verbal readback obtained from   RUBEN TOUSSAINT RN, 10/04/2019 05:56     10/03/2019 2 (LL) 150 - 350 K/uL Final     Comment:     Hct, Plt critical result(s) called and verbal readback obtained from   Darshana Lara RN, 10/03/2019 11:13     10/03/2019 1 (LL) 150 - 350 K/uL Final     Comment:     Results confirmed, test repeated  HGB, HCT, AND PLT count critical result(s) called and verbal readback   obtained from Sonia Manjarrez RN, 10/03/2019 05:51     10/02/2019 1 (LL) 150 - 350 K/uL Final     Comment:     H/H, PLT   critical result(s) called and verbal readback obtained   from SAHMIKA MCDOWELL RN, 10/02/2019 06:36     10/01/2019 2 (LL) 150 - 350 K/uL Final      Comment:     Results confirmed, test repeated  PLT  critical result(s) called and verbal readback obtained from   SG RIVERA RN, 10/01/2019 22:19     09/24/2019 428 (H) 150 - 350 K/uL Final   09/14/2019 47 (L) 150 - 350 K/uL Final   09/14/2019 37 (LL) 150 - 350 K/uL Final     Comment:     H&H, PLT critical result(s) called and verbal readback obtained from   Bob Lara, 09/14/2019 07:36     09/13/2019 5 (LL) 150 - 350 K/uL Final     Comment:     H&H, PLT critical result(s) called and verbal readback obtained from   Travon Morrison, 09/13/2019 09:04     08/03/2019 62 (L) 150 - 350 K/uL Final   08/02/2019 12 (LL) 150 - 350 K/uL Final     Comment:     Hgb, Hct, Plt critical result(s) called and verbal readback obtained   from Darshana Lara RN , 08/02/2019 07:07     08/02/2019 4 (LL) 150 - 350 K/uL Final     Comment:     HGB, HCT, PLT critical result(s) called and verbal readback obtained   from nurse Layton Jimenez, 08/02/2019 02:35     07/17/2019 349 150 - 350 K/uL Final   06/28/2019 375 (H) 150 - 350 K/uL Final       ?   Assessment/Plan:       1. Acute ITP    2. Thrombocytopenia    3. Iron deficiency anemia due to chronic blood loss    4. H/O splenectomy          Plan:     # acute on chronic ITP:  history of refractory ITP status post steroids, splenectomy in November 2018, and this year monthly hospitalizations with platelet counts as low as 1-5K.  Since December 2018 she has been treated with daily eltrombopag.  During most recent hospitalization she received 2 infusions of IVIG on 10/02/2019 and 10/03/2019 along with dexamethasone 40mg x 4 days with excellent response.      Recurrent acute ITP with platelet count 0 K today.  This is about 3 weeks from last acute ITP treatment. She developed recurrent gum bleeding yesterday no other evidence of bleeding.    - transfuse 2 units platelet today, irradiated with premedication due to reported pruritus with infusion.  - IVIG 1 g today  -  dexamethasone 40 mg x4  days with day 1 today 11/11/19   - RV on Wed for repeat CBC  - discontinued today eltrombopag since recurrent acture ITP therapy appears ineffective  - consider N-plate therapy initiation pending response.    # iron deficiency anemia:  For the last than she has been on 3 times daily oral iron.  Iron deficiency anemia noted on 06/11/2019 labs with 3% iron saturation and ferritin 4 with improvement on 10/03/2019 labs with 17% saturation and ferritin 39.  Recheck of iron indices 10/22/2019 continues to show iron deficiency and will plan to restart injectafer after resolution of acute thrombocytopenia.    Follow-Up:   On Wednesday with repeat CBC.

## 2019-11-11 NOTE — PATIENT INSTRUCTIONS
IVIG today  Please get dex from pharmacy 40mg x 4 days  2u plt transfusion  Consent obtained  RV wed with cbc and possible Nplate

## 2019-11-11 NOTE — DISCHARGE INSTRUCTIONS
[facility]  [address]  [address]  [phone] phone     [phone] fax  Hours of Operation: Monday- Friday 8:00am- 5:00pm  After hours phone  [phone]  Hematology / Oncology Physicians on call      VERN [address] Dr[address] Dr., Dr., Dr., NP Sydney Prescott, NP Tyesha Taylor, NP    Please call with any concerns regarding your appointment today.

## 2019-11-13 ENCOUNTER — LAB VISIT (OUTPATIENT)
Dept: LAB | Facility: HOSPITAL | Age: 24
End: 2019-11-13
Attending: INTERNAL MEDICINE
Payer: MEDICAID

## 2019-11-13 ENCOUNTER — OFFICE VISIT (OUTPATIENT)
Dept: HEMATOLOGY/ONCOLOGY | Facility: CLINIC | Age: 24
End: 2019-11-13
Payer: MEDICAID

## 2019-11-13 ENCOUNTER — TELEPHONE (OUTPATIENT)
Dept: HEMATOLOGY/ONCOLOGY | Facility: CLINIC | Age: 24
End: 2019-11-13

## 2019-11-13 VITALS
DIASTOLIC BLOOD PRESSURE: 73 MMHG | OXYGEN SATURATION: 99 % | BODY MASS INDEX: 39.63 KG/M2 | HEART RATE: 80 BPM | TEMPERATURE: 99 F | HEIGHT: 62 IN | SYSTOLIC BLOOD PRESSURE: 122 MMHG | WEIGHT: 215.38 LBS

## 2019-11-13 DIAGNOSIS — D69.3 ACUTE ITP: ICD-10-CM

## 2019-11-13 DIAGNOSIS — D69.3 ACUTE ITP: Primary | ICD-10-CM

## 2019-11-13 LAB
BASOPHILS # BLD AUTO: 0.02 K/UL (ref 0–0.2)
BASOPHILS NFR BLD: 0.2 % (ref 0–1.9)
DIFFERENTIAL METHOD: ABNORMAL
EOSINOPHIL # BLD AUTO: 0 K/UL (ref 0–0.5)
EOSINOPHIL NFR BLD: 0 % (ref 0–8)
ERYTHROCYTE [DISTWIDTH] IN BLOOD BY AUTOMATED COUNT: 17.2 % (ref 11.5–14.5)
HCT VFR BLD AUTO: 32 % (ref 37–48.5)
HGB BLD-MCNC: 9.8 G/DL (ref 12–16)
IMM GRANULOCYTES # BLD AUTO: 0.03 K/UL (ref 0–0.04)
IMM GRANULOCYTES NFR BLD AUTO: 0.3 % (ref 0–0.5)
LYMPHOCYTES # BLD AUTO: 0.7 K/UL (ref 1–4.8)
LYMPHOCYTES NFR BLD: 7.8 % (ref 18–48)
MCH RBC QN AUTO: 23.7 PG (ref 27–31)
MCHC RBC AUTO-ENTMCNC: 30.6 G/DL (ref 32–36)
MCV RBC AUTO: 77 FL (ref 82–98)
MONOCYTES # BLD AUTO: 0.1 K/UL (ref 0.3–1)
MONOCYTES NFR BLD: 0.8 % (ref 4–15)
NEUTROPHILS # BLD AUTO: 7.9 K/UL (ref 1.8–7.7)
NEUTROPHILS NFR BLD: 90.9 % (ref 38–73)
NRBC BLD-RTO: 0 /100 WBC
PLATELET # BLD AUTO: 13 K/UL (ref 150–350)
PMV BLD AUTO: ABNORMAL FL (ref 9.2–12.9)
RBC # BLD AUTO: 4.14 M/UL (ref 4–5.4)
WBC # BLD AUTO: 8.7 K/UL (ref 3.9–12.7)

## 2019-11-13 PROCEDURE — 99213 OFFICE O/P EST LOW 20 MIN: CPT | Mod: S$PBB,,, | Performed by: INTERNAL MEDICINE

## 2019-11-13 PROCEDURE — 99999 PR PBB SHADOW E&M-EST. PATIENT-LVL III: ICD-10-PCS | Mod: PBBFAC,,, | Performed by: INTERNAL MEDICINE

## 2019-11-13 PROCEDURE — 99999 PR PBB SHADOW E&M-EST. PATIENT-LVL III: CPT | Mod: PBBFAC,,, | Performed by: INTERNAL MEDICINE

## 2019-11-13 PROCEDURE — 36415 COLL VENOUS BLD VENIPUNCTURE: CPT

## 2019-11-13 PROCEDURE — 99213 PR OFFICE/OUTPT VISIT, EST, LEVL III, 20-29 MIN: ICD-10-PCS | Mod: S$PBB,,, | Performed by: INTERNAL MEDICINE

## 2019-11-13 PROCEDURE — 99213 OFFICE O/P EST LOW 20 MIN: CPT | Mod: PBBFAC | Performed by: INTERNAL MEDICINE

## 2019-11-13 NOTE — PROGRESS NOTES
Subjective:      DATE OF VISIT: 11/13/2019   ?   ?   Patient ID:?Mari Miller is a 23 y.o. female.?? MR#: [id number]   ?  ?   CHIEF COMPLAINT:  Acute ITP  ?   CURRENT TREATMENT:  Eltrombopag     PAST TREATMENT:  Steroids, splenectomy 11/2018, eltrombopag (Promacta) and prednisone taper, IVIG x 2 10/2/19 and 10/3/19     INTERVAL EVENTS:    Ms. Rasmussen is doing well today without evidence of gum or other bleeding or bruising or petechia.  No fevers or chills.  Energy is stable.  She started taking dexamethasone on Tuesday and thus far has had 1 dose with 2nd dose later this afternoon.  On Monday she received 1 pack platelets as well as IVIG.     Review of Systems    ?   A comprehensive 14-point review of systems was reviewed with patient and was negative other than as specified above.   ?     Objective:      Physical Exam      ?   Vitals:    11/13/19 1309   BP: 122/73   Pulse: 80   Temp: 98.6 °F (37 °C)      ?   ECOG:?  0   General appearance: Generally well appearing, in no acute distress.   Head, eyes, ears, nose, and throat:  Oropharynx clear without petechiae or bleeding.  Abdomen: nontender, nondistended.   Extremities: Warm, without edema.   Neurologic: Alert and oriented. Grossly normal strength, coordination, and gait.   Skin:  Forearms with resolved ecchymoses and no petechial lesion on shins.  Psychiatric: normal mood and affect, conversant and appropriate    ?   Laboratory:  ?   Platelets   Date Value Ref Range Status   11/13/2019 13 (LL) 150 - 350 K/uL Final     Comment:     PLT critical result(s) called and verbal readback obtained from   Anisha Yun MA by HTPA1 11/13/2019 13:27     11/11/2019 0 (LL) 150 - 350 K/uL Final     Comment:     Plt critical result(s) called and verbal readback obtained from   [facility] 11/11/2019 08:01     10/29/2019 1,128 (HH) 150 - 350 K/uL Final     Comment:     PLT critical result(s) called and verbal readback obtained from MINDA SINGH  RN , 10/29/2019 11:36     10/25/2019 110 (L) 150 - 350 K/uL Final     Comment:     Results confirmed, test repeated   10/22/2019 6 (LL) 150 - 350 K/uL Final     Comment:     Plt critical result(s) called and verbal readback obtained from   Marielos Koenig, 10/22/2019 15:35     10/15/2019 663 (H) 150 - 350 K/uL Final   10/11/2019 611 (H) 150 - 350 K/uL Final   10/04/2019 27 (LL) 150 - 350 K/uL Final     Comment:     Results confirmed, test repeated  PLT COUNT critical result(s) called and verbal readback obtained from   RUBEN TOUSSAINT RN, 10/04/2019 05:56     10/03/2019 2 (LL) 150 - 350 K/uL Final     Comment:     Hct, Plt critical result(s) called and verbal readback obtained from   Darshana Lara RN, 10/03/2019 11:13     10/03/2019 1 (LL) 150 - 350 K/uL Final     Comment:     Results confirmed, test repeated  HGB, HCT, AND PLT count critical result(s) called and verbal readback   obtained from Sonia Manjarrez RN, 10/03/2019 05:51           Lab Visit on 11/13/2019   Component Date Value Ref Range Status    WBC 11/13/2019 8.70  3.90 - 12.70 K/uL Final    RBC 11/13/2019 4.14  4.00 - 5.40 M/uL Final    Hemoglobin 11/13/2019 9.8* 12.0 - 16.0 g/dL Final    Hematocrit 11/13/2019 32.0* 37.0 - 48.5 % Final    Mean Corpuscular Volume 11/13/2019 77* 82 - 98 fL Final    Mean Corpuscular Hemoglobin 11/13/2019 23.7* 27.0 - 31.0 pg Final    Mean Corpuscular Hemoglobin Conc 11/13/2019 30.6* 32.0 - 36.0 g/dL Final    RDW 11/13/2019 17.2* 11.5 - 14.5 % Final    Platelets 11/13/2019 13* 150 - 350 K/uL Final    MPV 11/13/2019 SEE COMMENT  9.2 - 12.9 fL Final    Immature Granulocytes 11/13/2019 0.3  0.0 - 0.5 % Final    Gran # (ANC) 11/13/2019 7.9* 1.8 - 7.7 K/uL Final    Immature Grans (Abs) 11/13/2019 0.03  0.00 - 0.04 K/uL Final    Lymph # 11/13/2019 0.7* 1.0 - 4.8 K/uL Final    Mono # 11/13/2019 0.1* 0.3 - 1.0 K/uL Final    Eos # 11/13/2019 0.0  0.0 - 0.5 K/uL Final    Baso # 11/13/2019 0.02  0.00 - 0.20 K/uL Final     nRBC 11/13/2019 0  0 /100 WBC Final    Gran% 11/13/2019 90.9* 38.0 - 73.0 % Final    Lymph% 11/13/2019 7.8* 18.0 - 48.0 % Final    Mono% 11/13/2019 0.8* 4.0 - 15.0 % Final    Eosinophil% 11/13/2019 0.0  0.0 - 8.0 % Final    Basophil% 11/13/2019 0.2  0.0 - 1.9 % Final    Differential Method 11/13/2019 Automated   Final      ?   Platelets   Date Value Ref Range Status   11/13/2019 13 (LL) 150 - 350 K/uL Final     Comment:     PLT critical result(s) called and verbal readback obtained from   Anisha Yun MA by HTPA1 11/13/2019 13:27     11/11/2019 0 (LL) 150 - 350 K/uL Final     Comment:     Plt critical result(s) called and verbal readback obtained from   Marielos Swan LPN by HTPA1 11/11/2019 08:01     10/29/2019 1,128 (HH) 150 - 350 K/uL Final     Comment:     PLT critical result(s) called and verbal readback obtained from MINDA SINGH RN , 10/29/2019 11:36     10/25/2019 110 (L) 150 - 350 K/uL Final     Comment:     Results confirmed, test repeated   10/22/2019 6 (LL) 150 - 350 K/uL Final     Comment:     Plt critical result(s) called and verbal readback obtained from   Marielos Koenig, 10/22/2019 15:35     10/15/2019 663 (H) 150 - 350 K/uL Final   10/11/2019 611 (H) 150 - 350 K/uL Final   10/04/2019 27 (LL) 150 - 350 K/uL Final     Comment:     Results confirmed, test repeated  PLT COUNT critical result(s) called and verbal readback obtained from   RUBEN TOUSSAINT RN, 10/04/2019 05:56     10/03/2019 2 (LL) 150 - 350 K/uL Final     Comment:     Hct, Plt critical result(s) called and verbal readback obtained from   Darshana Lara RN, 10/03/2019 11:13     10/03/2019 1 (LL) 150 - 350 K/uL Final     Comment:     Results confirmed, test repeated  HGB, HCT, AND PLT count critical result(s) called and verbal readback   obtained from Sonia Manjarrez RN, 10/03/2019 05:51     10/02/2019 1 (LL) 150 - 350 K/uL Final     Comment:     H/H, PLT   critical result(s) called and verbal readback obtained   from SHAMIKA  JEREMIAS, RN, 10/02/2019 06:36     10/01/2019 2 (LL) 150 - 350 K/uL Final     Comment:     Results confirmed, test repeated  PLT  critical result(s) called and verbal readback obtained from   SG RIVERA RN, 10/01/2019 22:19     09/24/2019 428 (H) 150 - 350 K/uL Final   09/14/2019 47 (L) 150 - 350 K/uL Final   09/14/2019 37 (LL) 150 - 350 K/uL Final     Comment:     H&H, PLT critical result(s) called and verbal readback obtained from   Bob Lara, 09/14/2019 07:36     09/13/2019 5 (LL) 150 - 350 K/uL Final     Comment:     H&H, PLT critical result(s) called and verbal readback obtained from   Travon Morrison, 09/13/2019 09:04     08/03/2019 62 (L) 150 - 350 K/uL Final   08/02/2019 12 (LL) 150 - 350 K/uL Final     Comment:     Hgb, Hct, Plt critical result(s) called and verbal readback obtained   from Darshana Lara RN , 08/02/2019 07:07     08/02/2019 4 (LL) 150 - 350 K/uL Final     Comment:     HGB, HCT, PLT critical result(s) called and verbal readback obtained   from nurse Layton Jimenez, 08/02/2019 02:35     07/17/2019 349 150 - 350 K/uL Final       ?   Assessment/Plan:       1. Acute ITP          Plan:     # acute on chronic ITP:  history of refractory ITP status post steroids, splenectomy in November 2018, and this year monthly hospitalizations with platelet counts as low as 1-5K.  Since December 2018 she has been treated with daily eltrombopag.  During most recent hospitalization she received 2 infusions of IVIG on 10/02/2019 and 10/03/2019 along with dexamethasone 40mg x 4 days with excellent response.      Recurrent acute ITP with platelet count 0 K and today mild improvement to 13 K, suboptimal response to transfusion of 1 packed platelets and IVIG on Monday.  She is in stable clinical condition and recommended continuation of dexamethasone today day 2 of 4, and close follow-up in 2 days for repeat CBC.    - discontinued eltrombopag on 11/11/19 due to refractory ITP on this medication and possible  headache associated  - consider N-plate therapy initiation pending response.    # iron deficiency anemia:  For the last than she has been on 3 times daily oral iron.  Iron deficiency anemia noted on 06/11/2019 labs with 3% iron saturation and ferritin 4 with improvement on 10/03/2019 labs with 17% saturation and ferritin 39.  Recheck of iron indices 10/22/2019 continues to show iron deficiency and will plan to restart injectafer after resolution of acute thrombocytopenia.    Follow-Up:   On Friday 11/15/2019 with repeat CBC.

## 2019-11-15 ENCOUNTER — LAB VISIT (OUTPATIENT)
Dept: LAB | Facility: HOSPITAL | Age: 24
End: 2019-11-15
Attending: INTERNAL MEDICINE
Payer: MEDICAID

## 2019-11-15 DIAGNOSIS — D69.3 ACUTE ITP: Primary | ICD-10-CM

## 2019-11-15 DIAGNOSIS — D69.3 ACUTE ITP: ICD-10-CM

## 2019-11-15 LAB
ANISOCYTOSIS BLD QL SMEAR: ABNORMAL
BASOPHILS # BLD AUTO: 0.02 K/UL (ref 0–0.2)
BASOPHILS NFR BLD: 0.1 % (ref 0–1.9)
DIFFERENTIAL METHOD: ABNORMAL
EOSINOPHIL # BLD AUTO: 0 K/UL (ref 0–0.5)
EOSINOPHIL NFR BLD: 0 % (ref 0–8)
ERYTHROCYTE [DISTWIDTH] IN BLOOD BY AUTOMATED COUNT: 17.3 % (ref 11.5–14.5)
HCT VFR BLD AUTO: 32.3 % (ref 37–48.5)
HGB BLD-MCNC: 9.8 G/DL (ref 12–16)
HYPOCHROMIA BLD QL SMEAR: ABNORMAL
IMM GRANULOCYTES # BLD AUTO: 0.09 K/UL (ref 0–0.04)
IMM GRANULOCYTES NFR BLD AUTO: 0.5 % (ref 0–0.5)
LYMPHOCYTES # BLD AUTO: 1 K/UL (ref 1–4.8)
LYMPHOCYTES NFR BLD: 5.6 % (ref 18–48)
MCH RBC QN AUTO: 23.8 PG (ref 27–31)
MCHC RBC AUTO-ENTMCNC: 30.3 G/DL (ref 32–36)
MCV RBC AUTO: 78 FL (ref 82–98)
MONOCYTES # BLD AUTO: 0.2 K/UL (ref 0.3–1)
MONOCYTES NFR BLD: 1.2 % (ref 4–15)
NEUTROPHILS # BLD AUTO: 16.9 K/UL (ref 1.8–7.7)
NEUTROPHILS NFR BLD: 93.1 % (ref 38–73)
NRBC BLD-RTO: 0 /100 WBC
PLATELET # BLD AUTO: 42 K/UL (ref 150–350)
PLATELET BLD QL SMEAR: ABNORMAL
PMV BLD AUTO: ABNORMAL FL (ref 9.2–12.9)
POIKILOCYTOSIS BLD QL SMEAR: SLIGHT
RBC # BLD AUTO: 4.12 M/UL (ref 4–5.4)
SCHISTOCYTES BLD QL SMEAR: PRESENT
SPHEROCYTES BLD QL SMEAR: ABNORMAL
TARGETS BLD QL SMEAR: ABNORMAL
WBC # BLD AUTO: 18.1 K/UL (ref 3.9–12.7)

## 2019-11-15 PROCEDURE — 85025 COMPLETE CBC W/AUTO DIFF WBC: CPT

## 2019-11-15 PROCEDURE — 36415 COLL VENOUS BLD VENIPUNCTURE: CPT

## 2019-11-20 ENCOUNTER — PATIENT MESSAGE (OUTPATIENT)
Dept: HEMATOLOGY/ONCOLOGY | Facility: CLINIC | Age: 24
End: 2019-11-20

## 2019-11-20 ENCOUNTER — LAB VISIT (OUTPATIENT)
Dept: LAB | Facility: HOSPITAL | Age: 24
End: 2019-11-20
Attending: INTERNAL MEDICINE
Payer: MEDICAID

## 2019-11-20 DIAGNOSIS — D69.3 ACUTE ITP: Primary | ICD-10-CM

## 2019-11-20 DIAGNOSIS — D69.3 ACUTE ITP: ICD-10-CM

## 2019-11-20 LAB
BASOPHILS # BLD AUTO: 0.01 K/UL (ref 0–0.2)
BASOPHILS NFR BLD: 0.1 % (ref 0–1.9)
DIFFERENTIAL METHOD: ABNORMAL
EOSINOPHIL # BLD AUTO: 0.2 K/UL (ref 0–0.5)
EOSINOPHIL NFR BLD: 1.5 % (ref 0–8)
ERYTHROCYTE [DISTWIDTH] IN BLOOD BY AUTOMATED COUNT: 17.8 % (ref 11.5–14.5)
HCT VFR BLD AUTO: 30.8 % (ref 37–48.5)
HGB BLD-MCNC: 9.3 G/DL (ref 12–16)
IMM GRANULOCYTES # BLD AUTO: 0.08 K/UL (ref 0–0.04)
IMM GRANULOCYTES NFR BLD AUTO: 0.7 % (ref 0–0.5)
LYMPHOCYTES # BLD AUTO: 3.3 K/UL (ref 1–4.8)
LYMPHOCYTES NFR BLD: 28.6 % (ref 18–48)
MCH RBC QN AUTO: 23.3 PG (ref 27–31)
MCHC RBC AUTO-ENTMCNC: 30.2 G/DL (ref 32–36)
MCV RBC AUTO: 77 FL (ref 82–98)
MONOCYTES # BLD AUTO: 1.3 K/UL (ref 0.3–1)
MONOCYTES NFR BLD: 11.7 % (ref 4–15)
NEUTROPHILS # BLD AUTO: 6.6 K/UL (ref 1.8–7.7)
NEUTROPHILS NFR BLD: 57.4 % (ref 38–73)
NRBC BLD-RTO: 0 /100 WBC
PLATELET # BLD AUTO: 54 K/UL (ref 150–350)
PMV BLD AUTO: ABNORMAL FL (ref 9.2–12.9)
RBC # BLD AUTO: 4 M/UL (ref 4–5.4)
WBC # BLD AUTO: 11.47 K/UL (ref 3.9–12.7)

## 2019-11-20 PROCEDURE — 36415 COLL VENOUS BLD VENIPUNCTURE: CPT

## 2019-11-22 ENCOUNTER — PATIENT MESSAGE (OUTPATIENT)
Dept: HEMATOLOGY/ONCOLOGY | Facility: CLINIC | Age: 24
End: 2019-11-22

## 2019-11-27 ENCOUNTER — INFUSION (OUTPATIENT)
Dept: INFUSION THERAPY | Facility: HOSPITAL | Age: 24
End: 2019-11-27
Attending: INTERNAL MEDICINE
Payer: MEDICAID

## 2019-11-27 ENCOUNTER — TELEPHONE (OUTPATIENT)
Dept: HEMATOLOGY/ONCOLOGY | Facility: CLINIC | Age: 24
End: 2019-11-27

## 2019-11-27 ENCOUNTER — OFFICE VISIT (OUTPATIENT)
Dept: HEMATOLOGY/ONCOLOGY | Facility: CLINIC | Age: 24
End: 2019-11-27
Payer: MEDICAID

## 2019-11-27 ENCOUNTER — LAB VISIT (OUTPATIENT)
Dept: LAB | Facility: HOSPITAL | Age: 24
End: 2019-11-27
Attending: INTERNAL MEDICINE
Payer: MEDICAID

## 2019-11-27 VITALS
WEIGHT: 218.25 LBS | BODY MASS INDEX: 40.16 KG/M2 | SYSTOLIC BLOOD PRESSURE: 152 MMHG | TEMPERATURE: 99 F | HEIGHT: 62 IN | OXYGEN SATURATION: 99 % | DIASTOLIC BLOOD PRESSURE: 88 MMHG | HEART RATE: 85 BPM

## 2019-11-27 VITALS
RESPIRATION RATE: 18 BRPM | TEMPERATURE: 98 F | SYSTOLIC BLOOD PRESSURE: 157 MMHG | HEART RATE: 72 BPM | DIASTOLIC BLOOD PRESSURE: 75 MMHG

## 2019-11-27 DIAGNOSIS — D69.3 ACUTE ITP: ICD-10-CM

## 2019-11-27 DIAGNOSIS — D69.3 CHRONIC ITP (IDIOPATHIC THROMBOCYTOPENIA): ICD-10-CM

## 2019-11-27 DIAGNOSIS — D69.3 ACUTE ITP: Primary | ICD-10-CM

## 2019-11-27 DIAGNOSIS — D50.9 MICROCYTIC ANEMIA: ICD-10-CM

## 2019-11-27 DIAGNOSIS — D69.6 THROMBOCYTOPENIA: ICD-10-CM

## 2019-11-27 LAB
BASOPHILS # BLD AUTO: 0.08 K/UL (ref 0–0.2)
BASOPHILS NFR BLD: 0.7 % (ref 0–1.9)
BLD PROD TYP BPU: NORMAL
BLOOD UNIT EXPIRATION DATE: NORMAL
BLOOD UNIT TYPE CODE: 6200
BLOOD UNIT TYPE: NORMAL
CODING SYSTEM: NORMAL
DIFFERENTIAL METHOD: ABNORMAL
DISPENSE STATUS: NORMAL
EOSINOPHIL # BLD AUTO: 0.1 K/UL (ref 0–0.5)
EOSINOPHIL NFR BLD: 1 % (ref 0–8)
ERYTHROCYTE [DISTWIDTH] IN BLOOD BY AUTOMATED COUNT: 18.2 % (ref 11.5–14.5)
HCT VFR BLD AUTO: 31.7 % (ref 37–48.5)
HGB BLD-MCNC: 9.4 G/DL (ref 12–16)
HYPOCHROMIA BLD QL SMEAR: ABNORMAL
IMM GRANULOCYTES # BLD AUTO: 0.06 K/UL (ref 0–0.04)
IMM GRANULOCYTES NFR BLD AUTO: 0.5 % (ref 0–0.5)
LYMPHOCYTES # BLD AUTO: 2.5 K/UL (ref 1–4.8)
LYMPHOCYTES NFR BLD: 20.3 % (ref 18–48)
MCH RBC QN AUTO: 22.9 PG (ref 27–31)
MCHC RBC AUTO-ENTMCNC: 29.7 G/DL (ref 32–36)
MCV RBC AUTO: 77 FL (ref 82–98)
MONOCYTES # BLD AUTO: 1.1 K/UL (ref 0.3–1)
MONOCYTES NFR BLD: 9.1 % (ref 4–15)
NEUTROPHILS # BLD AUTO: 8.4 K/UL (ref 1.8–7.7)
NEUTROPHILS NFR BLD: 68.9 % (ref 38–73)
NRBC BLD-RTO: 0 /100 WBC
PLATELET # BLD AUTO: 3 K/UL (ref 150–350)
PLATELET BLD QL SMEAR: ABNORMAL
PMV BLD AUTO: ABNORMAL FL (ref 9.2–12.9)
RBC # BLD AUTO: 4.1 M/UL (ref 4–5.4)
TRANS PLATPHERESIS VOL PATIENT: NORMAL ML
WBC # BLD AUTO: 12.15 K/UL (ref 3.9–12.7)

## 2019-11-27 PROCEDURE — 85025 COMPLETE CBC W/AUTO DIFF WBC: CPT

## 2019-11-27 PROCEDURE — 25000003 PHARM REV CODE 250: Performed by: INTERNAL MEDICINE

## 2019-11-27 PROCEDURE — 99999 PR PBB SHADOW E&M-EST. PATIENT-LVL III: CPT | Mod: PBBFAC,,, | Performed by: INTERNAL MEDICINE

## 2019-11-27 PROCEDURE — 99999 PR PBB SHADOW E&M-EST. PATIENT-LVL III: ICD-10-PCS | Mod: PBBFAC,,, | Performed by: INTERNAL MEDICINE

## 2019-11-27 PROCEDURE — 36415 COLL VENOUS BLD VENIPUNCTURE: CPT

## 2019-11-27 PROCEDURE — 36430 TRANSFUSION BLD/BLD COMPNT: CPT

## 2019-11-27 PROCEDURE — 99213 OFFICE O/P EST LOW 20 MIN: CPT | Mod: PBBFAC,25 | Performed by: INTERNAL MEDICINE

## 2019-11-27 PROCEDURE — 99215 OFFICE O/P EST HI 40 MIN: CPT | Mod: S$PBB,,, | Performed by: INTERNAL MEDICINE

## 2019-11-27 PROCEDURE — P9035 PLATELET PHERES LEUKOREDUCED: HCPCS

## 2019-11-27 PROCEDURE — 99215 PR OFFICE/OUTPT VISIT, EST, LEVL V, 40-54 MIN: ICD-10-PCS | Mod: S$PBB,,, | Performed by: INTERNAL MEDICINE

## 2019-11-27 RX ORDER — DIPHENHYDRAMINE HCL 25 MG
25 CAPSULE ORAL
Status: CANCELLED | OUTPATIENT
Start: 2019-11-27

## 2019-11-27 RX ORDER — HYDROCODONE BITARTRATE AND ACETAMINOPHEN 500; 5 MG/1; MG/1
TABLET ORAL ONCE
Status: CANCELLED | OUTPATIENT
Start: 2019-11-27 | End: 2019-11-27

## 2019-11-27 RX ORDER — DIPHENHYDRAMINE HCL 25 MG
25 CAPSULE ORAL
Status: COMPLETED | OUTPATIENT
Start: 2019-11-27 | End: 2019-11-27

## 2019-11-27 RX ORDER — HYDROCODONE BITARTRATE AND ACETAMINOPHEN 500; 5 MG/1; MG/1
TABLET ORAL ONCE
Status: DISCONTINUED | OUTPATIENT
Start: 2019-11-27 | End: 2019-11-27 | Stop reason: HOSPADM

## 2019-11-27 RX ADMIN — DIPHENHYDRAMINE HYDROCHLORIDE 25 MG: 25 CAPSULE ORAL at 01:11

## 2019-11-27 NOTE — PLAN OF CARE
Ambulatory to outpatient infusion for administration 1 unit platelets. Patient alert, oriented. Plan of care reviewed; start iv, premeds, vs, platelets, post vs. Verbalizes understanding. Aware to notify nurse of any complaints.

## 2019-11-27 NOTE — PROGRESS NOTES
Subjective:      DATE OF VISIT: 12/1/2019   ?   ?   Patient ID:?Mari Miller is a 23 y.o. female.?? MR#: [id number]   ?  ?   CHIEF COMPLAINT:  Acute ITP  ?   CURRENT TREATMENT:  Eltrombopag     PAST TREATMENT:  Steroids, splenectomy 11/2018, eltrombopag (Promacta) and prednisone taper, IVIG x 2 10/2/19 and 10/3/19     INTERVAL EVENTS:    Ms. Rasmussen missed her last appointment last week.  Over the last couple days she has developed petechial rash on forearms and legs.  She is otherwise without gum or other bleeding. She otherwise continues to feel well with good energy, appetite no fevers or chills or other infectious symptoms.      Review of Systems    ?   A comprehensive 14-point review of systems was reviewed with patient and was negative other than as specified above.   ?     Objective:      Physical Exam      ?   Vitals:    11/27/19 1115   BP: (!) 152/88   Pulse: 85   Temp: 99.1 °F (37.3 °C)      ?   ECOG:?  0   General appearance: Generally well appearing, in no acute distress.   Head, eyes, ears, nose, and throat:  Oropharynx clear without bleeding.  Pulmonary:  Normal work of breathing, clear to auscultation bilaterally  Cardiovascular:  Regular rate and rhythm, no murmurs rubs or gallops.  Abdomen: nontender, nondistended.   Extremities: Warm, without edema.   Neurologic: Alert and oriented. Grossly normal strength, coordination, and gait.   Skin:  Forearms and shins with petechial rash.   Psychiatric: normal mood and affect, conversant and appropriate    ?   Laboratory:  ?   Platelets   Date Value Ref Range Status   11/27/2019 3 (LL) 150 - 350 K/uL Final     Comment:     PLT   critical result(s) called and verbal readback obtained from   Sherlyn Lee by MELANY 11/27/2019 11:04     11/20/2019 54 (L) 150 - 350 K/uL Final   11/15/2019 42 (L) 150 - 350 K/uL Final   11/13/2019 13 (LL) 150 - 350 K/uL Final     Comment:     PLT critical result(s) called and verbal readback obtained from   Anisha  EVA Yun by HTPA1 11/13/2019 13:27     11/11/2019 0 (LL) 150 - 350 K/uL Final     Comment:     Plt critical result(s) called and verbal readback obtained from   Marielos Swan LPN by HTPA1 11/11/2019 08:01     10/29/2019 1,128 (HH) 150 - 350 K/uL Final     Comment:     PLT critical result(s) called and verbal readback obtained from MINDA SINGH RN , 10/29/2019 11:36     10/25/2019 110 (L) 150 - 350 K/uL Final     Comment:     Results confirmed, test repeated   10/22/2019 6 (LL) 150 - 350 K/uL Final     Comment:     Plt critical result(s) called and verbal readback obtained from   Marielos Koenig, 10/22/2019 15:35     10/15/2019 663 (H) 150 - 350 K/uL Final   10/11/2019 611 (H) 150 - 350 K/uL Final         Infusion on 11/27/2019   Component Date Value Ref Range Status    UNIT NUMBER 11/27/2019 [id number]   Final    Product Code 11/27/2019 [id number]   Final    DISPENSE STATUS 11/27/2019 TRANSFUSED   Final    CODING SYSTEM 11/27/2019 [id number]   Final    Unit Blood Type Code 11/27/2019 6200   Final    Unit Blood Type 11/27/2019 A POS   Final    Unit Expiration 11/27/2019 [id number]   Final   Lab Visit on 11/27/2019   Component Date Value Ref Range Status    WBC 11/27/2019 12.15  3.90 - 12.70 K/uL Final    RBC 11/27/2019 4.10  4.00 - 5.40 M/uL Final    Hemoglobin 11/27/2019 9.4* 12.0 - 16.0 g/dL Final    Hematocrit 11/27/2019 31.7* 37.0 - 48.5 % Final    Mean Corpuscular Volume 11/27/2019 77* 82 - 98 fL Final    Mean Corpuscular Hemoglobin 11/27/2019 22.9* 27.0 - 31.0 pg Final    Mean Corpuscular Hemoglobin Conc 11/27/2019 29.7* 32.0 - 36.0 g/dL Final    RDW 11/27/2019 18.2* 11.5 - 14.5 % Final    Platelets 11/27/2019 3* 150 - 350 K/uL Final    MPV 11/27/2019 SEE COMMENT  9.2 - 12.9 fL Final    Immature Granulocytes 11/27/2019 0.5  0.0 - 0.5 % Final    Gran # (ANC) 11/27/2019 8.4* 1.8 - 7.7 K/uL Final    Immature Grans (Abs) 11/27/2019 0.06* 0.00 - 0.04 K/uL Final    Lymph # 11/27/2019 2.5  1.0  - 4.8 K/uL Final    Mono # 11/27/2019 1.1* 0.3 - 1.0 K/uL Final    Eos # 11/27/2019 0.1  0.0 - 0.5 K/uL Final    Baso # 11/27/2019 0.08  0.00 - 0.20 K/uL Final    nRBC 11/27/2019 0  0 /100 WBC Final    Gran% 11/27/2019 68.9  38.0 - 73.0 % Final    Lymph% 11/27/2019 20.3  18.0 - 48.0 % Final    Mono% 11/27/2019 9.1  4.0 - 15.0 % Final    Eosinophil% 11/27/2019 1.0  0.0 - 8.0 % Final    Basophil% 11/27/2019 0.7  0.0 - 1.9 % Final    Platelet Estimate 11/27/2019 Decreased*  Final    Hypo 11/27/2019 Occasional   Final    Differential Method 11/27/2019 Automated   Final      ?   Platelets   Date Value Ref Range Status   11/27/2019 3 (LL) 150 - 350 K/uL Final     Comment:     PLT   critical result(s) called and verbal readback obtained from   Sherlyn Lee by MELANY 11/27/2019 11:04     11/20/2019 54 (L) 150 - 350 K/uL Final   11/15/2019 42 (L) 150 - 350 K/uL Final   11/13/2019 13 (LL) 150 - 350 K/uL Final     Comment:     PLT critical result(s) called and verbal readback obtained from   Anisha Yun MA by HTPA1 11/13/2019 13:27     11/11/2019 0 (LL) 150 - 350 K/uL Final     Comment:     Plt critical result(s) called and verbal readback obtained from   Marielos Swan LPN by ALLA 11/11/2019 08:01     10/29/2019 1,128 (HH) 150 - 350 K/uL Final     Comment:     PLT critical result(s) called and verbal readback obtained from MINDA SINGH RN , 10/29/2019 11:36     10/25/2019 110 (L) 150 - 350 K/uL Final     Comment:     Results confirmed, test repeated   10/22/2019 6 (LL) 150 - 350 K/uL Final     Comment:     Plt critical result(s) called and verbal readback obtained from   Marielos Koenig, 10/22/2019 15:35     10/15/2019 663 (H) 150 - 350 K/uL Final   10/11/2019 611 (H) 150 - 350 K/uL Final   10/04/2019 27 (LL) 150 - 350 K/uL Final     Comment:     Results confirmed, test repeated  PLT COUNT critical result(s) called and verbal readback obtained from   RUBEN TOUSSAINT RN, 10/04/2019 05:56     10/03/2019 2 (LL)  150 - 350 K/uL Final     Comment:     Hct, Plt critical result(s) called and verbal readback obtained from   Darshana Lara RN, 10/03/2019 11:13     10/03/2019 1 (LL) 150 - 350 K/uL Final     Comment:     Results confirmed, test repeated  HGB, HCT, AND PLT count critical result(s) called and verbal readback   obtained from Sonia Manjarrez RN, 10/03/2019 05:51     10/02/2019 1 (LL) 150 - 350 K/uL Final     Comment:     H/H, PLT   critical result(s) called and verbal readback obtained   from SHAMIKA MCDOWELL RN, 10/02/2019 06:36     10/01/2019 2 (LL) 150 - 350 K/uL Final     Comment:     Results confirmed, test repeated  PLT  critical result(s) called and verbal readback obtained from   SG RIVERA RN, 10/01/2019 22:19     09/24/2019 428 (H) 150 - 350 K/uL Final   09/14/2019 47 (L) 150 - 350 K/uL Final   09/14/2019 37 (LL) 150 - 350 K/uL Final     Comment:     H&H, PLT critical result(s) called and verbal readback obtained from   Bob Lara, 09/14/2019 07:36     09/13/2019 5 (LL) 150 - 350 K/uL Final     Comment:     H&H, PLT critical result(s) called and verbal readback obtained from   Travon Morrison, 09/13/2019 09:04     08/03/2019 62 (L) 150 - 350 K/uL Final       ?   Assessment/Plan:       1. Acute ITP    2. Thrombocytopenia    3. Microcytic anemia          Plan:     # acute on chronic ITP:  history of refractory ITP status post steroids, splenectomy in November 2018, and this year monthly hospitalizations with platelet counts as low as 1-5K.  Since December 2018 she has been treated with daily eltrombopag, discontinued on 11/11/2019 due to refractory ITP and possible associated headache.  - recurrent ITP with platelet count 3 and petechial rash; no other evidence of concerning bleeding.  Anemia is relatively stable though more microcytic, see below  - romiplostim today in infusion area  - 1 pack platelets at transfusion area  - RV in couple days for repeat CBC and check in  - RV in 1 week with CBC for next  scheduled romiplstim    # iron deficiency anemia:  Progressive microcytosis and evidence of iron deficiency.  She is taking oral iron and will restart injected for next week.    Follow-Up:   Per above

## 2019-11-27 NOTE — TELEPHONE ENCOUNTER
----- Message from Angelia Dooley sent at 11/27/2019  9:23 AM CST -----  Contact: pt  ..Type:  Patient Returning Call    Who Called:pt  Who Left Message for Patient:nurse  Does the patient know what this is regarding?:maybe appt today  Would the patient rather a call back or a response via [facility]? Call back  Best Call Back Number:[phone]  Additional Information:

## 2019-11-27 NOTE — PATIENT INSTRUCTIONS
- romiplostim today in infusion area  - 1 pack platelets at transfusion area  - [facility] Fri with NP for CBC and check in  - RV in 1 week with CBC for next scheduled romiplstim

## 2019-11-27 NOTE — PLAN OF CARE
Pt tolerated transfusion of one unit platelets,  VS stable. Discharged to ride ambulatory in no apparent discomfort.

## 2019-12-03 ENCOUNTER — TELEPHONE (OUTPATIENT)
Dept: HEMATOLOGY/ONCOLOGY | Facility: CLINIC | Age: 24
End: 2019-12-03

## 2019-12-03 ENCOUNTER — TELEPHONE (OUTPATIENT)
Dept: PHARMACY | Facility: CLINIC | Age: 24
End: 2019-12-03

## 2019-12-03 NOTE — TELEPHONE ENCOUNTER
LVM for patient to see if she has any remaining tablets of Promacta on hand and to discuss proper disposal since she has d/c. She is moving forward with romiplostim and received on 11/27.

## 2019-12-03 NOTE — TELEPHONE ENCOUNTER
----- Message from Madalyn Robles sent at 12/3/2019 10:47 AM CST -----  Contact: pt  Pt stated that she was scheduled for this week and would like to get CBC checked.  Pt can be reached at [phone]        Thanks,  Madalyn Robles

## 2019-12-05 ENCOUNTER — TELEPHONE (OUTPATIENT)
Dept: HEMATOLOGY/ONCOLOGY | Facility: CLINIC | Age: 24
End: 2019-12-05

## 2019-12-05 NOTE — TELEPHONE ENCOUNTER
----- Message from Nova Rasmussen sent at 12/5/2019 10:31 AM CST -----  Contact: pt  The pt request a return call, no additional info given and can be reached at [phone]///[email or web address]

## 2019-12-08 ENCOUNTER — PATIENT MESSAGE (OUTPATIENT)
Dept: HEMATOLOGY/ONCOLOGY | Facility: CLINIC | Age: 24
End: 2019-12-08

## 2019-12-10 ENCOUNTER — PATIENT MESSAGE (OUTPATIENT)
Dept: HEMATOLOGY/ONCOLOGY | Facility: CLINIC | Age: 24
End: 2019-12-10

## 2019-12-10 ENCOUNTER — LAB VISIT (OUTPATIENT)
Dept: LAB | Facility: HOSPITAL | Age: 24
End: 2019-12-10
Attending: INTERNAL MEDICINE
Payer: MEDICAID

## 2019-12-10 DIAGNOSIS — D50.9 MICROCYTIC ANEMIA: ICD-10-CM

## 2019-12-10 DIAGNOSIS — D69.3 ACUTE ITP: ICD-10-CM

## 2019-12-10 LAB
BASOPHILS # BLD AUTO: 0.1 K/UL (ref 0–0.2)
BASOPHILS # BLD AUTO: 0.1 K/UL (ref 0–0.2)
BASOPHILS NFR BLD: 1 % (ref 0–1.9)
BASOPHILS NFR BLD: 1 % (ref 0–1.9)
DIFFERENTIAL METHOD: ABNORMAL
DIFFERENTIAL METHOD: ABNORMAL
EOSINOPHIL # BLD AUTO: 0.1 K/UL (ref 0–0.5)
EOSINOPHIL # BLD AUTO: 0.1 K/UL (ref 0–0.5)
EOSINOPHIL NFR BLD: 1.2 % (ref 0–8)
EOSINOPHIL NFR BLD: 1.2 % (ref 0–8)
ERYTHROCYTE [DISTWIDTH] IN BLOOD BY AUTOMATED COUNT: 18 % (ref 11.5–14.5)
ERYTHROCYTE [DISTWIDTH] IN BLOOD BY AUTOMATED COUNT: 18 % (ref 11.5–14.5)
GIANT PLATELETS BLD QL SMEAR: PRESENT
GIANT PLATELETS BLD QL SMEAR: PRESENT
HCT VFR BLD AUTO: 31.6 % (ref 37–48.5)
HCT VFR BLD AUTO: 31.6 % (ref 37–48.5)
HGB BLD-MCNC: 9.3 G/DL (ref 12–16)
HGB BLD-MCNC: 9.3 G/DL (ref 12–16)
HYPOCHROMIA BLD QL SMEAR: ABNORMAL
HYPOCHROMIA BLD QL SMEAR: ABNORMAL
IMM GRANULOCYTES # BLD AUTO: 0.03 K/UL (ref 0–0.04)
IMM GRANULOCYTES # BLD AUTO: 0.03 K/UL (ref 0–0.04)
IMM GRANULOCYTES NFR BLD AUTO: 0.3 % (ref 0–0.5)
IMM GRANULOCYTES NFR BLD AUTO: 0.3 % (ref 0–0.5)
LYMPHOCYTES # BLD AUTO: 3.1 K/UL (ref 1–4.8)
LYMPHOCYTES # BLD AUTO: 3.1 K/UL (ref 1–4.8)
LYMPHOCYTES NFR BLD: 31.7 % (ref 18–48)
LYMPHOCYTES NFR BLD: 31.7 % (ref 18–48)
MCH RBC QN AUTO: 22 PG (ref 27–31)
MCH RBC QN AUTO: 22 PG (ref 27–31)
MCHC RBC AUTO-ENTMCNC: 29.4 G/DL (ref 32–36)
MCHC RBC AUTO-ENTMCNC: 29.4 G/DL (ref 32–36)
MCV RBC AUTO: 75 FL (ref 82–98)
MCV RBC AUTO: 75 FL (ref 82–98)
MONOCYTES # BLD AUTO: 1.1 K/UL (ref 0.3–1)
MONOCYTES # BLD AUTO: 1.1 K/UL (ref 0.3–1)
MONOCYTES NFR BLD: 11.5 % (ref 4–15)
MONOCYTES NFR BLD: 11.5 % (ref 4–15)
NEUTROPHILS # BLD AUTO: 5.3 K/UL (ref 1.8–7.7)
NEUTROPHILS # BLD AUTO: 5.3 K/UL (ref 1.8–7.7)
NEUTROPHILS NFR BLD: 54.6 % (ref 38–73)
NEUTROPHILS NFR BLD: 54.6 % (ref 38–73)
NRBC BLD-RTO: 0 /100 WBC
NRBC BLD-RTO: 0 /100 WBC
PLATELET # BLD AUTO: 131 K/UL (ref 150–350)
PLATELET # BLD AUTO: 131 K/UL (ref 150–350)
PLATELET BLD QL SMEAR: ABNORMAL
PLATELET BLD QL SMEAR: ABNORMAL
PMV BLD AUTO: ABNORMAL FL (ref 9.2–12.9)
PMV BLD AUTO: ABNORMAL FL (ref 9.2–12.9)
RBC # BLD AUTO: 4.23 M/UL (ref 4–5.4)
RBC # BLD AUTO: 4.23 M/UL (ref 4–5.4)
WBC # BLD AUTO: 9.71 K/UL (ref 3.9–12.7)
WBC # BLD AUTO: 9.71 K/UL (ref 3.9–12.7)

## 2019-12-10 PROCEDURE — 85025 COMPLETE CBC W/AUTO DIFF WBC: CPT

## 2019-12-10 PROCEDURE — 83540 ASSAY OF IRON: CPT

## 2019-12-10 PROCEDURE — 36415 COLL VENOUS BLD VENIPUNCTURE: CPT

## 2019-12-10 PROCEDURE — 82728 ASSAY OF FERRITIN: CPT

## 2019-12-11 LAB
FERRITIN SERPL-MCNC: 12 NG/ML (ref 20–300)
IRON SERPL-MCNC: 48 UG/DL (ref 30–160)
SATURATED IRON: 10 % (ref 20–50)
TOTAL IRON BINDING CAPACITY: 478 UG/DL (ref 250–450)
TRANSFERRIN SERPL-MCNC: 323 MG/DL (ref 200–375)

## 2019-12-13 DIAGNOSIS — D69.3 ACUTE ITP: Primary | ICD-10-CM

## 2019-12-17 ENCOUNTER — INFUSION (OUTPATIENT)
Dept: INFUSION THERAPY | Facility: HOSPITAL | Age: 24
End: 2019-12-17
Attending: INTERNAL MEDICINE
Payer: MEDICAID

## 2019-12-17 ENCOUNTER — OFFICE VISIT (OUTPATIENT)
Dept: HEMATOLOGY/ONCOLOGY | Facility: CLINIC | Age: 24
End: 2019-12-17
Payer: MEDICAID

## 2019-12-17 VITALS
SYSTOLIC BLOOD PRESSURE: 139 MMHG | TEMPERATURE: 97 F | BODY MASS INDEX: 40.12 KG/M2 | OXYGEN SATURATION: 100 % | HEART RATE: 93 BPM | RESPIRATION RATE: 18 BRPM | DIASTOLIC BLOOD PRESSURE: 95 MMHG | WEIGHT: 218 LBS | HEIGHT: 62 IN

## 2019-12-17 VITALS — DIASTOLIC BLOOD PRESSURE: 85 MMHG | HEART RATE: 82 BPM | SYSTOLIC BLOOD PRESSURE: 141 MMHG

## 2019-12-17 DIAGNOSIS — D50.8 OTHER IRON DEFICIENCY ANEMIA: ICD-10-CM

## 2019-12-17 DIAGNOSIS — D50.0 IRON DEFICIENCY ANEMIA DUE TO CHRONIC BLOOD LOSS: ICD-10-CM

## 2019-12-17 DIAGNOSIS — D69.3 ACUTE ITP: Primary | ICD-10-CM

## 2019-12-17 PROCEDURE — 63600175 PHARM REV CODE 636 W HCPCS: Mod: JG | Performed by: INTERNAL MEDICINE

## 2019-12-17 PROCEDURE — 99999 PR PBB SHADOW E&M-EST. PATIENT-LVL IV: CPT | Mod: PBBFAC,,, | Performed by: INTERNAL MEDICINE

## 2019-12-17 PROCEDURE — 99214 OFFICE O/P EST MOD 30 MIN: CPT | Mod: S$PBB,,, | Performed by: INTERNAL MEDICINE

## 2019-12-17 PROCEDURE — 96372 THER/PROPH/DIAG INJ SC/IM: CPT | Mod: 59

## 2019-12-17 PROCEDURE — 63600175 PHARM REV CODE 636 W HCPCS: Mod: JG | Performed by: NURSE PRACTITIONER

## 2019-12-17 PROCEDURE — 96365 THER/PROPH/DIAG IV INF INIT: CPT

## 2019-12-17 PROCEDURE — 99214 PR OFFICE/OUTPT VISIT, EST, LEVL IV, 30-39 MIN: ICD-10-PCS | Mod: S$PBB,,, | Performed by: INTERNAL MEDICINE

## 2019-12-17 PROCEDURE — 99999 PR PBB SHADOW E&M-EST. PATIENT-LVL IV: ICD-10-PCS | Mod: PBBFAC,,, | Performed by: INTERNAL MEDICINE

## 2019-12-17 PROCEDURE — 99214 OFFICE O/P EST MOD 30 MIN: CPT | Mod: PBBFAC,25 | Performed by: INTERNAL MEDICINE

## 2019-12-17 RX ORDER — HEPARIN 100 UNIT/ML
5 SYRINGE INTRAVENOUS
Status: CANCELLED | OUTPATIENT
Start: 2019-12-20

## 2019-12-17 RX ORDER — SODIUM CHLORIDE 9 MG/ML
INJECTION, SOLUTION INTRAVENOUS CONTINUOUS
Status: CANCELLED | OUTPATIENT
Start: 2019-12-20

## 2019-12-17 RX ORDER — SODIUM CHLORIDE 0.9 % (FLUSH) 0.9 %
10 SYRINGE (ML) INJECTION
Status: CANCELLED | OUTPATIENT
Start: 2019-12-20

## 2019-12-17 RX ADMIN — FERRIC CARBOXYMALTOSE INJECTION 750 MG: 50 INJECTION, SOLUTION INTRAVENOUS at 01:12

## 2019-12-17 RX ADMIN — SODIUM CHLORIDE: 9 INJECTION, SOLUTION INTRAVENOUS at 01:12

## 2019-12-17 RX ADMIN — ROMIPLOSTIM 95 MCG: 250 INJECTION, POWDER, LYOPHILIZED, FOR SOLUTION SUBCUTANEOUS at 02:12

## 2019-12-17 NOTE — LETTER
December 18, 2019      Sophy Das NP  [address] [place] [address] [address] [place] [address]            [facility]  [id number] [place] [address] [address]  [address] [place] [address]  Phone: [phone]  Fax: [phone]          Patient: Mari Miller   MR Number: [id number]   YOB: 1995   Date of Visit: 12/17/2019       Dear Sophy Das:    Thank you for referring Mari Miller to me for evaluation. Attached you will find relevant portions of my assessment and plan of care.    If you have questions, please do not hesitate to call me. I look forward to following Mari Miller along with you.    Sincerely,    Sakina Swan MD    Enclosure  CC:  No Recipients    If you would like to receive this communication electronically, please contact [email or web address] or [phone] to request more information on [facility] access.    For providers and/or their staff who would like to refer a patient to [facility], please contact us through our one-stop-shop provider referral line, [facility], at [phone].    If you feel you have received this communication in error or would no longer like to receive these types of communications, please e-mail [email or web address]

## 2019-12-17 NOTE — NURSING
Pt tolerated Injectafer/Nplate well. No adverse reaction noted. Pt education reinforced on psosible side effects, what to expect, and when to call . Pt verbalized understanding. I reviewed pt calendar w/ pt and understanding verbalized. IV flushed w/ NS and D/C per protocol.

## 2019-12-17 NOTE — PATIENT INSTRUCTIONS
- labs stat  - then infusion area for iv iron and romiplostim; please confirm not received before  - RV in 1 week for CBC and romiplostim

## 2019-12-19 NOTE — PROGRESS NOTES
Subjective:      DATE OF VISIT: 12/18/2019   ?   ?   Patient ID:?Mari Miller is a 24 y.o. female.?? MR#: [id number]   ?  ?   CHIEF COMPLAINT:  Acute ITP  ?   CURRENT TREATMENT: N-plate    PAST TREATMENT:  Steroids, splenectomy 11/2018, eltrombopag (Promacta) and prednisone taper, IVIG x 2 10/2/19 and 10/3/19     INTERVAL EVENTS:    Ms. Rasmussen is doing well with good energy without evidence of bleeding, petechial lesion.    Review of Systems    ?   A comprehensive 14-point review of systems was reviewed with patient and was negative other than as specified above.   ?     Objective:      Physical Exam      ?   Vitals:    12/17/19 1218   BP: (!) 139/95   Pulse: 93   Resp: 18   Temp: 97.4 °F (36.3 °C)      ?   ECOG:?  0   General appearance: Generally well appearing, in no acute distress.   Head, eyes, ears, nose, and throat:  Oropharynx clear without bleeding.  Pulmonary:  Normal work of breathing, clear to auscultation bilaterally  Cardiovascular:  Regular rate and rhythm, no murmurs rubs or gallops.  Abdomen: nontender, nondistended.   Extremities: Warm, without edema.   Neurologic: Alert and oriented. Grossly normal strength, coordination, and gait.   Skin:  No ecchymoses or petechial rash.   Psychiatric: normal mood and affect, conversant and appropriate    ?   Laboratory:  ?   Platelets   Date Value Ref Range Status   12/17/2019 31 (LL) 150 - 350 K/uL Final     Comment:     Results confirmed, test repeated  PLT  critical result(s) called and verbal readback obtained from KELSI JEFFERSON MA by ISAK 12/17/2019 13:26     12/10/2019 131 (L) 150 - 350 K/uL Final   12/10/2019 131 (L) 150 - 350 K/uL Final   11/27/2019 3 (LL) 150 - 350 K/uL Final     Comment:     PLT   critical result(s) called and verbal readback obtained from   Sherlyn Lee by MELANY 11/27/2019 11:04     11/20/2019 54 (L) 150 - 350 K/uL Final   11/15/2019 42 (L) 150 - 350 K/uL Final   11/13/2019 13 (LL) 150 - 350 K/uL Final     Comment:      PLT critical result(s) called and verbal readback obtained from   Anisha Yun MA by HTPA1 11/13/2019 13:27     11/11/2019 0 (LL) 150 - 350 K/uL Final     Comment:     Plt critical result(s) called and verbal readback obtained from   Marielos Swan LPN by HTPA1 11/11/2019 08:01     10/29/2019 1,128 (HH) 150 - 350 K/uL Final     Comment:     PLT critical result(s) called and verbal readback obtained from MINDA SINGH RN , 10/29/2019 11:36     10/25/2019 110 (L) 150 - 350 K/uL Final     Comment:     Results confirmed, test repeated         Lab Visit on 12/17/2019   Component Date Value Ref Range Status    WBC 12/17/2019 11.70  3.90 - 12.70 K/uL Final    RBC 12/17/2019 4.44  4.00 - 5.40 M/uL Final    Hemoglobin 12/17/2019 9.5* 12.0 - 16.0 g/dL Final    Hematocrit 12/17/2019 32.3* 37.0 - 48.5 % Final    Mean Corpuscular Volume 12/17/2019 73* 82 - 98 fL Final    Mean Corpuscular Hemoglobin 12/17/2019 21.4* 27.0 - 31.0 pg Final    Mean Corpuscular Hemoglobin Conc 12/17/2019 29.4* 32.0 - 36.0 g/dL Final    RDW 12/17/2019 17.8* 11.5 - 14.5 % Final    Platelets 12/17/2019 31* 150 - 350 K/uL Final    MPV 12/17/2019 SEE COMMENT  9.2 - 12.9 fL Final    Immature Granulocytes 12/17/2019 0.5  0.0 - 0.5 % Final    Gran # (ANC) 12/17/2019 7.5  1.8 - 7.7 K/uL Final    Immature Grans (Abs) 12/17/2019 0.06* 0.00 - 0.04 K/uL Final    Lymph # 12/17/2019 2.9  1.0 - 4.8 K/uL Final    Mono # 12/17/2019 1.1* 0.3 - 1.0 K/uL Final    Eos # 12/17/2019 0.1  0.0 - 0.5 K/uL Final    Baso # 12/17/2019 0.13  0.00 - 0.20 K/uL Final    nRBC 12/17/2019 0  0 /100 WBC Final    Gran% 12/17/2019 63.9  38.0 - 73.0 % Final    Lymph% 12/17/2019 24.4  18.0 - 48.0 % Final    Mono% 12/17/2019 9.7  4.0 - 15.0 % Final    Eosinophil% 12/17/2019 0.4  0.0 - 8.0 % Final    Basophil% 12/17/2019 1.1  0.0 - 1.9 % Final    Differential Method 12/17/2019 Automated   Final      ?   Platelets   Date Value Ref Range Status   12/17/2019 31 (LL)  150 - 350 K/uL Final     Comment:     Results confirmed, test repeated  PLT  critical result(s) called and verbal readback obtained from KELSI JEFFERSON MA by AES 12/17/2019 13:26     12/10/2019 131 (L) 150 - 350 K/uL Final   12/10/2019 131 (L) 150 - 350 K/uL Final   11/27/2019 3 (LL) 150 - 350 K/uL Final     Comment:     PLT   critical result(s) called and verbal readback obtained from   Sherlyn Lee by LLB 11/27/2019 11:04     11/20/2019 54 (L) 150 - 350 K/uL Final   11/15/2019 42 (L) 150 - 350 K/uL Final   11/13/2019 13 (LL) 150 - 350 K/uL Final     Comment:     PLT critical result(s) called and verbal readback obtained from   Anisha Yun MA by HTPA1 11/13/2019 13:27     11/11/2019 0 (LL) 150 - 350 K/uL Final     Comment:     Plt critical result(s) called and verbal readback obtained from   Marielos Swan LPN by HTPA1 11/11/2019 08:01     10/29/2019 1,128 (HH) 150 - 350 K/uL Final     Comment:     PLT critical result(s) called and verbal readback obtained from MINDA SINGH RN , 10/29/2019 11:36     10/25/2019 110 (L) 150 - 350 K/uL Final     Comment:     Results confirmed, test repeated   10/22/2019 6 (LL) 150 - 350 K/uL Final     Comment:     Plt critical result(s) called and verbal readback obtained from   Marielos Koenig, 10/22/2019 15:35     10/15/2019 663 (H) 150 - 350 K/uL Final   10/11/2019 611 (H) 150 - 350 K/uL Final   10/04/2019 27 (LL) 150 - 350 K/uL Final     Comment:     Results confirmed, test repeated  PLT COUNT critical result(s) called and verbal readback obtained from   RUBEN TOUSSAINT RN, 10/04/2019 05:56     10/03/2019 2 (LL) 150 - 350 K/uL Final     Comment:     Hct, Plt critical result(s) called and verbal readback obtained from   Darshana Lara RN, 10/03/2019 11:13     10/03/2019 1 (LL) 150 - 350 K/uL Final     Comment:     Results confirmed, test repeated  HGB, HCT, AND PLT count critical result(s) called and verbal readback   obtained from Sonia Manjarrez RN, 10/03/2019 05:51      10/02/2019 1 (LL) 150 - 350 K/uL Final     Comment:     H/H, PLT   critical result(s) called and verbal readback obtained   from SHAMIKA MCDOWELL RN, 10/02/2019 06:36     10/01/2019 2 (LL) 150 - 350 K/uL Final     Comment:     Results confirmed, test repeated  PLT  critical result(s) called and verbal readback obtained from   SG RIVERA RN, 10/01/2019 22:19     09/24/2019 428 (H) 150 - 350 K/uL Final   09/14/2019 47 (L) 150 - 350 K/uL Final       ?   Assessment/Plan:       1. Acute ITP          Plan:     # acute on chronic ITP:  history of refractory ITP status post steroids, splenectomy in November 2018, and this year monthly hospitalizations with platelet counts as low as 1-5K.  Since December 2018 she has been treated with daily eltrombopag, discontinued on 11/11/2019 due to refractory ITP and possible associated headache.  - recurrent ITP with platelet count 31 0 without evidence of petechial rash last bleeding.   - romiplostim seems to not have been given as intended during last visit likely due to need for transfusion and timing; will give 1st dose 12/17/2019, plan for weekly dosing  - RV Friday for repeat CBC and check in  - RV in 1 week with CBC for next scheduled romiplstim    # iron deficiency anemia:  Progressive microcytosis and evidence of iron deficiency.  Plan for IV iron x 2 doses    Follow-Up:   Per above

## 2019-12-20 ENCOUNTER — TELEPHONE (OUTPATIENT)
Dept: HEMATOLOGY/ONCOLOGY | Facility: CLINIC | Age: 24
End: 2019-12-20

## 2019-12-20 ENCOUNTER — INFUSION (OUTPATIENT)
Dept: INFUSION THERAPY | Facility: HOSPITAL | Age: 24
End: 2019-12-20
Attending: INTERNAL MEDICINE
Payer: MEDICAID

## 2019-12-20 VITALS
SYSTOLIC BLOOD PRESSURE: 137 MMHG | TEMPERATURE: 98 F | HEART RATE: 82 BPM | DIASTOLIC BLOOD PRESSURE: 96 MMHG | RESPIRATION RATE: 18 BRPM

## 2019-12-20 DIAGNOSIS — D69.3 ACUTE ITP: Primary | ICD-10-CM

## 2019-12-20 DIAGNOSIS — D69.3 ACUTE ITP: ICD-10-CM

## 2019-12-20 PROCEDURE — 25000003 PHARM REV CODE 250: Performed by: INTERNAL MEDICINE

## 2019-12-20 PROCEDURE — 36430 TRANSFUSION BLD/BLD COMPNT: CPT

## 2019-12-20 RX ORDER — DIPHENHYDRAMINE HCL 25 MG
25 CAPSULE ORAL
Status: COMPLETED | OUTPATIENT
Start: 2019-12-20 | End: 2019-12-20

## 2019-12-20 RX ORDER — HYDROCODONE BITARTRATE AND ACETAMINOPHEN 500; 5 MG/1; MG/1
TABLET ORAL ONCE
Status: DISCONTINUED | OUTPATIENT
Start: 2019-12-20 | End: 2019-12-20 | Stop reason: HOSPADM

## 2019-12-20 RX ORDER — HYDROCODONE BITARTRATE AND ACETAMINOPHEN 500; 5 MG/1; MG/1
TABLET ORAL ONCE
Status: CANCELLED | OUTPATIENT
Start: 2019-12-20 | End: 2019-12-20

## 2019-12-20 RX ADMIN — DIPHENHYDRAMINE HYDROCHLORIDE 25 MG: 25 CAPSULE ORAL at 03:12

## 2019-12-20 NOTE — PLAN OF CARE
Pt to infusion for transfusion of one unit platelets. Plan of care reveiwed. Pt verbalizes understanding.  Consent obtained.

## 2019-12-20 NOTE — TELEPHONE ENCOUNTER
Critical  Radha called Platelet count of 5 for patient . She is waiting in our [facility] Lobby   please advise to staff.

## 2019-12-20 NOTE — PLAN OF CARE
Pt tolerated transfusion of one unit platelets without s/s reaction.  AVS printed, reviewed with patient. She verbalizes understanding of followup appointments .  Discharged to ride ambulatory in no apparent discomfort.

## 2019-12-23 ENCOUNTER — TELEPHONE (OUTPATIENT)
Dept: HEMATOLOGY/ONCOLOGY | Facility: CLINIC | Age: 24
End: 2019-12-23

## 2019-12-23 ENCOUNTER — HOSPITAL ENCOUNTER (OUTPATIENT)
Facility: HOSPITAL | Age: 24
Discharge: OTHER INSTITUTION | End: 2019-12-23
Attending: EMERGENCY MEDICINE | Admitting: INTERNAL MEDICINE
Payer: MEDICAID

## 2019-12-23 ENCOUNTER — INFUSION (OUTPATIENT)
Dept: INFUSION THERAPY | Facility: HOSPITAL | Age: 24
End: 2019-12-23
Attending: INTERNAL MEDICINE
Payer: MEDICAID

## 2019-12-23 VITALS
SYSTOLIC BLOOD PRESSURE: 136 MMHG | TEMPERATURE: 99 F | BODY MASS INDEX: 40.33 KG/M2 | RESPIRATION RATE: 16 BRPM | DIASTOLIC BLOOD PRESSURE: 81 MMHG | HEART RATE: 85 BPM | HEIGHT: 62 IN | WEIGHT: 219.13 LBS | OXYGEN SATURATION: 98 %

## 2019-12-23 VITALS
HEART RATE: 91 BPM | TEMPERATURE: 99 F | SYSTOLIC BLOOD PRESSURE: 140 MMHG | DIASTOLIC BLOOD PRESSURE: 84 MMHG | RESPIRATION RATE: 18 BRPM

## 2019-12-23 DIAGNOSIS — D69.3 CHRONIC ITP (IDIOPATHIC THROMBOCYTOPENIA): Primary | ICD-10-CM

## 2019-12-23 DIAGNOSIS — R23.3 PALATAL PETECHIAE: ICD-10-CM

## 2019-12-23 DIAGNOSIS — D50.0 IRON DEFICIENCY ANEMIA DUE TO CHRONIC BLOOD LOSS: Primary | ICD-10-CM

## 2019-12-23 DIAGNOSIS — D69.3 ACUTE ITP: ICD-10-CM

## 2019-12-23 PROBLEM — D69.6 THROMBOCYTOPENIA: Status: ACTIVE | Noted: 2019-12-23

## 2019-12-23 PROBLEM — D50.9 MICROCYTIC ANEMIA: Status: ACTIVE | Noted: 2019-12-23

## 2019-12-23 PROBLEM — D72.829 LEUKOCYTOSIS: Status: ACTIVE | Noted: 2019-12-23

## 2019-12-23 LAB
ABO + RH BLD: NORMAL
AMORPH CRY URNS QL MICRO: NORMAL
BACTERIA #/AREA URNS HPF: NORMAL /HPF
BILIRUB UR QL STRIP: NEGATIVE
BLD GP AB SCN CELLS X3 SERPL QL: NORMAL
CLARITY UR: CLEAR
COLOR UR: YELLOW
GLUCOSE UR QL STRIP: NEGATIVE
HGB UR QL STRIP: ABNORMAL
KETONES UR QL STRIP: NEGATIVE
LEUKOCYTE ESTERASE UR QL STRIP: ABNORMAL
MICROSCOPIC COMMENT: NORMAL
NITRITE UR QL STRIP: NEGATIVE
PH UR STRIP: 6 [PH] (ref 5–8)
PROT UR QL STRIP: NEGATIVE
RBC #/AREA URNS HPF: 1 /HPF (ref 0–4)
SP GR UR STRIP: 1.02 (ref 1–1.03)
URN SPEC COLLECT METH UR: ABNORMAL
UROBILINOGEN UR STRIP-ACNC: NEGATIVE EU/DL
WBC #/AREA URNS HPF: 1 /HPF (ref 0–5)

## 2019-12-23 PROCEDURE — 96365 THER/PROPH/DIAG IV INF INIT: CPT

## 2019-12-23 PROCEDURE — 81000 URINALYSIS NONAUTO W/SCOPE: CPT

## 2019-12-23 PROCEDURE — 96367 TX/PROPH/DG ADDL SEQ IV INF: CPT

## 2019-12-23 PROCEDURE — G0378 HOSPITAL OBSERVATION PER HR: HCPCS

## 2019-12-23 PROCEDURE — 63600175 PHARM REV CODE 636 W HCPCS: Performed by: INTERNAL MEDICINE

## 2019-12-23 PROCEDURE — 99291 CRITICAL CARE FIRST HOUR: CPT

## 2019-12-23 PROCEDURE — 86850 RBC ANTIBODY SCREEN: CPT

## 2019-12-23 PROCEDURE — 96366 THER/PROPH/DIAG IV INF ADDON: CPT

## 2019-12-23 RX ORDER — ACETAMINOPHEN 325 MG/1
325 TABLET ORAL EVERY 4 HOURS PRN
Status: DISCONTINUED | OUTPATIENT
Start: 2019-12-23 | End: 2019-12-23 | Stop reason: HOSPADM

## 2019-12-23 RX ORDER — DEXAMETHASONE 4 MG/1
40 TABLET ORAL DAILY
Status: DISCONTINUED | OUTPATIENT
Start: 2019-12-23 | End: 2019-12-23 | Stop reason: HOSPADM

## 2019-12-23 RX ORDER — SODIUM CHLORIDE 9 MG/ML
INJECTION, SOLUTION INTRAVENOUS CONTINUOUS
Status: CANCELLED | OUTPATIENT
Start: 2019-12-24

## 2019-12-23 RX ORDER — DIPHENHYDRAMINE HYDROCHLORIDE 50 MG/ML
25 INJECTION INTRAMUSCULAR; INTRAVENOUS
Status: DISCONTINUED | OUTPATIENT
Start: 2019-12-23 | End: 2019-12-23 | Stop reason: HOSPADM

## 2019-12-23 RX ORDER — SODIUM CHLORIDE 0.9 % (FLUSH) 0.9 %
10 SYRINGE (ML) INJECTION
Status: CANCELLED | OUTPATIENT
Start: 2019-12-24

## 2019-12-23 RX ORDER — DEXAMETHASONE 4 MG/1
40 TABLET ORAL DAILY
Qty: 30 TABLET | Refills: 0 | Status: SHIPPED | OUTPATIENT
Start: 2019-12-24 | End: 2020-01-23 | Stop reason: SDUPTHER

## 2019-12-23 RX ORDER — HEPARIN 100 UNIT/ML
5 SYRINGE INTRAVENOUS
Status: CANCELLED | OUTPATIENT
Start: 2019-12-24

## 2019-12-23 RX ADMIN — DEXAMETHASONE SODIUM PHOSPHATE 40 MG: 4 INJECTION, SOLUTION INTRAMUSCULAR; INTRAVENOUS at 03:12

## 2019-12-23 RX ADMIN — HUMAN IMMUNOGLOBULIN G 70 G: 40 LIQUID INTRAVENOUS at 01:12

## 2019-12-23 NOTE — TELEPHONE ENCOUNTER
Nurse returned call to ms schroeder and she is at [facility] ER and Dr Swan has been made aware of her arrival. Nurse thanked ms schroeder and assured her Dr Swan has spoken to ER Doc.

## 2019-12-23 NOTE — ASSESSMENT & PLAN NOTE
Orders as per Hematology- patient recently received IV iron infusion 12/17/2019  Add multivitamin

## 2019-12-23 NOTE — PLAN OF CARE
Pt tolerated IVIG infusion without difficulty.  Pharmacy does not have Romiplostim available until tomorrow. Dr Cota (on call) notified via secure chat and ok for patient to return on 12/24 for injection.  Patient received it last Tuesday in the clinic.   Discharged to car ambulatory with personal belongings.  Strict precautions given to patient to return to ER if bleeding.  She verbalizes understanding. Vs stable at discharge.

## 2019-12-23 NOTE — TELEPHONE ENCOUNTER
----- Message from Soila Oakes sent at 12/23/2019  9:47 AM CST -----  Contact: Patient  Type:  Patient Returning Call    Who Called:  Patient  Who Left Message for Patient:  Bridgette  Does the patient know what this is regarding?:  yes  Best Call Back Number:  [phone] (home)    Additional Information:  States that she has made it to the ER. Thank you

## 2019-12-23 NOTE — SUBJECTIVE & OBJECTIVE
Past Medical History:   Diagnosis Date    Encounter for blood transfusion     Thrombocytopenia        Past Surgical History:   Procedure Laterality Date     SECTION      x1    ROBOT-ASSISTED SURGICAL REMOVAL OF SPLEEN USING DA MAXIM XI N/A 2018    Procedure: XI ROBOTIC SPLENECTOMY;  Surgeon: Yobani Lara MD;  Location: [facility] ER;  Service: General;  Laterality: N/A;       Review of patient's allergies indicates:   Allergen Reactions    Aspirin      Unable to take because of illness    Ibuprofen Other (See Comments)     Cannot take due to Blood Disorder       No current facility-administered medications on file prior to encounter.      No current outpatient medications on file prior to encounter.     Family History     None        Tobacco Use    Smoking status: Never Smoker    Smokeless tobacco: Never Used   Substance and Sexual Activity    Alcohol use: No     Frequency: Never    Drug use: No    Sexual activity: Not on file     Review of Systems   Constitutional: Negative for activity change, appetite change, chills, diaphoresis, fatigue and fever.   HENT: Positive for dental problem. Negative for ear discharge, ear pain, facial swelling and hearing loss.         Bleeding gums   Eyes: Negative for pain and redness.   Respiratory: Negative for cough and shortness of breath.    Cardiovascular: Negative for leg swelling.   Gastrointestinal: Negative for abdominal distention, abdominal pain, constipation, diarrhea, nausea and vomiting.   Endocrine: Negative for polydipsia and polyphagia.   Genitourinary: Negative for difficulty urinating, dysuria, flank pain and hematuria.   Musculoskeletal: Negative for gait problem, neck pain and neck stiffness.   Skin: Positive for color change.        Petechiae noted under tongue, on arms and legs   Allergic/Immunologic: Negative for food allergies.   Neurological: Negative for seizures, facial asymmetry, speech difficulty and weakness.   Hematological:  Bruises/bleeds easily.        Bruising noted to right upper extremity  Petechiae noted under tongue, and on arms and legs   Psychiatric/Behavioral: Negative for agitation, behavioral problems, confusion, hallucinations and suicidal ideas. The patient is not nervous/anxious.      Objective:     Vital Signs (Most Recent):  Temp: 99.1 °F (37.3 °C) (12/23/19 1001)  Pulse: 96 (12/23/19 1001)  Resp: 16 (12/23/19 1001)  BP: (!) 142/88 (12/23/19 1001)  SpO2: 100 % (12/23/19 1001) Vital Signs (24h Range):  Temp:  [99.1 °F (37.3 °C)] 99.1 °F (37.3 °C)  Pulse:  [96] 96  Resp:  [16] 16  SpO2:  [100 %] 100 %  BP: (142)/(88) 142/88     Weight: 99.4 kg (219 lb 2.2 oz)  Body mass index is 40.08 kg/m².    Physical Exam   Constitutional: She is oriented to person, place, and time. She appears well-developed and well-nourished. No distress.   HENT:   Head: Normocephalic and atraumatic.   Eyes: Pupils are equal, round, and reactive to light. Conjunctivae and EOM are normal. Right eye exhibits no discharge. Left eye exhibits no discharge.   Neck: Normal range of motion. Neck supple. No JVD present.   Cardiovascular: Normal rate, regular rhythm, normal heart sounds and intact distal pulses.   No murmur heard.  Pulmonary/Chest: Effort normal and breath sounds normal. No respiratory distress.   Abdominal: Soft. Bowel sounds are normal. She exhibits no distension. There is no tenderness. There is no guarding.   Genitourinary:   Genitourinary Comments: Not examined   Musculoskeletal: Normal range of motion. She exhibits no edema.   Neurological: She is alert and oriented to person, place, and time. No cranial nerve deficit.   Skin: Skin is warm and dry. Capillary refill takes less than 2 seconds. She is not diaphoretic.   Psychiatric: She has a normal mood and affect. Her behavior is normal. Judgment and thought content normal.         CRANIAL NERVES     CN III, IV, VI   Pupils are equal, round, and reactive to light.  Extraocular motions  are normal.        Significant Labs: CBC  Reviewed    Significant Imaging:  None ordered

## 2019-12-23 NOTE — ASSESSMENT & PLAN NOTE
With critically low thrombocytopenia-  Orders as per Hematology/Oncology  Patient to receive IV dexamethasone, IgG, and romiplostim  Bleeding precautions  Repeat CBC in a.m.

## 2019-12-23 NOTE — ED NOTES
Patient to be discharged from ED and registered again through fifth floor, all meds are to go with patient to infusion center per CN. Patient will be taken to floor via stretcher.

## 2019-12-23 NOTE — H&P
[facility] Hospital Medicine  History & Physical    Patient Name: Mari Miller  MRN: [id number]  Admission Date: 2019  Attending Physician: Randi Wilson MD   Primary Care Provider: Ifeanyi Tello NP     Patient seen in the emergency room bed 11    Patient information was obtained from patient and ER records.     Subjective:     Principal Problem:  Chronic ITP with significant thrombocytopenia    Chief Complaint:   Chief Complaint   Patient presents with    thrombocytopenia     pt PCP called and said pt's platelets was 1.         HPI: This  is a 24 y.o. female patient  history of ITP, blood transfusion, and thrombocytopenia who presents to the Emergency Department for evaluation of thrombocytopenia which onset   prior to admission.  Patient was referred to the ED by Dr. Swan (Hem/Onc)  because patient's platelet count was 1. Symptoms are constant and moderate in severity. No mitigating or exacerbating factors reported. Associated s symptom include small bruise to right upper forearm bruise and bleeding gums. Patient denies any fever, chills, headache, dizziness, head injury/trauma, chest pain, shortness of breath, and all other sxs at this time. No prior treatment included. No further complaints or concerns at this time. Patient to receive IVIG, Romiplastin, and dexamethasone as well as admit to obs overnight as recommended by Dr. Swan (Hem/Onc).      Past Medical History:   Diagnosis Date    Encounter for blood transfusion     Thrombocytopenia        Past Surgical History:   Procedure Laterality Date     SECTION      x1    ROBOT-ASSISTED SURGICAL REMOVAL OF SPLEEN USING DA MAXIM XI N/A 2018    Procedure: XI ROBOTIC SPLENECTOMY;  Surgeon: Yobani Lara MD;  [facility];  Service: General;  Laterality: N/A;       Review of patient's allergies indicates:   Allergen Reactions    Aspirin      Unable to take because of illness    Ibuprofen Other  (See Comments)     Cannot take due to Blood Disorder       No current facility-administered medications on file prior to encounter.      No current outpatient medications on file prior to encounter.     Family History     None        Tobacco Use    Smoking status: Never Smoker    Smokeless tobacco: Never Used   Substance and Sexual Activity    Alcohol use: No     Frequency: Never    Drug use: No    Sexual activity: Not on file     Review of Systems   Constitutional: Negative for activity change, appetite change, chills, diaphoresis, fatigue and fever.   HENT: Positive for dental problem. Negative for ear discharge, ear pain, facial swelling and hearing loss.         Bleeding gums   Eyes: Negative for pain and redness.   Respiratory: Negative for cough and shortness of breath.    Cardiovascular: Negative for leg swelling.   Gastrointestinal: Negative for abdominal distention, abdominal pain, constipation, diarrhea, nausea and vomiting.   Endocrine: Negative for polydipsia and polyphagia.   Genitourinary: Negative for difficulty urinating, dysuria, flank pain and hematuria.   Musculoskeletal: Negative for gait problem, neck pain and neck stiffness.   Skin: Positive for color change.        Petechiae noted under tongue, on arms and legs   Allergic/Immunologic: Negative for food allergies.   Neurological: Negative for seizures, facial asymmetry, speech difficulty and weakness.   Hematological: Bruises/bleeds easily.        Bruising noted to right upper extremity  Petechiae noted under tongue, and on arms and legs   Psychiatric/Behavioral: Negative for agitation, behavioral problems, confusion, hallucinations and suicidal ideas. The patient is not nervous/anxious.      Objective:     Vital Signs (Most Recent):  Temp: 99.1 °F (37.3 °C) (12/23/19 1001)  Pulse: 96 (12/23/19 1001)  Resp: 16 (12/23/19 1001)  BP: (!) 142/88 (12/23/19 1001)  SpO2: 100 % (12/23/19 1001) Vital Signs (24h Range):  Temp:  [99.1 °F (37.3 °C)]  99.1 °F (37.3 °C)  Pulse:  [96] 96  Resp:  [16] 16  SpO2:  [100 %] 100 %  BP: (142)/(88) 142/88     Weight: 99.4 kg (219 lb 2.2 oz)  Body mass index is 40.08 kg/m².    Physical Exam   Constitutional: She is oriented to person, place, and time. She appears well-developed and well-nourished. No distress.   HENT:   Head: Normocephalic and atraumatic.   Eyes: Pupils are equal, round, and reactive to light. Conjunctivae and EOM are normal. Right eye exhibits no discharge. Left eye exhibits no discharge.   Neck: Normal range of motion. Neck supple. No JVD present.   Cardiovascular: Normal rate, regular rhythm, normal heart sounds and intact distal pulses.   No murmur heard.  Pulmonary/Chest: Effort normal and breath sounds normal. No respiratory distress.   Abdominal: Soft. Bowel sounds are normal. She exhibits no distension. There is no tenderness. There is no guarding.   Genitourinary:   Genitourinary Comments: Not examined   Musculoskeletal: Normal range of motion. She exhibits no edema.   Neurological: She is alert and oriented to person, place, and time. No cranial nerve deficit.   Skin: Skin is warm and dry. Capillary refill takes less than 2 seconds. She is not diaphoretic.   Psychiatric: She has a normal mood and affect. Her behavior is normal. Judgment and thought content normal.         CRANIAL NERVES     CN III, IV, VI   Pupils are equal, round, and reactive to light.  Extraocular motions are normal.        Significant Labs: CBC  Reviewed    Significant Imaging:  None ordered    Assessment/Plan:     Leukocytosis  Monitor for any signs of infection  Bilateral breath sounds are clear  Check UA    Microcytic anemia  Orders as per Hematology- patient recently received IV iron infusion 12/17/2019  Add multivitamin      Thrombocytopenia  As above      Chronic ITP (idiopathic thrombocytopenia)  With critically low thrombocytopenia-  Orders as per Hematology/Oncology  Patient to receive IV dexamethasone, IgG, and  romiplostim  Bleeding precautions  Repeat CBC in a.m.        VTE Risk Mitigation (From admission, onward)    None         Time spent seeing patient( greater than 1/2 spent in direct contact) :  58 minutes    SUZY Cotto  Department of Hospital Medicine   [facility]

## 2019-12-23 NOTE — HOSPITAL COURSE
The patient was monitored closely during her stay.  Arrangements were made for outpatient infusion and patient was discharged.

## 2019-12-23 NOTE — PROVIDER PROGRESS NOTES - EMERGENCY DEPT.
Encounter Date: 12/23/2019    ED Physician Progress Notes        Physician Note:   12:56 PM: Pt has a bed in the infusion center and Dr. Swan (Hem/Onc) agrees with the plan of tx. Pt will be discharged and sent to the infusion center.       SCRIBE #1 NOTE: I, Artem Vallejo, am scribing for, and in the presence of, Randi Wilson MD (Emergency Medicine). I have scribed the entire note.             Scribe Attestation:   Scribe #1: I performed the above scribed service and the documentation accurately describes the services I performed. I attest to the accuracy of the note. 12/23/2019 1:01 PM    Attending:   Physician Attestation Statement for Scribe #1: I, Randi Wilson MD (Emergency Medicine), personally performed the services described in this documentation, as scribed by Artem Vallejo, in my presence, and it is both accurate and complete.

## 2019-12-23 NOTE — ED PROVIDER NOTES
SCRIBE #1 NOTE: I, Artem Vallejo, am scribing for, and in the presence of, Randi Wilson MD. I have scribed the entire note.      History      Chief Complaint   Patient presents with    thrombocytopenia     pt PCP called and said pt's platelets was 1.        Review of patient's allergies indicates:   Allergen Reactions    Aspirin      Unable to take because of illness    Ibuprofen Other (See Comments)     Cannot take due to Blood Disorder        HPI   HPI    2019, 10:00 AM   History obtained from the patient      History of Present Illness: Mari Miller is a 24 y.o. female patient h/o ITP, blood transfusion and thrombocytopenia who presents to the Emergency Department for evaluation of worsening thrombocytopenia. Pt was referred to the ED by Dr. Swan (Hem/Onc) b/c pt's platelet was 1. Symptoms are constant and moderate in severity. No mitigating or exacerbating factors reported. Associated sxs include small bruise to R upper forearm bruise and bleeding gums. Patient denies any fever, chills, HA, dizziness, head injury/trauma, CP, SOB, and all other sxs at this time. No prior Tx included. No further complaints or concerns at this time.          Arrival mode: Personal vehicle      PCP: Ifeanyi Tello NP       Past Medical History:  Past Medical History:   Diagnosis Date    Encounter for blood transfusion     Thrombocytopenia        Past Surgical History:  Past Surgical History:   Procedure Laterality Date     SECTION      x1    ROBOT-ASSISTED SURGICAL REMOVAL OF SPLEEN USING DA MAXIM XI N/A 2018    Procedure: XI ROBOTIC SPLENECTOMY;  Surgeon: Yobani Lara MD;  Location: [facility];  Service: General;  Laterality: N/A;         Family History:  History reviewed. No pertinent family history.     Social History:  Social History     Tobacco Use    Smoking status: Never Smoker    Smokeless tobacco: Never Used   Substance and Sexual Activity    Alcohol use: No      Frequency: Never    Drug use: No    Sexual activity: Unknown       ROS   Review of Systems   Constitutional: Negative for chills and fever.   HENT: Negative for sore throat.         (+) bleeding gums     Respiratory: Negative for shortness of breath.    Cardiovascular: Negative for chest pain.   Gastrointestinal: Negative for nausea.   Genitourinary: Negative for dysuria.   Musculoskeletal: Negative for back pain.   Skin: Negative for rash.   Neurological: Negative for dizziness, weakness and headaches.        (-) head injury     Hematological: Bruises/bleeds easily (Small bruise (R upper forearm)).   All other systems reviewed and are negative.    Physical Exam      Initial Vitals [12/23/19 1001]   BP Pulse Resp Temp SpO2   (!) 142/88 96 16 99.1 °F (37.3 °C) 100 %      MAP       --          Physical Exam  Nursing Notes and Vital Signs Reviewed.  Constitutional: Patient is in no acute distress. Well-developed and well-nourished.  Head: Atraumatic. Normocephalic.  Eyes: PERRL. EOM intact. Conjunctivae are not pale. No scleral icterus.  ENT: Mucous membranes are moist. Oropharynx is clear and symmetric.    Neck: Supple. Full ROM. No lymphadenopathy.  Cardiovascular: Regular rate. Regular rhythm. No murmurs, rubs, or gallops. Distal pulses are 2+ and symmetric.  Pulmonary/Chest: No respiratory distress. Clear to auscultation bilaterally. No wheezing or rales.  Abdominal: Soft and non-distended.  There is no tenderness.  No rebound, guarding, or rigidity. Good bowel sounds.  Genitourinary: No CVA tenderness  Musculoskeletal: Moves all extremities. No obvious deformities. No edema. No calf tenderness.  Skin: Warm and dry. Scattered petechiae on legs, arms, and oral mucosa. No active bleeding. Small bruise to R upper forearm.   Neurological:  Alert, awake, and appropriate.  Normal speech.  No acute focal neurological deficits are appreciated.  Psychiatric: Normal affect. Good eye contact. Appropriate in content.    ED  "Course    Critical Care  Date/Time: 12/23/2019 10:29 AM  Performed by: Randi Wilson MD  Authorized by: Randi Wilson MD   Direct patient critical care time: 15 minutes  Additional history critical care time: 15 minutes  Ordering / reviewing critical care time: 5 minutes  Documentation critical care time: 5 minutes  Consulting other physicians critical care time: 5 minutes  Total critical care time (exclusive of procedural time) : 45 minutes  Critical care time was exclusive of separately billable procedures and treating other patients and teaching time.  Critical care was necessary to treat or prevent imminent or life-threatening deterioration of the following conditions: thrombocytopenia.  Critical care was time spent personally by me on the following activities: development of treatment plan with patient or surrogate, discussions with consultants, examination of patient, obtaining history from patient or surrogate, ordering and performing treatments and interventions, pulse oximetry, re-evaluation of patient's condition and review of old charts.        ED Vital Signs:  Vitals:    12/23/19 1001   BP: (!) 142/88   Pulse: 96   Resp: 16   Temp: 99.1 °F (37.3 °C)   TempSrc: Oral   SpO2: 100%   Weight: 99.4 kg (219 lb 2.2 oz)   Height: 5' 2" (1.575 m)       Abnormal Lab Results:  Labs Reviewed   TYPE & SCREEN        All Lab Results:        Notes recorded by Sakina Swan MD on 12/23/2019 at 8:30 AM CST  Trying to get her in for IVIG and N-plate today and start dexamethasone. I am told no room at [facility] and will need to do in house. Please see if possible to direct admit versus thru ED and call patient to notify.   Contains critical data CBC auto differential   Order: [id number]   Status:  Final result   Visible to patient:  Yes (Patient Portal) Next appt:  12/26/2019 at 12:40 PM in Lab ([facility]) Dx:  Acute ITP    Ref Range & Units 07:38 3d ago 6d ago   WBC 3.90 - 12.70 K/uL 14.53High   " 9.45  11.70    RBC 4.00 - 5.40 M/uL 4.29  4.77  4.44    Hemoglobin 12.0 - 16.0 g/dL 9.4Low   10.4Low   9.5Low     Hematocrit 37.0 - 48.5 % 32.6Low   35.0Low   32.3Low     Mean Corpuscular Volume 82 - 98 fL 76Low   73Low   73Low     Mean Corpuscular Hemoglobin 27.0 - 31.0 pg 21.9Low   21.8Low   21.4Low     Mean Corpuscular Hemoglobin Conc 32.0 - 36.0 g/dL 28.8Low   29.7Low   29.4Low     RDW 11.5 - 14.5 % 21.6High   18.7High   17.8High     Platelets 150 - 350 K/uL 1Low Panic   5Low Panic  CM 31Low Panic  CM   Comment: PLT critical result(s) called and verbal readback obtained from   Marielos Koenig by KIRK 12/23/2019 08:05    MPV 9.2 - 12.9 fL SEE COMMENT  SEE COMMENT CM SEE COMMENT CM   Comment: Result not available.   Immature Granulocytes 0.0 - 0.5 % 1.2High   0.8High   0.5    Gran # (ANC) 1.8 - 7.7 K/uL 9.5High   6.0  7.5    Immature Grans (Abs) 0.00 - 0.04 K/uL 0.18High   0.08High  CM 0.06High  CM   Comment: Mild elevation in immature granulocytes is non specific and   can be seen in a variety of conditions including stress response,   acute inflammation, trauma and pregnancy. Correlation with other   laboratory and clinical findings is essential.    Lymph # 1.0 - 4.8 K/uL 3.1  2.5  2.9    Mono # 0.3 - 1.0 K/uL 1.4High   0.7  1.1High     Eos # 0.0 - 0.5 K/uL 0.1  0.1  0.1    Baso # 0.00 - 0.20 K/uL 0.13  0.10  0.13    nRBC 0 /100 WBC 0  2Abnormal   0    Gran% 38.0 - 73.0 % 65.6  63.1  63.9    Lymph% 18.0 - 48.0 % 21.5  26.7  24.4    Mono% 4.0 - 15.0 % 9.8  7.8  9.7    Eosinophil% 0.0 - 8.0 % 1.0  0.5  0.4    Basophil% 0.0 - 1.9 % 0.9  1.1  1.1    Differential Method  Automated  Automated  Automated    Resulting Agency  [facility]RL[facility]      Narrative   Performed by: TANYA   PLT critical result(s) called and verbal readback obtained from   [facility] [place] [facility] by KIRK 12/23/2019 08:05      Specimen Collected: 12/23/19 07:38 Last Resulted: 12/23/19 08:05 Lab Flowsheet Order Details View Encounter Lab and Collection Details  Routing Result History      CM=Additional comments                 Imaging Results:  Imaging Results    None                 The Emergency Provider reviewed the vital signs and test results, which are outlined above.    ED Discussion     10:00 AM: Pt was sent to the Ed by Dr. Swan (Hem/Onc) for IVIG, Romiplastin, and dexamethasone as well as admit to obs overnight.    10:21 AM: Discussed case with George Wells NP ([facility]). Dr. Hauser agrees with current care and management of pt and accepts admission.   Admitting Service: Hospital Medicine  Admitting Physician: Dr. Hauser  Admit to: Med Surg    10:22 AM: Re-evaluated pt. I have discussed test results, shared treatment plan, and the need for admission with patient and family at bedside. Pt and family express understanding at this time and agree with all information. All questions answered. Pt and family have no further questions or concerns at this time. Pt is ready for admit.      ED Medication(s):  Medications   romiPLOStim injection 200 mcg (has no administration in time range)   dexAMETHasone tablet 40 mg (has no administration in time range)   Immune Globulin G (IGG)-PRO-IGA 10 % injection (Privigen) 10 % injection 100 g (has no administration in time range)   acetaminophen tablet 325 mg (has no administration in time range)   diphenhydrAMINE injection 25 mg (has no administration in time range)             Medical Decision Making              Scribe Attestation:   Scribe #1: I performed the above scribed service and the documentation accurately describes the services I performed. I attest to the accuracy of the note.    Attending:   Physician Attestation Statement for Scribe #1: I, Randi Wilson MD, personally performed the services described in this documentation, as scribed by Artem Vallejo, in my presence, and it is both accurate and complete.          Clinical Impression       ICD-10-CM ICD-9-CM   1. Chronic ITP (idiopathic  thrombocytopenia) D69.3 287.31   2. Palatal petechiae R23.3 782.7       Disposition:   Disposition: Admitted (Med Surg)  Condition: Valerie Wilson MD  12/23/19 1142

## 2019-12-23 NOTE — ED NOTES
Charge nurse, Toro, spoke to Alton Horton Supervisor, states pt bed will be assigned soon, await room assignment to start IVIG infusion.

## 2019-12-23 NOTE — HPI
This  is a 24 y.o. female patient history of ITP, blood transfusion, and thrombocytopenia who presents to the Emergency Department for evaluation of thrombocytopenia which onset  prior to admission.  Patient was referred to the ED by Dr. Swan (Hem/Onc)  because patient's platelet count was 1. Symptoms are constant and moderate in severity. No mitigating or exacerbating factors reported. Associated s symptom include small bruise to right upper forearm bruise and bleeding gums. Patient denies any fever, chills, headache, dizziness, head injury/trauma, chest pain, shortness of breath, and all other sxs at this time. No prior treatment included. No further complaints or concerns at this time. Patient to receive IVIG, Romiplastin, and dexamethasone as well as admit to obs overnight as recommended by Dr. Swan (Hem/Onc).

## 2019-12-23 NOTE — DISCHARGE SUMMARY
[facility] Hospital Medicine  Discharge Summary      Patient Name: Mari Miller  MRN: [id number]  Admission Date: 12/23/2019  Hospital Length of Stay: 0 days  Discharge Date and Time:  12/23/2019 3:16 PM  Attending Physician: No att. providers found   Discharging Provider: SUZY Cotto  Primary Care Provider: Ifeanyi Tello NP      HPI:   This  is a 24 y.o. female patient  history of ITP, blood transfusion, and thrombocytopenia who presents to the Emergency Department for evaluation of thrombocytopenia which onset   prior to admission.  Patient was referred to the ED by Dr. Swan (Hem/Onc)  because patient's platelet count was 1. Symptoms are constant and moderate in severity. No mitigating or exacerbating factors reported. Associated s symptom include small bruise to right upper forearm bruise and bleeding gums. Patient denies any fever, chills, headache, dizziness, head injury/trauma, chest pain, shortness of breath, and all other sxs at this time. No prior treatment included. No further complaints or concerns at this time. Patient to receive IVIG, Romiplastin, and dexamethasone as well as admit to obs overnight as recommended by Dr. Swan (Hem/Onc).      * No surgery found *      Hospital Course:   The patient was monitored closely during her stay.  Arrangements were made for outpatient infusion and patient was discharged.     Consults:   Consults (From admission, onward)        Status Ordering Provider     Inpatient consult to Hematology/Oncology  Once     Provider:  Nash Cota MD    Acknowledged KENDALL JACOB        Service: Hospital Medicine    Final Active Diagnoses:    Diagnosis Date Noted POA    PRINCIPAL PROBLEM:  Thrombocytopenia [D69.6] 12/23/2019 Yes    Microcytic anemia [D50.9] 12/23/2019 Yes    Leukocytosis [D72.829] 12/23/2019 Yes    H/O splenectomy [Z90.81] 12/18/2018 Not Applicable    Chronic ITP (idiopathic thrombocytopenia) [D69.3]  08/20/2018 Yes      Problems Resolved During this Admission:       Discharged Condition: stable    Disposition: Another Health Care Inst*    Follow Up:  Follow-up Information     Sakina Swan MD. Schedule an appointment as soon as possible for a visit in 2 days.    Specialty:  Hematology and Oncology  Why:  Return to the Emergency Room, If symptoms worsen  Contact information:  [address] [facility]  [place] [address]  [phone]                 Patient Instructions:   Patient discharged to outpatient infusion services    Significant Diagnostic Studies:     CBC   Recent Labs   Lab 12/23/19  0738   WBC 14.53*   HGB 9.4*   HCT 32.6*   PLT 1*       Pending Diagnostic Studies:     None         Medications:  Transfer Medications (for Discharge Readmit only):   No current facility-administered medications for this encounter.      Current Outpatient Medications   Medication Sig Dispense Refill    [START ON 12/24/2019] dexAMETHasone (DECADRON) 4 MG Tab Take 10 tablets (40 mg total) by mouth once daily. 30 tablet 0     Facility-Administered Medications Ordered in Other Encounters   Medication Dose Route Frequency Provider Last Rate Last Dose    dexamethasone (DECADRON) 40 mg in sodium chloride 0.9% 50 mL IVPB  40 mg Intravenous 1 time in Clinic/HOD Sakina Swan  mL/hr at 12/23/19 1514 40 mg at 12/23/19 1514       Indwelling Lines/Drains at time of discharge:   Lines/Drains/Airways     None                 Time spent on the discharge of patient: 28 minutes  Patient was seen and examined on the date of discharge and determined to be suitable for discharge.         Jackie Schmitt, SUZY  Department of Hospital Medicine  [facility]

## 2019-12-23 NOTE — TELEPHONE ENCOUNTER
"Called emergency room to notify that I sent this patient for acute on chronic ITP, platelet count 1 K today in setting of petechial rash and bruising.    Related recommendations to ED staff to please provide her the following treatments for acute ITP:  - IVIG 1000mg/kg x 1 dose  - romiplostim 2mcg/kg x 1 dose  - dexamethasone 40mg x 4 days, can give first fose iv in ED and script for po on days 2-4    She should follow-up in heme/onc clinic on Thursday with NP already scheduled.      ----- Message from Evelyne Pal LPN sent at 12/23/2019  8:54 AM CST -----  Nurse notified patient of results as per provider, pt verbalized full understanding, nurse asked if pt is she experiencing any symptoms related to low platelets, she stated," I have a bruise on my arm and I have petechiae,"  nurse explained to pt per Dr Swan, she is to report to [facility] for eval and treatment, nurse informed pt to call the cllinic and notify nurse once she has arrived to [facility]. Pt verabilzed full understanding call ended with out any other questions or concerns. Dr Swan is included in this message and will be informed as she is in clinic at this time.    "

## 2019-12-24 ENCOUNTER — TELEPHONE (OUTPATIENT)
Dept: INFUSION THERAPY | Facility: HOSPITAL | Age: 24
End: 2019-12-24

## 2019-12-24 NOTE — TELEPHONE ENCOUNTER
Attempts x 3 to contact patient at [phone]. Left message. Patient to receive Romiplostim at office visit with labs and MD  on Thurs 12/26 instead of hospital infusion today.  Per Nkechi in pharmacy, will not be available at hospital pharmacy until 12/27.

## 2019-12-26 ENCOUNTER — OFFICE VISIT (OUTPATIENT)
Dept: HEMATOLOGY/ONCOLOGY | Facility: CLINIC | Age: 24
End: 2019-12-26
Payer: MEDICAID

## 2019-12-26 ENCOUNTER — INFUSION (OUTPATIENT)
Dept: INFUSION THERAPY | Facility: HOSPITAL | Age: 24
End: 2019-12-26
Attending: INTERNAL MEDICINE
Payer: MEDICAID

## 2019-12-26 VITALS
BODY MASS INDEX: 40.6 KG/M2 | TEMPERATURE: 98 F | HEART RATE: 74 BPM | DIASTOLIC BLOOD PRESSURE: 83 MMHG | OXYGEN SATURATION: 99 % | WEIGHT: 222 LBS | SYSTOLIC BLOOD PRESSURE: 134 MMHG

## 2019-12-26 VITALS — SYSTOLIC BLOOD PRESSURE: 148 MMHG | HEART RATE: 69 BPM | DIASTOLIC BLOOD PRESSURE: 82 MMHG

## 2019-12-26 DIAGNOSIS — D50.0 IRON DEFICIENCY ANEMIA DUE TO CHRONIC BLOOD LOSS: Primary | ICD-10-CM

## 2019-12-26 DIAGNOSIS — D69.3 ACUTE ITP: Primary | ICD-10-CM

## 2019-12-26 DIAGNOSIS — D50.0 IRON DEFICIENCY ANEMIA DUE TO CHRONIC BLOOD LOSS: ICD-10-CM

## 2019-12-26 DIAGNOSIS — D69.3 ACUTE ITP: ICD-10-CM

## 2019-12-26 DIAGNOSIS — D69.3 CHRONIC ITP (IDIOPATHIC THROMBOCYTOPENIA): ICD-10-CM

## 2019-12-26 PROCEDURE — 96372 THER/PROPH/DIAG INJ SC/IM: CPT | Mod: 59

## 2019-12-26 PROCEDURE — 63600175 PHARM REV CODE 636 W HCPCS: Performed by: NURSE PRACTITIONER

## 2019-12-26 PROCEDURE — 99214 PR OFFICE/OUTPT VISIT, EST, LEVL IV, 30-39 MIN: ICD-10-PCS | Mod: 25,S$PBB,, | Performed by: NURSE PRACTITIONER

## 2019-12-26 PROCEDURE — 63600175 PHARM REV CODE 636 W HCPCS: Mod: JG | Performed by: INTERNAL MEDICINE

## 2019-12-26 PROCEDURE — 99999 PR PBB SHADOW E&M-EST. PATIENT-LVL III: CPT | Mod: PBBFAC,,, | Performed by: NURSE PRACTITIONER

## 2019-12-26 PROCEDURE — 99214 OFFICE O/P EST MOD 30 MIN: CPT | Mod: 25,S$PBB,, | Performed by: NURSE PRACTITIONER

## 2019-12-26 PROCEDURE — 96365 THER/PROPH/DIAG IV INF INIT: CPT

## 2019-12-26 PROCEDURE — 99213 OFFICE O/P EST LOW 20 MIN: CPT | Mod: PBBFAC,25 | Performed by: NURSE PRACTITIONER

## 2019-12-26 PROCEDURE — 99999 PR PBB SHADOW E&M-EST. PATIENT-LVL III: ICD-10-PCS | Mod: PBBFAC,,, | Performed by: NURSE PRACTITIONER

## 2019-12-26 PROCEDURE — 96376 TX/PRO/DX INJ SAME DRUG ADON: CPT

## 2019-12-26 RX ORDER — SODIUM CHLORIDE 9 MG/ML
INJECTION, SOLUTION INTRAVENOUS CONTINUOUS
Status: CANCELLED | OUTPATIENT
Start: 2019-12-27

## 2019-12-26 RX ORDER — HEPARIN 100 UNIT/ML
5 SYRINGE INTRAVENOUS
Status: CANCELLED | OUTPATIENT
Start: 2019-12-27

## 2019-12-26 RX ORDER — ONDANSETRON 2 MG/ML
8 INJECTION INTRAMUSCULAR; INTRAVENOUS ONCE
Status: COMPLETED | OUTPATIENT
Start: 2019-12-26 | End: 2019-12-26

## 2019-12-26 RX ORDER — SODIUM CHLORIDE 0.9 % (FLUSH) 0.9 %
10 SYRINGE (ML) INJECTION
Status: CANCELLED | OUTPATIENT
Start: 2019-12-27

## 2019-12-26 RX ORDER — ONDANSETRON 2 MG/ML
8 INJECTION INTRAMUSCULAR; INTRAVENOUS ONCE
Status: CANCELLED
Start: 2020-01-02

## 2019-12-26 RX ADMIN — ROMIPLOSTIM 95 MCG: 250 INJECTION, POWDER, LYOPHILIZED, FOR SOLUTION SUBCUTANEOUS at 02:12

## 2019-12-26 RX ADMIN — FERRIC CARBOXYMALTOSE INJECTION 750 MG: 50 INJECTION, SOLUTION INTRAVENOUS at 02:12

## 2019-12-26 RX ADMIN — ONDANSETRON 8 MG: 2 INJECTION INTRAMUSCULAR; INTRAVENOUS at 01:12

## 2019-12-26 NOTE — ASSESSMENT & PLAN NOTE
Most recent iron studies done 12/10/2019 reflect iron deficiency.  Patient has received 1 dose of IV Injectafer.  Scheduled to receive dose 2.  IV Injectafer.  Patient reports nausea with previous intravenous iron infusion.  Adding Zofran 8 mg to treatment plan for patient to receive just prior to Injectafer infusion    Would plan to repeat iron studies 6-8 weeks following today's iron infusion

## 2019-12-26 NOTE — DISCHARGE INSTRUCTIONS
"[facility]  [address] [address].  or  [address] [address]  [phone] phone     [phone] fax  Hours of Operation: Monday- Friday 8:00am- 5:00pm  After hours phone  [phone]  Hematology / Oncology Physicians on call      Dr. Cipriano Johns, VERN Atkins NP Tyesha Taylor, NP    Please call with any concerns regarding your appointment today.Support Groups/Classes    Support groups and classes are being offered at the   [facility] and [facility]!!    "Cooking with Cancer" (Nutrition Class):  Second Wednesday of each month   at noon at the [facility].  Metastatic Support Group:  Third Tuesday of each month   at noon at the [facility].  Next Steps Class/Group: Second and fourth Thursday of each month at noon at the [facility].  [facility] (Breast Cancer Support Group): First Tuesday of each month   at 5:30pm at the [facility] location.  [employer]: [facility]: Second and third Tuesday of each month from 7:30am - 2pm.  [facility]: First and fourth Tuesday of each month from 7:30am - 2pm    If you are interested in attending or would like more information please ask our social workers or your nurse!  Iron-Deficiency Anemia (Adult)  Red blood cells carry oxygen to the tissues of your body. Anemia is a condition in which you have too few red blood cells. You need iron to make red cells. Anemia makes you feel tired and run down. When anemia becomes severe, your skin becomes pale. You may feel short of breath after physical activity. Other symptoms include:  · Headaches  · Dizziness  · Leg cramps with physical activity  · Drowsiness  · Restless legs  Your anemia is caused by not having enough iron in your body. This may be because of:  · Loss of blood. This can be caused by heavy menstrual periods. It can also be caused by bleeding from the stomach or " intestines.  · Poor diet. You may not be eating enough foods that contain iron.  · Inability to absorb iron from the foods you eat  · Pregnancy  If your blood count is low enough, your healthcare provider may prescribe an iron supplement. It usually takes about 2 to 3 months of treatment with iron supplements to correct anemia. Severe cases of anemia need a blood transfusion to quickly ease symptoms and deliver more oxygen to the cells.  Home care  Follow these guidelines when caring for yourself at home:  · Eat foods high in iron. This will boost the amount of iron stored in your body. It is a natural way to build up the number of blood cells. Good sources of iron include beef, liver, spinach and other dark green leafy vegetables, whole grains, beans, and nuts.  · Do not overexert yourself.  · Talk with your healthcare provider before traveling by air or traveling to high altitudes.  Follow-up care  Follow up with your healthcare provider in 2 months, or as advised. This is to have another red blood cell count to be sure your anemia has been fixed.  When to seek medical advice  Call your healthcare provider right away if any of these occur:  · Shortness of breath or chest pain  · Dizziness or fainting  · Vomiting blood or passing red or black-colored stool   Date Last Reviewed: 2/25/2016  © 4758-9015 [employer]. [address] [place] [address], [place] [address]. All rights reserved. This information is not intended as a substitute for professional medical care. Always follow your healthcare professional's instructions.

## 2019-12-26 NOTE — ASSESSMENT & PLAN NOTE
Patient is status post IVIG and steroid administration for ED visit on 12/23/2019.  Platelet count 37.  Proceed with Nplate injection today.  Follow-up 1 week with repeat CBC and possible Nplate.  She knows to call if questions, concerns, or bleeding

## 2019-12-26 NOTE — PROGRESS NOTES
Subjective:       Patient ID: Mari Miller is a 24 y.o. female.    Chief Complaint: No chief complaint on file.    HPI: 24 y.o female with ITP unresponsive to Rituxan. Patient also h/o taking Promacta for treatment of ITP. There has been questions raised as to whether or not patient was appropriately taking Promacta as prescribed.  Patient has had several hospitalizations for her ITP requiring IVIG, steroids and PRBCs for anemia.  Patient currently being treated with Nplate injections.  Recently seen in the ED for platelet count of 1.  Patient received IVIG and steroids.  Patient presents today for Nplate injection and labs.  Platelet count 37 today.  Patient denies any abnormal bleeding.  Resolving petechiae rash noted to bilateral upper extremities. Hemoglobin stable    Today's visit:  Patient denies any hematuria, hematochezia, melena, hemoptysis, epistaxis, bowel or urinary complaints.  Social History     Socioeconomic History    Marital status: Single     Spouse name: Not on file    Number of children: Not on file    Years of education: Not on file    Highest education level: Not on file   Occupational History    Not on file   Social Needs    Financial resource strain: Not on file    Food insecurity:     Worry: Not on file     Inability: Not on file    Transportation needs:     Medical: Not on file     Non-medical: Not on file   Tobacco Use    Smoking status: Never Smoker    Smokeless tobacco: Never Used   Substance and Sexual Activity    Alcohol use: No     Frequency: Never    Drug use: No    Sexual activity: Not on file   Lifestyle    Physical activity:     Days per week: Not on file     Minutes per session: Not on file    Stress: Not on file   Relationships    Social connections:     Talks on phone: Not on file     Gets together: Not on file     Attends Protestant service: Not on file     Active member of club or organization: Not on file     Attends meetings of clubs or  organizations: Not on file     Relationship status: Not on file   Other Topics Concern    Not on file   Social History Narrative    Not on file       Past Medical History:   Diagnosis Date    Encounter for blood transfusion     Thrombocytopenia        No family history on file.    Past Surgical History:   Procedure Laterality Date     SECTION      x1    ROBOT-ASSISTED SURGICAL REMOVAL OF SPLEEN USING DA MAXIM XI N/A 2018    Procedure: XI ROBOTIC SPLENECTOMY;  Surgeon: Yobani Lara MD;  Location: [facility];  Service: General;  Laterality: N/A;       Review of Systems   Constitutional: Negative for activity change, appetite change, chills, diaphoresis, fatigue, fever and unexpected weight change.   HENT: Negative for congestion, mouth sores, nosebleeds, sore throat, trouble swallowing and voice change.    Eyes: Negative for photophobia and visual disturbance.   Respiratory: Negative for cough, chest tightness, shortness of breath and wheezing.    Cardiovascular: Negative for chest pain, palpitations and leg swelling.   Gastrointestinal: Negative for abdominal distention, abdominal pain, blood in stool, constipation, diarrhea, nausea and vomiting.   Genitourinary: Negative for difficulty urinating, dysuria and hematuria.   Musculoskeletal: Negative for arthralgias, back pain and myalgias.   Skin: Negative for pallor, rash and wound.   Neurological: Negative for dizziness, syncope, weakness and headaches.   Hematological: Negative for adenopathy. Bruises/bleeds easily.   Psychiatric/Behavioral: The patient is not nervous/anxious.          Medication List with Changes/Refills   Current Medications    DEXAMETHASONE (DECADRON) 4 MG TAB    Take 10 tablets (40 mg total) by mouth once daily.     Objective:     Vitals:    19 1314   BP: 134/83   Pulse: 74   Temp: 98.2 °F (36.8 °C)     Lab Results   Component Value Date    WBC 27.31 (H) 2019    HGB 10.7 (L) 2019    HCT 35.7 (L) 2019     MCV 76 (L) 12/26/2019    PLT 37 (LL) 12/26/2019     Lab Results   Component Value Date    IRON 48 12/10/2019    TIBC 478 (H) 12/10/2019    FERRITIN 12 (L) 12/10/2019     BMP  Lab Results   Component Value Date     10/15/2019    K 4.3 10/15/2019     10/15/2019    CO2 23 10/15/2019    BUN 9 10/15/2019    CREATININE 0.6 10/15/2019    CALCIUM 8.6 (L) 10/15/2019    ANIONGAP 7 (L) 10/15/2019    ESTGFRAFRICA >60 10/15/2019    EGFRNONAA >60 10/15/2019     Lab Results   Component Value Date    ALT 15 10/15/2019    AST 11 10/15/2019    ALKPHOS 47 (L) 10/15/2019    BILITOT 0.1 10/15/2019         Physical Exam   Constitutional: She is oriented to person, place, and time. She appears well-developed and well-nourished. She is cooperative.   HENT:   Head: Normocephalic.   Right Ear: Hearing normal. No drainage.   Left Ear: Hearing normal. No drainage.   Nose: Nose normal.   Mouth/Throat: Oropharynx is clear and moist.   Eyes: Conjunctivae, EOM and lids are normal. Right eye exhibits no discharge. Left eye exhibits no discharge. No scleral icterus.   Neck: Normal range of motion. No thyroid mass present.   Cardiovascular: Normal rate, regular rhythm and normal heart sounds.   No murmur heard.  Pulmonary/Chest: Effort normal and breath sounds normal. No respiratory distress. She has no wheezes. She has no rhonchi. She has no rales.   Abdominal: Soft. Bowel sounds are normal. She exhibits no distension. There is no tenderness.   Genitourinary:   Genitourinary Comments: deferred   Musculoskeletal: Normal range of motion. She exhibits no edema.   Lymphadenopathy:        Head (right side): No submandibular, no preauricular and no posterior auricular adenopathy present.        Head (left side): No submandibular, no preauricular and no posterior auricular adenopathy present.        Right cervical: No superficial cervical adenopathy present.       Left cervical: No superficial cervical adenopathy present.   Neurological: She is  alert and oriented to person, place, and time.   Skin: Skin is warm, dry and intact.   Psychiatric: She has a normal mood and affect. Her speech is normal and behavior is normal. Thought content normal.   Vitals reviewed.       Assessment:     Problem List Items Addressed This Visit        Hematology    Chronic ITP (idiopathic thrombocytopenia)     Patient is status post IVIG and steroid administration for ED visit on 12/23/2019.  Platelet count 37.  Proceed with Nplate injection today.  Follow-up 1 week with repeat CBC and possible Nplate.  She knows to call if questions, concerns, or bleeding           Acute ITP - Primary    Relevant Orders    CBC auto differential       Oncology    Iron deficiency anemia due to chronic blood loss     Most recent iron studies done 12/10/2019 reflect iron deficiency.  Patient has received 1 dose of IV Injectafer.  Scheduled to receive dose 2.  IV Injectafer.  Patient reports nausea with previous intravenous iron infusion.  Adding Zofran 8 mg to treatment plan for patient to receive just prior to Injectafer infusion    Would plan to repeat iron studies 6-8 weeks following today's iron infusion                 Plan:     Acute ITP  -     CBC auto differential; Future; Expected date: 12/26/2019    Iron deficiency anemia due to chronic blood loss    Chronic ITP (idiopathic thrombocytopenia)          I will review assessment/plan with collaborating physician Dr. Nash Das, BELA

## 2020-01-02 ENCOUNTER — OFFICE VISIT (OUTPATIENT)
Dept: HEMATOLOGY/ONCOLOGY | Facility: CLINIC | Age: 25
End: 2020-01-02
Payer: MEDICAID

## 2020-01-02 ENCOUNTER — INFUSION (OUTPATIENT)
Dept: INFUSION THERAPY | Facility: HOSPITAL | Age: 25
End: 2020-01-02
Attending: RADIOLOGY
Payer: MEDICAID

## 2020-01-02 VITALS
HEIGHT: 62 IN | BODY MASS INDEX: 41.17 KG/M2 | WEIGHT: 223.75 LBS | RESPIRATION RATE: 12 BRPM | TEMPERATURE: 99 F | DIASTOLIC BLOOD PRESSURE: 85 MMHG | SYSTOLIC BLOOD PRESSURE: 124 MMHG | HEART RATE: 87 BPM | OXYGEN SATURATION: 99 %

## 2020-01-02 DIAGNOSIS — D69.3 ACUTE ITP: Primary | ICD-10-CM

## 2020-01-02 DIAGNOSIS — D69.3 CHRONIC ITP (IDIOPATHIC THROMBOCYTOPENIA): Primary | ICD-10-CM

## 2020-01-02 PROCEDURE — 96372 THER/PROPH/DIAG INJ SC/IM: CPT

## 2020-01-02 PROCEDURE — 99999 PR PBB SHADOW E&M-EST. PATIENT-LVL III: CPT | Mod: PBBFAC,,, | Performed by: INTERNAL MEDICINE

## 2020-01-02 PROCEDURE — 99999 PR PBB SHADOW E&M-EST. PATIENT-LVL III: ICD-10-PCS | Mod: PBBFAC,,, | Performed by: INTERNAL MEDICINE

## 2020-01-02 PROCEDURE — 99214 PR OFFICE/OUTPT VISIT, EST, LEVL IV, 30-39 MIN: ICD-10-PCS | Mod: 25,S$PBB,, | Performed by: INTERNAL MEDICINE

## 2020-01-02 PROCEDURE — 99213 OFFICE O/P EST LOW 20 MIN: CPT | Mod: PBBFAC,25 | Performed by: INTERNAL MEDICINE

## 2020-01-02 PROCEDURE — 63600175 PHARM REV CODE 636 W HCPCS: Mod: JG | Performed by: INTERNAL MEDICINE

## 2020-01-02 PROCEDURE — 99214 OFFICE O/P EST MOD 30 MIN: CPT | Mod: 25,S$PBB,, | Performed by: INTERNAL MEDICINE

## 2020-01-02 RX ADMIN — ROMIPLOSTIM 95 MCG: 250 INJECTION, POWDER, LYOPHILIZED, FOR SOLUTION SUBCUTANEOUS at 01:01

## 2020-01-02 NOTE — DISCHARGE INSTRUCTIONS
[facility]  [address] [address]  [address]  [phone] phone     [phone] fax  Hours of Operation: Monday- Friday 8:00am- 5:00pm  After hours phone  [phone]  Hematology / Oncology Physicians on call      VERN Bell Dr., Dr., Dr., Dr., NP Sydney Prescott, NP Tyesha Taylor, NP    Please call with any concerns regarding your appointment today.

## 2020-01-02 NOTE — PROGRESS NOTES
Subjective:       Patient ID: Mari Miller is a 24 y.o. female.    Chief Complaint: Follow-up    HPI  24 y.o AA lady who comes for follow up of her chronic ITp. She is known to our department because of    ITP unresponsive to Rituxan. Patient also h/o taking Promacta for treatment of ITP. There has been questions raised as to whether or not patient was appropriately taking Promacta as prescribed.  Patient has had several hospitalizations for her ITP requiring IVIG, steroids and PRBCs for anemia.  Patient currently being treated with Nplate injections.  Recently seen in the ED for platelet count of 1.  Patient received IVIG and steroids.  Patient presents today for Nplate injection and labs.    Patient denies any abnormal bleeding.       Today's visit:  Patient denies any hematuria, hematochezia, melena, hemoptysis, epistaxis, bowel or urinary complaints.  Social History               Socioeconomic History    Marital status: Single       Spouse name: Not on file    Number of children: Not on file    Years of education: Not on file    Highest education level: Not on file   Occupational History    Not on file   Social Needs    Financial resource strain: Not on file    Food insecurity:       Worry: Not on file       Inability: Not on file    Transportation needs:       Medical: Not on file       Non-medical: Not on file   Tobacco Use    Smoking status: Never Smoker    Smokeless tobacco: Never Used   Substance and Sexual Activity    Alcohol use: No       Frequency: Never    Drug use: No    Sexual activity: Not on file   Lifestyle    Physical activity:       Days per week: Not on file       Minutes per session: Not on file    Stress: Not on file   Relationships    Social connections:       Talks on phone: Not on file       Gets together: Not on file       Attends Jainism service: Not on file       Active member of club or organization: Not on file       Attends meetings of clubs or  organizations: Not on file       Relationship status: Not on file   Other Topics Concern    Not on file   Social History Narrative    Not on file                 Past Medical History:   Diagnosis Date    Encounter for blood transfusion      Thrombocytopenia           No family history on file.           Past Surgical History:   Procedure Laterality Date     SECTION         x1    ROBOT-ASSISTED SURGICAL REMOVAL OF SPLEEN USING DA MAXIM XI N/A 2018     Procedure: XI ROBOTIC SPLENECTOMY;  Surgeon: Yobani Lara MD;  Location: [facility];  Service: General;  Laterality: N/A;           Review of Systems   Constitutional: Negative.    HENT: Negative.    Eyes: Negative.    Respiratory: Negative.  Negative for cough and wheezing.    Cardiovascular: Negative.  Negative for chest pain.   Gastrointestinal: Negative.    Genitourinary: Negative.    Neurological: Negative.    Psychiatric/Behavioral: Negative.        Objective:      Physical Exam   Constitutional: She is oriented to person, place, and time. She appears well-developed. No distress.   HENT:   Head: Normocephalic.   Right Ear: Tympanic membrane, external ear and ear canal normal.   Left Ear: Tympanic membrane, external ear and ear canal normal.   Nose: Nose normal. Right sinus exhibits no maxillary sinus tenderness and no frontal sinus tenderness. Left sinus exhibits no maxillary sinus tenderness and no frontal sinus tenderness.   Mouth/Throat: Oropharynx is clear and moist and mucous membranes are normal.   Teeth normal.  Gums normal.   Eyes: Pupils are equal, round, and reactive to light. Conjunctivae and lids are normal.   Neck: Normal carotid pulses, no hepatojugular reflux and no JVD present. Carotid bruit is not present. No tracheal deviation present. No thyroid mass and no thyromegaly present.   Cardiovascular: Normal rate, regular rhythm, S1 normal, S2 normal, normal heart sounds and intact distal pulses. Exam reveals no gallop and no  friction rub.   No murmur heard.  Carotid exam normal   Pulmonary/Chest: Effort normal and breath sounds normal. No accessory muscle usage. No respiratory distress. She has no wheezes. She has no rales. She exhibits no tenderness.   Abdominal: Soft. Normal appearance. She exhibits no distension and no mass. There is no splenomegaly or hepatomegaly. There is no tenderness. There is no rebound and no guarding.   Musculoskeletal: Normal range of motion. She exhibits no edema or tenderness.        Right hand: Normal.        Left hand: Normal.   Nails normal   Lymphadenopathy:     She has no cervical adenopathy.   Neurological: She is alert and oriented to person, place, and time. No cranial nerve deficit. Coordination normal.   Skin: Skin is warm and dry. No rash noted. She is not diaphoretic. No erythema. No pallor.   Psychiatric: She has a normal mood and affect. Her behavior is normal. Judgment and thought content normal.       Wt Readings from Last 3 Encounters:   01/02/20 101.5 kg (223 lb 12.3 oz)   12/26/19 100.7 kg (222 lb 0.1 oz)   12/23/19 99.4 kg (219 lb 2.2 oz)     Temp Readings from Last 3 Encounters:   01/02/20 98.9 °F (37.2 °C)   12/26/19 98.2 °F (36.8 °C) (Oral)   12/23/19 98.5 °F (36.9 °C)     BP Readings from Last 3 Encounters:   01/02/20 124/85   12/26/19 (!) 148/82   12/26/19 134/83     Pulse Readings from Last 3 Encounters:   01/02/20 87   12/26/19 69   12/26/19 74       Assessment:       1. Chronic ITP (idiopathic thrombocytopenia)        Plan:       Lab Results   Component Value Date    WBC 19.29 (H) 01/02/2020    HGB 11.1 (L) 01/02/2020    HCT 36.6 (L) 01/02/2020    MCV 80 (L) 01/02/2020     01/02/2020         Plateletcount normal today. Continue Nplate    at 1 mcg x KG   . See MD in 7 days with a cbc

## 2020-01-09 ENCOUNTER — OFFICE VISIT (OUTPATIENT)
Dept: HEMATOLOGY/ONCOLOGY | Facility: CLINIC | Age: 25
End: 2020-01-09
Payer: MEDICAID

## 2020-01-09 ENCOUNTER — INFUSION (OUTPATIENT)
Dept: INFUSION THERAPY | Facility: HOSPITAL | Age: 25
End: 2020-01-09
Attending: INTERNAL MEDICINE
Payer: MEDICAID

## 2020-01-09 VITALS
WEIGHT: 221.31 LBS | TEMPERATURE: 98 F | HEIGHT: 62 IN | DIASTOLIC BLOOD PRESSURE: 83 MMHG | HEART RATE: 85 BPM | OXYGEN SATURATION: 98 % | SYSTOLIC BLOOD PRESSURE: 131 MMHG | BODY MASS INDEX: 40.72 KG/M2

## 2020-01-09 DIAGNOSIS — D50.0 IRON DEFICIENCY ANEMIA DUE TO CHRONIC BLOOD LOSS: ICD-10-CM

## 2020-01-09 DIAGNOSIS — D69.3 ACUTE ITP: Primary | ICD-10-CM

## 2020-01-09 PROCEDURE — 99999 PR PBB SHADOW E&M-EST. PATIENT-LVL III: CPT | Mod: PBBFAC,,, | Performed by: INTERNAL MEDICINE

## 2020-01-09 PROCEDURE — 99214 PR OFFICE/OUTPT VISIT, EST, LEVL IV, 30-39 MIN: ICD-10-PCS | Mod: S$PBB,,, | Performed by: INTERNAL MEDICINE

## 2020-01-09 PROCEDURE — 99999 PR PBB SHADOW E&M-EST. PATIENT-LVL III: ICD-10-PCS | Mod: PBBFAC,,, | Performed by: INTERNAL MEDICINE

## 2020-01-09 PROCEDURE — 99214 OFFICE O/P EST MOD 30 MIN: CPT | Mod: S$PBB,,, | Performed by: INTERNAL MEDICINE

## 2020-01-09 PROCEDURE — 63600175 PHARM REV CODE 636 W HCPCS: Mod: JG | Performed by: INTERNAL MEDICINE

## 2020-01-09 PROCEDURE — 96372 THER/PROPH/DIAG INJ SC/IM: CPT

## 2020-01-09 PROCEDURE — 99213 OFFICE O/P EST LOW 20 MIN: CPT | Mod: PBBFAC,25 | Performed by: INTERNAL MEDICINE

## 2020-01-09 RX ORDER — ONDANSETRON 2 MG/ML
8 INJECTION INTRAMUSCULAR; INTRAVENOUS ONCE
Status: CANCELLED
Start: 2020-01-09

## 2020-01-09 RX ADMIN — ROMIPLOSTIM 190 MCG: 250 INJECTION, POWDER, LYOPHILIZED, FOR SOLUTION SUBCUTANEOUS at 11:01

## 2020-01-09 NOTE — PROGRESS NOTES
Subjective:      DATE OF VISIT: 1/9/2020   ?   ?   Patient ID:?Mari Miller is a 24 y.o. female.?? MR#: [id number]   ?  ?   CHIEF COMPLAINT:  Acute ITP  ?   CURRENT TREATMENT: N-plate    PAST TREATMENT:  Steroids, splenectomy 11/2018, eltrombopag (Promacta) and prednisone taper, IVIG x 2 10/2/19 and 10/3/19     HPI  Since our last visit she had acute lowering of platelet count on 12/23/2019 required  - IVIG 1000mg/kg x 1 dose  - romiplostim 2mcg/kg x 1 dose  - dexamethasone 40mg x 4 days    Since then she has been receiving weekly Nplate at 1 microgram/kilogram per week x3 weeks.  She presents with repeat CBC showing downtrending platelet count to 10 K. She does not have any signs or symptoms of bleeding including an oral mucosa or petechial lesion.  She is feeling well and denies any symptoms or concerns today.    Platelets   Date Value Ref Range Status   01/09/2020 10 (LL) 150 - 350 K/uL Final     Comment:     PLT critical result(s) called and verbal readback obtained from   Marielos Villar by KIRK 01/09/2020 09:59     01/02/2020 154 150 - 350 K/uL Final   12/26/2019 37 (LL) 150 - 350 K/uL Final     Comment:     PLT   critical result(s) called and verbal readback obtained from   RADHA WILLINGHAM MA by ISAK 12/26/2019 13:15     12/23/2019 1 (LL) 150 - 350 K/uL Final     Comment:     PLT critical result(s) called and verbal readback obtained from   Marielos Koenig by KIRK 12/23/2019 08:05     12/20/2019 5 (LL) 150 - 350 K/uL Final     Comment:     PLT critical result(s) called and verbal readback obtained from Radha Willingham by KIRK 12/20/2019 13:37     12/17/2019 31 (LL) 150 - 350 K/uL Final     Comment:     Results confirmed, test repeated  PLT  critical result(s) called and verbal readback obtained from KELSI JEFFERSON MA by ISAK 12/17/2019 13:26     12/10/2019 131 (L) 150 - 350 K/uL Final   12/10/2019 131 (L) 150 - 350 K/uL Final   11/27/2019 3 (LL) 150 - 350 K/uL Final     Comment:     PLT   critical result(s)  called and verbal readback obtained from   Sherlyn Lee by MELANY 11/27/2019 11:04     11/20/2019 54 (L) 150 - 350 K/uL Final           Review of Systems    ?   A comprehensive 14-point review of systems was reviewed with patient and was negative other than as specified above.   ?     Objective:      Physical Exam      ?   Vitals:    01/09/20 0904   BP: 131/83   Pulse: 85   Temp: 98.4 °F (36.9 °C)      ?   ECOG:?  0   General appearance: Generally well appearing, in no acute distress.   Head, eyes, ears, nose, and throat:  Oropharynx clear without bleeding.  Pulmonary:  Normal work of breathing, clear to auscultation bilaterally  Cardiovascular:  Regular rate and rhythm, no murmurs rubs or gallops.  Abdomen: nontender, nondistended.   Extremities: Warm, without edema.   Neurologic: Alert and oriented. Grossly normal strength, coordination, and gait.   Skin:  No ecchymoses or petechial rash.   Psychiatric: normal mood and affect, conversant and appropriate    ?   Laboratory:  ?   Platelets   Date Value Ref Range Status   01/09/2020 10 (LL) 150 - 350 K/uL Final     Comment:     PLT critical result(s) called and verbal readback obtained from   Marielos Villar by KIRK 01/09/2020 09:59     01/02/2020 154 150 - 350 K/uL Final   12/26/2019 37 (LL) 150 - 350 K/uL Final     Comment:     PLT   critical result(s) called and verbal readback obtained from   RADHA WILLINGHAM MA by ISAK 12/26/2019 13:15     12/23/2019 1 (LL) 150 - 350 K/uL Final     Comment:     PLT critical result(s) called and verbal readback obtained from   Marielos Koenig by KIRK 12/23/2019 08:05     12/20/2019 5 (LL) 150 - 350 K/uL Final     Comment:     PLT critical result(s) called and verbal readback obtained from Radha Willingham by KIRK 12/20/2019 13:37     12/17/2019 31 (LL) 150 - 350 K/uL Final     Comment:     Results confirmed, test repeated  PLT  critical result(s) called and verbal readback obtained from KELSI JEFFERSON MA by ISAK 12/17/2019 13:26     12/10/2019 131  (L) 150 - 350 K/uL Final   12/10/2019 131 (L) 150 - 350 K/uL Final   11/27/2019 3 (LL) 150 - 350 K/uL Final     Comment:     PLT   critical result(s) called and verbal readback obtained from   Sherlyn Lee by MELANY 11/27/2019 11:04     11/20/2019 54 (L) 150 - 350 K/uL Final         Lab Visit on 01/09/2020   Component Date Value Ref Range Status    WBC 01/09/2020 9.20  3.90 - 12.70 K/uL Final    RBC 01/09/2020 4.73  4.00 - 5.40 M/uL Final    Hemoglobin 01/09/2020 11.6* 12.0 - 16.0 g/dL Final    Hematocrit 01/09/2020 38.7  37.0 - 48.5 % Final    Mean Corpuscular Volume 01/09/2020 82  82 - 98 fL Final    Mean Corpuscular Hemoglobin 01/09/2020 24.5* 27.0 - 31.0 pg Final    Mean Corpuscular Hemoglobin Conc 01/09/2020 30.0* 32.0 - 36.0 g/dL Final    RDW 01/09/2020 SEE COMMENT  11.5 - 14.5 % Final    Platelets 01/09/2020 10* 150 - 350 K/uL Final    MPV 01/09/2020 SEE COMMENT  9.2 - 12.9 fL Final    Immature Granulocytes 01/09/2020 0.5  0.0 - 0.5 % Final    Gran # (ANC) 01/09/2020 5.6  1.8 - 7.7 K/uL Final    Immature Grans (Abs) 01/09/2020 0.05* 0.00 - 0.04 K/uL Final    Lymph # 01/09/2020 2.5  1.0 - 4.8 K/uL Final    Mono # 01/09/2020 0.9  0.3 - 1.0 K/uL Final    Eos # 01/09/2020 0.1  0.0 - 0.5 K/uL Final    Baso # 01/09/2020 0.06  0.00 - 0.20 K/uL Final    nRBC 01/09/2020 0  0 /100 WBC Final    Gran% 01/09/2020 60.9  38.0 - 73.0 % Final    Lymph% 01/09/2020 26.6  18.0 - 48.0 % Final    Mono% 01/09/2020 10.1  4.0 - 15.0 % Final    Eosinophil% 01/09/2020 1.2  0.0 - 8.0 % Final    Basophil% 01/09/2020 0.7  0.0 - 1.9 % Final    Differential Method 01/09/2020 Automated   Final    ?   Assessment/Plan:       1. Acute ITP          Plan:     # acute on chronic ITP:  refractory ITP status post steroids, splenectomy in November 2018, and this year monthly hospitalizations with platelet counts as low as 1-5K.  Since December 2018 she has been treated with daily eltrombopag, discontinued on 11/11/2019 due to  refractory ITP and possible associated headache.  December 2019 initiated romiplostim and will plan to continue weekly with uptitration.   - N-plate today at higher dose 2mcg/kg; plan for weekly dose with uptitration by 1mcg/kg/week to max 10mcg/kg    # iron deficiency anemia:  Progressive microcytosis and evidence of iron deficiency.  S/p IV iron x 2 doses 12/2019. Can repeat iron indices periodically if progressive anemia/microcytosis.    Follow-Up:   N-plate today at higher dose 2mcg/kg; plan for weekly dose with uptitration by 1mcg/kg/week to max 10mcg/kg  On Monday with repeat cbc and possible ivig/platelet transfusion prn

## 2020-01-09 NOTE — PATIENT INSTRUCTIONS
- N-plate today at higher dose 2mcg/kg; plan for weekly dose with uptitration by 1mcg/kg/week to max 10mcg/kg  - RV Monday with repeat cbc and possible ivig/platelet transfusion  - RV 1 week with labs and N-plate

## 2020-01-13 ENCOUNTER — INFUSION (OUTPATIENT)
Dept: INFUSION THERAPY | Facility: HOSPITAL | Age: 25
End: 2020-01-13
Payer: MEDICAID

## 2020-01-13 ENCOUNTER — TELEPHONE (OUTPATIENT)
Dept: HEMATOLOGY/ONCOLOGY | Facility: CLINIC | Age: 25
End: 2020-01-13

## 2020-01-13 ENCOUNTER — OFFICE VISIT (OUTPATIENT)
Dept: HEMATOLOGY/ONCOLOGY | Facility: CLINIC | Age: 25
End: 2020-01-13
Payer: MEDICAID

## 2020-01-13 VITALS
HEIGHT: 62 IN | HEART RATE: 86 BPM | DIASTOLIC BLOOD PRESSURE: 75 MMHG | BODY MASS INDEX: 41.06 KG/M2 | SYSTOLIC BLOOD PRESSURE: 121 MMHG | WEIGHT: 223.13 LBS

## 2020-01-13 VITALS
HEART RATE: 93 BPM | SYSTOLIC BLOOD PRESSURE: 119 MMHG | OXYGEN SATURATION: 99 % | HEIGHT: 62 IN | TEMPERATURE: 98 F | DIASTOLIC BLOOD PRESSURE: 79 MMHG | RESPIRATION RATE: 18 BRPM | WEIGHT: 223.13 LBS | BODY MASS INDEX: 41.06 KG/M2

## 2020-01-13 DIAGNOSIS — R23.3 ECCHYMOSES, SPONTANEOUS: ICD-10-CM

## 2020-01-13 DIAGNOSIS — D50.0 IRON DEFICIENCY ANEMIA DUE TO CHRONIC BLOOD LOSS: ICD-10-CM

## 2020-01-13 DIAGNOSIS — D69.3 ACUTE ITP: Primary | ICD-10-CM

## 2020-01-13 DIAGNOSIS — R23.3 PETECHIAE: ICD-10-CM

## 2020-01-13 PROCEDURE — 63600175 PHARM REV CODE 636 W HCPCS: Mod: JG | Performed by: INTERNAL MEDICINE

## 2020-01-13 PROCEDURE — 99999 PR PBB SHADOW E&M-EST. PATIENT-LVL IV: CPT | Mod: PBBFAC,,, | Performed by: INTERNAL MEDICINE

## 2020-01-13 PROCEDURE — 63600175 PHARM REV CODE 636 W HCPCS: Performed by: INTERNAL MEDICINE

## 2020-01-13 PROCEDURE — 99999 PR PBB SHADOW E&M-EST. PATIENT-LVL IV: ICD-10-PCS | Mod: PBBFAC,,, | Performed by: INTERNAL MEDICINE

## 2020-01-13 PROCEDURE — 96365 THER/PROPH/DIAG IV INF INIT: CPT

## 2020-01-13 PROCEDURE — 99214 OFFICE O/P EST MOD 30 MIN: CPT | Mod: PBBFAC,25 | Performed by: INTERNAL MEDICINE

## 2020-01-13 PROCEDURE — 99215 OFFICE O/P EST HI 40 MIN: CPT | Mod: S$PBB,,, | Performed by: INTERNAL MEDICINE

## 2020-01-13 PROCEDURE — 96366 THER/PROPH/DIAG IV INF ADDON: CPT

## 2020-01-13 PROCEDURE — 25000003 PHARM REV CODE 250: Performed by: INTERNAL MEDICINE

## 2020-01-13 PROCEDURE — 99215 PR OFFICE/OUTPT VISIT, EST, LEVL V, 40-54 MIN: ICD-10-PCS | Mod: S$PBB,,, | Performed by: INTERNAL MEDICINE

## 2020-01-13 RX ORDER — ACETAMINOPHEN 500 MG
500 TABLET ORAL
Status: CANCELLED | OUTPATIENT
Start: 2020-01-13

## 2020-01-13 RX ORDER — SODIUM CHLORIDE 0.9 % (FLUSH) 0.9 %
10 SYRINGE (ML) INJECTION
Status: CANCELLED | OUTPATIENT
Start: 2020-01-13

## 2020-01-13 RX ORDER — DIPHENHYDRAMINE HYDROCHLORIDE 50 MG/ML
25 INJECTION INTRAMUSCULAR; INTRAVENOUS
Status: CANCELLED | OUTPATIENT
Start: 2020-02-10

## 2020-01-13 RX ORDER — HEPARIN 100 UNIT/ML
500 SYRINGE INTRAVENOUS
Status: CANCELLED | OUTPATIENT
Start: 2020-01-13

## 2020-01-13 RX ORDER — ACETAMINOPHEN 500 MG
500 TABLET ORAL
Status: DISCONTINUED | OUTPATIENT
Start: 2020-01-13 | End: 2020-01-13 | Stop reason: HOSPADM

## 2020-01-13 RX ORDER — DIPHENHYDRAMINE HCL 25 MG
25 CAPSULE ORAL
Status: CANCELLED | OUTPATIENT
Start: 2020-01-13

## 2020-01-13 RX ORDER — HYDROCODONE BITARTRATE AND ACETAMINOPHEN 500; 5 MG/1; MG/1
TABLET ORAL ONCE
Status: CANCELLED | OUTPATIENT
Start: 2020-01-13 | End: 2020-01-13

## 2020-01-13 RX ORDER — SODIUM CHLORIDE 0.9 % (FLUSH) 0.9 %
10 SYRINGE (ML) INJECTION
Status: CANCELLED | OUTPATIENT
Start: 2020-02-10

## 2020-01-13 RX ORDER — DIPHENHYDRAMINE HCL 25 MG
25 CAPSULE ORAL
Status: CANCELLED | OUTPATIENT
Start: 2020-02-10

## 2020-01-13 RX ORDER — DIPHENHYDRAMINE HCL 25 MG
25 CAPSULE ORAL
Status: DISCONTINUED | OUTPATIENT
Start: 2020-01-13 | End: 2020-01-13 | Stop reason: HOSPADM

## 2020-01-13 RX ORDER — HEPARIN 100 UNIT/ML
500 SYRINGE INTRAVENOUS
Status: CANCELLED | OUTPATIENT
Start: 2020-02-10

## 2020-01-13 RX ORDER — DIPHENHYDRAMINE HYDROCHLORIDE 50 MG/ML
25 INJECTION INTRAMUSCULAR; INTRAVENOUS
Status: CANCELLED | OUTPATIENT
Start: 2020-01-13

## 2020-01-13 RX ORDER — ACETAMINOPHEN 500 MG
500 TABLET ORAL
Status: CANCELLED | OUTPATIENT
Start: 2020-02-10

## 2020-01-13 RX ORDER — SODIUM CHLORIDE 0.9 % (FLUSH) 0.9 %
10 SYRINGE (ML) INJECTION
Status: DISCONTINUED | OUTPATIENT
Start: 2020-01-13 | End: 2020-01-13 | Stop reason: HOSPADM

## 2020-01-13 RX ADMIN — ACETAMINOPHEN 500 MG: 500 TABLET ORAL at 09:01

## 2020-01-13 RX ADMIN — HUMAN IMMUNOGLOBULIN G 100 G: 40 LIQUID INTRAVENOUS at 10:01

## 2020-01-13 RX ADMIN — DIPHENHYDRAMINE HYDROCHLORIDE 25 MG: 25 CAPSULE ORAL at 09:01

## 2020-01-13 RX ADMIN — DEXTROSE MONOHYDRATE: 50 INJECTION, SOLUTION INTRAVENOUS at 10:01

## 2020-01-13 NOTE — DISCHARGE INSTRUCTIONS
[facility]  [address] [address] [facility] [address]  [address]  [phone] phone     [phone] fax  Hours of Operation: Monday- Friday 8:00am- 5:00pm  After hours phone  [phone]  Hematology / Oncology Physicians on call      Dr. Cipriano Johns, VERN Atkins NP Tyesha Taylor, NP    Please call with any concerns regarding your appointment today.FALL PREVENTION   Falls often occur due to slipping, tripping or losing your balance. Here are ways to reduce your risk of falling again.   Was there anything that caused your fall that can be fixed, removed or replaced?   Make your home safe by keeping walkways clear of objects you may trip over.   Use non-slip pads under rugs.   Do not walk in poorly lit areas.   Do not stand on chairs or wobbly ladders.   Use caution when reaching overhead or looking upward. This position can cause a loss of balance.   Be sure your shoes fit properly, have non-slip bottoms and are in good condition.   Be cautious when going up and down stairs, curbs, and when walking on uneven sidewalks.   If your balance is poor, consider using a cane or walker.   If your fall was related to alcohol use, stop or limit alcohol intake.   If your fall was related to use of sleeping medicines, talk to your doctor about this. You may need to reduce your dosage at bedtime if you awaken during the night to go to the bathroom.   To reduce the need for nighttime bathroom trips:   Avoid drinking fluids for several hours before going to bed   Empty your bladder before going to bed   Men can keep a urinal at the bedside   © 0476-6875 [employer] [facility], [address] [place] [address], [place] [address]. All rights reserved. This information is not intended as a substitute for professional medical care. Always follow your healthcare professional's instructions.

## 2020-01-13 NOTE — PROGRESS NOTES
Subjective:      DATE OF VISIT: 1/13/2020   ?   ?   Patient ID:?Mari Miller is a 24 y.o. female.?? MR#: [id number]   ?  ?   CHIEF COMPLAINT:  Acute ITP  ?   CURRENT TREATMENT: N-plate    PAST TREATMENT:  Steroids, splenectomy 11/2018, eltrombopag (Promacta) and prednisone taper, IVIG x 2 10/2/19 and 10/3/19     HPI  Last Thursday received Nplate higher dose up titrated to 2 micrograms/kilogram.  Over the last day developed ecchymoses on extremities, and started menstrual cycle.  No gum or other bleeding in bowel or otherwise.  She is otherwise in good spirits, good energy and no pain. I discussed recommendation for IVIG as well as platelet transfusion and she is amenable to IVIG today but would prefer to wait until tomorrow morning for platelet transfusion.    Review of Systems    ?   A comprehensive 14-point review of systems was reviewed with patient and was negative other than as specified above.   ?     Objective:      Physical Exam      ?   Vitals:    01/13/20 0802   BP: 119/79   Pulse: 93   Resp: 18   Temp: 98.4 °F (36.9 °C)      ?   ECOG:?  0   General appearance: Generally well appearing, in no acute distress.   Head, eyes, ears, nose, and throat:  Oropharynx clear without bleeding.  Pulmonary:  Normal work of breathing, clear to auscultation bilaterally  Cardiovascular:  Regular rate and rhythm, no murmurs rubs or gallops.  Abdomen: nontender, nondistended.   Extremities:  Mild ecchymoses on forearms.  Neurologic: Alert and oriented. Grossly normal strength, coordination, and gait.   Skin:  No ecchymoses or petechial rash.   Psychiatric: normal mood and affect, conversant and appropriate    ?   Laboratory:  ?   Platelets   Date Value Ref Range Status   01/13/2020 2 (LL) 150 - 350 K/uL Final     Comment:     Platelet critical result(s) called and verbal readback obtained from   Evelyne Pal @ 0810 by AIDA 01/13/2020 11:25     01/09/2020 10 (LL) 150 - 350 K/uL Final     Comment:     PLT critical  result(s) called and verbal readback obtained from   Marielos Villar by DANITZA1 01/09/2020 09:59     01/02/2020 154 150 - 350 K/uL Final   12/26/2019 37 (LL) 150 - 350 K/uL Final     Comment:     PLT   critical result(s) called and verbal readback obtained from   RADHA WILLINGHAM MA by AES 12/26/2019 13:15     12/23/2019 1 (LL) 150 - 350 K/uL Final     Comment:     PLT critical result(s) called and verbal readback obtained from   Marielos Arya by DANITZA1 12/23/2019 08:05     12/20/2019 5 (LL) 150 - 350 K/uL Final     Comment:     PLT critical result(s) called and verbal readback obtained from Radha Willingham by KIRK 12/20/2019 13:37     12/17/2019 31 (LL) 150 - 350 K/uL Final     Comment:     Results confirmed, test repeated  PLT  critical result(s) called and verbal readback obtained from KELSI JEFFERSON MA by AES 12/17/2019 13:26     12/10/2019 131 (L) 150 - 350 K/uL Final   12/10/2019 131 (L) 150 - 350 K/uL Final   11/27/2019 3 (LL) 150 - 350 K/uL Final     Comment:     PLT   critical result(s) called and verbal readback obtained from   Sherlyn Lee by MELANY 11/27/2019 11:04           Lab Visit on 01/13/2020   Component Date Value Ref Range Status    WBC 01/13/2020 12.08  3.90 - 12.70 K/uL Final    RBC 01/13/2020 4.26  4.00 - 5.40 M/uL Final    Hemoglobin 01/13/2020 10.9* 12.0 - 16.0 g/dL Final    Hematocrit 01/13/2020 35.5* 37.0 - 48.5 % Final    Mean Corpuscular Volume 01/13/2020 83  82 - 98 fL Final    Mean Corpuscular Hemoglobin 01/13/2020 25.6* 27.0 - 31.0 pg Final    Mean Corpuscular Hemoglobin Conc 01/13/2020 30.7* 32.0 - 36.0 g/dL Final    RDW 01/13/2020 SEE COMMENT  11.5 - 14.5 % Final    Platelets 01/13/2020 2* 150 - 350 K/uL Final    MPV 01/13/2020 SEE COMMENT  9.2 - 12.9 fL Final    Immature Granulocytes 01/13/2020 0.8* 0.0 - 0.5 % Final    Gran # (ANC) 01/13/2020 8.3* 1.8 - 7.7 K/uL Final    Immature Grans (Abs) 01/13/2020 0.10* 0.00 - 0.04 K/uL Final    Lymph # 01/13/2020 2.5  1.0 - 4.8 K/uL Final     Mono # 01/13/2020 1.0  0.3 - 1.0 K/uL Final    Eos # 01/13/2020 0.2  0.0 - 0.5 K/uL Final    Baso # 01/13/2020 0.12  0.00 - 0.20 K/uL Final    nRBC 01/13/2020 0  0 /100 WBC Final    Gran% 01/13/2020 68.4  38.0 - 73.0 % Final    Lymph% 01/13/2020 20.7  18.0 - 48.0 % Final    Mono% 01/13/2020 8.4  4.0 - 15.0 % Final    Eosinophil% 01/13/2020 1.5  0.0 - 8.0 % Final    Basophil% 01/13/2020 1.0  0.0 - 1.9 % Final    Platelet Estimate 01/13/2020 Decreased*  Final    Aniso 01/13/2020 Slight   Final    Hypo 01/13/2020 Occasional   Final    Target Cells 01/13/2020 Occasional   Final    Differential Method 01/13/2020 Automated   Final    ?   Assessment/Plan:       1. Acute ITP    2. Ecchymoses, spontaneous    3. Petechiae          Plan:     # acute on chronic ITP:  refractory ITP status post steroids, splenectomy in November 2018, and this year monthly hospitalizations with platelet counts as low as 1-5K.  Since December 2018 she has been treated with daily eltrombopag, discontinued on 11/11/2019 due to refractory ITP and possible associated headache.  December 2019 initiated romiplostim and will plan to continue weekly with uptitration.   - N-plate last week at higher dose 2mcg/kg; plan for weekly dose with uptitration by 1mcg/kg/week to max 10mcg/kg  - further thrombocytopenia with ecchymoses and started menstrual cycle.  Will administer IVIG today and recommended platelet transfusion but per patient preference will get this tomorrow morning.  Consent signed in orders placed for tomorrow morning transfusion of 1 packed platelets.  Will have her return on Thursday for up titration of Nplate and recheck of CBC.    # iron deficiency anemia:  Progressive microcytosis and evidence of iron deficiency.  S/p IV iron x 2 doses 12/2019. Can repeat iron indices periodically if progressive anemia/microcytosis.    Follow-Up:   ivig today  plt transfusion tomorrow am  Consent today   thurs N-plate and RV with labs

## 2020-01-14 ENCOUNTER — INFUSION (OUTPATIENT)
Dept: INFUSION THERAPY | Facility: HOSPITAL | Age: 25
End: 2020-01-14
Attending: RADIOLOGY
Payer: MEDICAID

## 2020-01-14 VITALS
TEMPERATURE: 98 F | HEART RATE: 86 BPM | RESPIRATION RATE: 18 BRPM | DIASTOLIC BLOOD PRESSURE: 72 MMHG | SYSTOLIC BLOOD PRESSURE: 124 MMHG

## 2020-01-14 DIAGNOSIS — D69.3 ACUTE ITP: ICD-10-CM

## 2020-01-14 LAB
BLD PROD TYP BPU: NORMAL
BLOOD UNIT EXPIRATION DATE: NORMAL
BLOOD UNIT TYPE CODE: 6200
BLOOD UNIT TYPE: NORMAL
CODING SYSTEM: NORMAL
DISPENSE STATUS: NORMAL
NUM UNITS TRANS WBC-POOR PLATPHERESIS: NORMAL

## 2020-01-14 PROCEDURE — 36430 TRANSFUSION BLD/BLD COMPNT: CPT

## 2020-01-14 PROCEDURE — 25000003 PHARM REV CODE 250: Performed by: INTERNAL MEDICINE

## 2020-01-14 PROCEDURE — P9037 PLATE PHERES LEUKOREDU IRRAD: HCPCS

## 2020-01-14 RX ORDER — HYDROCODONE BITARTRATE AND ACETAMINOPHEN 500; 5 MG/1; MG/1
TABLET ORAL ONCE
Status: DISCONTINUED | OUTPATIENT
Start: 2020-01-14 | End: 2020-01-14 | Stop reason: HOSPADM

## 2020-01-14 RX ORDER — DIPHENHYDRAMINE HCL 25 MG
25 CAPSULE ORAL
Status: COMPLETED | OUTPATIENT
Start: 2020-01-14 | End: 2020-01-14

## 2020-01-14 RX ADMIN — DIPHENHYDRAMINE HYDROCHLORIDE 25 MG: 25 CAPSULE ORAL at 08:01

## 2020-01-14 NOTE — PLAN OF CARE
Ambulate to outpatient infusion for administration of 1 unit irradiated platelets. Patient alert, oriented; plan of care reviewed with patient. Verbalizes understanding.

## 2020-01-14 NOTE — PLAN OF CARE
Patient tolerated platelet transfusion well; no adverse reactions noted. Iv discontinued with unit intact. 2x2/coban to site. Ambulatory off unit with belongings. No complaints voiced.

## 2020-01-16 ENCOUNTER — INFUSION (OUTPATIENT)
Dept: INFUSION THERAPY | Facility: HOSPITAL | Age: 25
End: 2020-01-16
Attending: INTERNAL MEDICINE
Payer: MEDICAID

## 2020-01-16 ENCOUNTER — OFFICE VISIT (OUTPATIENT)
Dept: HEMATOLOGY/ONCOLOGY | Facility: CLINIC | Age: 25
End: 2020-01-16
Payer: MEDICAID

## 2020-01-16 VITALS
WEIGHT: 221.81 LBS | TEMPERATURE: 98 F | SYSTOLIC BLOOD PRESSURE: 140 MMHG | HEIGHT: 62 IN | BODY MASS INDEX: 40.82 KG/M2 | DIASTOLIC BLOOD PRESSURE: 90 MMHG | OXYGEN SATURATION: 100 % | HEART RATE: 76 BPM | RESPIRATION RATE: 18 BRPM

## 2020-01-16 DIAGNOSIS — D50.0 IRON DEFICIENCY ANEMIA DUE TO CHRONIC BLOOD LOSS: ICD-10-CM

## 2020-01-16 DIAGNOSIS — D69.3 ACUTE ITP: Primary | ICD-10-CM

## 2020-01-16 DIAGNOSIS — R23.3 ECCHYMOSES, SPONTANEOUS: ICD-10-CM

## 2020-01-16 PROCEDURE — 99999 PR PBB SHADOW E&M-EST. PATIENT-LVL IV: ICD-10-PCS | Mod: PBBFAC,,, | Performed by: INTERNAL MEDICINE

## 2020-01-16 PROCEDURE — 63600175 PHARM REV CODE 636 W HCPCS: Mod: JG | Performed by: INTERNAL MEDICINE

## 2020-01-16 PROCEDURE — 96372 THER/PROPH/DIAG INJ SC/IM: CPT

## 2020-01-16 PROCEDURE — 99999 PR PBB SHADOW E&M-EST. PATIENT-LVL IV: CPT | Mod: PBBFAC,,, | Performed by: INTERNAL MEDICINE

## 2020-01-16 PROCEDURE — 99215 PR OFFICE/OUTPT VISIT, EST, LEVL V, 40-54 MIN: ICD-10-PCS | Mod: S$PBB,,, | Performed by: INTERNAL MEDICINE

## 2020-01-16 PROCEDURE — 99214 OFFICE O/P EST MOD 30 MIN: CPT | Mod: PBBFAC,25 | Performed by: INTERNAL MEDICINE

## 2020-01-16 PROCEDURE — 99215 OFFICE O/P EST HI 40 MIN: CPT | Mod: S$PBB,,, | Performed by: INTERNAL MEDICINE

## 2020-01-16 RX ORDER — ONDANSETRON 2 MG/ML
8 INJECTION INTRAMUSCULAR; INTRAVENOUS ONCE
Status: CANCELLED
Start: 2020-01-16

## 2020-01-16 RX ADMIN — ROMIPLOSTIM 285 MCG: 250 INJECTION, POWDER, LYOPHILIZED, FOR SOLUTION SUBCUTANEOUS at 12:01

## 2020-01-16 NOTE — PROGRESS NOTES
Subjective:      DATE OF VISIT: 1/16/2020   ?   ?   Patient ID:?Mari Miller is a 24 y.o. female.?? MR#: [id number]   ?  ?   CHIEF COMPLAINT:  Acute ITP  ?   CURRENT TREATMENT: N-plate    PAST TREATMENT:  Steroids, splenectomy 11/2018, eltrombopag (Promacta) and prednisone taper, IVIG x 2 10/2/19 and 10/3/19     HPI  01/13/2019 received IVIG and 01/14/2019 received to 1 pack of platelets.  She has since had improvement in resolution of ecchymoses and no gum or other bleeding.  Otherwise she continues to feel well.    Review of Systems    ?   A comprehensive 14-point review of systems was reviewed with patient and was negative other than as specified above.   ?     Objective:      Physical Exam      ?   Vitals:    01/16/20 1203   BP: (!) 140/90   Pulse: 76   Resp: 18   Temp: 98.3 °F (36.8 °C)      ?   ECOG:?  0   General appearance: Generally well appearing, in no acute distress.   Head, eyes, ears, nose, and throat:  Oropharynx clear without bleeding.  Pulmonary:  Normal work of breathing, clear to auscultation bilaterally  Cardiovascular:  Regular rate and rhythm, no murmurs rubs or gallops.  Abdomen: nontender, nondistended.   Extremities:  Mild ecchymoses on forearms, improved.  Neurologic: Alert and oriented. Grossly normal strength, coordination, and gait.   Skin:  No ecchymoses or petechial rash.   Psychiatric: normal mood and affect, conversant and appropriate    ?   Laboratory:  ?   Platelets   Date Value Ref Range Status   01/16/2020 51 (L) 150 - 350 K/uL Final   01/13/2020 2 (LL) 150 - 350 K/uL Final     Comment:     Platelet critical result(s) called and verbal readback obtained from   Evelyne Pal @ 0810 by JL2 01/13/2020 11:25     01/09/2020 10 (LL) 150 - 350 K/uL Final     Comment:     PLT critical result(s) called and verbal readback obtained from   Marielos Villar by DAL1 01/09/2020 09:59     01/02/2020 154 150 - 350 K/uL Final   12/26/2019 37 (LL) 150 - 350 K/uL Final     Comment:     PLT    critical result(s) called and verbal readback obtained from   RADHA GARZA MA by AES 12/26/2019 13:15     12/23/2019 1 (LL) 150 - 350 K/uL Final     Comment:     PLT critical result(s) called and verbal readback obtained from   Marielos Arya by DAL1 12/23/2019 08:05     12/20/2019 5 (LL) 150 - 350 K/uL Final     Comment:     PLT critical result(s) called and verbal readback obtained from Radha Garza by DALJosep 12/20/2019 13:37     12/17/2019 31 (LL) 150 - 350 K/uL Final     Comment:     Results confirmed, test repeated  PLT  critical result(s) called and verbal readback obtained from KELSI JEFFERSON MA by AES 12/17/2019 13:26     12/10/2019 131 (L) 150 - 350 K/uL Final   12/10/2019 131 (L) 150 - 350 K/uL Final         Lab Visit on 01/16/2020   Component Date Value Ref Range Status    WBC 01/16/2020 8.98  3.90 - 12.70 K/uL Final    RBC 01/16/2020 4.00  4.00 - 5.40 M/uL Final    Hemoglobin 01/16/2020 10.2* 12.0 - 16.0 g/dL Final    Hematocrit 01/16/2020 33.1* 37.0 - 48.5 % Final    Mean Corpuscular Volume 01/16/2020 83  82 - 98 fL Final    Mean Corpuscular Hemoglobin 01/16/2020 25.5* 27.0 - 31.0 pg Final    Mean Corpuscular Hemoglobin Conc 01/16/2020 30.8* 32.0 - 36.0 g/dL Final    RDW 01/16/2020 SEE COMMENT  11.5 - 14.5 % Final    Platelets 01/16/2020 51* 150 - 350 K/uL Final    MPV 01/16/2020 SEE COMMENT  9.2 - 12.9 fL Final    ?   Assessment/Plan:       1. Acute ITP    2. Iron deficiency anemia due to chronic blood loss    3. Ecchymoses, spontaneous          Plan:     # acute on chronic ITP:  refractory ITP status post steroids, splenectomy in November 2018, and this year monthly hospitalizations with platelet counts as low as 1-5K.  Since December 2018 she has been treated with daily eltrombopag, discontinued on 11/11/2019 due to refractory ITP and possible associated headache.  December 2019 initiated romiplostim and will plan to continue weekly with uptitration.   - the earlier this week had relapse with  IVIG and platelet transfusion with improvement 50 K.  - N-plate last week at higher dose 2mcg/kg; plan for weekly dose with uptitration by 1mcg/kg/week to max 10mcg/kg; 3 micrograms/kilogram planned today.    # iron deficiency anemia:  Progressive microcytosis and evidence of iron deficiency.  S/p IV iron x 2 doses 12/2019. Can repeat iron indices periodically if progressive anemia/microcytosis.    Follow-Up:   ivig today  plt transfusion tomorrow am  Consent today   thurs N-plate and ANGELIKA with labs

## 2020-01-23 ENCOUNTER — OFFICE VISIT (OUTPATIENT)
Dept: HEMATOLOGY/ONCOLOGY | Facility: CLINIC | Age: 25
End: 2020-01-23
Payer: MEDICAID

## 2020-01-23 ENCOUNTER — INFUSION (OUTPATIENT)
Dept: INFUSION THERAPY | Facility: HOSPITAL | Age: 25
End: 2020-01-23
Attending: INTERNAL MEDICINE
Payer: MEDICAID

## 2020-01-23 VITALS
RESPIRATION RATE: 18 BRPM | OXYGEN SATURATION: 98 % | DIASTOLIC BLOOD PRESSURE: 93 MMHG | BODY MASS INDEX: 41.17 KG/M2 | WEIGHT: 223.75 LBS | TEMPERATURE: 99 F | HEIGHT: 62 IN | SYSTOLIC BLOOD PRESSURE: 138 MMHG | HEART RATE: 78 BPM

## 2020-01-23 DIAGNOSIS — D69.3 ACUTE ITP: ICD-10-CM

## 2020-01-23 DIAGNOSIS — D69.3 ACUTE ITP: Primary | ICD-10-CM

## 2020-01-23 PROCEDURE — 99999 PR PBB SHADOW E&M-EST. PATIENT-LVL IV: CPT | Mod: PBBFAC,,, | Performed by: INTERNAL MEDICINE

## 2020-01-23 PROCEDURE — 99214 OFFICE O/P EST MOD 30 MIN: CPT | Mod: PBBFAC,25 | Performed by: INTERNAL MEDICINE

## 2020-01-23 PROCEDURE — 99999 PR PBB SHADOW E&M-EST. PATIENT-LVL IV: ICD-10-PCS | Mod: PBBFAC,,, | Performed by: INTERNAL MEDICINE

## 2020-01-23 PROCEDURE — 99215 PR OFFICE/OUTPT VISIT, EST, LEVL V, 40-54 MIN: ICD-10-PCS | Mod: S$PBB,,, | Performed by: INTERNAL MEDICINE

## 2020-01-23 PROCEDURE — 63600175 PHARM REV CODE 636 W HCPCS: Mod: JG | Performed by: INTERNAL MEDICINE

## 2020-01-23 PROCEDURE — 96372 THER/PROPH/DIAG INJ SC/IM: CPT

## 2020-01-23 PROCEDURE — 99215 OFFICE O/P EST HI 40 MIN: CPT | Mod: S$PBB,,, | Performed by: INTERNAL MEDICINE

## 2020-01-23 RX ORDER — ONDANSETRON 2 MG/ML
8 INJECTION INTRAMUSCULAR; INTRAVENOUS ONCE
Status: CANCELLED
Start: 2020-01-23

## 2020-01-23 RX ORDER — DEXAMETHASONE 4 MG/1
40 TABLET ORAL DAILY
Qty: 40 TABLET | Refills: 0 | Status: SHIPPED | OUTPATIENT
Start: 2020-01-23 | End: 2020-01-27

## 2020-01-23 RX ADMIN — ROMIPLOSTIM 405 MCG: 250 INJECTION, POWDER, LYOPHILIZED, FOR SOLUTION SUBCUTANEOUS at 01:01

## 2020-01-23 NOTE — PATIENT INSTRUCTIONS
- romiplostim today 4mcg/kg and in 1 week (5mcg/kg) with labs prior  - RV on Monday with cbc at [facility]

## 2020-01-23 NOTE — PROGRESS NOTES
Subjective:      DATE OF VISIT: 1/23/2020   ?   ?   Patient ID:?Mari Miller is a 24 y.o. female.?? MR#: [id number]   ?  ?   CHIEF COMPLAINT:  Acute ITP  ?   CURRENT TREATMENT: N-plate, 4mcg/kg/week on 1/23/20    PAST TREATMENT:  Steroids, splenectomy 11/2018, eltrombopag (Promacta) and prednisone taper, IVIG x 2 10/2/19 and 10/3/19     HPI    She is feeling well today and denies any bleeding, ecchymoses or petechial lesion.  She missed lab appointment this past Monday due to migraine headache which has since resolved.  She thinks migraines are assist with IVIG infusion.      Review of Systems    ?   A comprehensive 14-point review of systems was reviewed with patient and was negative other than as specified above.   ?     Objective:      Physical Exam      ?   Vitals:    01/23/20 1255   BP: (!) 138/93   Pulse: 78   Resp: 18   Temp: 98.7 °F (37.1 °C)      ?   ECOG:?  0   General appearance: Generally well appearing, in no acute distress.   Head, eyes, ears, nose, and throat:  Oropharynx clear without bleeding.  Pulmonary:  Normal work of breathing, clear to auscultation bilaterally  Cardiovascular:  Regular rate and rhythm, no murmurs rubs or gallops.  Abdomen: nontender, nondistended.   Extremities:  Mild ecchymoses on forearms, improved.  Neurologic: Alert and oriented. Grossly normal strength, coordination, and gait.   Skin:  No ecchymoses or petechial rash.   Psychiatric: normal mood and affect, conversant and appropriate    ?   Laboratory:  ?   Platelets   Date Value Ref Range Status   01/23/2020 2 (LL) 150 - 350 K/uL Final     Comment:     Results confirmed, test repeated  PLT critical result(s) called and verbal readback obtained from   Juan Yost. by MLS 01/23/2020 12:43     01/16/2020 51 (L) 150 - 350 K/uL Final   01/13/2020 2 (LL) 150 - 350 K/uL Final     Comment:     Platelet critical result(s) called and verbal readback obtained from   Evelyne Pal @ 0810 by AIDA 01/13/2020 11:25      01/09/2020 10 (LL) 150 - 350 K/uL Final     Comment:     PLT critical result(s) called and verbal readback obtained from   Marielos Villar by DAL1 01/09/2020 09:59     01/02/2020 154 150 - 350 K/uL Final   12/26/2019 37 (LL) 150 - 350 K/uL Final     Comment:     PLT   critical result(s) called and verbal readback obtained from   RADHA GARZA MA by AES 12/26/2019 13:15     12/23/2019 1 (LL) 150 - 350 K/uL Final     Comment:     PLT critical result(s) called and verbal readback obtained from   Marielos Arya by DAL1 12/23/2019 08:05     12/20/2019 5 (LL) 150 - 350 K/uL Final     Comment:     PLT critical result(s) called and verbal readback obtained from Radha Garza by DANITZA1 12/20/2019 13:37     12/17/2019 31 (LL) 150 - 350 K/uL Final     Comment:     Results confirmed, test repeated  PLT  critical result(s) called and verbal readback obtained from KELSI JEFFERSON MA by AES 12/17/2019 13:26     12/10/2019 131 (L) 150 - 350 K/uL Final   12/10/2019 131 (L) 150 - 350 K/uL Final         Lab Visit on 01/23/2020   Component Date Value Ref Range Status    WBC 01/23/2020 10.95  3.90 - 12.70 K/uL Final    RBC 01/23/2020 4.66  4.00 - 5.40 M/uL Final    Hemoglobin 01/23/2020 12.0  12.0 - 16.0 g/dL Final    Hematocrit 01/23/2020 38.9  37.0 - 48.5 % Final    Mean Corpuscular Volume 01/23/2020 84  82 - 98 fL Final    Mean Corpuscular Hemoglobin 01/23/2020 25.8* 27.0 - 31.0 pg Final    Mean Corpuscular Hemoglobin Conc 01/23/2020 30.8* 32.0 - 36.0 g/dL Final    RDW 01/23/2020 24.1* 11.5 - 14.5 % Final    Platelets 01/23/2020 2* 150 - 350 K/uL Final    MPV 01/23/2020 SEE COMMENT  9.2 - 12.9 fL Final    Immature Granulocytes 01/23/2020 2.2* 0.0 - 0.5 % Final    Gran # (ANC) 01/23/2020 6.9  1.8 - 7.7 K/uL Final    Immature Grans (Abs) 01/23/2020 0.24* 0.00 - 0.04 K/uL Final    Lymph # 01/23/2020 2.8  1.0 - 4.8 K/uL Final    Mono # 01/23/2020 1.1* 0.3 - 1.0 K/uL Final    Eos # 01/23/2020 0.1  0.0 - 0.5 K/uL Final    Baso #  01/23/2020 0.10  0.00 - 0.20 K/uL Final    nRBC 01/23/2020 0  0 /100 WBC Final    Gran% 01/23/2020 62.7  38.0 - 73.0 % Final    Lymph% 01/23/2020 25.3  18.0 - 48.0 % Final    Mono% 01/23/2020 10.0  4.0 - 15.0 % Final    Eosinophil% 01/23/2020 1.1  0.0 - 8.0 % Final    Basophil% 01/23/2020 0.9  0.0 - 1.9 % Final    Platelet Estimate 01/23/2020 Decreased*  Final    Aniso 01/23/2020 Slight   Final    Hypo 01/23/2020 Occasional   Final    Target Cells 01/23/2020 Occasional   Final    Differential Method 01/23/2020 Automated   Final    ?   Assessment/Plan:       1. Acute ITP          Plan:     # acute on chronic ITP:  refractory ITP status post steroids, splenectomy in November 2018, and this year monthly hospitalizations with platelet counts as low as 1-5K.  Since December 2018 she has been treated with daily eltrombopag, discontinued on 11/11/2019 due to refractory ITP and possible associated headache.  December 2019 initiated romiplostim and will plan to continue weekly with uptitration.   -Again relapse with platelet count 2 K today.  She would like to trial dexamethasone 40 mg x4 days but does understand importance of presenting to emergency room if she develops any concerning bleeding for platelet transfusion.    - N-plate last week at higher dose 4mcg/kg; plan for weekly dose with uptitration by 1mcg/kg/week to max 10mcg/kg    # iron deficiency anemia:  Progressive microcytosis and evidence of iron deficiency.  S/p IV iron x 2 doses 12/2019. Can repeat iron indices periodically if progressive anemia/microcytosis.    Follow-Up:    N-plate and RV with labs Monday and again in 1 week for Nplate with labs  Prescribed dexamethasone 40 mg daily x4 days

## 2020-01-27 ENCOUNTER — LAB VISIT (OUTPATIENT)
Dept: LAB | Facility: HOSPITAL | Age: 25
End: 2020-01-27
Attending: INTERNAL MEDICINE
Payer: MEDICAID

## 2020-01-27 DIAGNOSIS — D69.3 ACUTE ITP: ICD-10-CM

## 2020-01-27 LAB
BASOPHILS # BLD AUTO: 0.04 K/UL (ref 0–0.2)
BASOPHILS NFR BLD: 0.2 % (ref 0–1.9)
DIFFERENTIAL METHOD: ABNORMAL
EOSINOPHIL # BLD AUTO: 0 K/UL (ref 0–0.5)
EOSINOPHIL NFR BLD: 0 % (ref 0–8)
ERYTHROCYTE [DISTWIDTH] IN BLOOD BY AUTOMATED COUNT: 22.9 % (ref 11.5–14.5)
HCT VFR BLD AUTO: 40.1 % (ref 37–48.5)
HGB BLD-MCNC: 12.3 G/DL (ref 12–16)
IMM GRANULOCYTES # BLD AUTO: 0.28 K/UL (ref 0–0.04)
IMM GRANULOCYTES NFR BLD AUTO: 1.1 % (ref 0–0.5)
LYMPHOCYTES # BLD AUTO: 1.5 K/UL (ref 1–4.8)
LYMPHOCYTES NFR BLD: 5.7 % (ref 18–48)
MCH RBC QN AUTO: 25.5 PG (ref 27–31)
MCHC RBC AUTO-ENTMCNC: 30.7 G/DL (ref 32–36)
MCV RBC AUTO: 83 FL (ref 82–98)
MONOCYTES # BLD AUTO: 1.2 K/UL (ref 0.3–1)
MONOCYTES NFR BLD: 4.5 % (ref 4–15)
NEUTROPHILS # BLD AUTO: 23.6 K/UL (ref 1.8–7.7)
NEUTROPHILS NFR BLD: 88.5 % (ref 38–73)
NRBC BLD-RTO: 0 /100 WBC
PLATELET # BLD AUTO: 259 K/UL (ref 150–350)
PMV BLD AUTO: ABNORMAL FL (ref 9.2–12.9)
RBC # BLD AUTO: 4.83 M/UL (ref 4–5.4)
WBC # BLD AUTO: 26.61 K/UL (ref 3.9–12.7)

## 2020-01-27 PROCEDURE — 36415 COLL VENOUS BLD VENIPUNCTURE: CPT

## 2020-01-30 ENCOUNTER — LAB VISIT (OUTPATIENT)
Dept: LAB | Facility: HOSPITAL | Age: 25
End: 2020-01-30
Attending: INTERNAL MEDICINE
Payer: MEDICAID

## 2020-01-30 ENCOUNTER — OFFICE VISIT (OUTPATIENT)
Dept: HEMATOLOGY/ONCOLOGY | Facility: CLINIC | Age: 25
End: 2020-01-30
Payer: MEDICAID

## 2020-01-30 VITALS
TEMPERATURE: 98 F | WEIGHT: 227.75 LBS | DIASTOLIC BLOOD PRESSURE: 74 MMHG | HEIGHT: 62 IN | HEART RATE: 90 BPM | BODY MASS INDEX: 41.91 KG/M2 | SYSTOLIC BLOOD PRESSURE: 115 MMHG | OXYGEN SATURATION: 99 %

## 2020-01-30 DIAGNOSIS — D69.3 ACUTE ITP: ICD-10-CM

## 2020-01-30 DIAGNOSIS — D50.0 IRON DEFICIENCY ANEMIA DUE TO CHRONIC BLOOD LOSS: ICD-10-CM

## 2020-01-30 DIAGNOSIS — D72.829 LEUKOCYTOSIS, UNSPECIFIED TYPE: ICD-10-CM

## 2020-01-30 DIAGNOSIS — D69.3 ACUTE ITP: Primary | ICD-10-CM

## 2020-01-30 DIAGNOSIS — R23.3 ECCHYMOSES, SPONTANEOUS: ICD-10-CM

## 2020-01-30 LAB
BASOPHILS # BLD AUTO: 0.03 K/UL (ref 0–0.2)
BASOPHILS NFR BLD: 0.2 % (ref 0–1.9)
DIFFERENTIAL METHOD: ABNORMAL
EOSINOPHIL # BLD AUTO: 0.1 K/UL (ref 0–0.5)
EOSINOPHIL NFR BLD: 0.8 % (ref 0–8)
ERYTHROCYTE [DISTWIDTH] IN BLOOD BY AUTOMATED COUNT: 23.6 % (ref 11.5–14.5)
HCT VFR BLD AUTO: 42.4 % (ref 37–48.5)
HGB BLD-MCNC: 12.8 G/DL (ref 12–16)
IMM GRANULOCYTES # BLD AUTO: 0.2 K/UL (ref 0–0.04)
IMM GRANULOCYTES NFR BLD AUTO: 1.2 % (ref 0–0.5)
LYMPHOCYTES # BLD AUTO: 4.2 K/UL (ref 1–4.8)
LYMPHOCYTES NFR BLD: 25 % (ref 18–48)
MCH RBC QN AUTO: 25.1 PG (ref 27–31)
MCHC RBC AUTO-ENTMCNC: 30.2 G/DL (ref 32–36)
MCV RBC AUTO: 83 FL (ref 82–98)
MONOCYTES # BLD AUTO: 1.7 K/UL (ref 0.3–1)
MONOCYTES NFR BLD: 10.3 % (ref 4–15)
NEUTROPHILS # BLD AUTO: 10.5 K/UL (ref 1.8–7.7)
NEUTROPHILS NFR BLD: 62.5 % (ref 38–73)
NRBC BLD-RTO: 0 /100 WBC
PLATELET # BLD AUTO: 1039 K/UL (ref 150–350)
PMV BLD AUTO: 11.1 FL (ref 9.2–12.9)
RBC # BLD AUTO: 5.09 M/UL (ref 4–5.4)
WBC # BLD AUTO: 16.86 K/UL (ref 3.9–12.7)

## 2020-01-30 PROCEDURE — 99213 OFFICE O/P EST LOW 20 MIN: CPT | Mod: PBBFAC | Performed by: INTERNAL MEDICINE

## 2020-01-30 PROCEDURE — 99999 PR PBB SHADOW E&M-EST. PATIENT-LVL III: CPT | Mod: PBBFAC,,, | Performed by: INTERNAL MEDICINE

## 2020-01-30 PROCEDURE — 99214 OFFICE O/P EST MOD 30 MIN: CPT | Mod: S$PBB,,, | Performed by: INTERNAL MEDICINE

## 2020-01-30 PROCEDURE — 99999 PR PBB SHADOW E&M-EST. PATIENT-LVL III: ICD-10-PCS | Mod: PBBFAC,,, | Performed by: INTERNAL MEDICINE

## 2020-01-30 PROCEDURE — 99214 PR OFFICE/OUTPT VISIT, EST, LEVL IV, 30-39 MIN: ICD-10-PCS | Mod: S$PBB,,, | Performed by: INTERNAL MEDICINE

## 2020-01-30 PROCEDURE — 36415 COLL VENOUS BLD VENIPUNCTURE: CPT

## 2020-01-30 NOTE — PROGRESS NOTES
Subjective:      DATE OF VISIT: 1/30/2020   ?   ?   Patient ID:?Mari Miller is a 24 y.o. female.?? MR#: [id number]   ?  ?   CHIEF COMPLAINT:  Acute ITP  ?   CURRENT TREATMENT: N-plate, 4mcg/kg/week on 1/23/20    PAST TREATMENT:  Steroids, splenectomy 11/2018, eltrombopag (Promacta) and prednisone taper, IVIG x 2 10/2/19 and 10/3/19     HPI    Ms. Rasmussen presents today with her daughter.  She received N-plate last Thursday and completed 4 days dexamethasone on Sunday.  She has not had any bleeding or petechial lesion.  No lower extremity edema, chest pain or shortness of breath or other in notable new symptom.  She continues to feel well.    Review of Systems    ?   A comprehensive 14-point review of systems was reviewed with patient and was negative other than as specified above.   ?     Objective:      Physical Exam      ?   Vitals:    01/30/20 1336   BP: 115/74   Pulse: 90   Temp: 98.1 °F (36.7 °C)      ?   ECOG:?  0   General appearance: Generally well appearing, in no acute distress.   Head, eyes, ears, nose, and throat:  Oropharynx clear without bleeding.  Pulmonary:  Normal work of breathing, clear to auscultation bilaterally  Cardiovascular:  Regular rate and rhythm, no murmurs rubs or gallops.  Abdomen: nontender, nondistended.   Extremities:  Mild ecchymoses on forearms, improved.  Neurologic: Alert and oriented. Grossly normal strength, coordination, and gait.   Skin:  No ecchymoses or petechial rash.   Psychiatric: normal mood and affect, conversant and appropriate    ?   Laboratory:  ?   Platelets   Date Value Ref Range Status   01/30/2020 1,039 (HH) 150 - 350 K/uL Final     Comment:     PLTS   critical result(s) called and verbal readback obtained from   JACKELYN CORRAL LPN by ERLINDA 01/30/2020 14:33     01/27/2020 259 150 - 350 K/uL Final   01/23/2020 2 (LL) 150 - 350 K/uL Final     Comment:     Results confirmed, test repeated  PLT critical result(s) called and verbal readback obtained from    Juan Yost. by MLS 01/23/2020 12:43     01/16/2020 51 (L) 150 - 350 K/uL Final   01/13/2020 2 (LL) 150 - 350 K/uL Final     Comment:     Platelet critical result(s) called and verbal readback obtained from   Evelyne Pal @ 0810 by JL2 01/13/2020 11:25     01/09/2020 10 (LL) 150 - 350 K/uL Final     Comment:     PLT critical result(s) called and verbal readback obtained from   Marielos Villar by DAL1 01/09/2020 09:59     01/02/2020 154 150 - 350 K/uL Final   12/26/2019 37 (LL) 150 - 350 K/uL Final     Comment:     PLT   critical result(s) called and verbal readback obtained from   RADHA WILLINGHAM MA by ISAK 12/26/2019 13:15     12/23/2019 1 (LL) 150 - 350 K/uL Final     Comment:     PLT critical result(s) called and verbal readback obtained from   Marielos Koenig by DAL1 12/23/2019 08:05     12/20/2019 5 (LL) 150 - 350 K/uL Final     Comment:     PLT critical result(s) called and verbal readback obtained from Radha Willingham by DAL1 12/20/2019 13:37           Lab Visit on 01/30/2020   Component Date Value Ref Range Status    WBC 01/30/2020 16.86* 3.90 - 12.70 K/uL Final    RBC 01/30/2020 5.09  4.00 - 5.40 M/uL Final    Hemoglobin 01/30/2020 12.8  12.0 - 16.0 g/dL Final    Hematocrit 01/30/2020 42.4  37.0 - 48.5 % Final    Mean Corpuscular Volume 01/30/2020 83  82 - 98 fL Final    Mean Corpuscular Hemoglobin 01/30/2020 25.1* 27.0 - 31.0 pg Final    Mean Corpuscular Hemoglobin Conc 01/30/2020 30.2* 32.0 - 36.0 g/dL Final    RDW 01/30/2020 23.6* 11.5 - 14.5 % Final    Platelets 01/30/2020 1,039* 150 - 350 K/uL Final    MPV 01/30/2020 11.1  9.2 - 12.9 fL Final    Immature Granulocytes 01/30/2020 1.2* 0.0 - 0.5 % Final    Gran # (ANC) 01/30/2020 10.5* 1.8 - 7.7 K/uL Final    Immature Grans (Abs) 01/30/2020 0.20* 0.00 - 0.04 K/uL Final    Lymph # 01/30/2020 4.2  1.0 - 4.8 K/uL Final    Mono # 01/30/2020 1.7* 0.3 - 1.0 K/uL Final    Eos # 01/30/2020 0.1  0.0 - 0.5 K/uL Final    Baso # 01/30/2020 0.03  0.00 - 0.20  K/uL Final    nRBC 01/30/2020 0  0 /100 WBC Final    Gran% 01/30/2020 62.5  38.0 - 73.0 % Final    Lymph% 01/30/2020 25.0  18.0 - 48.0 % Final    Mono% 01/30/2020 10.3  4.0 - 15.0 % Final    Eosinophil% 01/30/2020 0.8  0.0 - 8.0 % Final    Basophil% 01/30/2020 0.2  0.0 - 1.9 % Final    Differential Method 01/30/2020 Automated   Final    ?   Assessment/Plan:       1. Acute ITP    2. Iron deficiency anemia due to chronic blood loss    3. Ecchymoses, spontaneous    4. Leukocytosis, unspecified type          Plan:     # acute on chronic ITP:  refractory ITP status post steroids, splenectomy in November 2018, and this year monthly hospitalizations with platelet counts as low as 1-5K.  Since December 2018 she has been treated with daily eltrombopag, discontinued on 11/11/2019 due to refractory ITP and possible associated headache.  Due to relapsed ITP with platelet count 2K she received dexamethasone over weekend aN-plate last week; prelim platelet count >1million.  Discussed typically secondary thrombocytosis is not felt to increase risk of thrombosis but I did describe signs symptoms of thrombosis/DVT/PE.  Platelet count will typically declined after this rebound over the next several days.  Will hold N-plate today, no further steroids.   RV with labs on Monday December 2019 initiated romiplostim and will plan to continue weekly with up titration if plt count drops.   -Again relapse with platelet count 2 K today.  She would like to trial dexamethasone 40 mg x4 days but does understand importance of presenting to emergency room if she develops any concerning bleeding for platelet transfusion.    - N-plate last week at higher dose 4mcg/kg; plan for weekly dose with uptitration by 1mcg/kg/week to max 10mcg/kg    # iron deficiency anemia:  Progressive microcytosis and evidence of iron deficiency.  S/p IV iron x 2 doses 12/2019. Can repeat iron indices periodically if progressive anemia/microcytosis.    #  Leukocytosis:  Secondary to steroid use.  Continue monitor.    Follow-Up:   RV with labs Monday and again in 1 week for Nplate pending plt count

## 2020-02-03 ENCOUNTER — LAB VISIT (OUTPATIENT)
Dept: LAB | Facility: HOSPITAL | Age: 25
End: 2020-02-03
Attending: INTERNAL MEDICINE
Payer: MEDICAID

## 2020-02-03 ENCOUNTER — TELEPHONE (OUTPATIENT)
Dept: HEMATOLOGY/ONCOLOGY | Facility: CLINIC | Age: 25
End: 2020-02-03

## 2020-02-03 DIAGNOSIS — D69.3 ACUTE ITP: ICD-10-CM

## 2020-02-03 LAB
ANISOCYTOSIS BLD QL SMEAR: SLIGHT
BASOPHILS # BLD AUTO: 0.04 K/UL (ref 0–0.2)
BASOPHILS NFR BLD: 0.3 % (ref 0–1.9)
DIFFERENTIAL METHOD: ABNORMAL
EOSINOPHIL # BLD AUTO: 0.2 K/UL (ref 0–0.5)
EOSINOPHIL NFR BLD: 1.2 % (ref 0–8)
ERYTHROCYTE [DISTWIDTH] IN BLOOD BY AUTOMATED COUNT: 23.3 % (ref 11.5–14.5)
GIANT PLATELETS BLD QL SMEAR: PRESENT
HCT VFR BLD AUTO: 37.8 % (ref 37–48.5)
HGB BLD-MCNC: 11.5 G/DL (ref 12–16)
HYPOCHROMIA BLD QL SMEAR: ABNORMAL
IMM GRANULOCYTES # BLD AUTO: 0.21 K/UL (ref 0–0.04)
IMM GRANULOCYTES NFR BLD AUTO: 1.4 % (ref 0–0.5)
LYMPHOCYTES # BLD AUTO: 3.1 K/UL (ref 1–4.8)
LYMPHOCYTES NFR BLD: 20.7 % (ref 18–48)
MCH RBC QN AUTO: 25.7 PG (ref 27–31)
MCHC RBC AUTO-ENTMCNC: 30.4 G/DL (ref 32–36)
MCV RBC AUTO: 84 FL (ref 82–98)
MONOCYTES # BLD AUTO: 1.9 K/UL (ref 0.3–1)
MONOCYTES NFR BLD: 13.1 % (ref 4–15)
NEUTROPHILS # BLD AUTO: 9.6 K/UL (ref 1.8–7.7)
NEUTROPHILS NFR BLD: 64.7 % (ref 38–73)
NRBC BLD-RTO: 0 /100 WBC
PLATELET # BLD AUTO: 1433 K/UL (ref 150–350)
PLATELET BLD QL SMEAR: ABNORMAL
PMV BLD AUTO: 10.3 FL (ref 9.2–12.9)
RBC # BLD AUTO: 4.48 M/UL (ref 4–5.4)
WBC # BLD AUTO: 14.75 K/UL (ref 3.9–12.7)

## 2020-02-03 PROCEDURE — 36415 COLL VENOUS BLD VENIPUNCTURE: CPT

## 2020-02-03 PROCEDURE — 85025 COMPLETE CBC W/AUTO DIFF WBC: CPT

## 2020-02-06 ENCOUNTER — LAB VISIT (OUTPATIENT)
Dept: LAB | Facility: HOSPITAL | Age: 25
End: 2020-02-06
Attending: INTERNAL MEDICINE
Payer: MEDICAID

## 2020-02-06 ENCOUNTER — OFFICE VISIT (OUTPATIENT)
Dept: HEMATOLOGY/ONCOLOGY | Facility: CLINIC | Age: 25
End: 2020-02-06
Payer: MEDICAID

## 2020-02-06 VITALS
HEART RATE: 88 BPM | DIASTOLIC BLOOD PRESSURE: 83 MMHG | WEIGHT: 226.63 LBS | BODY MASS INDEX: 41.71 KG/M2 | TEMPERATURE: 100 F | HEIGHT: 62 IN | SYSTOLIC BLOOD PRESSURE: 122 MMHG | OXYGEN SATURATION: 99 %

## 2020-02-06 DIAGNOSIS — D72.829 LEUKOCYTOSIS, UNSPECIFIED TYPE: ICD-10-CM

## 2020-02-06 DIAGNOSIS — D69.3 ACUTE ITP: Primary | ICD-10-CM

## 2020-02-06 DIAGNOSIS — D69.3 CHRONIC ITP (IDIOPATHIC THROMBOCYTOPENIA): ICD-10-CM

## 2020-02-06 DIAGNOSIS — D75.839 THROMBOCYTOSIS: ICD-10-CM

## 2020-02-06 DIAGNOSIS — D69.3 ACUTE ITP: ICD-10-CM

## 2020-02-06 LAB
BASOPHILS # BLD AUTO: 0.08 K/UL (ref 0–0.2)
BASOPHILS NFR BLD: 0.4 % (ref 0–1.9)
DIFFERENTIAL METHOD: ABNORMAL
EOSINOPHIL # BLD AUTO: 0.2 K/UL (ref 0–0.5)
EOSINOPHIL NFR BLD: 0.9 % (ref 0–8)
ERYTHROCYTE [DISTWIDTH] IN BLOOD BY AUTOMATED COUNT: 22.3 % (ref 11.5–14.5)
HCT VFR BLD AUTO: 37.3 % (ref 37–48.5)
HGB BLD-MCNC: 11.4 G/DL (ref 12–16)
IMM GRANULOCYTES # BLD AUTO: 0.19 K/UL (ref 0–0.04)
IMM GRANULOCYTES NFR BLD AUTO: 1 % (ref 0–0.5)
LYMPHOCYTES # BLD AUTO: 3.3 K/UL (ref 1–4.8)
LYMPHOCYTES NFR BLD: 17.4 % (ref 18–48)
MCH RBC QN AUTO: 26 PG (ref 27–31)
MCHC RBC AUTO-ENTMCNC: 30.6 G/DL (ref 32–36)
MCV RBC AUTO: 85 FL (ref 82–98)
MONOCYTES # BLD AUTO: 1.5 K/UL (ref 0.3–1)
MONOCYTES NFR BLD: 8 % (ref 4–15)
NEUTROPHILS # BLD AUTO: 13.6 K/UL (ref 1.8–7.7)
NEUTROPHILS NFR BLD: 72.3 % (ref 38–73)
NRBC BLD-RTO: 0 /100 WBC
PLATELET # BLD AUTO: 780 K/UL (ref 150–350)
PMV BLD AUTO: 10.3 FL (ref 9.2–12.9)
RBC # BLD AUTO: 4.38 M/UL (ref 4–5.4)
WBC # BLD AUTO: 18.85 K/UL (ref 3.9–12.7)

## 2020-02-06 PROCEDURE — 99212 OFFICE O/P EST SF 10 MIN: CPT | Mod: S$PBB,,, | Performed by: INTERNAL MEDICINE

## 2020-02-06 PROCEDURE — 99212 PR OFFICE/OUTPT VISIT, EST, LEVL II, 10-19 MIN: ICD-10-PCS | Mod: S$PBB,,, | Performed by: INTERNAL MEDICINE

## 2020-02-06 PROCEDURE — 36415 COLL VENOUS BLD VENIPUNCTURE: CPT

## 2020-02-06 PROCEDURE — 99213 OFFICE O/P EST LOW 20 MIN: CPT | Mod: PBBFAC | Performed by: INTERNAL MEDICINE

## 2020-02-06 PROCEDURE — 99999 PR PBB SHADOW E&M-EST. PATIENT-LVL III: CPT | Mod: PBBFAC,,, | Performed by: INTERNAL MEDICINE

## 2020-02-06 PROCEDURE — 99999 PR PBB SHADOW E&M-EST. PATIENT-LVL III: ICD-10-PCS | Mod: PBBFAC,,, | Performed by: INTERNAL MEDICINE

## 2020-02-07 NOTE — PROGRESS NOTES
Subjective:      DATE OF VISIT: 2/6/2020   ?   ?   Patient ID:?Mari Miller is a 24 y.o. female.?? MR#: [id number]   ?  ?   CHIEF COMPLAINT:  Acute ITP  ?   CURRENT TREATMENT: N-plate, 4mcg/kg/week on 1/23/20    PAST TREATMENT:  Steroids, splenectomy 11/2018, eltrombopag (Promacta) and prednisone taper, IVIG x 2 10/2/19 and 10/3/19     HPI    Ms. Rasmussen presents today in stable condition.  No evidence of bleeding, bruising, or petechial lesion.  No lower extremity edema or other concerns for thrombosis.    Review of Systems    ?   A comprehensive 14-point review of systems was reviewed with patient and was negative other than as specified above.   ?     Objective:      Physical Exam      ?   Vitals:    02/06/20 0759   BP: 122/83   Pulse: 88   Temp: 99.5 °F (37.5 °C)      ?   ECOG:?  0   General appearance: Generally well appearing, in no acute distress.   Head, eyes, ears, nose, and throat:  Oropharynx clear without bleeding.  Pulmonary:  Normal work of breathing, clear to auscultation bilaterally  Cardiovascular:  Regular rate and rhythm, no murmurs rubs or gallops.  Abdomen: nontender, nondistended.   Extremities:  Mild ecchymoses on forearms, improved.  Neurologic: Alert and oriented. Grossly normal strength, coordination, and gait.   Skin:  No ecchymoses or petechial rash.   Psychiatric: normal mood and affect, conversant and appropriate    ?   Laboratory:  ?   Platelets   Date Value Ref Range Status   02/06/2020 780 (H) 150 - 350 K/uL Final   02/03/2020 1,433 (HH) 150 - 350 K/uL Final     Comment:     PLT  critical result(s) called and verbal readback obtained from   Penny Koenig by MELANY 02/03/2020 12:45     01/30/2020 1,039 (HH) 150 - 350 K/uL Final     Comment:     PLTS   critical result(s) called and verbal readback obtained from   JACKELYN CORRAL LPN by ERLINDA 01/30/2020 14:33     01/27/2020 259 150 - 350 K/uL Final   01/23/2020 2 (LL) 150 - 350 K/uL Final     Comment:     Results confirmed, test  repeated  PLT critical result(s) called and verbal readback obtained from   Juan Yost. by MLS 01/23/2020 12:43     01/16/2020 51 (L) 150 - 350 K/uL Final   01/13/2020 2 (LL) 150 - 350 K/uL Final     Comment:     Platelet critical result(s) called and verbal readback obtained from   Evelyne Kayealana @ 0810 by JL2 01/13/2020 11:25     01/09/2020 10 (LL) 150 - 350 K/uL Final     Comment:     PLT critical result(s) called and verbal readback obtained from   Marielos Villar by DAL1 01/09/2020 09:59     01/02/2020 154 150 - 350 K/uL Final   12/26/2019 37 (LL) 150 - 350 K/uL Final     Comment:     PLT   critical result(s) called and verbal readback obtained from   MIMI GARZA MA by ISAK 12/26/2019 13:15           Lab Visit on 02/06/2020   Component Date Value Ref Range Status    WBC 02/06/2020 18.85* 3.90 - 12.70 K/uL Final    RBC 02/06/2020 4.38  4.00 - 5.40 M/uL Final    Hemoglobin 02/06/2020 11.4* 12.0 - 16.0 g/dL Final    Hematocrit 02/06/2020 37.3  37.0 - 48.5 % Final    Mean Corpuscular Volume 02/06/2020 85  82 - 98 fL Final    Mean Corpuscular Hemoglobin 02/06/2020 26.0* 27.0 - 31.0 pg Final    Mean Corpuscular Hemoglobin Conc 02/06/2020 30.6* 32.0 - 36.0 g/dL Final    RDW 02/06/2020 22.3* 11.5 - 14.5 % Final    Platelets 02/06/2020 780* 150 - 350 K/uL Final    MPV 02/06/2020 10.3  9.2 - 12.9 fL Final    Immature Granulocytes 02/06/2020 1.0* 0.0 - 0.5 % Final    Gran # (ANC) 02/06/2020 13.6* 1.8 - 7.7 K/uL Final    Immature Grans (Abs) 02/06/2020 0.19* 0.00 - 0.04 K/uL Final    Lymph # 02/06/2020 3.3  1.0 - 4.8 K/uL Final    Mono # 02/06/2020 1.5* 0.3 - 1.0 K/uL Final    Eos # 02/06/2020 0.2  0.0 - 0.5 K/uL Final    Baso # 02/06/2020 0.08  0.00 - 0.20 K/uL Final    nRBC 02/06/2020 0  0 /100 WBC Final    Gran% 02/06/2020 72.3  38.0 - 73.0 % Final    Lymph% 02/06/2020 17.4* 18.0 - 48.0 % Final    Mono% 02/06/2020 8.0  4.0 - 15.0 % Final    Eosinophil% 02/06/2020 0.9  0.0 - 8.0 % Final    Basophil%  02/06/2020 0.4  0.0 - 1.9 % Final    Differential Method 02/06/2020 Automated   Final    ?   Assessment/Plan:       No diagnosis found.      Plan:     # acute on chronic ITP:  refractory ITP status post steroids, splenectomy in November 2018, and this year monthly hospitalizations with platelet counts as low as 1-5K.  Since December 2018 she has been treated with daily eltrombopag, discontinued on 11/11/2019 due to refractory ITP and possible associated headache.  Due to relapsed ITP with platelet count 2K she received dexamethasone in addition to weekly N-plate last dose 2 weeks ago and continues to have elevation in platelet count although starting to decline 700sK today. I discussed typically secondary thrombocytosis is not felt to increase risk of thrombosis but I did describe signs symptoms of thrombosis/DVT/PE.  Platelet count will typically decline after this rebound over the next several days.  Will hold N-plate today, no further steroids.   RV with labs on Monday  - N-plate will likely need to be re-initiated as maintenance once platelet counts decline.    # iron deficiency anemia:  Progressive microcytosis and evidence of iron deficiency.  S/p IV iron x 2 doses 12/2019. Can repeat iron indices periodically if progressive anemia/microcytosis.    # Leukocytosis:  Continues to be elevated.  No signs or symptoms of infection.  No notable change in anemia to suggest iron deficiency.  Likely inflammation/residual steroid effect.  Continue to monitor.    Follow-Up:   RV with labs Monday and possible Nplate and again in 1 week for Nplate pending plt count

## 2020-02-10 ENCOUNTER — TELEPHONE (OUTPATIENT)
Dept: HEMATOLOGY/ONCOLOGY | Facility: CLINIC | Age: 25
End: 2020-02-10

## 2020-02-10 ENCOUNTER — INFUSION (OUTPATIENT)
Dept: INFUSION THERAPY | Facility: HOSPITAL | Age: 25
End: 2020-02-10
Attending: RADIOLOGY
Payer: MEDICAID

## 2020-02-10 ENCOUNTER — OFFICE VISIT (OUTPATIENT)
Dept: HEMATOLOGY/ONCOLOGY | Facility: CLINIC | Age: 25
End: 2020-02-10
Payer: MEDICAID

## 2020-02-10 ENCOUNTER — INFUSION (OUTPATIENT)
Dept: INFUSION THERAPY | Facility: HOSPITAL | Age: 25
End: 2020-02-10
Attending: INTERNAL MEDICINE
Payer: MEDICAID

## 2020-02-10 VITALS — BODY MASS INDEX: 41.71 KG/M2 | HEIGHT: 62 IN | WEIGHT: 226.63 LBS

## 2020-02-10 DIAGNOSIS — D69.3 ACUTE ITP: Primary | ICD-10-CM

## 2020-02-10 DIAGNOSIS — D69.3 ACUTE ITP: ICD-10-CM

## 2020-02-10 DIAGNOSIS — R23.3 PETECHIAE OF PALATE: ICD-10-CM

## 2020-02-10 PROCEDURE — 99999 PR PBB SHADOW E&M-EST. PATIENT-LVL II: CPT | Mod: PBBFAC,,, | Performed by: INTERNAL MEDICINE

## 2020-02-10 PROCEDURE — 99215 PR OFFICE/OUTPT VISIT, EST, LEVL V, 40-54 MIN: ICD-10-PCS | Mod: S$PBB,,, | Performed by: INTERNAL MEDICINE

## 2020-02-10 PROCEDURE — 99999 PR PBB SHADOW E&M-EST. PATIENT-LVL II: ICD-10-PCS | Mod: PBBFAC,,, | Performed by: INTERNAL MEDICINE

## 2020-02-10 PROCEDURE — 96372 THER/PROPH/DIAG INJ SC/IM: CPT

## 2020-02-10 PROCEDURE — 63600175 PHARM REV CODE 636 W HCPCS: Mod: JG | Performed by: INTERNAL MEDICINE

## 2020-02-10 PROCEDURE — 99215 OFFICE O/P EST HI 40 MIN: CPT | Mod: S$PBB,,, | Performed by: INTERNAL MEDICINE

## 2020-02-10 PROCEDURE — 99212 OFFICE O/P EST SF 10 MIN: CPT | Mod: PBBFAC | Performed by: INTERNAL MEDICINE

## 2020-02-10 RX ORDER — HYDROCODONE BITARTRATE AND ACETAMINOPHEN 500; 5 MG/1; MG/1
TABLET ORAL ONCE
Status: DISCONTINUED | OUTPATIENT
Start: 2020-02-10 | End: 2020-02-11 | Stop reason: HOSPADM

## 2020-02-10 RX ORDER — HYDROCODONE BITARTRATE AND ACETAMINOPHEN 500; 5 MG/1; MG/1
TABLET ORAL ONCE
Status: CANCELLED | OUTPATIENT
Start: 2020-02-10 | End: 2020-02-10

## 2020-02-10 RX ORDER — DIPHENHYDRAMINE HCL 25 MG
25 CAPSULE ORAL
Status: DISCONTINUED | OUTPATIENT
Start: 2020-02-10 | End: 2020-02-11 | Stop reason: HOSPADM

## 2020-02-10 RX ORDER — ONDANSETRON 2 MG/ML
8 INJECTION INTRAMUSCULAR; INTRAVENOUS ONCE
Status: CANCELLED
Start: 2020-02-10

## 2020-02-10 RX ORDER — DEXAMETHASONE 4 MG/1
40 TABLET ORAL EVERY 6 HOURS
Qty: 40 TABLET | Refills: 0 | Status: SHIPPED | OUTPATIENT
Start: 2020-02-10 | End: 2020-02-14

## 2020-02-10 RX ADMIN — ROMIPLOSTIM 375 MCG: 125 INJECTION, POWDER, LYOPHILIZED, FOR SOLUTION SUBCUTANEOUS at 12:02

## 2020-02-10 NOTE — PATIENT INSTRUCTIONS
- N-plate today  - O'Jan for plt transfusion, consent  - dexamethasone 4mng x 4 days  - RV Thursday with cbc prior

## 2020-02-10 NOTE — DISCHARGE INSTRUCTIONS
[facility]  [address] [address]  [address]  [phone] phone     [phone] fax  Hours of Operation: Monday- Friday 8:00am- 5:00pm  After hours phone  [phone]  Hematology / Oncology Physicians on call      VERN Bell Dr., Dr., Dr., Dr., NP Sydney Prescott, NP Tyesha Taylor, NP    Please call with any concerns regarding your appointment today.

## 2020-02-10 NOTE — PROGRESS NOTES
Subjective:      DATE OF VISIT: 2/10/2020   ?   ?   Patient ID:?Mari Miller is a 24 y.o. female.?? MR#: [id number]   ?  ?   CHIEF COMPLAINT:  Petechiae/ora mucosal l bleeding    DIAGNOSIS: Acute ITP  ?   CURRENT TREATMENT: N-plate, 4mcg/kg/week on 1/23/20    PAST TREATMENT:  Steroids, splenectomy 11/2018, eltrombopag (Promacta) and prednisone taper, IVIG x 2 10/2/19 and 10/3/19     HPI    Ms. Rasmussen over the last day as developed oral mucosal bleeding/ecchymoses.  She is also on her menstrual cycle.  She denies any other new symptoms, abdominal/retroperitoneal or other pain, ecchymoses or bleeding in urine or stool.  No lightheadedness/dizziness, fatigue, fevers.    Review of Systems    ?   A comprehensive 14-point review of systems was reviewed with patient and was negative other than as specified above.   ?     Objective:      Physical Exam      Blood pressure deferred due to petechiae/ecchymoses on arms  Heart rate 80-90  ?   ECOG:?  0   General appearance: Generally well appearing, in no acute distress.   Head, eyes, ears, nose, and throat: Oropharynx with petechiae and evidence of recent bleeding in the gums.  Facial edema, diffusely.  Pulmonary:  Normal work of breathing, clear to auscultation bilaterally  Cardiovascular:  Regular rate and rhythm, no murmurs rubs or gallops.  Abdomen: nontender, nondistended.   Extremities:  No edema  Neurologic: Alert and oriented. Grossly normal strength, coordination, and gait.   Skin:   Notable ecchymoses on forearms and petechial lesions on all extremities.    Psychiatric: normal mood and affect, conversant and appropriate    ?   Laboratory:  ?   Platelets   Date Value Ref Range Status   02/10/2020 3 (LL) 150 - 350 K/uL Final     Comment:     Results confirmed, test repeated  PLT  critical result(s) called and verbal readback obtained from   Evelyne Pal by UGO 02/10/2020 11:13     02/06/2020 780 (H) 150 - 350 K/uL Final   02/03/2020 1,433 (HH) 150 - 350  K/uL Final     Comment:     PLT  critical result(s) called and verbal readback obtained from   Penny Koenig by MELANY 02/03/2020 12:45     01/30/2020 1,039 (HH) 150 - 350 K/uL Final     Comment:     PLTS   critical result(s) called and verbal readback obtained from   JACKELYN CORRAL LPN by ERLINDA 01/30/2020 14:33     01/27/2020 259 150 - 350 K/uL Final   01/23/2020 2 (LL) 150 - 350 K/uL Final     Comment:     Results confirmed, test repeated  PLT critical result(s) called and verbal readback obtained from   Juan Yost. by UGO 01/23/2020 12:43     01/16/2020 51 (L) 150 - 350 K/uL Final   01/13/2020 2 (LL) 150 - 350 K/uL Final     Comment:     Platelet critical result(s) called and verbal readback obtained from   Jackelyn Corral @ 0810 by JL2 01/13/2020 11:25     01/09/2020 10 (LL) 150 - 350 K/uL Final     Comment:     PLT critical result(s) called and verbal readback obtained from   Mareilos Villar by DALJosep 01/09/2020 09:59     01/02/2020 154 150 - 350 K/uL Final         Lab Visit on 02/10/2020   Component Date Value Ref Range Status    WBC 02/10/2020 9.28  3.90 - 12.70 K/uL Final    RBC 02/10/2020 4.10  4.00 - 5.40 M/uL Final    Hemoglobin 02/10/2020 10.8* 12.0 - 16.0 g/dL Final    Hematocrit 02/10/2020 34.4* 37.0 - 48.5 % Final    Mean Corpuscular Volume 02/10/2020 84  82 - 98 fL Final    Mean Corpuscular Hemoglobin 02/10/2020 26.3* 27.0 - 31.0 pg Final    Mean Corpuscular Hemoglobin Conc 02/10/2020 31.4* 32.0 - 36.0 g/dL Final    RDW 02/10/2020 19.6* 11.5 - 14.5 % Final    Platelets 02/10/2020 3* 150 - 350 K/uL Final    MPV 02/10/2020 SEE COMMENT  9.2 - 12.9 fL Final    Immature Granulocytes 02/10/2020 0.6* 0.0 - 0.5 % Final    Gran # (ANC) 02/10/2020 6.0  1.8 - 7.7 K/uL Final    Immature Grans (Abs) 02/10/2020 0.06* 0.00 - 0.04 K/uL Final    Lymph # 02/10/2020 2.0  1.0 - 4.8 K/uL Final    Mono # 02/10/2020 1.2* 0.3 - 1.0 K/uL Final    Eos # 02/10/2020 0.1  0.0 - 0.5 K/uL Final    Baso # 02/10/2020 0.08   0.00 - 0.20 K/uL Final    nRBC 02/10/2020 0  0 /100 WBC Final    Gran% 02/10/2020 64.1  38.0 - 73.0 % Final    Lymph% 02/10/2020 21.4  18.0 - 48.0 % Final    Mono% 02/10/2020 12.8  4.0 - 15.0 % Final    Eosinophil% 02/10/2020 0.8  0.0 - 8.0 % Final    Basophil% 02/10/2020 0.9  0.0 - 1.9 % Final    Platelet Estimate 02/10/2020 Decreased*  Final    Aniso 02/10/2020 Slight   Final    Poik 02/10/2020 Slight   Final    Hypo 02/10/2020 Occasional   Final    Target Cells 02/10/2020 Occasional   Final    Schistocytes 02/10/2020 Present   Final    Differential Method 02/10/2020 Automated   Final    ?   Assessment/Plan:       1. Acute ITP    2. Petechiae of palate          Plan:     # acute on chronic ITP:  refractory ITP status post steroids, splenectomy in November 2018, and this year monthly hospitalizations with platelet counts as low as 1-5K.  Since December 2018 she has been treated with daily eltrombopag, discontinued on 11/11/2019 due to refractory ITP and possible associated headache.  - acute decline in platelets with oral mucosal bleeding and petechiae/ecchymoses associated edema in face.  Discussed admission however patient declines and prefers outpatient management at this time.  Instructed her on concern for internal bleeding however she does prefer outpatient management and knows to report to emergency room if any new bleeding, pain or other symptoms or concerns.  - N-plate in treatment area today  - dexamethasone 40mg x 4 days   - platelet transfusion today  - close follow-up outpatient per patient preference on Thursday with CBC.    # iron deficiency anemia:  Progressive microcytosis and evidence of iron deficiency.  S/p IV iron x 2 doses 12/2019. Can repeat iron indices periodically if progressive anemia/microcytosis.      Follow-Up:   - N-plate today  - O'Jan for plt transfusion, consent obtained  - dexamethasone 4mg x 4 days  - RV Thursday with cbc prior

## 2020-02-10 NOTE — NURSING
1219pm: Injection given without difficulties.Bandaid applied. Patient instructed to stay in the clinic for 15 minutes. Patient verbalized understanding and will notify nurse with any complaints.

## 2020-02-11 ENCOUNTER — INFUSION (OUTPATIENT)
Dept: INFUSION THERAPY | Facility: HOSPITAL | Age: 25
End: 2020-02-11
Attending: INTERNAL MEDICINE
Payer: MEDICAID

## 2020-02-11 VITALS
SYSTOLIC BLOOD PRESSURE: 139 MMHG | DIASTOLIC BLOOD PRESSURE: 81 MMHG | RESPIRATION RATE: 18 BRPM | TEMPERATURE: 98 F | HEART RATE: 85 BPM

## 2020-02-11 DIAGNOSIS — D69.6 THROMBOCYTOPENIA: Primary | ICD-10-CM

## 2020-02-11 LAB
BLD PROD TYP BPU: NORMAL
BLOOD UNIT EXPIRATION DATE: NORMAL
BLOOD UNIT TYPE CODE: 5100
BLOOD UNIT TYPE: NORMAL
CODING SYSTEM: NORMAL
DISPENSE STATUS: NORMAL
NUM UNITS TRANS WBC-POOR PLATPHERESIS: NORMAL

## 2020-02-11 PROCEDURE — 36430 TRANSFUSION BLD/BLD COMPNT: CPT

## 2020-02-11 PROCEDURE — P9037 PLATE PHERES LEUKOREDU IRRAD: HCPCS

## 2020-02-11 PROCEDURE — 25000003 PHARM REV CODE 250: Performed by: INTERNAL MEDICINE

## 2020-02-11 RX ORDER — HYDROCODONE BITARTRATE AND ACETAMINOPHEN 500; 5 MG/1; MG/1
TABLET ORAL
Status: DISCONTINUED | OUTPATIENT
Start: 2020-02-11 | End: 2020-02-11 | Stop reason: HOSPADM

## 2020-02-11 RX ORDER — DIPHENHYDRAMINE HCL 25 MG
25 CAPSULE ORAL
Status: COMPLETED | OUTPATIENT
Start: 2020-02-11 | End: 2020-02-11

## 2020-02-11 RX ADMIN — DIPHENHYDRAMINE HYDROCHLORIDE 25 MG: 25 CAPSULE ORAL at 07:02

## 2020-02-11 NOTE — NURSING
Spoke with Savannah in lab who states there was a mix up and the platelets have not left [place] yet. Patient notified and states she has been here all evening and she is ready to go home. Asked nurse to take the iv out. Iv discontinued with unit intact. 2x2/coban to site. Awaiting ride home.

## 2020-02-11 NOTE — PLAN OF CARE
Patient tolerated platelet transfusion well; no adverse reactions noted. Informed to come to emergency room immediately if any problems. Patient verbalizes understanding. Iv discontinued with unit intact. Ambulatory off unit with belongings.

## 2020-02-11 NOTE — NURSING
Dr henderson notified that patient is leaving without receiving the platelets; no new orders noted. Informed patient to come to emergency room immediately if any problems. Pt states she plan to come back first thing in the morning to get platelets.

## 2020-02-11 NOTE — NURSING
Still awaiting stat irradiated platelets one unit that was ordered at 1145. Spoke with Evelyne in blood bank, will call  and check the status.

## 2020-02-13 ENCOUNTER — LAB VISIT (OUTPATIENT)
Dept: LAB | Facility: HOSPITAL | Age: 25
End: 2020-02-13
Attending: INTERNAL MEDICINE
Payer: MEDICAID

## 2020-02-13 ENCOUNTER — OFFICE VISIT (OUTPATIENT)
Dept: HEMATOLOGY/ONCOLOGY | Facility: CLINIC | Age: 25
End: 2020-02-13
Payer: MEDICAID

## 2020-02-13 ENCOUNTER — TELEPHONE (OUTPATIENT)
Dept: HEMATOLOGY/ONCOLOGY | Facility: CLINIC | Age: 25
End: 2020-02-13

## 2020-02-13 VITALS
WEIGHT: 221.81 LBS | HEIGHT: 62 IN | TEMPERATURE: 98 F | SYSTOLIC BLOOD PRESSURE: 149 MMHG | OXYGEN SATURATION: 98 % | RESPIRATION RATE: 16 BRPM | HEART RATE: 69 BPM | DIASTOLIC BLOOD PRESSURE: 87 MMHG | BODY MASS INDEX: 40.82 KG/M2

## 2020-02-13 DIAGNOSIS — D69.3 ACUTE ITP: Primary | ICD-10-CM

## 2020-02-13 DIAGNOSIS — D72.829 LEUKOCYTOSIS, UNSPECIFIED TYPE: ICD-10-CM

## 2020-02-13 DIAGNOSIS — D69.3 CHRONIC ITP (IDIOPATHIC THROMBOCYTOPENIA): ICD-10-CM

## 2020-02-13 DIAGNOSIS — D64.9 ANEMIA, UNSPECIFIED TYPE: ICD-10-CM

## 2020-02-13 DIAGNOSIS — D69.3 ACUTE ITP: ICD-10-CM

## 2020-02-13 DIAGNOSIS — R23.3 PETECHIAE OF PALATE: ICD-10-CM

## 2020-02-13 LAB
ANISOCYTOSIS BLD QL SMEAR: SLIGHT
BASOPHILS # BLD AUTO: 0.03 K/UL (ref 0–0.2)
BASOPHILS NFR BLD: 0.1 % (ref 0–1.9)
DIFFERENTIAL METHOD: ABNORMAL
EOSINOPHIL # BLD AUTO: 0 K/UL (ref 0–0.5)
EOSINOPHIL NFR BLD: 0 % (ref 0–8)
ERYTHROCYTE [DISTWIDTH] IN BLOOD BY AUTOMATED COUNT: 19.1 % (ref 11.5–14.5)
HCT VFR BLD AUTO: 28.4 % (ref 37–48.5)
HGB BLD-MCNC: 9.2 G/DL (ref 12–16)
HYPOCHROMIA BLD QL SMEAR: ABNORMAL
IMM GRANULOCYTES # BLD AUTO: 0.25 K/UL (ref 0–0.04)
IMM GRANULOCYTES NFR BLD AUTO: 1 % (ref 0–0.5)
LYMPHOCYTES # BLD AUTO: 2.5 K/UL (ref 1–4.8)
LYMPHOCYTES NFR BLD: 9.7 % (ref 18–48)
MCH RBC QN AUTO: 26.5 PG (ref 27–31)
MCHC RBC AUTO-ENTMCNC: 32.4 G/DL (ref 32–36)
MCV RBC AUTO: 82 FL (ref 82–98)
MONOCYTES # BLD AUTO: 1.6 K/UL (ref 0.3–1)
MONOCYTES NFR BLD: 6.2 % (ref 4–15)
NEUTROPHILS # BLD AUTO: 21.8 K/UL (ref 1.8–7.7)
NEUTROPHILS NFR BLD: 84 % (ref 38–73)
NRBC BLD-RTO: 0 /100 WBC
PLATELET # BLD AUTO: 1 K/UL (ref 150–350)
PLATELET BLD QL SMEAR: ABNORMAL
PMV BLD AUTO: ABNORMAL FL (ref 9.2–12.9)
RBC # BLD AUTO: 3.47 M/UL (ref 4–5.4)
WBC # BLD AUTO: 25.93 K/UL (ref 3.9–12.7)

## 2020-02-13 PROCEDURE — 36415 COLL VENOUS BLD VENIPUNCTURE: CPT

## 2020-02-13 PROCEDURE — 99213 PR OFFICE/OUTPT VISIT, EST, LEVL III, 20-29 MIN: ICD-10-PCS | Mod: S$PBB,,, | Performed by: INTERNAL MEDICINE

## 2020-02-13 PROCEDURE — 99213 OFFICE O/P EST LOW 20 MIN: CPT | Mod: PBBFAC | Performed by: INTERNAL MEDICINE

## 2020-02-13 PROCEDURE — 99999 PR PBB SHADOW E&M-EST. PATIENT-LVL III: CPT | Mod: PBBFAC,,, | Performed by: INTERNAL MEDICINE

## 2020-02-13 PROCEDURE — 99999 PR PBB SHADOW E&M-EST. PATIENT-LVL III: ICD-10-PCS | Mod: PBBFAC,,, | Performed by: INTERNAL MEDICINE

## 2020-02-13 PROCEDURE — 99213 OFFICE O/P EST LOW 20 MIN: CPT | Mod: S$PBB,,, | Performed by: INTERNAL MEDICINE

## 2020-02-13 PROCEDURE — 85025 COMPLETE CBC W/AUTO DIFF WBC: CPT

## 2020-02-13 NOTE — TELEPHONE ENCOUNTER
[facility] called hematology dept with a critical lab platelet 1 value, ms schroeder is in the exam room to see Dr Swan today.

## 2020-02-13 NOTE — PATIENT INSTRUCTIONS
-   Revisit Friday 02/14/2020 with CBC and possible IVIG; patient should wait to hear instructions from Dr. Swan before leaving the  building

## 2020-02-13 NOTE — PROGRESS NOTES
Subjective:      DATE OF VISIT: 2/13/2020   ?   ?   Patient ID:?Mari Miller is a 24 y.o. female.?? MR#: [id number]   ?  ?   CHIEF COMPLAINT:  Petechiae/ora mucosal l bleeding    DIAGNOSIS: Acute ITP  ?   CURRENT TREATMENT: N-plate, 4mcg/kg/week on 1/23/20    PAST TREATMENT:  Steroids, splenectomy 11/2018, eltrombopag (Promacta) and prednisone taper, IVIG x 2 10/2/19 and 10/3/19     HPI    Ms. Rasmussen Presents today accompanied by her daughter in much better spirits.  Today is day 4 of 4 of dexamethasone.   She has had interval resolution of facial edema and oral mucosal bleeding.  Faint petechiae resolving on all extremities and no other new evidence of bleeding.    Review of Systems    ?   A comprehensive 14-point review of systems was reviewed with patient and was negative other than as specified above.   ?     Objective:      Physical Exam      Wt Readings from Last 3 Encounters:   02/13/20 100.6 kg (221 lb 12.5 oz)   02/10/20 102.8 kg (226 lb 10.1 oz)   02/06/20 102.8 kg (226 lb 10.1 oz)     Temp Readings from Last 3 Encounters:   02/13/20 98.2 °F (36.8 °C)   02/11/20 98.1 °F (36.7 °C)   02/06/20 99.5 °F (37.5 °C) (Oral)     BP Readings from Last 3 Encounters:   02/13/20 (!) 149/87   02/11/20 139/81   02/06/20 122/83     Pulse Readings from Last 3 Encounters:   02/13/20 69   02/11/20 85   02/06/20 88       ?   ECOG:?  0   General appearance: Generally well appearing, in no acute distress.   Head, eyes, ears, nose, and throat:  1 small area on left posterior pharynx of residual ecchymoses.  Resolution of facial edema.  F  Pulmonary:  Normal work of breathing, clear to auscultation bilaterally  Cardiovascular:  Regular rate and rhythm, no murmurs rubs or gallops.  Abdomen: nontender, nondistended.   Extremities:  No edema  Neurologic: Alert and oriented. Grossly normal strength, coordination, and gait.   Skin:    Significant improvement in  Petechial lesion on all extremities.   Psychiatric: normal  mood and affect, conversant and appropriate    ?   Laboratory:  ?   Platelets   Date Value Ref Range Status   02/13/2020 1 (LL) 150 - 350 K/uL Final     Comment:     PLT   critical result(s) called and verbal readback obtained from   Jackelyn Altman by MELANY 02/13/2020 11:00     02/10/2020 3 (LL) 150 - 350 K/uL Final     Comment:     Results confirmed, test repeated  PLT  critical result(s) called and verbal readback obtained from   Jackelyn Corral by UGO 02/10/2020 11:13     02/06/2020 780 (H) 150 - 350 K/uL Final   02/03/2020 1,433 (HH) 150 - 350 K/uL Final     Comment:     PLT  critical result(s) called and verbal readback obtained from   Penny Koenig by MELANY 02/03/2020 12:45     01/30/2020 1,039 (HH) 150 - 350 K/uL Final     Comment:     PLTS   critical result(s) called and verbal readback obtained from   JACKELYN CORRAL LPN by ERLINDA 01/30/2020 14:33     01/27/2020 259 150 - 350 K/uL Final   01/23/2020 2 (LL) 150 - 350 K/uL Final     Comment:     Results confirmed, test repeated  PLT critical result(s) called and verbal readback obtained from   Juan Yost. by UGO 01/23/2020 12:43     01/16/2020 51 (L) 150 - 350 K/uL Final   01/13/2020 2 (LL) 150 - 350 K/uL Final     Comment:     Platelet critical result(s) called and verbal readback obtained from   Jackelyn Corral @ 0810 by AIDA 01/13/2020 11:25     01/09/2020 10 (LL) 150 - 350 K/uL Final     Comment:     PLT critical result(s) called and verbal readback obtained from   Marielos Villar by KIRK 01/09/2020 09:59           Lab Visit on 02/13/2020   Component Date Value Ref Range Status    WBC 02/13/2020 25.93* 3.90 - 12.70 K/uL Final    RBC 02/13/2020 3.47* 4.00 - 5.40 M/uL Final    Hemoglobin 02/13/2020 9.2* 12.0 - 16.0 g/dL Final    Hematocrit 02/13/2020 28.4* 37.0 - 48.5 % Final    Mean Corpuscular Volume 02/13/2020 82  82 - 98 fL Final    Mean Corpuscular Hemoglobin 02/13/2020 26.5* 27.0 - 31.0 pg Final    Mean Corpuscular Hemoglobin Conc 02/13/2020 32.4  32.0 - 36.0  g/dL Final    RDW 02/13/2020 19.1* 11.5 - 14.5 % Final    Platelets 02/13/2020 1* 150 - 350 K/uL Final    MPV 02/13/2020 SEE COMMENT  9.2 - 12.9 fL Final    Immature Granulocytes 02/13/2020 1.0* 0.0 - 0.5 % Final    Gran # (ANC) 02/13/2020 21.8* 1.8 - 7.7 K/uL Final    Immature Grans (Abs) 02/13/2020 0.25* 0.00 - 0.04 K/uL Final    Lymph # 02/13/2020 2.5  1.0 - 4.8 K/uL Final    Mono # 02/13/2020 1.6* 0.3 - 1.0 K/uL Final    Eos # 02/13/2020 0.0  0.0 - 0.5 K/uL Final    Baso # 02/13/2020 0.03  0.00 - 0.20 K/uL Final    nRBC 02/13/2020 0  0 /100 WBC Final    Gran% 02/13/2020 84.0* 38.0 - 73.0 % Final    Lymph% 02/13/2020 9.7* 18.0 - 48.0 % Final    Mono% 02/13/2020 6.2  4.0 - 15.0 % Final    Eosinophil% 02/13/2020 0.0  0.0 - 8.0 % Final    Basophil% 02/13/2020 0.1  0.0 - 1.9 % Final    Platelet Estimate 02/13/2020 Decreased*  Final    Aniso 02/13/2020 Slight   Final    Hypo 02/13/2020 Occasional   Final    Differential Method 02/13/2020 Automated   Final    ?   Assessment/Plan:       1. Acute ITP    2. Petechiae of palate    3. Anemia, unspecified type    4. Chronic ITP (idiopathic thrombocytopenia)    5. Leukocytosis, unspecified type          Plan:     # acute on chronic ITP:  refractory ITP status post steroids, splenectomy in November 2018, and this year monthly hospitalizations with platelet counts as low as 1-5K.  Since December 2018 she has been treated with daily eltrombopag, discontinued on 11/11/2019 due to refractory ITP and possible associated headache.  - acute decline in platelets with oral mucosal bleeding and petechiae/ecchymoses associated edema in face no significant clinical improvement however not reflected in platelet count still 1 K today.   Given notable clinical improvement she prefers to hold on further treatment today and will closely follow up tomorrow with repeat CBC and platelet check.  If still no improvement will plan on IVIG.    - N-plate in treatment area last  Monday 02/10/2020  - dexamethasone 40mg x 4 days, with day for a for 02/13/2020   - platelet transfusion today was given on Tuesday 02/11/2020  - close follow-up outpatient per patient preference on Friday with CBC.    # iron deficiency anemia:  S/p IV iron x 2 doses 12/2019.  Progressive decline in hemoglobin over the last week likely related to bleeding with acute ITP.  Will closely monitor and provide supportive care/transfusion p.r.n..  Will to periodically check iron indices.      Follow-Up:   -  Friday 02/14/2020 with CBC and possible IVIG

## 2020-02-14 ENCOUNTER — INFUSION (OUTPATIENT)
Dept: INFUSION THERAPY | Facility: HOSPITAL | Age: 25
End: 2020-02-14
Attending: NURSE PRACTITIONER
Payer: MEDICAID

## 2020-02-14 VITALS
WEIGHT: 221.81 LBS | RESPIRATION RATE: 16 BRPM | HEART RATE: 73 BPM | SYSTOLIC BLOOD PRESSURE: 143 MMHG | BODY MASS INDEX: 40.82 KG/M2 | HEIGHT: 62 IN | TEMPERATURE: 98 F | OXYGEN SATURATION: 99 % | DIASTOLIC BLOOD PRESSURE: 92 MMHG

## 2020-02-14 DIAGNOSIS — D50.0 IRON DEFICIENCY ANEMIA DUE TO CHRONIC BLOOD LOSS: ICD-10-CM

## 2020-02-14 DIAGNOSIS — D69.3 ACUTE ITP: Primary | ICD-10-CM

## 2020-02-14 PROCEDURE — 96366 THER/PROPH/DIAG IV INF ADDON: CPT

## 2020-02-14 PROCEDURE — 25000003 PHARM REV CODE 250: Performed by: INTERNAL MEDICINE

## 2020-02-14 PROCEDURE — 63600175 PHARM REV CODE 636 W HCPCS: Mod: JG | Performed by: INTERNAL MEDICINE

## 2020-02-14 PROCEDURE — 96365 THER/PROPH/DIAG IV INF INIT: CPT

## 2020-02-14 PROCEDURE — A4216 STERILE WATER/SALINE, 10 ML: HCPCS | Performed by: INTERNAL MEDICINE

## 2020-02-14 PROCEDURE — 63600175 PHARM REV CODE 636 W HCPCS: Performed by: INTERNAL MEDICINE

## 2020-02-14 RX ORDER — HEPARIN 100 UNIT/ML
500 SYRINGE INTRAVENOUS
Status: CANCELLED | OUTPATIENT
Start: 2020-03-13

## 2020-02-14 RX ORDER — ACETAMINOPHEN 500 MG
500 TABLET ORAL
Status: DISCONTINUED | OUTPATIENT
Start: 2020-02-14 | End: 2020-02-14 | Stop reason: HOSPADM

## 2020-02-14 RX ORDER — SODIUM CHLORIDE 0.9 % (FLUSH) 0.9 %
10 SYRINGE (ML) INJECTION
Status: DISCONTINUED | OUTPATIENT
Start: 2020-02-14 | End: 2020-02-14 | Stop reason: HOSPADM

## 2020-02-14 RX ORDER — DIPHENHYDRAMINE HCL 25 MG
25 CAPSULE ORAL
Status: DISCONTINUED | OUTPATIENT
Start: 2020-02-14 | End: 2020-02-14 | Stop reason: HOSPADM

## 2020-02-14 RX ORDER — SODIUM CHLORIDE 0.9 % (FLUSH) 0.9 %
10 SYRINGE (ML) INJECTION
Status: CANCELLED | OUTPATIENT
Start: 2020-03-13

## 2020-02-14 RX ORDER — ACETAMINOPHEN 500 MG
500 TABLET ORAL
Status: CANCELLED | OUTPATIENT
Start: 2020-03-13

## 2020-02-14 RX ORDER — DIPHENHYDRAMINE HCL 25 MG
25 CAPSULE ORAL
Status: CANCELLED | OUTPATIENT
Start: 2020-03-13

## 2020-02-14 RX ORDER — DIPHENHYDRAMINE HYDROCHLORIDE 50 MG/ML
25 INJECTION INTRAMUSCULAR; INTRAVENOUS
Status: CANCELLED | OUTPATIENT
Start: 2020-03-13

## 2020-02-14 RX ADMIN — DIPHENHYDRAMINE HYDROCHLORIDE 25 MG: 25 CAPSULE ORAL at 08:02

## 2020-02-14 RX ADMIN — DEXTROSE: 5 SOLUTION INTRAVENOUS at 09:02

## 2020-02-14 RX ADMIN — HUMAN IMMUNOGLOBULIN G 100 G: 40 LIQUID INTRAVENOUS at 09:02

## 2020-02-14 RX ADMIN — SODIUM CHLORIDE, PRESERVATIVE FREE 10 ML: 5 INJECTION INTRAVENOUS at 08:02

## 2020-02-14 RX ADMIN — ACETAMINOPHEN 500 MG: 500 TABLET ORAL at 08:02

## 2020-02-14 NOTE — DISCHARGE INSTRUCTIONS
[facility]  [address] [address]  [address]  [phone] phone     [phone] fax  Hours of Operation: Monday- Friday 8:00am- 5:00pm  After hours phone  [phone]  Hematology / Oncology Physicians on call      VERN Bell Dr., Dr., Dr., Dr., NP Sydney Prescott, NP Tyesha Taylor, NP    Please call with any concerns regarding your appointment today.

## 2020-02-17 ENCOUNTER — LAB VISIT (OUTPATIENT)
Dept: LAB | Facility: HOSPITAL | Age: 25
End: 2020-02-17
Attending: INTERNAL MEDICINE
Payer: MEDICAID

## 2020-02-17 ENCOUNTER — OFFICE VISIT (OUTPATIENT)
Dept: HEMATOLOGY/ONCOLOGY | Facility: CLINIC | Age: 25
End: 2020-02-17
Payer: MEDICAID

## 2020-02-17 VITALS
RESPIRATION RATE: 18 BRPM | DIASTOLIC BLOOD PRESSURE: 81 MMHG | BODY MASS INDEX: 42.36 KG/M2 | SYSTOLIC BLOOD PRESSURE: 126 MMHG | TEMPERATURE: 99 F | HEART RATE: 86 BPM | HEIGHT: 62 IN | OXYGEN SATURATION: 99 % | WEIGHT: 230.19 LBS

## 2020-02-17 DIAGNOSIS — D69.3 ACUTE ITP: ICD-10-CM

## 2020-02-17 DIAGNOSIS — D72.829 LEUKOCYTOSIS, UNSPECIFIED TYPE: Primary | ICD-10-CM

## 2020-02-17 LAB
ANISOCYTOSIS BLD QL SMEAR: ABNORMAL
BASOPHILS NFR BLD: 0 % (ref 0–1.9)
DIFFERENTIAL METHOD: ABNORMAL
EOSINOPHIL NFR BLD: 4 % (ref 0–8)
ERYTHROCYTE [DISTWIDTH] IN BLOOD BY AUTOMATED COUNT: 21.5 % (ref 11.5–14.5)
HCT VFR BLD AUTO: 29.5 % (ref 37–48.5)
HGB BLD-MCNC: 9.2 G/DL (ref 12–16)
IMM GRANULOCYTES # BLD AUTO: ABNORMAL K/UL (ref 0–0.04)
IMM GRANULOCYTES NFR BLD AUTO: ABNORMAL % (ref 0–0.5)
LYMPHOCYTES NFR BLD: 20 % (ref 18–48)
MCH RBC QN AUTO: 26.7 PG (ref 27–31)
MCHC RBC AUTO-ENTMCNC: 31.2 G/DL (ref 32–36)
MCV RBC AUTO: 86 FL (ref 82–98)
MONOCYTES NFR BLD: 8 % (ref 4–15)
NEUTROPHILS NFR BLD: 68 % (ref 38–73)
NRBC BLD-RTO: 0 /100 WBC
OVALOCYTES BLD QL SMEAR: ABNORMAL
PLATELET # BLD AUTO: 930 K/UL (ref 150–350)
PLATELET BLD QL SMEAR: ABNORMAL
PMV BLD AUTO: 12.3 FL (ref 9.2–12.9)
POIKILOCYTOSIS BLD QL SMEAR: SLIGHT
RBC # BLD AUTO: 3.45 M/UL (ref 4–5.4)
TARGETS BLD QL SMEAR: ABNORMAL
WBC # BLD AUTO: 31.77 K/UL (ref 3.9–12.7)

## 2020-02-17 PROCEDURE — 99214 OFFICE O/P EST MOD 30 MIN: CPT | Mod: S$PBB,,, | Performed by: INTERNAL MEDICINE

## 2020-02-17 PROCEDURE — 85027 COMPLETE CBC AUTOMATED: CPT

## 2020-02-17 PROCEDURE — 99999 PR PBB SHADOW E&M-EST. PATIENT-LVL III: CPT | Mod: PBBFAC,,, | Performed by: INTERNAL MEDICINE

## 2020-02-17 PROCEDURE — 99213 OFFICE O/P EST LOW 20 MIN: CPT | Mod: PBBFAC | Performed by: INTERNAL MEDICINE

## 2020-02-17 PROCEDURE — 36415 COLL VENOUS BLD VENIPUNCTURE: CPT

## 2020-02-17 PROCEDURE — 99214 PR OFFICE/OUTPT VISIT, EST, LEVL IV, 30-39 MIN: ICD-10-PCS | Mod: S$PBB,,, | Performed by: INTERNAL MEDICINE

## 2020-02-17 PROCEDURE — 99999 PR PBB SHADOW E&M-EST. PATIENT-LVL III: ICD-10-PCS | Mod: PBBFAC,,, | Performed by: INTERNAL MEDICINE

## 2020-02-17 PROCEDURE — 85007 BL SMEAR W/DIFF WBC COUNT: CPT

## 2020-02-17 NOTE — PROGRESS NOTES
Subjective:      DATE OF VISIT: 2/17/2020   ?   ?   Patient ID:?Mari Miller is a 24 y.o. female.?? MR#: [id number]   ?  ?   CHIEF COMPLAINT:  Petechiae/ora mucosal l bleeding    DIAGNOSIS: Acute ITP  ?   CURRENT TREATMENT: N-plate, 4mcg/kg/week on 1/23/20    PAST TREATMENT:  Steroids, splenectomy 11/2018, eltrombopag (Promacta) and prednisone taper, IVIG x 2 10/2/19 and 10/3/19     HPI    Ms. Rasmussen presents today for follow-up.  She continues to have gradual resolution of petechial lesion and ecchymoses without further oral/mucosal bleeding.  Good energy and no pain/edema.    Review of Systems    ?   A comprehensive 14-point review of systems was reviewed with patient and was negative other than as specified above.   ?     Objective:      Physical Exam      Wt Readings from Last 3 Encounters:   02/17/20 104.4 kg (230 lb 2.6 oz)   02/14/20 100.6 kg (221 lb 12.5 oz)   02/13/20 100.6 kg (221 lb 12.5 oz)     Temp Readings from Last 3 Encounters:   02/17/20 99.2 °F (37.3 °C) (Oral)   02/14/20 97.9 °F (36.6 °C)   02/13/20 98.2 °F (36.8 °C)     BP Readings from Last 3 Encounters:   02/17/20 126/81   02/14/20 (!) 143/92   02/13/20 (!) 149/87     Pulse Readings from Last 3 Encounters:   02/17/20 86   02/14/20 73   02/13/20 69       ?   ECOG:?  0   General appearance: Generally well appearing, in no acute distress.   Head, eyes, ears, nose, and throat:  No further facial edema or oral mucosal bleeding   Pulmonary:  Normal work of breathing, clear to auscultation bilaterally  Cardiovascular:  Regular rate and rhythm, no murmurs rubs or gallops.  Abdomen: nontender, nondistended.   Extremities:  No edema  Neurologic: Alert and oriented. Grossly normal strength, coordination, and gait.   Skin:   Trace residual petechia a/ecchymoses on extremities, largely resolved   Psychiatric: normal mood and affect, conversant and appropriate    ?   Laboratory:  ?   Platelets   Date Value Ref Range Status   02/17/2020 930 (H)  150 - 350 K/uL Final   02/14/2020 99 (L) 150 - 350 K/uL Final   02/13/2020 1 (LL) 150 - 350 K/uL Final     Comment:     PLT   critical result(s) called and verbal readback obtained from   Jackelyn Altman by MELANY 02/13/2020 11:00     02/10/2020 3 (LL) 150 - 350 K/uL Final     Comment:     Results confirmed, test repeated  PLT  critical result(s) called and verbal readback obtained from   Jackelyn Corral by MLS 02/10/2020 11:13     02/06/2020 780 (H) 150 - 350 K/uL Final   02/03/2020 1,433 (HH) 150 - 350 K/uL Final     Comment:     PLT  critical result(s) called and verbal readback obtained from   ePnny Koenig by MELANY 02/03/2020 12:45     01/30/2020 1,039 (HH) 150 - 350 K/uL Final     Comment:     PLTS   critical result(s) called and verbal readback obtained from   JACKELYN CORRAL LPN by ERLINDA 01/30/2020 14:33     01/27/2020 259 150 - 350 K/uL Final   01/23/2020 2 (LL) 150 - 350 K/uL Final     Comment:     Results confirmed, test repeated  PLT critical result(s) called and verbal readback obtained from   Juan Yost. by [facility] 01/23/2020 12:43     01/16/2020 51 (L) 150 - 350 K/uL Final         Lab Visit on 02/17/2020   Component Date Value Ref Range Status    WBC 02/17/2020 31.77* 3.90 - 12.70 K/uL Final    RBC 02/17/2020 3.45* 4.00 - 5.40 M/uL Final    Hemoglobin 02/17/2020 9.2* 12.0 - 16.0 g/dL Final    Hematocrit 02/17/2020 29.5* 37.0 - 48.5 % Final    Mean Corpuscular Volume 02/17/2020 86  82 - 98 fL Final    Mean Corpuscular Hemoglobin 02/17/2020 26.7* 27.0 - 31.0 pg Final    Mean Corpuscular Hemoglobin Conc 02/17/2020 31.2* 32.0 - 36.0 g/dL Final    RDW 02/17/2020 21.5* 11.5 - 14.5 % Final    Platelets 02/17/2020 930* 150 - 350 K/uL Final    MPV 02/17/2020 12.3  9.2 - 12.9 fL Final    ?   Assessment/Plan:       1. Leukocytosis, unspecified type    2. Acute ITP          Plan:     # acute on chronic ITP:  refractory ITP status post steroids, splenectomy in November 2018, and this year monthly hospitalizations with  platelet counts as low as 1-5K.  Since December 2018 she has been treated with daily eltrombopag, discontinued on 11/11/2019 due to refractory ITP and possible associated headache.  - acute decline in platelets with oral mucosal bleeding and petechiae/ecchymoses associated edema in face last week treated with dexamethasone 40 mg x4 days with day 4 on 02/13/2020, platelet transfusion on 02/11/2020 and IVIG on 02/14/2020.  Platelet count over 900 K.  Will hold on further treatment today in follow-up on Thursday with plan to resume  Weekly N-plate (last 1/23/2020 at 4mcg/kg)    # iron deficiency anemia:  S/p IV iron x 2 doses 12/2019.  Progressive decline in hemoglobin over the last week likely related to bleeding with acute ITP.  Will closely monitor and provide supportive care/transfusion p.r.n..  Will to periodically check iron indices.      Follow-Up:   -  Thursday 02/20/2020 with CBC and possible IVIG

## 2020-02-20 ENCOUNTER — INFUSION (OUTPATIENT)
Dept: INFUSION THERAPY | Facility: HOSPITAL | Age: 25
End: 2020-02-20
Attending: INTERNAL MEDICINE
Payer: MEDICAID

## 2020-02-20 ENCOUNTER — OFFICE VISIT (OUTPATIENT)
Dept: HEMATOLOGY/ONCOLOGY | Facility: CLINIC | Age: 25
End: 2020-02-20
Payer: MEDICAID

## 2020-02-20 ENCOUNTER — LAB VISIT (OUTPATIENT)
Dept: LAB | Facility: HOSPITAL | Age: 25
End: 2020-02-20
Attending: INTERNAL MEDICINE
Payer: MEDICAID

## 2020-02-20 VITALS
HEART RATE: 86 BPM | BODY MASS INDEX: 41.62 KG/M2 | HEIGHT: 62 IN | OXYGEN SATURATION: 99 % | DIASTOLIC BLOOD PRESSURE: 92 MMHG | WEIGHT: 226.19 LBS | SYSTOLIC BLOOD PRESSURE: 131 MMHG | TEMPERATURE: 99 F

## 2020-02-20 VITALS — BODY MASS INDEX: 41.62 KG/M2 | WEIGHT: 226.19 LBS | HEIGHT: 62 IN

## 2020-02-20 DIAGNOSIS — D69.3 ACUTE ITP: Primary | ICD-10-CM

## 2020-02-20 DIAGNOSIS — D72.829 LEUKOCYTOSIS, UNSPECIFIED TYPE: ICD-10-CM

## 2020-02-20 DIAGNOSIS — D69.3 ACUTE ITP: ICD-10-CM

## 2020-02-20 DIAGNOSIS — D64.9 ANEMIA, UNSPECIFIED TYPE: ICD-10-CM

## 2020-02-20 LAB
BASOPHILS # BLD AUTO: 0.06 K/UL (ref 0–0.2)
BASOPHILS NFR BLD: 0.4 % (ref 0–1.9)
DIFFERENTIAL METHOD: ABNORMAL
EOSINOPHIL # BLD AUTO: 0.5 K/UL (ref 0–0.5)
EOSINOPHIL NFR BLD: 2.8 % (ref 0–8)
ERYTHROCYTE [DISTWIDTH] IN BLOOD BY AUTOMATED COUNT: 20.2 % (ref 11.5–14.5)
HCT VFR BLD AUTO: 32.7 % (ref 37–48.5)
HGB BLD-MCNC: 10.3 G/DL (ref 12–16)
IMM GRANULOCYTES # BLD AUTO: 0.69 K/UL (ref 0–0.04)
IMM GRANULOCYTES NFR BLD AUTO: 4.1 % (ref 0–0.5)
LYMPHOCYTES # BLD AUTO: 2.9 K/UL (ref 1–4.8)
LYMPHOCYTES NFR BLD: 17.2 % (ref 18–48)
MCH RBC QN AUTO: 27.4 PG (ref 27–31)
MCHC RBC AUTO-ENTMCNC: 31.5 G/DL (ref 32–36)
MCV RBC AUTO: 87 FL (ref 82–98)
MONOCYTES # BLD AUTO: 1.5 K/UL (ref 0.3–1)
MONOCYTES NFR BLD: 8.9 % (ref 4–15)
NEUTROPHILS # BLD AUTO: 12 K/UL (ref 1.8–7.7)
NEUTROPHILS NFR BLD: 70.7 % (ref 38–73)
NRBC BLD-RTO: 0 /100 WBC
PLATELET # BLD AUTO: 769 K/UL (ref 150–350)
PMV BLD AUTO: 10.9 FL (ref 9.2–12.9)
RBC # BLD AUTO: 3.76 M/UL (ref 4–5.4)
WBC # BLD AUTO: 16.94 K/UL (ref 3.9–12.7)

## 2020-02-20 PROCEDURE — 99214 PR OFFICE/OUTPT VISIT, EST, LEVL IV, 30-39 MIN: ICD-10-PCS | Mod: S$PBB,,, | Performed by: INTERNAL MEDICINE

## 2020-02-20 PROCEDURE — 99214 OFFICE O/P EST MOD 30 MIN: CPT | Mod: PBBFAC,25 | Performed by: INTERNAL MEDICINE

## 2020-02-20 PROCEDURE — 99999 PR PBB SHADOW E&M-EST. PATIENT-LVL IV: ICD-10-PCS | Mod: PBBFAC,,, | Performed by: INTERNAL MEDICINE

## 2020-02-20 PROCEDURE — 99214 OFFICE O/P EST MOD 30 MIN: CPT | Mod: S$PBB,,, | Performed by: INTERNAL MEDICINE

## 2020-02-20 PROCEDURE — 63600175 PHARM REV CODE 636 W HCPCS: Mod: JG | Performed by: INTERNAL MEDICINE

## 2020-02-20 PROCEDURE — 99999 PR PBB SHADOW E&M-EST. PATIENT-LVL IV: CPT | Mod: PBBFAC,,, | Performed by: INTERNAL MEDICINE

## 2020-02-20 PROCEDURE — 85025 COMPLETE CBC W/AUTO DIFF WBC: CPT

## 2020-02-20 PROCEDURE — 96372 THER/PROPH/DIAG INJ SC/IM: CPT

## 2020-02-20 PROCEDURE — 36415 COLL VENOUS BLD VENIPUNCTURE: CPT

## 2020-02-20 RX ORDER — ONDANSETRON 2 MG/ML
8 INJECTION INTRAMUSCULAR; INTRAVENOUS ONCE
Status: CANCELLED
Start: 2020-02-28

## 2020-02-20 RX ADMIN — ROMIPLOSTIM 380 MCG: 250 INJECTION, POWDER, LYOPHILIZED, FOR SOLUTION SUBCUTANEOUS at 11:02

## 2020-02-20 NOTE — PROGRESS NOTES
Subjective:      DATE OF VISIT: 2/20/2020   ?   ?   Patient ID:?Mari Miller is a 24 y.o. female.?? MR#: [id number]   ?  ?   CHIEF COMPLAINT:  Follow-up for acute ITP exacerbation    DIAGNOSIS: Acute ITP  ?   CURRENT TREATMENT: N-plate, 4mcg/kg/week     PAST TREATMENT:  Steroids, splenectomy 11/2018, eltrombopag (Promacta) and prednisone taper, IVIG x 2 10/2/19 and 10/3/19     HPI    Ms. Rasmussen presents today for follow-up continuing to do well with resolving ecchymoses on forearms and no further petechiae a/oral mucosal bleeding.  No edema, chest pain or shortness of breath.    Review of Systems    ?   A comprehensive 14-point review of systems was reviewed with patient and was negative other than as specified above.   ?     Objective:      Physical Exam      Wt Readings from Last 3 Encounters:   02/20/20 102.6 kg (226 lb 3.1 oz)   02/20/20 102.6 kg (226 lb 3.1 oz)   02/17/20 104.4 kg (230 lb 2.6 oz)     Temp Readings from Last 3 Encounters:   02/20/20 98.7 °F (37.1 °C) (Oral)   02/17/20 99.2 °F (37.3 °C) (Oral)   02/14/20 97.9 °F (36.6 °C)     BP Readings from Last 3 Encounters:   02/20/20 (!) 131/92   02/17/20 126/81   02/14/20 (!) 143/92     Pulse Readings from Last 3 Encounters:   02/20/20 86   02/17/20 86   02/14/20 73       ?   ECOG:?  0   General appearance: Generally well appearing, in no acute distress.   Head, eyes, ears, nose, and throat:  Oropharynx clear, no petechial lesion.   Pulmonary:  Normal work of breathing, clear to auscultation bilaterally  Cardiovascular:  Regular rate and rhythm, no murmurs rubs or gallops.  Abdomen: nontender, nondistended.   Extremities:  No edema  Neurologic: Alert and oriented. Grossly normal strength, coordination, and gait.   Skin:  Minimal ecchymoses on forearms.   Psychiatric: normal mood and affect, conversant and appropriate    ?   Laboratory:  ?   Platelets   Date Value Ref Range Status   02/20/2020 769 (H) 150 - 350 K/uL Final   02/17/2020 930 (H)  150 - 350 K/uL Final   02/14/2020 99 (L) 150 - 350 K/uL Final   02/13/2020 1 (LL) 150 - 350 K/uL Final     Comment:     PLT   critical result(s) called and verbal readback obtained from   Jackelyn Altman by MELANY 02/13/2020 11:00     02/10/2020 3 (LL) 150 - 350 K/uL Final     Comment:     Results confirmed, test repeated  PLT  critical result(s) called and verbal readback obtained from   Jackelyn Corral by MLS 02/10/2020 11:13     02/06/2020 780 (H) 150 - 350 K/uL Final   02/03/2020 1,433 (HH) 150 - 350 K/uL Final     Comment:     PLT  critical result(s) called and verbal readback obtained from   Penny Koenig by MELANY 02/03/2020 12:45     01/30/2020 1,039 (HH) 150 - 350 K/uL Final     Comment:     PLTS   critical result(s) called and verbal readback obtained from   JACKELYN CORRAL LPN by ERLINDA 01/30/2020 14:33     01/27/2020 259 150 - 350 K/uL Final   01/23/2020 2 (LL) 150 - 350 K/uL Final     Comment:     Results confirmed, test repeated  PLT critical result(s) called and verbal readback obtained from   Juan Yost. by [facility] 01/23/2020 12:43           Lab Visit on 02/20/2020   Component Date Value Ref Range Status    WBC 02/20/2020 16.94* 3.90 - 12.70 K/uL Final    RBC 02/20/2020 3.76* 4.00 - 5.40 M/uL Final    Hemoglobin 02/20/2020 10.3* 12.0 - 16.0 g/dL Final    Hematocrit 02/20/2020 32.7* 37.0 - 48.5 % Final    Mean Corpuscular Volume 02/20/2020 87  82 - 98 fL Final    Mean Corpuscular Hemoglobin 02/20/2020 27.4  27.0 - 31.0 pg Final    Mean Corpuscular Hemoglobin Conc 02/20/2020 31.5* 32.0 - 36.0 g/dL Final    RDW 02/20/2020 20.2* 11.5 - 14.5 % Final    Platelets 02/20/2020 769* 150 - 350 K/uL Final    MPV 02/20/2020 10.9  9.2 - 12.9 fL Final    Immature Granulocytes 02/20/2020 4.1* 0.0 - 0.5 % Final    Gran # (ANC) 02/20/2020 12.0* 1.8 - 7.7 K/uL Final    Immature Grans (Abs) 02/20/2020 0.69* 0.00 - 0.04 K/uL Final    Lymph # 02/20/2020 2.9  1.0 - 4.8 K/uL Final    Mono # 02/20/2020 1.5* 0.3 - 1.0 K/uL Final     Eos # 02/20/2020 0.5  0.0 - 0.5 K/uL Final    Baso # 02/20/2020 0.06  0.00 - 0.20 K/uL Final    nRBC 02/20/2020 0  0 /100 WBC Final    Gran% 02/20/2020 70.7  38.0 - 73.0 % Final    Lymph% 02/20/2020 17.2* 18.0 - 48.0 % Final    Mono% 02/20/2020 8.9  4.0 - 15.0 % Final    Eosinophil% 02/20/2020 2.8  0.0 - 8.0 % Final    Basophil% 02/20/2020 0.4  0.0 - 1.9 % Final    Differential Method 02/20/2020 Automated   Final    ?   Assessment/Plan:       1. Acute ITP    2. Anemia, unspecified type    3. Leukocytosis, unspecified type          Plan:     # acute on chronic ITP:  refractory ITP status post steroids, splenectomy in November 2018, and this year monthly hospitalizations with platelet counts as low as 1-5K.  Since December 2018 she has been treated with daily eltrombopag, discontinued on 11/11/2019 due to refractory ITP and possible associated headache.  - acute decline in platelets with oral mucosal bleeding and petechiae/ecchymoses associated edema in face last week treated with dexamethasone 40 mg x4 days with day 4 on 02/13/2020, platelet transfusion on 02/11/2020 and IVIG on 02/14/2020.  Platelet count starting to decline from steroid burst now 700sK.  Given prior sharp janell will continue with weekly Nplate today (last 1/23/2020 at 4mcg/kg) and goal of maintaining weekly treatment to avoid recurrent severe platelet decline.     # leukocytosis:  Downtrending from recent steroid burst.  Continue to monitor.    # iron deficiency anemia:  S/p IV iron x 2 doses 12/2019.  Relatively stable anemia with hemoglobin 9-10.  Will continue monitor along with platelet counts.      Follow-Up:   -  Nplate 4mcg/kg today  - RV Monday for CBC  - RV next Thursday for N-plate and labs prior

## 2020-02-20 NOTE — NURSING
1132am: Injection given without difficulties.Bandaid applied. Patient instructed to stay in the clinic for 15 minutes. Patient verbalized understanding and will notify nurse with any complaints.

## 2020-02-20 NOTE — DISCHARGE INSTRUCTIONS
[facility]  [id number] [address]  [address]  [phone] phone     [phone] fax  Hours of Operation: Monday- Friday 8:00am- 5:00pm  After hours phone  [phone]  Hematology / Oncology Physicians on call      VERN Bell Dr., Dr., Dr., Dr., NP Sydney Prescott, NP Tyesha Taylor, NP    Please call with any concerns regarding your appointment today.

## 2020-02-24 ENCOUNTER — OFFICE VISIT (OUTPATIENT)
Dept: HEMATOLOGY/ONCOLOGY | Facility: CLINIC | Age: 25
End: 2020-02-24
Payer: MEDICAID

## 2020-02-24 ENCOUNTER — LAB VISIT (OUTPATIENT)
Dept: LAB | Facility: HOSPITAL | Age: 25
End: 2020-02-24
Attending: INTERNAL MEDICINE
Payer: MEDICAID

## 2020-02-24 VITALS
BODY MASS INDEX: 41.71 KG/M2 | DIASTOLIC BLOOD PRESSURE: 98 MMHG | RESPIRATION RATE: 18 BRPM | HEART RATE: 90 BPM | HEIGHT: 62 IN | TEMPERATURE: 98 F | OXYGEN SATURATION: 99 % | WEIGHT: 226.63 LBS | SYSTOLIC BLOOD PRESSURE: 145 MMHG

## 2020-02-24 DIAGNOSIS — D64.9 ANEMIA, UNSPECIFIED TYPE: ICD-10-CM

## 2020-02-24 DIAGNOSIS — D69.3 ACUTE ITP: ICD-10-CM

## 2020-02-24 DIAGNOSIS — D72.829 LEUKOCYTOSIS, UNSPECIFIED TYPE: ICD-10-CM

## 2020-02-24 DIAGNOSIS — D69.3 ACUTE ITP: Primary | ICD-10-CM

## 2020-02-24 LAB
BASOPHILS # BLD AUTO: 0.11 K/UL (ref 0–0.2)
BASOPHILS NFR BLD: 0.6 % (ref 0–1.9)
DIFFERENTIAL METHOD: ABNORMAL
EOSINOPHIL # BLD AUTO: 0.1 K/UL (ref 0–0.5)
EOSINOPHIL NFR BLD: 0.7 % (ref 0–8)
ERYTHROCYTE [DISTWIDTH] IN BLOOD BY AUTOMATED COUNT: 18.7 % (ref 11.5–14.5)
HCT VFR BLD AUTO: 34.5 % (ref 37–48.5)
HGB BLD-MCNC: 10.3 G/DL (ref 12–16)
IMM GRANULOCYTES # BLD AUTO: 0.32 K/UL (ref 0–0.04)
IMM GRANULOCYTES NFR BLD AUTO: 1.9 % (ref 0–0.5)
LYMPHOCYTES # BLD AUTO: 2.8 K/UL (ref 1–4.8)
LYMPHOCYTES NFR BLD: 16 % (ref 18–48)
MCH RBC QN AUTO: 25.6 PG (ref 27–31)
MCHC RBC AUTO-ENTMCNC: 29.9 G/DL (ref 32–36)
MCV RBC AUTO: 86 FL (ref 82–98)
MONOCYTES # BLD AUTO: 1.5 K/UL (ref 0.3–1)
MONOCYTES NFR BLD: 8.8 % (ref 4–15)
NEUTROPHILS # BLD AUTO: 12.5 K/UL (ref 1.8–7.7)
NEUTROPHILS NFR BLD: 72 % (ref 38–73)
NRBC BLD-RTO: 1 /100 WBC
PLATELET # BLD AUTO: 43 K/UL (ref 150–350)
PMV BLD AUTO: ABNORMAL FL (ref 9.2–12.9)
RBC # BLD AUTO: 4.02 M/UL (ref 4–5.4)
WBC # BLD AUTO: 17.29 K/UL (ref 3.9–12.7)

## 2020-02-24 PROCEDURE — 36415 COLL VENOUS BLD VENIPUNCTURE: CPT

## 2020-02-24 PROCEDURE — 99214 OFFICE O/P EST MOD 30 MIN: CPT | Mod: S$PBB,,, | Performed by: INTERNAL MEDICINE

## 2020-02-24 PROCEDURE — 99214 PR OFFICE/OUTPT VISIT, EST, LEVL IV, 30-39 MIN: ICD-10-PCS | Mod: S$PBB,,, | Performed by: INTERNAL MEDICINE

## 2020-02-24 PROCEDURE — 99999 PR PBB SHADOW E&M-EST. PATIENT-LVL IV: ICD-10-PCS | Mod: PBBFAC,,, | Performed by: INTERNAL MEDICINE

## 2020-02-24 PROCEDURE — 99214 OFFICE O/P EST MOD 30 MIN: CPT | Mod: PBBFAC | Performed by: INTERNAL MEDICINE

## 2020-02-24 PROCEDURE — 99999 PR PBB SHADOW E&M-EST. PATIENT-LVL IV: CPT | Mod: PBBFAC,,, | Performed by: INTERNAL MEDICINE

## 2020-02-24 RX ORDER — ONDANSETRON 2 MG/ML
8 INJECTION INTRAMUSCULAR; INTRAVENOUS ONCE
Status: CANCELLED
Start: 2020-02-24

## 2020-02-24 NOTE — PROGRESS NOTES
Subjective:      DATE OF VISIT: 2/25/2020   ?   ?   Patient ID:?Mari Miller is a 24 y.o. female.?? MR#: [id number]   ?  ?   CHIEF COMPLAINT:  Follow-up for acute ITP exacerbation    DIAGNOSIS: Acute ITP  ?   CURRENT TREATMENT: N-plate, 4mcg/kg/week     PAST TREATMENT:  Steroids, splenectomy 11/2018, eltrombopag (Promacta) and prednisone taper, IVIG x 2 10/2/19 and 10/3/19     HPI    Ms. Rasmussen presents today and continues to feel well without recurrent ecchymoses or mucosal bleeding. No fevers/chills/infectious symptoms, pain, edema or other new concerning symptoms..    Review of Systems    ?   A comprehensive 14-point review of systems was reviewed with patient and was negative other than as specified above.   ?     Objective:      Physical Exam      Wt Readings from Last 3 Encounters:   02/24/20 102.8 kg (226 lb 10.1 oz)   02/20/20 102.6 kg (226 lb 3.1 oz)   02/20/20 102.6 kg (226 lb 3.1 oz)     Temp Readings from Last 3 Encounters:   02/24/20 98.1 °F (36.7 °C)   02/20/20 98.7 °F (37.1 °C) (Oral)   02/17/20 99.2 °F (37.3 °C) (Oral)     BP Readings from Last 3 Encounters:   02/24/20 (!) 145/98   02/20/20 (!) 131/92   02/17/20 126/81     Pulse Readings from Last 3 Encounters:   02/24/20 90   02/20/20 86   02/17/20 86       ?   ECOG:?  0   General appearance: Generally well appearing, in no acute distress.   Head, eyes, ears, nose, and throat:  Oropharynx clear, no petechial lesion.   Pulmonary:  Normal work of breathing, clear to auscultation bilaterally  Cardiovascular:  Regular rate and rhythm, no murmurs rubs or gallops.  Abdomen: nontender, nondistended.   Extremities:  No edema  Neurologic: Alert and oriented. Grossly normal strength, coordination, and gait.   Skin:  Minimal ecchymoses on forearms.   Psychiatric: normal mood and affect, conversant and appropriate    ?   Laboratory:  ?   Platelets   Date Value Ref Range Status   02/24/2020 43 (L) 150 - 350 K/uL Final     Comment:     Results  confirmed, test repeated   02/20/2020 769 (H) 150 - 350 K/uL Final   02/17/2020 930 (H) 150 - 350 K/uL Final   02/14/2020 99 (L) 150 - 350 K/uL Final   02/13/2020 1 (LL) 150 - 350 K/uL Final     Comment:     PLT   critical result(s) called and verbal readback obtained from   Jackelyn Altman by MELANY 02/13/2020 11:00     02/10/2020 3 (LL) 150 - 350 K/uL Final     Comment:     Results confirmed, test repeated  PLT  critical result(s) called and verbal readback obtained from   Jackelyn Pal by UGO 02/10/2020 11:13     02/06/2020 780 (H) 150 - 350 K/uL Final   02/03/2020 1,433 (HH) 150 - 350 K/uL Final     Comment:     PLT  critical result(s) called and verbal readback obtained from   Penny Koenig by MELANY 02/03/2020 12:45     01/30/2020 1,039 (HH) 150 - 350 K/uL Final     Comment:     PLTS   critical result(s) called and verbal readback obtained from   JACKELYN PAL LPN by ERLINDA 01/30/2020 14:33     01/27/2020 259 150 - 350 K/uL Final         Lab Visit on 02/24/2020   Component Date Value Ref Range Status    WBC 02/24/2020 17.29* 3.90 - 12.70 K/uL Final    RBC 02/24/2020 4.02  4.00 - 5.40 M/uL Final    Hemoglobin 02/24/2020 10.3* 12.0 - 16.0 g/dL Final    Hematocrit 02/24/2020 34.5* 37.0 - 48.5 % Final    Mean Corpuscular Volume 02/24/2020 86  82 - 98 fL Final    Mean Corpuscular Hemoglobin 02/24/2020 25.6* 27.0 - 31.0 pg Final    Mean Corpuscular Hemoglobin Conc 02/24/2020 29.9* 32.0 - 36.0 g/dL Final    RDW 02/24/2020 18.7* 11.5 - 14.5 % Final    Platelets 02/24/2020 43* 150 - 350 K/uL Final    MPV 02/24/2020 SEE COMMENT  9.2 - 12.9 fL Final    Immature Granulocytes 02/24/2020 1.9* 0.0 - 0.5 % Final    Gran # (ANC) 02/24/2020 12.5* 1.8 - 7.7 K/uL Final    Immature Grans (Abs) 02/24/2020 0.32* 0.00 - 0.04 K/uL Final    Lymph # 02/24/2020 2.8  1.0 - 4.8 K/uL Final    Mono # 02/24/2020 1.5* 0.3 - 1.0 K/uL Final    Eos # 02/24/2020 0.1  0.0 - 0.5 K/uL Final    Baso # 02/24/2020 0.11  0.00 - 0.20 K/uL Final     nRBC 02/24/2020 1* 0 /100 WBC Final    Gran% 02/24/2020 72.0  38.0 - 73.0 % Final    Lymph% 02/24/2020 16.0* 18.0 - 48.0 % Final    Mono% 02/24/2020 8.8  4.0 - 15.0 % Final    Eosinophil% 02/24/2020 0.7  0.0 - 8.0 % Final    Basophil% 02/24/2020 0.6  0.0 - 1.9 % Final    Differential Method 02/24/2020 Automated   Final    ?   Assessment/Plan:       1. Acute ITP    2. Anemia, unspecified type    3. Leukocytosis, unspecified type          Plan:     # acute on chronic ITP:  refractory ITP status post steroids, splenectomy in November 2018, and this year monthly hospitalizations with platelet counts as low as 1-5K.  Since December 2018 she has been treated with daily eltrombopag, discontinued on 11/11/2019 due to refractory ITP and possible associated headache.  - received Nplate on 02/20/2020 and per discussion with pharmacy would not recommend giving prior to 7 days after last dose.  I did discuss concern about further decline in platelets given precipitous drop and may require additional agents in interim including IVIG or blood transfusion; she knows to call if any worsening of her symptoms in the interim.  Otherwise we will schedule her for this coming Thursday with Nplate of titrated up to 5 micrograms/kilogram.  I did discuss concern about recurrent acute episodes and may require further evaluation with bone marrow although not safe in setting of thrombocytopenia.  Will continue to discuss this at future visits.  # leukocytosis:  No infectious symptoms, attributed to steroid with decline since administration.  Continue to monitor.    # iron deficiency anemia:  S/p IV iron x 2 doses 12/2019.  Relatively stable anemia with hemoglobin 9-10.  Will continue monitor along with platelet counts.      Follow-Up:   - RV Thursday for CBC with N-plate 5mcg/kg  - noted to patient to please call in interim if worsening symptoms for sooner intervention e.g. IVIG or platelet transfusion.

## 2020-02-27 ENCOUNTER — INFUSION (OUTPATIENT)
Dept: INFUSION THERAPY | Facility: HOSPITAL | Age: 25
End: 2020-02-27
Attending: INTERNAL MEDICINE
Payer: MEDICAID

## 2020-02-27 ENCOUNTER — OFFICE VISIT (OUTPATIENT)
Dept: HEMATOLOGY/ONCOLOGY | Facility: CLINIC | Age: 25
End: 2020-02-27
Payer: MEDICAID

## 2020-02-27 VITALS
WEIGHT: 222.69 LBS | SYSTOLIC BLOOD PRESSURE: 136 MMHG | RESPIRATION RATE: 18 BRPM | TEMPERATURE: 98 F | DIASTOLIC BLOOD PRESSURE: 86 MMHG | HEIGHT: 62 IN | OXYGEN SATURATION: 99 % | BODY MASS INDEX: 40.98 KG/M2 | HEART RATE: 83 BPM

## 2020-02-27 DIAGNOSIS — D69.6 THROMBOCYTOPENIA: Primary | ICD-10-CM

## 2020-02-27 DIAGNOSIS — D64.9 ANEMIA, UNSPECIFIED TYPE: ICD-10-CM

## 2020-02-27 DIAGNOSIS — D69.3 ACUTE ITP: Primary | ICD-10-CM

## 2020-02-27 DIAGNOSIS — D69.3 ACUTE ITP: ICD-10-CM

## 2020-02-27 PROCEDURE — 99999 PR PBB SHADOW E&M-EST. PATIENT-LVL III: CPT | Mod: PBBFAC,,, | Performed by: INTERNAL MEDICINE

## 2020-02-27 PROCEDURE — 99999 PR PBB SHADOW E&M-EST. PATIENT-LVL III: ICD-10-PCS | Mod: PBBFAC,,, | Performed by: INTERNAL MEDICINE

## 2020-02-27 PROCEDURE — 99213 OFFICE O/P EST LOW 20 MIN: CPT | Mod: PBBFAC,25 | Performed by: INTERNAL MEDICINE

## 2020-02-27 PROCEDURE — 99214 OFFICE O/P EST MOD 30 MIN: CPT | Mod: S$PBB,,, | Performed by: INTERNAL MEDICINE

## 2020-02-27 PROCEDURE — 96372 THER/PROPH/DIAG INJ SC/IM: CPT

## 2020-02-27 PROCEDURE — 99214 PR OFFICE/OUTPT VISIT, EST, LEVL IV, 30-39 MIN: ICD-10-PCS | Mod: S$PBB,,, | Performed by: INTERNAL MEDICINE

## 2020-02-27 PROCEDURE — 63600175 PHARM REV CODE 636 W HCPCS: Mod: JG | Performed by: INTERNAL MEDICINE

## 2020-02-27 RX ORDER — ONDANSETRON 2 MG/ML
8 INJECTION INTRAMUSCULAR; INTRAVENOUS ONCE
Status: CANCELLED
Start: 2020-03-01

## 2020-02-27 RX ADMIN — ROMIPLOSTIM 475 MCG: 250 INJECTION, POWDER, LYOPHILIZED, FOR SOLUTION SUBCUTANEOUS at 11:02

## 2020-02-27 NOTE — PROGRESS NOTES
Subjective:      DATE OF VISIT: 2/27/2020   ?   ?   Patient ID:?Mari Miller is a 24 y.o. female.?? MR#: [id number]   ?  ?   CHIEF COMPLAINT:  Follow-up for acute ITP exacerbation    DIAGNOSIS: Acute ITP  ?   CURRENT TREATMENT: N-plate, 5mcg/kg/week     PAST TREATMENT:  Steroids, splenectomy 11/2018, eltrombopag (Promacta) and prednisone taper, IVIG x 2 10/2/19 and 10/3/19     HPI    Ms. Rasmussen presents continuing to feel well. She has not had any oral mucosal bleeding, new ecchymoses and has had resolution of prior.      Review of Systems    ?   A comprehensive 14-point review of systems was reviewed with patient and was negative other than as specified above.   ?     Objective:      Physical Exam      Wt Readings from Last 3 Encounters:   02/27/20 101 kg (222 lb 10.6 oz)   02/24/20 102.8 kg (226 lb 10.1 oz)   02/20/20 102.6 kg (226 lb 3.1 oz)     Temp Readings from Last 3 Encounters:   02/27/20 98.1 °F (36.7 °C) (Oral)   02/24/20 98.1 °F (36.7 °C)   02/20/20 98.7 °F (37.1 °C) (Oral)     BP Readings from Last 3 Encounters:   02/27/20 136/86   02/24/20 (!) 145/98   02/20/20 (!) 131/92     Pulse Readings from Last 3 Encounters:   02/27/20 83   02/24/20 90   02/20/20 86       ?   ECOG:?  0   General appearance: Generally well appearing, in no acute distress.   Head, eyes, ears, nose, and throat:  Oropharynx clear, no petechial lesion.   Pulmonary:  Normal work of breathing, clear to auscultation bilaterally  Cardiovascular:  Regular rate and rhythm, no murmurs rubs or gallops.  Abdomen: nontender, nondistended.   Extremities:  No edema  Neurologic: Alert and oriented. Grossly normal strength, coordination, and gait.   Skin:  Minimal ecchymoses on forearms.   Psychiatric: normal mood and affect, conversant and appropriate    ?   Laboratory:  ?   Platelets   Date Value Ref Range Status   02/27/2020 71 (L) 150 - 350 K/uL Final   02/24/2020 43 (L) 150 - 350 K/uL Final     Comment:     Results confirmed,  test repeated   02/20/2020 769 (H) 150 - 350 K/uL Final   02/17/2020 930 (H) 150 - 350 K/uL Final   02/14/2020 99 (L) 150 - 350 K/uL Final   02/13/2020 1 (LL) 150 - 350 K/uL Final     Comment:     PLT   critical result(s) called and verbal readback obtained from   Jackelyn Altman by MELANY 02/13/2020 11:00     02/10/2020 3 (LL) 150 - 350 K/uL Final     Comment:     Results confirmed, test repeated  PLT  critical result(s) called and verbal readback obtained from   Jackelyn Pal by UGO 02/10/2020 11:13     02/06/2020 780 (H) 150 - 350 K/uL Final   02/03/2020 1,433 (HH) 150 - 350 K/uL Final     Comment:     PLT  critical result(s) called and verbal readback obtained from   Penny Koenig by MELANY 02/03/2020 12:45     01/30/2020 1,039 (HH) 150 - 350 K/uL Final     Comment:     PLTS   critical result(s) called and verbal readback obtained from   JACKELYN PAL LPN by ERLINDA 01/30/2020 14:33           Lab Visit on 02/27/2020   Component Date Value Ref Range Status    WBC 02/27/2020 12.57  3.90 - 12.70 K/uL Final    RBC 02/27/2020 4.48  4.00 - 5.40 M/uL Final    Hemoglobin 02/27/2020 11.6* 12.0 - 16.0 g/dL Final    Hematocrit 02/27/2020 38.3  37.0 - 48.5 % Final    Mean Corpuscular Volume 02/27/2020 86  82 - 98 fL Final    Mean Corpuscular Hemoglobin 02/27/2020 25.9* 27.0 - 31.0 pg Final    Mean Corpuscular Hemoglobin Conc 02/27/2020 30.3* 32.0 - 36.0 g/dL Final    RDW 02/27/2020 18.3* 11.5 - 14.5 % Final    Platelets 02/27/2020 71* 150 - 350 K/uL Final    MPV 02/27/2020 SEE COMMENT  9.2 - 12.9 fL Final    Immature Granulocytes 02/27/2020 0.5  0.0 - 0.5 % Final    Gran # (ANC) 02/27/2020 8.6* 1.8 - 7.7 K/uL Final    Immature Grans (Abs) 02/27/2020 0.06* 0.00 - 0.04 K/uL Final    Lymph # 02/27/2020 2.2  1.0 - 4.8 K/uL Final    Mono # 02/27/2020 1.6* 0.3 - 1.0 K/uL Final    Eos # 02/27/2020 0.0  0.0 - 0.5 K/uL Final    Baso # 02/27/2020 0.11  0.00 - 0.20 K/uL Final    nRBC 02/27/2020 0  0 /100 WBC Final    Gran%  02/27/2020 68.5  38.0 - 73.0 % Final    Lymph% 02/27/2020 17.1* 18.0 - 48.0 % Final    Mono% 02/27/2020 12.7  4.0 - 15.0 % Final    Eosinophil% 02/27/2020 0.3  0.0 - 8.0 % Final    Basophil% 02/27/2020 0.9  0.0 - 1.9 % Final    Differential Method 02/27/2020 Automated   Final    ?   Assessment/Plan:       1. Thrombocytopenia    2. Anemia, unspecified type    3. Acute ITP          Plan:     # acute on chronic ITP:  refractory ITP status post steroids, splenectomy in November 2018, and this year monthly hospitalizations with platelet counts as low as 1-5K.  Since December 2018 she has been treated with daily eltrombopag, discontinued on 11/11/2019 due to refractory ITP and possible associated headache.  - received Nplate on 02/20/2020 at 4 micrograms/kilogram and will up titrate to 5 micrograms/kilogram today 02/27/2020.  Discussed with pharmacy.  Will plan to have her revisit on Monday to recheck labs given history of recurrent acute ITP.  Would like approval for IVIG indication additional therapy needed at that time.  Otherwise if stable platelet count will plan on weekly and-plate    # leukocytosis:  Resolved on labs today.  Likely attributed to steroid with decline since administration.  Continue to monitor.    # iron deficiency anemia:  S/p IV iron x 2 doses 12/2019.  Relatively stable anemia with hemoglobin 9-10.  Will continue monitor along with platelet counts.      Follow-Up:   - RV Monday for CBC   - revisit next Thursday for CBC and N-plate dose TBD

## 2020-02-27 NOTE — PATIENT INSTRUCTIONS
- N-plate 5mcg/kg today (had signed on Monday)  - RV on Monday with CBC and possible IVIG  - RV in 1 week with labs and N-plate

## 2020-03-02 ENCOUNTER — INFUSION (OUTPATIENT)
Dept: INFUSION THERAPY | Facility: HOSPITAL | Age: 25
End: 2020-03-02
Attending: INTERNAL MEDICINE
Payer: MEDICAID

## 2020-03-02 ENCOUNTER — OFFICE VISIT (OUTPATIENT)
Dept: HEMATOLOGY/ONCOLOGY | Facility: CLINIC | Age: 25
End: 2020-03-02
Payer: MEDICAID

## 2020-03-02 VITALS — SYSTOLIC BLOOD PRESSURE: 150 MMHG | DIASTOLIC BLOOD PRESSURE: 89 MMHG | HEART RATE: 69 BPM

## 2020-03-02 VITALS
SYSTOLIC BLOOD PRESSURE: 129 MMHG | WEIGHT: 224.88 LBS | BODY MASS INDEX: 41.38 KG/M2 | HEART RATE: 78 BPM | RESPIRATION RATE: 18 BRPM | HEIGHT: 62 IN | OXYGEN SATURATION: 99 % | TEMPERATURE: 98 F | DIASTOLIC BLOOD PRESSURE: 94 MMHG

## 2020-03-02 VITALS
SYSTOLIC BLOOD PRESSURE: 136 MMHG | DIASTOLIC BLOOD PRESSURE: 87 MMHG | HEART RATE: 77 BPM | TEMPERATURE: 98 F | RESPIRATION RATE: 18 BRPM

## 2020-03-02 DIAGNOSIS — D69.3 ACUTE ITP: Primary | ICD-10-CM

## 2020-03-02 DIAGNOSIS — D69.3 ACUTE ITP: ICD-10-CM

## 2020-03-02 DIAGNOSIS — D50.0 IRON DEFICIENCY ANEMIA DUE TO CHRONIC BLOOD LOSS: ICD-10-CM

## 2020-03-02 PROCEDURE — 99999 PR PBB SHADOW E&M-EST. PATIENT-LVL IV: ICD-10-PCS | Mod: PBBFAC,,, | Performed by: INTERNAL MEDICINE

## 2020-03-02 PROCEDURE — 96366 THER/PROPH/DIAG IV INF ADDON: CPT

## 2020-03-02 PROCEDURE — 96365 THER/PROPH/DIAG IV INF INIT: CPT

## 2020-03-02 PROCEDURE — 63600175 PHARM REV CODE 636 W HCPCS: Mod: JG | Performed by: INTERNAL MEDICINE

## 2020-03-02 PROCEDURE — 99999 PR PBB SHADOW E&M-EST. PATIENT-LVL IV: CPT | Mod: PBBFAC,,, | Performed by: INTERNAL MEDICINE

## 2020-03-02 PROCEDURE — 99215 OFFICE O/P EST HI 40 MIN: CPT | Mod: S$PBB,,, | Performed by: INTERNAL MEDICINE

## 2020-03-02 PROCEDURE — 63600175 PHARM REV CODE 636 W HCPCS: Performed by: INTERNAL MEDICINE

## 2020-03-02 PROCEDURE — 25000003 PHARM REV CODE 250: Performed by: INTERNAL MEDICINE

## 2020-03-02 PROCEDURE — 36430 TRANSFUSION BLD/BLD COMPNT: CPT

## 2020-03-02 PROCEDURE — 99214 OFFICE O/P EST MOD 30 MIN: CPT | Mod: PBBFAC,25 | Performed by: INTERNAL MEDICINE

## 2020-03-02 PROCEDURE — 99215 PR OFFICE/OUTPT VISIT, EST, LEVL V, 40-54 MIN: ICD-10-PCS | Mod: S$PBB,,, | Performed by: INTERNAL MEDICINE

## 2020-03-02 PROCEDURE — 96375 TX/PRO/DX INJ NEW DRUG ADDON: CPT

## 2020-03-02 RX ORDER — ACETAMINOPHEN 500 MG
500 TABLET ORAL
Status: DISCONTINUED | OUTPATIENT
Start: 2020-03-02 | End: 2020-03-02 | Stop reason: HOSPADM

## 2020-03-02 RX ORDER — HEPARIN 100 UNIT/ML
500 SYRINGE INTRAVENOUS
Status: CANCELLED | OUTPATIENT
Start: 2020-03-09

## 2020-03-02 RX ORDER — DIPHENHYDRAMINE HYDROCHLORIDE 50 MG/ML
25 INJECTION INTRAMUSCULAR; INTRAVENOUS
Status: CANCELLED | OUTPATIENT
Start: 2020-03-09

## 2020-03-02 RX ORDER — HYDROCODONE BITARTRATE AND ACETAMINOPHEN 500; 5 MG/1; MG/1
TABLET ORAL ONCE
Status: DISCONTINUED | OUTPATIENT
Start: 2020-03-02 | End: 2020-03-02 | Stop reason: HOSPADM

## 2020-03-02 RX ORDER — DIPHENHYDRAMINE HCL 25 MG
25 CAPSULE ORAL
Status: DISCONTINUED | OUTPATIENT
Start: 2020-03-02 | End: 2020-03-02 | Stop reason: HOSPADM

## 2020-03-02 RX ORDER — ACETAMINOPHEN 500 MG
500 TABLET ORAL
Status: CANCELLED | OUTPATIENT
Start: 2020-03-09

## 2020-03-02 RX ORDER — SODIUM CHLORIDE 0.9 % (FLUSH) 0.9 %
10 SYRINGE (ML) INJECTION
Status: CANCELLED | OUTPATIENT
Start: 2020-03-09

## 2020-03-02 RX ORDER — DIPHENHYDRAMINE HCL 25 MG
25 CAPSULE ORAL
Status: CANCELLED | OUTPATIENT
Start: 2020-03-02

## 2020-03-02 RX ORDER — DIPHENHYDRAMINE HYDROCHLORIDE 50 MG/ML
25 INJECTION INTRAMUSCULAR; INTRAVENOUS
Status: DISCONTINUED | OUTPATIENT
Start: 2020-03-02 | End: 2020-03-02 | Stop reason: HOSPADM

## 2020-03-02 RX ORDER — HYDROCODONE BITARTRATE AND ACETAMINOPHEN 500; 5 MG/1; MG/1
TABLET ORAL ONCE
Status: CANCELLED | OUTPATIENT
Start: 2020-03-02 | End: 2020-03-02

## 2020-03-02 RX ORDER — DIPHENHYDRAMINE HCL 25 MG
25 CAPSULE ORAL
Status: CANCELLED | OUTPATIENT
Start: 2020-03-09

## 2020-03-02 RX ADMIN — DIPHENHYDRAMINE HYDROCHLORIDE 25 MG: 50 INJECTION INTRAMUSCULAR; INTRAVENOUS at 08:03

## 2020-03-02 RX ADMIN — DEXTROSE: 5 SOLUTION INTRAVENOUS at 08:03

## 2020-03-02 RX ADMIN — HUMAN IMMUNOGLOBULIN G 100 G: 40 LIQUID INTRAVENOUS at 08:03

## 2020-03-02 RX ADMIN — ACETAMINOPHEN 500 MG: 500 TABLET ORAL at 08:03

## 2020-03-02 NOTE — PLAN OF CARE
Problem: Adult Inpatient Plan of Care  Goal: Plan of Care Review  Outcome: Ongoing, Progressing  Goal: Patient-Specific Goal (Individualization)  Outcome: Ongoing, Progressing   Pt stated feeling great and no complaints today .

## 2020-03-02 NOTE — PLAN OF CARE
1530 Pt tolerated transfusion of one unit platelets without  s/s reaction.  VSS. Discharged ambulatory with personal belongings.

## 2020-03-05 ENCOUNTER — INFUSION (OUTPATIENT)
Dept: INFUSION THERAPY | Facility: HOSPITAL | Age: 25
End: 2020-03-05
Attending: INTERNAL MEDICINE
Payer: MEDICAID

## 2020-03-05 ENCOUNTER — OFFICE VISIT (OUTPATIENT)
Dept: HEMATOLOGY/ONCOLOGY | Facility: CLINIC | Age: 25
End: 2020-03-05
Payer: MEDICAID

## 2020-03-05 VITALS
SYSTOLIC BLOOD PRESSURE: 140 MMHG | HEART RATE: 83 BPM | HEIGHT: 62 IN | TEMPERATURE: 98 F | WEIGHT: 224.63 LBS | DIASTOLIC BLOOD PRESSURE: 93 MMHG | BODY MASS INDEX: 41.34 KG/M2 | OXYGEN SATURATION: 99 % | RESPIRATION RATE: 18 BRPM

## 2020-03-05 DIAGNOSIS — D69.6 THROMBOCYTOPENIA: ICD-10-CM

## 2020-03-05 DIAGNOSIS — D72.829 LEUKOCYTOSIS, UNSPECIFIED TYPE: ICD-10-CM

## 2020-03-05 DIAGNOSIS — D69.3 ACUTE ITP: Primary | ICD-10-CM

## 2020-03-05 DIAGNOSIS — D64.9 ANEMIA, UNSPECIFIED TYPE: ICD-10-CM

## 2020-03-05 PROCEDURE — 99999 PR PBB SHADOW E&M-EST. PATIENT-LVL III: ICD-10-PCS | Mod: PBBFAC,,, | Performed by: INTERNAL MEDICINE

## 2020-03-05 PROCEDURE — 96372 THER/PROPH/DIAG INJ SC/IM: CPT

## 2020-03-05 PROCEDURE — 63600175 PHARM REV CODE 636 W HCPCS: Mod: JG | Performed by: INTERNAL MEDICINE

## 2020-03-05 PROCEDURE — 99999 PR PBB SHADOW E&M-EST. PATIENT-LVL III: CPT | Mod: PBBFAC,,, | Performed by: INTERNAL MEDICINE

## 2020-03-05 PROCEDURE — 99214 PR OFFICE/OUTPT VISIT, EST, LEVL IV, 30-39 MIN: ICD-10-PCS | Mod: S$PBB,,, | Performed by: INTERNAL MEDICINE

## 2020-03-05 PROCEDURE — 99213 OFFICE O/P EST LOW 20 MIN: CPT | Mod: PBBFAC,25 | Performed by: INTERNAL MEDICINE

## 2020-03-05 PROCEDURE — 99214 OFFICE O/P EST MOD 30 MIN: CPT | Mod: S$PBB,,, | Performed by: INTERNAL MEDICINE

## 2020-03-05 RX ORDER — ONDANSETRON 2 MG/ML
8 INJECTION INTRAMUSCULAR; INTRAVENOUS ONCE
Status: CANCELLED | OUTPATIENT
Start: 2020-03-05

## 2020-03-05 RX ADMIN — ROMIPLOSTIM 570 MCG: 250 INJECTION, POWDER, LYOPHILIZED, FOR SOLUTION SUBCUTANEOUS at 10:03

## 2020-03-05 NOTE — NURSING
Injection given without difficulties.Bandaid applied. Patient instructed to stay in the clinic for 15 minutes. Patient verbalized understanding and will notify nurse with any complaints.    Note:  Patient refused zofran today.

## 2020-03-05 NOTE — PROGRESS NOTES
Subjective:      DATE OF VISIT: 3/5/2020   ?   ?   Patient ID:?Mari Miller is a 24 y.o. female.?? MR#: [id number]   ?  ?   CHIEF COMPLAINT:  Follow-up for acute ITP exacerbation    DIAGNOSIS: Acute ITP  ?   CURRENT TREATMENT: N-plate, 5mcg/kg/week     PAST TREATMENT:  Steroids, splenectomy 11/2018, eltrombopag (Promacta) and prednisone taper, IVIG x 2 10/2/19 and 10/3/19     HPI    Ms. Rasmussen presents continuing to feel well and has had resolution of oral petechiae since Mondays visit when she was given IVIG as well as platelet transfusion.  No petechial rash on extremities or bleeding. She feels well with good energy.  No other clinical concerns.    Review of Systems    ?   A comprehensive 14-point review of systems was reviewed with patient and was negative other than as specified above.   ?     Objective:      Physical Exam      Wt Readings from Last 3 Encounters:   03/05/20 101.9 kg (224 lb 10.4 oz)   03/02/20 102 kg (224 lb 13.9 oz)   02/27/20 101 kg (222 lb 10.6 oz)     Temp Readings from Last 3 Encounters:   03/05/20 98.1 °F (36.7 °C) (Oral)   03/02/20 98.1 °F (36.7 °C)   03/02/20 98.2 °F (36.8 °C)     BP Readings from Last 3 Encounters:   03/05/20 (!) 140/93   03/02/20 136/87   03/02/20 (!) 150/89     Pulse Readings from Last 3 Encounters:   03/05/20 83   03/02/20 77   03/02/20 69       ?   ECOG:?  0   General appearance: Generally well appearing, in no acute distress.   Head, eyes, ears, nose, and throat:  Oropharynx clear, no petechial lesion.   Pulmonary:  Normal work of breathing, clear to auscultation bilaterally  Cardiovascular:  Regular rate and rhythm, no murmurs rubs or gallops.  Abdomen: nontender, nondistended.   Extremities:  No edema  Neurologic: Alert and oriented. Grossly normal strength, coordination, and gait.   Skin:  Minimal ecchymoses on forearms.   Psychiatric: normal mood and affect, conversant and appropriate    ?   Laboratory:  ?   Platelets   Date Value Ref Range  Status   03/05/2020 22 (LL) 150 - 350 K/uL Final     Comment:     PLt critical result(s) called and verbal readback obtained from   Jennifer Domingo RN  by ALLA 03/05/2020 10:15     03/02/2020 2 (LL) 150 - 350 K/uL Final     Comment:     Results confirmed, test repeated  PLTS  critical result(s) called and verbal readback obtained from   MINDA JEFFERSON LPN by ERLINDA 03/02/2020 08:03     02/27/2020 71 (L) 150 - 350 K/uL Final   02/24/2020 43 (L) 150 - 350 K/uL Final     Comment:     Results confirmed, test repeated   02/20/2020 769 (H) 150 - 350 K/uL Final   02/17/2020 930 (H) 150 - 350 K/uL Final   02/14/2020 99 (L) 150 - 350 K/uL Final   02/13/2020 1 (LL) 150 - 350 K/uL Final     Comment:     PLT   critical result(s) called and verbal readback obtained from   Evelyne Altman by MELANY 02/13/2020 11:00     02/10/2020 3 (LL) 150 - 350 K/uL Final     Comment:     Results confirmed, test repeated  PLT  critical result(s) called and verbal readback obtained from   Evelyne Pal by UGO 02/10/2020 11:13     02/06/2020 780 (H) 150 - 350 K/uL Final         Lab Visit on 03/05/2020   Component Date Value Ref Range Status    WBC 03/05/2020 12.86* 3.90 - 12.70 K/uL Final    RBC 03/05/2020 4.10  4.00 - 5.40 M/uL Final    Hemoglobin 03/05/2020 10.7* 12.0 - 16.0 g/dL Final    Hematocrit 03/05/2020 35.0* 37.0 - 48.5 % Final    Mean Corpuscular Volume 03/05/2020 85  82 - 98 fL Final    Mean Corpuscular Hemoglobin 03/05/2020 26.1* 27.0 - 31.0 pg Final    Mean Corpuscular Hemoglobin Conc 03/05/2020 30.6* 32.0 - 36.0 g/dL Final    RDW 03/05/2020 17.3* 11.5 - 14.5 % Final    Platelets 03/05/2020 22* 150 - 350 K/uL Final    MPV 03/05/2020 SEE COMMENT  9.2 - 12.9 fL Final    Immature Granulocytes 03/05/2020 1.2* 0.0 - 0.5 % Final    Gran # (ANC) 03/05/2020 8.4* 1.8 - 7.7 K/uL Final    Immature Grans (Abs) 03/05/2020 0.15* 0.00 - 0.04 K/uL Final    Lymph # 03/05/2020 2.7  1.0 - 4.8 K/uL Final    Mono # 03/05/2020 1.4* 0.3 - 1.0 K/uL  Final    Eos # 03/05/2020 0.1  0.0 - 0.5 K/uL Final    Baso # 03/05/2020 0.10  0.00 - 0.20 K/uL Final    nRBC 03/05/2020 0  0 /100 WBC Final    Gran% 03/05/2020 65.3  38.0 - 73.0 % Final    Lymph% 03/05/2020 21.0  18.0 - 48.0 % Final    Mono% 03/05/2020 10.8  4.0 - 15.0 % Final    Eosinophil% 03/05/2020 0.9  0.0 - 8.0 % Final    Basophil% 03/05/2020 0.8  0.0 - 1.9 % Final    Differential Method 03/05/2020 Automated   Final    ?   Assessment/Plan:       1. Acute ITP    2. Leukocytosis, unspecified type    3. Anemia, unspecified type    4. Thrombocytopenia          Plan:     # acute on chronic ITP:  refractory ITP status post steroids, splenectomy in November 2018, and this year monthly hospitalizations with platelet counts as low as 1-5K.  Since December 2018 she has been treated with daily eltrombopag, discontinued on 11/11/2019 due to refractory ITP and possible associated headache.  - on Monday 03/02/2020 received IVIG and platelet transfusion with improvement in platelet count from 2 to 22 today.  Still suboptimal and will proceed with Nplate  - received Nplate 5 micrograms/kilogram today 02/27/2020.  Will up titrate to 6 micrograms/kilogram today 03/05/2020.  - Will plan to have her revisit on Monday to recheck labs given history of recurrent acute ITP.  If decline may consider IVIG an or dexamethasone an or platelet transfusion pending counts.  Will plan on recheck of labs again next Thursday and up titration of N-plate to 5mcg/kg pending plt count.     # leukocytosis:  Mild, continue to monitor.    # iron deficiency anemia:  S/p IV iron x 2 doses 12/2019.  Relatively stable anemia with hemoglobin 9-10.  Will continue monitor along with platelet counts.      Follow-Up:   - RV Monday for CBC   - revisit next Thursday for CBC and N-plate dose TBD

## 2020-03-05 NOTE — DISCHARGE INSTRUCTIONS
[facility]  [address] [facility]  [address]  [phone] phone     [phone] fax  Hours of Operation: Monday- Friday 8:00am- 5:00pm  After hours phone  [phone]  Hematology / Oncology Physicians on call      Dr. Cipriano Johns, VERN Atkins NP Tyesha Taylor, NP    Please call with any concerns regarding your appointment today.  FALL PREVENTION   Falls often occur due to slipping, tripping or losing your balance. Here are ways to reduce your risk of falling again.   Was there anything that caused your fall that can be fixed, removed or replaced?   Make your home safe by keeping walkways clear of objects you may trip over.   Use non-slip pads under rugs.   Do not walk in poorly lit areas.   Do not stand on chairs or wobbly ladders.   Use caution when reaching overhead or looking upward. This position can cause a loss of balance.   Be sure your shoes fit properly, have non-slip bottoms and are in good condition.   Be cautious when going up and down stairs, curbs, and when walking on uneven sidewalks.   If your balance is poor, consider using a cane or walker.   If your fall was related to alcohol use, stop or limit alcohol intake.   If your fall was related to use of sleeping medicines, talk to your doctor about this. You may need to reduce your dosage at bedtime if you awaken during the night to go to the bathroom.   To reduce the need for nighttime bathroom trips:   Avoid drinking fluids for several hours before going to bed   Empty your bladder before going to bed   Men can keep a urinal at the bedside   © 4344-6310 [facility] [address], [address] [place] [address], [place] [address]. All rights reserved. This information is not intended as a substitute for professional medical care. Always follow your healthcare professional's instructions.

## 2020-03-06 NOTE — PROGRESS NOTES
Subjective:      DATE OF VISIT: 3/6/2020   ?   ?   Patient ID:?Mari Miller is a 24 y.o. female.?? MR#: [id number]   ?  ?   CHIEF COMPLAINT:  Follow-up for acute ITP exacerbation    DIAGNOSIS: Acute ITP  ?   CURRENT TREATMENT: N-plate, 5mcg/kg/week     PAST TREATMENT:  Steroids, splenectomy 11/2018, eltrombopag (Promacta) and prednisone taper, IVIG x 2 10/2/19 and 10/3/19     HPI    Ms. Rasmussen presents with new petechial lesion in mouth and forearms without bleeding.  Platelet count 2 K.    Review of Systems    ?   A comprehensive 14-point review of systems was reviewed with patient and was negative other than as specified above.   ?     Objective:      Physical Exam      Wt Readings from Last 3 Encounters:   03/05/20 101.9 kg (224 lb 10.4 oz)   03/02/20 102 kg (224 lb 13.9 oz)   02/27/20 101 kg (222 lb 10.6 oz)     Temp Readings from Last 3 Encounters:   03/05/20 98.1 °F (36.7 °C) (Oral)   03/02/20 98.1 °F (36.7 °C)   03/02/20 98.2 °F (36.8 °C)     BP Readings from Last 3 Encounters:   03/05/20 (!) 140/93   03/02/20 136/87   03/02/20 (!) 150/89     Pulse Readings from Last 3 Encounters:   03/05/20 83   03/02/20 77   03/02/20 69       ?   ECOG:?  0   General appearance: Generally well appearing, in no acute distress.   Head, eyes, ears, nose, and throat:  Oropharynx with mild petechiae without bleeding   Pulmonary:  Normal work of breathing, clear to auscultation bilaterally  Cardiovascular:  Regular rate and rhythm, no murmurs rubs or gallops.  Abdomen: nontender, nondistended.   Extremities:  No edema  Neurologic: Alert and oriented. Grossly normal strength, coordination, and gait.   Skin:  Minimal petechial lesion on forearms.   Psychiatric: normal mood and affect, conversant and appropriate    ?   Laboratory:  ?   Platelets   Date Value Ref Range Status   03/05/2020 22 (LL) 150 - 350 K/uL Final     Comment:     PLt critical result(s) called and verbal readback obtained from   Jennifer Domingo RN   by ALAL 03/05/2020 10:15     03/02/2020 2 (LL) 150 - 350 K/uL Final     Comment:     Results confirmed, test repeated  PLTS  critical result(s) called and verbal readback obtained from   MINDA JEFFERSON LPN by ERLINDA 03/02/2020 08:03     02/27/2020 71 (L) 150 - 350 K/uL Final   02/24/2020 43 (L) 150 - 350 K/uL Final     Comment:     Results confirmed, test repeated   02/20/2020 769 (H) 150 - 350 K/uL Final   02/17/2020 930 (H) 150 - 350 K/uL Final   02/14/2020 99 (L) 150 - 350 K/uL Final   02/13/2020 1 (LL) 150 - 350 K/uL Final     Comment:     PLT   critical result(s) called and verbal readback obtained from   Evelyne Altman by MELANY 02/13/2020 11:00     02/10/2020 3 (LL) 150 - 350 K/uL Final     Comment:     Results confirmed, test repeated  PLT  critical result(s) called and verbal readback obtained from   Evelyne Pal by UGO 02/10/2020 11:13     02/06/2020 780 (H) 150 - 350 K/uL Final         Lab Visit on 03/02/2020   Component Date Value Ref Range Status    UNIT NUMBER 03/02/2020 [id number]   Final    Product Code 03/02/2020 [id number]   Final    DISPENSE STATUS 03/02/2020 TRANSFUSED   Final    CODING SYSTEM 03/02/2020 [id number]   Final    Unit Blood Type Code 03/02/2020 2800   Final    Unit Blood Type 03/02/2020 AB NEG   Final    Unit Expiration 03/02/2020 [id number]   Final    Group & Rh 03/02/2020 O POS   Final    Indirect Richard 03/02/2020 NEG   Final   Lab Visit on 03/02/2020   Component Date Value Ref Range Status    WBC 03/02/2020 8.72  3.90 - 12.70 K/uL Final    RBC 03/02/2020 4.24  4.00 - 5.40 M/uL Final    Hemoglobin 03/02/2020 11.1* 12.0 - 16.0 g/dL Final    Hematocrit 03/02/2020 36.4* 37.0 - 48.5 % Final    Mean Corpuscular Volume 03/02/2020 86  82 - 98 fL Final    Mean Corpuscular Hemoglobin 03/02/2020 26.2* 27.0 - 31.0 pg Final    Mean Corpuscular Hemoglobin Conc 03/02/2020 30.5* 32.0 - 36.0 g/dL Final    RDW 03/02/2020 17.5* 11.5 - 14.5 % Final    Platelets 03/02/2020 2* 150 - 350 K/uL  Final    MPV 03/02/2020 SEE COMMENT  9.2 - 12.9 fL Final    Immature Granulocytes 03/02/2020 0.7* 0.0 - 0.5 % Final    Gran # (ANC) 03/02/2020 5.3  1.8 - 7.7 K/uL Final    Immature Grans (Abs) 03/02/2020 0.06* 0.00 - 0.04 K/uL Final    Lymph # 03/02/2020 2.2  1.0 - 4.8 K/uL Final    Mono # 03/02/2020 1.0  0.3 - 1.0 K/uL Final    Eos # 03/02/2020 0.1  0.0 - 0.5 K/uL Final    Baso # 03/02/2020 0.07  0.00 - 0.20 K/uL Final    nRBC 03/02/2020 0  0 /100 WBC Final    Gran% 03/02/2020 60.5  38.0 - 73.0 % Final    Lymph% 03/02/2020 25.2  18.0 - 48.0 % Final    Mono% 03/02/2020 11.9  4.0 - 15.0 % Final    Eosinophil% 03/02/2020 0.9  0.0 - 8.0 % Final    Basophil% 03/02/2020 0.8  0.0 - 1.9 % Final    Differential Method 03/02/2020 Automated   Final    ?   Assessment/Plan:       1. Acute ITP          Plan:     # acute on chronic ITP:  refractory ITP status post steroids, splenectomy in November 2018, and this year monthly hospitalizations with platelet counts as low as 1-5K.  Since December 2018 she has been treated with daily eltrombopag, discontinued on 11/11/2019 due to refractory ITP and possible associated headache.  - received Nplate 5 micrograms/kilogram 02/27/2020.   - labs showing platelet decline to 2 K with petechia a redeveloping.  Will give IVIG and 1 pack platelets.    # leukocytosis:  Resolved on labs today.  Likely attributed to steroid with decline since administration.  Continue to monitor.    # iron deficiency anemia:  S/p IV iron x 2 doses 12/2019.  Relatively stable anemia with hemoglobin 9-10.  Will continue monitor along with platelet counts.      Follow-Up:   - revisit next Thursday for CBC and N-plate dose TBD

## 2020-03-09 ENCOUNTER — LAB VISIT (OUTPATIENT)
Dept: LAB | Facility: HOSPITAL | Age: 25
End: 2020-03-09
Attending: INTERNAL MEDICINE
Payer: MEDICAID

## 2020-03-09 ENCOUNTER — OFFICE VISIT (OUTPATIENT)
Dept: HEMATOLOGY/ONCOLOGY | Facility: CLINIC | Age: 25
End: 2020-03-09
Payer: MEDICAID

## 2020-03-09 VITALS
DIASTOLIC BLOOD PRESSURE: 91 MMHG | OXYGEN SATURATION: 99 % | HEIGHT: 62 IN | WEIGHT: 226.63 LBS | SYSTOLIC BLOOD PRESSURE: 136 MMHG | TEMPERATURE: 98 F | BODY MASS INDEX: 41.71 KG/M2 | HEART RATE: 94 BPM

## 2020-03-09 DIAGNOSIS — D69.3 ACUTE ITP: Primary | ICD-10-CM

## 2020-03-09 DIAGNOSIS — D50.0 IRON DEFICIENCY ANEMIA DUE TO CHRONIC BLOOD LOSS: ICD-10-CM

## 2020-03-09 DIAGNOSIS — D69.3 ACUTE ITP: ICD-10-CM

## 2020-03-09 DIAGNOSIS — D69.3 CHRONIC ITP (IDIOPATHIC THROMBOCYTOPENIA): ICD-10-CM

## 2020-03-09 LAB
ANISOCYTOSIS BLD QL SMEAR: SLIGHT
BASOPHILS # BLD AUTO: 0.09 K/UL (ref 0–0.2)
BASOPHILS NFR BLD: 0.6 % (ref 0–1.9)
DACRYOCYTES BLD QL SMEAR: ABNORMAL
DIFFERENTIAL METHOD: ABNORMAL
EOSINOPHIL # BLD AUTO: 0.1 K/UL (ref 0–0.5)
EOSINOPHIL NFR BLD: 0.9 % (ref 0–8)
ERYTHROCYTE [DISTWIDTH] IN BLOOD BY AUTOMATED COUNT: 17.1 % (ref 11.5–14.5)
GIANT PLATELETS BLD QL SMEAR: PRESENT
HCT VFR BLD AUTO: 37.3 % (ref 37–48.5)
HGB BLD-MCNC: 11.4 G/DL (ref 12–16)
HOWELL-JOLLY BOD BLD QL SMEAR: ABNORMAL
IMM GRANULOCYTES # BLD AUTO: 0.19 K/UL (ref 0–0.04)
IMM GRANULOCYTES NFR BLD AUTO: 1.3 % (ref 0–0.5)
LYMPHOCYTES # BLD AUTO: 2.8 K/UL (ref 1–4.8)
LYMPHOCYTES NFR BLD: 18.9 % (ref 18–48)
MCH RBC QN AUTO: 26.2 PG (ref 27–31)
MCHC RBC AUTO-ENTMCNC: 30.6 G/DL (ref 32–36)
MCV RBC AUTO: 86 FL (ref 82–98)
MONOCYTES # BLD AUTO: 2.2 K/UL (ref 0.3–1)
MONOCYTES NFR BLD: 14.4 % (ref 4–15)
NEUTROPHILS # BLD AUTO: 9.6 K/UL (ref 1.8–7.7)
NEUTROPHILS NFR BLD: 63.9 % (ref 38–73)
NRBC BLD-RTO: 0 /100 WBC
OVALOCYTES BLD QL SMEAR: ABNORMAL
PLATELET # BLD AUTO: 223 K/UL (ref 150–350)
PLATELET BLD QL SMEAR: ABNORMAL
PMV BLD AUTO: 12.7 FL (ref 9.2–12.9)
POIKILOCYTOSIS BLD QL SMEAR: SLIGHT
POLYCHROMASIA BLD QL SMEAR: ABNORMAL
RBC # BLD AUTO: 4.35 M/UL (ref 4–5.4)
SPHEROCYTES BLD QL SMEAR: ABNORMAL
TARGETS BLD QL SMEAR: ABNORMAL
WBC # BLD AUTO: 15.01 K/UL (ref 3.9–12.7)

## 2020-03-09 PROCEDURE — 36415 COLL VENOUS BLD VENIPUNCTURE: CPT

## 2020-03-09 PROCEDURE — 99213 OFFICE O/P EST LOW 20 MIN: CPT | Mod: PBBFAC | Performed by: NURSE PRACTITIONER

## 2020-03-09 PROCEDURE — 99213 OFFICE O/P EST LOW 20 MIN: CPT | Mod: S$PBB,,, | Performed by: NURSE PRACTITIONER

## 2020-03-09 PROCEDURE — 99999 PR PBB SHADOW E&M-EST. PATIENT-LVL III: CPT | Mod: PBBFAC,,, | Performed by: NURSE PRACTITIONER

## 2020-03-09 PROCEDURE — 99213 PR OFFICE/OUTPT VISIT, EST, LEVL III, 20-29 MIN: ICD-10-PCS | Mod: S$PBB,,, | Performed by: NURSE PRACTITIONER

## 2020-03-09 PROCEDURE — 99999 PR PBB SHADOW E&M-EST. PATIENT-LVL III: ICD-10-PCS | Mod: PBBFAC,,, | Performed by: NURSE PRACTITIONER

## 2020-03-09 NOTE — ASSESSMENT & PLAN NOTE
S/p IV Injectafer x 2 doses 12/17/19 and 12/26/20. Hemoglobin 11.4, improved from previous. Monitor

## 2020-03-12 ENCOUNTER — LAB VISIT (OUTPATIENT)
Dept: LAB | Facility: HOSPITAL | Age: 25
End: 2020-03-12
Attending: INTERNAL MEDICINE
Payer: MEDICAID

## 2020-03-12 ENCOUNTER — OFFICE VISIT (OUTPATIENT)
Dept: HEMATOLOGY/ONCOLOGY | Facility: CLINIC | Age: 25
End: 2020-03-12
Payer: MEDICAID

## 2020-03-12 VITALS
SYSTOLIC BLOOD PRESSURE: 125 MMHG | HEART RATE: 91 BPM | WEIGHT: 224.19 LBS | TEMPERATURE: 97 F | OXYGEN SATURATION: 99 % | DIASTOLIC BLOOD PRESSURE: 85 MMHG | BODY MASS INDEX: 41.01 KG/M2

## 2020-03-12 DIAGNOSIS — D69.3 ACUTE ITP: Primary | ICD-10-CM

## 2020-03-12 DIAGNOSIS — D69.3 ACUTE ITP: ICD-10-CM

## 2020-03-12 DIAGNOSIS — D69.3 CHRONIC ITP (IDIOPATHIC THROMBOCYTOPENIA): ICD-10-CM

## 2020-03-12 LAB
ANISOCYTOSIS BLD QL SMEAR: ABNORMAL
BASOPHILS # BLD AUTO: 0.08 K/UL (ref 0–0.2)
BASOPHILS NFR BLD: 0.7 % (ref 0–1.9)
DACRYOCYTES BLD QL SMEAR: ABNORMAL
DIFFERENTIAL METHOD: ABNORMAL
EOSINOPHIL # BLD AUTO: 0.1 K/UL (ref 0–0.5)
EOSINOPHIL NFR BLD: 0.6 % (ref 0–8)
ERYTHROCYTE [DISTWIDTH] IN BLOOD BY AUTOMATED COUNT: 16.8 % (ref 11.5–14.5)
HCT VFR BLD AUTO: 37.4 % (ref 37–48.5)
HGB BLD-MCNC: 11.3 G/DL (ref 12–16)
IMM GRANULOCYTES # BLD AUTO: 0.07 K/UL (ref 0–0.04)
IMM GRANULOCYTES NFR BLD AUTO: 0.6 % (ref 0–0.5)
LYMPHOCYTES # BLD AUTO: 3.5 K/UL (ref 1–4.8)
LYMPHOCYTES NFR BLD: 28.8 % (ref 18–48)
MCH RBC QN AUTO: 25.3 PG (ref 27–31)
MCHC RBC AUTO-ENTMCNC: 30.2 G/DL (ref 32–36)
MCV RBC AUTO: 84 FL (ref 82–98)
MONOCYTES # BLD AUTO: 1.4 K/UL (ref 0.3–1)
MONOCYTES NFR BLD: 11.8 % (ref 4–15)
NEUTROPHILS # BLD AUTO: 6.9 K/UL (ref 1.8–7.7)
NEUTROPHILS NFR BLD: 57.5 % (ref 38–73)
NRBC BLD-RTO: 0 /100 WBC
OVALOCYTES BLD QL SMEAR: ABNORMAL
PLATELET # BLD AUTO: 431 K/UL (ref 150–350)
PLATELET BLD QL SMEAR: ABNORMAL
PMV BLD AUTO: 12.5 FL (ref 9.2–12.9)
POIKILOCYTOSIS BLD QL SMEAR: ABNORMAL
POLYCHROMASIA BLD QL SMEAR: ABNORMAL
RBC # BLD AUTO: 4.47 M/UL (ref 4–5.4)
SPHEROCYTES BLD QL SMEAR: ABNORMAL
TARGETS BLD QL SMEAR: ABNORMAL
WBC # BLD AUTO: 11.98 K/UL (ref 3.9–12.7)

## 2020-03-12 PROCEDURE — 99214 OFFICE O/P EST MOD 30 MIN: CPT | Mod: S$PBB,,, | Performed by: NURSE PRACTITIONER

## 2020-03-12 PROCEDURE — 36415 COLL VENOUS BLD VENIPUNCTURE: CPT

## 2020-03-12 PROCEDURE — 99213 OFFICE O/P EST LOW 20 MIN: CPT | Mod: PBBFAC | Performed by: NURSE PRACTITIONER

## 2020-03-12 PROCEDURE — 99214 PR OFFICE/OUTPT VISIT, EST, LEVL IV, 30-39 MIN: ICD-10-PCS | Mod: S$PBB,,, | Performed by: NURSE PRACTITIONER

## 2020-03-12 PROCEDURE — 99999 PR PBB SHADOW E&M-EST. PATIENT-LVL III: CPT | Mod: PBBFAC,,, | Performed by: NURSE PRACTITIONER

## 2020-03-12 PROCEDURE — 99999 PR PBB SHADOW E&M-EST. PATIENT-LVL III: ICD-10-PCS | Mod: PBBFAC,,, | Performed by: NURSE PRACTITIONER

## 2020-03-12 NOTE — ASSESSMENT & PLAN NOTE
Platelet count 431. Hold Nplate today per up to date guidelines. F/u Monday with Dr. Swan with repeat CBC. Discussed S&S to report

## 2020-03-12 NOTE — PROGRESS NOTES
Subjective:       Patient ID: Mari Miller is a 24 y.o. female.    Chief Complaint: No chief complaint on file.    HPI: 24 y.o female with h/o refractory ITP. Past treatments include Rituxan, Prednisone, IVIG, splenectomy 11/2018, Promacta. Patient has had several hospitalizations for her ITP requiring IVIG, steroids and PRBCs for anemia.  Patient currently being treated with Nplate injections 6mcg/kg.      She presents today for labs to determine need for NPlate 6mcg/kg. Last administered 3/5/20.   Patient denies any abnormal bleeding. Patient denies any hematuria, hematochezia, melena, hemoptysis, epistaxis, bowel or urinary complaints, fever, chills, night sweats, unintentional weight loss. Hemoglobin stable        Social History     Socioeconomic History    Marital status: Single     Spouse name: Not on file    Number of children: Not on file    Years of education: Not on file    Highest education level: Not on file   Occupational History    Not on file   Social Needs    Financial resource strain: Not on file    Food insecurity:     Worry: Not on file     Inability: Not on file    Transportation needs:     Medical: Not on file     Non-medical: Not on file   Tobacco Use    Smoking status: Never Smoker    Smokeless tobacco: Never Used   Substance and Sexual Activity    Alcohol use: No     Frequency: Never    Drug use: No    Sexual activity: Not on file   Lifestyle    Physical activity:     Days per week: Not on file     Minutes per session: Not on file    Stress: Not on file   Relationships    Social connections:     Talks on phone: Not on file     Gets together: Not on file     Attends Protestant service: Not on file     Active member of club or organization: Not on file     Attends meetings of clubs or organizations: Not on file     Relationship status: Not on file   Other Topics Concern    Not on file   Social History Narrative    Not on file       Past Medical History:   Diagnosis  Date    Encounter for blood transfusion     Thrombocytopenia        History reviewed. No pertinent family history.    Past Surgical History:   Procedure Laterality Date     SECTION      x1    ROBOT-ASSISTED SURGICAL REMOVAL OF SPLEEN USING DA MAXIM XI N/A 2018    Procedure: XI ROBOTIC SPLENECTOMY;  Surgeon: Yobani Lara MD;  Location: [facility];  Service: General;  Laterality: N/A;       Review of Systems   Constitutional: Negative for activity change, appetite change, chills, diaphoresis, fatigue, fever and unexpected weight change.   HENT: Negative for congestion, mouth sores, nosebleeds, sore throat, trouble swallowing and voice change.    Eyes: Negative for photophobia and visual disturbance.   Respiratory: Negative for cough, chest tightness, shortness of breath and wheezing.    Cardiovascular: Negative for chest pain, palpitations and leg swelling.   Gastrointestinal: Negative for abdominal distention, abdominal pain, blood in stool, constipation, diarrhea, nausea and vomiting.   Genitourinary: Negative for difficulty urinating, dysuria and hematuria.   Musculoskeletal: Negative for arthralgias, back pain and myalgias.   Skin: Negative for pallor, rash and wound.   Neurological: Negative for dizziness, syncope, weakness and headaches.   Hematological: Negative for adenopathy. Does not bruise/bleed easily.   Psychiatric/Behavioral: The patient is not nervous/anxious.            Objective:     Vitals:    20 1140   BP: 125/85   Pulse: 91   Temp: 97.3 °F (36.3 °C)     Lab Results   Component Value Date    WBC 11.98 2020    HGB 11.3 (L) 2020    HCT 37.4 2020    MCV 84 2020     (H) 2020     Lab Results   Component Value Date    IRON 48 12/10/2019    TIBC 478 (H) 12/10/2019    FERRITIN 12 (L) 12/10/2019     BMP  Lab Results   Component Value Date     10/15/2019    K 4.3 10/15/2019     10/15/2019    CO2 23 10/15/2019    BUN 9 10/15/2019     CREATININE 0.6 10/15/2019    CALCIUM 8.6 (L) 10/15/2019    ANIONGAP 7 (L) 10/15/2019    ESTGFRAFRICA >60 10/15/2019    EGFRNONAA >60 10/15/2019     Lab Results   Component Value Date    ALT 15 10/15/2019    AST 11 10/15/2019    ALKPHOS 47 (L) 10/15/2019    BILITOT 0.1 10/15/2019         Physical Exam   Constitutional: She is oriented to person, place, and time. She appears well-developed and well-nourished. She is cooperative.   HENT:   Head: Normocephalic.   Right Ear: Hearing normal. No drainage.   Left Ear: Hearing normal. No drainage.   Nose: Nose normal.   Mouth/Throat: Oropharynx is clear and moist.   Eyes: Conjunctivae, EOM and lids are normal. Right eye exhibits no discharge. Left eye exhibits no discharge. No scleral icterus.   Neck: Normal range of motion. No thyroid mass present.   Cardiovascular: Normal rate, regular rhythm and normal heart sounds.   No murmur heard.  Pulmonary/Chest: Effort normal and breath sounds normal. No respiratory distress. She has no wheezes. She has no rhonchi. She has no rales.   Abdominal: Soft. Bowel sounds are normal. She exhibits no distension. There is no tenderness.   Genitourinary:   Genitourinary Comments: deferred   Musculoskeletal: Normal range of motion. She exhibits no edema.   Lymphadenopathy:        Head (right side): No submandibular, no preauricular and no posterior auricular adenopathy present.        Head (left side): No submandibular, no preauricular and no posterior auricular adenopathy present.        Right cervical: No superficial cervical adenopathy present.       Left cervical: No superficial cervical adenopathy present.   Neurological: She is alert and oriented to person, place, and time.   Skin: Skin is warm, dry and intact.   Psychiatric: She has a normal mood and affect. Her speech is normal and behavior is normal. Thought content normal.   Vitals reviewed.       Assessment:     Problem List Items Addressed This Visit        Hematology    Chronic  ITP (idiopathic thrombocytopenia)     Platelet count 431. Hold Nplate today per up to date guidelines. F/u Monday with Dr. Swan with repeat CBC. Discussed S&S to report         Acute ITP - Primary    Relevant Orders    CBC auto differential            Plan:     Acute ITP  -     CBC auto differential; Future; Expected date: 03/12/2020    Chronic ITP (idiopathic thrombocytopenia)              BELA Acevedo

## 2020-03-16 ENCOUNTER — LAB VISIT (OUTPATIENT)
Dept: LAB | Facility: HOSPITAL | Age: 25
End: 2020-03-16
Attending: NURSE PRACTITIONER
Payer: MEDICAID

## 2020-03-16 ENCOUNTER — OFFICE VISIT (OUTPATIENT)
Dept: HEMATOLOGY/ONCOLOGY | Facility: CLINIC | Age: 25
End: 2020-03-16
Payer: MEDICAID

## 2020-03-16 VITALS
TEMPERATURE: 98 F | DIASTOLIC BLOOD PRESSURE: 88 MMHG | WEIGHT: 225.75 LBS | HEART RATE: 92 BPM | SYSTOLIC BLOOD PRESSURE: 132 MMHG | HEIGHT: 62 IN | RESPIRATION RATE: 16 BRPM | OXYGEN SATURATION: 99 % | BODY MASS INDEX: 41.54 KG/M2

## 2020-03-16 DIAGNOSIS — D69.3 CHRONIC ITP (IDIOPATHIC THROMBOCYTOPENIA): Primary | ICD-10-CM

## 2020-03-16 DIAGNOSIS — D64.9 ANEMIA, UNSPECIFIED TYPE: ICD-10-CM

## 2020-03-16 DIAGNOSIS — D72.829 LEUKOCYTOSIS, UNSPECIFIED TYPE: ICD-10-CM

## 2020-03-16 DIAGNOSIS — D69.3 ACUTE ITP: ICD-10-CM

## 2020-03-16 LAB
BASOPHILS # BLD AUTO: 0.14 K/UL (ref 0–0.2)
BASOPHILS NFR BLD: 0.9 % (ref 0–1.9)
DIFFERENTIAL METHOD: ABNORMAL
EOSINOPHIL # BLD AUTO: 0.1 K/UL (ref 0–0.5)
EOSINOPHIL NFR BLD: 0.6 % (ref 0–8)
ERYTHROCYTE [DISTWIDTH] IN BLOOD BY AUTOMATED COUNT: 16.5 % (ref 11.5–14.5)
GIANT PLATELETS BLD QL SMEAR: PRESENT
HCT VFR BLD AUTO: 38.5 % (ref 37–48.5)
HGB BLD-MCNC: 12 G/DL (ref 12–16)
HYPOCHROMIA BLD QL SMEAR: ABNORMAL
IMM GRANULOCYTES # BLD AUTO: 0.09 K/UL (ref 0–0.04)
IMM GRANULOCYTES NFR BLD AUTO: 0.6 % (ref 0–0.5)
LYMPHOCYTES # BLD AUTO: 3.3 K/UL (ref 1–4.8)
LYMPHOCYTES NFR BLD: 21.7 % (ref 18–48)
MCH RBC QN AUTO: 26.2 PG (ref 27–31)
MCHC RBC AUTO-ENTMCNC: 31.2 G/DL (ref 32–36)
MCV RBC AUTO: 84 FL (ref 82–98)
MONOCYTES # BLD AUTO: 1.6 K/UL (ref 0.3–1)
MONOCYTES NFR BLD: 10.7 % (ref 4–15)
NEUTROPHILS # BLD AUTO: 10.1 K/UL (ref 1.8–7.7)
NEUTROPHILS NFR BLD: 66.1 % (ref 38–73)
NRBC BLD-RTO: 0 /100 WBC
PLATELET # BLD AUTO: 310 K/UL (ref 150–350)
PLATELET BLD QL SMEAR: ABNORMAL
PMV BLD AUTO: 11.4 FL (ref 9.2–12.9)
RBC # BLD AUTO: 4.58 M/UL (ref 4–5.4)
WBC # BLD AUTO: 15.3 K/UL (ref 3.9–12.7)

## 2020-03-16 PROCEDURE — 99213 PR OFFICE/OUTPT VISIT, EST, LEVL III, 20-29 MIN: ICD-10-PCS | Mod: S$PBB,,, | Performed by: INTERNAL MEDICINE

## 2020-03-16 PROCEDURE — 99214 OFFICE O/P EST MOD 30 MIN: CPT | Mod: PBBFAC | Performed by: INTERNAL MEDICINE

## 2020-03-16 PROCEDURE — 99999 PR PBB SHADOW E&M-EST. PATIENT-LVL IV: CPT | Mod: PBBFAC,,, | Performed by: INTERNAL MEDICINE

## 2020-03-16 PROCEDURE — 99999 PR PBB SHADOW E&M-EST. PATIENT-LVL IV: ICD-10-PCS | Mod: PBBFAC,,, | Performed by: INTERNAL MEDICINE

## 2020-03-16 PROCEDURE — 85025 COMPLETE CBC W/AUTO DIFF WBC: CPT

## 2020-03-16 PROCEDURE — 36415 COLL VENOUS BLD VENIPUNCTURE: CPT

## 2020-03-16 PROCEDURE — 99213 OFFICE O/P EST LOW 20 MIN: CPT | Mod: S$PBB,,, | Performed by: INTERNAL MEDICINE

## 2020-03-16 NOTE — PROGRESS NOTES
Subjective:      DATE OF VISIT: 3/16/2020   ?   ?   Patient ID:?Mari Miller is a 24 y.o. female.?? MR#: [id number]   ?  ?   CHIEF COMPLAINT:  Follow-up     DIAGNOSIS: ITP  ?   CURRENT TREATMENT: N-plate, 5mcg/kg/week, last 3/5/20    PAST TREATMENT:  Steroids, splenectomy 11/2018, eltrombopag (Promacta) and prednisone taper, IVIG x 2 10/2/19 and 10/3/19     HPI    Ms. Rasmussen presents for follow-up.  She last received treatment on 03/05/2020 with N-plate 5mcg/kg.     She continues to feel well today without evidence of bleeding or petechial rash.    Review of Systems    ?   A comprehensive 14-point review of systems was reviewed with patient and was negative other than as specified above.   ?     Objective:      Physical Exam      Wt Readings from Last 3 Encounters:   03/16/20 102.4 kg (225 lb 12 oz)   03/12/20 101.7 kg (224 lb 3.3 oz)   03/09/20 102.8 kg (226 lb 10.1 oz)     Temp Readings from Last 3 Encounters:   03/16/20 97.7 °F (36.5 °C)   03/12/20 97.3 °F (36.3 °C) (Oral)   03/09/20 98.4 °F (36.9 °C) (Oral)     BP Readings from Last 3 Encounters:   03/16/20 132/88   03/12/20 125/85   03/09/20 (!) 136/91     Pulse Readings from Last 3 Encounters:   03/16/20 92   03/12/20 91   03/09/20 94       ?   ECOG:?  0   General appearance: Generally well appearing, in no acute distress.   Head, eyes, ears, nose, and throat:  Oropharynx clear, no petechial lesion.   Pulmonary:  Normal work of breathing, clear to auscultation bilaterally  Cardiovascular:  Regular rate and rhythm, no murmurs rubs or gallops.  Abdomen: nontender, nondistended.   Extremities:  No edema  Neurologic: Alert and oriented. Grossly normal strength, coordination, and gait.   Skin:  Minimal ecchymoses on forearms.   Psychiatric: normal mood and affect, conversant and appropriate    ?   Laboratory:  ?   Platelets   Date Value Ref Range Status   03/16/2020 310 150 - 350 K/uL Final   03/12/2020 431 (H) 150 - 350 K/uL Final     Comment:      Results confirmed, test repeated   03/09/2020 223 150 - 350 K/uL Final     Comment:     Results confirmed, test repeated   03/05/2020 22 (LL) 150 - 350 K/uL Final     Comment:     PLt critical result(s) called and verbal readback obtained from   Jennifer Domingo RN  by ALLA 03/05/2020 10:15     03/02/2020 2 (LL) 150 - 350 K/uL Final     Comment:     Results confirmed, test repeated  PLTS  critical result(s) called and verbal readback obtained from   MINDA JEFFERSON LPN by ERLINDA 03/02/2020 08:03     02/27/2020 71 (L) 150 - 350 K/uL Final   02/24/2020 43 (L) 150 - 350 K/uL Final     Comment:     Results confirmed, test repeated   02/20/2020 769 (H) 150 - 350 K/uL Final   02/17/2020 930 (H) 150 - 350 K/uL Final   02/14/2020 99 (L) 150 - 350 K/uL Final         Lab Visit on 03/16/2020   Component Date Value Ref Range Status    WBC 03/16/2020 15.30* 3.90 - 12.70 K/uL Final    RBC 03/16/2020 4.58  4.00 - 5.40 M/uL Final    Hemoglobin 03/16/2020 12.0  12.0 - 16.0 g/dL Final    Hematocrit 03/16/2020 38.5  37.0 - 48.5 % Final    Mean Corpuscular Volume 03/16/2020 84  82 - 98 fL Final    Mean Corpuscular Hemoglobin 03/16/2020 26.2* 27.0 - 31.0 pg Final    Mean Corpuscular Hemoglobin Conc 03/16/2020 31.2* 32.0 - 36.0 g/dL Final    RDW 03/16/2020 16.5* 11.5 - 14.5 % Final    Platelets 03/16/2020 310  150 - 350 K/uL Final    MPV 03/16/2020 11.4  9.2 - 12.9 fL Final    ?   Assessment/Plan:       1. Chronic ITP (idiopathic thrombocytopenia)    2. Leukocytosis, unspecified type    3. Anemia, unspecified type          Plan:     # acute on chronic ITP:  refractory ITP status post steroids, splenectomy in November 2018, and this year monthly hospitalizations with platelet counts as low as 1-5K.  Since December 2018 she has been treated with daily eltrombopag, discontinued on 11/11/2019 due to refractory ITP and possible associated headache.  - last received Nplate 6 micrograms/kilogram 03/05/2020, held subsequent dose last week  due to improved platelet count to 400 K; repeat labs today with platelet count 310 K.  Given sustained platelet count will hold Nplate today in repeat on Thursday    # leukocytosis:  Mild, continue to monitor.    # iron deficiency anemia:  S/p IV iron x 2 doses 12/2019.  Relatively stable anemia with hemoglobin 9-10.  Will continue monitor along with platelet counts.      Follow-Up:   - revisit Thursday for CBC and N-plate dose TBD

## 2020-03-19 ENCOUNTER — INFUSION (OUTPATIENT)
Dept: INFUSION THERAPY | Facility: HOSPITAL | Age: 25
End: 2020-03-19
Attending: INTERNAL MEDICINE
Payer: MEDICAID

## 2020-03-19 ENCOUNTER — OFFICE VISIT (OUTPATIENT)
Dept: HEMATOLOGY/ONCOLOGY | Facility: CLINIC | Age: 25
End: 2020-03-19
Payer: MEDICAID

## 2020-03-19 VITALS
DIASTOLIC BLOOD PRESSURE: 95 MMHG | BODY MASS INDEX: 42.03 KG/M2 | TEMPERATURE: 98 F | OXYGEN SATURATION: 98 % | SYSTOLIC BLOOD PRESSURE: 142 MMHG | WEIGHT: 228.38 LBS | RESPIRATION RATE: 16 BRPM | HEIGHT: 62 IN | HEART RATE: 86 BPM

## 2020-03-19 DIAGNOSIS — D69.3 ACUTE ITP: Primary | ICD-10-CM

## 2020-03-19 PROCEDURE — 99214 PR OFFICE/OUTPT VISIT, EST, LEVL IV, 30-39 MIN: ICD-10-PCS | Mod: S$PBB,,, | Performed by: INTERNAL MEDICINE

## 2020-03-19 PROCEDURE — 99999 PR PBB SHADOW E&M-EST. PATIENT-LVL IV: ICD-10-PCS | Mod: PBBFAC,,, | Performed by: INTERNAL MEDICINE

## 2020-03-19 PROCEDURE — 99999 PR PBB SHADOW E&M-EST. PATIENT-LVL IV: CPT | Mod: PBBFAC,,, | Performed by: INTERNAL MEDICINE

## 2020-03-19 PROCEDURE — 99214 OFFICE O/P EST MOD 30 MIN: CPT | Mod: S$PBB,,, | Performed by: INTERNAL MEDICINE

## 2020-03-19 PROCEDURE — 99214 OFFICE O/P EST MOD 30 MIN: CPT | Mod: PBBFAC,25 | Performed by: INTERNAL MEDICINE

## 2020-03-19 PROCEDURE — 96372 THER/PROPH/DIAG INJ SC/IM: CPT

## 2020-03-19 RX ORDER — ONDANSETRON 2 MG/ML
8 INJECTION INTRAMUSCULAR; INTRAVENOUS ONCE
Status: CANCELLED
Start: 2020-03-19

## 2020-03-19 RX ORDER — DEXAMETHASONE 4 MG/1
40 TABLET ORAL DAILY
Qty: 40 TABLET | Refills: 0 | Status: SHIPPED | OUTPATIENT
Start: 2020-03-20 | End: 2020-03-24

## 2020-03-19 NOTE — PROGRESS NOTES
Subjective:      DATE OF VISIT: 3/19/2020   ?   ?   Patient ID:?Mari Miller is a 24 y.o. female.?? MR#: [id number]   ?  ?   CHIEF COMPLAINT:  Follow-up     DIAGNOSIS: ITP  ?   CURRENT TREATMENT: N-plate, 5mcg/kg/week, last 3/5/20    PAST TREATMENT:  Steroids, splenectomy 11/2018, eltrombopag (Promacta) and prednisone taper, IVIG x 2 10/2/19 and 10/3/19     HPI    Ms. Rasmussen presents for follow-up.  She last received treatment on 03/05/2020 with N-plate 5mcg/kg.     Over the last day developed petechiae in mouth and mild on forearms.  No other evidence of bleeding or bruising.  Otherwise feels well no chest pain shortness of breath.    Review of Systems    ?   A comprehensive 14-point review of systems was reviewed with patient and was negative other than as specified above.   ?     Objective:      Physical Exam      Wt Readings from Last 3 Encounters:   03/19/20 103.6 kg (228 lb 6.3 oz)   03/16/20 102.4 kg (225 lb 12 oz)   03/12/20 101.7 kg (224 lb 3.3 oz)     Temp Readings from Last 3 Encounters:   03/19/20 98.3 °F (36.8 °C)   03/16/20 97.7 °F (36.5 °C)   03/12/20 97.3 °F (36.3 °C) (Oral)     BP Readings from Last 3 Encounters:   03/19/20 (!) 142/95   03/16/20 132/88   03/12/20 125/85     Pulse Readings from Last 3 Encounters:   03/19/20 86   03/16/20 92   03/12/20 91       ?   ECOG:?  0   General appearance: Generally well appearing, in no acute distress.   Head, eyes, ears, nose, and throat:  Mild oral petechiae.  No active bleeding.   Pulmonary:  Normal work of breathing.  Abdomen: nondistended.   Extremities:  No edema  Neurologic: Alert and oriented. Grossly normal strength, coordination, and gait.   Skin:  Minimal petechia on forearms.  No other ecchymoses   Psychiatric: normal mood and affect, conversant and appropriate    ?   Laboratory:  ?   Platelets   Date Value Ref Range Status   03/19/2020 2 (LL) 150 - 350 K/uL Final     Comment:     Plt   critical result(s) called and verbal readback  obtained from   Sarah Domingo by MELANY 03/19/2020 09:31     03/16/2020 310 150 - 350 K/uL Final   03/12/2020 431 (H) 150 - 350 K/uL Final     Comment:     Results confirmed, test repeated   03/09/2020 223 150 - 350 K/uL Final     Comment:     Results confirmed, test repeated   03/05/2020 22 (LL) 150 - 350 K/uL Final     Comment:     PLt critical result(s) called and verbal readback obtained from   Jennifer Domingo RN  by ALLA 03/05/2020 10:15     03/02/2020 2 (LL) 150 - 350 K/uL Final     Comment:     Results confirmed, test repeated  PLTS  critical result(s) called and verbal readback obtained from   MINDA JEFFERSON LPN by ERLINDA 03/02/2020 08:03     02/27/2020 71 (L) 150 - 350 K/uL Final   02/24/2020 43 (L) 150 - 350 K/uL Final     Comment:     Results confirmed, test repeated   02/20/2020 769 (H) 150 - 350 K/uL Final   02/17/2020 930 (H) 150 - 350 K/uL Final         Lab Visit on 03/19/2020   Component Date Value Ref Range Status    WBC 03/19/2020 13.65* 3.90 - 12.70 K/uL Final    RBC 03/19/2020 4.28  4.00 - 5.40 M/uL Final    Hemoglobin 03/19/2020 11.3* 12.0 - 16.0 g/dL Final    Hematocrit 03/19/2020 35.8* 37.0 - 48.5 % Final    Mean Corpuscular Volume 03/19/2020 84  82 - 98 fL Final    Mean Corpuscular Hemoglobin 03/19/2020 26.4* 27.0 - 31.0 pg Final    Mean Corpuscular Hemoglobin Conc 03/19/2020 31.6* 32.0 - 36.0 g/dL Final    RDW 03/19/2020 15.9* 11.5 - 14.5 % Final    Platelets 03/19/2020 2* 150 - 350 K/uL Final    MPV 03/19/2020 SEE COMMENT  9.2 - 12.9 fL Final    Immature Granulocytes 03/19/2020 0.7* 0.0 - 0.5 % Final    Gran # (ANC) 03/19/2020 7.7  1.8 - 7.7 K/uL Final    Immature Grans (Abs) 03/19/2020 0.09* 0.00 - 0.04 K/uL Final    Lymph # 03/19/2020 3.7  1.0 - 4.8 K/uL Final    Mono # 03/19/2020 2.0* 0.3 - 1.0 K/uL Final    Eos # 03/19/2020 0.1  0.0 - 0.5 K/uL Final    Baso # 03/19/2020 0.16  0.00 - 0.20 K/uL Final    nRBC 03/19/2020 0  0 /100 WBC Final    Gran% 03/19/2020 56.0  38.0 -  73.0 % Final    Lymph% 03/19/2020 27.2  18.0 - 48.0 % Final    Mono% 03/19/2020 14.8  4.0 - 15.0 % Final    Eosinophil% 03/19/2020 0.8  0.0 - 8.0 % Final    Basophil% 03/19/2020 1.2  0.0 - 1.9 % Final    Differential Method 03/19/2020 Automated   Final    ?   Assessment/Plan:       No diagnosis found.      Plan:     # acute on chronic ITP:  refractory ITP status post steroids, splenectomy in November 2018, and this year monthly hospitalizations with platelet counts as low as 1-5K.  Since December 2018 she has been treated with daily eltrombopag, discontinued on 11/11/2019 due to refractory ITP and possible associated headache.  - last received Nplate 6 micrograms/kilogram 03/05/2020, held subsequent dose last week due to improved platelet count to 300-400 K; repeat labs today with platelet count with acute decline to 2K.   - will give 6 micrograms/kilogram Nplate today.  It is important to note that she does require sustained N-plate every 1-1.5 weeks it appears due to rapid decline from 300s K  - Due to prior response to high-dose steroid will prescribe today dexamethasone 40 mg x4 days to use if needed prior to response to N-plate but she does know importance of calling to speak with physician on call prior in case further supportive care needed.    # leukocytosis:  Mild, continue to monitor.    # iron deficiency anemia:  S/p IV iron x 2 doses 12/2019.  Relatively stable anemia with hemoglobin 9-10.  Will continue monitor along with platelet counts.      Follow-Up:   - N-plate 6mcg/kg today  - revisit Monday with CBC  - RV Thursday for CBC and N-plate dose TBD

## 2020-03-19 NOTE — DISCHARGE INSTRUCTIONS
[facility] [place]  [address] [address]  [address] [address]  [phone] phone     [phone] fax  Hours of Operation: Monday- Friday 8:00am- 5:00pm  After hours phone  [phone]  Hematology / Oncology Physicians on call      VERN [address] Dr., Dr., Dr., NP Cheree Bodden, NP Sydney Prescott, NP      Please call with any concerns regarding your appointment today.

## 2020-03-23 ENCOUNTER — TELEPHONE (OUTPATIENT)
Dept: HEMATOLOGY/ONCOLOGY | Facility: CLINIC | Age: 25
End: 2020-03-23

## 2020-03-23 ENCOUNTER — LAB VISIT (OUTPATIENT)
Dept: LAB | Facility: HOSPITAL | Age: 25
End: 2020-03-23
Attending: INTERNAL MEDICINE
Payer: MEDICAID

## 2020-03-23 DIAGNOSIS — D64.9 ANEMIA, UNSPECIFIED TYPE: ICD-10-CM

## 2020-03-23 DIAGNOSIS — D69.3 ACUTE ITP: Primary | ICD-10-CM

## 2020-03-23 DIAGNOSIS — D69.3 ACUTE ITP: ICD-10-CM

## 2020-03-23 LAB
ANISOCYTOSIS BLD QL SMEAR: ABNORMAL
BASOPHILS NFR BLD: 0 % (ref 0–1.9)
DIFFERENTIAL METHOD: ABNORMAL
EOSINOPHIL NFR BLD: 0 % (ref 0–8)
ERYTHROCYTE [DISTWIDTH] IN BLOOD BY AUTOMATED COUNT: 17.2 % (ref 11.5–14.5)
HCT VFR BLD AUTO: 22.1 % (ref 37–48.5)
HGB BLD-MCNC: 7 G/DL (ref 12–16)
HYPOCHROMIA BLD QL SMEAR: ABNORMAL
IMM GRANULOCYTES # BLD AUTO: ABNORMAL K/UL (ref 0–0.04)
IMM GRANULOCYTES NFR BLD AUTO: ABNORMAL % (ref 0–0.5)
LYMPHOCYTES NFR BLD: 12 % (ref 18–48)
MCH RBC QN AUTO: 27.6 PG (ref 27–31)
MCHC RBC AUTO-ENTMCNC: 31.7 G/DL (ref 32–36)
MCV RBC AUTO: 87 FL (ref 82–98)
METAMYELOCYTES NFR BLD MANUAL: 2 %
MONOCYTES NFR BLD: 6 % (ref 4–15)
NEUTROPHILS NFR BLD: 76 % (ref 38–73)
NEUTS BAND NFR BLD MANUAL: 4 %
NRBC BLD-RTO: 7 /100 WBC
PLATELET # BLD AUTO: 6 K/UL (ref 150–350)
PLATELET BLD QL SMEAR: ABNORMAL
PMV BLD AUTO: ABNORMAL FL (ref 9.2–12.9)
POIKILOCYTOSIS BLD QL SMEAR: SLIGHT
POLYCHROMASIA BLD QL SMEAR: ABNORMAL
RBC # BLD AUTO: 2.54 M/UL (ref 4–5.4)
TARGETS BLD QL SMEAR: ABNORMAL
WBC # BLD AUTO: 62.33 K/UL (ref 3.9–12.7)

## 2020-03-23 PROCEDURE — 85060 BLOOD SMEAR INTERPRETATION: CPT | Mod: ,,, | Performed by: PATHOLOGY

## 2020-03-23 PROCEDURE — 85060 PATHOLOGIST REVIEW: ICD-10-PCS | Mod: ,,, | Performed by: PATHOLOGY

## 2020-03-23 PROCEDURE — 85027 COMPLETE CBC AUTOMATED: CPT

## 2020-03-23 PROCEDURE — 85007 BL SMEAR W/DIFF WBC COUNT: CPT

## 2020-03-23 PROCEDURE — 36415 COLL VENOUS BLD VENIPUNCTURE: CPT

## 2020-03-23 NOTE — TELEPHONE ENCOUNTER
----- Message from Noemi Pool sent at 3/23/2020  4:22 PM CDT -----  Contact: Self -[phone]  .Type:  Patient Returning Call    Who Called:.Mari Miller  Who Left Message for Patient:Usnure   Does the patient know what this is regarding?:   Would the patient rather a call back or a response via [facility]? Call back   Best Call Back Number:.[phone]  Additional Information:

## 2020-03-23 NOTE — TELEPHONE ENCOUNTER
I called to check in due to labs today which showed mild improvement in platelet count from 2-6 K and hemoglobin now down to 7 from 11. She noted nose bleed and starting menstrual cycle over the weekend.  [facility] and was [facility] due to control of nose bleed.  I recommended PRBC transfusion and will try to arrange this tomorrow.  She did start dexamethasone 40 mg x4 days.  Will see her in clinic on Thursday for repeat CBC and planned for Nplate

## 2020-03-23 NOTE — TELEPHONE ENCOUNTER
Called patient back with blood transfusion date and time location she verbalized understhanding and the call ended.

## 2020-03-24 ENCOUNTER — INFUSION (OUTPATIENT)
Dept: INFUSION THERAPY | Facility: HOSPITAL | Age: 25
End: 2020-03-24
Attending: INTERNAL MEDICINE
Payer: MEDICAID

## 2020-03-24 VITALS
TEMPERATURE: 98 F | RESPIRATION RATE: 18 BRPM | DIASTOLIC BLOOD PRESSURE: 87 MMHG | HEART RATE: 98 BPM | SYSTOLIC BLOOD PRESSURE: 142 MMHG

## 2020-03-24 DIAGNOSIS — D69.3 ACUTE ITP: ICD-10-CM

## 2020-03-24 DIAGNOSIS — D50.0 ANEMIA DUE TO CHRONIC BLOOD LOSS: ICD-10-CM

## 2020-03-24 DIAGNOSIS — D69.3 ACUTE ITP: Primary | ICD-10-CM

## 2020-03-24 LAB — PATH REV BLD -IMP: NORMAL

## 2020-03-24 PROCEDURE — 36430 TRANSFUSION BLD/BLD COMPNT: CPT

## 2020-03-24 RX ORDER — DIPHENHYDRAMINE HCL 25 MG
25 CAPSULE ORAL
Status: CANCELLED | OUTPATIENT
Start: 2020-03-24

## 2020-03-24 RX ORDER — DIPHENHYDRAMINE HCL 25 MG
25 CAPSULE ORAL
Status: DISCONTINUED | OUTPATIENT
Start: 2020-03-24 | End: 2020-03-24 | Stop reason: HOSPADM

## 2020-03-24 RX ORDER — HYDROCODONE BITARTRATE AND ACETAMINOPHEN 500; 5 MG/1; MG/1
TABLET ORAL ONCE
Status: DISCONTINUED | OUTPATIENT
Start: 2020-03-24 | End: 2020-03-24 | Stop reason: HOSPADM

## 2020-03-24 RX ORDER — HYDROCODONE BITARTRATE AND ACETAMINOPHEN 500; 5 MG/1; MG/1
TABLET ORAL ONCE
Status: CANCELLED | OUTPATIENT
Start: 2020-03-24 | End: 2020-03-24

## 2020-03-24 NOTE — PLAN OF CARE
Patient tolerated blood transfusion well; no adverse reactions noted. Iv discontinued with unit intact. Work excuse provided. Patient states she have follow up appointment on Thursday 3/26/2020. No complaints voiced. Ambulate off unit with belongings.

## 2020-03-26 ENCOUNTER — OFFICE VISIT (OUTPATIENT)
Dept: HEMATOLOGY/ONCOLOGY | Facility: CLINIC | Age: 25
End: 2020-03-26
Payer: MEDICAID

## 2020-03-26 ENCOUNTER — INFUSION (OUTPATIENT)
Dept: INFUSION THERAPY | Facility: HOSPITAL | Age: 25
End: 2020-03-26
Attending: INTERNAL MEDICINE
Payer: MEDICAID

## 2020-03-26 VITALS
RESPIRATION RATE: 18 BRPM | HEART RATE: 110 BPM | SYSTOLIC BLOOD PRESSURE: 109 MMHG | TEMPERATURE: 99 F | WEIGHT: 222.69 LBS | HEIGHT: 62 IN | BODY MASS INDEX: 40.98 KG/M2 | DIASTOLIC BLOOD PRESSURE: 71 MMHG | OXYGEN SATURATION: 98 %

## 2020-03-26 VITALS
OXYGEN SATURATION: 100 % | HEART RATE: 102 BPM | RESPIRATION RATE: 20 BRPM | TEMPERATURE: 98 F | SYSTOLIC BLOOD PRESSURE: 127 MMHG | DIASTOLIC BLOOD PRESSURE: 86 MMHG

## 2020-03-26 DIAGNOSIS — D72.829 LEUKOCYTOSIS, UNSPECIFIED TYPE: ICD-10-CM

## 2020-03-26 DIAGNOSIS — D50.0 ANEMIA DUE TO CHRONIC BLOOD LOSS: ICD-10-CM

## 2020-03-26 DIAGNOSIS — D69.3 ACUTE ITP: Primary | ICD-10-CM

## 2020-03-26 DIAGNOSIS — D50.0 IRON DEFICIENCY ANEMIA DUE TO CHRONIC BLOOD LOSS: ICD-10-CM

## 2020-03-26 PROCEDURE — 96372 THER/PROPH/DIAG INJ SC/IM: CPT

## 2020-03-26 PROCEDURE — 99999 PR PBB SHADOW E&M-EST. PATIENT-LVL III: CPT | Mod: PBBFAC,,, | Performed by: INTERNAL MEDICINE

## 2020-03-26 PROCEDURE — 99214 OFFICE O/P EST MOD 30 MIN: CPT | Mod: S$PBB,,, | Performed by: INTERNAL MEDICINE

## 2020-03-26 PROCEDURE — 99213 OFFICE O/P EST LOW 20 MIN: CPT | Mod: PBBFAC,25 | Performed by: INTERNAL MEDICINE

## 2020-03-26 PROCEDURE — 99999 PR PBB SHADOW E&M-EST. PATIENT-LVL III: ICD-10-PCS | Mod: PBBFAC,,, | Performed by: INTERNAL MEDICINE

## 2020-03-26 PROCEDURE — 63600175 PHARM REV CODE 636 W HCPCS: Mod: JG | Performed by: INTERNAL MEDICINE

## 2020-03-26 PROCEDURE — 99214 PR OFFICE/OUTPT VISIT, EST, LEVL IV, 30-39 MIN: ICD-10-PCS | Mod: S$PBB,,, | Performed by: INTERNAL MEDICINE

## 2020-03-26 RX ORDER — ONDANSETRON 2 MG/ML
8 INJECTION INTRAMUSCULAR; INTRAVENOUS ONCE
Status: CANCELLED
Start: 2020-03-26

## 2020-03-26 RX ADMIN — ROMIPLOSTIM 570 MCG: 500 INJECTION, POWDER, LYOPHILIZED, FOR SOLUTION SUBCUTANEOUS at 02:03

## 2020-03-26 NOTE — PROGRESS NOTES
Subjective:      DATE OF VISIT: 3/26/2020   ?   ?   Patient ID:?Mari Miller is a 24 y.o. female.?? MR#: [id number]   ?  ?   CHIEF COMPLAINT:  Follow-up     DIAGNOSIS: ITP  ?   CURRENT TREATMENT: N-plate, 5mcg/kg/week, last 3/5/20    PAST TREATMENT:  Steroids, splenectomy 11/2018, eltrombopag (Promacta) and prednisone taper, IVIG x 2 10/2/19 and 10/3/19     HPI    Ms. Rasmussen had PRBC transfusion 2 days ago due to nose bleed and bleeding in the setting of thrombocytopenia and menstrual cycle.  Her menstrual cycle has ceased and no further bleeding.  She has resolving ecchymoses and petechial lesions on all extremities.  No further oral bleeding.  She is feeling well.  Her daughter had mild upper respiratory infection that she has caught on has mild dry cough, no fever other chest pain, shortness of breath.    Review of Systems    ?   A comprehensive 14-point review of systems was reviewed with patient and was negative other than as specified above.   ?     Objective:      Physical Exam      Wt Readings from Last 3 Encounters:   03/26/20 101 kg (222 lb 10.6 oz)   03/20/20 101.8 kg (224 lb 6.9 oz)   03/19/20 103.6 kg (228 lb 6.3 oz)     Temp Readings from Last 3 Encounters:   03/26/20 98.4 °F (36.9 °C)   03/26/20 98.6 °F (37 °C)   03/24/20 98.4 °F (36.9 °C)     BP Readings from Last 3 Encounters:   03/26/20 127/86   03/26/20 109/71   03/24/20 (!) 142/87     Pulse Readings from Last 3 Encounters:   03/26/20 102   03/26/20 110   03/24/20 98       ?   ECOG:?  0   General appearance: Generally well appearing, in no acute distress.   Head, eyes, ears, nose, and throat:  Mild oral petechiae.  No active bleeding.   Pulmonary:  Normal work of breathing.  Abdomen: nondistended.   Extremities:  No edema  Neurologic: Alert and oriented. Grossly normal strength, coordination, and gait.   Skin:  Minimal petechia on forearms.  No other ecchymoses   Psychiatric: normal mood and affect, conversant and appropriate    ?    Laboratory:  ?   Platelets   Date Value Ref Range Status   03/26/2020 350 150 - 350 K/uL Final   03/23/2020 6 (LL) 150 - 350 K/uL Final     Comment:     WBC & PLT critical result(s) called and verbal readback obtained from   Johanne Niño  by AIDA 03/23/2020 14:25     03/19/2020 2 (LL) 150 - 350 K/uL Final     Comment:     Plt   critical result(s) called and verbal readback obtained from   Sarah Domingo by MELANY 03/19/2020 09:31     03/16/2020 310 150 - 350 K/uL Final   03/12/2020 431 (H) 150 - 350 K/uL Final     Comment:     Results confirmed, test repeated   03/09/2020 223 150 - 350 K/uL Final     Comment:     Results confirmed, test repeated   03/05/2020 22 (LL) 150 - 350 K/uL Final     Comment:     PLt critical result(s) called and verbal readback obtained from   Jennifer Domingo RN  by ALLA 03/05/2020 10:15     03/02/2020 2 (LL) 150 - 350 K/uL Final     Comment:     Results confirmed, test repeated  PLTS  critical result(s) called and verbal readback obtained from   MINDA JEFFERSON LPN by ERLINDA 03/02/2020 08:03     02/27/2020 71 (L) 150 - 350 K/uL Final   02/24/2020 43 (L) 150 - 350 K/uL Final     Comment:     Results confirmed, test repeated         Lab Visit on 03/26/2020   Component Date Value Ref Range Status    WBC 03/26/2020 41.53* 3.90 - 12.70 K/uL Final    RBC 03/26/2020 3.28* 4.00 - 5.40 M/uL Final    Hemoglobin 03/26/2020 8.8* 12.0 - 16.0 g/dL Final    Hematocrit 03/26/2020 28.6* 37.0 - 48.5 % Final    Mean Corpuscular Volume 03/26/2020 87  82 - 98 fL Final    Mean Corpuscular Hemoglobin 03/26/2020 26.8* 27.0 - 31.0 pg Final    Mean Corpuscular Hemoglobin Conc 03/26/2020 30.8* 32.0 - 36.0 g/dL Final    RDW 03/26/2020 19.3* 11.5 - 14.5 % Final    Platelets 03/26/2020 350  150 - 350 K/uL Final    MPV 03/26/2020 SEE COMMENT  9.2 - 12.9 fL Final    Immature Granulocytes 03/26/2020 4.5* 0.0 - 0.5 % Final    Gran # (ANC) 03/26/2020 30.9* 1.8 - 7.7 K/uL Final    Immature Grans (Abs) 03/26/2020  1.85* 0.00 - 0.04 K/uL Final    Lymph # 03/26/2020 4.9* 1.0 - 4.8 K/uL Final    Mono # 03/26/2020 3.1* 0.3 - 1.0 K/uL Final    Eos # 03/26/2020 0.7* 0.0 - 0.5 K/uL Final    Baso # 03/26/2020 0.12  0.00 - 0.20 K/uL Final    nRBC 03/26/2020 4* 0 /100 WBC Final    Gran% 03/26/2020 74.2* 38.0 - 73.0 % Final    Lymph% 03/26/2020 11.7* 18.0 - 48.0 % Final    Mono% 03/26/2020 7.6  4.0 - 15.0 % Final    Eosinophil% 03/26/2020 1.7  0.0 - 8.0 % Final    Basophil% 03/26/2020 0.3  0.0 - 1.9 % Final    Differential Method 03/26/2020 Automated   Final    ?   Assessment/Plan:       1. Acute ITP    2. Anemia due to chronic blood loss    3. Leukocytosis, unspecified type    4. Iron deficiency anemia due to chronic blood loss          Plan:     # acute on chronic ITP:  refractory ITP status post steroids, splenectomy in November 2018, and this year monthly hospitalizations with platelet counts as low as 1-5K.  Since December 2018 she has been treated with daily eltrombopag, discontinued on 11/11/2019 due to refractory ITP and possible associated headache.  - due to thrombocytopenia of 2 K last week she did receive dexamethasone 40 mg x4 days completed last Sunday with improved platelet count today to 350 K and resolution of menorrhagia and improvement in oral bleeding/petechiae/ecchymoses.  Due to hemoglobin declined to 7 receive 1 unit PRBC with appropriate response to hemoglobin 8.8.  - will give 6 micrograms/kilogram Nplate today.  It is important to note that she does require sustained N-plate every 1-1.5 weeks it appears due to rapid decline from 300s K    # leukocytosis:  Mild, continue to monitor.    # iron deficiency anemia:  S/p IV iron x 2 doses 12/2019.  Relatively stable anemia with hemoglobin 9-10.  Will continue monitor along with platelet counts.      Follow-Up:   - N-plate 6mcg/kg today  - revisit Monday with CBC and iron indices  - RV Thursday for CBC and N-plate dose TBD

## 2020-03-26 NOTE — NURSING
Nplate injection given without difficulties.Bandaid applied. Patient instructed to stay in the clinic for 15 minutes. Patient verbalized understanding and will notify nurse with any complaints.

## 2020-03-26 NOTE — PATIENT INSTRUCTIONS
- Nplate today 6mcg/kg  - RV mon with CBC, no appointment but please wait for results  - RV thurs for labs and N-plate planned

## 2020-03-30 ENCOUNTER — LAB VISIT (OUTPATIENT)
Dept: LAB | Facility: HOSPITAL | Age: 25
End: 2020-03-30
Attending: INTERNAL MEDICINE
Payer: MEDICAID

## 2020-03-30 ENCOUNTER — PATIENT MESSAGE (OUTPATIENT)
Dept: HEMATOLOGY/ONCOLOGY | Facility: CLINIC | Age: 25
End: 2020-03-30

## 2020-03-30 DIAGNOSIS — D50.0 IRON DEFICIENCY ANEMIA DUE TO CHRONIC BLOOD LOSS: ICD-10-CM

## 2020-03-30 DIAGNOSIS — D50.0 ANEMIA DUE TO CHRONIC BLOOD LOSS: ICD-10-CM

## 2020-03-30 DIAGNOSIS — D69.3 ACUTE ITP: ICD-10-CM

## 2020-03-30 LAB
BASOPHILS # BLD AUTO: ABNORMAL K/UL (ref 0–0.2)
BASOPHILS NFR BLD: 0 % (ref 0–1.9)
DIFFERENTIAL METHOD: ABNORMAL
EOSINOPHIL # BLD AUTO: ABNORMAL K/UL (ref 0–0.5)
EOSINOPHIL NFR BLD: 0 % (ref 0–8)
ERYTHROCYTE [DISTWIDTH] IN BLOOD BY AUTOMATED COUNT: 18.6 % (ref 11.5–14.5)
FERRITIN SERPL-MCNC: 50 NG/ML (ref 20–300)
HCT VFR BLD AUTO: 31.7 % (ref 37–48.5)
HGB BLD-MCNC: 9.5 G/DL (ref 12–16)
IMM GRANULOCYTES # BLD AUTO: ABNORMAL K/UL (ref 0–0.04)
IMM GRANULOCYTES NFR BLD AUTO: ABNORMAL % (ref 0–0.5)
IRON SERPL-MCNC: 23 UG/DL (ref 30–160)
LYMPHOCYTES # BLD AUTO: ABNORMAL K/UL (ref 1–4.8)
LYMPHOCYTES NFR BLD: 17 % (ref 18–48)
MCH RBC QN AUTO: 26.5 PG (ref 27–31)
MCHC RBC AUTO-ENTMCNC: 30 G/DL (ref 32–36)
MCV RBC AUTO: 88 FL (ref 82–98)
METAMYELOCYTES NFR BLD MANUAL: 1 %
MONOCYTES # BLD AUTO: ABNORMAL K/UL (ref 0.3–1)
MONOCYTES NFR BLD: 4 % (ref 4–15)
MYELOCYTES NFR BLD MANUAL: 3 %
NEUTROPHILS NFR BLD: 75 % (ref 38–73)
NRBC BLD-RTO: 2 /100 WBC
PLATELET # BLD AUTO: 358 K/UL (ref 150–350)
PMV BLD AUTO: 14.1 FL (ref 9.2–12.9)
RBC # BLD AUTO: 3.59 M/UL (ref 4–5.4)
SATURATED IRON: 6 % (ref 20–50)
TOTAL IRON BINDING CAPACITY: 416 UG/DL (ref 250–450)
TRANSFERRIN SERPL-MCNC: 281 MG/DL (ref 200–375)
WBC # BLD AUTO: 36.26 K/UL (ref 3.9–12.7)

## 2020-03-30 PROCEDURE — 36415 COLL VENOUS BLD VENIPUNCTURE: CPT

## 2020-03-30 PROCEDURE — 83540 ASSAY OF IRON: CPT

## 2020-03-30 PROCEDURE — 82728 ASSAY OF FERRITIN: CPT

## 2020-04-01 ENCOUNTER — PATIENT MESSAGE (OUTPATIENT)
Dept: HEMATOLOGY/ONCOLOGY | Facility: CLINIC | Age: 25
End: 2020-04-01

## 2020-04-02 ENCOUNTER — OFFICE VISIT (OUTPATIENT)
Dept: HEMATOLOGY/ONCOLOGY | Facility: CLINIC | Age: 25
End: 2020-04-02
Payer: MEDICAID

## 2020-04-02 ENCOUNTER — LAB VISIT (OUTPATIENT)
Dept: LAB | Facility: HOSPITAL | Age: 25
End: 2020-04-02
Attending: INTERNAL MEDICINE
Payer: MEDICAID

## 2020-04-02 VITALS
SYSTOLIC BLOOD PRESSURE: 131 MMHG | TEMPERATURE: 98 F | DIASTOLIC BLOOD PRESSURE: 87 MMHG | OXYGEN SATURATION: 100 % | RESPIRATION RATE: 18 BRPM | HEIGHT: 62 IN | HEART RATE: 78 BPM | WEIGHT: 226.88 LBS | BODY MASS INDEX: 41.75 KG/M2

## 2020-04-02 DIAGNOSIS — D50.8 OTHER IRON DEFICIENCY ANEMIA: ICD-10-CM

## 2020-04-02 DIAGNOSIS — D69.3 ACUTE ITP: ICD-10-CM

## 2020-04-02 DIAGNOSIS — D50.0 IRON DEFICIENCY ANEMIA DUE TO CHRONIC BLOOD LOSS: ICD-10-CM

## 2020-04-02 DIAGNOSIS — D72.829 LEUKOCYTOSIS, UNSPECIFIED TYPE: Primary | ICD-10-CM

## 2020-04-02 DIAGNOSIS — D75.839 THROMBOCYTOSIS: ICD-10-CM

## 2020-04-02 DIAGNOSIS — D69.3 CHRONIC ITP (IDIOPATHIC THROMBOCYTOPENIA): ICD-10-CM

## 2020-04-02 LAB
ANISOCYTOSIS BLD QL SMEAR: ABNORMAL
BASOPHILS # BLD AUTO: 0.09 K/UL (ref 0–0.2)
BASOPHILS NFR BLD: 0.4 % (ref 0–1.9)
DIFFERENTIAL METHOD: ABNORMAL
EOSINOPHIL # BLD AUTO: 0.2 K/UL (ref 0–0.5)
EOSINOPHIL NFR BLD: 0.7 % (ref 0–8)
ERYTHROCYTE [DISTWIDTH] IN BLOOD BY AUTOMATED COUNT: 18.6 % (ref 11.5–14.5)
HCT VFR BLD AUTO: 29.9 % (ref 37–48.5)
HGB BLD-MCNC: 9 G/DL (ref 12–16)
HYPOCHROMIA BLD QL SMEAR: ABNORMAL
IMM GRANULOCYTES # BLD AUTO: 0.28 K/UL (ref 0–0.04)
IMM GRANULOCYTES NFR BLD AUTO: 1.1 % (ref 0–0.5)
LYMPHOCYTES # BLD AUTO: 3.2 K/UL (ref 1–4.8)
LYMPHOCYTES NFR BLD: 12.9 % (ref 18–48)
MCH RBC QN AUTO: 25.9 PG (ref 27–31)
MCHC RBC AUTO-ENTMCNC: 30.1 G/DL (ref 32–36)
MCV RBC AUTO: 86 FL (ref 82–98)
MONOCYTES # BLD AUTO: 1.7 K/UL (ref 0.3–1)
MONOCYTES NFR BLD: 6.7 % (ref 4–15)
NEUTROPHILS # BLD AUTO: 19.4 K/UL (ref 1.8–7.7)
NEUTROPHILS NFR BLD: 78.2 % (ref 38–73)
NRBC BLD-RTO: 1 /100 WBC
PLATELET # BLD AUTO: 568 K/UL (ref 150–350)
PLATELET BLD QL SMEAR: ABNORMAL
PMV BLD AUTO: 13 FL (ref 9.2–12.9)
POIKILOCYTOSIS BLD QL SMEAR: ABNORMAL
POLYCHROMASIA BLD QL SMEAR: ABNORMAL
RBC # BLD AUTO: 3.47 M/UL (ref 4–5.4)
TARGETS BLD QL SMEAR: ABNORMAL
WBC # BLD AUTO: 24.89 K/UL (ref 3.9–12.7)

## 2020-04-02 PROCEDURE — 85025 COMPLETE CBC W/AUTO DIFF WBC: CPT

## 2020-04-02 PROCEDURE — 99999 PR PBB SHADOW E&M-EST. PATIENT-LVL III: ICD-10-PCS | Mod: PBBFAC,,, | Performed by: INTERNAL MEDICINE

## 2020-04-02 PROCEDURE — 99213 OFFICE O/P EST LOW 20 MIN: CPT | Mod: PBBFAC | Performed by: INTERNAL MEDICINE

## 2020-04-02 PROCEDURE — 99214 OFFICE O/P EST MOD 30 MIN: CPT | Mod: S$PBB,,, | Performed by: INTERNAL MEDICINE

## 2020-04-02 PROCEDURE — 36415 COLL VENOUS BLD VENIPUNCTURE: CPT

## 2020-04-02 PROCEDURE — 99999 PR PBB SHADOW E&M-EST. PATIENT-LVL III: CPT | Mod: PBBFAC,,, | Performed by: INTERNAL MEDICINE

## 2020-04-02 PROCEDURE — 99214 PR OFFICE/OUTPT VISIT, EST, LEVL IV, 30-39 MIN: ICD-10-PCS | Mod: S$PBB,,, | Performed by: INTERNAL MEDICINE

## 2020-04-02 NOTE — PATIENT INSTRUCTIONS
- no Nplate today  - Revisit tele visit with me on Monday with repeat CBC and plan for IV iron therapy and likely N-plate 6mcg/kg

## 2020-04-02 NOTE — PROGRESS NOTES
Subjective:      DATE OF VISIT: 4/2/2020   ?   ?   Patient ID:?Mari Miller is a 24 y.o. female.?? MR#: [id number]   ?  ?   CHIEF COMPLAINT:  Follow-up     DIAGNOSIS: ITP  ?   CURRENT TREATMENT: N-plate, 5mcg/kg/week, last 3/26/20    PAST TREATMENT:  Steroids, splenectomy 11/2018, eltrombopag (Promacta) and prednisone taper, IVIG x 2 10/2/19 and 10/3/19     HPI    Ms. Rasmussen is doing well today with ecchymosis on left forearm, resolving and no new bleeding, bruising or petechial lesion.  No headache, edema.     Review of Systems    ?   A comprehensive 14-point review of systems was reviewed with patient and was negative other than as specified above.   ?     Objective:      Physical Exam      Wt Readings from Last 3 Encounters:   04/02/20 102.9 kg (226 lb 13.7 oz)   03/26/20 101 kg (222 lb 10.6 oz)   03/20/20 101.8 kg (224 lb 6.9 oz)     Temp Readings from Last 3 Encounters:   04/02/20 98 °F (36.7 °C) (Oral)   03/26/20 98.4 °F (36.9 °C)   03/26/20 98.6 °F (37 °C)     BP Readings from Last 3 Encounters:   04/02/20 131/87   03/26/20 127/86   03/26/20 109/71     Pulse Readings from Last 3 Encounters:   04/02/20 78   03/26/20 102   03/26/20 110       ?   ECOG:?  0   General appearance: Generally well appearing, in no acute distress.   Head, eyes, ears, nose, and throat:  Oropharynx clear without petechiae AI or bleeding.   Pulmonary:  Normal work of breathing.  Abdomen: nondistended.   Extremities:  No edema  Neurologic: Alert and oriented. Grossly normal strength, coordination, and gait.   Skin:  Ecchymosis on left forearm, no other ecchymoses or petechiae.   Psychiatric: normal mood and affect, conversant and appropriate    ?   Laboratory:  ?   Platelets   Date Value Ref Range Status   04/02/2020 568 (H) 150 - 350 K/uL Final     Comment:     Results confirmed, test repeated   03/30/2020 358 (H) 150 - 350 K/uL Final   03/26/2020 350 150 - 350 K/uL Final   03/23/2020 6 (LL) 150 - 350 K/uL Final      Comment:     WBC & PLT critical result(s) called and verbal readback obtained from   Johanne Niño  by AIDA 03/23/2020 14:25     03/19/2020 2 (LL) 150 - 350 K/uL Final     Comment:     Plt   critical result(s) called and verbal readback obtained from   Sarah Domingo by MELANY 03/19/2020 09:31     03/16/2020 310 150 - 350 K/uL Final   03/12/2020 431 (H) 150 - 350 K/uL Final     Comment:     Results confirmed, test repeated   03/09/2020 223 150 - 350 K/uL Final     Comment:     Results confirmed, test repeated   03/05/2020 22 (LL) 150 - 350 K/uL Final     Comment:     PLt critical result(s) called and verbal readback obtained from   Jennifer Domingo RN  by ALLA 03/05/2020 10:15     03/02/2020 2 (LL) 150 - 350 K/uL Final     Comment:     Results confirmed, test repeated  PLTS  critical result(s) called and verbal readback obtained from   MINDA JEFFERSON LPN by ERLINDA 03/02/2020 08:03           Lab Visit on 04/02/2020   Component Date Value Ref Range Status    WBC 04/02/2020 24.89* 3.90 - 12.70 K/uL Final    RBC 04/02/2020 3.47* 4.00 - 5.40 M/uL Final    Hemoglobin 04/02/2020 9.0* 12.0 - 16.0 g/dL Final    Hematocrit 04/02/2020 29.9* 37.0 - 48.5 % Final    Mean Corpuscular Volume 04/02/2020 86  82 - 98 fL Final    Mean Corpuscular Hemoglobin 04/02/2020 25.9* 27.0 - 31.0 pg Final    Mean Corpuscular Hemoglobin Conc 04/02/2020 30.1* 32.0 - 36.0 g/dL Final    RDW 04/02/2020 18.6* 11.5 - 14.5 % Final    Platelets 04/02/2020 568* 150 - 350 K/uL Final    MPV 04/02/2020 13.0* 9.2 - 12.9 fL Final    ?   Assessment/Plan:       1. Leukocytosis, unspecified type    2. Other iron deficiency anemia    3. Iron deficiency anemia due to chronic blood loss    4. Chronic ITP (idiopathic thrombocytopenia)    5. Thrombocytosis          Plan:     # acute on chronic ITP:  refractory ITP status post steroids, splenectomy in November 2018, and this year monthly hospitalizations with platelet counts as low as 1-5K.  Since December 2018  she has been treated with daily eltrombopag, discontinued on 11/11/2019 due to refractory ITP and possible associated headache.  - due to thrombocytopenia of 2 K last week she did receive dexamethasone 40 mg x4 days completed last Sunday with improved platelet count today to 350 K and resolution of menorrhagia and improvement in oral bleeding/petechiae/ecchymoses.  Due to hemoglobin declined to 7 receive 1 unit PRBC with appropriate response to hemoglobin 8.8.  - last given 6 micrograms/kilogram Nplate 03/26/2020.  Repeat platelet count 568 K today and will hold however based on prior experience with rapid decline following steroid (last received 1.5 weeks ago) it is important to note that she does require sustained N-plate every 1-1.5 weeks it appears due to rapid decline from 300s K. will plan to give N-plate next Monday 4/6/20.  Note:  She does discuss possible interest in pregnancy this coming fall.  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    # leukocytosis:  May be related to steroid, no signs or symptoms of infection.  Peripheral blood smear reviewed notable for neutrophilia and no dysplastic cells noted.    # iron deficiency anemia:  Repeat iron indices with some deficiency and recommended repeat IV iron which I have ordered today for next week.  Encourage oral iron with stool softeners as needed for maintenance.      Follow-Up:   - no Nplate today  - Revisit tele visit with me on Monday with repeat CBC and plan for IV iron therapy and likely N-plate 6mcg/kg

## 2020-04-06 ENCOUNTER — OFFICE VISIT (OUTPATIENT)
Dept: HEMATOLOGY/ONCOLOGY | Facility: CLINIC | Age: 25
End: 2020-04-06
Payer: MEDICAID

## 2020-04-06 ENCOUNTER — INFUSION (OUTPATIENT)
Dept: INFUSION THERAPY | Facility: HOSPITAL | Age: 25
End: 2020-04-06
Attending: INTERNAL MEDICINE
Payer: MEDICAID

## 2020-04-06 VITALS
WEIGHT: 224.88 LBS | HEART RATE: 84 BPM | DIASTOLIC BLOOD PRESSURE: 81 MMHG | HEIGHT: 62 IN | TEMPERATURE: 98 F | SYSTOLIC BLOOD PRESSURE: 133 MMHG | RESPIRATION RATE: 18 BRPM | BODY MASS INDEX: 41.38 KG/M2 | OXYGEN SATURATION: 100 %

## 2020-04-06 VITALS — HEART RATE: 75 BPM | DIASTOLIC BLOOD PRESSURE: 90 MMHG | SYSTOLIC BLOOD PRESSURE: 146 MMHG | RESPIRATION RATE: 18 BRPM

## 2020-04-06 DIAGNOSIS — D50.0 IRON DEFICIENCY ANEMIA DUE TO CHRONIC BLOOD LOSS: ICD-10-CM

## 2020-04-06 DIAGNOSIS — D69.3 CHRONIC ITP (IDIOPATHIC THROMBOCYTOPENIA): Primary | ICD-10-CM

## 2020-04-06 DIAGNOSIS — D69.3 ACUTE ITP: Primary | ICD-10-CM

## 2020-04-06 PROCEDURE — 99214 PR OFFICE/OUTPT VISIT, EST, LEVL IV, 30-39 MIN: ICD-10-PCS | Mod: 95,,, | Performed by: INTERNAL MEDICINE

## 2020-04-06 PROCEDURE — 63600175 PHARM REV CODE 636 W HCPCS: Mod: JG | Performed by: INTERNAL MEDICINE

## 2020-04-06 PROCEDURE — 96365 THER/PROPH/DIAG IV INF INIT: CPT

## 2020-04-06 PROCEDURE — 99214 OFFICE O/P EST MOD 30 MIN: CPT | Mod: 95,,, | Performed by: INTERNAL MEDICINE

## 2020-04-06 PROCEDURE — 25000003 PHARM REV CODE 250: Performed by: INTERNAL MEDICINE

## 2020-04-06 RX ORDER — DIPHENHYDRAMINE HYDROCHLORIDE 50 MG/ML
25 INJECTION INTRAMUSCULAR; INTRAVENOUS
Status: CANCELLED | OUTPATIENT
Start: 2020-04-13

## 2020-04-06 RX ORDER — ACETAMINOPHEN 500 MG
500 TABLET ORAL
Status: DISCONTINUED | OUTPATIENT
Start: 2020-04-06 | End: 2020-04-06 | Stop reason: HOSPADM

## 2020-04-06 RX ORDER — DIPHENHYDRAMINE HCL 25 MG
25 CAPSULE ORAL
Status: CANCELLED | OUTPATIENT
Start: 2020-04-13

## 2020-04-06 RX ORDER — DIPHENHYDRAMINE HYDROCHLORIDE 50 MG/ML
25 INJECTION INTRAMUSCULAR; INTRAVENOUS
Status: DISCONTINUED | OUTPATIENT
Start: 2020-04-06 | End: 2020-04-06

## 2020-04-06 RX ORDER — ACETAMINOPHEN 500 MG
500 TABLET ORAL
Status: CANCELLED | OUTPATIENT
Start: 2020-04-13

## 2020-04-06 RX ORDER — HEPARIN 100 UNIT/ML
500 SYRINGE INTRAVENOUS
Status: CANCELLED | OUTPATIENT
Start: 2020-04-13

## 2020-04-06 RX ORDER — SODIUM CHLORIDE 9 MG/ML
250 INJECTION, SOLUTION INTRAVENOUS ONCE
Status: DISCONTINUED | OUTPATIENT
Start: 2020-04-06 | End: 2020-04-06 | Stop reason: HOSPADM

## 2020-04-06 RX ORDER — SODIUM CHLORIDE 0.9 % (FLUSH) 0.9 %
10 SYRINGE (ML) INJECTION
Status: CANCELLED | OUTPATIENT
Start: 2020-04-13

## 2020-04-06 RX ORDER — DIPHENHYDRAMINE HCL 25 MG
25 CAPSULE ORAL
Status: DISCONTINUED | OUTPATIENT
Start: 2020-04-06 | End: 2020-04-06 | Stop reason: HOSPADM

## 2020-04-06 RX ADMIN — FERUMOXYTOL 510 MG: 510 INJECTION INTRAVENOUS at 10:04

## 2020-04-06 NOTE — PROGRESS NOTES
Subjective:      DATE OF VISIT: 4/6/2020   ?   The patient location is:  clinic  The chief complaint leading to consultation is:  Follow-up for iron infusion and chronic ITP management  Visit type: Virtual visit with synchronous audio and video  Total time spent with patient:  10 min  Each patient to whom he or she provides medical services by telemedicine is:  (1) informed of the relationship between the physician and patient and the respective role of any other health care provider with respect to management of the patient; and (2) notified that he or she may decline to receive medical services by telemedicine and may withdraw from such care at any time.    Notes:     ?   Patient ID:?Mari Miller is a 24 y.o. female.?? MR#: [id number]   ?  ?   CHIEF COMPLAINT:  Follow-up     DIAGNOSIS: ITP  ?   CURRENT TREATMENT: N-plate, 5mcg/kg/week, last 3/26/20    PAST TREATMENT:  Steroids, splenectomy 11/2018, eltrombopag (Promacta) and prednisone taper, IVIG x 2 10/2/19 and 10/3/19     HPI    Ms. Rasmussen she presents to clinic today for follow-up.  She continues to have resolving ecchymosis on left forearm and no new areas of ecchymoses, petechial lesion or oropharyngeal bleeding.  Energy is stable.  No new issues or concerns.  She last received IV iron in 2019 and tolerated well.    Review of Systems    ?   A comprehensive 14-point review of systems was reviewed with patient and was negative other than as specified above.   ?     Objective:      Physical Exam      Wt Readings from Last 3 Encounters:   04/06/20 102 kg (224 lb 13.9 oz)   04/02/20 102.9 kg (226 lb 13.7 oz)   03/26/20 101 kg (222 lb 10.6 oz)     Temp Readings from Last 3 Encounters:   04/06/20 98.4 °F (36.9 °C)   04/06/20 98.4 °F (36.9 °C)   04/02/20 98 °F (36.7 °C) (Oral)     BP Readings from Last 3 Encounters:   04/06/20 133/81   04/06/20 (!) 146/90   04/06/20 133/81     Pulse Readings from Last 3 Encounters:   04/06/20 84   04/06/20 75    04/06/20 84       ?   ECOG:?  0   General appearance: Generally well appearing, in no acute distress.   Head, eyes, ears, nose, and throat:  Oropharynx clear without petechiae.   Pulmonary:  Normal work of breathing.  Abdomen: nondistended.   Extremities:  No edema  Neurologic: Alert and oriented. Grossly normal strength, coordination, and gait.   Skin:  Continued resolving ecchymosis on left forearm.  No petechial rash or other ecchymosis.   Psychiatric: normal mood and affect, conversant and appropriate    ?   Laboratory:  ?   Platelets   Date Value Ref Range Status   04/06/2020 571 (H) 150 - 350 K/uL Final   04/02/2020 568 (H) 150 - 350 K/uL Final     Comment:     Results confirmed, test repeated   03/30/2020 358 (H) 150 - 350 K/uL Final   03/26/2020 350 150 - 350 K/uL Final   03/23/2020 6 (LL) 150 - 350 K/uL Final     Comment:     WBC & PLT critical result(s) called and verbal readback obtained from   Johanne Niño  by AIDA 03/23/2020 14:25     03/19/2020 2 (LL) 150 - 350 K/uL Final     Comment:     Plt   critical result(s) called and verbal readback obtained from   Sarah Domingo by MELANY 03/19/2020 09:31     03/16/2020 310 150 - 350 K/uL Final   03/12/2020 431 (H) 150 - 350 K/uL Final     Comment:     Results confirmed, test repeated   03/09/2020 223 150 - 350 K/uL Final     Comment:     Results confirmed, test repeated   03/05/2020 22 (LL) 150 - 350 K/uL Final     Comment:     PLt critical result(s) called and verbal readback obtained from   Jennifer Domingo RN  by ALLA 03/05/2020 10:15           Lab Visit on 04/06/2020   Component Date Value Ref Range Status    WBC 04/06/2020 11.88  3.90 - 12.70 K/uL Final    RBC 04/06/2020 3.68* 4.00 - 5.40 M/uL Final    Hemoglobin 04/06/2020 9.5* 12.0 - 16.0 g/dL Final    Hematocrit 04/06/2020 31.5* 37.0 - 48.5 % Final    Mean Corpuscular Volume 04/06/2020 86  82 - 98 fL Final    Mean Corpuscular Hemoglobin 04/06/2020 25.8* 27.0 - 31.0 pg Final    Mean  Corpuscular Hemoglobin Conc 04/06/2020 30.2* 32.0 - 36.0 g/dL Final    RDW 04/06/2020 19.3* 11.5 - 14.5 % Final    Platelets 04/06/2020 571* 150 - 350 K/uL Final    MPV 04/06/2020 11.8  9.2 - 12.9 fL Final    Immature Granulocytes 04/06/2020 0.5  0.0 - 0.5 % Final    Gran # (ANC) 04/06/2020 7.9* 1.8 - 7.7 K/uL Final    Immature Grans (Abs) 04/06/2020 0.06* 0.00 - 0.04 K/uL Final    Lymph # 04/06/2020 2.2  1.0 - 4.8 K/uL Final    Mono # 04/06/2020 1.3* 0.3 - 1.0 K/uL Final    Eos # 04/06/2020 0.2  0.0 - 0.5 K/uL Final    Baso # 04/06/2020 0.12  0.00 - 0.20 K/uL Final    nRBC 04/06/2020 0  0 /100 WBC Final    Gran% 04/06/2020 66.8  38.0 - 73.0 % Final    Lymph% 04/06/2020 18.7  18.0 - 48.0 % Final    Mono% 04/06/2020 11.3  4.0 - 15.0 % Final    Eosinophil% 04/06/2020 1.7  0.0 - 8.0 % Final    Basophil% 04/06/2020 1.0  0.0 - 1.9 % Final    Differential Method 04/06/2020 Automated   Final    ?   Assessment/Plan:       1. Chronic ITP (idiopathic thrombocytopenia)    2. Iron deficiency anemia due to chronic blood loss          Plan:     # acute on chronic ITP:  refractory ITP status post steroids, splenectomy in November 2018, and this year monthly hospitalizations with platelet counts as low as 1-5K.  Since December 2018 she has been treated with daily eltrombopag, discontinued on 11/11/2019 due to refractory ITP and possible associated headache.  - due to thrombocytopenia of 2 K last week she did receive dexamethasone 40 mg x4 days completed last Sunday with improved platelet count today to 350 K and resolution of menorrhagia and improvement in oral bleeding/petechiae/ecchymoses.  Due to hemoglobin declined to 7 receive 1 unit PRBC with appropriate response to hemoglobin 8.8.  - last given 6 micrograms/kilogram Nplate 03/26/2020.  Repeat platelet count 571 K today and will hold on N-plate however based on prior experience with rapid decline from 300s K and will need to closely monitor  Note:  She does  discuss possible interest in pregnancy this coming fall.  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    # iron deficiency anemia:  Repeat iron indices with some deficiency.  IV iron ordered for today and 2nd dose in 1 week. Encouraged oral iron with stool softeners as needed for maintenance.      Follow-Up:   - no Nplate today  - IV iron today  - Revisit Thursday with labs and possible Nplate

## 2020-04-06 NOTE — PATIENT INSTRUCTIONS
- iv iron today  - RV on Thursday for CBC and possible N-plate   - RV in 1 week with labs prior and second dose iv iron

## 2020-04-09 ENCOUNTER — OFFICE VISIT (OUTPATIENT)
Dept: HEMATOLOGY/ONCOLOGY | Facility: CLINIC | Age: 25
End: 2020-04-09
Payer: MEDICAID

## 2020-04-09 ENCOUNTER — INFUSION (OUTPATIENT)
Dept: INFUSION THERAPY | Facility: HOSPITAL | Age: 25
End: 2020-04-09
Attending: INTERNAL MEDICINE
Payer: MEDICAID

## 2020-04-09 VITALS
SYSTOLIC BLOOD PRESSURE: 137 MMHG | RESPIRATION RATE: 18 BRPM | HEIGHT: 62 IN | BODY MASS INDEX: 41.38 KG/M2 | HEART RATE: 88 BPM | TEMPERATURE: 99 F | OXYGEN SATURATION: 97 % | DIASTOLIC BLOOD PRESSURE: 99 MMHG | WEIGHT: 224.88 LBS

## 2020-04-09 VITALS
TEMPERATURE: 99 F | DIASTOLIC BLOOD PRESSURE: 90 MMHG | WEIGHT: 224.88 LBS | BODY MASS INDEX: 41.38 KG/M2 | HEART RATE: 84 BPM | HEIGHT: 62 IN | RESPIRATION RATE: 16 BRPM | SYSTOLIC BLOOD PRESSURE: 139 MMHG | OXYGEN SATURATION: 100 %

## 2020-04-09 VITALS
RESPIRATION RATE: 18 BRPM | HEART RATE: 84 BPM | TEMPERATURE: 98 F | SYSTOLIC BLOOD PRESSURE: 141 MMHG | DIASTOLIC BLOOD PRESSURE: 88 MMHG

## 2020-04-09 DIAGNOSIS — D69.3 ACUTE ITP: Primary | ICD-10-CM

## 2020-04-09 DIAGNOSIS — D69.3 ACUTE ITP: ICD-10-CM

## 2020-04-09 DIAGNOSIS — D50.0 IRON DEFICIENCY ANEMIA DUE TO CHRONIC BLOOD LOSS: ICD-10-CM

## 2020-04-09 DIAGNOSIS — D69.3 CHRONIC ITP (IDIOPATHIC THROMBOCYTOPENIA): ICD-10-CM

## 2020-04-09 PROCEDURE — 36430 TRANSFUSION BLD/BLD COMPNT: CPT

## 2020-04-09 PROCEDURE — P9035 PLATELET PHERES LEUKOREDUCED: HCPCS

## 2020-04-09 PROCEDURE — 99215 PR OFFICE/OUTPT VISIT, EST, LEVL V, 40-54 MIN: ICD-10-PCS | Mod: 95,,, | Performed by: INTERNAL MEDICINE

## 2020-04-09 PROCEDURE — 96372 THER/PROPH/DIAG INJ SC/IM: CPT

## 2020-04-09 PROCEDURE — 99215 OFFICE O/P EST HI 40 MIN: CPT | Mod: 95,,, | Performed by: INTERNAL MEDICINE

## 2020-04-09 PROCEDURE — 63600175 PHARM REV CODE 636 W HCPCS: Mod: JG | Performed by: INTERNAL MEDICINE

## 2020-04-09 RX ORDER — HYDROCODONE BITARTRATE AND ACETAMINOPHEN 500; 5 MG/1; MG/1
TABLET ORAL ONCE
Status: DISCONTINUED | OUTPATIENT
Start: 2020-04-09 | End: 2020-04-09 | Stop reason: HOSPADM

## 2020-04-09 RX ORDER — DIPHENHYDRAMINE HCL 25 MG
25 CAPSULE ORAL
Status: CANCELLED | OUTPATIENT
Start: 2020-04-09

## 2020-04-09 RX ORDER — DEXAMETHASONE 4 MG/1
40 TABLET ORAL DAILY
Qty: 40 TABLET | Refills: 0 | Status: SHIPPED | OUTPATIENT
Start: 2020-04-10 | End: 2020-04-14

## 2020-04-09 RX ORDER — ONDANSETRON 2 MG/ML
8 INJECTION INTRAMUSCULAR; INTRAVENOUS ONCE
Status: CANCELLED | OUTPATIENT
Start: 2020-04-09

## 2020-04-09 RX ORDER — DIPHENHYDRAMINE HCL 25 MG
25 CAPSULE ORAL
Status: DISCONTINUED | OUTPATIENT
Start: 2020-04-09 | End: 2020-04-09 | Stop reason: HOSPADM

## 2020-04-09 RX ORDER — HYDROCODONE BITARTRATE AND ACETAMINOPHEN 500; 5 MG/1; MG/1
TABLET ORAL ONCE
Status: CANCELLED | OUTPATIENT
Start: 2020-04-09 | End: 2020-04-09

## 2020-04-09 RX ADMIN — ROMIPLOSTIM 570 MCG: 500 INJECTION, POWDER, LYOPHILIZED, FOR SOLUTION SUBCUTANEOUS at 02:04

## 2020-04-09 NOTE — PROGRESS NOTES
Subjective:      DATE OF VISIT: 4/9/2020   ?   The patient location is:  clinic  The chief complaint leading to consultation is:  Follow-up for iron infusion and chronic ITP management  Visit type: Virtual visit with synchronous audio and video  Total time spent with patient:  10 min  Each patient to whom he or she provides medical services by telemedicine is:  (1) informed of the relationship between the physician and patient and the respective role of any other health care provider with respect to management of the patient; and (2) notified that he or she may decline to receive medical services by telemedicine and may withdraw from such care at any time.    Notes:     ?   Patient ID:?Mari Miller is a 24 y.o. female.?? MR#: [id number]   ?  ?   CHIEF COMPLAINT:  Follow-up     DIAGNOSIS: ITP  ?   CURRENT TREATMENT: N-plate, 5mcg/kg/week, last 3/26/20    PAST TREATMENT:  Steroids, splenectomy 11/2018, eltrombopag (Promacta) and prednisone taper, IVIG x 2 10/2/19 and 10/3/19     HPI    Ms. Rasmussen is doing well aside from toothache but denies any bleeding orally or otherwise, ecchymoses or petechial lesion throughout her body.      Review of Systems    ?   A comprehensive 14-point review of systems was reviewed with patient and was negative other than as specified above.   ?     Objective:      Physical Exam      Wt Readings from Last 3 Encounters:   04/09/20 102 kg (224 lb 13.9 oz)   04/06/20 102 kg (224 lb 13.9 oz)   04/02/20 102.9 kg (226 lb 13.7 oz)     Temp Readings from Last 3 Encounters:   04/09/20 98.8 °F (37.1 °C)   04/06/20 98.4 °F (36.9 °C)   04/06/20 98.4 °F (36.9 °C)     BP Readings from Last 3 Encounters:   04/09/20 (!) 137/99   04/06/20 133/81   04/06/20 (!) 146/90     Pulse Readings from Last 3 Encounters:   04/09/20 88   04/06/20 84   04/06/20 75       ?  Limited physical exam due to tele visit  ECOG:?  0   General appearance: Generally well appearing, in no acute distress.   Head, eyes,  ears, nose, and throat:  Oropharynx clear without petechiae, wearing mask.   Pulmonary:  Normal work of breathing.  Abdomen: nondistended.   Extremities:  No edema  Neurologic: Alert and oriented. Grossly normal strength, coordination, and gait.   Skin: No petechial rash or other ecchymosis.   Psychiatric: normal mood and affect, conversant and appropriate    ?   Laboratory:  ?   Platelets   Date Value Ref Range Status   04/09/2020 0 (LL) 150 - 350 K/uL Final     Comment:     Plt critical result(s) called and verbal readback obtained from Johanne Niño RN  by HTPA1 04/09/2020 13:49     04/06/2020 571 (H) 150 - 350 K/uL Final   04/02/2020 568 (H) 150 - 350 K/uL Final     Comment:     Results confirmed, test repeated   03/30/2020 358 (H) 150 - 350 K/uL Final   03/26/2020 350 150 - 350 K/uL Final   03/23/2020 6 (LL) 150 - 350 K/uL Final     Comment:     WBC & PLT critical result(s) called and verbal readback obtained from   Johanne Niño  by JL2 03/23/2020 14:25     03/19/2020 2 (LL) 150 - 350 K/uL Final     Comment:     Plt   critical result(s) called and verbal readback obtained from   Sarah Domingo by MELANY 03/19/2020 09:31     03/16/2020 310 150 - 350 K/uL Final   03/12/2020 431 (H) 150 - 350 K/uL Final     Comment:     Results confirmed, test repeated   03/09/2020 223 150 - 350 K/uL Final     Comment:     Results confirmed, test repeated         Lab Visit on 04/09/2020   Component Date Value Ref Range Status    WBC 04/09/2020 15.12* 3.90 - 12.70 K/uL Final    RBC 04/09/2020 3.70* 4.00 - 5.40 M/uL Final    Hemoglobin 04/09/2020 9.5* 12.0 - 16.0 g/dL Final    Hematocrit 04/09/2020 30.9* 37.0 - 48.5 % Final    Mean Corpuscular Volume 04/09/2020 84  82 - 98 fL Final    Mean Corpuscular Hemoglobin 04/09/2020 25.7* 27.0 - 31.0 pg Final    Mean Corpuscular Hemoglobin Conc 04/09/2020 30.7* 32.0 - 36.0 g/dL Final    RDW 04/09/2020 19.0* 11.5 - 14.5 % Final    Platelets 04/09/2020 0* 150 - 350 K/uL Final     MPV 04/09/2020 SEE COMMENT  9.2 - 12.9 fL Final    Immature Granulocytes 04/09/2020 0.6* 0.0 - 0.5 % Final    Gran # (ANC) 04/09/2020 11.3* 1.8 - 7.7 K/uL Final    Immature Grans (Abs) 04/09/2020 0.09* 0.00 - 0.04 K/uL Final    Lymph # 04/09/2020 2.3  1.0 - 4.8 K/uL Final    Mono # 04/09/2020 1.2* 0.3 - 1.0 K/uL Final    Eos # 04/09/2020 0.0  0.0 - 0.5 K/uL Final    Baso # 04/09/2020 0.14  0.00 - 0.20 K/uL Final    nRBC 04/09/2020 1* 0 /100 WBC Final    Gran% 04/09/2020 75.0* 38.0 - 73.0 % Final    Lymph% 04/09/2020 15.1* 18.0 - 48.0 % Final    Mono% 04/09/2020 8.1  4.0 - 15.0 % Final    Eosinophil% 04/09/2020 0.3  0.0 - 8.0 % Final    Basophil% 04/09/2020 0.9  0.0 - 1.9 % Final    Differential Method 04/09/2020 Automated   Final    ?   Assessment/Plan:       1. Acute ITP    2. Chronic ITP (idiopathic thrombocytopenia)          Plan:     # acute on chronic ITP:  refractory ITP status post steroids, splenectomy in November 2018, and this year monthly hospitalizations with platelet counts as low as 1-5K.  Since December 2018 she has been treated with daily eltrombopag, discontinued on 11/11/2019 due to refractory ITP and possible associated headache.  - last given 6 micrograms/kilogram Nplate 03/26/2020.  Repeat platelet count a couple days ago 571 K and therefore Nplate held however she has had abrupt decline to 0 K today although no clinically significant bleeding.  Given rapid decline it is essential that she maintain weekly endplate to see if this can help prevent such acute declines in her platelet count.  We will arrange for 1 pack platelets today along with endplate and follow-up on Monday.  Will prescribe dexamethasone 40 mg x4 days as she has previously responded well to this as needed for refractory thrombocytopenia/bleeding.  She understands importance of calling if she develops notable bleeding or new concerning symptom.    # iron deficiency anemia:  Repeat iron indices with some  deficiency.  IV iron was administered on Monday and 2nd dose next Monday. Encouraged oral iron with stool softeners as needed for maintenance.      Follow-Up:   - 1 pack platelets today  - N-plate 6mcg/kg today  - RV on Monday for repeat CBC   - script for dexamethasone 40mg x 4 days if needed for refractory thrombocytopenia sent ot [facility]

## 2020-04-09 NOTE — PATIENT INSTRUCTIONS
- 1 pack platelets today  - N-plate 6mcg/kg today  - RV on Monday for repeat CBC   - script for dexamethasone 40mg x 4 days if needed for refractory thrombocytopenia sent ot [facility]

## 2020-04-09 NOTE — PROGRESS NOTES
Subjective:      DATE OF VISIT: 4/9/2020   ?   The patient location is:  clinic  The chief complaint leading to consultation is:  Follow-up for iron infusion and chronic ITP management  Visit type: Virtual visit with synchronous audio and video  Total time spent with patient:  10 min  Each patient to whom he or she provides medical services by telemedicine is:  (1) informed of the relationship between the physician and patient and the respective role of any other health care provider with respect to management of the patient; and (2) notified that he or she may decline to receive medical services by telemedicine and may withdraw from such care at any time.    Notes:     ?   Patient ID:?Mari Miller is a 24 y.o. female.?? MR#: [id number]   ?  ?   CHIEF COMPLAINT:  Follow-up     DIAGNOSIS: ITP  ?   CURRENT TREATMENT: N-plate, 6mcg/kg/week, last 3/26/20    PAST TREATMENT:  Steroids, splenectomy 11/2018, eltrombopag (Promacta) and prednisone taper, IVIG x 2 10/2/19 and 10/3/19     HPI    Ms. Rasmussen is doing well aside from toothache but denies any bleeding orally or otherwise, ecchymoses or petechial lesion throughout her body.      Review of Systems    ?   A comprehensive 14-point review of systems was reviewed with patient and was negative other than as specified above.   ?     Objective:      Physical Exam      Wt Readings from Last 3 Encounters:   04/09/20 102 kg (224 lb 13.9 oz)   04/09/20 102 kg (224 lb 13.9 oz)   04/06/20 102 kg (224 lb 13.9 oz)     Temp Readings from Last 3 Encounters:   04/09/20 98.5 °F (36.9 °C)   04/09/20 98.8 °F (37.1 °C)   04/06/20 98.4 °F (36.9 °C)     BP Readings from Last 3 Encounters:   04/09/20 (!) 139/90   04/09/20 (!) 137/99   04/06/20 133/81     Pulse Readings from Last 3 Encounters:   04/09/20 84   04/09/20 88   04/06/20 84       ?  Limited physical exam due to tele visit  ECOG:?  0   General appearance: Generally well appearing, in no acute distress.   Head, eyes,  ears, nose, and throat:  Oropharynx clear without petechiae, wearing mask.   Pulmonary:  Normal work of breathing.  Abdomen: nondistended.   Extremities:  No edema  Neurologic: Alert and oriented. Grossly normal strength, coordination, and gait.   Skin: No petechial rash or other ecchymosis.   Psychiatric: normal mood and affect, conversant and appropriate    ?   Laboratory:  ?   Platelets   Date Value Ref Range Status   04/09/2020 0 (LL) 150 - 350 K/uL Final     Comment:     Plt critical result(s) called and verbal readback obtained from Johanne Niño RN  by HTPA1 04/09/2020 13:49     04/06/2020 571 (H) 150 - 350 K/uL Final   04/02/2020 568 (H) 150 - 350 K/uL Final     Comment:     Results confirmed, test repeated   03/30/2020 358 (H) 150 - 350 K/uL Final   03/26/2020 350 150 - 350 K/uL Final   03/23/2020 6 (LL) 150 - 350 K/uL Final     Comment:     WBC & PLT critical result(s) called and verbal readback obtained from   Johanne Niño  by JL2 03/23/2020 14:25     03/19/2020 2 (LL) 150 - 350 K/uL Final     Comment:     Plt   critical result(s) called and verbal readback obtained from   Sarah Domingo by MELANY 03/19/2020 09:31     03/16/2020 310 150 - 350 K/uL Final   03/12/2020 431 (H) 150 - 350 K/uL Final     Comment:     Results confirmed, test repeated   03/09/2020 223 150 - 350 K/uL Final     Comment:     Results confirmed, test repeated         Lab Visit on 04/09/2020   Component Date Value Ref Range Status    WBC 04/09/2020 15.12* 3.90 - 12.70 K/uL Final    RBC 04/09/2020 3.70* 4.00 - 5.40 M/uL Final    Hemoglobin 04/09/2020 9.5* 12.0 - 16.0 g/dL Final    Hematocrit 04/09/2020 30.9* 37.0 - 48.5 % Final    Mean Corpuscular Volume 04/09/2020 84  82 - 98 fL Final    Mean Corpuscular Hemoglobin 04/09/2020 25.7* 27.0 - 31.0 pg Final    Mean Corpuscular Hemoglobin Conc 04/09/2020 30.7* 32.0 - 36.0 g/dL Final    RDW 04/09/2020 19.0* 11.5 - 14.5 % Final    Platelets 04/09/2020 0* 150 - 350 K/uL Final     MPV 04/09/2020 SEE COMMENT  9.2 - 12.9 fL Final    Immature Granulocytes 04/09/2020 0.6* 0.0 - 0.5 % Final    Gran # (ANC) 04/09/2020 11.3* 1.8 - 7.7 K/uL Final    Immature Grans (Abs) 04/09/2020 0.09* 0.00 - 0.04 K/uL Final    Lymph # 04/09/2020 2.3  1.0 - 4.8 K/uL Final    Mono # 04/09/2020 1.2* 0.3 - 1.0 K/uL Final    Eos # 04/09/2020 0.0  0.0 - 0.5 K/uL Final    Baso # 04/09/2020 0.14  0.00 - 0.20 K/uL Final    nRBC 04/09/2020 1* 0 /100 WBC Final    Gran% 04/09/2020 75.0* 38.0 - 73.0 % Final    Lymph% 04/09/2020 15.1* 18.0 - 48.0 % Final    Mono% 04/09/2020 8.1  4.0 - 15.0 % Final    Eosinophil% 04/09/2020 0.3  0.0 - 8.0 % Final    Basophil% 04/09/2020 0.9  0.0 - 1.9 % Final    Differential Method 04/09/2020 Automated   Final    ?   Assessment/Plan:       No diagnosis found.      Plan:     # acute on chronic ITP:  refractory ITP status post steroids, splenectomy in November 2018, and this year monthly hospitalizations with platelet counts as low as 1-5K.  Since December 2018 she has been treated with daily eltrombopag, discontinued on 11/11/2019 due to refractory ITP and possible associated headache.  - last given 6 micrograms/kilogram Nplate 03/26/2020.  Repeat platelet count a couple days ago 571 K and therefore Nplate held however she has had abrupt decline to 0 K today although no clinically significant bleeding.  Given rapid decline it is essential that she maintain weekly endplate to see if this can help prevent such acute declines in her platelet count.  We will arrange for 1 pack platelets today along with endplate and follow-up on Monday.  Will prescribe dexamethasone 40 mg x4 days as she has previously responded well to this as needed for refractory thrombocytopenia/bleeding.  She understands importance of calling if she develops notable bleeding or new concerning symptom.    # iron deficiency anemia:  Repeat iron indices with some deficiency.  IV iron was administered on Monday and  2nd dose next Monday. Encouraged oral iron with stool softeners as needed for maintenance.      Follow-Up:   - 1 pack platelets today  - N-plate 6mcg/kg today  - RV on Monday for repeat CBC   - script for dexamethasone 40mg x 4 days if needed for refractory thrombocytopenia sent ot [facility]

## 2020-04-09 NOTE — PLAN OF CARE
Patient tolerated platelet transfusion well; no adverse reactions noted. Iv discontinued with unit intact. 2x2/pressure applied to site. No complaints voiced; discharge/follow up information reviewed; verbalizes understanding. Ambulate off unit with belongings. States she have to go next door to  prescription.

## 2020-04-09 NOTE — NURSING
1442 pm: Injection given without difficulties.Bandaid applied. Patient instructed to stay in the clinic for 15 minutes. Patient verbalized understanding and will notify nurse with any complaints.

## 2020-04-12 LAB
BLD PROD TYP BPU: NORMAL
BLOOD UNIT EXPIRATION DATE: NORMAL
BLOOD UNIT TYPE CODE: 9500
BLOOD UNIT TYPE: NORMAL
CODING SYSTEM: NORMAL
DISPENSE STATUS: NORMAL
TRANS PLATPHERESIS VOL PATIENT: NORMAL ML

## 2020-04-13 ENCOUNTER — PATIENT MESSAGE (OUTPATIENT)
Dept: HEMATOLOGY/ONCOLOGY | Facility: CLINIC | Age: 25
End: 2020-04-13

## 2020-04-14 ENCOUNTER — DOCUMENTATION ONLY (OUTPATIENT)
Dept: HEMATOLOGY/ONCOLOGY | Facility: CLINIC | Age: 25
End: 2020-04-14

## 2020-04-14 NOTE — PROGRESS NOTES
Late note:  Nurse made several attempts to contact Ms Miller on 4/13/2020 regarding her appointment with Dr chavez. No answer from numbers listed in epic. Messages were left and no returned phone call. Dr Chavez called pt as well and left voice message, no returned call.

## 2020-04-14 NOTE — PROGRESS NOTES
Nurse made an attempt to re-contact Ms Guerra. The 's number and name listed was called at this time, no answer, unable to leave voice message.

## 2020-04-21 ENCOUNTER — TELEPHONE (OUTPATIENT)
Dept: HEMATOLOGY/ONCOLOGY | Facility: CLINIC | Age: 25
End: 2020-04-21

## 2020-04-21 NOTE — TELEPHONE ENCOUNTER
I have made several attempts calling patient on her home mobile numbers as well as emergency contact.   My staff has also made several times to call her.  I have left a voicemail due to my concern about several missed appointments and lack of response as she has critically low platelet count and needs supportive care.

## 2020-04-27 ENCOUNTER — PATIENT MESSAGE (OUTPATIENT)
Dept: HEMATOLOGY/ONCOLOGY | Facility: CLINIC | Age: 25
End: 2020-04-27

## 2020-04-27 DIAGNOSIS — Z03.818 ENCOUNTER FOR OBSERVATION FOR SUSPECTED EXPOSURE TO OTHER BIOLOGICAL AGENTS RULED OUT: Primary | ICD-10-CM

## 2020-04-28 ENCOUNTER — TELEPHONE (OUTPATIENT)
Dept: INFUSION THERAPY | Facility: HOSPITAL | Age: 25
End: 2020-04-28

## 2020-04-29 ENCOUNTER — DOCUMENTATION ONLY (OUTPATIENT)
Dept: HEMATOLOGY/ONCOLOGY | Facility: CLINIC | Age: 25
End: 2020-04-29

## 2020-04-29 NOTE — TELEPHONE ENCOUNTER
I called patient again and left another voicemail expressing my concern about several missed appointments and importance of coming to be evaluated in receives supportive care as needed.

## 2020-04-30 ENCOUNTER — PATIENT MESSAGE (OUTPATIENT)
Dept: HEMATOLOGY/ONCOLOGY | Facility: CLINIC | Age: 25
End: 2020-04-30

## 2020-05-01 ENCOUNTER — OFFICE VISIT (OUTPATIENT)
Dept: HEMATOLOGY/ONCOLOGY | Facility: CLINIC | Age: 25
End: 2020-05-01
Payer: MEDICAID

## 2020-05-01 ENCOUNTER — INFUSION (OUTPATIENT)
Dept: INFUSION THERAPY | Facility: HOSPITAL | Age: 25
End: 2020-05-01
Attending: INTERNAL MEDICINE
Payer: MEDICAID

## 2020-05-01 ENCOUNTER — TELEPHONE (OUTPATIENT)
Dept: HEMATOLOGY/ONCOLOGY | Facility: CLINIC | Age: 25
End: 2020-05-01

## 2020-05-01 VITALS
RESPIRATION RATE: 20 BRPM | DIASTOLIC BLOOD PRESSURE: 71 MMHG | SYSTOLIC BLOOD PRESSURE: 143 MMHG | HEART RATE: 110 BPM | TEMPERATURE: 99 F

## 2020-05-01 VITALS
OXYGEN SATURATION: 100 % | SYSTOLIC BLOOD PRESSURE: 134 MMHG | RESPIRATION RATE: 18 BRPM | HEART RATE: 117 BPM | DIASTOLIC BLOOD PRESSURE: 79 MMHG | HEIGHT: 62 IN | TEMPERATURE: 98 F | BODY MASS INDEX: 41.34 KG/M2 | WEIGHT: 224.63 LBS

## 2020-05-01 DIAGNOSIS — D50.0 ANEMIA DUE TO CHRONIC BLOOD LOSS: ICD-10-CM

## 2020-05-01 DIAGNOSIS — K04.7 DENTAL ABSCESS: ICD-10-CM

## 2020-05-01 DIAGNOSIS — D69.3 ACUTE ITP: Primary | ICD-10-CM

## 2020-05-01 DIAGNOSIS — D69.3 ACUTE ITP: ICD-10-CM

## 2020-05-01 LAB
BLD PROD TYP BPU: NORMAL
BLOOD UNIT EXPIRATION DATE: NORMAL
BLOOD UNIT TYPE CODE: 7300
BLOOD UNIT TYPE: NORMAL
CODING SYSTEM: NORMAL
DISPENSE STATUS: NORMAL
TRANS PLATPHERESIS VOL PATIENT: NORMAL ML

## 2020-05-01 PROCEDURE — 96372 THER/PROPH/DIAG INJ SC/IM: CPT

## 2020-05-01 PROCEDURE — 99999 PR PBB SHADOW E&M-EST. PATIENT-LVL III: ICD-10-PCS | Mod: PBBFAC,,, | Performed by: INTERNAL MEDICINE

## 2020-05-01 PROCEDURE — 63600175 PHARM REV CODE 636 W HCPCS: Mod: JG | Performed by: INTERNAL MEDICINE

## 2020-05-01 PROCEDURE — 99213 OFFICE O/P EST LOW 20 MIN: CPT | Mod: PBBFAC,25 | Performed by: INTERNAL MEDICINE

## 2020-05-01 PROCEDURE — P9035 PLATELET PHERES LEUKOREDUCED: HCPCS

## 2020-05-01 PROCEDURE — 99215 OFFICE O/P EST HI 40 MIN: CPT | Mod: S$PBB,,, | Performed by: INTERNAL MEDICINE

## 2020-05-01 PROCEDURE — 99999 PR PBB SHADOW E&M-EST. PATIENT-LVL III: CPT | Mod: PBBFAC,,, | Performed by: INTERNAL MEDICINE

## 2020-05-01 PROCEDURE — 25000003 PHARM REV CODE 250: Performed by: INTERNAL MEDICINE

## 2020-05-01 PROCEDURE — 36430 TRANSFUSION BLD/BLD COMPNT: CPT

## 2020-05-01 PROCEDURE — 99215 PR OFFICE/OUTPT VISIT, EST, LEVL V, 40-54 MIN: ICD-10-PCS | Mod: S$PBB,,, | Performed by: INTERNAL MEDICINE

## 2020-05-01 RX ORDER — ONDANSETRON 2 MG/ML
8 INJECTION INTRAMUSCULAR; INTRAVENOUS ONCE
Status: CANCELLED
Start: 2020-05-01

## 2020-05-01 RX ORDER — DIPHENHYDRAMINE HCL 25 MG
25 CAPSULE ORAL
Status: COMPLETED | OUTPATIENT
Start: 2020-05-01 | End: 2020-05-01

## 2020-05-01 RX ORDER — DIPHENHYDRAMINE HCL 25 MG
25 CAPSULE ORAL
Status: CANCELLED | OUTPATIENT
Start: 2020-05-01

## 2020-05-01 RX ORDER — AMOXICILLIN AND CLAVULANATE POTASSIUM 500; 125 MG/1; MG/1
1 TABLET, FILM COATED ORAL
COMMUNITY
Start: 2020-04-29 | End: 2020-05-09

## 2020-05-01 RX ORDER — HYDROCODONE BITARTRATE AND ACETAMINOPHEN 500; 5 MG/1; MG/1
TABLET ORAL
Status: DISCONTINUED | OUTPATIENT
Start: 2020-05-01 | End: 2020-05-01 | Stop reason: HOSPADM

## 2020-05-01 RX ORDER — HYDROCODONE BITARTRATE AND ACETAMINOPHEN 500; 5 MG/1; MG/1
TABLET ORAL ONCE
Status: CANCELLED | OUTPATIENT
Start: 2020-05-01 | End: 2020-05-01

## 2020-05-01 RX ADMIN — ROMIPLOSTIM 570 MCG: 500 INJECTION, POWDER, LYOPHILIZED, FOR SOLUTION SUBCUTANEOUS at 12:05

## 2020-05-01 RX ADMIN — DIPHENHYDRAMINE HYDROCHLORIDE 25 MG: 25 CAPSULE ORAL at 01:05

## 2020-05-01 NOTE — PATIENT INSTRUCTIONS
- N-plate today  - 1 pack platelets today, type and screen ordered only if needed  - RV in 1 week with labs and N-plate planned

## 2020-05-01 NOTE — PROGRESS NOTES
Subjective:      DATE OF VISIT: 5/3/2020   ?     Patient ID:?Mari Miller is a 24 y.o. female.?? MR#: [id number]   ?  ?   CHIEF COMPLAINT:  Follow-up     DIAGNOSIS: ITP  ?   CURRENT TREATMENT: N-plate, 6mcg/kg/week, platelet transfusions p.r.n.    PAST TREATMENT:  Steroids, splenectomy 11/2018, eltrombopag (Promacta) and prednisone taper, IVIG x 2 10/2/19 and 10/3/19     HPI    Ms. Rasmussen has not been to our clinic for several weeks with missed appointments.  She says that she broke her to use a month ago complicated by abscess with extensive pain and swelling and has been unable to answer the phone.  She denies any fever.  She was treated with antibiotics with improvement but still continues to be painful.  Due to COVID-19 she has been unable to seek dental care.  Due to her ITP condition and onset of menstrual cycle she took dexamethasone 4 day course completed 04/21/2020 which likely temporarily raise platelet counts aside from vaginal bleeding which has stopped with completion of her cycle she denies any other bleeding.  Mild petechial lesion on forearms.  No gum or oropharyngeal bleeding or ecchymoses.    Review of Systems    ?   A comprehensive 14-point review of systems was reviewed with patient and was negative other than as specified above.   ?     Objective:      Physical Exam      Wt Readings from Last 3 Encounters:   05/01/20 101.9 kg (224 lb 10.4 oz)   04/09/20 102 kg (224 lb 13.9 oz)   04/09/20 102 kg (224 lb 13.9 oz)     Temp Readings from Last 3 Encounters:   05/01/20 98.7 °F (37.1 °C)   05/01/20 98.2 °F (36.8 °C)   04/09/20 98.4 °F (36.9 °C)     BP Readings from Last 3 Encounters:   05/01/20 (!) 143/71   05/01/20 134/79   04/09/20 (!) 141/88     Pulse Readings from Last 3 Encounters:   05/01/20 110   05/01/20 (!) 117   04/09/20 84       ?  Limited physical exam due to tele visit  ECOG:?  0   General appearance: Generally well appearing, in no acute distress.   Head, eyes, ears, nose,  and throat:  Oropharynx clear without petechiae, wearing mask.   Pulmonary:  Normal work of breathing.  Abdomen: nondistended.   Extremities:  No edema  Neurologic: Alert and oriented. Grossly normal strength, coordination, and gait.   Skin: No petechial rash or other ecchymosis.   Psychiatric: normal mood and affect, conversant and appropriate    ?   Laboratory:  ?   Platelets   Date Value Ref Range Status   05/01/2020 8 (LL) 150 - 350 K/uL Final     Comment:     PLT critical result(s) called and verbal readback obtained from Otoniel Lincoln by DAL1 05/01/2020 11:31     04/09/2020 0 (LL) 150 - 350 K/uL Final     Comment:     Plt critical result(s) called and verbal readback obtained from Johanne Niño RN  by [email or web address] 04/09/2020 13:49     04/06/2020 571 (H) 150 - 350 K/uL Final   04/02/2020 568 (H) 150 - 350 K/uL Final     Comment:     Results confirmed, test repeated   03/30/2020 358 (H) 150 - 350 K/uL Final   03/26/2020 350 150 - 350 K/uL Final   03/23/2020 6 (LL) 150 - 350 K/uL Final     Comment:     WBC & PLT critical result(s) called and verbal readback obtained from   Johanne Niño  by JL2 03/23/2020 14:25     03/19/2020 2 (LL) 150 - 350 K/uL Final     Comment:     Plt   critical result(s) called and verbal readback obtained from   Sarah Domingo by LLB 03/19/2020 09:31     03/16/2020 310 150 - 350 K/uL Final   03/12/2020 431 (H) 150 - 350 K/uL Final     Comment:     Results confirmed, test repeated         Infusion on 05/01/2020   Component Date Value Ref Range Status    UNIT NUMBER 05/01/2020 [id number]   Final    Product Code 05/01/2020 [id number]   Final    DISPENSE STATUS 05/01/2020 TRANSFUSED   Final    CODING SYSTEM 05/01/2020 [id number]   Final    Unit Blood Type Code 05/01/2020 7300   Final    Unit Blood Type 05/01/2020 B POS   Final    Unit Expiration 05/01/2020 [id number]   Final   Lab Visit on 05/01/2020   Component Date Value Ref Range Status    WBC 05/01/2020 27.57* 3.90 - 12.70 K/uL  Final    RBC 05/01/2020 2.97* 4.00 - 5.40 M/uL Final    Hemoglobin 05/01/2020 7.8* 12.0 - 16.0 g/dL Final    Hematocrit 05/01/2020 25.7* 37.0 - 48.5 % Final    Mean Corpuscular Volume 05/01/2020 87  82 - 98 fL Final    Mean Corpuscular Hemoglobin 05/01/2020 26.3* 27.0 - 31.0 pg Final    Mean Corpuscular Hemoglobin Conc 05/01/2020 30.4* 32.0 - 36.0 g/dL Final    RDW 05/01/2020 19.6* 11.5 - 14.5 % Final    Platelets 05/01/2020 8* 150 - 350 K/uL Final    MPV 05/01/2020 SEE COMMENT  9.2 - 12.9 fL Final    Immature Granulocytes 05/01/2020 1.2* 0.0 - 0.5 % Final    Gran # (ANC) 05/01/2020 21.6* 1.8 - 7.7 K/uL Final    Immature Grans (Abs) 05/01/2020 0.32* 0.00 - 0.04 K/uL Final    Lymph # 05/01/2020 3.3  1.0 - 4.8 K/uL Final    Mono # 05/01/2020 2.2* 0.3 - 1.0 K/uL Final    Eos # 05/01/2020 0.1  0.0 - 0.5 K/uL Final    Baso # 05/01/2020 0.08  0.00 - 0.20 K/uL Final    nRBC 05/01/2020 0  0 /100 WBC Final    Gran% 05/01/2020 78.3* 38.0 - 73.0 % Final    Lymph% 05/01/2020 11.9* 18.0 - 48.0 % Final    Mono% 05/01/2020 8.0  4.0 - 15.0 % Final    Eosinophil% 05/01/2020 0.3  0.0 - 8.0 % Final    Basophil% 05/01/2020 0.3  0.0 - 1.9 % Final    Platelet Estimate 05/01/2020 Decreased*  Final    Aniso 05/01/2020 Slight   Final    Poik 05/01/2020 Slight   Final    Hypo 05/01/2020 Occasional   Final    Ovalocytes 05/01/2020 Occasional   Final    Target Cells 05/01/2020 Occasional   Final    Tear Drop Cells 05/01/2020 Occasional   Final    Stomatocytes 05/01/2020 Present   Final    Differential Method 05/01/2020 Automated   Final    ?   Assessment/Plan:       1. Acute ITP    2. Dental abscess    3. Anemia due to chronic blood loss          Plan:     # acute on chronic ITP:  refractory ITP status post steroids, splenectomy in November 2018, and this year monthly hospitalizations with platelet counts as low as 1-5K.  Since December 2018 she has been treated with daily eltrombopag, discontinued on 11/11/2019  due to refractory ITP and possible associated headache.  - has missed several clinic appointments over the last several weeks due to dental abscess.  Discussed importance of routine follow-up for best management of acute ITP.  She did take dexamethasone high dose for day course ending 04/21/2020 which likely temporarily improved platelet count but is likely rapidly declining given previous trajectories and will require transfusion today 1 pack platelets and will resume endplate.  Discussed importance of keeping appointments to get weekly Nplate and establish a therapeutic dose and stability in platelet count. She understands importance of calling if she develops notable bleeding or new concerning symptom.    # iron deficiency anemia:  Repeat iron indices with some deficiency.  Recently received IV iron.  Heavy menstrual cycle exacerbated by ITP and will likely require additional iron transfusion.  Hemoglobin declined from 9.5-7.8.  Will recheck iron indices and give additional IV iron as needed.      Follow-Up:   - N-plate today  - 1 pack platelets today, type and screen ordered only if needed  - RV in 1 week with labs and N-plate planned

## 2020-05-01 NOTE — PLAN OF CARE
Patient tolerated platelet transfusion well; no adverse reactions noted. Informed patient to come to emergency room if any problems.

## 2020-05-07 ENCOUNTER — INFUSION (OUTPATIENT)
Dept: INFUSION THERAPY | Facility: HOSPITAL | Age: 25
End: 2020-05-07
Attending: INTERNAL MEDICINE
Payer: MEDICAID

## 2020-05-07 ENCOUNTER — OFFICE VISIT (OUTPATIENT)
Dept: HEMATOLOGY/ONCOLOGY | Facility: CLINIC | Age: 25
End: 2020-05-07
Payer: MEDICAID

## 2020-05-07 VITALS
HEIGHT: 62 IN | HEART RATE: 91 BPM | SYSTOLIC BLOOD PRESSURE: 135 MMHG | OXYGEN SATURATION: 99 % | RESPIRATION RATE: 18 BRPM | DIASTOLIC BLOOD PRESSURE: 90 MMHG | TEMPERATURE: 99 F | WEIGHT: 232.56 LBS | BODY MASS INDEX: 42.8 KG/M2

## 2020-05-07 DIAGNOSIS — D75.839 THROMBOCYTOSIS: ICD-10-CM

## 2020-05-07 DIAGNOSIS — D69.3 ACUTE ITP: Primary | ICD-10-CM

## 2020-05-07 DIAGNOSIS — D50.0 IRON DEFICIENCY ANEMIA DUE TO CHRONIC BLOOD LOSS: ICD-10-CM

## 2020-05-07 DIAGNOSIS — D69.3 CHRONIC ITP (IDIOPATHIC THROMBOCYTOPENIA): Primary | ICD-10-CM

## 2020-05-07 PROCEDURE — 63600175 PHARM REV CODE 636 W HCPCS: Mod: JG | Performed by: INTERNAL MEDICINE

## 2020-05-07 PROCEDURE — 99215 PR OFFICE/OUTPT VISIT, EST, LEVL V, 40-54 MIN: ICD-10-PCS | Mod: S$PBB,,, | Performed by: INTERNAL MEDICINE

## 2020-05-07 PROCEDURE — 99214 OFFICE O/P EST MOD 30 MIN: CPT | Mod: PBBFAC,25 | Performed by: INTERNAL MEDICINE

## 2020-05-07 PROCEDURE — 96372 THER/PROPH/DIAG INJ SC/IM: CPT

## 2020-05-07 PROCEDURE — 99215 OFFICE O/P EST HI 40 MIN: CPT | Mod: S$PBB,,, | Performed by: INTERNAL MEDICINE

## 2020-05-07 PROCEDURE — 99999 PR PBB SHADOW E&M-EST. PATIENT-LVL IV: ICD-10-PCS | Mod: PBBFAC,,, | Performed by: INTERNAL MEDICINE

## 2020-05-07 PROCEDURE — 99999 PR PBB SHADOW E&M-EST. PATIENT-LVL IV: CPT | Mod: PBBFAC,,, | Performed by: INTERNAL MEDICINE

## 2020-05-07 RX ADMIN — ROMIPLOSTIM 285 MCG: 250 INJECTION, POWDER, LYOPHILIZED, FOR SOLUTION SUBCUTANEOUS at 10:05

## 2020-05-07 NOTE — PROGRESS NOTES
Subjective:      DATE OF VISIT: 5/7/2020   ?     Patient ID:?Mari Miller is a 24 y.o. female.?? MR#: [id number]   ?  ?   CHIEF COMPLAINT:  Follow-up     DIAGNOSIS: ITP  ?   CURRENT TREATMENT: N-plate, 6mcg/kg/week, platelet transfusions p.r.n.    PAST TREATMENT:  Steroids, splenectomy 11/2018, eltrombopag (Promacta) and prednisone taper, IVIG x 2 10/2/19 and 10/3/19     HPI    Ms. Rasmussen notes continued improvement in oral abscess and recently completed antibiotic course.  No fevers or chills or exudate.  She denies any bleeding, petechial lesion.  Prior ecchymosis on right forearm continues to heal.  No menstrual cycle/vaginal bleeding.    Review of Systems    ?   A comprehensive 14-point review of systems was reviewed with patient and was negative other than as specified above.   ?     Objective:      Physical Exam      Wt Readings from Last 3 Encounters:   05/07/20 105.5 kg (232 lb 9.4 oz)   05/01/20 101.9 kg (224 lb 10.4 oz)   04/09/20 102 kg (224 lb 13.9 oz)     Temp Readings from Last 3 Encounters:   05/07/20 98.9 °F (37.2 °C) (Oral)   05/01/20 98.7 °F (37.1 °C)   05/01/20 98.2 °F (36.8 °C)     BP Readings from Last 3 Encounters:   05/07/20 (!) 135/90   05/01/20 (!) 143/71   05/01/20 134/79     Pulse Readings from Last 3 Encounters:   05/07/20 91   05/01/20 110   05/01/20 (!) 117       ECOG:?  0   General appearance: Generally well appearing, in no acute distress.   Head, eyes, ears, nose, and throat:  Oropharynx clear without petechiae, wearing mask, area of prior abscess left inner cheek with only mild edema and no exudate.   Pulmonary:  Normal work of breathing.  Abdomen: nondistended.   Extremities:  No edema.  Neurologic: Alert and oriented. Grossly normal strength, coordination, and gait.   Skin: No petechial rash or other ecchymosis aside from resolving ecchymosis on right forearm.   Psychiatric: normal mood and affect, conversant and appropriate    ?   Laboratory:  ?   Platelets   Date  Value Ref Range Status   05/07/2020 957 (H) 150 - 350 K/uL Final   05/01/2020 8 (LL) 150 - 350 K/uL Final     Comment:     PLT critical result(s) called and verbal readback obtained from Otoniel Lincoln by DALJosep 05/01/2020 11:31     04/09/2020 0 (LL) 150 - 350 K/uL Final     Comment:     Plt critical result(s) called and verbal readback obtained from Johanne Niño RN  by HTTONIE 04/09/2020 13:49     04/06/2020 571 (H) 150 - 350 K/uL Final   04/02/2020 568 (H) 150 - 350 K/uL Final     Comment:     Results confirmed, test repeated   03/30/2020 358 (H) 150 - 350 K/uL Final   03/26/2020 350 150 - 350 K/uL Final   03/23/2020 6 (LL) 150 - 350 K/uL Final     Comment:     WBC & PLT critical result(s) called and verbal readback obtained from   Johanne Niño  by JL2 03/23/2020 14:25     03/19/2020 2 (LL) 150 - 350 K/uL Final     Comment:     Plt   critical result(s) called and verbal readback obtained from   Sarah Domingo by MELANY 03/19/2020 09:31     03/16/2020 310 150 - 350 K/uL Final         Lab Visit on 05/07/2020   Component Date Value Ref Range Status    WBC 05/07/2020 22.01* 3.90 - 12.70 K/uL Final    RBC 05/07/2020 2.89* 4.00 - 5.40 M/uL Final    Hemoglobin 05/07/2020 7.3* 12.0 - 16.0 g/dL Final    Hematocrit 05/07/2020 24.8* 37.0 - 48.5 % Final    Mean Corpuscular Volume 05/07/2020 86  82 - 98 fL Final    Mean Corpuscular Hemoglobin 05/07/2020 25.3* 27.0 - 31.0 pg Final    Mean Corpuscular Hemoglobin Conc 05/07/2020 29.4* 32.0 - 36.0 g/dL Final    RDW 05/07/2020 20.4* 11.5 - 14.5 % Final    Platelets 05/07/2020 957* 150 - 350 K/uL Final    MPV 05/07/2020 12.8  9.2 - 12.9 fL Final    ?   Assessment/Plan:       No diagnosis found.      Plan:     # acute on chronic ITP:  refractory ITP status post steroids, splenectomy in November 2018, and this year monthly hospitalizations with platelet counts as low as 1-5K.  Since December 2018 she has been treated with daily eltrombopag, discontinued on 11/11/2019 due  to refractory ITP and possible associated headache.  - has missed several clinic appointments over the last several weeks due to dental abscess.  Discussed importance of routine follow-up for best management of acute ITP.  She did take dexamethasone high dose for day course ending 04/21/2020 which likely temporarily improved platelet count.  Last week received endplate 6 mcg per kg and 1 platelet transfusion.  Platelet count 957 K however given prior trajectory of rapid decline and bleeding will proceed with N-plate at lower dose today 3 micrograms/kilogram.  Follow-up in 1 week with CBC and planned N-plate. Discussed importance of keeping appointments to get weekly Nplate and establish a therapeutic dose and stability in platelet count. She understands importance of calling if she develops notable bleeding or new concerning symptom.    # iron deficiency anemia:   Heavy menstrual cycle exacerbated by ITP and will likely require additional iron transfusion.  Mild decline in hemoglobin today.  Will repeat iron indices in give additional Feraheme dose x1 today.   It appears she has only received  Hemoglobin declined from 9.5-7.8.  Will recheck iron indices and give additional IV iron as needed.      Follow-Up:   - N-plate today  - IV iron Feraheme 510mg x 1 today  - repeat iron labs  - RV in 1 week with labs and N-plate planned

## 2020-05-07 NOTE — NURSING
Nplate administered to right upper arm. Pt tolerated well. Advised to wait in waiting room for 15 mins and notify staff of any adverse reaction. Pt verbalized understanding.

## 2020-05-14 ENCOUNTER — LAB VISIT (OUTPATIENT)
Dept: LAB | Facility: HOSPITAL | Age: 25
End: 2020-05-14
Attending: NURSE PRACTITIONER
Payer: MEDICAID

## 2020-05-14 ENCOUNTER — OFFICE VISIT (OUTPATIENT)
Dept: HEMATOLOGY/ONCOLOGY | Facility: CLINIC | Age: 25
End: 2020-05-14
Payer: MEDICAID

## 2020-05-14 VITALS
TEMPERATURE: 99 F | SYSTOLIC BLOOD PRESSURE: 138 MMHG | WEIGHT: 229.25 LBS | RESPIRATION RATE: 18 BRPM | BODY MASS INDEX: 42.19 KG/M2 | DIASTOLIC BLOOD PRESSURE: 88 MMHG | OXYGEN SATURATION: 100 % | HEART RATE: 88 BPM | HEIGHT: 62 IN

## 2020-05-14 DIAGNOSIS — D50.0 IRON DEFICIENCY ANEMIA DUE TO CHRONIC BLOOD LOSS: ICD-10-CM

## 2020-05-14 DIAGNOSIS — D75.839 THROMBOCYTOSIS: ICD-10-CM

## 2020-05-14 DIAGNOSIS — D69.3 ACUTE ITP: ICD-10-CM

## 2020-05-14 DIAGNOSIS — K04.7 DENTAL ABSCESS: ICD-10-CM

## 2020-05-14 DIAGNOSIS — D69.3 CHRONIC ITP (IDIOPATHIC THROMBOCYTOPENIA): Primary | ICD-10-CM

## 2020-05-14 LAB
ANISOCYTOSIS BLD QL SMEAR: SLIGHT
BASOPHILS # BLD AUTO: 0.13 K/UL (ref 0–0.2)
BASOPHILS NFR BLD: 0.8 % (ref 0–1.9)
DIFFERENTIAL METHOD: ABNORMAL
EOSINOPHIL # BLD AUTO: 0.3 K/UL (ref 0–0.5)
EOSINOPHIL NFR BLD: 1.7 % (ref 0–8)
ERYTHROCYTE [DISTWIDTH] IN BLOOD BY AUTOMATED COUNT: 22.5 % (ref 11.5–14.5)
HCT VFR BLD AUTO: 28.1 % (ref 37–48.5)
HGB BLD-MCNC: 8.2 G/DL (ref 12–16)
HYPOCHROMIA BLD QL SMEAR: ABNORMAL
IMM GRANULOCYTES # BLD AUTO: 0.14 K/UL (ref 0–0.04)
IMM GRANULOCYTES NFR BLD AUTO: 0.9 % (ref 0–0.5)
LYMPHOCYTES # BLD AUTO: 2.8 K/UL (ref 1–4.8)
LYMPHOCYTES NFR BLD: 17.1 % (ref 18–48)
MCH RBC QN AUTO: 23.7 PG (ref 27–31)
MCHC RBC AUTO-ENTMCNC: 29.2 G/DL (ref 32–36)
MCV RBC AUTO: 81 FL (ref 82–98)
MONOCYTES # BLD AUTO: 1.6 K/UL (ref 0.3–1)
MONOCYTES NFR BLD: 10 % (ref 4–15)
NEUTROPHILS # BLD AUTO: 11.5 K/UL (ref 1.8–7.7)
NEUTROPHILS NFR BLD: 70.4 % (ref 38–73)
NRBC BLD-RTO: 1 /100 WBC
OVALOCYTES BLD QL SMEAR: ABNORMAL
PLATELET # BLD AUTO: 1200 K/UL (ref 150–350)
PLATELET BLD QL SMEAR: ABNORMAL
PMV BLD AUTO: 10.8 FL (ref 9.2–12.9)
POIKILOCYTOSIS BLD QL SMEAR: SLIGHT
POLYCHROMASIA BLD QL SMEAR: ABNORMAL
RBC # BLD AUTO: 3.46 M/UL (ref 4–5.4)
SCHISTOCYTES BLD QL SMEAR: PRESENT
STOMATOCYTES BLD QL SMEAR: PRESENT
TARGETS BLD QL SMEAR: ABNORMAL
WBC # BLD AUTO: 16.32 K/UL (ref 3.9–12.7)

## 2020-05-14 PROCEDURE — 36415 COLL VENOUS BLD VENIPUNCTURE: CPT

## 2020-05-14 PROCEDURE — 99999 PR PBB SHADOW E&M-EST. PATIENT-LVL IV: ICD-10-PCS | Mod: PBBFAC,,, | Performed by: INTERNAL MEDICINE

## 2020-05-14 PROCEDURE — 85025 COMPLETE CBC W/AUTO DIFF WBC: CPT

## 2020-05-14 PROCEDURE — 99999 PR PBB SHADOW E&M-EST. PATIENT-LVL IV: CPT | Mod: PBBFAC,,, | Performed by: INTERNAL MEDICINE

## 2020-05-14 PROCEDURE — 99214 PR OFFICE/OUTPT VISIT, EST, LEVL IV, 30-39 MIN: ICD-10-PCS | Mod: S$PBB,,, | Performed by: INTERNAL MEDICINE

## 2020-05-14 PROCEDURE — 99214 OFFICE O/P EST MOD 30 MIN: CPT | Mod: PBBFAC | Performed by: INTERNAL MEDICINE

## 2020-05-14 PROCEDURE — 99214 OFFICE O/P EST MOD 30 MIN: CPT | Mod: S$PBB,,, | Performed by: INTERNAL MEDICINE

## 2020-05-14 NOTE — PATIENT INSTRUCTIONS
- RV on Monday for iv iron #2 and likely N-plate with CBC prior (note can double book me and video visit if cannot do at [facility])

## 2020-05-14 NOTE — PROGRESS NOTES
Subjective:      DATE OF VISIT: 5/14/2020   ?     Patient ID:?Mari Miller is a 24 y.o. female.?? MR#: [id number]   ?  ?   CHIEF COMPLAINT:  Follow-up     DIAGNOSIS: ITP  ?   CURRENT TREATMENT: N-plate prn    PAST TREATMENT:  Steroids, splenectomy 11/2018, eltrombopag (Promacta) and prednisone taper, IVIG x 2 10/2/19 and 10/3/19     HPI    Ms. Rasmussen is feeling well without acute concerns today.  Prior site of oral abscess continues to improve with mild resolving edema, and no tenderness, exudate, fever or chills.  She denies any evidence of bleeding, bruising or petechiae.    Review of Systems    ?   A comprehensive 14-point review of systems was reviewed with patient and was negative other than as specified above.   ?     Objective:      Physical Exam      Wt Readings from Last 3 Encounters:   05/14/20 104 kg (229 lb 4.5 oz)   05/07/20 105.5 kg (232 lb 9.4 oz)   05/01/20 101.9 kg (224 lb 10.4 oz)     Temp Readings from Last 3 Encounters:   05/14/20 98.5 °F (36.9 °C) (Oral)   05/07/20 98.9 °F (37.2 °C) (Oral)   05/01/20 98.7 °F (37.1 °C)     BP Readings from Last 3 Encounters:   05/14/20 138/88   05/07/20 (!) 135/90   05/01/20 (!) 143/71     Pulse Readings from Last 3 Encounters:   05/14/20 88   05/07/20 91   05/01/20 110       ECOG:?  0   General appearance: Generally well appearing, in no acute distress.   Head, eyes, ears, nose, and throat:  Oropharynx clear without petechiae, minimal edema in prior abscess area of left energy.   Pulmonary:  Normal work of breathing.  Abdomen: nondistended.   Extremities:  No edema.  Neurologic: Alert and oriented. Grossly normal strength, coordination, and gait.   Skin:  No ecchymoses or petechiae. Psychiatric: normal mood and affect, conversant and appropriate    ?   Laboratory:  ?   Platelets   Date Value Ref Range Status   05/14/2020 1,200 (HH) 150 - 350 K/uL Final     Comment:     Results confirmed, test repeated  PLT critical result(s) called and verbal  readback obtained from   Juan Yost  by MLS 05/14/2020 10:39     05/07/2020 957 (H) 150 - 350 K/uL Final   05/01/2020 8 (LL) 150 - 350 K/uL Final     Comment:     PLT critical result(s) called and verbal readback obtained from Otoniel Lincoln by DAL1 05/01/2020 11:31     04/09/2020 0 (LL) 150 - 350 K/uL Final     Comment:     Plt critical result(s) called and verbal readback obtained from Johanne Niño RN  by ALLA 04/09/2020 13:49     04/06/2020 571 (H) 150 - 350 K/uL Final   04/02/2020 568 (H) 150 - 350 K/uL Final     Comment:     Results confirmed, test repeated   03/30/2020 358 (H) 150 - 350 K/uL Final   03/26/2020 350 150 - 350 K/uL Final   03/23/2020 6 (LL) 150 - 350 K/uL Final     Comment:     WBC & PLT critical result(s) called and verbal readback obtained from   Johanne Niño  by JL2 03/23/2020 14:25     03/19/2020 2 (LL) 150 - 350 K/uL Final     Comment:     Plt   critical result(s) called and verbal readback obtained from   Sarah Domingo by MELANY 03/19/2020 09:31           Lab Visit on 05/14/2020   Component Date Value Ref Range Status    WBC 05/14/2020 16.32* 3.90 - 12.70 K/uL Final    RBC 05/14/2020 3.46* 4.00 - 5.40 M/uL Final    Hemoglobin 05/14/2020 8.2* 12.0 - 16.0 g/dL Final    Hematocrit 05/14/2020 28.1* 37.0 - 48.5 % Final    Mean Corpuscular Volume 05/14/2020 81* 82 - 98 fL Final    Mean Corpuscular Hemoglobin 05/14/2020 23.7* 27.0 - 31.0 pg Final    Mean Corpuscular Hemoglobin Conc 05/14/2020 29.2* 32.0 - 36.0 g/dL Final    RDW 05/14/2020 22.5* 11.5 - 14.5 % Final    Platelets 05/14/2020 1,200* 150 - 350 K/uL Final    MPV 05/14/2020 10.8  9.2 - 12.9 fL Final    Immature Granulocytes 05/14/2020 0.9* 0.0 - 0.5 % Final    Gran # (ANC) 05/14/2020 11.5* 1.8 - 7.7 K/uL Final    Immature Grans (Abs) 05/14/2020 0.14* 0.00 - 0.04 K/uL Final    Lymph # 05/14/2020 2.8  1.0 - 4.8 K/uL Final    Mono # 05/14/2020 1.6* 0.3 - 1.0 K/uL Final    Eos # 05/14/2020 0.3  0.0 - 0.5 K/uL  Final    Baso # 05/14/2020 0.13  0.00 - 0.20 K/uL Final    nRBC 05/14/2020 1* 0 /100 WBC Final    Gran% 05/14/2020 70.4  38.0 - 73.0 % Final    Lymph% 05/14/2020 17.1* 18.0 - 48.0 % Final    Mono% 05/14/2020 10.0  4.0 - 15.0 % Final    Eosinophil% 05/14/2020 1.7  0.0 - 8.0 % Final    Basophil% 05/14/2020 0.8  0.0 - 1.9 % Final    Platelet Estimate 05/14/2020 Increased*  Final    Aniso 05/14/2020 Slight   Final    Poik 05/14/2020 Slight   Final    Poly 05/14/2020 Occasional   Final    Hypo 05/14/2020 Occasional   Final    Ovalocytes 05/14/2020 Occasional   Final    Target Cells 05/14/2020 Occasional   Final    Stomatocytes 05/14/2020 Present   Final    Schistocytes 05/14/2020 Present   Final    Differential Method 05/14/2020 Automated   Final    ?   Assessment/Plan:       1. Chronic ITP (idiopathic thrombocytopenia)    2. Thrombocytosis    3. Iron deficiency anemia due to chronic blood loss    4. Dental abscess          Plan:     # acute on chronic ITP:  refractory ITP status post steroids, splenectomy in November 2018, and this year monthly hospitalizations with platelet counts as low as 1-5K.  Since December 2018 she has been treated with daily eltrombopag, discontinued on 11/11/2019 due to refractory ITP and possible associated headache.  - last week on 05/07/2020 Platelet count 957 K however given prior trajectory of rapid decline and bleeding will proceed with N-plate at lower dose 3 micrograms/kilogram.  Given continued thrombocytosis on today's labs will hold Nplate with very close follow-up next Monday to determine further dosing of Nplate.  She has no current signs or symptoms of infection or thrombosis.  She understands importance of calling if she develops notable bleeding or new concerning symptom.    # iron deficiency anemia:   Heavy menstrual cycle exacerbated by ITP.  Due to receive 2nd dose of IV iron but will hold due to mild GI upset today and she prefers next week which we can  reschedule.      Follow-Up:   - holding N-plate today  - RV on Monday for iv iron #2 and likely N-plate with CBC prior

## 2020-05-18 ENCOUNTER — TELEPHONE (OUTPATIENT)
Dept: INFUSION THERAPY | Facility: HOSPITAL | Age: 25
End: 2020-05-18

## 2020-05-18 NOTE — TELEPHONE ENCOUNTER
I called because patient didn't show up for appointment today. Patient didn't answer. I left voicemail for patient to call us back to reschedule missed appointment.

## 2020-05-26 ENCOUNTER — OFFICE VISIT (OUTPATIENT)
Dept: HEMATOLOGY/ONCOLOGY | Facility: CLINIC | Age: 25
End: 2020-05-26
Payer: MEDICAID

## 2020-05-26 ENCOUNTER — TELEPHONE (OUTPATIENT)
Dept: HEMATOLOGY/ONCOLOGY | Facility: CLINIC | Age: 25
End: 2020-05-26

## 2020-05-26 ENCOUNTER — PATIENT MESSAGE (OUTPATIENT)
Dept: HEMATOLOGY/ONCOLOGY | Facility: CLINIC | Age: 25
End: 2020-05-26

## 2020-05-26 ENCOUNTER — INFUSION (OUTPATIENT)
Dept: INFUSION THERAPY | Facility: HOSPITAL | Age: 25
End: 2020-05-26
Attending: INTERNAL MEDICINE
Payer: MEDICAID

## 2020-05-26 VITALS
WEIGHT: 233.25 LBS | HEIGHT: 62 IN | OXYGEN SATURATION: 98 % | DIASTOLIC BLOOD PRESSURE: 87 MMHG | BODY MASS INDEX: 42.92 KG/M2 | RESPIRATION RATE: 18 BRPM | SYSTOLIC BLOOD PRESSURE: 133 MMHG | TEMPERATURE: 97 F | HEART RATE: 95 BPM

## 2020-05-26 DIAGNOSIS — D50.0 IRON DEFICIENCY ANEMIA DUE TO CHRONIC BLOOD LOSS: ICD-10-CM

## 2020-05-26 DIAGNOSIS — D69.3 ACUTE ITP: Primary | ICD-10-CM

## 2020-05-26 PROCEDURE — 96372 THER/PROPH/DIAG INJ SC/IM: CPT

## 2020-05-26 PROCEDURE — 99999 PR PBB SHADOW E&M-EST. PATIENT-LVL IV: CPT | Mod: PBBFAC,,, | Performed by: INTERNAL MEDICINE

## 2020-05-26 PROCEDURE — 99214 OFFICE O/P EST MOD 30 MIN: CPT | Mod: PBBFAC,25 | Performed by: INTERNAL MEDICINE

## 2020-05-26 PROCEDURE — 99214 OFFICE O/P EST MOD 30 MIN: CPT | Mod: S$PBB,,, | Performed by: INTERNAL MEDICINE

## 2020-05-26 PROCEDURE — 99999 PR PBB SHADOW E&M-EST. PATIENT-LVL IV: ICD-10-PCS | Mod: PBBFAC,,, | Performed by: INTERNAL MEDICINE

## 2020-05-26 PROCEDURE — 63600175 PHARM REV CODE 636 W HCPCS: Mod: JG | Performed by: INTERNAL MEDICINE

## 2020-05-26 PROCEDURE — 99214 PR OFFICE/OUTPT VISIT, EST, LEVL IV, 30-39 MIN: ICD-10-PCS | Mod: S$PBB,,, | Performed by: INTERNAL MEDICINE

## 2020-05-26 RX ADMIN — ROMIPLOSTIM 635 MCG: 500 INJECTION, POWDER, LYOPHILIZED, FOR SOLUTION SUBCUTANEOUS at 03:05

## 2020-05-26 NOTE — NURSING
Nplate Injection given without difficulties. Bandaid applied. Patient instructed to stay in the clinic for 15 minutes. Patient verbalized understanding and will notify nurse with any complaints.Tolerated injection well. Discharged home with next appointment scheduled.

## 2020-05-27 NOTE — PROGRESS NOTES
Subjective:      DATE OF VISIT: 5/26/2020   ?     Patient ID:?Mari Miller is a 24 y.o. female.?? MR#: [id number]   ?  ?   CHIEF COMPLAINT:  Follow-up     DIAGNOSIS: ITP  ?   CURRENT TREATMENT: N-plate prn    PAST TREATMENT:  Steroids, splenectomy 11/2018, eltrombopag (Promacta) and prednisone taper, IVIG x 2 10/2/19 and 10/3/19     HPI    Ms. Rasmussen has missed her follow-up appointments.  Over the last day she developed new petechial lesion on arms and mild in mouth without evidence of bleeding.  She recently started her menstrual cycle and noted less bleeding than usual.  She is taking oral iron supplements as recommended.  No evidence of other bruising or bleeding in stool or otherwise.  Oropharynx without abscess and denies any fevers or chills.    Review of Systems    ?   A comprehensive 14-point review of systems was reviewed with patient and was negative other than as specified above.   ?     Objective:      Physical Exam      Wt Readings from Last 3 Encounters:   05/26/20 105.8 kg (233 lb 4 oz)   05/14/20 104 kg (229 lb 4.5 oz)   05/07/20 105.5 kg (232 lb 9.4 oz)     Temp Readings from Last 3 Encounters:   05/26/20 97.1 °F (36.2 °C)   05/14/20 98.5 °F (36.9 °C) (Oral)   05/07/20 98.9 °F (37.2 °C) (Oral)     BP Readings from Last 3 Encounters:   05/26/20 133/87   05/14/20 138/88   05/07/20 (!) 135/90     Pulse Readings from Last 3 Encounters:   05/26/20 95   05/14/20 88   05/07/20 91       ECOG:?  0   General appearance: Generally well appearing, in no acute distress.   Head, eyes, ears, nose, and throat:  Oropharynx clear with trace petechiae a in posterior oropharynx and under tongue.   Pulmonary:  Normal work of breathing.  Abdomen: nondistended.   Extremities:  No edema.  Neurologic: Alert and oriented. Grossly normal strength, coordination, and gait.   Skin:  Scan petechial rash on forearms.  Psychiatric:  Normal mood and affect  ?   Laboratory:  ?   Platelets   Date Value Ref Range Status    05/26/2020 5 (LL) 150 - 350 K/uL Final     Comment:     PLT  critical result(s) called and verbal readback obtained from   MARY LU RN by AES 05/26/2020 14:15     05/14/2020 1,200 (HH) 150 - 350 K/uL Final     Comment:     Results confirmed, test repeated  PLT critical result(s) called and verbal readback obtained from   Juan Yost  by MLS 05/14/2020 10:39     05/07/2020 957 (H) 150 - 350 K/uL Final   05/01/2020 8 (LL) 150 - 350 K/uL Final     Comment:     PLT critical result(s) called and verbal readback obtained from Otoniel Lincoln by DAL1 05/01/2020 11:31     04/09/2020 0 (LL) 150 - 350 K/uL Final     Comment:     Plt critical result(s) called and verbal readback obtained from Johanne Niño RN  by [email or web address] 04/09/2020 13:49     04/06/2020 571 (H) 150 - 350 K/uL Final   04/02/2020 568 (H) 150 - 350 K/uL Final     Comment:     Results confirmed, test repeated   03/30/2020 358 (H) 150 - 350 K/uL Final   03/26/2020 350 150 - 350 K/uL Final   03/23/2020 6 (LL) 150 - 350 K/uL Final     Comment:     WBC & PLT critical result(s) called and verbal readback obtained from   Johanne Niño  by JL2 03/23/2020 14:25           Lab Visit on 05/26/2020   Component Date Value Ref Range Status    WBC 05/26/2020 20.60* 3.90 - 12.70 K/uL Final    RBC 05/26/2020 3.61* 4.00 - 5.40 M/uL Final    Hemoglobin 05/26/2020 8.0* 12.0 - 16.0 g/dL Final    Hematocrit 05/26/2020 28.0* 37.0 - 48.5 % Final    Mean Corpuscular Volume 05/26/2020 78* 82 - 98 fL Final    Mean Corpuscular Hemoglobin 05/26/2020 22.2* 27.0 - 31.0 pg Final    Mean Corpuscular Hemoglobin Conc 05/26/2020 28.6* 32.0 - 36.0 g/dL Final    RDW 05/26/2020 22.1* 11.5 - 14.5 % Final    Platelets 05/26/2020 5* 150 - 350 K/uL Final    MPV 05/26/2020 SEE COMMENT  9.2 - 12.9 fL Final    Immature Granulocytes 05/26/2020 2.2* 0.0 - 0.5 % Final    Gran # (ANC) 05/26/2020 13.9* 1.8 - 7.7 K/uL Final    Immature Grans (Abs) 05/26/2020 0.46* 0.00 - 0.04 K/uL Final     Lymph # 05/26/2020 3.9  1.0 - 4.8 K/uL Final    Mono # 05/26/2020 2.0* 0.3 - 1.0 K/uL Final    Eos # 05/26/2020 0.2  0.0 - 0.5 K/uL Final    Baso # 05/26/2020 0.16  0.00 - 0.20 K/uL Final    nRBC 05/26/2020 0  0 /100 WBC Final    Gran% 05/26/2020 67.7  38.0 - 73.0 % Final    Lymph% 05/26/2020 18.8  18.0 - 48.0 % Final    Mono% 05/26/2020 9.7  4.0 - 15.0 % Final    Eosinophil% 05/26/2020 0.8  0.0 - 8.0 % Final    Basophil% 05/26/2020 0.8  0.0 - 1.9 % Final    Platelet Estimate 05/26/2020 Decreased*  Final    Aniso 05/26/2020 Slight   Final    Poik 05/26/2020 Slight   Final    Poly 05/26/2020 Occasional   Final    Hypo 05/26/2020 Occasional   Final    Ovalocytes 05/26/2020 Occasional   Final    Target Cells 05/26/2020 Occasional   Final    Tear Drop Cells 05/26/2020 Occasional   Final    Differential Method 05/26/2020 Automated   Final    ?   Assessment/Plan:       1. Acute ITP          Plan:     # acute on chronic ITP:  refractory ITP status post steroids, splenectomy in November 2018, and this year monthly hospitalizations with platelet counts as low as 1-5K.  Since December 2018 she has been treated with daily eltrombopag, discontinued on 11/11/2019 due to refractory ITP and possible associated headache.  - she has been treated with Nplate over the last several months and has demonstrated response although sustaining platelet count limited by her in consistent follow-up in clinic.  She has not come to clinic for almost 2 weeks and platelet count again dropped to 5 K. will plan on endplate 6 micrograms/kilogram today.  I did discuss with her importance of evaluating etiology with bone marrow biopsy although given inconsistent follow-up and recurrent severe thrombocytopenia would not be safe to have procedure until sustained platelet counts achieved.  Her responsiveness to steroid as salvage does point to immune etiology however given concomitant anemia which may be multifactorial with bone  marrow etiology in differential would be important to rule out.  She expressed understanding and agreement with plan for Nplate today and understands importance of consistent follow-up ws recommended.      # iron deficiency anemia:   Heavy menstrual cycle exacerbated by ITP.  Due to receive 2nd dose of IV iron but will hold due to mild GI upset.  She did not get 2nd does and prefers oral iron supplementation.  Requested at on for repeat iron indices when she comes back on Friday.  If severe iron deficiency would recommend additional IV iron therapy.      Follow-Up:   - N-plate 6 micrograms/kilogram today  - RV on Friday with repeat CBC and iron indices to determine if further IV iron indicated.

## 2020-05-29 ENCOUNTER — TELEPHONE (OUTPATIENT)
Dept: HEMATOLOGY/ONCOLOGY | Facility: CLINIC | Age: 25
End: 2020-05-29

## 2020-05-29 ENCOUNTER — LAB VISIT (OUTPATIENT)
Dept: LAB | Facility: HOSPITAL | Age: 25
End: 2020-05-29
Attending: INTERNAL MEDICINE
Payer: MEDICAID

## 2020-05-29 ENCOUNTER — OFFICE VISIT (OUTPATIENT)
Dept: HEMATOLOGY/ONCOLOGY | Facility: CLINIC | Age: 25
End: 2020-05-29
Payer: MEDICAID

## 2020-05-29 VITALS
OXYGEN SATURATION: 98 % | RESPIRATION RATE: 18 BRPM | SYSTOLIC BLOOD PRESSURE: 123 MMHG | WEIGHT: 230.63 LBS | HEART RATE: 99 BPM | TEMPERATURE: 97 F | HEIGHT: 62 IN | BODY MASS INDEX: 42.44 KG/M2 | DIASTOLIC BLOOD PRESSURE: 68 MMHG

## 2020-05-29 DIAGNOSIS — D69.6 THROMBOCYTOPENIA: ICD-10-CM

## 2020-05-29 DIAGNOSIS — D50.0 IRON DEFICIENCY ANEMIA DUE TO CHRONIC BLOOD LOSS: ICD-10-CM

## 2020-05-29 DIAGNOSIS — D69.3 ACUTE ITP: Primary | ICD-10-CM

## 2020-05-29 DIAGNOSIS — D69.3 ACUTE ITP: ICD-10-CM

## 2020-05-29 DIAGNOSIS — D69.3 CHRONIC ITP (IDIOPATHIC THROMBOCYTOPENIA): ICD-10-CM

## 2020-05-29 DIAGNOSIS — D50.0 ANEMIA DUE TO CHRONIC BLOOD LOSS: ICD-10-CM

## 2020-05-29 LAB
ANISOCYTOSIS BLD QL SMEAR: SLIGHT
BASOPHILS # BLD AUTO: 0.21 K/UL (ref 0–0.2)
BASOPHILS NFR BLD: 0.7 % (ref 0–1.9)
DACRYOCYTES BLD QL SMEAR: ABNORMAL
DIFFERENTIAL METHOD: ABNORMAL
EOSINOPHIL # BLD AUTO: 0.2 K/UL (ref 0–0.5)
EOSINOPHIL NFR BLD: 0.7 % (ref 0–8)
ERYTHROCYTE [DISTWIDTH] IN BLOOD BY AUTOMATED COUNT: 22.1 % (ref 11.5–14.5)
FERRITIN SERPL-MCNC: 16 NG/ML (ref 20–300)
HCT VFR BLD AUTO: 29.9 % (ref 37–48.5)
HGB BLD-MCNC: 8.4 G/DL (ref 12–16)
HYPOCHROMIA BLD QL SMEAR: ABNORMAL
IMM GRANULOCYTES # BLD AUTO: 1.07 K/UL (ref 0–0.04)
IMM GRANULOCYTES NFR BLD AUTO: 3.8 % (ref 0–0.5)
IRON SERPL-MCNC: 17 UG/DL (ref 30–160)
LYMPHOCYTES # BLD AUTO: 4.2 K/UL (ref 1–4.8)
LYMPHOCYTES NFR BLD: 15 % (ref 18–48)
MCH RBC QN AUTO: 21.9 PG (ref 27–31)
MCHC RBC AUTO-ENTMCNC: 28.1 G/DL (ref 32–36)
MCV RBC AUTO: 78 FL (ref 82–98)
MONOCYTES # BLD AUTO: 2.3 K/UL (ref 0.3–1)
MONOCYTES NFR BLD: 8.2 % (ref 4–15)
NEUTROPHILS # BLD AUTO: 20.1 K/UL (ref 1.8–7.7)
NEUTROPHILS NFR BLD: 71.6 % (ref 38–73)
NRBC BLD-RTO: 1 /100 WBC
OVALOCYTES BLD QL SMEAR: ABNORMAL
PLATELET # BLD AUTO: 17 K/UL (ref 150–350)
PLATELET BLD QL SMEAR: ABNORMAL
PMV BLD AUTO: ABNORMAL FL (ref 9.2–12.9)
POIKILOCYTOSIS BLD QL SMEAR: SLIGHT
POLYCHROMASIA BLD QL SMEAR: ABNORMAL
RBC # BLD AUTO: 3.84 M/UL (ref 4–5.4)
SATURATED IRON: 3 % (ref 20–50)
SCHISTOCYTES BLD QL SMEAR: PRESENT
STOMATOCYTES BLD QL SMEAR: PRESENT
TARGETS BLD QL SMEAR: ABNORMAL
TOTAL IRON BINDING CAPACITY: 527 UG/DL (ref 250–450)
TRANSFERRIN SERPL-MCNC: 356 MG/DL (ref 200–375)
WBC # BLD AUTO: 28.16 K/UL (ref 3.9–12.7)

## 2020-05-29 PROCEDURE — 99213 PR OFFICE/OUTPT VISIT, EST, LEVL III, 20-29 MIN: ICD-10-PCS | Mod: S$PBB,,, | Performed by: INTERNAL MEDICINE

## 2020-05-29 PROCEDURE — 99213 OFFICE O/P EST LOW 20 MIN: CPT | Mod: S$PBB,,, | Performed by: INTERNAL MEDICINE

## 2020-05-29 PROCEDURE — 83540 ASSAY OF IRON: CPT

## 2020-05-29 PROCEDURE — 36415 COLL VENOUS BLD VENIPUNCTURE: CPT

## 2020-05-29 PROCEDURE — 99999 PR PBB SHADOW E&M-EST. PATIENT-LVL III: ICD-10-PCS | Mod: PBBFAC,,, | Performed by: INTERNAL MEDICINE

## 2020-05-29 PROCEDURE — 99213 OFFICE O/P EST LOW 20 MIN: CPT | Mod: PBBFAC | Performed by: INTERNAL MEDICINE

## 2020-05-29 PROCEDURE — 82728 ASSAY OF FERRITIN: CPT

## 2020-05-29 PROCEDURE — 99999 PR PBB SHADOW E&M-EST. PATIENT-LVL III: CPT | Mod: PBBFAC,,, | Performed by: INTERNAL MEDICINE

## 2020-05-29 PROCEDURE — 85025 COMPLETE CBC W/AUTO DIFF WBC: CPT

## 2020-05-31 NOTE — PROGRESS NOTES
Subjective:      DATE OF VISIT: 5/31/2020   ?     Patient ID:?Mari Miller is a 24 y.o. female.?? MR#: [id number]   ?  ?   CHIEF COMPLAINT:  Follow-up     DIAGNOSIS: ITP  ?   CURRENT TREATMENT: N-plate prn    PAST TREATMENT:  Steroids, splenectomy 11/2018, eltrombopag (Promacta) and prednisone taper, IVIG x 2 10/2/19 and 10/3/19     HPI    Ms. Rasmussen denies any changes in petechial rash or bleeding that is new since last visit.  No pain, shortness of breath, fever.    Review of Systems    ?   A comprehensive 14-point review of systems was reviewed with patient and was negative other than as specified above.   ?     Objective:      Physical Exam      Wt Readings from Last 3 Encounters:   05/29/20 104.6 kg (230 lb 9.6 oz)   05/26/20 105.8 kg (233 lb 4 oz)   05/14/20 104 kg (229 lb 4.5 oz)     Temp Readings from Last 3 Encounters:   05/29/20 97.3 °F (36.3 °C)   05/26/20 97.1 °F (36.2 °C)   05/14/20 98.5 °F (36.9 °C) (Oral)     BP Readings from Last 3 Encounters:   05/29/20 123/68   05/26/20 133/87   05/14/20 138/88     Pulse Readings from Last 3 Encounters:   05/29/20 99   05/26/20 95   05/14/20 88       ECOG:?  0   General appearance: Generally well appearing, in no acute distress.   Head, eyes, ears, nose, and throat:  Oropharynx clear without evidence of bleeding or petechiae.   Pulmonary:  Normal work of breathing.  Abdomen: nondistended.   Extremities:  No edema.  Neurologic: Alert and oriented. Grossly normal strength, coordination, and gait.   Skin:  Scant petechial rash on forearms, similar to prior.  Psychiatric:  Normal mood and affect  ?   Laboratory:  ?   Platelets   Date Value Ref Range Status   05/29/2020 17 (LL) 150 - 350 K/uL Final     Comment:     JACKELYN CORRAL LPN  critical result(s) called and verbal readback   obtained from  by ERLINDA 05/29/2020 12:27     05/26/2020 5 (LL) 150 - 350 K/uL Final     Comment:     PLT  critical result(s) called and verbal readback obtained from   MARY  JOHN LU by ISAK 05/26/2020 14:15     05/14/2020 1,200 (HH) 150 - 350 K/uL Final     Comment:     Results confirmed, test repeated  PLT critical result(s) called and verbal readback obtained from   Juan Yost  by UGO 05/14/2020 10:39     05/07/2020 957 (H) 150 - 350 K/uL Final   05/01/2020 8 (LL) 150 - 350 K/uL Final     Comment:     PLT critical result(s) called and verbal readback obtained from Otoniel Lincoln by KIRK 05/01/2020 11:31     04/09/2020 0 (LL) 150 - 350 K/uL Final     Comment:     Plt critical result(s) called and verbal readback obtained from Johanne Niño RN  by ALLA 04/09/2020 13:49     04/06/2020 571 (H) 150 - 350 K/uL Final   04/02/2020 568 (H) 150 - 350 K/uL Final     Comment:     Results confirmed, test repeated   03/30/2020 358 (H) 150 - 350 K/uL Final   03/26/2020 350 150 - 350 K/uL Final         Lab Visit on 05/29/2020   Component Date Value Ref Range Status    WBC 05/29/2020 28.16* 3.90 - 12.70 K/uL Final    RBC 05/29/2020 3.84* 4.00 - 5.40 M/uL Final    Hemoglobin 05/29/2020 8.4* 12.0 - 16.0 g/dL Final    Hematocrit 05/29/2020 29.9* 37.0 - 48.5 % Final    Mean Corpuscular Volume 05/29/2020 78* 82 - 98 fL Final    Mean Corpuscular Hemoglobin 05/29/2020 21.9* 27.0 - 31.0 pg Final    Mean Corpuscular Hemoglobin Conc 05/29/2020 28.1* 32.0 - 36.0 g/dL Final    RDW 05/29/2020 22.1* 11.5 - 14.5 % Final    Platelets 05/29/2020 17* 150 - 350 K/uL Final    MPV 05/29/2020 SEE COMMENT  9.2 - 12.9 fL Final    Immature Granulocytes 05/29/2020 3.8* 0.0 - 0.5 % Final    Gran # (ANC) 05/29/2020 20.1* 1.8 - 7.7 K/uL Final    Immature Grans (Abs) 05/29/2020 1.07* 0.00 - 0.04 K/uL Final    Lymph # 05/29/2020 4.2  1.0 - 4.8 K/uL Final    Mono # 05/29/2020 2.3* 0.3 - 1.0 K/uL Final    Eos # 05/29/2020 0.2  0.0 - 0.5 K/uL Final    Baso # 05/29/2020 0.21* 0.00 - 0.20 K/uL Final    nRBC 05/29/2020 1* 0 /100 WBC Final    Gran% 05/29/2020 71.6  38.0 - 73.0 % Final    Lymph% 05/29/2020  15.0* 18.0 - 48.0 % Final    Mono% 05/29/2020 8.2  4.0 - 15.0 % Final    Eosinophil% 05/29/2020 0.7  0.0 - 8.0 % Final    Basophil% 05/29/2020 0.7  0.0 - 1.9 % Final    Platelet Estimate 05/29/2020 Decreased*  Final    Aniso 05/29/2020 Slight   Final    Poik 05/29/2020 Slight   Final    Poly 05/29/2020 Occasional   Final    Hypo 05/29/2020 Moderate   Final    Ovalocytes 05/29/2020 Occasional   Final    Target Cells 05/29/2020 Occasional   Final    Tear Drop Cells 05/29/2020 Occasional   Final    Stomatocytes 05/29/2020 Present   Final    Schistocytes 05/29/2020 Present   Final    Differential Method 05/29/2020 Automated   Final    Ferritin 05/29/2020 16* 20.0 - 300.0 ng/mL Final    Iron 05/29/2020 17* 30 - 160 ug/dL Final    Transferrin 05/29/2020 356  200 - 375 mg/dL Final    TIBC 05/29/2020 527* 250 - 450 ug/dL Final    Saturated Iron 05/29/2020 3* 20 - 50 % Final    ?   Assessment/Plan:       1. Acute ITP    2. Chronic ITP (idiopathic thrombocytopenia)    3. Thrombocytopenia    4. Iron deficiency anemia due to chronic blood loss          Plan:     # acute on chronic ITP:  refractory ITP status post steroids, splenectomy in November 2018, and this year monthly hospitalizations with platelet counts as low as 1-5K.  Since December 2018 she has been treated with daily eltrombopag, discontinued on 11/11/2019 due to refractory ITP and possible associated headache.  - she has been treated with Nplate over the last several months and has demonstrated response although sustaining platelet count limited by her in consistent follow-up in clinic.   - received N-plate 6 micrograms/kilogram earlier this week Monday 5/25/20 with improvement in plt today to 17K. Stable clinically without worsened petechiae or bleeding.  - recommended close follow-up with redosing of N-plate next Monday 6/1/20 at 6mcg/kg.  - I do recommend bone marrow biopsy evaluation after stabilization of platelet count.    # iron deficiency  anemia:   Heavy menstrual cycle exacerbated by ITP.  Due to receive 2nd dose of IV iron but will hold due to mild GI upset.  She did not get 2nd does and prefers oral iron supplementation.  Repeat iron indices however do show persistent iron deficiency anemia and do recommend repeat IV iron therapy.  We will arrange for this when she returns.      Follow-Up:   - RV on Monday with repeat CBC, NP review, plan for N-plate and iv iron feraheme (6/1 and 6/8)

## 2020-06-01 ENCOUNTER — OFFICE VISIT (OUTPATIENT)
Dept: HEMATOLOGY/ONCOLOGY | Facility: CLINIC | Age: 25
End: 2020-06-01
Payer: MEDICAID

## 2020-06-01 ENCOUNTER — LAB VISIT (OUTPATIENT)
Dept: LAB | Facility: HOSPITAL | Age: 25
End: 2020-06-01
Attending: INTERNAL MEDICINE
Payer: MEDICAID

## 2020-06-01 VITALS
OXYGEN SATURATION: 99 % | TEMPERATURE: 98 F | WEIGHT: 236.56 LBS | SYSTOLIC BLOOD PRESSURE: 145 MMHG | BODY MASS INDEX: 43.53 KG/M2 | HEIGHT: 62 IN | HEART RATE: 99 BPM | DIASTOLIC BLOOD PRESSURE: 96 MMHG

## 2020-06-01 DIAGNOSIS — D72.829 LEUKOCYTOSIS, UNSPECIFIED TYPE: ICD-10-CM

## 2020-06-01 DIAGNOSIS — D50.0 IRON DEFICIENCY ANEMIA DUE TO CHRONIC BLOOD LOSS: ICD-10-CM

## 2020-06-01 DIAGNOSIS — R00.0 SINUS TACHYCARDIA: ICD-10-CM

## 2020-06-01 DIAGNOSIS — D69.3 CHRONIC ITP (IDIOPATHIC THROMBOCYTOPENIA): Primary | ICD-10-CM

## 2020-06-01 DIAGNOSIS — D50.9 MICROCYTIC ANEMIA: ICD-10-CM

## 2020-06-01 DIAGNOSIS — D69.3 ACUTE ITP: ICD-10-CM

## 2020-06-01 DIAGNOSIS — Z90.81 H/O SPLENECTOMY: ICD-10-CM

## 2020-06-01 DIAGNOSIS — D50.0 ANEMIA DUE TO CHRONIC BLOOD LOSS: ICD-10-CM

## 2020-06-01 DIAGNOSIS — D69.6 THROMBOCYTOPENIA: ICD-10-CM

## 2020-06-01 DIAGNOSIS — R03.0 ELEVATED BP WITHOUT DIAGNOSIS OF HYPERTENSION: ICD-10-CM

## 2020-06-01 DIAGNOSIS — D69.3 ACUTE ITP: Primary | ICD-10-CM

## 2020-06-01 LAB
ANISOCYTOSIS BLD QL SMEAR: SLIGHT
BASOPHILS # BLD AUTO: 0.19 K/UL (ref 0–0.2)
BASOPHILS NFR BLD: 0.8 % (ref 0–1.9)
DIFFERENTIAL METHOD: ABNORMAL
EOSINOPHIL # BLD AUTO: 0.3 K/UL (ref 0–0.5)
EOSINOPHIL NFR BLD: 1.1 % (ref 0–8)
ERYTHROCYTE [DISTWIDTH] IN BLOOD BY AUTOMATED COUNT: 22.8 % (ref 11.5–14.5)
HCT VFR BLD AUTO: 27.4 % (ref 37–48.5)
HGB BLD-MCNC: 7.8 G/DL (ref 12–16)
IMM GRANULOCYTES # BLD AUTO: 0.48 K/UL (ref 0–0.04)
IMM GRANULOCYTES NFR BLD AUTO: 1.9 % (ref 0–0.5)
LYMPHOCYTES # BLD AUTO: 4.4 K/UL (ref 1–4.8)
LYMPHOCYTES NFR BLD: 17.5 % (ref 18–48)
MCH RBC QN AUTO: 21.7 PG (ref 27–31)
MCHC RBC AUTO-ENTMCNC: 28.5 G/DL (ref 32–36)
MCV RBC AUTO: 76 FL (ref 82–98)
MONOCYTES # BLD AUTO: 2.2 K/UL (ref 0.3–1)
MONOCYTES NFR BLD: 8.7 % (ref 4–15)
NEUTROPHILS # BLD AUTO: 18.1 K/UL (ref 1.8–7.7)
NEUTROPHILS NFR BLD: 71.9 % (ref 38–73)
NRBC BLD-RTO: 1 /100 WBC
PLATELET # BLD AUTO: 549 K/UL (ref 150–350)
PLATELET BLD QL SMEAR: ABNORMAL
PMV BLD AUTO: ABNORMAL FL (ref 9.2–12.9)
POIKILOCYTOSIS BLD QL SMEAR: SLIGHT
POLYCHROMASIA BLD QL SMEAR: ABNORMAL
RBC # BLD AUTO: 3.59 M/UL (ref 4–5.4)
TARGETS BLD QL SMEAR: ABNORMAL
WBC # BLD AUTO: 25.18 K/UL (ref 3.9–12.7)

## 2020-06-01 PROCEDURE — 85025 COMPLETE CBC W/AUTO DIFF WBC: CPT

## 2020-06-01 PROCEDURE — 36415 COLL VENOUS BLD VENIPUNCTURE: CPT

## 2020-06-01 PROCEDURE — 99215 OFFICE O/P EST HI 40 MIN: CPT | Mod: S$PBB,,, | Performed by: NURSE PRACTITIONER

## 2020-06-01 PROCEDURE — 99215 PR OFFICE/OUTPT VISIT, EST, LEVL V, 40-54 MIN: ICD-10-PCS | Mod: S$PBB,,, | Performed by: NURSE PRACTITIONER

## 2020-06-01 PROCEDURE — 99999 PR PBB SHADOW E&M-EST. PATIENT-LVL III: ICD-10-PCS | Mod: PBBFAC,,, | Performed by: NURSE PRACTITIONER

## 2020-06-01 PROCEDURE — 99999 PR PBB SHADOW E&M-EST. PATIENT-LVL III: CPT | Mod: PBBFAC,,, | Performed by: NURSE PRACTITIONER

## 2020-06-01 PROCEDURE — 99213 OFFICE O/P EST LOW 20 MIN: CPT | Mod: PBBFAC | Performed by: NURSE PRACTITIONER

## 2020-06-01 RX ORDER — SODIUM CHLORIDE 0.9 % (FLUSH) 0.9 %
10 SYRINGE (ML) INJECTION
Status: CANCELLED | OUTPATIENT
Start: 2020-06-08

## 2020-06-01 RX ORDER — DIPHENHYDRAMINE HYDROCHLORIDE 50 MG/ML
50 INJECTION INTRAMUSCULAR; INTRAVENOUS ONCE AS NEEDED
Status: CANCELLED | OUTPATIENT
Start: 2020-06-08

## 2020-06-01 RX ORDER — EPINEPHRINE 0.3 MG/.3ML
0.3 INJECTION SUBCUTANEOUS ONCE AS NEEDED
Status: CANCELLED | OUTPATIENT
Start: 2020-06-08

## 2020-06-01 RX ORDER — METHYLPREDNISOLONE SOD SUCC 125 MG
125 VIAL (EA) INJECTION ONCE AS NEEDED
Status: CANCELLED | OUTPATIENT
Start: 2020-06-08

## 2020-06-01 RX ORDER — HEPARIN 100 UNIT/ML
500 SYRINGE INTRAVENOUS
Status: CANCELLED | OUTPATIENT
Start: 2020-06-08

## 2020-06-03 NOTE — PATIENT INSTRUCTIONS
COVID-19 and Cancer Patients    What is COVID-19?  COVID-19 is a new type of virus that can cause mild to severe infections in the lungs. Like other viruses, it can lead to serious infections for people with weakened immune systems. COVID-19 may cause more severe infections than other viruses. We do not have a vaccine to help control its spread, but experts are working to make a vaccine.    How does COVID-19 spread?  The virus can spread easily, just like the common cold or flu. It spreads when an infected person coughs or sneezes droplets that can get into the eyes, nose, or mouth of people nearby. Droplets also land on surfaces that people touch before touching their own eyes, nose, or mouth.    How can I protect myself?  These are some of the best ways to protect yourself and others from the virus:  - Wash your hands often with soap and water for at least 20 seconds.  - Use hand  with 60% or more alcohol until you can wash your hands with soap and water.  - Avoid touching your eyes, nose, and mouth without washing your hands first.  - Clean and disinfect surfaces often. Regular household wipes and sprays will kill the virus. Be sure to  clean places that people touch a lot, such as door handles, phones, keyboards, and light switches.  - Avoid handshakes, hugging, and standing or sitting close to people who are coughing or sneezing.  - Be as healthy as you can. Get plenty of sleep, eat healthy, exercise, and manage your stress.  If you are sick, follow these steps:  - Stay home.  - Cover your nose and mouth when you cough or sneeze. If you use a tissue, put it in the garbage right  away. If you do not have a tissue, cough or sneeze into your elbow crease.  - Call before going to your medical appointments. Let them know about recent travel or if you have had  contact with a person with COVID-19.          As of 3/12/2020  Should I wear a mask?  Experts don't believe that wearing a mask is helpful for the  general public, unless there is concern you have been exposed and may not have symptoms. Some people should use certain types of masks because of their own health or the type of work they do. Talk to your doctor or nurse to see if you would benefit from wearing a mask. If you arrive at the hospital with respiratory symptoms, please ask for a mask.    What if I care for or live with a cancer patient?  If you are caring for or living with someone with cancer, do your best to keep them from getting the virus.  Follow the steps to protect yourself listed on this sheet.  If you become sick yourself, call your doctor to see what more you should do to protect your loved one.    What about people visiting?   If you are sick or have been exposed to COVID-19, we ask that you not come to [facility].    If patients have symptoms, call the [facility] Covid-19 Line ([phone])    We ask that you find someone who isn't sick to join your loved one at their appointments.  To protect all patients, we may limit the number of people who can visit someone staying in the hospital.  Please check with your care team.    How will [facility] protect me from getting COVID-19?  Our hospital and clinics are taking steps to keep infected patients separate from those who may be at risk. At every appointment, your care team will ask questions about overall health and recent travel. We may ask some patients to wait in a separate room or to reschedule until they are feeling better if they have symptoms.  We are also taking extra steps to clean and disinfect surfaces throughout our hospital and clinics.     Will you still care for me if I get sick?  Yes. Your care is our top priority. Although we may change some ways we care for you, we will never put your care or health at risk.            CALL BEFORE YOU ACT   Dial 211 or Text keyword LACOVID to [phone] for general questions and information.   If patients have  symptoms, call the [facility] Covid-19 Line ([phone])               [facility] ([email or web address])    [email or web address]

## 2020-06-03 NOTE — PROGRESS NOTES
Subjective:       Patient ID: Mari Miller is a 24 y.o. female.    Chief Complaint: Abnormal Lab    CHIEF COMPLAINT:  Follow-up      DIAGNOSIS: ITP    CURRENT TREATMENT: N-plate prn     PAST TREATMENT:  Steroids, splenectomy 11/2018, eltrombopag (Promacta) and prednisone taper, IVIG x 2 10/2/19 and 10/3/19     Today's visit:  Patient denies bleeding, pain, blood in stool, nose bleed.     Review of Systems   Constitutional: Positive for fatigue. Negative for activity change, appetite change, chills, diaphoresis, fever and unexpected weight change.   HENT: Negative for congestion, hearing loss, nosebleeds, postnasal drip, sore throat and trouble swallowing.    Eyes: Negative for discharge and visual disturbance.   Respiratory: Negative for cough, chest tightness and shortness of breath.    Cardiovascular: Negative for chest pain and palpitations.   Gastrointestinal: Negative for abdominal distention, abdominal pain, blood in stool, constipation, diarrhea, nausea and vomiting.   Endocrine: Negative for cold intolerance and heat intolerance.   Genitourinary: Negative for difficulty urinating, dyspareunia, flank pain and hematuria.   Musculoskeletal: Negative for arthralgias, back pain and myalgias.   Skin: Negative.    Neurological: Negative for dizziness, weakness, light-headedness and headaches.   Hematological: Negative for adenopathy. Does not bruise/bleed easily.   Psychiatric/Behavioral: Negative for agitation, behavioral problems and confusion. The patient is nervous/anxious.          Objective:     Vitals:    06/01/20 1101   BP: (!) 145/96   Pulse: 99   Temp: 98.3 °F (36.8 °C)     Physical Exam   Constitutional: She is oriented to person, place, and time. She appears well-developed and well-nourished. No distress.   HENT:   Head: Normocephalic and atraumatic.   Right Ear: Hearing and external ear normal.   Left Ear: Hearing and external ear normal.   Nose: No rhinorrhea or sinus tenderness. Right  sinus exhibits no maxillary sinus tenderness and no frontal sinus tenderness. Left sinus exhibits no maxillary sinus tenderness and no frontal sinus tenderness.   Mouth/Throat: Uvula is midline, oropharynx is clear and moist and mucous membranes are normal. No oral lesions.   Eyes: Pupils are equal, round, and reactive to light. Conjunctivae are normal. Right eye exhibits no discharge. Left eye exhibits no discharge.   Neck: Normal range of motion. Carotid bruit is not present. No tracheal deviation present. No thyromegaly present.   Cardiovascular: Normal rate, regular rhythm, S1 normal, S2 normal, normal heart sounds and intact distal pulses.   No murmur heard.  Pulses:       Dorsalis pedis pulses are 2+ on the right side, and 2+ on the left side.   Pulmonary/Chest: Effort normal and breath sounds normal. No respiratory distress.   Abdominal: Soft. Bowel sounds are normal. She exhibits distension. She exhibits no mass. There is no tenderness.   Musculoskeletal: Normal range of motion. She exhibits no edema.   Lymphadenopathy:     She has no cervical adenopathy.        Right: No supraclavicular adenopathy present.        Left: No supraclavicular adenopathy present.   Neurological: She is alert and oriented to person, place, and time. She has normal strength. No sensory deficit. Coordination and gait normal.   Skin: Skin is warm and dry. Capillary refill takes less than 2 seconds. No rash noted. No pallor.   Psychiatric: Her speech is normal and behavior is normal. Judgment and thought content normal. Her mood appears anxious. Cognition and memory are normal. She does not exhibit a depressed mood.   Vitals reviewed.      Assessment:       Problem List Items Addressed This Visit        Cardiac/Vascular    Elevated BP without diagnosis of hypertension    Relevant Orders    CBC auto differential    Comprehensive metabolic panel    Ferritin    Iron and TIBC    Sinus tachycardia       Hematology    Chronic ITP  (idiopathic thrombocytopenia) - Primary    Relevant Orders    CBC auto differential    Comprehensive metabolic panel    Ferritin    Iron and TIBC    Thrombocytopenia    Relevant Orders    CBC auto differential    Comprehensive metabolic panel    Ferritin    Iron and TIBC       Oncology    Iron deficiency anemia due to chronic blood loss    Relevant Orders    CBC auto differential    Comprehensive metabolic panel    Ferritin    Iron and TIBC    Microcytic anemia    Relevant Orders    CBC auto differential    Comprehensive metabolic panel    Ferritin    Iron and TIBC    Leukocytosis    Relevant Orders    CBC auto differential    Comprehensive metabolic panel    Ferritin    Iron and TIBC       GI    H/O splenectomy    Relevant Orders    CBC auto differential    Comprehensive metabolic panel    Ferritin    Iron and TIBC          Plan:         ITP:  --Hold N-Plate today, platelet count: 549K    BAILEY:  --Plan for IV iron repletion per plan    Follow-up:  Return to clinic in 1 week with lab and possible N-Plate, 2 doses of Feraheme IV ASAP

## 2020-06-08 ENCOUNTER — TELEPHONE (OUTPATIENT)
Dept: HEMATOLOGY/ONCOLOGY | Facility: CLINIC | Age: 25
End: 2020-06-08

## 2020-06-08 ENCOUNTER — DOCUMENTATION ONLY (OUTPATIENT)
Dept: HEMATOLOGY/ONCOLOGY | Facility: CLINIC | Age: 25
End: 2020-06-08

## 2020-06-08 NOTE — TELEPHONE ENCOUNTER
----- Message from Katiuska Bone sent at 6/8/2020  9:41 AM CDT -----  Contact: self- [phone]  Would like to consult with the nurse, patient is returning the nurse call, please call back at  [phone]. Thanks sj  .Type:  Patient Returning Call    Who Called: Helen  Who Left Message for Patient:  Does the patient know what this is regarding?:no  Would the patient rather a call back or a response via [facility]? callback  Best Call Back Number:[phone]  Additional Information:

## 2020-06-08 NOTE — TELEPHONE ENCOUNTER
Called to check on patient, due to a missed appt this morn. Spoke w patient who chose to reschedule her appt for mariah. Pt states she had an unexpected emergency this morn.which caused her to miss her appt.. Patient rescheduled.

## 2020-06-08 NOTE — PROGRESS NOTES
Hematology Nurse tried to contact pt regarding a missed appointment in the Department today, nurse attempted calling  phones numbers listed in epic, no answer, message was left to contact clinic.  unsuccessful contact # 1

## 2020-06-09 ENCOUNTER — OFFICE VISIT (OUTPATIENT)
Dept: HEMATOLOGY/ONCOLOGY | Facility: CLINIC | Age: 25
End: 2020-06-09
Payer: MEDICAID

## 2020-06-09 ENCOUNTER — LAB VISIT (OUTPATIENT)
Dept: LAB | Facility: HOSPITAL | Age: 25
End: 2020-06-09
Attending: INTERNAL MEDICINE
Payer: MEDICAID

## 2020-06-09 ENCOUNTER — INFUSION (OUTPATIENT)
Dept: INFUSION THERAPY | Facility: HOSPITAL | Age: 25
End: 2020-06-09
Attending: NURSE PRACTITIONER
Payer: MEDICAID

## 2020-06-09 ENCOUNTER — TELEPHONE (OUTPATIENT)
Dept: HEMATOLOGY/ONCOLOGY | Facility: CLINIC | Age: 25
End: 2020-06-09

## 2020-06-09 VITALS
BODY MASS INDEX: 43.24 KG/M2 | OXYGEN SATURATION: 99 % | SYSTOLIC BLOOD PRESSURE: 129 MMHG | HEIGHT: 62 IN | DIASTOLIC BLOOD PRESSURE: 87 MMHG | WEIGHT: 235 LBS | HEART RATE: 100 BPM | TEMPERATURE: 99 F | RESPIRATION RATE: 14 BRPM

## 2020-06-09 VITALS
OXYGEN SATURATION: 100 % | SYSTOLIC BLOOD PRESSURE: 125 MMHG | TEMPERATURE: 98 F | DIASTOLIC BLOOD PRESSURE: 80 MMHG | RESPIRATION RATE: 18 BRPM | HEART RATE: 79 BPM

## 2020-06-09 DIAGNOSIS — D50.0 IRON DEFICIENCY ANEMIA DUE TO CHRONIC BLOOD LOSS: ICD-10-CM

## 2020-06-09 DIAGNOSIS — D50.0 IRON DEFICIENCY ANEMIA DUE TO CHRONIC BLOOD LOSS: Primary | ICD-10-CM

## 2020-06-09 DIAGNOSIS — D69.6 THROMBOCYTOPENIA: Primary | ICD-10-CM

## 2020-06-09 DIAGNOSIS — D69.3 ACUTE ITP: ICD-10-CM

## 2020-06-09 DIAGNOSIS — D69.3 CHRONIC ITP (IDIOPATHIC THROMBOCYTOPENIA): ICD-10-CM

## 2020-06-09 LAB
ANISOCYTOSIS BLD QL SMEAR: ABNORMAL
BASOPHILS # BLD AUTO: 0.17 K/UL (ref 0–0.2)
BASOPHILS NFR BLD: 1.3 % (ref 0–1.9)
DACRYOCYTES BLD QL SMEAR: ABNORMAL
DIFFERENTIAL METHOD: ABNORMAL
EOSINOPHIL # BLD AUTO: 0.4 K/UL (ref 0–0.5)
EOSINOPHIL NFR BLD: 3 % (ref 0–8)
ERYTHROCYTE [DISTWIDTH] IN BLOOD BY AUTOMATED COUNT: 22.8 % (ref 11.5–14.5)
GIANT PLATELETS BLD QL SMEAR: PRESENT
HCT VFR BLD AUTO: 27.8 % (ref 37–48.5)
HGB BLD-MCNC: 7.9 G/DL (ref 12–16)
HYPOCHROMIA BLD QL SMEAR: ABNORMAL
IMM GRANULOCYTES # BLD AUTO: 0.07 K/UL (ref 0–0.04)
IMM GRANULOCYTES NFR BLD AUTO: 0.5 % (ref 0–0.5)
LYMPHOCYTES # BLD AUTO: 3.1 K/UL (ref 1–4.8)
LYMPHOCYTES NFR BLD: 24 % (ref 18–48)
MCH RBC QN AUTO: 21 PG (ref 27–31)
MCHC RBC AUTO-ENTMCNC: 28.4 G/DL (ref 32–36)
MCV RBC AUTO: 74 FL (ref 82–98)
MONOCYTES # BLD AUTO: 1.4 K/UL (ref 0.3–1)
MONOCYTES NFR BLD: 10.7 % (ref 4–15)
NEUTROPHILS # BLD AUTO: 7.8 K/UL (ref 1.8–7.7)
NEUTROPHILS NFR BLD: 61 % (ref 38–73)
NRBC BLD-RTO: 0 /100 WBC
PLATELET # BLD AUTO: 853 K/UL (ref 150–350)
PLATELET BLD QL SMEAR: ABNORMAL
PMV BLD AUTO: 10.9 FL (ref 9.2–12.9)
POIKILOCYTOSIS BLD QL SMEAR: ABNORMAL
POLYCHROMASIA BLD QL SMEAR: ABNORMAL
RBC # BLD AUTO: 3.77 M/UL (ref 4–5.4)
SCHISTOCYTES BLD QL SMEAR: PRESENT
TARGETS BLD QL SMEAR: ABNORMAL
WBC # BLD AUTO: 12.79 K/UL (ref 3.9–12.7)

## 2020-06-09 PROCEDURE — 63600175 PHARM REV CODE 636 W HCPCS: Mod: JG | Performed by: NURSE PRACTITIONER

## 2020-06-09 PROCEDURE — 25000003 PHARM REV CODE 250: Performed by: NURSE PRACTITIONER

## 2020-06-09 PROCEDURE — 99213 OFFICE O/P EST LOW 20 MIN: CPT | Mod: PBBFAC,25 | Performed by: NURSE PRACTITIONER

## 2020-06-09 PROCEDURE — 36415 COLL VENOUS BLD VENIPUNCTURE: CPT

## 2020-06-09 PROCEDURE — 99999 PR PBB SHADOW E&M-EST. PATIENT-LVL III: CPT | Mod: PBBFAC,,, | Performed by: NURSE PRACTITIONER

## 2020-06-09 PROCEDURE — 99214 OFFICE O/P EST MOD 30 MIN: CPT | Mod: 25,S$PBB,, | Performed by: NURSE PRACTITIONER

## 2020-06-09 PROCEDURE — 96365 THER/PROPH/DIAG IV INF INIT: CPT

## 2020-06-09 PROCEDURE — 99999 PR PBB SHADOW E&M-EST. PATIENT-LVL III: ICD-10-PCS | Mod: PBBFAC,,, | Performed by: NURSE PRACTITIONER

## 2020-06-09 PROCEDURE — 85025 COMPLETE CBC W/AUTO DIFF WBC: CPT

## 2020-06-09 PROCEDURE — 99214 PR OFFICE/OUTPT VISIT, EST, LEVL IV, 30-39 MIN: ICD-10-PCS | Mod: 25,S$PBB,, | Performed by: NURSE PRACTITIONER

## 2020-06-09 RX ORDER — METHYLPREDNISOLONE SOD SUCC 125 MG
125 VIAL (EA) INJECTION ONCE AS NEEDED
Status: CANCELLED | OUTPATIENT
Start: 2020-06-09

## 2020-06-09 RX ORDER — HEPARIN 100 UNIT/ML
500 SYRINGE INTRAVENOUS
Status: CANCELLED | OUTPATIENT
Start: 2020-06-09

## 2020-06-09 RX ORDER — DIPHENHYDRAMINE HYDROCHLORIDE 50 MG/ML
50 INJECTION INTRAMUSCULAR; INTRAVENOUS ONCE AS NEEDED
Status: CANCELLED | OUTPATIENT
Start: 2020-06-09

## 2020-06-09 RX ORDER — EPINEPHRINE 0.3 MG/.3ML
0.3 INJECTION SUBCUTANEOUS ONCE AS NEEDED
Status: CANCELLED | OUTPATIENT
Start: 2020-06-09

## 2020-06-09 RX ORDER — SODIUM CHLORIDE 0.9 % (FLUSH) 0.9 %
10 SYRINGE (ML) INJECTION
Status: CANCELLED | OUTPATIENT
Start: 2020-06-09

## 2020-06-09 RX ADMIN — FERUMOXYTOL 510 MG: 510 INJECTION INTRAVENOUS at 10:06

## 2020-06-09 NOTE — TELEPHONE ENCOUNTER
Called patient to speak with her on behalf of upcoming visits, but no answer. Left voicemail to call cesar shi call bk number

## 2020-06-09 NOTE — ASSESSMENT & PLAN NOTE
Platelet count 853. Hold Nplate. Case discussed with Dr. Swan. Arrange bone marrow biopsy this week if possible. Have cbc done just prior to bone marrow biopsy to assess platelet count as her platelet count frequently fluctuates. Check Von Willebrand activity/antigen on her way out. Also check Factor VIII lab.      F/u 1 week with Dr. Swan with CBC and possible N plate. Discussed S&S to report sooner

## 2020-06-09 NOTE — TELEPHONE ENCOUNTER
----- Message from Sakina Swan MD sent at 6/9/2020 10:23 AM CDT -----  Please schedule follow-up with me with patient a week after bone marrow biopsy checking day before to ensure pathology results back.    ----- Message -----  From: Sophy Das NP  Sent: 6/9/2020  10:12 AM CDT  To: Evelyne Pal LPN, Sakina Swan MD, #    Please schedule von willebrand labs (there are 2) and factor VIII labs for patient to have done on her way out. Also please arrange 1 week follow up with Dr. Swan with cbc and and possible Nplate appt.     Dr. Swan is placing bone marrow biopsy orders that will need to be scheduled this week if possible. Thank you

## 2020-06-09 NOTE — DISCHARGE INSTRUCTIONS
.[facility]  [address]  [address]  [phone] phone     [phone] fax  Hours of Operation: Monday- Friday 8:00am- 5:00pm  After hours phone  [phone]  Hematology / Oncology Physicians on call      VERN Bell Dr., Dr., Dr., Dr., NP Sydney Prescott, NP Tyesha Taylor, NP    Please call with any concerns regarding your appointment today.

## 2020-06-09 NOTE — PROGRESS NOTES
Subjective:       Patient ID: Mari Miller is a 24 y.o. female.    Chief Complaint: Follow-up    HPI: 24 y.o female with h/o refractory ITP. Past treatments include Rituxan, Prednisone, IVIG, splenectomy 11/2018, Promacta. Patient has had several hospitalizations for her ITP requiring IVIG, steroids and PRBCs for anemia.  Patient currently being treated with Nplate injections 6mcg/kg.  Last dose 5/26/20. Most recent platelet count 853>549K    She presents today for labs to determine need for NPlate 6mcg/kg. Patient denies any abnormal bleeding. Patient denies any hematuria, hematochezia, melena, hemoptysis, epistaxis, bowel or urinary complaints, fever, chills, night sweats, unintentional weight loss. Hemoglobin stable but needs IV iron. Scheduled to receive first dose today        Social History     Socioeconomic History    Marital status: Single     Spouse name: Not on file    Number of children: Not on file    Years of education: Not on file    Highest education level: Not on file   Occupational History    Not on file   Social Needs    Financial resource strain: Not on file    Food insecurity:     Worry: Not on file     Inability: Not on file    Transportation needs:     Medical: Not on file     Non-medical: Not on file   Tobacco Use    Smoking status: Never Smoker    Smokeless tobacco: Never Used   Substance and Sexual Activity    Alcohol use: No     Frequency: Never    Drug use: No    Sexual activity: Not on file   Lifestyle    Physical activity:     Days per week: Not on file     Minutes per session: Not on file    Stress: Not on file   Relationships    Social connections:     Talks on phone: Not on file     Gets together: Not on file     Attends [employer] service: Not on file     Active member of club or organization: Not on file     Attends meetings of clubs or organizations: Not on file     Relationship status: Not on file   Other Topics Concern    Not on file   Social History  Narrative    Not on file       Past Medical History:   Diagnosis Date    Encounter for blood transfusion     Thrombocytopenia        No family history on file.    Past Surgical History:   Procedure Laterality Date     SECTION      x1    ROBOT-ASSISTED SURGICAL REMOVAL OF SPLEEN USING DA MAXIM XI N/A 2018    Procedure: XI ROBOTIC SPLENECTOMY;  Surgeon: Yobani Lara MD;  Location: [facility];  Service: General;  Laterality: N/A;       Review of Systems   Constitutional: Negative for activity change, appetite change, chills, diaphoresis, fatigue, fever and unexpected weight change.   HENT: Negative for congestion, mouth sores, nosebleeds, sore throat, trouble swallowing and voice change.    Eyes: Negative for photophobia and visual disturbance.   Respiratory: Negative for cough, chest tightness, shortness of breath and wheezing.    Cardiovascular: Negative for chest pain, palpitations and leg swelling.   Gastrointestinal: Negative for abdominal distention, abdominal pain, blood in stool, constipation, diarrhea, nausea and vomiting.   Genitourinary: Negative for difficulty urinating, dysuria and hematuria.   Musculoskeletal: Negative for arthralgias, back pain and myalgias.   Skin: Negative for pallor, rash and wound.   Neurological: Negative for dizziness, syncope, weakness and headaches.   Hematological: Negative for adenopathy. Does not bruise/bleed easily.   Psychiatric/Behavioral: The patient is not nervous/anxious.            Objective:     Vitals:    20 0923   BP: 129/87   Pulse: 100   Resp: 14   Temp: 99.2 °F (37.3 °C)     Lab Results   Component Value Date    WBC 12.79 (H) 2020    HGB 7.9 (L) 2020    HCT 27.8 (L) 2020    MCV 74 (L) 2020     (H) 2020     Lab Results   Component Value Date    IRON 17 (L) 2020    TIBC 527 (H) 2020    FERRITIN 16 (L) 2020     BMP  Lab Results   Component Value Date     10/15/2019    K 4.3 10/15/2019      10/15/2019    CO2 23 10/15/2019    BUN 9 10/15/2019    CREATININE 0.6 10/15/2019    CALCIUM 8.6 (L) 10/15/2019    ANIONGAP 7 (L) 10/15/2019    ESTGFRAFRICA >60 10/15/2019    EGFRNONAA >60 10/15/2019     Lab Results   Component Value Date    ALT 15 10/15/2019    AST 11 10/15/2019    ALKPHOS 47 (L) 10/15/2019    BILITOT 0.1 10/15/2019         Physical Exam   Constitutional: She is oriented to person, place, and time. She appears well-developed and well-nourished. She is cooperative.   HENT:   Head: Normocephalic.   Right Ear: Hearing normal. No drainage.   Left Ear: Hearing normal. No drainage.   Nose: Nose normal.   Mouth/Throat: Oropharynx is clear and moist.   Eyes: Conjunctivae, EOM and lids are normal. Right eye exhibits no discharge. Left eye exhibits no discharge. No scleral icterus.   Neck: Normal range of motion. No thyroid mass present.   Cardiovascular: Normal rate.   Pulmonary/Chest: Effort normal. No respiratory distress.   Abdominal: Soft. Bowel sounds are normal. She exhibits no distension. There is no tenderness.   Genitourinary:   Genitourinary Comments: deferred   Musculoskeletal: Normal range of motion. She exhibits no edema.   Neurological: She is alert and oriented to person, place, and time.   Skin: Skin is warm, dry and intact.   Psychiatric: She has a normal mood and affect. Her speech is normal and behavior is normal. Thought content normal.   Vitals reviewed.       Assessment:     Problem List Items Addressed This Visit        Hematology    Chronic ITP (idiopathic thrombocytopenia)     Platelet count 853. Hold Nplate. Case discussed with Dr. Swan. Arrange bone marrow biopsy this week if possible. Have cbc done just prior to bone marrow biopsy to assess platelet count as her platelet count frequently fluctuates. Check Von Willebrand activity/antigen on her way out. Also check Factor VIII lab.      F/u 1 week with Dr. Swan with CBC and possible N plate. Discussed S&S to report  sooner         Relevant Orders    Von Willebrand antigen    VON WILLEBRAND FACTOR ACTIVITY, PLASMA    Factor 8 Assay    CBC auto differential    CBC auto differential    Thrombocytopenia - Primary    Relevant Orders    Von Willebrand antigen    VON WILLEBRAND FACTOR ACTIVITY, PLASMA    Factor 8 Assay    CBC auto differential       Oncology    Iron deficiency anemia due to chronic blood loss     --Feraheme #1 today. Repeat Feraheme in 1 week         Relevant Orders    Von Willebrand antigen    VON WILLEBRAND FACTOR ACTIVITY, PLASMA    Factor 8 Assay    CBC auto differential            Plan:     Thrombocytopenia  -     Von Willebrand antigen; Future; Expected date: 06/09/2020  -     VON WILLEBRAND FACTOR ACTIVITY, PLASMA; Future; Expected date: 06/09/2020  -     Factor 8 Assay; Future; Expected date: 06/09/2020  -     CBC auto differential; Future; Expected date: 06/09/2020    Chronic ITP (idiopathic thrombocytopenia)  -     Von Willebrand antigen; Future; Expected date: 06/09/2020  -     VON WILLEBRAND FACTOR ACTIVITY, PLASMA; Future; Expected date: 06/09/2020  -     Factor 8 Assay; Future; Expected date: 06/09/2020  -     CBC auto differential; Future; Expected date: 06/09/2020  -     CBC auto differential; Future; Expected date: 06/09/2020    Iron deficiency anemia due to chronic blood loss  -     Von Willebrand antigen; Future; Expected date: 06/09/2020  -     VON WILLEBRAND FACTOR ACTIVITY, PLASMA; Future; Expected date: 06/09/2020  -     Factor 8 Assay; Future; Expected date: 06/09/2020  -     CBC auto differential; Future; Expected date: 06/09/2020              BELA Acevedo

## 2020-06-15 ENCOUNTER — TELEPHONE (OUTPATIENT)
Dept: INFUSION THERAPY | Facility: HOSPITAL | Age: 25
End: 2020-06-15

## 2020-06-15 ENCOUNTER — OFFICE VISIT (OUTPATIENT)
Dept: HEMATOLOGY/ONCOLOGY | Facility: CLINIC | Age: 25
End: 2020-06-15
Payer: MEDICAID

## 2020-06-15 ENCOUNTER — TELEPHONE (OUTPATIENT)
Dept: RADIOLOGY | Facility: HOSPITAL | Age: 25
End: 2020-06-15

## 2020-06-15 ENCOUNTER — INFUSION (OUTPATIENT)
Dept: INFUSION THERAPY | Facility: HOSPITAL | Age: 25
End: 2020-06-15
Attending: INTERNAL MEDICINE
Payer: MEDICAID

## 2020-06-15 VITALS
BODY MASS INDEX: 43.37 KG/M2 | TEMPERATURE: 99 F | DIASTOLIC BLOOD PRESSURE: 64 MMHG | HEIGHT: 62 IN | SYSTOLIC BLOOD PRESSURE: 112 MMHG | OXYGEN SATURATION: 100 % | RESPIRATION RATE: 18 BRPM | WEIGHT: 235.69 LBS | HEART RATE: 103 BPM

## 2020-06-15 VITALS — HEIGHT: 62 IN | BODY MASS INDEX: 43.37 KG/M2 | WEIGHT: 235.69 LBS

## 2020-06-15 DIAGNOSIS — D50.9 MICROCYTIC ANEMIA: ICD-10-CM

## 2020-06-15 DIAGNOSIS — D50.0 IRON DEFICIENCY ANEMIA DUE TO CHRONIC BLOOD LOSS: ICD-10-CM

## 2020-06-15 DIAGNOSIS — Z90.81 H/O SPLENECTOMY: ICD-10-CM

## 2020-06-15 DIAGNOSIS — D69.3 ACUTE ITP: Primary | ICD-10-CM

## 2020-06-15 DIAGNOSIS — D69.6 THROMBOCYTOPENIA: ICD-10-CM

## 2020-06-15 DIAGNOSIS — D50.0 ANEMIA DUE TO CHRONIC BLOOD LOSS: ICD-10-CM

## 2020-06-15 DIAGNOSIS — D72.829 LEUKOCYTOSIS, UNSPECIFIED TYPE: ICD-10-CM

## 2020-06-15 DIAGNOSIS — D69.6 THROMBOCYTOPENIA: Primary | ICD-10-CM

## 2020-06-15 PROCEDURE — 63600175 PHARM REV CODE 636 W HCPCS: Mod: JG | Performed by: INTERNAL MEDICINE

## 2020-06-15 PROCEDURE — 99999 PR PBB SHADOW E&M-EST. PATIENT-LVL III: CPT | Mod: PBBFAC,,, | Performed by: INTERNAL MEDICINE

## 2020-06-15 PROCEDURE — 99213 OFFICE O/P EST LOW 20 MIN: CPT | Mod: PBBFAC,25 | Performed by: INTERNAL MEDICINE

## 2020-06-15 PROCEDURE — 96372 THER/PROPH/DIAG INJ SC/IM: CPT

## 2020-06-15 PROCEDURE — 99214 PR OFFICE/OUTPT VISIT, EST, LEVL IV, 30-39 MIN: ICD-10-PCS | Mod: S$PBB,,, | Performed by: INTERNAL MEDICINE

## 2020-06-15 PROCEDURE — 99214 OFFICE O/P EST MOD 30 MIN: CPT | Mod: S$PBB,,, | Performed by: INTERNAL MEDICINE

## 2020-06-15 PROCEDURE — 99999 PR PBB SHADOW E&M-EST. PATIENT-LVL III: ICD-10-PCS | Mod: PBBFAC,,, | Performed by: INTERNAL MEDICINE

## 2020-06-15 RX ORDER — ONDANSETRON 2 MG/ML
8 INJECTION INTRAMUSCULAR; INTRAVENOUS ONCE
Status: CANCELLED | OUTPATIENT
Start: 2020-06-15

## 2020-06-15 RX ADMIN — ROMIPLOSTIM 635 MCG: 500 INJECTION, POWDER, LYOPHILIZED, FOR SOLUTION SUBCUTANEOUS at 04:06

## 2020-06-15 NOTE — PATIENT INSTRUCTIONS
N-plate today 4mcg/kg  Thursday revisit with cbc and 2nd dose iv iron (please cancel at [facility] today it was scheduled?)  Please add onto Thursday's labs factor and von willebrand studies  Need to reschedule bmbx from tomorrow to next week due to low platelet count

## 2020-06-15 NOTE — PROGRESS NOTES
Subjective:      DATE OF VISIT: 6/15/2020   ?     Patient ID:?Mari Miller is a 24 y.o. female.?? MR#: [id number]   ?  ?   CHIEF COMPLAINT:  Follow-up     DIAGNOSIS: ITP  ?   CURRENT TREATMENT: N-plate prn    PAST TREATMENT:  Steroids, splenectomy 11/2018, eltrombopag (Promacta) and prednisone taper, IVIG x 2 10/2/19 and 10/3/19     HPI    Treatment held last week due to platelet count over 800 K, seen by nurse practitioner.  Over the last couple days she developed mild petechiae on arms and trace gum bleeding.  No fevers chills or other infectious symptoms.  No bleeding in stool.  No headache    Review of Systems    ?   A comprehensive 14-point review of systems was reviewed with patient and was negative other than as specified above.   ?     Objective:      Physical Exam      Wt Readings from Last 3 Encounters:   06/15/20 106.9 kg (235 lb 10.8 oz)   06/15/20 106.9 kg (235 lb 10.8 oz)   06/09/20 106.6 kg (235 lb 0.2 oz)     Temp Readings from Last 3 Encounters:   06/15/20 98.7 °F (37.1 °C) (Oral)   06/09/20 98.4 °F (36.9 °C)   06/09/20 99.2 °F (37.3 °C)     BP Readings from Last 3 Encounters:   06/15/20 112/64   06/09/20 125/80   06/09/20 129/87     Pulse Readings from Last 3 Encounters:   06/15/20 103   06/09/20 79   06/09/20 100       ECOG:?  0   General appearance: Generally well appearing, in no acute distress.   Head, eyes, ears, nose, and throat:  Trace gum bleeding.   Pulmonary:  Normal work of breathing.  Abdomen: nondistended.   Extremities:  No edema.  Neurologic: Alert and oriented. Grossly normal strength, coordination, and gait.   Skin:  Scant petechial rash on forearms.  Yet into the into will get my does not work in terms of like when people do does then contacting S2 people always you this and I never received sorry yet I yet it manually yet sorry I do not get that more than the AF the yet due for bones and is heavy spell issue the shin than was her labs in is a Vaseline have the are of  the review:  She is or to help find somebody II of she also monthly due both to help from some bony or back 40 the room to of I yeah the we would move to a or will week we had ordered  Psychiatric:  Normal mood and affect  ?   Laboratory:  ?   Platelets   Date Value Ref Range Status   06/15/2020 10 (LL) 150 - 350 K/uL Final     Comment:     PLT  critical result(s) called and verbal readback obtained from   OMID HUMPHREYS LPN by AES 06/15/2020 15:40     06/09/2020 853 (H) 150 - 350 K/uL Final   06/01/2020 549 (H) 150 - 350 K/uL Final   05/29/2020 17 (LL) 150 - 350 K/uL Final     Comment:     JACKELYN CORRAL LPN  critical result(s) called and verbal readback   obtained from  by ERLINDA 05/29/2020 12:27     05/26/2020 5 (LL) 150 - 350 K/uL Final     Comment:     PLT  critical result(s) called and verbal readback obtained from   MARY LU RN by AES 05/26/2020 14:15     05/14/2020 1,200 (HH) 150 - 350 K/uL Final     Comment:     Results confirmed, test repeated  PLT critical result(s) called and verbal readback obtained from   Juan Yost  by MLS 05/14/2020 10:39     05/07/2020 957 (H) 150 - 350 K/uL Final   05/01/2020 8 (LL) 150 - 350 K/uL Final     Comment:     PLT critical result(s) called and verbal readback obtained from Otoniel Lincoln by KIRK 05/01/2020 11:31     04/09/2020 0 (LL) 150 - 350 K/uL Final     Comment:     Plt critical result(s) called and verbal readback obtained from Johanne Niño RN  by ALLA 04/09/2020 13:49     04/06/2020 571 (H) 150 - 350 K/uL Final         Lab Visit on 06/15/2020   Component Date Value Ref Range Status    WBC 06/15/2020 13.63* 3.90 - 12.70 K/uL Final    RBC 06/15/2020 4.23  4.00 - 5.40 M/uL Final    Hemoglobin 06/15/2020 9.2* 12.0 - 16.0 g/dL Final    Hematocrit 06/15/2020 32.5* 37.0 - 48.5 % Final    Mean Corpuscular Volume 06/15/2020 77* 82 - 98 fL Final    Mean Corpuscular Hemoglobin 06/15/2020 21.7* 27.0 - 31.0 pg Final    Mean Corpuscular Hemoglobin Conc  06/15/2020 28.3* 32.0 - 36.0 g/dL Final    RDW 06/15/2020 26.9* 11.5 - 14.5 % Final    Platelets 06/15/2020 10* 150 - 350 K/uL Final    MPV 06/15/2020 SEE COMMENT  9.2 - 12.9 fL Final    Immature Granulocytes 06/15/2020 0.9* 0.0 - 0.5 % Final    Gran # (ANC) 06/15/2020 8.6* 1.8 - 7.7 K/uL Final    Immature Grans (Abs) 06/15/2020 0.12* 0.00 - 0.04 K/uL Final    Lymph # 06/15/2020 3.0  1.0 - 4.8 K/uL Final    Mono # 06/15/2020 1.7* 0.3 - 1.0 K/uL Final    Eos # 06/15/2020 0.2  0.0 - 0.5 K/uL Final    Baso # 06/15/2020 0.11  0.00 - 0.20 K/uL Final    nRBC 06/15/2020 0  0 /100 WBC Final    Gran% 06/15/2020 62.7  38.0 - 73.0 % Final    Lymph% 06/15/2020 21.9  18.0 - 48.0 % Final    Mono% 06/15/2020 12.3  4.0 - 15.0 % Final    Eosinophil% 06/15/2020 1.4  0.0 - 8.0 % Final    Basophil% 06/15/2020 0.8  0.0 - 1.9 % Final    Platelet Estimate 06/15/2020 Decreased*  Final    Aniso 06/15/2020 Moderate   Final    Poik 06/15/2020 Moderate   Final    Poly 06/15/2020 Moderate   Final    Hypo 06/15/2020 Moderate   Final    Ovalocytes 06/15/2020 Occasional   Final    Target Cells 06/15/2020 Moderate   Final    Tear Drop Cells 06/15/2020 Occasional   Final    Differential Method 06/15/2020 Automated   Final    Prothrombin Time 06/15/2020 10.7  9.0 - 12.5 sec Final    INR 06/15/2020 1.0  0.8 - 1.2 Final    aPTT 06/15/2020 27.9  21.0 - 32.0 sec Final    ?   Assessment/Plan:       1. Acute ITP    2. Microcytic anemia    3. Thrombocytopenia    4. H/O splenectomy    5. Anemia due to chronic blood loss          Plan:     # acute on chronic ITP:  refractory ITP status post steroids, splenectomy in November 2018, and this year monthly hospitalizations with platelet counts as low as 1-5K.  Since December 2018 she has been treated with daily eltrombopag, discontinued on 11/11/2019 due to refractory ITP and possible associated headache.  - she has been treated with Nplate over the last several months and has  demonstrated response although sustaining platelet count limited by her in consistent follow-up in clinic.   Treatment held last week for platelets over 800 K now down to 10 K with mild petechiae.  Will give Nplate 6 micrograms/kilogram today and follow-up on Thursday.  Will need to reschedule bone marrow biopsy planned for tomorrow to next week next Tuesday ideally so we can recheck blood counts on Monday.  - factor level and von Willebrand's panel pending    # iron deficiency anemia:   Heavy menstrual cycle exacerbated by ITP.   Due for 2nd infusion will plan on Thursday    Follow-Up:   N-plate today 4mcg/kg  Thursday revisit with cbc and 2nd dose iv iron  Please add onto Thursday's labs factor and von willebrand studies  Need to reschedule bmbx from tomorrow to next week due to low platelet count

## 2020-06-15 NOTE — TELEPHONE ENCOUNTER
Pre-procedure phone call made at this time. Left message for pt to be NPO after midnight, show up to [facility] on [address] at 9:30am, either have a ride with them or have a phone number for the person driving them home so that we can get in contact with them to keep them updated.

## 2020-06-18 ENCOUNTER — TELEPHONE (OUTPATIENT)
Dept: HEMATOLOGY/ONCOLOGY | Facility: CLINIC | Age: 25
End: 2020-06-18

## 2020-06-22 ENCOUNTER — TELEPHONE (OUTPATIENT)
Dept: RADIOLOGY | Facility: HOSPITAL | Age: 25
End: 2020-06-22

## 2020-06-23 ENCOUNTER — HOSPITAL ENCOUNTER (OUTPATIENT)
Dept: RADIOLOGY | Facility: HOSPITAL | Age: 25
Discharge: HOME | End: 2020-06-23
Attending: INTERNAL MEDICINE
Payer: MEDICAID

## 2020-06-23 VITALS
BODY MASS INDEX: 42.33 KG/M2 | OXYGEN SATURATION: 100 % | SYSTOLIC BLOOD PRESSURE: 147 MMHG | WEIGHT: 230 LBS | RESPIRATION RATE: 15 BRPM | HEIGHT: 62 IN | DIASTOLIC BLOOD PRESSURE: 66 MMHG | HEART RATE: 82 BPM

## 2020-06-23 DIAGNOSIS — D50.0 ANEMIA DUE TO CHRONIC BLOOD LOSS: ICD-10-CM

## 2020-06-23 DIAGNOSIS — D69.6 THROMBOCYTOPENIA: ICD-10-CM

## 2020-06-23 DIAGNOSIS — D69.3 ACUTE ITP: ICD-10-CM

## 2020-06-23 PROCEDURE — 88342 CHG IMMUNOCYTOCHEMISTRY: ICD-10-PCS | Mod: 26,59,, | Performed by: PATHOLOGY

## 2020-06-23 PROCEDURE — 88341 IMHCHEM/IMCYTCHM EA ADD ANTB: CPT | Mod: 59 | Performed by: PATHOLOGY

## 2020-06-23 PROCEDURE — 88264 CHROMOSOME ANALYSIS 20-25: CPT

## 2020-06-23 PROCEDURE — 77012 CT SCAN FOR NEEDLE BIOPSY: CPT | Mod: TC

## 2020-06-23 PROCEDURE — 88313 SPECIAL STAINS GROUP 2: CPT | Mod: 26,,, | Performed by: PATHOLOGY

## 2020-06-23 PROCEDURE — 85097 BONE MARROW INTERPRETATION: CPT | Mod: ,,, | Performed by: PATHOLOGY

## 2020-06-23 PROCEDURE — 88342 IMHCHEM/IMCYTCHM 1ST ANTB: CPT | Mod: 59 | Performed by: PATHOLOGY

## 2020-06-23 PROCEDURE — 88189 FLOWCYTOMETRY/READ 16 & >: CPT | Mod: ,,, | Performed by: PATHOLOGY

## 2020-06-23 PROCEDURE — 85097 PR  BONE MARROW,SMEAR INTERPRETATION: ICD-10-PCS | Mod: ,,, | Performed by: PATHOLOGY

## 2020-06-23 PROCEDURE — 81219 CALR GENE COM VARIANTS: CPT

## 2020-06-23 PROCEDURE — 88184 FLOWCYTOMETRY/ TC 1 MARKER: CPT | Performed by: PATHOLOGY

## 2020-06-23 PROCEDURE — 81403 MOPATH PROCEDURE LEVEL 4: CPT

## 2020-06-23 PROCEDURE — 88313 SPECIAL STAINS GROUP 2: CPT | Performed by: PATHOLOGY

## 2020-06-23 PROCEDURE — 88311 DECALCIFY TISSUE: CPT | Mod: 26,,, | Performed by: PATHOLOGY

## 2020-06-23 PROCEDURE — 88341 PR IHC OR ICC EACH ADD'L SINGLE ANTIBODY  STAINPR: ICD-10-PCS | Mod: 26,59,, | Performed by: PATHOLOGY

## 2020-06-23 PROCEDURE — 88311 PR  DECALCIFY TISSUE: ICD-10-PCS | Mod: 26,,, | Performed by: PATHOLOGY

## 2020-06-23 PROCEDURE — 88342 IMHCHEM/IMCYTCHM 1ST ANTB: CPT | Mod: 26,59,, | Performed by: PATHOLOGY

## 2020-06-23 PROCEDURE — 88299 UNLISTED CYTOGENETIC STUDY: CPT

## 2020-06-23 PROCEDURE — 88305 TISSUE EXAM BY PATHOLOGIST: CPT | Performed by: PATHOLOGY

## 2020-06-23 PROCEDURE — 88311 DECALCIFY TISSUE: CPT | Performed by: PATHOLOGY

## 2020-06-23 PROCEDURE — 88189 PR  FLOWCYTOMETRY/READ, 16 & > MARKERS: ICD-10-PCS | Mod: ,,, | Performed by: PATHOLOGY

## 2020-06-23 PROCEDURE — 63600175 PHARM REV CODE 636 W HCPCS: Performed by: RADIOLOGY

## 2020-06-23 PROCEDURE — 88305 TISSUE EXAM BY PATHOLOGIST: CPT | Mod: 26,,, | Performed by: PATHOLOGY

## 2020-06-23 PROCEDURE — 88185 FLOWCYTOMETRY/TC ADD-ON: CPT | Performed by: PATHOLOGY

## 2020-06-23 PROCEDURE — 88237 TISSUE CULTURE BONE MARROW: CPT

## 2020-06-23 PROCEDURE — 88313 PR  SPECIAL STAINS,GROUP II: ICD-10-PCS | Mod: 26,,, | Performed by: PATHOLOGY

## 2020-06-23 PROCEDURE — 88341 IMHCHEM/IMCYTCHM EA ADD ANTB: CPT | Mod: 26,59,, | Performed by: PATHOLOGY

## 2020-06-23 PROCEDURE — 88305 TISSUE EXAM BY PATHOLOGIST: ICD-10-PCS | Mod: 26,,, | Performed by: PATHOLOGY

## 2020-06-23 PROCEDURE — 81270 JAK2 GENE: CPT

## 2020-06-23 PROCEDURE — 25000003 PHARM REV CODE 250: Performed by: RADIOLOGY

## 2020-06-23 PROCEDURE — 36415 COLL VENOUS BLD VENIPUNCTURE: CPT

## 2020-06-23 RX ORDER — MIDAZOLAM HYDROCHLORIDE 1 MG/ML
INJECTION INTRAMUSCULAR; INTRAVENOUS CODE/TRAUMA/SEDATION MEDICATION
Status: COMPLETED | OUTPATIENT
Start: 2020-06-23 | End: 2020-06-23

## 2020-06-23 RX ORDER — FENTANYL CITRATE 50 UG/ML
INJECTION, SOLUTION INTRAMUSCULAR; INTRAVENOUS CODE/TRAUMA/SEDATION MEDICATION
Status: COMPLETED | OUTPATIENT
Start: 2020-06-23 | End: 2020-06-23

## 2020-06-23 RX ORDER — LIDOCAINE HYDROCHLORIDE 10 MG/ML
INJECTION INFILTRATION; PERINEURAL CODE/TRAUMA/SEDATION MEDICATION
Status: COMPLETED | OUTPATIENT
Start: 2020-06-23 | End: 2020-06-23

## 2020-06-23 RX ADMIN — FENTANYL CITRATE 50 MCG: 50 INJECTION, SOLUTION INTRAMUSCULAR; INTRAVENOUS at 09:06

## 2020-06-23 RX ADMIN — LIDOCAINE HYDROCHLORIDE 5 ML: 10 INJECTION, SOLUTION INFILTRATION; PERINEURAL at 09:06

## 2020-06-23 RX ADMIN — MIDAZOLAM HYDROCHLORIDE 1 MG: 1 INJECTION, SOLUTION INTRAMUSCULAR; INTRAVENOUS at 09:06

## 2020-06-23 NOTE — DISCHARGE INSTRUCTIONS
Bone Marrow Aspiration and Biopsy  Does this test have other names?  Bone marrow exam  What is this test?  This is a two-part test that looks at the blood cells in a sample of bone marrow, the spongy tissue within certain bones. This test may help your healthcare provider diagnose or monitor a blood disease or health condition affecting your marrow.  Your bone marrow has a liquid part and a solid part. Aspiration uses a needle to remove a sample of the liquid part of bone marrow. Biopsy uses a larger needle to remove a small amount of bone with its marrow.  Part of the job of bone marrow is to make blood cells. This test can find out how well your bone marrow is working. This test is also done to find some types of cancer.  Why do I need this test?  You might have this test if your healthcare provider wants to find out the health of your bone marrow or to check on how well your marrow is making blood cells.  You may have an aspiration to check for:  · The health of your bone marrow for a transplant  · Acute leukemia  · Multiple myeloma  In some cases, bone marrow aspiration is used to confirm chromosome disorders in newborns.  You may have an aspiration followed by a biopsy if you could have:  · Bacterial, fungal, or parasitic infection  · Unexplained anemia, leucopenia, or thrombocytopenia  · Metastatic cancer or many other diseases  What other tests might I have along with this test?  Your healthcare provider may also order these tests:  · Complete blood count, or CBC  · Reticulocyte count to find out your red blood cell survival rate  What do my test results mean?  Many things may affect your lab test results. These include the method each lab uses to do the test. Even if your test results are different from the normal value, you may not have a problem. To learn what the results mean for you, talk with your healthcare provider.  The lab will look at different aspects of your bone marrow to help find certain  diseases or conditions. These aspects include:  · Type and number of blood cells  · Any abnormalities in the size, shape, or look of cells  · Level of iron in the bone marrow  · Abnormal amount of young white blood cells, called blasts  · Any chromosomal abnormalities  Depending on what is seen, your results may mean you have an infection, a blood disease, leukemia, or cancer that has spread to the bone marrow from another site.  Your healthcare provider will take your results and combine this information with information from your physical exam, health history, and other types of tests to make a diagnosis.   If your results are negative, your provider may order other tests to diagnose your condition.   How is this test done?  These tests require a sample of bone marrow. A number of sites on your body can be used for marrow aspiration, but the hip bone is a common spot. You will likely lie on your side or stomach on an exam table. Your healthcare provider will numb the area of the test. You may feel a slight prick from the needle that the provider uses to give the numbing agent.  Does this test pose any risks?  It's not possible to numb the bone, so you may feel slight pain during the procedure. But you shouldn't feel any pain afterward. Risks from a bone marrow test are rare, but you could have bleeding or an infection.  What might affect my test results?  Other factors aren't likely to affect your results.  How do I get ready for this test?  Tell your healthcare provider if you take aspirin or have any allergies. Also tell your provider if you are pregnant, take any blood-thinner medicines, or have a history of bleeding problems.  Be sure your provider knows about all other medicines, herbs, vitamins, and supplements you are taking. This includes medicines that don't need a prescription and any illicit drugs you may use.     © 2781-7166 [employer]. [address] [place] [address], [place] [address]. All  rights reserved. This information is not intended as a substitute for professional medical care. Always follow your healthcare professional's instructions.        Recovery After Procedural Sedation (Adult)   You have been given medicine by vein to make you sleep during your surgery. This may have included both a pain medicine and sleeping medicine. Most of the effects have worn off. But you may still have some drowsiness for the next 6 to 8 hours.  Home care  Follow these guidelines when you get home:  · For the next 8 hours, you should be watched by a responsible adult. This person should make sure your condition is not getting worse.  · Don't drink any alcohol for the next 24 hours.  · Don't drive, operate dangerous machinery, or make important business or personal decisions during the next 24 hours.  · To prevent injury or falls, use caution when standing and walking for at least 24 hours after your procedure.  Note: Your healthcare provider may tell you not to take any medicine by mouth for pain or sleep in the next 4 hours. These medicines may react with the medicines you were given in the hospital. This could cause a much stronger response than usual.  Follow-up care  Follow up with your healthcare provider if you are not alert and back to your usual level of activity within 12 hours.  When to seek medical advice  Call your healthcare provider right away if any of these occur:  · Drowsiness gets worse  · Weakness or dizziness gets worse  · Repeated vomiting  · You can't be awakened  · Fever  · New rash  Abdoulaye last reviewed this educational content on 9/1/2019  © 6224-9714 [employer]. [address] [place] [address], [place] [address]. All rights reserved. This information is not intended as a substitute for professional medical care. Always follow your healthcare professional's instructions.

## 2020-06-23 NOTE — DISCHARGE SUMMARY
Pre Op Diagnosis: ITP     Post Op Diagnosis: same     Procedure:  Bone marrow biopsy     Procedure performed by: Susan WILL, Irving PALAFOX     Written Informed Consent Obtained: Yes     Specimen Removed:  yes     Estimated Blood Loss:  minimal     Findings: Local anesthesia and moderate sedation were used.     The patient tolerated the procedure well and there were no complications.      Sterile technique was performed in the posterior right iliac, lidocaine was used as a local anesthetic.  Multiple samples taken from the right iliac bone.  Pt tolerated the procedure well without immediate complications.  Please see radiologist report for details. F/u with PCP and/or ordering physician.

## 2020-06-23 NOTE — SEDATION DOCUMENTATION
Procedure complete.  VSS.  Patient tolerated fair.  Pressure held to puncture site.  Band aid to puncture site C/D/I

## 2020-06-23 NOTE — PLAN OF CARE
Recovery complete.  VSS.  Discussed DC instructions with patient and she verbalized understanding.  Band aid to puncture site - C/D/I.  Patient dc'd via WC to front of hospital to waiting car.

## 2020-06-26 LAB
BODY SITE - BONE MARROW: NORMAL
CLINICAL DIAGNOSIS - BONE MARROW: NORMAL
DNA/RNA EXTRACT AND HOLD RESULT: NORMAL
DNA/RNA EXTRACTION: NORMAL
EXHR SPECIMEN TYPE: NORMAL
FLOW CYTOMETRY ANTIBODIES ANALYZED - BONE MARROW: NORMAL
FLOW CYTOMETRY COMMENT - BONE MARROW: NORMAL
FLOW CYTOMETRY INTERPRETATION - BONE MARROW: NORMAL

## 2020-07-01 LAB
CHROM BANDING METHOD: NORMAL
CHROMOSOME ANALYSIS BM ADDITIONAL INFORMATION: NORMAL
CHROMOSOME ANALYSIS BM RELEASED BY: NORMAL
CHROMOSOME ANALYSIS BM RESULT SUMMARY: NORMAL
CLINICAL CYTOGENETICIST REVIEW: NORMAL
KARYOTYP MAR: NORMAL
REASON FOR REFERRAL (NARRATIVE): NORMAL
REF LAB TEST METHOD: NORMAL
SPECIMEN SOURCE: NORMAL
SPECIMEN: NORMAL

## 2020-07-02 ENCOUNTER — INFUSION (OUTPATIENT)
Dept: INFUSION THERAPY | Facility: HOSPITAL | Age: 25
End: 2020-07-02
Attending: INTERNAL MEDICINE
Payer: MEDICAID

## 2020-07-02 ENCOUNTER — OFFICE VISIT (OUTPATIENT)
Dept: HEMATOLOGY/ONCOLOGY | Facility: CLINIC | Age: 25
End: 2020-07-02
Payer: MEDICAID

## 2020-07-02 VITALS
BODY MASS INDEX: 44.22 KG/M2 | HEART RATE: 86 BPM | WEIGHT: 240.31 LBS | TEMPERATURE: 98 F | DIASTOLIC BLOOD PRESSURE: 97 MMHG | SYSTOLIC BLOOD PRESSURE: 139 MMHG | OXYGEN SATURATION: 99 % | HEIGHT: 62 IN | RESPIRATION RATE: 16 BRPM

## 2020-07-02 VITALS
OXYGEN SATURATION: 99 % | HEART RATE: 105 BPM | BODY MASS INDEX: 44.22 KG/M2 | TEMPERATURE: 98 F | WEIGHT: 240.31 LBS | HEIGHT: 62 IN | DIASTOLIC BLOOD PRESSURE: 94 MMHG | SYSTOLIC BLOOD PRESSURE: 145 MMHG

## 2020-07-02 DIAGNOSIS — D69.3 ACUTE ITP: Primary | ICD-10-CM

## 2020-07-02 DIAGNOSIS — D69.6 THROMBOCYTOPENIA: ICD-10-CM

## 2020-07-02 DIAGNOSIS — D50.0 IRON DEFICIENCY ANEMIA DUE TO CHRONIC BLOOD LOSS: ICD-10-CM

## 2020-07-02 PROCEDURE — 96372 THER/PROPH/DIAG INJ SC/IM: CPT

## 2020-07-02 PROCEDURE — 99213 OFFICE O/P EST LOW 20 MIN: CPT | Mod: PBBFAC,25 | Performed by: INTERNAL MEDICINE

## 2020-07-02 PROCEDURE — 99999 PR PBB SHADOW E&M-EST. PATIENT-LVL III: CPT | Mod: PBBFAC,,, | Performed by: INTERNAL MEDICINE

## 2020-07-02 PROCEDURE — 99999 PR PBB SHADOW E&M-EST. PATIENT-LVL III: ICD-10-PCS | Mod: PBBFAC,,, | Performed by: INTERNAL MEDICINE

## 2020-07-02 PROCEDURE — 99214 PR OFFICE/OUTPT VISIT, EST, LEVL IV, 30-39 MIN: ICD-10-PCS | Mod: S$PBB,,, | Performed by: INTERNAL MEDICINE

## 2020-07-02 PROCEDURE — 99214 OFFICE O/P EST MOD 30 MIN: CPT | Mod: S$PBB,,, | Performed by: INTERNAL MEDICINE

## 2020-07-02 PROCEDURE — 63600175 PHARM REV CODE 636 W HCPCS: Mod: JG | Performed by: INTERNAL MEDICINE

## 2020-07-02 RX ORDER — ONDANSETRON 2 MG/ML
8 INJECTION INTRAMUSCULAR; INTRAVENOUS ONCE
Status: CANCELLED
Start: 2020-07-02

## 2020-07-02 RX ADMIN — ROMIPLOSTIM 425 MCG: 500 INJECTION, POWDER, LYOPHILIZED, FOR SOLUTION SUBCUTANEOUS at 03:07

## 2020-07-02 NOTE — PROGRESS NOTES
Subjective:      DATE OF VISIT: 7/2/2020   ?     Patient ID:?Mari Milelr is a 24 y.o. female.?? MR#: [id number]   ?  ?   CHIEF COMPLAINT:  Follow-up     DIAGNOSIS: ITP  ?   CURRENT TREATMENT: N-plate, iv iron prn    PAST TREATMENT:  Steroids, splenectomy 11/2018, eltrombopag (Promacta) and prednisone taper, IVIG x 2 10/2/19 and 10/3/19     HPI    She has missed several appointments and last in clinic for Nplate about 2 weeks ago.  She did have interval bone marrow biopsy and comes to discuss these results as well as repeat CBC and Nplate, iron overdue as well.  She denies any evidence of bleeding, petechia or ecchymosis.  She is feeling well overall.    Review of Systems    ?   A comprehensive 14-point review of systems was reviewed with patient and was negative other than as specified above.   ?     Objective:      Physical Exam      Wt Readings from Last 3 Encounters:   07/02/20 109 kg (240 lb 4.8 oz)   06/23/20 104.3 kg (230 lb)   06/15/20 106.9 kg (235 lb 10.8 oz)     Temp Readings from Last 3 Encounters:   07/02/20 97.9 °F (36.6 °C)   06/15/20 98.7 °F (37.1 °C) (Oral)   06/09/20 98.4 °F (36.9 °C)     BP Readings from Last 3 Encounters:   07/02/20 (!) 145/94   06/23/20 (!) 147/66   06/15/20 112/64     Pulse Readings from Last 3 Encounters:   07/02/20 105   06/23/20 82   06/15/20 103       ECOG:?  0   General appearance: Generally well appearing, in no acute distress.   Head, eyes, ears, nose, and throat:  Moist mucus membranes, oropharynx clear   Pulmonary:  Normal work of breathing.  Abdomen: nondistended.   Extremities:  No edema.  Neurologic: Alert and oriented. Grossly normal strength, coordination, and gait.   Skin:  No petechiae or ecchymoses or other rash.  Psychiatric:  Normal mood and affect  ?   Laboratory:  ?   Platelets   Date Value Ref Range Status   07/02/2020 43 (L) 150 - 350 K/uL Final   06/15/2020 10 (LL) 150 - 350 K/uL Final     Comment:     PLT  critical result(s) called and  verbal readback obtained from   OMID HUMPHREYS LPN by AES 06/15/2020 15:40     06/09/2020 853 (H) 150 - 350 K/uL Final   06/01/2020 549 (H) 150 - 350 K/uL Final   05/29/2020 17 (LL) 150 - 350 K/uL Final     Comment:     JACKELYN CORRAL LPN  critical result(s) called and verbal readback   obtained from  by JLJARAD 05/29/2020 12:27     05/26/2020 5 (LL) 150 - 350 K/uL Final     Comment:     PLT  critical result(s) called and verbal readback obtained from   MARY LU RN by AES 05/26/2020 14:15     05/14/2020 1,200 (HH) 150 - 350 K/uL Final     Comment:     Results confirmed, test repeated  PLT critical result(s) called and verbal readback obtained from   Juan Yost  by MLS 05/14/2020 10:39     05/07/2020 957 (H) 150 - 350 K/uL Final   05/01/2020 8 (LL) 150 - 350 K/uL Final     Comment:     PLT critical result(s) called and verbal readback obtained from Otoniel Lincoln by KIRK 05/01/2020 11:31     04/09/2020 0 (LL) 150 - 350 K/uL Final     Comment:     Plt critical result(s) called and verbal readback obtained from Johanne Niño RN  by ALLA 04/09/2020 13:49           Lab Visit on 07/02/2020   Component Date Value Ref Range Status    WBC 07/02/2020 7.95  3.90 - 12.70 K/uL Final    RBC 07/02/2020 4.62  4.00 - 5.40 M/uL Final    Hemoglobin 07/02/2020 10.4* 12.0 - 16.0 g/dL Final    Hematocrit 07/02/2020 34.9* 37.0 - 48.5 % Final    Mean Corpuscular Volume 07/02/2020 76* 82 - 98 fL Final    Mean Corpuscular Hemoglobin 07/02/2020 22.5* 27.0 - 31.0 pg Final    Mean Corpuscular Hemoglobin Conc 07/02/2020 29.8* 32.0 - 36.0 g/dL Final    RDW 07/02/2020 23.9* 11.5 - 14.5 % Final    Platelets 07/02/2020 43* 150 - 350 K/uL Final    MPV 07/02/2020 SEE COMMENT  9.2 - 12.9 fL Final    Immature Granulocytes 07/02/2020 0.2  0.0 - 0.5 % Final    Gran # (ANC) 07/02/2020 4.3  1.8 - 7.7 K/uL Final    Immature Grans (Abs) 07/02/2020 0.02  0.00 - 0.04 K/uL Final    Lymph # 07/02/2020 2.6  1.0 - 4.8 K/uL Final    Mono #  "07/02/2020 1.0  0.3 - 1.0 K/uL Final    Eos # 07/02/2020 0.1  0.0 - 0.5 K/uL Final    Baso # 07/02/2020 0.12  0.00 - 0.20 K/uL Final    nRBC 07/02/2020 0  0 /100 WBC Final    Gran% 07/02/2020 53.3  38.0 - 73.0 % Final    Lymph% 07/02/2020 32.5  18.0 - 48.0 % Final    Mono% 07/02/2020 11.8  4.0 - 15.0 % Final    Eosinophil% 07/02/2020 0.7  0.0 - 8.0 % Final    Basophil% 07/02/2020 1.5  0.0 - 1.9 % Final    Differential Method 07/02/2020 Automated   Final    ?   Assessment/Plan:       1. Acute ITP    2. Thrombocytopenia    3. Iron deficiency anemia due to chronic blood loss          Plan:     # acute on chronic ITP:  refractory ITP status post steroids, splenectomy in November 2018, and this year monthly hospitalizations with platelet counts as low as 1-5K.  Since December 2018 she has been treated with daily eltrombopag, discontinued on 11/11/2019 due to refractory ITP and possible associated headache.  - she has been treated with Nplate over the last several months and has demonstrated response although sustaining platelet count limited by her inconsistent follow-up in clinic.  Last received Nplate 6mcg 6/15.   Recurrent thrombocytopenia to 40 K and will give endplate 4mcg today for 07/02/2020.  Encouraged her to follow up on weekly basis for CBC for consistent endplate dosing.    I reviewed in detail results of bone marrow biopsy which did not demonstrate a clear neoplastic process but does show:  "Marrow hypercellularity with M:E ratio generally within normal limits and megakaryocytic hyperplasia with  atypical megakaryocytic morphology with myeloproliferative neoplasm-like features and mild increased  reticulin fibrosis (but no collagen deposition) have been documented after initiation of TPO-R agonist  therapy in ITP patients."  Corresponding flow cytometry ([facility]-) demonstrates no clonal B-cells, aberrant T-cell antigen  expression, or increase in blasts.    - recommend MPN testing for associated " mutation, will discuss with pathology bone marrow vs blood.    # iron deficiency anemia:   Iron deficiency as evidence by bone marrow biopsy as well.  Will proceed with additional Feraheme x2 doses per patient preference will get next week to timing.    Follow-Up:   N-plate today 4mcg/kg  Overdue for iv iron x 2 doses  RV in 1 week with repeat CBC plan for Nplate and IV iron

## 2020-07-02 NOTE — DISCHARGE INSTRUCTIONS
[facility]  [address] [address]  [address]  [phone] phone     [phone] fax  Hours of Operation: Monday- Friday 8:00am- 5:00pm  After hours phone  [phone]  Hematology / Oncology Physicians on call      Dr. Cipriano Johns, VERN Atkins, VERN Das NP    Please call with any concerns regarding your appointment today.  WAYS TO HELP PREVENT INFECTION         WASH YOUR HANDS OFTEN DURING THE DAY, ESPECIALLY BEFORE YOU EAT, AFTER USING THE BATHROOM, AND AFTER TOUCHING ANIMALS     STAY AWAY FROM PEOPLE WHO HAVE ILLNESSES YOU CAN CATCH; SUCH AS COLDS, FLU, CHICKEN POX     TRY TO AVOID CROWDS     STAY AWAY FROM CHILDREN WHO RECENTLY HAVE RECEIVED LIVE VIRUS VACCINES     MAINTAIN GOOD MOUTH CARE     DO NOT SQUEEZE OR SCRATCH PIMPLES     CLEAN CUTS & SCRAPES RIGHT AWAY AND DAILY UNTIL HEALED WITH WARM WATER, SOAP & AN ANTISEPTIC     AVOID CONTACT WITH LITTER BOXES, BIRD CAGES, & FISH TANKS     AVOID STANDING WATER, IE., BIRD BATHS, FLOWER POTS/VASES, OR HUMIDIFIERS     WEAR GLOVES WHEN GARDENING OR CLEANING UP AFTER OTHERS, ESPECIALLY BABIES & SMALL CHILDREN     DO NOT EAT RAW FISH, SEAFOOD, MEAT, OR EGGS    FALL PREVENTION   Falls often occur due to slipping, tripping or losing your balance. Here are ways to reduce your risk of falling again.   Was there anything that caused your fall that can be fixed, removed or replaced?   Make your home safe by keeping walkways clear of objects you may trip over.   Use non-slip pads under rugs.   Do not walk in poorly lit areas.   Do not stand on chairs or wobbly ladders.   Use caution when reaching overhead or looking upward. This position can cause a loss of balance.   Be sure your shoes fit properly, have non-slip bottoms and are in good condition.   Be cautious when going up and down stairs, curbs, and when walking  on uneven sidewalks.   If your balance is poor, consider using a cane or walker.   If your fall was related to alcohol use, stop or limit alcohol intake.   If your fall was related to use of sleeping medicines, talk to your doctor about this. You may need to reduce your dosage at bedtime if you awaken during the night to go to the bathroom.   To reduce the need for nighttime bathroom trips:   Avoid drinking fluids for several hours before going to bed   Empty your bladder before going to bed   Men can keep a urinal at the bedside   © 6315-3168 [employer] \[employer] [facility] [place] [facility]\"", [address] [place] [address], [place] [address]. All rights reserved. This information is not intended as a substitute for professional medical care. Always follow your healthcare professional's instructions.

## 2020-07-07 ENCOUNTER — TELEPHONE (OUTPATIENT)
Dept: INFUSION THERAPY | Facility: HOSPITAL | Age: 25
End: 2020-07-07

## 2020-07-09 ENCOUNTER — TELEPHONE (OUTPATIENT)
Dept: HEMATOLOGY/ONCOLOGY | Facility: CLINIC | Age: 25
End: 2020-07-09

## 2020-07-09 NOTE — TELEPHONE ENCOUNTER
----- Message from Marcelo Estevez sent at 7/9/2020 11:41 AM CDT -----  Regarding: Appt reschedule  Contact: Mari Rasmussen  Pt called and stated she was about to leave to come to her appt today and has 2 flat tires.    Pt rescheduled her appt with your office and lab. Pt would like to know if she needs to reschedule her Infusion  for tomorrow or another date.    Pt can be reached at [phone]

## 2020-07-10 ENCOUNTER — OFFICE VISIT (OUTPATIENT)
Dept: HEMATOLOGY/ONCOLOGY | Facility: CLINIC | Age: 25
End: 2020-07-10
Payer: MEDICAID

## 2020-07-10 ENCOUNTER — LAB VISIT (OUTPATIENT)
Dept: LAB | Facility: HOSPITAL | Age: 25
End: 2020-07-10
Attending: INTERNAL MEDICINE
Payer: MEDICAID

## 2020-07-10 ENCOUNTER — INFUSION (OUTPATIENT)
Dept: INFUSION THERAPY | Facility: HOSPITAL | Age: 25
End: 2020-07-10
Attending: PATHOLOGY
Payer: MEDICAID

## 2020-07-10 VITALS
SYSTOLIC BLOOD PRESSURE: 127 MMHG | TEMPERATURE: 98 F | DIASTOLIC BLOOD PRESSURE: 77 MMHG | WEIGHT: 241.63 LBS | OXYGEN SATURATION: 99 % | HEART RATE: 88 BPM | BODY MASS INDEX: 44.46 KG/M2 | RESPIRATION RATE: 18 BRPM | HEIGHT: 62 IN

## 2020-07-10 VITALS
RESPIRATION RATE: 18 BRPM | WEIGHT: 241.63 LBS | SYSTOLIC BLOOD PRESSURE: 133 MMHG | DIASTOLIC BLOOD PRESSURE: 88 MMHG | OXYGEN SATURATION: 100 % | HEIGHT: 62 IN | TEMPERATURE: 99 F | HEART RATE: 99 BPM | BODY MASS INDEX: 44.46 KG/M2

## 2020-07-10 DIAGNOSIS — D69.3 CHRONIC ITP (IDIOPATHIC THROMBOCYTOPENIA): ICD-10-CM

## 2020-07-10 DIAGNOSIS — D69.3 ACUTE ITP: Primary | ICD-10-CM

## 2020-07-10 DIAGNOSIS — D50.0 IRON DEFICIENCY ANEMIA DUE TO CHRONIC BLOOD LOSS: ICD-10-CM

## 2020-07-10 DIAGNOSIS — D69.3 ACUTE ITP: ICD-10-CM

## 2020-07-10 DIAGNOSIS — D50.9 MICROCYTIC ANEMIA: ICD-10-CM

## 2020-07-10 DIAGNOSIS — Z90.81 H/O SPLENECTOMY: ICD-10-CM

## 2020-07-10 DIAGNOSIS — D69.6 THROMBOCYTOPENIA: Primary | ICD-10-CM

## 2020-07-10 LAB
ANISOCYTOSIS BLD QL SMEAR: SLIGHT
BASOPHILS # BLD AUTO: 0.14 K/UL (ref 0–0.2)
BASOPHILS NFR BLD: 1.3 % (ref 0–1.9)
DIFFERENTIAL METHOD: ABNORMAL
EOSINOPHIL # BLD AUTO: 0.1 K/UL (ref 0–0.5)
EOSINOPHIL NFR BLD: 1.3 % (ref 0–8)
ERYTHROCYTE [DISTWIDTH] IN BLOOD BY AUTOMATED COUNT: 24.6 % (ref 11.5–14.5)
HCT VFR BLD AUTO: 38.4 % (ref 37–48.5)
HGB BLD-MCNC: 11.3 G/DL (ref 12–16)
HYPOCHROMIA BLD QL SMEAR: ABNORMAL
IMM GRANULOCYTES # BLD AUTO: 0.05 K/UL (ref 0–0.04)
IMM GRANULOCYTES NFR BLD AUTO: 0.5 % (ref 0–0.5)
LYMPHOCYTES # BLD AUTO: 3.1 K/UL (ref 1–4.8)
LYMPHOCYTES NFR BLD: 28.2 % (ref 18–48)
MCH RBC QN AUTO: 22.8 PG (ref 27–31)
MCHC RBC AUTO-ENTMCNC: 29.4 G/DL (ref 32–36)
MCV RBC AUTO: 77 FL (ref 82–98)
MONOCYTES # BLD AUTO: 1.3 K/UL (ref 0.3–1)
MONOCYTES NFR BLD: 12 % (ref 4–15)
NEUTROPHILS # BLD AUTO: 6.2 K/UL (ref 1.8–7.7)
NEUTROPHILS NFR BLD: 57.2 % (ref 38–73)
NRBC BLD-RTO: 0 /100 WBC
PLATELET # BLD AUTO: 88 K/UL (ref 150–350)
PLATELET BLD QL SMEAR: ABNORMAL
PMV BLD AUTO: ABNORMAL FL (ref 9.2–12.9)
POIKILOCYTOSIS BLD QL SMEAR: SLIGHT
RBC # BLD AUTO: 4.96 M/UL (ref 4–5.4)
TARGETS BLD QL SMEAR: ABNORMAL
WBC # BLD AUTO: 10.8 K/UL (ref 3.9–12.7)

## 2020-07-10 PROCEDURE — 63600175 PHARM REV CODE 636 W HCPCS: Mod: JW,JG | Performed by: INTERNAL MEDICINE

## 2020-07-10 PROCEDURE — 99999 PR PBB SHADOW E&M-EST. PATIENT-LVL III: CPT | Mod: PBBFAC,,, | Performed by: INTERNAL MEDICINE

## 2020-07-10 PROCEDURE — 99214 OFFICE O/P EST MOD 30 MIN: CPT | Mod: S$PBB,,, | Performed by: INTERNAL MEDICINE

## 2020-07-10 PROCEDURE — 96372 THER/PROPH/DIAG INJ SC/IM: CPT

## 2020-07-10 PROCEDURE — 99213 OFFICE O/P EST LOW 20 MIN: CPT | Mod: PBBFAC | Performed by: INTERNAL MEDICINE

## 2020-07-10 PROCEDURE — 99999 PR PBB SHADOW E&M-EST. PATIENT-LVL III: ICD-10-PCS | Mod: PBBFAC,,, | Performed by: INTERNAL MEDICINE

## 2020-07-10 PROCEDURE — 85025 COMPLETE CBC W/AUTO DIFF WBC: CPT

## 2020-07-10 PROCEDURE — 36415 COLL VENOUS BLD VENIPUNCTURE: CPT

## 2020-07-10 PROCEDURE — 99214 PR OFFICE/OUTPT VISIT, EST, LEVL IV, 30-39 MIN: ICD-10-PCS | Mod: S$PBB,,, | Performed by: INTERNAL MEDICINE

## 2020-07-10 RX ADMIN — ROMIPLOSTIM 425 MCG: 250 INJECTION, POWDER, LYOPHILIZED, FOR SOLUTION SUBCUTANEOUS at 10:07

## 2020-07-10 NOTE — DISCHARGE INSTRUCTIONS
[facility]  [address]  [address]  [phone] phone     [phone] fax  Hours of Operation: Monday- Friday 8:00am- 5:00pm  After hours phone  [phone]  Hematology / Oncology Physicians on call      VERN Bell Dr., Dr., Dr., Dr., NP Sydney Prescott, NP Tyesha Taylor, NP    Please call with any concerns regarding your appointment today.

## 2020-07-10 NOTE — NURSING
1031am: Injection given without difficulties.Bandaid applied. Patient instructed to stay in the clinic for 15 minutes. Patient verbalized understanding and will notify nurse with any complaints.

## 2020-07-10 NOTE — PROGRESS NOTES
Subjective:      DATE OF VISIT: 7/10/2020   ?     Patient ID:?Mari Miller is a 24 y.o. female.?? MR#: [id number]   ?  ?   CHIEF COMPLAINT:  Follow-up     DIAGNOSIS: ITP, iron deficiency anemia  ?   CURRENT TREATMENT: N-plate, iv iron prn    PAST TREATMENT:  Steroids, splenectomy 11/2018, eltrombopag (Promacta) and prednisone taper, IVIG x 2 10/2/19 and 10/3/19     HPI    She was last in clinic for Nplate 1 week ago received Nplate 4 mcg.  She continues to do well without evidence of bleeding or bruising, petechial lesion, oral ulceration/bleeding.  She is pending follow-up with dental for prior abscess continues to be in remission.  No fevers or chills or other infectious symptoms.  She missed follow-up for IV iron therapy.    Review of Systems    ?   A comprehensive 14-point review of systems was reviewed with patient and was negative other than as specified above.   ?     Objective:      Physical Exam      Wt Readings from Last 3 Encounters:   07/10/20 109.6 kg (241 lb 10 oz)   07/02/20 109 kg (240 lb 4.8 oz)   07/02/20 109 kg (240 lb 4.8 oz)     Temp Readings from Last 3 Encounters:   07/10/20 99 °F (37.2 °C) (Oral)   07/02/20 98.4 °F (36.9 °C)   07/02/20 97.9 °F (36.6 °C)     BP Readings from Last 3 Encounters:   07/10/20 133/88   07/02/20 (!) 139/97   07/02/20 (!) 145/94     Pulse Readings from Last 3 Encounters:   07/10/20 99   07/02/20 86   07/02/20 105       ECOG:?  0   General appearance: Generally well appearing, in no acute distress.   Head, eyes, ears, nose, and throat:  Moist mucus membranes, oropharynx clear   Pulmonary:  Clear to auscultation bilaterally  Cardiovascular:  Regular rate and rhythm no murmurs rubs or gallops.  Abdomen: nondistended.   Extremities:  No edema.  Neurologic: Alert and oriented. Grossly normal strength, coordination, and gait.   Skin:  No petechiae or ecchymoses or other rash.  Psychiatric:  Normal mood and affect  ?   Laboratory:  ?   Platelets   Date Value Ref  Range Status   07/10/2020 88 (L) 150 - 350 K/uL Final   07/02/2020 43 (L) 150 - 350 K/uL Final   06/15/2020 10 (LL) 150 - 350 K/uL Final     Comment:     PLT  critical result(s) called and verbal readback obtained from   OMID HUMPHREYS LPN by AES 06/15/2020 15:40     06/09/2020 853 (H) 150 - 350 K/uL Final   06/01/2020 549 (H) 150 - 350 K/uL Final   05/29/2020 17 (LL) 150 - 350 K/uL Final     Comment:     JACKELYN CORRAL LPN  critical result(s) called and verbal readback   obtained from  by ERLINDA 05/29/2020 12:27     05/26/2020 5 (LL) 150 - 350 K/uL Final     Comment:     PLT  critical result(s) called and verbal readback obtained from   MARY LU RN by AES 05/26/2020 14:15     05/14/2020 1,200 (HH) 150 - 350 K/uL Final     Comment:     Results confirmed, test repeated  PLT critical result(s) called and verbal readback obtained from   Juan Yost  by MLS 05/14/2020 10:39     05/07/2020 957 (H) 150 - 350 K/uL Final   05/01/2020 8 (LL) 150 - 350 K/uL Final     Comment:     PLT critical result(s) called and verbal readback obtained from Otoniel Lincoln by KIRK 05/01/2020 11:31           Lab Visit on 07/10/2020   Component Date Value Ref Range Status    WBC 07/10/2020 10.80  3.90 - 12.70 K/uL Final    RBC 07/10/2020 4.96  4.00 - 5.40 M/uL Final    Hemoglobin 07/10/2020 11.3* 12.0 - 16.0 g/dL Final    Hematocrit 07/10/2020 38.4  37.0 - 48.5 % Final    Mean Corpuscular Volume 07/10/2020 77* 82 - 98 fL Final    Mean Corpuscular Hemoglobin 07/10/2020 22.8* 27.0 - 31.0 pg Final    Mean Corpuscular Hemoglobin Conc 07/10/2020 29.4* 32.0 - 36.0 g/dL Final    RDW 07/10/2020 24.6* 11.5 - 14.5 % Final    Platelets 07/10/2020 88* 150 - 350 K/uL Final    MPV 07/10/2020 SEE COMMENT  9.2 - 12.9 fL Final    Immature Granulocytes 07/10/2020 0.5  0.0 - 0.5 % Final    Gran # (ANC) 07/10/2020 6.2  1.8 - 7.7 K/uL Final    Immature Grans (Abs) 07/10/2020 0.05* 0.00 - 0.04 K/uL Final    Lymph # 07/10/2020 3.1  1.0 - 4.8 K/uL  "Final    Mono # 07/10/2020 1.3* 0.3 - 1.0 K/uL Final    Eos # 07/10/2020 0.1  0.0 - 0.5 K/uL Final    Baso # 07/10/2020 0.14  0.00 - 0.20 K/uL Final    nRBC 07/10/2020 0  0 /100 WBC Final    Gran% 07/10/2020 57.2  38.0 - 73.0 % Final    Lymph% 07/10/2020 28.2  18.0 - 48.0 % Final    Mono% 07/10/2020 12.0  4.0 - 15.0 % Final    Eosinophil% 07/10/2020 1.3  0.0 - 8.0 % Final    Basophil% 07/10/2020 1.3  0.0 - 1.9 % Final    Platelet Estimate 07/10/2020 Decreased*  Final    Aniso 07/10/2020 Slight   Final    Poik 07/10/2020 Slight   Final    Hypo 07/10/2020 Occasional   Final    Target Cells 07/10/2020 Occasional   Final    Differential Method 07/10/2020 Automated   Final    ?   Assessment/Plan:       No diagnosis found.      Plan:     # acute on chronic ITP:  refractory ITP status post steroids, splenectomy in November 2018, and this year monthly hospitalizations with platelet counts as low as 1-5K.  Since December 2018 she has been treated with daily eltrombopag, discontinued on 11/11/2019 due to refractory ITP and possible associated headache.  - she has been treated with Nplate over the last several months and has demonstrated response although sustaining platelet count limited by her inconsistent follow-up in clinic.  Last received Nplate 4mcg 07/03/2020..   Mild improvement in thrombocytopenia from 40-80 K  - will give N-plate 4mcg today 07/10/2020.  Encouraged her to follow up on weekly basis for CBC for consistent Nplate dosing.    Bone marrow biopsy did not demonstrate a clear neoplastic process but does show:  "Marrow hypercellularity with M:E ratio generally within normal limits and megakaryocytic hyperplasia with  atypical megakaryocytic morphology with myeloproliferative neoplasm-like features and mild increased  reticulin fibrosis (but no collagen deposition) have been documented after initiation of TPO-R agonist  therapy in ITP patients."  Corresponding flow cytometry ([facility] " demonstrates no clonal B-cells, aberrant T-cell antigen  expression, or increase in blasts.    - MPN testing for associated mutation, from bone marrow discussed with pathology, pending    # iron deficiency anemia:   Iron deficiency as evidence by bone marrow biopsy as well.  Will proceed with additional Feraheme x2 doses per patient preference will get next Monday.    Follow-Up:   N-plate today 4mcg/kg  Overdue for iv iron x 2 doses, prefer monday  RV in 1 week with repeat CBC plan for Nplate

## 2020-07-14 LAB
COMMENT: NORMAL
FINAL PATHOLOGIC DIAGNOSIS: NORMAL
GROSS: NORMAL
Lab: NORMAL
MICROSCOPIC EXAM: NORMAL
MPNR  FINAL DIAGNOSIS: NORMAL
MPNR  SPECIMEN TYPE: NORMAL
MPNR RESULT: NORMAL
SUPPLEMENTAL DIAGNOSIS: NORMAL

## 2020-07-17 ENCOUNTER — OFFICE VISIT (OUTPATIENT)
Dept: HEMATOLOGY/ONCOLOGY | Facility: CLINIC | Age: 25
End: 2020-07-17
Payer: MEDICAID

## 2020-07-17 ENCOUNTER — INFUSION (OUTPATIENT)
Dept: INFUSION THERAPY | Facility: HOSPITAL | Age: 25
End: 2020-07-17
Attending: INTERNAL MEDICINE
Payer: MEDICAID

## 2020-07-17 VITALS
TEMPERATURE: 98 F | DIASTOLIC BLOOD PRESSURE: 83 MMHG | SYSTOLIC BLOOD PRESSURE: 110 MMHG | BODY MASS INDEX: 43.55 KG/M2 | WEIGHT: 238.13 LBS | HEART RATE: 86 BPM | OXYGEN SATURATION: 98 %

## 2020-07-17 VITALS
SYSTOLIC BLOOD PRESSURE: 124 MMHG | RESPIRATION RATE: 18 BRPM | TEMPERATURE: 98 F | HEART RATE: 79 BPM | OXYGEN SATURATION: 100 % | DIASTOLIC BLOOD PRESSURE: 85 MMHG

## 2020-07-17 DIAGNOSIS — D69.3 CHRONIC ITP (IDIOPATHIC THROMBOCYTOPENIA): Primary | ICD-10-CM

## 2020-07-17 DIAGNOSIS — Z90.81 H/O SPLENECTOMY: ICD-10-CM

## 2020-07-17 DIAGNOSIS — D75.839 THROMBOCYTOSIS: ICD-10-CM

## 2020-07-17 DIAGNOSIS — D50.0 IRON DEFICIENCY ANEMIA DUE TO CHRONIC BLOOD LOSS: Primary | ICD-10-CM

## 2020-07-17 DIAGNOSIS — D72.829 LEUKOCYTOSIS, UNSPECIFIED TYPE: ICD-10-CM

## 2020-07-17 DIAGNOSIS — D50.0 IRON DEFICIENCY ANEMIA DUE TO CHRONIC BLOOD LOSS: ICD-10-CM

## 2020-07-17 PROCEDURE — 25000003 PHARM REV CODE 250: Performed by: NURSE PRACTITIONER

## 2020-07-17 PROCEDURE — 99214 PR OFFICE/OUTPT VISIT, EST, LEVL IV, 30-39 MIN: ICD-10-PCS | Mod: S$PBB,,, | Performed by: INTERNAL MEDICINE

## 2020-07-17 PROCEDURE — 96374 THER/PROPH/DIAG INJ IV PUSH: CPT

## 2020-07-17 PROCEDURE — 63600175 PHARM REV CODE 636 W HCPCS: Mod: JG | Performed by: NURSE PRACTITIONER

## 2020-07-17 PROCEDURE — 99212 OFFICE O/P EST SF 10 MIN: CPT | Mod: PBBFAC | Performed by: INTERNAL MEDICINE

## 2020-07-17 PROCEDURE — 99999 PR PBB SHADOW E&M-EST. PATIENT-LVL II: ICD-10-PCS | Mod: PBBFAC,,, | Performed by: INTERNAL MEDICINE

## 2020-07-17 PROCEDURE — 99999 PR PBB SHADOW E&M-EST. PATIENT-LVL II: CPT | Mod: PBBFAC,,, | Performed by: INTERNAL MEDICINE

## 2020-07-17 PROCEDURE — 99214 OFFICE O/P EST MOD 30 MIN: CPT | Mod: S$PBB,,, | Performed by: INTERNAL MEDICINE

## 2020-07-17 RX ORDER — EPINEPHRINE 0.3 MG/.3ML
0.3 INJECTION SUBCUTANEOUS ONCE AS NEEDED
Status: CANCELLED | OUTPATIENT
Start: 2020-07-17

## 2020-07-17 RX ORDER — METHYLPREDNISOLONE SOD SUCC 125 MG
125 VIAL (EA) INJECTION ONCE AS NEEDED
Status: CANCELLED | OUTPATIENT
Start: 2020-07-17

## 2020-07-17 RX ORDER — SODIUM CHLORIDE 0.9 % (FLUSH) 0.9 %
10 SYRINGE (ML) INJECTION
Status: DISCONTINUED | OUTPATIENT
Start: 2020-07-17 | End: 2020-07-17 | Stop reason: HOSPADM

## 2020-07-17 RX ORDER — SODIUM CHLORIDE 0.9 % (FLUSH) 0.9 %
10 SYRINGE (ML) INJECTION
Status: CANCELLED | OUTPATIENT
Start: 2020-07-17

## 2020-07-17 RX ORDER — HEPARIN 100 UNIT/ML
500 SYRINGE INTRAVENOUS
Status: CANCELLED | OUTPATIENT
Start: 2020-07-17

## 2020-07-17 RX ORDER — DIPHENHYDRAMINE HYDROCHLORIDE 50 MG/ML
50 INJECTION INTRAMUSCULAR; INTRAVENOUS ONCE AS NEEDED
Status: CANCELLED | OUTPATIENT
Start: 2020-07-17

## 2020-07-17 RX ADMIN — FERUMOXYTOL 510 MG: 510 INJECTION INTRAVENOUS at 09:07

## 2020-07-17 NOTE — DISCHARGE INSTRUCTIONS
[facility]  [address] [address]  [address]  [phone] phone     [phone] fax  Hours of Operation: Monday- Friday 8:00am- 5:00pm  After hours phone  [phone]  Hematology / Oncology Physicians on call      VERN Bell Dr., Dr., Dr., Dr., NP Sydney Prescott, NP Tyesha Taylor, NP    Please call with any concerns regarding your appointment today.IRON DEFICIENCY ANEMIA:  Anemia is a condition where the size or number of red blood cells in the body is reduced. Iron is needed in the diet to make red cells. The red blood cells carry oxygen to all parts of the body. Anemia limits the delivery of oxygen to where it is needed. This causes a feeling of being tired and run down. When anemia becomes severe, the skin becomes pale and there is shortness of breath with exertion, headaches, dizziness, drowsiness and fatigue.  The cause of your anemia is lack of iron in your body. This may occur due to blood loss (for example, heavy menstrual periods or bleeding from the stomach or intestines) or a poor diet (not eating enough iron-containing foods), inability to absorb iron from your diet, or pregnancy.  If the blood count is low enough, an IRON SUPPLEMENT will be prescribed. It usually takes about 2-3 months of treatment with iron supplements to correct an anemia. Severe cases of anemia requires a blood transfusion to rapidly correct symptoms and deliver more oxygen to the cells.  HOME CARE:  1) Increase the iron stores in your body by eating foods high in iron content. This is a natural way of building your blood cells back up again. Beef, liver, spinach and other dark green leafy vegetables, whole grain products, beans and nuts are all natural sources of iron.  2) If you are having symptoms of anemia listed above:  -- Do not overexert yourself.  -- Talk to your doctor before flying on an airplane or  travelling to high altitudes.  FOLLOW UP with your doctor in 2 months for a repeat red blood cell count, or as recommended by our staff, to be sure that the anemia has been corrected.  GET PROMPT MEDICAL ATTENTION if any of the following occur or worsen:  -- Shortness of breath or chest pain  -- Dizziness or fainting  -- Vomiting blood or passing red or black-colored stool  © 2000-2011 [employer] [facility], [address] [place] [address], [place] [address]. All rights reserved. This information is not intended as a substitute for professional medical care. Always follow your healthcare professional's instructions.Ferumoxytol Solution for injection  What is this medicine?  FERUMOXYTOL is an iron complex. Iron is used to make healthy red blood cells, which carry oxygen and nutrients throughout the body. This medicine is used to treat iron deficiency anemia in people with chronic kidney disease.  This medicine may be used for other purposes; ask your health care provider or pharmacist if you have questions.  What should I tell my health care provider before I take this medicine?  They need to know if you have any of these conditions:  · anemia not caused by low iron levels  · high levels of iron in the blood  · magnetic resonance imaging (MRI) test scheduled  · an unusual or allergic reaction to iron, other medicines, foods, dyes, or preservatives  · pregnant or trying to get pregnant  · breast-feeding  How should I use this medicine?  This medicine is for infusion into a vein. It is given by a health care professional in a hospital or clinic setting.  Talk to your pediatrician regarding the use of this medicine in children. Special care may be needed.  Overdosage: If you think you've taken too much of this medicine contact a poison control center or emergency room at once.  NOTE: This medicine is only for you. Do not share this medicine with others.  What if I miss a dose?  It is important not to miss your dose. Call your doctor  or health care professional if you are unable to keep an appointment.  What may interact with this medicine?  This medicine may interact with the following medications:  · other iron products  This list may not describe all possible interactions. Give your health care provider a list of all the medicines, herbs, non-prescription drugs, or dietary supplements you use. Also tell them if you smoke, drink alcohol, or use illegal drugs. Some items may interact with your medicine.  What should I watch for while using this medicine?  Visit your doctor or healthcare professional regularly. Tell your doctor or healthcare professional if your symptoms do not start to get better or if they get worse. You may need blood work done while you are taking this medicine.  You may need to follow a special diet. Talk to your doctor. Foods that contain iron include: whole grains/cereals, dried fruits, beans, or peas, leafy green vegetables, and organ meats (liver, kidney).  What side effects may I notice from receiving this medicine?  Side effects that you should report to your doctor or health care professional as soon as possible:  · allergic reactions like skin rash, itching or hives, swelling of the face, lips, or tongue  · breathing problems  · changes in blood pressure  · feeling faint or lightheaded, falls  · fever or chills  · flushing, sweating, or hot feelings  · swelling of the ankles or feet  Side effects that usually do not require medical attention (Report these to your doctor or health care professional if they continue or are bothersome.):  · diarrhea  · headache  · nausea, vomiting  · stomach pain  This list may not describe all possible side effects. Call your doctor for medical advice about side effects. You may report side effects to FDA at 3-977-FDA-1425.  Where should I keep my medicine?  This drug is given in a hospital or clinic and will not be stored at home.  NOTE: This sheet is a summary. It may not cover all  possible information. If you have questions about this medicine, talk to your doctor, pharmacist, or health care provider.  NOTE:This sheet is a summary. It may not cover all possible information. If you have questions about this medicine, talk to your doctor, pharmacist, or health care provider. Copyright© 2011 [employer]

## 2020-07-17 NOTE — PROGRESS NOTES
Subjective:      DATE OF VISIT: 7/17/2020   ?     Patient ID:?Mari Miller is a 24 y.o. female.?? MR#: [id number]   ?  ?   CHIEF COMPLAINT:  Follow-up     DIAGNOSIS: ITP, iron deficiency anemia  ?   CURRENT TREATMENT: N-plate, iv iron prn    PAST TREATMENT:  Steroids, splenectomy 11/2018, eltrombopag (Promacta) and prednisone taper, IVIG x 2 10/2/19 and 10/3/19     HPI    She was last in clinic 1 week ago received Nplate 4 mcg with increase in platelet count to 410 K. No bleeding or bruising, petechial lesion, oral bleeding.  No signs or symptoms of infection including oropharynx.  She is planning IV iron therapy today delayed due to preference.    Review of Systems    ?   A comprehensive 14-point review of systems was reviewed with patient and was negative other than as specified above.   ?     Objective:      Physical Exam      Wt Readings from Last 3 Encounters:   07/17/20 108 kg (238 lb 1.6 oz)   07/10/20 109.6 kg (241 lb 10 oz)   07/10/20 109.6 kg (241 lb 10 oz)     Temp Readings from Last 3 Encounters:   07/17/20 98.1 °F (36.7 °C) (Oral)   07/10/20 98.2 °F (36.8 °C)   07/10/20 99 °F (37.2 °C) (Oral)     BP Readings from Last 3 Encounters:   07/17/20 110/83   07/10/20 127/77   07/10/20 133/88     Pulse Readings from Last 3 Encounters:   07/17/20 86   07/10/20 88   07/10/20 99       ECOG:?  0   General appearance: Generally well appearing, in no acute distress.   Head, eyes, ears, nose, and throat:  Moist mucus membranes, oropharynx clear   Pulmonary:  Clear to auscultation bilaterally  Cardiovascular:  Regular rate and rhythm no murmurs rubs or gallops.  Abdomen: nondistended.   Extremities:  No edema.  Neurologic: Alert and oriented. Grossly normal strength, coordination, and gait.   Skin:  No petechiae or ecchymoses or other rash.  Psychiatric:  Normal mood and affect  ?   Laboratory:  ?   Platelets   Date Value Ref Range Status   07/17/2020 415 (H) 150 - 350 K/uL Final   07/10/2020 88 (L) 150 -  350 K/uL Final   07/02/2020 43 (L) 150 - 350 K/uL Final   06/15/2020 10 (LL) 150 - 350 K/uL Final     Comment:     PLT  critical result(s) called and verbal readback obtained from   OIMD HUMPHREYS LPN by AES 06/15/2020 15:40     06/09/2020 853 (H) 150 - 350 K/uL Final   06/01/2020 549 (H) 150 - 350 K/uL Final   05/29/2020 17 (LL) 150 - 350 K/uL Final     Comment:     JACKELYN CORRAL LPN  critical result(s) called and verbal readback   obtained from  by ERLINDA 05/29/2020 12:27     05/26/2020 5 (LL) 150 - 350 K/uL Final     Comment:     PLT  critical result(s) called and verbal readback obtained from   MARY LU RN by AES 05/26/2020 14:15     05/14/2020 1,200 (HH) 150 - 350 K/uL Final     Comment:     Results confirmed, test repeated  PLT critical result(s) called and verbal readback obtained from   Juan Yost  by MLS 05/14/2020 10:39     05/07/2020 957 (H) 150 - 350 K/uL Final         Lab Visit on 07/17/2020   Component Date Value Ref Range Status    WBC 07/17/2020 17.72* 3.90 - 12.70 K/uL Final    RBC 07/17/2020 4.90  4.00 - 5.40 M/uL Final    Hemoglobin 07/17/2020 11.0* 12.0 - 16.0 g/dL Final    Hematocrit 07/17/2020 37.0  37.0 - 48.5 % Final    Mean Corpuscular Volume 07/17/2020 76* 82 - 98 fL Final    Mean Corpuscular Hemoglobin 07/17/2020 22.4* 27.0 - 31.0 pg Final    Mean Corpuscular Hemoglobin Conc 07/17/2020 29.7* 32.0 - 36.0 g/dL Final    RDW 07/17/2020 24.1* 11.5 - 14.5 % Final    Platelets 07/17/2020 415* 150 - 350 K/uL Final    MPV 07/17/2020 SEE COMMENT  9.2 - 12.9 fL Final    Immature Granulocytes 07/17/2020 0.8* 0.0 - 0.5 % Final    Gran # (ANC) 07/17/2020 10.6* 1.8 - 7.7 K/uL Final    Immature Grans (Abs) 07/17/2020 0.14* 0.00 - 0.04 K/uL Final    Lymph # 07/17/2020 5.0* 1.0 - 4.8 K/uL Final    Mono # 07/17/2020 1.5* 0.3 - 1.0 K/uL Final    Eos # 07/17/2020 0.2  0.0 - 0.5 K/uL Final    Baso # 07/17/2020 0.18  0.00 - 0.20 K/uL Final    nRBC 07/17/2020 0  0 /100 WBC Final    Gran%  "07/17/2020 59.8  38.0 - 73.0 % Final    Lymph% 07/17/2020 28.4  18.0 - 48.0 % Final    Mono% 07/17/2020 8.7  4.0 - 15.0 % Final    Eosinophil% 07/17/2020 1.3  0.0 - 8.0 % Final    Basophil% 07/17/2020 1.0  0.0 - 1.9 % Final    Platelet Estimate 07/17/2020 Appears normal   Final    Differential Method 07/17/2020 Automated   Final    ?   Assessment/Plan:       1. Chronic ITP (idiopathic thrombocytopenia)    2. Leukocytosis, unspecified type    3. Iron deficiency anemia due to chronic blood loss    4. H/O splenectomy    5. Thrombocytosis          Plan:     # acute on chronic ITP:  refractory ITP status post steroids, splenectomy in November 2018, and this year monthly hospitalizations with platelet counts as low as 1-5K.  Since December 2018 she has been treated with daily eltrombopag, discontinued on 11/11/2019 due to refractory ITP and possible associated headache.  - she has been treated with Nplate over the last several months and has demonstrated response although sustaining platelet count limited by her inconsistent follow-up in clinic.  Last received Nplate 4mcg 07/03/2020..   Mild improvement in thrombocytopenia from 40-80 K  - last N-plate 4mcg 07/10/2020.  Improvement in platelet count to 410 K. Will hold endplate today with close follow-up repeat CBC in 1 week, consider decreasing dose Nplate to 3 mcg given robust response.   Encouraged her to follow up on weekly basis for CBC for consistent Nplate dosing.    Bone marrow biopsy did not demonstrate a clear neoplastic process but does show:  "Marrow hypercellularity with M:E ratio generally within normal limits and megakaryocytic hyperplasia with  atypical megakaryocytic morphology with myeloproliferative neoplasm-like features and mild increased  reticulin fibrosis (but no collagen deposition) have been documented after initiation of TPO-R agonist  therapy in ITP patients."  Corresponding flow cytometry (FLW-) demonstrates no clonal B-cells, " aberrant T-cell antigen  expression, or increase in blasts.    - MPN testing was negative, reviewed results with her today.    # iron deficiency anemia:   Iron deficiency as evidence by bone marrow biopsy as well.  Will proceed with additional Feraheme x2 doses per patient preference will get 1st dose today and next in 1 week.    # leukocytosis:  No signs or symptoms of infection, may be secondary to bone marrow stimulation with N-plate last week.  Continue to monitor.    Follow-Up:   No N-plate today   Overdue for iv iron x 2 doses, prefer today  RV in 1 week with repeat CBC plan for Nplate and 2nd dose IV iron Feraheme

## 2020-07-24 ENCOUNTER — INFUSION (OUTPATIENT)
Dept: INFUSION THERAPY | Facility: HOSPITAL | Age: 25
End: 2020-07-24
Attending: INTERNAL MEDICINE
Payer: MEDICAID

## 2020-07-24 ENCOUNTER — DOCUMENTATION ONLY (OUTPATIENT)
Dept: HEMATOLOGY/ONCOLOGY | Facility: CLINIC | Age: 25
End: 2020-07-24

## 2020-07-24 ENCOUNTER — OFFICE VISIT (OUTPATIENT)
Dept: HEMATOLOGY/ONCOLOGY | Facility: CLINIC | Age: 25
End: 2020-07-24
Payer: MEDICAID

## 2020-07-24 VITALS
RESPIRATION RATE: 17 BRPM | OXYGEN SATURATION: 99 % | TEMPERATURE: 97 F | HEIGHT: 62 IN | SYSTOLIC BLOOD PRESSURE: 134 MMHG | HEART RATE: 82 BPM | WEIGHT: 238.13 LBS | BODY MASS INDEX: 43.82 KG/M2 | DIASTOLIC BLOOD PRESSURE: 91 MMHG

## 2020-07-24 VITALS
SYSTOLIC BLOOD PRESSURE: 132 MMHG | HEART RATE: 71 BPM | RESPIRATION RATE: 16 BRPM | OXYGEN SATURATION: 99 % | DIASTOLIC BLOOD PRESSURE: 89 MMHG | TEMPERATURE: 98 F

## 2020-07-24 DIAGNOSIS — D69.3 CHRONIC ITP (IDIOPATHIC THROMBOCYTOPENIA): Primary | ICD-10-CM

## 2020-07-24 DIAGNOSIS — D69.3 ACUTE ITP: Primary | ICD-10-CM

## 2020-07-24 DIAGNOSIS — D69.3 ACUTE ITP: ICD-10-CM

## 2020-07-24 PROCEDURE — 99999 PR PBB SHADOW E&M-EST. PATIENT-LVL III: CPT | Mod: PBBFAC,,, | Performed by: INTERNAL MEDICINE

## 2020-07-24 PROCEDURE — 99213 OFFICE O/P EST LOW 20 MIN: CPT | Mod: PBBFAC,25 | Performed by: INTERNAL MEDICINE

## 2020-07-24 PROCEDURE — 63600175 PHARM REV CODE 636 W HCPCS: Mod: JG | Performed by: INTERNAL MEDICINE

## 2020-07-24 PROCEDURE — 99999 PR PBB SHADOW E&M-EST. PATIENT-LVL III: ICD-10-PCS | Mod: PBBFAC,,, | Performed by: INTERNAL MEDICINE

## 2020-07-24 PROCEDURE — 99215 OFFICE O/P EST HI 40 MIN: CPT | Mod: S$PBB,,, | Performed by: INTERNAL MEDICINE

## 2020-07-24 PROCEDURE — 96372 THER/PROPH/DIAG INJ SC/IM: CPT

## 2020-07-24 PROCEDURE — 99215 PR OFFICE/OUTPT VISIT, EST, LEVL V, 40-54 MIN: ICD-10-PCS | Mod: S$PBB,,, | Performed by: INTERNAL MEDICINE

## 2020-07-24 RX ORDER — DEXAMETHASONE 4 MG/1
20 TABLET ORAL
Qty: 20 TABLET | Refills: 0 | Status: SHIPPED | OUTPATIENT
Start: 2020-07-24 | End: 2020-07-28

## 2020-07-24 RX ORDER — HYDROCODONE BITARTRATE AND ACETAMINOPHEN 500; 5 MG/1; MG/1
TABLET ORAL ONCE
Status: CANCELLED | OUTPATIENT
Start: 2020-07-24 | End: 2020-07-24

## 2020-07-24 RX ADMIN — ROMIPLOSTIM 425 MCG: 500 INJECTION, POWDER, LYOPHILIZED, FOR SOLUTION SUBCUTANEOUS at 12:07

## 2020-07-24 NOTE — NURSING
1259  7/24/20  Injection given without difficulties.Bandaid applied. Patient instructed to stay in the clinic for 15 minutes. Patient verbalized understanding and will notify nurse with any complaints.

## 2020-07-24 NOTE — PROGRESS NOTES
Hematology Nurse tried to contact pt regarding a missed appointment in the Department today, nurse attempted calling  phones numbers listed in epic, no answer,  leave message,   unsuccessful contact # 1

## 2020-07-24 NOTE — PROGRESS NOTES
Spoke with the patient not actively bleeding do not have available platelets at this point would recommend that patient take her dexamethasone pulse which he has responded to in the past was given Nplate today will have repeat CBC on Monday

## 2020-07-24 NOTE — PROGRESS NOTES
Subjective:      DATE OF VISIT: 7/24/2020   ?     Patient ID:?Mari Miller is a 24 y.o. female.?? MR#: [id number]   ?  ?   CHIEF COMPLAINT:  Follow-up     DIAGNOSIS: ITP, iron deficiency anemia  ?   CURRENT TREATMENT: N-plate, iv iron prn    PAST TREATMENT:  Steroids, splenectomy 11/2018, eltrombopag (Promacta) and prednisone taper, IVIG x 2 10/2/19 and 10/3/19     HPI    Ms. Rasmussen this morning noticed initial development of mild petechial rash on arms and taste of blood in mouth although oropharynx clear.  No other evidence of bleeding.    Review of Systems    ?   A comprehensive 14-point review of systems was reviewed with patient and was negative other than as specified above.   ?     Objective:      Physical Exam      Wt Readings from Last 3 Encounters:   07/24/20 108 kg (238 lb 1.6 oz)   07/17/20 108 kg (238 lb 1.6 oz)   07/10/20 109.6 kg (241 lb 10 oz)     Temp Readings from Last 3 Encounters:   07/24/20 98.1 °F (36.7 °C)   07/24/20 97.2 °F (36.2 °C)   07/17/20 97.7 °F (36.5 °C)     BP Readings from Last 3 Encounters:   07/24/20 132/89   07/24/20 (!) 134/91   07/17/20 124/85     Pulse Readings from Last 3 Encounters:   07/24/20 71   07/24/20 82   07/17/20 79       ECOG:?  0   General appearance: Generally well appearing, in no acute distress.   Head, eyes, ears, nose, and throat:  Moist mucus membranes, oropharynx clear   Pulmonary:  Clear to auscultation bilaterally  Cardiovascular:  Regular rate and rhythm no murmurs rubs or gallops.  Abdomen: nondistended.   Extremities:  No edema.  Neurologic: Alert and oriented. Grossly normal strength, coordination, and gait.   Skin:  Mild petechiae on bilateral arms.  Psychiatric:  Normal mood and affect  ?   Laboratory:  ?   Platelets   Date Value Ref Range Status   07/24/2020 1 (LL) 150 - 350 K/uL Final     Comment:     plt critical result(s) called and verbal readback obtained from   nisha garcia by [email or web address] 07/24/2020 12:01     07/17/2020 415 (H) 150 -  350 K/uL Final   07/10/2020 88 (L) 150 - 350 K/uL Final   07/02/2020 43 (L) 150 - 350 K/uL Final   06/15/2020 10 (LL) 150 - 350 K/uL Final     Comment:     PLT  critical result(s) called and verbal readback obtained from   OMID HUMPHREYS LPN by AES 06/15/2020 15:40     06/09/2020 853 (H) 150 - 350 K/uL Final   06/01/2020 549 (H) 150 - 350 K/uL Final   05/29/2020 17 (LL) 150 - 350 K/uL Final     Comment:     JACKELYN CORRAL LPN  critical result(s) called and verbal readback   obtained from  by ERLINDA 05/29/2020 12:27     05/26/2020 5 (LL) 150 - 350 K/uL Final     Comment:     PLT  critical result(s) called and verbal readback obtained from   MARY LU RN by AES 05/26/2020 14:15     05/14/2020 1,200 (HH) 150 - 350 K/uL Final     Comment:     Results confirmed, test repeated  PLT critical result(s) called and verbal readback obtained from   Juan Yost  by MLS 05/14/2020 10:39           Lab Visit on 07/24/2020   Component Date Value Ref Range Status    WBC 07/24/2020 9.84  3.90 - 12.70 K/uL Final    RBC 07/24/2020 4.94  4.00 - 5.40 M/uL Final    Hemoglobin 07/24/2020 11.2* 12.0 - 16.0 g/dL Final    Hematocrit 07/24/2020 37.1  37.0 - 48.5 % Final    Mean Corpuscular Volume 07/24/2020 75* 82 - 98 fL Final    Mean Corpuscular Hemoglobin 07/24/2020 22.7* 27.0 - 31.0 pg Final    Mean Corpuscular Hemoglobin Conc 07/24/2020 30.2* 32.0 - 36.0 g/dL Final    RDW 07/24/2020 23.1* 11.5 - 14.5 % Final    Platelets 07/24/2020 1* 150 - 350 K/uL Final    MPV 07/24/2020 SEE COMMENT  9.2 - 12.9 fL Final    Immature Granulocytes 07/24/2020 0.7* 0.0 - 0.5 % Final    Gran # (ANC) 07/24/2020 5.3  1.8 - 7.7 K/uL Final    Immature Grans (Abs) 07/24/2020 0.07* 0.00 - 0.04 K/uL Final    Lymph # 07/24/2020 3.1  1.0 - 4.8 K/uL Final    Mono # 07/24/2020 1.0  0.3 - 1.0 K/uL Final    Eos # 07/24/2020 0.1  0.0 - 0.5 K/uL Final    Baso # 07/24/2020 0.16  0.00 - 0.20 K/uL Final    nRBC 07/24/2020 0  0 /100 WBC Final    Gran%  "07/24/2020 54.3  38.0 - 73.0 % Final    Lymph% 07/24/2020 31.7  18.0 - 48.0 % Final    Mono% 07/24/2020 10.5  4.0 - 15.0 % Final    Eosinophil% 07/24/2020 1.2  0.0 - 8.0 % Final    Basophil% 07/24/2020 1.6  0.0 - 1.9 % Final    Differential Method 07/24/2020 Automated   Final    ?   Assessment/Plan:       1. Chronic ITP (idiopathic thrombocytopenia)    2. Acute ITP          Plan:     # acute on chronic ITP:  refractory ITP status post steroids, splenectomy in November 2018, and this year monthly hospitalizations with platelet counts as low as 1-5K.  Since December 2018 she has been treated with daily eltrombopag, discontinued on 11/11/2019 due to refractory ITP and possible associated headache.  - she has been treated with Nplate over the last several months and has demonstrated response although sustaining platelet count limited by her inconsistent follow-up in clinic.  Last received Nplate 4mcg 07/03/2020..   Mild improvement in thrombocytopenia from 40-80 K  - last N-plate 4mcg 07/10/2020.  Improvement in platelet count to 410 K. N-plate today however today repeat labs show platelet less than 1 K.  One pack platelet transfusion today  Endplate for mcg  Repeat CBC Monday and Friday return visit for repeat CBCand plan for N-plate   Encouraged her to follow up on weekly basis for CBC for consistent Nplate dosing.  Discussed importance of presenting to emergency room if concerning bleeding.  Also provide her script dexamethasone to use if signs or symptoms of refractory low platelets despite treatment today as she has previously responded well to dexamethasone 40 mg in the past.    Bone marrow biopsy did not demonstrate a clear neoplastic process but does show:  "Marrow hypercellularity with M:E ratio generally within normal limits and megakaryocytic hyperplasia with  atypical megakaryocytic morphology with myeloproliferative neoplasm-like features and mild increased  reticulin fibrosis (but no collagen " "deposition) have been documented after initiation of TPO-R agonist  therapy in ITP patients."  Corresponding flow cytometry (FLW-) demonstrates no clonal B-cells, aberrant T-cell antigen  expression, or increase in blasts.    - MPN testing was negative, reviewed results with her today.    # iron deficiency anemia:   Iron deficiency as evidence by bone marrow biopsy as well.  Will proceed with additional Feraheme, will defer dosed today due to urgent need for platelet transfusion.    # leukocytosis:  No signs or symptoms of infection, may be secondary to bone marrow stimulation with N-plate last week.  Continue to monitor.    Follow-Up:   4mcg N-plate today   Platelet transfusion today  CBC Monday  CBC in revisit in 1 week with plan for Nplate and 2nd dose IV iron Feraheme        "

## 2020-07-24 NOTE — DISCHARGE INSTRUCTIONS
[facility]  [id number] [address]  [address]  [phone] phone     [phone] fax  Hours of Operation: Monday- Friday 8:00am- 5:00pm  After hours phone  [phone]  Hematology / Oncology Physicians on call      Dr. Cipriano Calderon      Please call with any concerns regarding your appointment today.          Thrombocytopenia  Thrombocytopenia occurs when there are fewer platelets in the blood than normal. Platelets (also called thrombocytes) are blood cells that are needed for clotting. They help stop or control bleeding when you have a cut or wound. Thrombocytopenia can range from mild to severe. This depends on the number of platelets in your blood. If you have severe thrombocytopenia, youre at higher risk for bruising and bleeding. Your doctor can tell you more about your condition and whether it needs to be treated.    Causes of Thrombocytopenia  Platelets and other blood cells are made in the bone marrow. This is the soft, spongy part inside bones. Thrombocytopenia can result when:  · The bone marrow doesnt make enough platelets.  · Platelets are destroyed by the body at a rate faster than they can be made in the bone marrow.  · Platelets become trapped in an enlarged spleen.  These problems can occur due to many reasons, including:  · Certain conditions that affect how platelets are made in the bone marrow, such as aplastic anemia, leukemia, and lymphoma  · Certain medications, such as some types of antibiotics, anti-seizure medications, and chemotherapy drugs  · Certain viral infections, such as varicella (chicken pox), HIV, and Nicki-Barr virus  · Certain autoimmune problems, such as lupus and immune thrombocytopenic purpura (ITP)  · Certain conditions that can cause an enlarged spleen, such as cirrhosis and cancer  · Alcohol abuse  · Pregnancy  Symptoms of Thrombocytopenia  Possible symptoms  include:  · Severe bruising or bleeding  · Small red or purple spots (petechiae) on the skin  · Bleeding gums  · Nosebleeds  · Bleeding from a wound that stops and starts again  · Bloody urine or stool  · Heavy menstrual flow (women only)  Diagnosing Thrombocytopenia  Your doctor will ask about your symptoms and health history. You will also be examined. Tests will be done to confirm the problem as well. These may include:  · Acomplete blood cell count (CBC). This test measures the amounts of the different types of cells in the blood. This includes the number of platelets in the blood (platelet count).  · A blood smear. This test checks for the different types of blood cells in the blood and how they appear. A sample of your blood is spread on a glass slide and viewed under a microscope. A stain is used so the blood cells can be seen.  · A bone marrow aspiration and biopsy. This test checks for problems with how the bone marrow makes blood cells. A needle is used to remove a sample of the bone marrow in your hip bone. The sample is then sent to a lab to be tested for problems.  Treating Thrombocytopenia  Often, no treatment is needed for thrombocytopenia. Your doctor will monitor your symptoms to see if they improve. Blood tests will also be done to check whether your platelet count returns to normal on its own. If treatment is needed, this may involve:  · Treatment of the underlying cause. For instance, if a medication is the cause, it may be stopped or changed.  · Platelet transfusions. These help raise the number of healthy platelets in the body.  · Blood transfusions. These help treat blood loss that may occur because of low platelets.  · Medications. These may be given to help prevent platelets from being destroyed. These may also be given to help the bone marrow make more platelets.  · Surgery to remove the spleen. The spleen helps filter the blood. It also stores some blood cells, including platelets. If the  spleen is enlarged, it can store too many platelets. This causes there to be fewer platelets in the blood than normal. Though done less often, removing the spleen may help treat thrombocytopenia in certain cases.  Recovery and Follow-Up  Thrombocytopenia may be a short-term (acute) problem that has no lasting effects. Or it may be an ongoing (chronic) problem. If your condition is chronic, you may need specific treatments to manage it. You may also need to take certain steps daily to reduce your risk of bleeding. For instance, you may be told to limit certain activities that increase your risk of injury. You may also be told to avoid drinking alcohol and taking certain medications, such as aspirin and ibuprofen. These can worsen your symptoms. In addition, you will need to know and watch for signs and symptoms of bleeding. These are described in more detail in the box below.  When to Call the Doctor  Call your doctor right away if you have any of the following:  · Severe bleeding that wont stop (call 911)  · Signs that might be seen with bleeding in the brain, such as severe headache, dizziness, trouble with balance and coordination, abnormal walk, memory loss, and confusion (call 911)  · Bruising that spreads or worsens  · Increase of small red or purple spots (petechiae) on the skin  · Bloody urine  · Dark brown or black, tarry, or bloody stools  · Bloody vomit   © 2000-2013 [facility], [address] [place] [address], [place] [address]. All rights reserved. This information is not intended as a substitute for professional medical care. Always follow your healthcare professional's instructions.

## 2020-07-27 ENCOUNTER — TELEPHONE (OUTPATIENT)
Dept: HEMATOLOGY/ONCOLOGY | Facility: CLINIC | Age: 25
End: 2020-07-27

## 2020-07-31 ENCOUNTER — DOCUMENTATION ONLY (OUTPATIENT)
Dept: HEMATOLOGY/ONCOLOGY | Facility: CLINIC | Age: 25
End: 2020-07-31

## 2020-07-31 ENCOUNTER — OFFICE VISIT (OUTPATIENT)
Dept: HEMATOLOGY/ONCOLOGY | Facility: CLINIC | Age: 25
End: 2020-07-31
Payer: MEDICAID

## 2020-07-31 VITALS
DIASTOLIC BLOOD PRESSURE: 81 MMHG | RESPIRATION RATE: 16 BRPM | SYSTOLIC BLOOD PRESSURE: 127 MMHG | TEMPERATURE: 97 F | BODY MASS INDEX: 44.67 KG/M2 | OXYGEN SATURATION: 99 % | HEART RATE: 89 BPM | WEIGHT: 242.75 LBS | HEIGHT: 62 IN

## 2020-07-31 DIAGNOSIS — D69.3 ACUTE ITP: Primary | ICD-10-CM

## 2020-07-31 DIAGNOSIS — Z90.81 H/O SPLENECTOMY: ICD-10-CM

## 2020-07-31 DIAGNOSIS — D75.839 THROMBOCYTOSIS: ICD-10-CM

## 2020-07-31 DIAGNOSIS — D50.0 IRON DEFICIENCY ANEMIA DUE TO CHRONIC BLOOD LOSS: ICD-10-CM

## 2020-07-31 DIAGNOSIS — D72.829 LEUKOCYTOSIS, UNSPECIFIED TYPE: ICD-10-CM

## 2020-07-31 PROCEDURE — 99213 PR OFFICE/OUTPT VISIT, EST, LEVL III, 20-29 MIN: ICD-10-PCS | Mod: S$PBB,,, | Performed by: INTERNAL MEDICINE

## 2020-07-31 PROCEDURE — 99213 OFFICE O/P EST LOW 20 MIN: CPT | Mod: S$PBB,,, | Performed by: INTERNAL MEDICINE

## 2020-07-31 PROCEDURE — 99999 PR PBB SHADOW E&M-EST. PATIENT-LVL III: ICD-10-PCS | Mod: PBBFAC,,, | Performed by: INTERNAL MEDICINE

## 2020-07-31 PROCEDURE — 99999 PR PBB SHADOW E&M-EST. PATIENT-LVL III: CPT | Mod: PBBFAC,,, | Performed by: INTERNAL MEDICINE

## 2020-07-31 PROCEDURE — 99213 OFFICE O/P EST LOW 20 MIN: CPT | Mod: PBBFAC | Performed by: INTERNAL MEDICINE

## 2020-07-31 NOTE — PROGRESS NOTES
Nurse called pt at this time to remind her of her apt today, nurse sent message via portal on yesterday to have ms hanley come in early and have labs drawn and rev with dr chavez, she has not responded, nurse phone calls are not answered nor returned regarding her hematology appointments

## 2020-07-31 NOTE — PROGRESS NOTES
Subjective:      DATE OF VISIT: 7/31/2020   ?     Patient ID:?Mari Miller is a 24 y.o. female.?? MR#: [id number]   ?  ?   CHIEF COMPLAINT:  Follow-up     DIAGNOSIS: ITP, iron deficiency anemia  ?   CURRENT TREATMENT: N-plate, iv iron prn    PAST TREATMENT:  Steroids, splenectomy 11/2018, eltrombopag (Promacta) and prednisone taper, IVIG x 2 10/2/19 and 10/3/19     HPI    Ms. Rasmussen was unable to receive platelet transfusion last week and was instructed by my colleague to take dexamethasone.  Labs today show thrombocytosis.  Resolving ecchymosis on arm without any further evidence of new ecchymoses, petechial lesion or bleeding.    Review of Systems    ?   A comprehensive 14-point review of systems was reviewed with patient and was negative other than as specified above.   ?     Objective:      Physical Exam      Wt Readings from Last 3 Encounters:   07/31/20 110.1 kg (242 lb 11.6 oz)   07/24/20 108 kg (238 lb 1.6 oz)   07/17/20 108 kg (238 lb 1.6 oz)     Temp Readings from Last 3 Encounters:   07/31/20 97.3 °F (36.3 °C)   07/24/20 98.1 °F (36.7 °C)   07/24/20 97.2 °F (36.2 °C)     BP Readings from Last 3 Encounters:   07/31/20 127/81   07/24/20 132/89   07/24/20 (!) 134/91     Pulse Readings from Last 3 Encounters:   07/31/20 89   07/24/20 71   07/24/20 82       ECOG:?  0   General appearance: Generally well appearing, in no acute distress.   Head, eyes, ears, nose, and throat:  Moist mucus membranes, oropharynx clear   Pulmonary:  Clear to auscultation bilaterally  Cardiovascular:  Regular rate and rhythm no murmurs rubs or gallops.  Abdomen: nondistended.   Extremities:  No edema.  Neurologic: Alert and oriented. Grossly normal strength, coordination, and gait.   Skin:  No ecchymoses or petechial lesion.  Psychiatric:  Normal mood and affect  ?   Laboratory:  ?   Platelets   Date Value Ref Range Status   07/31/2020 996 (H) 150 - 350 K/uL Final   07/27/2020 7 (LL) 150 - 350 K/uL Final     Comment:      Plt critical result(s) called and verbal readback obtained from Gelacio Nielsen  by ADALID 07/27/2020 13:40     07/24/2020 1 (LL) 150 - 350 K/uL Final     Comment:     plt critical result(s) called and verbal readback obtained from   jackelyn garcia by ALLA 07/24/2020 12:01     07/17/2020 415 (H) 150 - 350 K/uL Final   07/10/2020 88 (L) 150 - 350 K/uL Final   07/02/2020 43 (L) 150 - 350 K/uL Final   06/15/2020 10 (LL) 150 - 350 K/uL Final     Comment:     PLT  critical result(s) called and verbal readback obtained from   OMID HUMPHREYS LPN by ISAK 06/15/2020 15:40     06/09/2020 853 (H) 150 - 350 K/uL Final   06/01/2020 549 (H) 150 - 350 K/uL Final   05/29/2020 17 (LL) 150 - 350 K/uL Final     Comment:     JACKELYN CORRAL LPN  critical result(s) called and verbal readback   obtained from  by ERLINDA 05/29/2020 12:27           Lab Visit on 07/31/2020   Component Date Value Ref Range Status    WBC 07/31/2020 19.79* 3.90 - 12.70 K/uL Final    RBC 07/31/2020 4.50  4.00 - 5.40 M/uL Final    Hemoglobin 07/31/2020 10.5* 12.0 - 16.0 g/dL Final    Hematocrit 07/31/2020 35.0* 37.0 - 48.5 % Final    Mean Corpuscular Volume 07/31/2020 78* 82 - 98 fL Final    Mean Corpuscular Hemoglobin 07/31/2020 23.3* 27.0 - 31.0 pg Final    Mean Corpuscular Hemoglobin Conc 07/31/2020 30.0* 32.0 - 36.0 g/dL Final    RDW 07/31/2020 26.6* 11.5 - 14.5 % Final    Platelets 07/31/2020 996* 150 - 350 K/uL Final    MPV 07/31/2020 11.7  9.2 - 12.9 fL Final    ?   Assessment/Plan:       No diagnosis found.      Plan:     # acute on chronic ITP:  refractory ITP status post steroids, splenectomy in November 2018, and this year monthly hospitalizations with platelet counts as low as 1-5K.  Since December 2018 she has been treated with daily eltrombopag, discontinued on 11/11/2019 due to refractory ITP and possible associated headache.  - she has been treated with Nplate over the last several months and has demonstrated response although sustaining  "platelet count limited by her inconsistent follow-up in clinic.  Last received Nplate 4mcg 07/24/20, plt 4; unable to receive platelet transfusion and took dexamethasone with rebound almost 1000K platelet count.  Will hold N-plate today but understands importance of close follow-up as she typically has sharp decline following steroid burst.    Bone marrow biopsy did not demonstrate a clear neoplastic process but does show:  "Marrow hypercellularity with M:E ratio generally within normal limits and megakaryocytic hyperplasia with  atypical megakaryocytic morphology with myeloproliferative neoplasm-like features and mild increased  reticulin fibrosis (but no collagen deposition) have been documented after initiation of TPO-R agonist  therapy in ITP patients."  Corresponding flow cytometry (FLW-) demonstrates no clonal B-cells, aberrant T-cell antigen  expression, or increase in blasts.    - MPN testing was negative, reviewed results with her today.    # iron deficiency anemia:   Iron deficiency as evidence by bone marrow biopsy as well.  7/2020 completed iv feraheme; monitor hgb for improvement    # leukocytosis:  No signs or symptoms of infection, reactive from recent steroid, continue monitor.    Follow-Up:   No tx today  RV wed with cbc and possible n-plate          "

## 2020-08-05 ENCOUNTER — TELEPHONE (OUTPATIENT)
Dept: HEMATOLOGY/ONCOLOGY | Facility: CLINIC | Age: 25
End: 2020-08-05

## 2020-08-05 ENCOUNTER — TELEPHONE (OUTPATIENT)
Dept: OBSTETRICS AND GYNECOLOGY | Facility: CLINIC | Age: 25
End: 2020-08-05

## 2020-08-05 NOTE — TELEPHONE ENCOUNTER
Nurse returned pt's call she requested to be rescheduled her visits today to Friday due to transportation issues. This was completed. Pt is aware of date/time/location.

## 2020-08-05 NOTE — TELEPHONE ENCOUNTER
Returned call to patient.  She requested an appt for Nexplanon Removal.  Appt scheduled 08/17/20 at 4:30 pm.  She confirmed appt, provider and location.  She verbalized understanding of the current visitor and mask policies.

## 2020-08-05 NOTE — TELEPHONE ENCOUNTER
----- Message from Josselin Broderick sent at 8/5/2020 10:30 AM CDT -----  Regarding: appt  Contact: patient  Patient needs to reschedule her cancelled appt, please call her back at [phone]

## 2020-08-05 NOTE — TELEPHONE ENCOUNTER
----- Message from Josselin Broderick sent at 8/5/2020 10:32 AM CDT -----  Regarding: appt  Contact: patient  Patient requesting a GYN appt for birth control removal, please call her back at [phone]

## 2020-08-07 ENCOUNTER — OFFICE VISIT (OUTPATIENT)
Dept: HEMATOLOGY/ONCOLOGY | Facility: CLINIC | Age: 25
End: 2020-08-07
Payer: MEDICAID

## 2020-08-07 ENCOUNTER — LAB VISIT (OUTPATIENT)
Dept: LAB | Facility: HOSPITAL | Age: 25
End: 2020-08-07
Attending: INTERNAL MEDICINE
Payer: MEDICAID

## 2020-08-07 VITALS
HEART RATE: 91 BPM | RESPIRATION RATE: 16 BRPM | DIASTOLIC BLOOD PRESSURE: 71 MMHG | TEMPERATURE: 98 F | WEIGHT: 241.19 LBS | OXYGEN SATURATION: 99 % | BODY MASS INDEX: 44.38 KG/M2 | HEIGHT: 62 IN | SYSTOLIC BLOOD PRESSURE: 128 MMHG

## 2020-08-07 DIAGNOSIS — D50.0 IRON DEFICIENCY ANEMIA DUE TO CHRONIC BLOOD LOSS: ICD-10-CM

## 2020-08-07 DIAGNOSIS — D75.839 THROMBOCYTOSIS: ICD-10-CM

## 2020-08-07 DIAGNOSIS — D69.3 ACUTE ITP: ICD-10-CM

## 2020-08-07 DIAGNOSIS — D69.3 CHRONIC ITP (IDIOPATHIC THROMBOCYTOPENIA): Primary | ICD-10-CM

## 2020-08-07 DIAGNOSIS — D72.829 LEUKOCYTOSIS, UNSPECIFIED TYPE: ICD-10-CM

## 2020-08-07 LAB
ANISOCYTOSIS BLD QL SMEAR: ABNORMAL
BASOPHILS # BLD AUTO: 0.14 K/UL (ref 0–0.2)
BASOPHILS NFR BLD: 0.8 % (ref 0–1.9)
DACRYOCYTES BLD QL SMEAR: ABNORMAL
DIFFERENTIAL METHOD: ABNORMAL
EOSINOPHIL # BLD AUTO: 0.2 K/UL (ref 0–0.5)
EOSINOPHIL NFR BLD: 1 % (ref 0–8)
ERYTHROCYTE [DISTWIDTH] IN BLOOD BY AUTOMATED COUNT: 26.5 % (ref 11.5–14.5)
HCT VFR BLD AUTO: 35.3 % (ref 37–48.5)
HGB BLD-MCNC: 10.4 G/DL (ref 12–16)
HYPOCHROMIA BLD QL SMEAR: ABNORMAL
IMM GRANULOCYTES # BLD AUTO: 0.13 K/UL (ref 0–0.04)
IMM GRANULOCYTES NFR BLD AUTO: 0.7 % (ref 0–0.5)
LYMPHOCYTES # BLD AUTO: 3 K/UL (ref 1–4.8)
LYMPHOCYTES NFR BLD: 16.8 % (ref 18–48)
MCH RBC QN AUTO: 23.5 PG (ref 27–31)
MCHC RBC AUTO-ENTMCNC: 29.5 G/DL (ref 32–36)
MCV RBC AUTO: 80 FL (ref 82–98)
MONOCYTES # BLD AUTO: 1.6 K/UL (ref 0.3–1)
MONOCYTES NFR BLD: 8.8 % (ref 4–15)
NEUTROPHILS # BLD AUTO: 12.7 K/UL (ref 1.8–7.7)
NEUTROPHILS NFR BLD: 72.6 % (ref 38–73)
NRBC BLD-RTO: 0 /100 WBC
OVALOCYTES BLD QL SMEAR: ABNORMAL
PLATELET # BLD AUTO: 1289 K/UL (ref 150–350)
PLATELET BLD QL SMEAR: ABNORMAL
PMV BLD AUTO: 10.2 FL (ref 9.2–12.9)
POIKILOCYTOSIS BLD QL SMEAR: ABNORMAL
POLYCHROMASIA BLD QL SMEAR: ABNORMAL
RBC # BLD AUTO: 4.43 M/UL (ref 4–5.4)
TARGETS BLD QL SMEAR: ABNORMAL
WBC # BLD AUTO: 17.55 K/UL (ref 3.9–12.7)

## 2020-08-07 PROCEDURE — 36415 COLL VENOUS BLD VENIPUNCTURE: CPT

## 2020-08-07 PROCEDURE — 99212 OFFICE O/P EST SF 10 MIN: CPT | Mod: S$PBB,,, | Performed by: INTERNAL MEDICINE

## 2020-08-07 PROCEDURE — 99212 PR OFFICE/OUTPT VISIT, EST, LEVL II, 10-19 MIN: ICD-10-PCS | Mod: S$PBB,,, | Performed by: INTERNAL MEDICINE

## 2020-08-07 PROCEDURE — 99999 PR PBB SHADOW E&M-EST. PATIENT-LVL III: ICD-10-PCS | Mod: PBBFAC,,, | Performed by: INTERNAL MEDICINE

## 2020-08-07 PROCEDURE — 99213 OFFICE O/P EST LOW 20 MIN: CPT | Mod: PBBFAC | Performed by: INTERNAL MEDICINE

## 2020-08-07 PROCEDURE — 99999 PR PBB SHADOW E&M-EST. PATIENT-LVL III: CPT | Mod: PBBFAC,,, | Performed by: INTERNAL MEDICINE

## 2020-08-07 PROCEDURE — 85025 COMPLETE CBC W/AUTO DIFF WBC: CPT

## 2020-08-07 NOTE — PROGRESS NOTES
Subjective:      DATE OF VISIT: 8/7/2020   ?     Patient ID:?Mari Miller is a 24 y.o. female.?? MR#: [id number]   ?  ?   CHIEF COMPLAINT:  Follow-up     DIAGNOSIS: ITP, iron deficiency anemia  ?   CURRENT TREATMENT: N-plate, iv iron prn    PAST TREATMENT:  Steroids, splenectomy 11/2018, eltrombopag (Promacta) and prednisone taper, IVIG x 2 10/2/19 and 10/3/19     HPI    Ms. Rasmussen continues have gradually resolving ecchymoses on arms, no new bleeding or bruising.  She is feeling well.  No interval treatments.    Review of Systems    ?   A comprehensive 14-point review of systems was reviewed with patient and was negative other than as specified above.   ?     Objective:      Physical Exam      Wt Readings from Last 3 Encounters:   08/07/20 109.4 kg (241 lb 2.9 oz)   07/31/20 110.1 kg (242 lb 11.6 oz)   07/24/20 108 kg (238 lb 1.6 oz)     Temp Readings from Last 3 Encounters:   08/07/20 97.6 °F (36.4 °C)   07/31/20 97.3 °F (36.3 °C)   07/24/20 98.1 °F (36.7 °C)     BP Readings from Last 3 Encounters:   08/07/20 128/71   07/31/20 127/81   07/24/20 132/89     Pulse Readings from Last 3 Encounters:   08/07/20 91   07/31/20 89   07/24/20 71       ECOG:?  0   General appearance: Generally well appearing, in no acute distress.   Head, eyes, ears, nose, and throat:  Moist mucus membranes, oropharynx clear   Pulmonary:  Clear to auscultation bilaterally  Cardiovascular:  Regular rate and rhythm no murmurs rubs or gallops.  Abdomen: nondistended.   Extremities:  No edema.  Neurologic: Alert and oriented. Grossly normal strength, coordination, and gait.   Skin:  No ecchymoses or petechial lesion.  Psychiatric:  Normal mood and affect  ?   Laboratory:  ?   Platelets   Date Value Ref Range Status   08/07/2020 1,289 (HH) 150 - 350 K/uL Final     Comment:     PLT. critical result(s) called and verbal readback obtained from   Evelyne Oneal by ADALID 08/07/2020 09:36     07/31/2020 996 (H) 150 - 350 K/uL Final    07/27/2020 7 (LL) 150 - 350 K/uL Final     Comment:     Plt critical result(s) called and verbal readback obtained from Gelacio Nielsen  by TAMELAG 07/27/2020 13:40     07/24/2020 1 (LL) 150 - 350 K/uL Final     Comment:     plt critical result(s) called and verbal readback obtained from   nisha garcia by HTTONIE 07/24/2020 12:01     07/17/2020 415 (H) 150 - 350 K/uL Final   07/10/2020 88 (L) 150 - 350 K/uL Final   07/02/2020 43 (L) 150 - 350 K/uL Final   06/15/2020 10 (LL) 150 - 350 K/uL Final     Comment:     PLT  critical result(s) called and verbal readback obtained from   OMID HUMPHREYS LPN by AES 06/15/2020 15:40     06/09/2020 853 (H) 150 - 350 K/uL Final   06/01/2020 549 (H) 150 - 350 K/uL Final         Lab Visit on 08/07/2020   Component Date Value Ref Range Status    WBC 08/07/2020 17.55* 3.90 - 12.70 K/uL Final    RBC 08/07/2020 4.43  4.00 - 5.40 M/uL Final    Hemoglobin 08/07/2020 10.4* 12.0 - 16.0 g/dL Final    Hematocrit 08/07/2020 35.3* 37.0 - 48.5 % Final    Mean Corpuscular Volume 08/07/2020 80* 82 - 98 fL Final    Mean Corpuscular Hemoglobin 08/07/2020 23.5* 27.0 - 31.0 pg Final    Mean Corpuscular Hemoglobin Conc 08/07/2020 29.5* 32.0 - 36.0 g/dL Final    RDW 08/07/2020 26.5* 11.5 - 14.5 % Final    Platelets 08/07/2020 1,289* 150 - 350 K/uL Final    MPV 08/07/2020 10.2  9.2 - 12.9 fL Final    Immature Granulocytes 08/07/2020 0.7* 0.0 - 0.5 % Final    Gran # (ANC) 08/07/2020 12.7* 1.8 - 7.7 K/uL Final    Immature Grans (Abs) 08/07/2020 0.13* 0.00 - 0.04 K/uL Final    Lymph # 08/07/2020 3.0  1.0 - 4.8 K/uL Final    Mono # 08/07/2020 1.6* 0.3 - 1.0 K/uL Final    Eos # 08/07/2020 0.2  0.0 - 0.5 K/uL Final    Baso # 08/07/2020 0.14  0.00 - 0.20 K/uL Final    nRBC 08/07/2020 0  0 /100 WBC Final    Gran% 08/07/2020 72.6  38.0 - 73.0 % Final    Lymph% 08/07/2020 16.8* 18.0 - 48.0 % Final    Mono% 08/07/2020 8.8  4.0 - 15.0 % Final    Eosinophil% 08/07/2020 1.0  0.0 - 8.0 % Final     Basophil% 08/07/2020 0.8  0.0 - 1.9 % Final    Platelet Estimate 08/07/2020 Increased*  Final    Aniso 08/07/2020 Moderate   Final    Poik 08/07/2020 Moderate   Final    Poly 08/07/2020 Occasional   Final    Hypo 08/07/2020 Occasional   Final    Ovalocytes 08/07/2020 Occasional   Final    Target Cells 08/07/2020 Occasional   Final    Tear Drop Cells 08/07/2020 Occasional   Final    Differential Method 08/07/2020 Automated   Final    ?   Assessment/Plan:       1. Chronic ITP (idiopathic thrombocytopenia)    2. Thrombocytosis    3. Iron deficiency anemia due to chronic blood loss    4. Leukocytosis, unspecified type          Plan:     # chronic ITP:  refractory ITP status post steroids, splenectomy in November 2018, and this year monthly hospitalizations with platelet counts as low as 1-5K.  Since December 2018 she has been treated with daily eltrombopag, discontinued on 11/11/2019 due to refractory ITP and possible associated headache.  - she has been treated with Nplate over the last several months and has demonstrated response although sustaining platelet count limited by her inconsistent follow-up in clinic.  Last received Nplate 4mcg 07/24/20, plt 4; unable to receive platelet transfusion and took dexamethasone with rebound and continued rise over 1200K today.Will hold N-plate today but understands importance of close follow-up as she typically has sharp decline following steroid burst.    # iron deficiency anemia:   Iron deficiency as evidence by bone marrow biopsy as well.  7/2020 completed iv feraheme; monitor hgb for improvement    # leukocytosis:  No signs or symptoms of infection, reactive from recent steroid, continue to monitor.    Follow-Up:   No tx today  RV next tues with cbc and possible n-plate

## 2020-08-11 ENCOUNTER — OFFICE VISIT (OUTPATIENT)
Dept: HEMATOLOGY/ONCOLOGY | Facility: CLINIC | Age: 25
End: 2020-08-11
Payer: MEDICAID

## 2020-08-11 ENCOUNTER — INFUSION (OUTPATIENT)
Dept: INFUSION THERAPY | Facility: HOSPITAL | Age: 25
End: 2020-08-11
Attending: INTERNAL MEDICINE
Payer: MEDICAID

## 2020-08-11 VITALS
RESPIRATION RATE: 18 BRPM | WEIGHT: 240.94 LBS | BODY MASS INDEX: 44.34 KG/M2 | HEART RATE: 99 BPM | HEIGHT: 62 IN | OXYGEN SATURATION: 99 % | DIASTOLIC BLOOD PRESSURE: 99 MMHG | SYSTOLIC BLOOD PRESSURE: 135 MMHG | TEMPERATURE: 98 F

## 2020-08-11 VITALS
WEIGHT: 240.94 LBS | DIASTOLIC BLOOD PRESSURE: 87 MMHG | BODY MASS INDEX: 44.34 KG/M2 | HEIGHT: 62 IN | HEART RATE: 77 BPM | OXYGEN SATURATION: 99 % | TEMPERATURE: 99 F | SYSTOLIC BLOOD PRESSURE: 137 MMHG

## 2020-08-11 DIAGNOSIS — Z90.81 H/O SPLENECTOMY: ICD-10-CM

## 2020-08-11 DIAGNOSIS — D69.3 CHRONIC ITP (IDIOPATHIC THROMBOCYTOPENIA): ICD-10-CM

## 2020-08-11 DIAGNOSIS — D72.829 LEUKOCYTOSIS, UNSPECIFIED TYPE: ICD-10-CM

## 2020-08-11 DIAGNOSIS — D50.0 IRON DEFICIENCY ANEMIA DUE TO CHRONIC BLOOD LOSS: ICD-10-CM

## 2020-08-11 DIAGNOSIS — D69.6 THROMBOCYTOPENIA: Primary | ICD-10-CM

## 2020-08-11 DIAGNOSIS — D69.3 ACUTE ITP: Primary | ICD-10-CM

## 2020-08-11 DIAGNOSIS — D69.6 THROMBOCYTOPENIA: ICD-10-CM

## 2020-08-11 LAB — B-HCG UR QL: NEGATIVE

## 2020-08-11 PROCEDURE — 99999 PR PBB SHADOW E&M-EST. PATIENT-LVL III: CPT | Mod: PBBFAC,,, | Performed by: INTERNAL MEDICINE

## 2020-08-11 PROCEDURE — 96372 THER/PROPH/DIAG INJ SC/IM: CPT

## 2020-08-11 PROCEDURE — 99214 PR OFFICE/OUTPT VISIT, EST, LEVL IV, 30-39 MIN: ICD-10-PCS | Mod: S$PBB,,, | Performed by: INTERNAL MEDICINE

## 2020-08-11 PROCEDURE — 63600175 PHARM REV CODE 636 W HCPCS: Mod: JG | Performed by: INTERNAL MEDICINE

## 2020-08-11 PROCEDURE — 99999 PR PBB SHADOW E&M-EST. PATIENT-LVL III: ICD-10-PCS | Mod: PBBFAC,,, | Performed by: INTERNAL MEDICINE

## 2020-08-11 PROCEDURE — 99214 OFFICE O/P EST MOD 30 MIN: CPT | Mod: S$PBB,,, | Performed by: INTERNAL MEDICINE

## 2020-08-11 PROCEDURE — 99213 OFFICE O/P EST LOW 20 MIN: CPT | Mod: PBBFAC,25 | Performed by: INTERNAL MEDICINE

## 2020-08-11 PROCEDURE — 81025 URINE PREGNANCY TEST: CPT

## 2020-08-11 RX ORDER — ONDANSETRON 2 MG/ML
8 INJECTION INTRAMUSCULAR; INTRAVENOUS ONCE
Status: CANCELLED | OUTPATIENT
Start: 2020-08-11

## 2020-08-11 RX ADMIN — ROMIPLOSTIM 425 MCG: 250 INJECTION, POWDER, LYOPHILIZED, FOR SOLUTION SUBCUTANEOUS at 10:08

## 2020-08-11 NOTE — PROGRESS NOTES
Subjective:      DATE OF VISIT: 8/11/2020   ?     Patient ID:?Mari Miller is a 24 y.o. female.?? MR#: [id number]   ?  ?   CHIEF COMPLAINT:  Follow-up     DIAGNOSIS: ITP, iron deficiency anemia  ?   CURRENT TREATMENT: N-plate, iv iron prn    PAST TREATMENT:  Steroids, splenectomy 11/2018, eltrombopag (Promacta) and prednisone taper, IVIG x 2 10/2/19 and 10/3/19     HPI    Ms. Rasmussen has been doing well in stable medical condition.  Ecchymoses on forearms continues to resolve.  No new evidence of bruising, bleeding or petechial lesion.  No infectious symptoms.    Review of Systems    ?   A comprehensive 14-point review of systems was reviewed with patient and was negative other than as specified above.   ?     Objective:      Physical Exam      Wt Readings from Last 3 Encounters:   08/11/20 109.3 kg (240 lb 15.4 oz)   08/07/20 109.4 kg (241 lb 2.9 oz)   07/31/20 110.1 kg (242 lb 11.6 oz)     Temp Readings from Last 3 Encounters:   08/11/20 99.1 °F (37.3 °C) (Oral)   08/07/20 97.6 °F (36.4 °C)   07/31/20 97.3 °F (36.3 °C)     BP Readings from Last 3 Encounters:   08/11/20 137/87   08/07/20 128/71   07/31/20 127/81     Pulse Readings from Last 3 Encounters:   08/11/20 77   08/07/20 91   07/31/20 89       ECOG:?  0   General appearance: Generally well appearing, in no acute distress.   Head, eyes, ears, nose, and throat:  Moist mucus membranes, oropharynx clear   Pulmonary:  Clear to auscultation bilaterally  Cardiovascular:  Regular rate and rhythm no murmurs rubs or gallops.  Abdomen: nondistended.   Extremities:  No edema.  Neurologic: Alert and oriented. Grossly normal strength, coordination, and gait.   Skin:  No ecchymoses or petechial lesion.  Mild resolving ecchymosis on right forearm.  Psychiatric:  Normal mood and affect  ?   Laboratory:  ?   Platelets   Date Value Ref Range Status   08/11/2020 178 150 - 350 K/uL Final   08/07/2020 1,289 () 150 - 350 K/uL Final     Comment:     PLT. critical  result(s) called and verbal readback obtained from   Nisha Oneal by DCG 08/07/2020 09:36     07/31/2020 996 (H) 150 - 350 K/uL Final   07/27/2020 7 (LL) 150 - 350 K/uL Final     Comment:     Plt critical result(s) called and verbal readback obtained from Gelacio Haleens  by DCG 07/27/2020 13:40     07/24/2020 1 (LL) 150 - 350 K/uL Final     Comment:     plt critical result(s) called and verbal readback obtained from   nisha garcia by [facility] 07/24/2020 12:01     07/17/2020 415 (H) 150 - 350 K/uL Final   07/10/2020 88 (L) 150 - 350 K/uL Final   07/02/2020 43 (L) 150 - 350 K/uL Final   06/15/2020 10 (LL) 150 - 350 K/uL Final     Comment:     PLT  critical result(s) called and verbal readback obtained from   OMID HUMPHREYS LPN by AES 06/15/2020 15:40     06/09/2020 853 (H) 150 - 350 K/uL Final         Lab Visit on 08/11/2020   Component Date Value Ref Range Status    WBC 08/11/2020 10.06  3.90 - 12.70 K/uL Final    RBC 08/11/2020 4.75  4.00 - 5.40 M/uL Final    Hemoglobin 08/11/2020 11.3* 12.0 - 16.0 g/dL Final    Hematocrit 08/11/2020 37.5  37.0 - 48.5 % Final    Mean Corpuscular Volume 08/11/2020 79* 82 - 98 fL Final    Mean Corpuscular Hemoglobin 08/11/2020 23.8* 27.0 - 31.0 pg Final    Mean Corpuscular Hemoglobin Conc 08/11/2020 30.1* 32.0 - 36.0 g/dL Final    RDW 08/11/2020 24.0* 11.5 - 14.5 % Final    Platelets 08/11/2020 178  150 - 350 K/uL Final    MPV 08/11/2020 10.9  9.2 - 12.9 fL Final    Immature Granulocytes 08/11/2020 0.4  0.0 - 0.5 % Final    Gran # (ANC) 08/11/2020 6.1  1.8 - 7.7 K/uL Final    Immature Grans (Abs) 08/11/2020 0.04  0.00 - 0.04 K/uL Final    Lymph # 08/11/2020 2.6  1.0 - 4.8 K/uL Final    Mono # 08/11/2020 1.1* 0.3 - 1.0 K/uL Final    Eos # 08/11/2020 0.1  0.0 - 0.5 K/uL Final    Baso # 08/11/2020 0.10  0.00 - 0.20 K/uL Final    nRBC 08/11/2020 0  0 /100 WBC Final    Gran% 08/11/2020 60.7  38.0 - 73.0 % Final    Lymph% 08/11/2020 26.1  18.0 - 48.0 % Final    Mono%  08/11/2020 10.7  4.0 - 15.0 % Final    Eosinophil% 08/11/2020 1.1  0.0 - 8.0 % Final    Basophil% 08/11/2020 1.0  0.0 - 1.9 % Final    Differential Method 08/11/2020 Automated   Final    ?   Assessment/Plan:       1. Thrombocytopenia    2. Chronic ITP (idiopathic thrombocytopenia)    3. Leukocytosis, unspecified type    4. H/O splenectomy    5. Iron deficiency anemia due to chronic blood loss          Plan:     # chronic ITP:  refractory ITP status post steroids, splenectomy in November 2018, and this year monthly hospitalizations with platelet counts as low as 1-5K.  Since December 2018 she has been treated with daily eltrombopag, discontinued on 11/11/2019 due to refractory ITP and possible associated headache.  - she has been treated with Nplate over the last several months and has demonstrated response although sustaining platelet count limited by her inconsistent follow-up in clinic.  Last received Nplate 4mcg 07/24/20, plt 4; unable to receive platelet transfusion and took dexamethasone with rebound and continued rise over 1200K with sharp downtrend to 178 K today.Will administer 4 micrograms/kilogram N-plate today in follow-up in 1 week with repeat CBC and re-dosing pending labs.    # iron deficiency anemia:   Iron deficiency as evidence by bone marrow biopsy as well.  7/2020 completed iv feraheme; monitor hgb for improvement    # leukocytosis:  Resolved.  Continue to monitor.    Follow-Up:   N-plate 4mcg/kg today, after repeat pregnancy test (noted menstrual cycle was late but recent home pregnancy test negative.)  RV next tues with cbc and possible N-plate

## 2020-08-17 ENCOUNTER — TELEPHONE (OUTPATIENT)
Dept: OBSTETRICS AND GYNECOLOGY | Facility: CLINIC | Age: 25
End: 2020-08-17

## 2020-08-17 NOTE — TELEPHONE ENCOUNTER
----- Message from Alvaro Herring sent at 8/17/2020  3:17 PM CDT -----  Contact: pt  Is calling to resched procedure for Wed 08/19 ir Friday 08/21 and can be reached at [phone]//[email or web address]     Procedure is scheudled at 430

## 2020-08-17 NOTE — TELEPHONE ENCOUNTER
Returned call to patient.  She requested to reschedule nexplanon removal.  Appt rescheduled for 08/24/20 at 3:30 pm.  She confirmed appt, provider and location.  She verbalized understanding of the current visitor and mask policies.

## 2020-08-18 ENCOUNTER — TELEPHONE (OUTPATIENT)
Dept: HEMATOLOGY/ONCOLOGY | Facility: CLINIC | Age: 25
End: 2020-08-18

## 2020-08-18 NOTE — TELEPHONE ENCOUNTER
Called pt due to missed appt. No answer. Left voicemail w call bk number to follow. Dr. Ponce. Will try again at later time.

## 2020-08-19 ENCOUNTER — DOCUMENTATION ONLY (OUTPATIENT)
Dept: HEMATOLOGY/ONCOLOGY | Facility: CLINIC | Age: 25
End: 2020-08-19

## 2020-08-19 NOTE — PROGRESS NOTES
Nurse called pt regarding her no show apt with dr chavez on yesterday, left voice message to call clinic to see provider and check lab values.

## 2020-08-21 ENCOUNTER — OFFICE VISIT (OUTPATIENT)
Dept: HEMATOLOGY/ONCOLOGY | Facility: CLINIC | Age: 25
End: 2020-08-21
Payer: MEDICAID

## 2020-08-21 ENCOUNTER — LAB VISIT (OUTPATIENT)
Dept: LAB | Facility: HOSPITAL | Age: 25
End: 2020-08-21
Attending: INTERNAL MEDICINE
Payer: MEDICAID

## 2020-08-21 VITALS
SYSTOLIC BLOOD PRESSURE: 127 MMHG | HEART RATE: 86 BPM | BODY MASS INDEX: 47.35 KG/M2 | DIASTOLIC BLOOD PRESSURE: 77 MMHG | HEIGHT: 60 IN | WEIGHT: 241.19 LBS | OXYGEN SATURATION: 99 % | TEMPERATURE: 98 F

## 2020-08-21 DIAGNOSIS — D69.3 ACUTE ITP: ICD-10-CM

## 2020-08-21 DIAGNOSIS — D72.829 LEUKOCYTOSIS, UNSPECIFIED TYPE: ICD-10-CM

## 2020-08-21 DIAGNOSIS — D69.3 CHRONIC ITP (IDIOPATHIC THROMBOCYTOPENIA): Primary | ICD-10-CM

## 2020-08-21 DIAGNOSIS — D75.839 THROMBOCYTOSIS: ICD-10-CM

## 2020-08-21 LAB
ACANTHOCYTES BLD QL SMEAR: PRESENT
ANISOCYTOSIS BLD QL SMEAR: SLIGHT
BASOPHILS # BLD AUTO: 0.11 K/UL (ref 0–0.2)
BASOPHILS NFR BLD: 0.7 % (ref 0–1.9)
DACRYOCYTES BLD QL SMEAR: ABNORMAL
DIFFERENTIAL METHOD: ABNORMAL
EOSINOPHIL # BLD AUTO: 0.3 K/UL (ref 0–0.5)
EOSINOPHIL NFR BLD: 2 % (ref 0–8)
ERYTHROCYTE [DISTWIDTH] IN BLOOD BY AUTOMATED COUNT: 24.1 % (ref 11.5–14.5)
HCT VFR BLD AUTO: 38.5 % (ref 37–48.5)
HGB BLD-MCNC: 11.6 G/DL (ref 12–16)
HYPOCHROMIA BLD QL SMEAR: ABNORMAL
IMM GRANULOCYTES # BLD AUTO: 0.11 K/UL (ref 0–0.04)
IMM GRANULOCYTES NFR BLD AUTO: 0.7 % (ref 0–0.5)
LYMPHOCYTES # BLD AUTO: 3.9 K/UL (ref 1–4.8)
LYMPHOCYTES NFR BLD: 25.6 % (ref 18–48)
MCH RBC QN AUTO: 24.4 PG (ref 27–31)
MCHC RBC AUTO-ENTMCNC: 30.1 G/DL (ref 32–36)
MCV RBC AUTO: 81 FL (ref 82–98)
MONOCYTES # BLD AUTO: 1.8 K/UL (ref 0.3–1)
MONOCYTES NFR BLD: 11.8 % (ref 4–15)
NEUTROPHILS # BLD AUTO: 9 K/UL (ref 1.8–7.7)
NEUTROPHILS NFR BLD: 59.2 % (ref 38–73)
NRBC BLD-RTO: 0 /100 WBC
OVALOCYTES BLD QL SMEAR: ABNORMAL
PLATELET # BLD AUTO: 656 K/UL (ref 150–350)
PMV BLD AUTO: 10.9 FL (ref 9.2–12.9)
POIKILOCYTOSIS BLD QL SMEAR: ABNORMAL
RBC # BLD AUTO: 4.76 M/UL (ref 4–5.4)
SCHISTOCYTES BLD QL SMEAR: PRESENT
SPHEROCYTES BLD QL SMEAR: ABNORMAL
STOMATOCYTES BLD QL SMEAR: PRESENT
TARGETS BLD QL SMEAR: ABNORMAL
WBC # BLD AUTO: 15.22 K/UL (ref 3.9–12.7)

## 2020-08-21 PROCEDURE — 99999 PR PBB SHADOW E&M-EST. PATIENT-LVL III: CPT | Mod: PBBFAC,,, | Performed by: INTERNAL MEDICINE

## 2020-08-21 PROCEDURE — 99999 PR PBB SHADOW E&M-EST. PATIENT-LVL III: ICD-10-PCS | Mod: PBBFAC,,, | Performed by: INTERNAL MEDICINE

## 2020-08-21 PROCEDURE — 36415 COLL VENOUS BLD VENIPUNCTURE: CPT

## 2020-08-21 PROCEDURE — 99213 OFFICE O/P EST LOW 20 MIN: CPT | Mod: PBBFAC | Performed by: INTERNAL MEDICINE

## 2020-08-21 PROCEDURE — 99213 OFFICE O/P EST LOW 20 MIN: CPT | Mod: S$PBB,,, | Performed by: INTERNAL MEDICINE

## 2020-08-21 PROCEDURE — 99213 PR OFFICE/OUTPT VISIT, EST, LEVL III, 20-29 MIN: ICD-10-PCS | Mod: S$PBB,,, | Performed by: INTERNAL MEDICINE

## 2020-08-21 PROCEDURE — 85025 COMPLETE CBC W/AUTO DIFF WBC: CPT

## 2020-08-21 NOTE — PROGRESS NOTES
Subjective:      DATE OF VISIT: 8/21/2020   ?     Patient ID:?Mari Miller is a 24 y.o. female.?? MR#: [id number]   ?  ?   CHIEF COMPLAINT:  Follow-up     DIAGNOSIS: ITP, iron deficiency anemia  ?   CURRENT TREATMENT: N-plate, iv iron prn    PAST TREATMENT:  Steroids, splenectomy 11/2018, eltrombopag (Promacta) and prednisone taper, IVIG x 2 10/2/19 and 10/3/19     HPI    Ms. Rasmussen has been doing well without ecchymoses, petechiae or other evidence of bleeding.      Review of Systems    ?   A comprehensive 14-point review of systems was reviewed with patient and was negative other than as specified above.   ?     Objective:      Physical Exam      Wt Readings from Last 3 Encounters:   08/21/20 109.4 kg (241 lb 2.9 oz)   08/11/20 109.3 kg (240 lb 15.4 oz)   08/11/20 109.3 kg (240 lb 15.4 oz)     Temp Readings from Last 3 Encounters:   08/21/20 97.9 °F (36.6 °C) (Oral)   08/11/20 97.9 °F (36.6 °C)   08/11/20 99.1 °F (37.3 °C) (Oral)     BP Readings from Last 3 Encounters:   08/21/20 127/77   08/11/20 (!) 135/99   08/11/20 137/87     Pulse Readings from Last 3 Encounters:   08/21/20 86   08/11/20 99   08/11/20 77       ECOG:?  0   General appearance: Generally well appearing, in no acute distress.   Head, eyes, ears, nose, and throat:  Moist mucus membranes, oropharynx clear   Pulmonary:  Clear to auscultation bilaterally  Cardiovascular:  Regular rate and rhythm no murmurs rubs or gallops.  Abdomen: nondistended.   Extremities:  No edema.  Neurologic: Alert and oriented. Grossly normal strength, coordination, and gait.   Skin:  No ecchymoses or petechial lesion.   Psychiatric:  Normal mood and affect  ?   Laboratory:  ?   Platelets   Date Value Ref Range Status   08/21/2020 656 (H) 150 - 350 K/uL Final   08/11/2020 178 150 - 350 K/uL Final   08/07/2020 1,289 (HH) 150 - 350 K/uL Final     Comment:     PLT. critical result(s) called and verbal readback obtained from   Evelyne Oneal [place] [facility] [place]/07/2020  09:36     07/31/2020 996 (H) 150 - 350 K/uL Final   07/27/2020 7 (LL) 150 - 350 K/uL Final     Comment:     Plt critical result(s) called and verbal readback obtained from Gelacio Nielsen  by DCG 07/27/2020 13:40     07/24/2020 1 (LL) 150 - 350 K/uL Final     Comment:     plt critical result(s) called and verbal readback obtained from   nisha garcia by HTPA1 07/24/2020 12:01     07/17/2020 415 (H) 150 - 350 K/uL Final   07/10/2020 88 (L) 150 - 350 K/uL Final   07/02/2020 43 (L) 150 - 350 K/uL Final   06/15/2020 10 (LL) 150 - 350 K/uL Final     Comment:     PLT  critical result(s) called and verbal readback obtained from   OMID HUMPHREYS LPN by AES 06/15/2020 15:40           Lab Visit on 08/21/2020   Component Date Value Ref Range Status    WBC 08/21/2020 15.22* 3.90 - 12.70 K/uL Final    RBC 08/21/2020 4.76  4.00 - 5.40 M/uL Final    Hemoglobin 08/21/2020 11.6* 12.0 - 16.0 g/dL Final    Hematocrit 08/21/2020 38.5  37.0 - 48.5 % Final    Mean Corpuscular Volume 08/21/2020 81* 82 - 98 fL Final    Mean Corpuscular Hemoglobin 08/21/2020 24.4* 27.0 - 31.0 pg Final    Mean Corpuscular Hemoglobin Conc 08/21/2020 30.1* 32.0 - 36.0 g/dL Final    RDW 08/21/2020 24.1* 11.5 - 14.5 % Final    Platelets 08/21/2020 656* 150 - 350 K/uL Final    MPV 08/21/2020 10.9  9.2 - 12.9 fL Final    ?   Assessment/Plan:       1. Chronic ITP (idiopathic thrombocytopenia)    2. Leukocytosis, unspecified type    3. Thrombocytosis          Plan:     # chronic ITP:  refractory ITP status post steroids, splenectomy in November 2018, and this year monthly hospitalizations with platelet counts as low as 1-5K.  Since December 2018 she has been treated with daily eltrombopag, discontinued on 11/11/2019 due to refractory ITP and possible associated headache.  - she has been treated with Nplate over the last several months and has demonstrated response although sustaining platelet count limited by her inconsistent follow-up in clinic.  Last  received Nplate 4mcg 07/24/20, plt 4; unable to receive platelet transfusion and took dexamethasone with rebound and continued rise over 1200K with sharp downtrend to 178 K last week 8/11/20 last N-plate 4mcg. ptl 656K today and will not give N-plate. She has predictable short decline without N-plate for 2 weeks and discussed important of coming on Monday or to ED over weekend if she develops s/sx bleeding. Recommend Monday RV but declined not available until Tuesday, will recheck plt count and N-plate accordingly    # iron deficiency anemia:   Iron deficiency as evidence by bone marrow biopsy as well.  7/2020 completed iv feraheme; monitor hgb for improvement    # leukocytosis:  Mild leukocytosis to 15 May be related to infusion.  Continue to monitor.    Follow-Up:   No treatment today  RV Tuesday per pt preference for cbc and possible N-plate

## 2020-08-25 ENCOUNTER — INFUSION (OUTPATIENT)
Dept: INFUSION THERAPY | Facility: HOSPITAL | Age: 25
End: 2020-08-25
Attending: INTERNAL MEDICINE
Payer: MEDICAID

## 2020-08-25 ENCOUNTER — OFFICE VISIT (OUTPATIENT)
Dept: HEMATOLOGY/ONCOLOGY | Facility: CLINIC | Age: 25
End: 2020-08-25
Payer: MEDICAID

## 2020-08-25 VITALS
RESPIRATION RATE: 14 BRPM | WEIGHT: 242.94 LBS | SYSTOLIC BLOOD PRESSURE: 134 MMHG | TEMPERATURE: 99 F | BODY MASS INDEX: 44.71 KG/M2 | HEART RATE: 85 BPM | DIASTOLIC BLOOD PRESSURE: 95 MMHG | OXYGEN SATURATION: 99 % | HEIGHT: 62 IN

## 2020-08-25 VITALS
TEMPERATURE: 98 F | RESPIRATION RATE: 18 BRPM | DIASTOLIC BLOOD PRESSURE: 99 MMHG | OXYGEN SATURATION: 99 % | SYSTOLIC BLOOD PRESSURE: 135 MMHG | HEART RATE: 78 BPM

## 2020-08-25 DIAGNOSIS — D69.3 CHRONIC ITP (IDIOPATHIC THROMBOCYTOPENIA): ICD-10-CM

## 2020-08-25 DIAGNOSIS — D50.0 IRON DEFICIENCY ANEMIA DUE TO CHRONIC BLOOD LOSS: ICD-10-CM

## 2020-08-25 DIAGNOSIS — D69.3 ACUTE ITP: Primary | ICD-10-CM

## 2020-08-25 DIAGNOSIS — D50.9 MICROCYTIC ANEMIA: ICD-10-CM

## 2020-08-25 DIAGNOSIS — D69.6 THROMBOCYTOPENIA: ICD-10-CM

## 2020-08-25 PROCEDURE — 99999 PR PBB SHADOW E&M-EST. PATIENT-LVL III: ICD-10-PCS | Mod: PBBFAC,,, | Performed by: INTERNAL MEDICINE

## 2020-08-25 PROCEDURE — 99999 PR PBB SHADOW E&M-EST. PATIENT-LVL III: CPT | Mod: PBBFAC,,, | Performed by: INTERNAL MEDICINE

## 2020-08-25 PROCEDURE — 99214 PR OFFICE/OUTPT VISIT, EST, LEVL IV, 30-39 MIN: ICD-10-PCS | Mod: S$PBB,,, | Performed by: INTERNAL MEDICINE

## 2020-08-25 PROCEDURE — 63600175 PHARM REV CODE 636 W HCPCS: Mod: JW,JG | Performed by: INTERNAL MEDICINE

## 2020-08-25 PROCEDURE — 99214 OFFICE O/P EST MOD 30 MIN: CPT | Mod: S$PBB,,, | Performed by: INTERNAL MEDICINE

## 2020-08-25 PROCEDURE — 99213 OFFICE O/P EST LOW 20 MIN: CPT | Mod: PBBFAC,25 | Performed by: INTERNAL MEDICINE

## 2020-08-25 PROCEDURE — 96372 THER/PROPH/DIAG INJ SC/IM: CPT

## 2020-08-25 RX ORDER — ONDANSETRON 2 MG/ML
8 INJECTION INTRAMUSCULAR; INTRAVENOUS ONCE
Status: CANCELLED
Start: 2020-08-28

## 2020-08-25 RX ADMIN — ROMIPLOSTIM 315 MCG: 250 INJECTION, POWDER, LYOPHILIZED, FOR SOLUTION SUBCUTANEOUS at 11:08

## 2020-08-25 NOTE — PROGRESS NOTES
Subjective:      DATE OF VISIT: 8/25/2020   ?     Patient ID:?Mari Miller is a 24 y.o. female.?? MR#: [id number]   ?  ?   CHIEF COMPLAINT:  Follow-up     DIAGNOSIS: ITP, iron deficiency anemia  ?   CURRENT TREATMENT: N-plate, iv iron prn    PAST TREATMENT:  Steroids, splenectomy 11/2018, eltrombopag (Promacta) and prednisone taper, IVIG x 2 10/2/19 and 10/3/19     HPI    Ms. Rasmussen returns for follow-up and continues to do well and denies any bleeding, ecchymoses or petechiae.  She is feeling well.    Review of Systems    ?   A comprehensive 14-point review of systems was reviewed with patient and was negative other than as specified above.   ?     Objective:      Physical Exam      Wt Readings from Last 3 Encounters:   08/25/20 110.2 kg (242 lb 15.2 oz)   08/21/20 109.4 kg (241 lb 2.9 oz)   08/11/20 109.3 kg (240 lb 15.4 oz)     Temp Readings from Last 3 Encounters:   08/25/20 99 °F (37.2 °C)   08/25/20 98.4 °F (36.9 °C)   08/21/20 97.9 °F (36.6 °C) (Oral)     BP Readings from Last 3 Encounters:   08/25/20 (!) 134/95   08/25/20 (!) 135/99   08/21/20 127/77     Pulse Readings from Last 3 Encounters:   08/25/20 85   08/25/20 78   08/21/20 86       ECOG:?  0   General appearance: Generally well appearing, in no acute distress.   Head, eyes, ears, nose, and throat:  Moist mucus membranes, oropharynx clear   Pulmonary:  Clear to auscultation bilaterally  Cardiovascular:  Regular rate and rhythm no murmurs rubs or gallops.  Abdomen: nondistended.   Extremities:  No edema.  Neurologic: Alert and oriented. Grossly normal strength, coordination, and gait.   Skin:  No ecchymoses or petechial lesion.   Psychiatric:  Normal mood and affect  ?   Laboratory:  ?   Platelets   Date Value Ref Range Status   08/25/2020 310 150 - 350 K/uL Final     Comment:     Results confirmed, test repeated   08/21/2020 656 (H) 150 - 350 K/uL Final   08/11/2020 178 150 - 350 K/uL Final   08/07/2020 1,289 () 150 - 350 K/uL Final      Comment:     PLT. critical result(s) called and verbal readback obtained from   Nisha Oneal by [facility] 08/07/2020 09:36     07/31/2020 996 (H) 150 - 350 K/uL Final   07/27/2020 7 (LL) 150 - 350 K/uL Final     Comment:     Plt critical result(s) called and verbal readback obtained from Gelacio Nielsen  by [facility] 07/27/2020 13:40     07/24/2020 1 (LL) 150 - 350 K/uL Final     Comment:     plt critical result(s) called and verbal readback obtained from   nisha garcia by [facility] 07/24/2020 12:01     07/17/2020 415 (H) 150 - 350 K/uL Final   07/10/2020 88 (L) 150 - 350 K/uL Final   07/02/2020 43 (L) 150 - 350 K/uL Final         Lab Visit on 08/25/2020   Component Date Value Ref Range Status    WBC 08/25/2020 14.02* 3.90 - 12.70 K/uL Final    RBC 08/25/2020 4.81  4.00 - 5.40 M/uL Final    Hemoglobin 08/25/2020 11.8* 12.0 - 16.0 g/dL Final    Hematocrit 08/25/2020 38.7  37.0 - 48.5 % Final    Mean Corpuscular Volume 08/25/2020 81* 82 - 98 fL Final    Mean Corpuscular Hemoglobin 08/25/2020 24.5* 27.0 - 31.0 pg Final    Mean Corpuscular Hemoglobin Conc 08/25/2020 30.5* 32.0 - 36.0 g/dL Final    RDW 08/25/2020 22.7* 11.5 - 14.5 % Final    Platelets 08/25/2020 310  150 - 350 K/uL Final    MPV 08/25/2020 10.8  9.2 - 12.9 fL Final    Immature Granulocytes 08/25/2020 0.7* 0.0 - 0.5 % Final    Gran # (ANC) 08/25/2020 9.3* 1.8 - 7.7 K/uL Final    Immature Grans (Abs) 08/25/2020 0.10* 0.00 - 0.04 K/uL Final    Lymph # 08/25/2020 3.2  1.0 - 4.8 K/uL Final    Mono # 08/25/2020 1.3* 0.3 - 1.0 K/uL Final    Eos # 08/25/2020 0.1  0.0 - 0.5 K/uL Final    Baso # 08/25/2020 0.16  0.00 - 0.20 K/uL Final    nRBC 08/25/2020 0  0 /100 WBC Final    Gran% 08/25/2020 66.5  38.0 - 73.0 % Final    Lymph% 08/25/2020 22.6  18.0 - 48.0 % Final    Mono% 08/25/2020 8.9  4.0 - 15.0 % Final    Eosinophil% 08/25/2020 0.9  0.0 - 8.0 % Final    Basophil% 08/25/2020 1.1  0.0 - 1.9 % Final    Platelet Estimate 08/25/2020 Appears normal   Final     Aniso 08/25/2020 Slight   Final    Poik 08/25/2020 Slight   Final    Hypo 08/25/2020 Occasional   Final    Ovalocytes 08/25/2020 Occasional   Final    Target Cells 08/25/2020 Occasional   Final    [place] Cells 08/25/2020 Occasional   Final    Spherocytes 08/25/2020 Occasional   Final    Acanthocytes 08/25/2020 Present   Final    Schistocytes 08/25/2020 Present   Final    Large/Giant Platelets 08/25/2020 Present   Final    Differential Method 08/25/2020 Automated   Final    ?   Assessment/Plan:       1. Acute ITP    2. Chronic ITP (idiopathic thrombocytopenia)    3. Microcytic anemia    4. Iron deficiency anemia due to chronic blood loss    5. Thrombocytopenia          Plan:     # acute on chronic ITP:  refractory ITP status post steroids, splenectomy in November 2018, and this year monthly hospitalizations with platelet counts as low as 1-5K.  Since December 2018 she has been treated with daily eltrombopag, discontinued on 11/11/2019 due to refractory ITP and possible associated headache.  - she has been treated with Nplate over the last several months and has demonstrated response although sustaining platelet count limited by her inconsistent follow-up in clinic.  [place] [facility] [place]  She had exceptional response to 656 now downtrending 02/03/2010.  Will decrease dose to 3 mcg/kg.  Do printer  # iron deficiency anemia:   Iron deficiency as evidence by bone marrow biopsy as well.  7/2020 completed iv feraheme; mild improvement hemoglobin 11.8.  Continue to monitor.    # leukocytosis:  Mild leukocytosis to 14, relatively stable with normal differential. Continue to monitor.    Follow-Up:   N-plate today at 3 mcg/kg  RV 1 week with repeat CBC and possible N-plate

## 2020-09-01 ENCOUNTER — OFFICE VISIT (OUTPATIENT)
Dept: HEMATOLOGY/ONCOLOGY | Facility: CLINIC | Age: 25
End: 2020-09-01
Payer: MEDICAID

## 2020-09-01 ENCOUNTER — INFUSION (OUTPATIENT)
Dept: INFUSION THERAPY | Facility: HOSPITAL | Age: 25
End: 2020-09-01
Attending: INTERNAL MEDICINE
Payer: MEDICAID

## 2020-09-01 ENCOUNTER — LAB VISIT (OUTPATIENT)
Dept: LAB | Facility: HOSPITAL | Age: 25
End: 2020-09-01
Attending: INTERNAL MEDICINE
Payer: MEDICAID

## 2020-09-01 VITALS
DIASTOLIC BLOOD PRESSURE: 90 MMHG | WEIGHT: 243.38 LBS | DIASTOLIC BLOOD PRESSURE: 83 MMHG | OXYGEN SATURATION: 99 % | HEART RATE: 86 BPM | HEART RATE: 80 BPM | RESPIRATION RATE: 16 BRPM | SYSTOLIC BLOOD PRESSURE: 132 MMHG | OXYGEN SATURATION: 98 % | TEMPERATURE: 97 F | BODY MASS INDEX: 44.79 KG/M2 | RESPIRATION RATE: 16 BRPM | TEMPERATURE: 98 F | HEIGHT: 62 IN | SYSTOLIC BLOOD PRESSURE: 139 MMHG

## 2020-09-01 DIAGNOSIS — D69.3 ACUTE ITP: ICD-10-CM

## 2020-09-01 DIAGNOSIS — D69.3 ACUTE ITP: Primary | ICD-10-CM

## 2020-09-01 DIAGNOSIS — D50.0 IRON DEFICIENCY ANEMIA DUE TO CHRONIC BLOOD LOSS: ICD-10-CM

## 2020-09-01 DIAGNOSIS — D69.6 THROMBOCYTOPENIA: ICD-10-CM

## 2020-09-01 LAB
ACANTHOCYTES BLD QL SMEAR: PRESENT
ANISOCYTOSIS BLD QL SMEAR: SLIGHT
BASOPHILS # BLD AUTO: 0.11 K/UL (ref 0–0.2)
BASOPHILS NFR BLD: 1.1 % (ref 0–1.9)
DACRYOCYTES BLD QL SMEAR: ABNORMAL
DIFFERENTIAL METHOD: ABNORMAL
EOSINOPHIL # BLD AUTO: 0.1 K/UL (ref 0–0.5)
EOSINOPHIL NFR BLD: 1.3 % (ref 0–8)
ERYTHROCYTE [DISTWIDTH] IN BLOOD BY AUTOMATED COUNT: 21 % (ref 11.5–14.5)
HCT VFR BLD AUTO: 38.6 % (ref 37–48.5)
HGB BLD-MCNC: 11.8 G/DL (ref 12–16)
IMM GRANULOCYTES # BLD AUTO: 0.04 K/UL (ref 0–0.04)
IMM GRANULOCYTES NFR BLD AUTO: 0.4 % (ref 0–0.5)
LYMPHOCYTES # BLD AUTO: 3.4 K/UL (ref 1–4.8)
LYMPHOCYTES NFR BLD: 33.7 % (ref 18–48)
MCH RBC QN AUTO: 24.7 PG (ref 27–31)
MCHC RBC AUTO-ENTMCNC: 30.6 G/DL (ref 32–36)
MCV RBC AUTO: 81 FL (ref 82–98)
MONOCYTES # BLD AUTO: 1.3 K/UL (ref 0.3–1)
MONOCYTES NFR BLD: 13.1 % (ref 4–15)
NEUTROPHILS # BLD AUTO: 5.1 K/UL (ref 1.8–7.7)
NEUTROPHILS NFR BLD: 50.8 % (ref 38–73)
NRBC BLD-RTO: 0 /100 WBC
PLATELET # BLD AUTO: 185 K/UL (ref 150–350)
PLATELET BLD QL SMEAR: ABNORMAL
PMV BLD AUTO: ABNORMAL FL (ref 9.2–12.9)
POIKILOCYTOSIS BLD QL SMEAR: SLIGHT
RBC # BLD AUTO: 4.78 M/UL (ref 4–5.4)
SCHISTOCYTES BLD QL SMEAR: PRESENT
TARGETS BLD QL SMEAR: ABNORMAL
WBC # BLD AUTO: 10.05 K/UL (ref 3.9–12.7)

## 2020-09-01 PROCEDURE — 99214 OFFICE O/P EST MOD 30 MIN: CPT | Mod: S$PBB,,, | Performed by: INTERNAL MEDICINE

## 2020-09-01 PROCEDURE — 99999 PR PBB SHADOW E&M-EST. PATIENT-LVL III: CPT | Mod: PBBFAC,,, | Performed by: INTERNAL MEDICINE

## 2020-09-01 PROCEDURE — 99213 OFFICE O/P EST LOW 20 MIN: CPT | Mod: PBBFAC,25 | Performed by: INTERNAL MEDICINE

## 2020-09-01 PROCEDURE — 99214 PR OFFICE/OUTPT VISIT, EST, LEVL IV, 30-39 MIN: ICD-10-PCS | Mod: S$PBB,,, | Performed by: INTERNAL MEDICINE

## 2020-09-01 PROCEDURE — 85025 COMPLETE CBC W/AUTO DIFF WBC: CPT

## 2020-09-01 PROCEDURE — 99999 PR PBB SHADOW E&M-EST. PATIENT-LVL III: ICD-10-PCS | Mod: PBBFAC,,, | Performed by: INTERNAL MEDICINE

## 2020-09-01 PROCEDURE — 96372 THER/PROPH/DIAG INJ SC/IM: CPT

## 2020-09-01 PROCEDURE — 63600175 PHARM REV CODE 636 W HCPCS: Mod: JW,JG | Performed by: INTERNAL MEDICINE

## 2020-09-01 PROCEDURE — 36415 COLL VENOUS BLD VENIPUNCTURE: CPT

## 2020-09-01 RX ADMIN — ROMIPLOSTIM 425 MCG: 250 INJECTION, POWDER, LYOPHILIZED, FOR SOLUTION SUBCUTANEOUS at 09:09

## 2020-09-01 NOTE — PROGRESS NOTES
Subjective:      DATE OF VISIT: 9/1/2020   ?     Patient ID:?Mari Miller is a 24 y.o. female.?? MR#: [id number]   ?  ?   CHIEF COMPLAINT:  Follow-up     DIAGNOSIS: ITP, iron deficiency anemia  ?   CURRENT TREATMENT: N-plate, iv iron prn    PAST TREATMENT:  Steroids, splenectomy 11/2018, eltrombopag (Promacta) and prednisone taper, IVIG x 2 10/2/19 and 10/3/19     HPI    Ms. Rasmussen has been doing well since last visit a week ago when she received N-plate 3mcg/kg.  No new bleeding, bruising, rash or petechial lesion.  She had longer menstrual cycle but not particularly heavy.  She is overdue for replacement of birth control Depot in arm due to low platelet count at the time and advise not to have invasive procedure due to risk of bleeding.    Review of Systems    ?   A comprehensive 14-point review of systems was reviewed with patient and was negative other than as specified above.   ?     Objective:      Physical Exam      Wt Readings from Last 3 Encounters:   09/01/20 110.4 kg (243 lb 6.2 oz)   08/25/20 110.2 kg (242 lb 15.2 oz)   08/21/20 109.4 kg (241 lb 2.9 oz)     Temp Readings from Last 3 Encounters:   09/01/20 97.7 °F (36.5 °C)   09/01/20 97.1 °F (36.2 °C) (Oral)   08/25/20 99 °F (37.2 °C)     BP Readings from Last 3 Encounters:   09/01/20 (!) 139/90   09/01/20 132/83   08/25/20 (!) 134/95     Pulse Readings from Last 3 Encounters:   09/01/20 80   09/01/20 86   08/25/20 85       ECOG:?  0   General appearance: Generally well appearing, in no acute distress.   Head, eyes, ears, nose, and throat:  Moist mucus membranes, oropharynx clear   Pulmonary:  Clear to auscultation bilaterally  Cardiovascular:  Regular rate and rhythm no murmurs rubs or gallops.  Abdomen: nondistended.   Extremities:  No edema.  Neurologic: Alert and oriented. Grossly normal strength, coordination, and gait.   Skin:  No ecchymoses or petechial lesion.   Psychiatric:  Normal mood and affect  ?   Laboratory:  ?   Platelets    Date Value Ref Range Status   09/01/2020 185 150 - 350 K/uL Final   08/25/2020 310 150 - 350 K/uL Final     Comment:     Results confirmed, test repeated   08/21/2020 656 (H) 150 - 350 K/uL Final   08/11/2020 178 150 - 350 K/uL Final   08/07/2020 1,289 (HH) 150 - 350 K/uL Final     Comment:     PLT. critical result(s) called and verbal readback obtained from   [place] [facility] [place]/07/[place] 09:36     07/31/2020 996 (H) 150 - 350 K/uL Final   07/27/2020 7 (LL) 150 - 350 K/uL Final     Comment:     Plt critical result(s) called and verbal readback obtained from Gelacio Nielsen  [place] [facility] [place]/27/2020 13:40     07/24/2020 1 (LL) 150 - 350 K/uL Final     Comment:     plt critical result(s) called and verbal readback obtained from   nisha garcia by [facility] 07/24/2020 12:01     07/17/2020 415 (H) 150 - 350 K/uL Final   07/10/2020 88 (L) 150 - 350 K/uL Final         Lab Visit on 09/01/2020   Component Date Value Ref Range Status    WBC 09/01/2020 10.05  3.90 - 12.70 K/uL Final    RBC 09/01/2020 4.78  4.00 - 5.40 M/uL Final    Hemoglobin 09/01/2020 11.8* 12.0 - 16.0 g/dL Final    Hematocrit 09/01/2020 38.6  37.0 - 48.5 % Final    Mean Corpuscular Volume 09/01/2020 81* 82 - 98 fL Final    Mean Corpuscular Hemoglobin 09/01/2020 24.7* 27.0 - 31.0 pg Final    Mean Corpuscular Hemoglobin Conc 09/01/2020 30.6* 32.0 - 36.0 g/dL Final    RDW 09/01/2020 21.0* 11.5 - 14.5 % Final    Platelets 09/01/2020 185  150 - 350 K/uL Final    MPV 09/01/2020 SEE COMMENT  9.2 - 12.9 fL Final    Immature Granulocytes 09/01/2020 0.4  0.0 - 0.5 % Final    Gran # (ANC) 09/01/2020 5.1  1.8 - 7.7 K/uL Final    Immature Grans (Abs) 09/01/2020 0.04  0.00 - 0.04 K/uL Final    Lymph # 09/01/2020 3.4  1.0 - 4.8 K/uL Final    Mono # 09/01/2020 1.3* 0.3 - 1.0 K/uL Final    Eos # 09/01/2020 0.1  0.0 - 0.5 K/uL Final    Baso # 09/01/2020 0.11  0.00 - 0.20 K/uL Final    nRBC 09/01/2020 0  0 /100 WBC Final    Gran% 09/01/2020 50.8  38.0 - 73.0 % Final     Lymph% 09/01/2020 33.7  18.0 - 48.0 % Final    Mono% 09/01/2020 13.1  4.0 - 15.0 % Final    Eosinophil% 09/01/2020 1.3  0.0 - 8.0 % Final    Basophil% 09/01/2020 1.1  0.0 - 1.9 % Final    Platelet Estimate 09/01/2020 Appears normal   Final    Aniso 09/01/2020 Slight   Final    Poik 09/01/2020 Slight   Final    Target Cells 09/01/2020 Occasional   Final    Tear Drop Cells 09/01/2020 Occasional   Final    Acanthocytes 09/01/2020 Present   Final    Schistocytes 09/01/2020 Present   Final    Differential Method 09/01/2020 Automated   Final    ?   Assessment/Plan:       No diagnosis found.      Plan:     # acute on chronic ITP:  refractory ITP status post steroids, splenectomy in November 2018, and this year monthly hospitalizations with platelet counts as low as 1-5K.  Since December 2018 she has been treated with daily eltrombopag, discontinued on 11/11/2019 due to refractory ITP and possible associated headache.  - she has been treated with Nplate over the last several months and has demonstrated response although sustaining platelet count limited by her inconsistent follow-up in clinic.  Last received Nplate 8/11/20 at 4mcg.  She had exceptional response to 656 now downtrending from last week with N-plate dose 3mcg/kg. Will increase to 4mcg/kg dose today and RV in 1 week.      # iron deficiency anemia:   Iron deficiency as evidence by bone marrow biopsy as well.  7/2020 completed iv feraheme; mild improvement in stable hemoglobin 11.8.  Continue to monitor.    # leukocytosis:  Resolved on labs today WBC 10, likely due to no recent steroid need.  Continue to monitor.    Follow-Up:   N-plate today at 4 mcg/kg  RV 1 week with repeat CBC and possible N-plate

## 2020-09-08 ENCOUNTER — LAB VISIT (OUTPATIENT)
Dept: LAB | Facility: HOSPITAL | Age: 25
End: 2020-09-08
Attending: INTERNAL MEDICINE
Payer: MEDICAID

## 2020-09-08 ENCOUNTER — OFFICE VISIT (OUTPATIENT)
Dept: HEMATOLOGY/ONCOLOGY | Facility: CLINIC | Age: 25
End: 2020-09-08
Payer: MEDICAID

## 2020-09-08 VITALS
DIASTOLIC BLOOD PRESSURE: 70 MMHG | HEIGHT: 62 IN | HEART RATE: 79 BPM | BODY MASS INDEX: 44.55 KG/M2 | WEIGHT: 242.06 LBS | OXYGEN SATURATION: 100 % | SYSTOLIC BLOOD PRESSURE: 131 MMHG | RESPIRATION RATE: 16 BRPM | TEMPERATURE: 98 F

## 2020-09-08 DIAGNOSIS — D69.3 CHRONIC ITP (IDIOPATHIC THROMBOCYTOPENIA): ICD-10-CM

## 2020-09-08 DIAGNOSIS — D50.9 MICROCYTIC ANEMIA: ICD-10-CM

## 2020-09-08 DIAGNOSIS — D75.839 THROMBOCYTOSIS: Primary | ICD-10-CM

## 2020-09-08 DIAGNOSIS — D69.3 ACUTE ITP: ICD-10-CM

## 2020-09-08 LAB
ACANTHOCYTES BLD QL SMEAR: PRESENT
ANISOCYTOSIS BLD QL SMEAR: SLIGHT
BASOPHILS # BLD AUTO: 0.17 K/UL (ref 0–0.2)
BASOPHILS NFR BLD: 1.2 % (ref 0–1.9)
BURR CELLS BLD QL SMEAR: ABNORMAL
DACRYOCYTES BLD QL SMEAR: ABNORMAL
DIFFERENTIAL METHOD: ABNORMAL
EOSINOPHIL # BLD AUTO: 0.2 K/UL (ref 0–0.5)
EOSINOPHIL NFR BLD: 1.2 % (ref 0–8)
ERYTHROCYTE [DISTWIDTH] IN BLOOD BY AUTOMATED COUNT: 21.1 % (ref 11.5–14.5)
HCT VFR BLD AUTO: 36.2 % (ref 37–48.5)
HGB BLD-MCNC: 11.2 G/DL (ref 12–16)
HYPOCHROMIA BLD QL SMEAR: ABNORMAL
IMM GRANULOCYTES # BLD AUTO: 0.09 K/UL (ref 0–0.04)
IMM GRANULOCYTES NFR BLD AUTO: 0.6 % (ref 0–0.5)
LYMPHOCYTES # BLD AUTO: 3.2 K/UL (ref 1–4.8)
LYMPHOCYTES NFR BLD: 21.8 % (ref 18–48)
MCH RBC QN AUTO: 24.9 PG (ref 27–31)
MCHC RBC AUTO-ENTMCNC: 30.9 G/DL (ref 32–36)
MCV RBC AUTO: 80 FL (ref 82–98)
MONOCYTES # BLD AUTO: 1.3 K/UL (ref 0.3–1)
MONOCYTES NFR BLD: 9.2 % (ref 4–15)
NEUTROPHILS # BLD AUTO: 9.6 K/UL (ref 1.8–7.7)
NEUTROPHILS NFR BLD: 66 % (ref 38–73)
NRBC BLD-RTO: 0 /100 WBC
OVALOCYTES BLD QL SMEAR: ABNORMAL
PLATELET # BLD AUTO: 713 K/UL (ref 150–350)
PLATELET BLD QL SMEAR: ABNORMAL
PMV BLD AUTO: 11.2 FL (ref 9.2–12.9)
POIKILOCYTOSIS BLD QL SMEAR: SLIGHT
RBC # BLD AUTO: 4.5 M/UL (ref 4–5.4)
SPHEROCYTES BLD QL SMEAR: ABNORMAL
STOMATOCYTES BLD QL SMEAR: PRESENT
TARGETS BLD QL SMEAR: ABNORMAL
WBC # BLD AUTO: 14.55 K/UL (ref 3.9–12.7)

## 2020-09-08 PROCEDURE — 99213 PR OFFICE/OUTPT VISIT, EST, LEVL III, 20-29 MIN: ICD-10-PCS | Mod: S$PBB,,, | Performed by: INTERNAL MEDICINE

## 2020-09-08 PROCEDURE — 99213 OFFICE O/P EST LOW 20 MIN: CPT | Mod: S$PBB,,, | Performed by: INTERNAL MEDICINE

## 2020-09-08 PROCEDURE — 99999 PR PBB SHADOW E&M-EST. PATIENT-LVL III: ICD-10-PCS | Mod: PBBFAC,,, | Performed by: INTERNAL MEDICINE

## 2020-09-08 PROCEDURE — 36415 COLL VENOUS BLD VENIPUNCTURE: CPT

## 2020-09-08 PROCEDURE — 99999 PR PBB SHADOW E&M-EST. PATIENT-LVL III: CPT | Mod: PBBFAC,,, | Performed by: INTERNAL MEDICINE

## 2020-09-08 PROCEDURE — 99213 OFFICE O/P EST LOW 20 MIN: CPT | Mod: PBBFAC | Performed by: INTERNAL MEDICINE

## 2020-09-08 PROCEDURE — 85025 COMPLETE CBC W/AUTO DIFF WBC: CPT

## 2020-09-08 NOTE — PROGRESS NOTES
Subjective:      DATE OF VISIT: 9/8/2020   ?     Patient ID:?Mari Miller is a 24 y.o. female.?? MR#: [id number]   ?  ?   CHIEF COMPLAINT:  Follow-up     DIAGNOSIS: ITP, iron deficiency anemia  ?   CURRENT TREATMENT: N-plate, iv iron prn    PAST TREATMENT:  Steroids, splenectomy 11/2018, eltrombopag (Promacta) and prednisone taper, IVIG x 2 10/2/19 and 10/3/19     HPI    Ms. Rasmussen has been doing well since last visit a week ago when she received N-plate 4mcg/kg.  She denies bleeding, bruising, rash or petechial lesion.  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    Review of Systems    ?   A comprehensive 14-point review of systems was reviewed with patient and was negative other than as specified above.   ?     Objective:      Physical Exam      Wt Readings from Last 3 Encounters:   09/08/20 109.8 kg (242 lb 1 oz)   09/01/20 110.4 kg (243 lb 6.2 oz)   08/25/20 110.2 kg (242 lb 15.2 oz)     Temp Readings from Last 3 Encounters:   09/08/20 98.1 °F (36.7 °C)   09/01/20 97.7 °F (36.5 °C)   09/01/20 97.1 °F (36.2 °C) (Oral)     BP Readings from Last 3 Encounters:   09/08/20 131/70   09/01/20 (!) 139/90   09/01/20 132/83     Pulse Readings from Last 3 Encounters:   09/08/20 79   09/01/20 80   09/01/20 86       ECOG:?  0   General appearance: Generally well appearing, in no acute distress.   Head, eyes, ears, nose, and throat:  Moist mucus membranes, oropharynx clear   Pulmonary:  Clear to auscultation bilaterally  Cardiovascular:  Regular rate and rhythm no murmurs rubs or gallops.  Abdomen: nondistended.   Extremities:  No edema.  Neurologic: Alert and oriented. Grossly normal strength, coordination, and gait.   Skin:  No ecchymoses or petechial lesion.   Psychiatric:  Normal mood and affect  ?   Laboratory:  ?   Platelets   Date Value Ref Range Status   09/08/2020 713 (H) 150 - 350 K/uL Final     Comment:     Results confirmed, test repeated   09/01/2020  185 150 - 350 K/uL Final   08/25/2020 310 150 - 350 K/uL Final     Comment:     Results confirmed, test repeated   08/21/2020 656 (H) 150 - 350 K/uL Final   08/11/2020 178 150 - 350 K/uL Final   08/07/2020 1,289 (HH) 150 - 350 K/uL Final     Comment:     PLT. critical result(s) called and verbal readback obtained from   Nisha Oneal by [facility] 08/07/2020 09:36     07/31/2020 996 (H) 150 - 350 K/uL Final   07/27/2020 7 (LL) 150 - 350 K/uL Final     Comment:     Plt critical result(s) called and verbal readback obtained from Gelacio Nielsen  by [facility] 07/27/2020 13:40     07/24/2020 1 (LL) 150 - 350 K/uL Final     Comment:     plt critical result(s) called and verbal readback obtained from   nisha garcia by [facility] 07/24/2020 12:01     07/17/2020 415 (H) 150 - 350 K/uL Final         Lab Visit on 09/08/2020   Component Date Value Ref Range Status    WBC 09/08/2020 14.55* 3.90 - 12.70 K/uL Final    RBC 09/08/2020 4.50  4.00 - 5.40 M/uL Final    Hemoglobin 09/08/2020 11.2* 12.0 - 16.0 g/dL Final    Hematocrit 09/08/2020 36.2* 37.0 - 48.5 % Final    Mean Corpuscular Volume 09/08/2020 80* 82 - 98 fL Final    Mean Corpuscular Hemoglobin 09/08/2020 24.9* 27.0 - 31.0 pg Final    Mean Corpuscular Hemoglobin Conc 09/08/2020 30.9* 32.0 - 36.0 g/dL Final    RDW 09/08/2020 21.1* 11.5 - 14.5 % Final    Platelets 09/08/2020 713* 150 - 350 K/uL Final    MPV 09/08/2020 11.2  9.2 - 12.9 fL Final    Immature Granulocytes 09/08/2020 0.6* 0.0 - 0.5 % Final    Gran # (ANC) 09/08/2020 9.6* 1.8 - 7.7 K/uL Final    Immature Grans (Abs) 09/08/2020 0.09* 0.00 - 0.04 K/uL Final    Lymph # 09/08/2020 3.2  1.0 - 4.8 K/uL Final    Mono # 09/08/2020 1.3* 0.3 - 1.0 K/uL Final    Eos # 09/08/2020 0.2  0.0 - 0.5 K/uL Final    Baso # 09/08/2020 0.17  0.00 - 0.20 K/uL Final    nRBC 09/08/2020 0  0 /100 WBC Final    Gran% 09/08/2020 66.0  38.0 - 73.0 % Final    Lymph% 09/08/2020 21.8  18.0 - 48.0 % Final    Mono% 09/08/2020 9.2  4.0 - 15.0 %  Final    Eosinophil% 09/08/2020 1.2  0.0 - 8.0 % Final    Basophil% 09/08/2020 1.2  0.0 - 1.9 % Final    Platelet Estimate 09/08/2020 Appears normal   Final    Aniso 09/08/2020 Slight   Final    Poik 09/08/2020 Slight   Final    Hypo 09/08/2020 Moderate   Final    Ovalocytes 09/08/2020 Occasional   Final    Target Cells 09/08/2020 Occasional   Final    Tear Drop Cells 09/08/2020 Occasional   Final    [place] Cells 09/08/2020 Occasional   Final    Stomatocytes 09/08/2020 Present   Final    Spherocytes 09/08/2020 Occasional   Final    Acanthocytes 09/08/2020 Present   Final    Differential Method 09/08/2020 Automated   Final    ?   Assessment/Plan:       1. Thrombocytosis    2. Chronic ITP (idiopathic thrombocytopenia)    3. Microcytic anemia          Plan:     # acute on chronic ITP:  refractory ITP status post steroids, splenectomy in November 2018, and this year monthly hospitalizations with platelet counts as low as 1-5K.  Since December 2018 she has been treated with daily eltrombopag, discontinued on 11/11/2019 due to refractory ITP and possible associated headache.  - she has been treated with Nplate over the last several months and has demonstrated response although sustaining platelet count limited by her inconsistent follow-up in clinic.  Last received Nplate 09/01/2020 at 4mcg.  She had exceptional response to 700s.  Will hold dose today, plan to recheck labs on Friday.  With re-dosing will plan on 3mcg/kg dose since she appears to have exceptional responses with higher dose.      # iron deficiency anemia:   Iron deficiency as evidence by bone marrow biopsy as well.  7/2020 completed iv feraheme; mild improvement in stable hemoglobin 11.8.  Continue to monitor.    # leukocytosis:  Resolved on labs today WBC 10, likely due to no recent steroid need.  Continue to monitor.    Follow-Up:   No N-plate today.  Repeat and possible N-plate (recommended dose 3 micrograms/kilogram  zakia)

## 2020-09-18 ENCOUNTER — OFFICE VISIT (OUTPATIENT)
Dept: HEMATOLOGY/ONCOLOGY | Facility: CLINIC | Age: 25
End: 2020-09-18
Payer: MEDICAID

## 2020-09-18 ENCOUNTER — TELEPHONE (OUTPATIENT)
Dept: HEMATOLOGY/ONCOLOGY | Facility: CLINIC | Age: 25
End: 2020-09-18

## 2020-09-18 ENCOUNTER — INFUSION (OUTPATIENT)
Dept: INFUSION THERAPY | Facility: HOSPITAL | Age: 25
End: 2020-09-18
Attending: INTERNAL MEDICINE
Payer: MEDICAID

## 2020-09-18 VITALS
TEMPERATURE: 97 F | HEIGHT: 62 IN | OXYGEN SATURATION: 99 % | WEIGHT: 240.75 LBS | BODY MASS INDEX: 44.3 KG/M2 | DIASTOLIC BLOOD PRESSURE: 70 MMHG | HEART RATE: 84 BPM | SYSTOLIC BLOOD PRESSURE: 135 MMHG

## 2020-09-18 VITALS
BODY MASS INDEX: 44.3 KG/M2 | WEIGHT: 240.75 LBS | OXYGEN SATURATION: 99 % | HEIGHT: 62 IN | DIASTOLIC BLOOD PRESSURE: 97 MMHG | RESPIRATION RATE: 18 BRPM | TEMPERATURE: 98 F | SYSTOLIC BLOOD PRESSURE: 141 MMHG | HEART RATE: 77 BPM

## 2020-09-18 DIAGNOSIS — D69.3 CHRONIC ITP (IDIOPATHIC THROMBOCYTOPENIA): ICD-10-CM

## 2020-09-18 DIAGNOSIS — D50.0 IRON DEFICIENCY ANEMIA DUE TO CHRONIC BLOOD LOSS: ICD-10-CM

## 2020-09-18 DIAGNOSIS — D69.3 ACUTE ITP: Primary | ICD-10-CM

## 2020-09-18 DIAGNOSIS — Z90.81 H/O SPLENECTOMY: ICD-10-CM

## 2020-09-18 PROCEDURE — 96372 THER/PROPH/DIAG INJ SC/IM: CPT

## 2020-09-18 PROCEDURE — 99214 OFFICE O/P EST MOD 30 MIN: CPT | Mod: S$PBB,,, | Performed by: INTERNAL MEDICINE

## 2020-09-18 PROCEDURE — 99999 PR PBB SHADOW E&M-EST. PATIENT-LVL III: ICD-10-PCS | Mod: PBBFAC,,, | Performed by: INTERNAL MEDICINE

## 2020-09-18 PROCEDURE — 63600175 PHARM REV CODE 636 W HCPCS: Mod: JG | Performed by: INTERNAL MEDICINE

## 2020-09-18 PROCEDURE — 99999 PR PBB SHADOW E&M-EST. PATIENT-LVL III: CPT | Mod: PBBFAC,,, | Performed by: INTERNAL MEDICINE

## 2020-09-18 PROCEDURE — 99214 PR OFFICE/OUTPT VISIT, EST, LEVL IV, 30-39 MIN: ICD-10-PCS | Mod: S$PBB,,, | Performed by: INTERNAL MEDICINE

## 2020-09-18 PROCEDURE — 99213 OFFICE O/P EST LOW 20 MIN: CPT | Mod: PBBFAC,25 | Performed by: INTERNAL MEDICINE

## 2020-09-18 RX ADMIN — ROMIPLOSTIM 315 MCG: 500 INJECTION, POWDER, LYOPHILIZED, FOR SOLUTION SUBCUTANEOUS at 08:09

## 2020-09-18 NOTE — DISCHARGE INSTRUCTIONS
[facility]  [address] [facility]  [address]  [phone] phone     [phone] fax  Hours of Operation: Monday- Friday 8:00am- 5:00pm  After hours phone  [phone]  Hematology / Oncology Physicians on call      VERN [address] Dr[address] Dr., Dr., Dr., NP Sydney Prescott, NP Tyesha Taylor, NP    Please call with any concerns regarding your appointment today.

## 2020-09-18 NOTE — NURSING
0859: Injection given without difficulties.Bandaid applied. Patient instructed to stay in the clinic for 15 minutes. Patient verbalized understanding and will notify nurse with any complaints.

## 2020-09-18 NOTE — PROGRESS NOTES
Subjective:      DATE OF VISIT: 9/20/2020   ?     Patient ID:?Mari Miller is a 24 y.o. female.?? MR#: [id number]   ?  ?   CHIEF COMPLAINT:  Follow-up     DIAGNOSIS: ITP, iron deficiency anemia  ?   CURRENT TREATMENT: N-plate, iv iron prn    PAST TREATMENT:  Steroids, splenectomy 11/2018, eltrombopag (Promacta) and prednisone taper, IVIG x 2 10/2/19 and 10/3/19     HPI    Ms. Rasmussen has last receive endplate 09/01/2020.  She continues to be without evidence of petechiae or bleeding.      Review of Systems    ?   A comprehensive 14-point review of systems was reviewed with patient and was negative other than as specified above.   ?     Objective:      Physical Exam      Wt Readings from Last 3 Encounters:   09/18/20 109.2 kg (240 lb 11.9 oz)   09/18/20 109.2 kg (240 lb 11.9 oz)   09/08/20 109.8 kg (242 lb 1 oz)     Temp Readings from Last 3 Encounters:   09/18/20 97.9 °F (36.6 °C)   09/18/20 96.7 °F (35.9 °C) (Oral)   09/08/20 98.1 °F (36.7 °C)     BP Readings from Last 3 Encounters:   09/18/20 (!) 141/97   09/18/20 135/70   09/08/20 131/70     Pulse Readings from Last 3 Encounters:   09/18/20 77   09/18/20 84   09/08/20 79       ECOG:?  0   General appearance: Generally well appearing, in no acute distress.   Head, eyes, ears, nose, and throat:  Moist mucus membranes, oropharynx clear   Pulmonary:  Clear to auscultation bilaterally  Cardiovascular:  Regular rate and rhythm no murmurs rubs or gallops.  Abdomen: nondistended.   Extremities:  No edema.  Neurologic: Alert and oriented. Grossly normal strength, coordination, and gait.   Skin:  No ecchymoses or petechial lesion.   Psychiatric:  Normal mood and affect  ?   Laboratory:  ?   Platelets   Date Value Ref Range Status   09/18/2020 9 (LL) 150 - 350 K/uL Final     Comment:     PLT   critical result(s) called and verbal readback obtained from   JACKELYN BOWERS RN by ERLINDA 09/18/2020 08:22     09/08/2020 713 (H) 150 - 350 K/uL Final     Comment:      Results confirmed, test repeated   09/01/2020 185 150 - 350 K/uL Final   08/25/2020 310 150 - 350 K/uL Final     Comment:     Results confirmed, test repeated   08/21/2020 656 (H) 150 - 350 K/uL Final   08/11/2020 178 150 - 350 K/uL Final   08/07/2020 1,289 (HH) 150 - 350 K/uL Final     Comment:     PLT. critical result(s) called and verbal readback obtained from   iNsha Oneal by [facility] 08/07/2020 09:36     07/31/2020 996 (H) 150 - 350 K/uL Final   07/27/2020 7 (LL) 150 - 350 K/uL Final     Comment:     Plt critical result(s) called and verbal readback obtained from Gelacio Nielsen  by [facility] 07/27/2020 13:40     07/24/2020 1 (LL) 150 - 350 K/uL Final     Comment:     plt critical result(s) called and verbal readback obtained from   nisha garcia by [facility] 07/24/2020 12:01           Lab Visit on 09/18/2020   Component Date Value Ref Range Status    WBC 09/18/2020 12.42  3.90 - 12.70 K/uL Final    RBC 09/18/2020 4.51  4.00 - 5.40 M/uL Final    Hemoglobin 09/18/2020 11.5* 12.0 - 16.0 g/dL Final    Hematocrit 09/18/2020 36.5* 37.0 - 48.5 % Final    Mean Corpuscular Volume 09/18/2020 81* 82 - 98 fL Final    Mean Corpuscular Hemoglobin 09/18/2020 25.5* 27.0 - 31.0 pg Final    Mean Corpuscular Hemoglobin Conc 09/18/2020 31.5* 32.0 - 36.0 g/dL Final    RDW 09/18/2020 18.0* 11.5 - 14.5 % Final    Platelets 09/18/2020 9* 150 - 350 K/uL Final    MPV 09/18/2020 SEE COMMENT  9.2 - 12.9 fL Final    Immature Granulocytes 09/18/2020 0.2  0.0 - 0.5 % Final    Gran # (ANC) 09/18/2020 6.7  1.8 - 7.7 K/uL Final    Immature Grans (Abs) 09/18/2020 0.03  0.00 - 0.04 K/uL Final    Lymph # 09/18/2020 4.1  1.0 - 4.8 K/uL Final    Mono # 09/18/2020 1.3* 0.3 - 1.0 K/uL Final    Eos # 09/18/2020 0.1  0.0 - 0.5 K/uL Final    Baso # 09/18/2020 0.13  0.00 - 0.20 K/uL Final    nRBC 09/18/2020 0  0 /100 WBC Final    Gran% 09/18/2020 54.3  38.0 - 73.0 % Final    Lymph% 09/18/2020 33.3  18.0 - 48.0 % Final    Mono% 09/18/2020 10.6  4.0 -  15.0 % Final    Eosinophil% 09/18/2020 0.6  0.0 - 8.0 % Final    Basophil% 09/18/2020 1.0  0.0 - 1.9 % Final    Platelet Estimate 09/18/2020 Appears normal   Final    Aniso 09/18/2020 Slight   Final    Poik 09/18/2020 Slight   Final    Hypo 09/18/2020 Occasional   Final    Ovalocytes 09/18/2020 Occasional   Final    Target Cells 09/18/2020 Occasional   Final    Tear Drop Cells 09/18/2020 Occasional   Final    Amelia Cells 09/18/2020 Occasional   Final    Stomatocytes 09/18/2020 Present   Final    Spherocytes 09/18/2020 Occasional   Final    Acanthocytes 09/18/2020 Present   Final    Schistocytes 09/18/2020 Present   Final    Differential Method 09/18/2020 Automated   Final    ?   Assessment/Plan:       1. Acute ITP    2. Chronic ITP (idiopathic thrombocytopenia)    3. H/O splenectomy    4. Iron deficiency anemia due to chronic blood loss          Plan:     # acute on chronic ITP:  refractory ITP status post steroids, splenectomy in November 2018, and this year monthly hospitalizations with platelet counts as low as 1-5K.  Since December 2018 she has been treated with daily eltrombopag, discontinued on 11/11/2019 due to refractory ITP and possible associated headache.  - she has been treated with Nplate over the last several months and has demonstrated response although sustaining platelet count limited by her inconsistent follow-up in clinic.  Last received Nplate 09/01/2020 at 4mcg.  She had exceptional response to 700s with downtrending to 9 K today, not yet demonstrating any evidence of petechiae or bleeding. With re-dose at 3mcg/kg dose since she appears to have exceptional responses with higher dose.  - discussed importance of seeking emergent medical attention if concerning bleeding develops    # iron deficiency anemia:   Iron deficiency as evidence by bone marrow biopsy as well.  7/2020 completed iv feraheme; mild improvement in stable hemoglobin 11.8.  Continue to monitor.    # leukocytosis:   Resolved on labs today WBC 12.     Follow-Up:   3mcg/kg N-plate  Repeat and possible N-plate (recommended dose 3 micrograms/kilogram) in 1 week

## 2020-09-25 ENCOUNTER — DOCUMENTATION ONLY (OUTPATIENT)
Dept: HEMATOLOGY/ONCOLOGY | Facility: CLINIC | Age: 25
End: 2020-09-25

## 2020-09-25 NOTE — PROGRESS NOTES
Late note pt was called at 9:00 am message left.   Hematology Nurse tried to contact pt regarding a missed appointment in the Department today, nurse attempted calling  phones numbers listed in epic, no answer, unable to leave message,   unsuccessful contact # 1

## 2020-10-14 ENCOUNTER — OFFICE VISIT (OUTPATIENT)
Dept: HEMATOLOGY/ONCOLOGY | Facility: CLINIC | Age: 25
End: 2020-10-14
Payer: MEDICAID

## 2020-10-14 ENCOUNTER — INFUSION (OUTPATIENT)
Dept: INFUSION THERAPY | Facility: HOSPITAL | Age: 25
End: 2020-10-14
Attending: INTERNAL MEDICINE
Payer: MEDICAID

## 2020-10-14 VITALS
HEART RATE: 108 BPM | BODY MASS INDEX: 43.16 KG/M2 | DIASTOLIC BLOOD PRESSURE: 79 MMHG | RESPIRATION RATE: 17 BRPM | HEIGHT: 62 IN | WEIGHT: 234.56 LBS | TEMPERATURE: 99 F | SYSTOLIC BLOOD PRESSURE: 129 MMHG | OXYGEN SATURATION: 98 %

## 2020-10-14 VITALS
TEMPERATURE: 99 F | DIASTOLIC BLOOD PRESSURE: 79 MMHG | RESPIRATION RATE: 18 BRPM | SYSTOLIC BLOOD PRESSURE: 129 MMHG | HEART RATE: 108 BPM | OXYGEN SATURATION: 98 %

## 2020-10-14 DIAGNOSIS — D69.6 THROMBOCYTOPENIA: ICD-10-CM

## 2020-10-14 DIAGNOSIS — D69.3 CHRONIC ITP (IDIOPATHIC THROMBOCYTOPENIA): Primary | ICD-10-CM

## 2020-10-14 DIAGNOSIS — Z90.81 H/O SPLENECTOMY: ICD-10-CM

## 2020-10-14 DIAGNOSIS — D69.3 ACUTE ITP: Primary | ICD-10-CM

## 2020-10-14 DIAGNOSIS — R03.0 ELEVATED BP WITHOUT DIAGNOSIS OF HYPERTENSION: ICD-10-CM

## 2020-10-14 DIAGNOSIS — R00.0 SINUS TACHYCARDIA: ICD-10-CM

## 2020-10-14 DIAGNOSIS — D50.0 IRON DEFICIENCY ANEMIA DUE TO CHRONIC BLOOD LOSS: ICD-10-CM

## 2020-10-14 PROCEDURE — 96372 THER/PROPH/DIAG INJ SC/IM: CPT

## 2020-10-14 PROCEDURE — 99213 OFFICE O/P EST LOW 20 MIN: CPT | Mod: PBBFAC,25 | Performed by: NURSE PRACTITIONER

## 2020-10-14 PROCEDURE — 99999 PR PBB SHADOW E&M-EST. PATIENT-LVL III: CPT | Mod: PBBFAC,,, | Performed by: NURSE PRACTITIONER

## 2020-10-14 PROCEDURE — 99215 OFFICE O/P EST HI 40 MIN: CPT | Mod: S$PBB,,, | Performed by: NURSE PRACTITIONER

## 2020-10-14 PROCEDURE — 99215 PR OFFICE/OUTPT VISIT, EST, LEVL V, 40-54 MIN: ICD-10-PCS | Mod: S$PBB,,, | Performed by: NURSE PRACTITIONER

## 2020-10-14 PROCEDURE — 99999 PR PBB SHADOW E&M-EST. PATIENT-LVL III: ICD-10-PCS | Mod: PBBFAC,,, | Performed by: NURSE PRACTITIONER

## 2020-10-14 PROCEDURE — 63600175 PHARM REV CODE 636 W HCPCS: Mod: JW,JG | Performed by: NURSE PRACTITIONER

## 2020-10-14 RX ORDER — DEXAMETHASONE 4 MG/1
40 TABLET ORAL DAILY
Qty: 50 TABLET | Refills: 0 | Status: SHIPPED | OUTPATIENT
Start: 2020-10-14 | End: 2020-10-19

## 2020-10-14 RX ADMIN — ROMIPLOSTIM 315 MCG: 500 INJECTION, POWDER, LYOPHILIZED, FOR SOLUTION SUBCUTANEOUS at 03:10

## 2020-10-14 NOTE — PATIENT INSTRUCTIONS
COVID-19 and Cancer Patients    What is COVID-19?  COVID-19 is a new type of virus that can cause mild to severe infections in the lungs. Like other viruses, it can lead to serious infections for people with weakened immune systems. COVID-19 may cause more severe infections than other viruses. We do not have a vaccine to help control its spread, but experts are working to make a vaccine.    How does COVID-19 spread?  The virus can spread easily, just like the common cold or flu. It spreads when an infected person coughs or sneezes droplets that can get into the eyes, nose, or mouth of people nearby. Droplets also land on surfaces that people touch before touching their own eyes, nose, or mouth.    How can I protect myself?  These are some of the best ways to protect yourself and others from the virus:  - Wash your hands often with soap and water for at least 20 seconds.  - Use hand  with 60% or more alcohol until you can wash your hands with soap and water.  - Avoid touching your eyes, nose, and mouth without washing your hands first.  - Clean and disinfect surfaces often. Regular household wipes and sprays will kill the virus. Be sure to  clean places that people touch a lot, such as door handles, phones, keyboards, and light switches.  - Avoid handshakes, hugging, and standing or sitting close to people who are coughing or sneezing.  - Be as healthy as you can. Get plenty of sleep, eat healthy, exercise, and manage your stress.  If you are sick, follow these steps:  - Stay home.  - Cover your nose and mouth when you cough or sneeze. If you use a tissue, put it in the garbage right  away. If you do not have a tissue, cough or sneeze into your elbow crease.  - Call before going to your medical appointments. Let them know about recent travel or if you have had  contact with a person with COVID-19.          As of 3/12/2020  Should I wear a mask?  Experts don't believe that wearing a mask is helpful for the  general public, unless there is concern you have been exposed and may not have symptoms. Some people should use certain types of masks because of their own health or the type of work they do. Talk to your doctor or nurse to see if you would benefit from wearing a mask. If you arrive at the hospital with respiratory symptoms, please ask for a mask.    What if I care for or live with a cancer patient?  If you are caring for or living with someone with cancer, do your best to keep them from getting the virus.  Follow the steps to protect yourself listed on this sheet.  If you become sick yourself, call your doctor to see what more you should do to protect your loved one.    What about people visiting?   If you are sick or have been exposed to COVID-19, we ask that you not come to [facility].    If patients have symptoms, call the [facility] Covid-19 Line ([phone])    We ask that you find someone who isn't sick to join your loved one at their appointments.  To protect all patients, we may limit the number of people who can visit someone staying in the hospital.  Please check with your care team.    How will [facility] protect me from getting COVID-19?  Our hospital and clinics are taking steps to keep infected patients separate from those who may be at risk. At every appointment, your care team will ask questions about overall health and recent travel. We may ask some patients to wait in a separate room or to reschedule until they are feeling better if they have symptoms.  We are also taking extra steps to clean and disinfect surfaces throughout our hospital and clinics.     Will you still care for me if I get sick?  Yes. Your care is our top priority. Although we may change some ways we care for you, we will never put your care or health at risk.            CALL BEFORE YOU ACT   Dial 211 or Text keyword LACOVID to [phone] for general questions and information.   If patients have  symptoms, call the [facility] Covid-19 Line ([phone])               [facility] ([email or web address])    [email or web address]

## 2020-10-14 NOTE — PROGRESS NOTES
Subjective:       Patient ID: Mari Miller is a 24 y.o. female.    Chief Complaint: Abnormal Lab    CHIEF COMPLAINT:  Follow-up      DIAGNOSIS: ITP    CURRENT TREATMENT: N-plate prn     PAST TREATMENT:  Steroids, splenectomy 11/2018, eltrombopag (Promacta) and prednisone taper, IVIG x 2 10/2/19 and 10/3/19     Today's visit:  Patient denies bleeding, pain, blood in stool, nose bleed.     Review of Systems   Constitutional: Positive for fatigue. Negative for activity change, appetite change, chills, diaphoresis, fever and unexpected weight change.   HENT: Negative for congestion, hearing loss, nosebleeds, postnasal drip, sore throat and trouble swallowing.    Eyes: Negative for discharge and visual disturbance.   Respiratory: Negative for cough, chest tightness and shortness of breath.    Cardiovascular: Negative for chest pain and palpitations.   Gastrointestinal: Negative for abdominal distention, abdominal pain, blood in stool, constipation, diarrhea, nausea and vomiting.   Endocrine: Negative for cold intolerance and heat intolerance.   Genitourinary: Negative for difficulty urinating, dyspareunia, flank pain and hematuria.   Musculoskeletal: Negative for arthralgias, back pain and myalgias.   Skin: Negative.    Neurological: Negative for dizziness, weakness, light-headedness and headaches.   Hematological: Negative for adenopathy. Does not bruise/bleed easily.   Psychiatric/Behavioral: Negative for agitation, behavioral problems and confusion. The patient is nervous/anxious.          Objective:     Vitals:    10/14/20 1334   BP: 129/79   Pulse: 108   Resp: 17   Temp: 98.6 °F (37 °C)     Physical Exam  Vitals signs reviewed.   Constitutional:       General: She is not in acute distress.     Appearance: She is well-developed.   HENT:      Head: Normocephalic and atraumatic.      Right Ear: Hearing and external ear normal.      Left Ear: Hearing and external ear normal.      Nose: No rhinorrhea.       Right Sinus: No maxillary sinus tenderness or frontal sinus tenderness.      Left Sinus: No maxillary sinus tenderness or frontal sinus tenderness.      Mouth/Throat:      Mouth: No oral lesions.      Pharynx: Uvula midline.   Eyes:      General:         Right eye: No discharge.         Left eye: No discharge.      Conjunctiva/sclera: Conjunctivae normal.      Pupils: Pupils are equal, round, and reactive to light.   Neck:      Musculoskeletal: Normal range of motion.      Thyroid: No thyromegaly.      Vascular: No carotid bruit.      Trachea: No tracheal deviation.   Cardiovascular:      Rate and Rhythm: Normal rate and regular rhythm.      Pulses:           Dorsalis pedis pulses are 2+ on the right side and 2+ on the left side.      Heart sounds: Normal heart sounds, S1 normal and S2 normal. No murmur.   Pulmonary:      Effort: Pulmonary effort is normal. No respiratory distress.      Breath sounds: Normal breath sounds.   Abdominal:      General: Bowel sounds are normal. There is distension.      Palpations: Abdomen is soft. There is no mass.      Tenderness: There is no abdominal tenderness.   Musculoskeletal: Normal range of motion.   Lymphadenopathy:      Cervical: No cervical adenopathy.      Upper Body:      Right upper body: No supraclavicular adenopathy.      Left upper body: No supraclavicular adenopathy.   Skin:     General: Skin is warm and dry.      Capillary Refill: Capillary refill takes less than 2 seconds.      Coloration: Skin is not pale.      Findings: No rash.   Neurological:      Mental Status: She is alert and oriented to person, place, and time.      Sensory: No sensory deficit.      Coordination: Coordination normal.      Gait: Gait normal.   Psychiatric:         Mood and Affect: Mood is anxious. Mood is not depressed.         Speech: Speech normal.         Behavior: Behavior normal.         Thought Content: Thought content normal.         Judgment: Judgment normal.         Assessment:        Problem List Items Addressed This Visit        Cardiac/Vascular    Elevated BP without diagnosis of hypertension    Sinus tachycardia       Hematology    Chronic ITP (idiopathic thrombocytopenia) - Primary    Relevant Medications    dexAMETHasone (DECADRON) 4 MG Tab    Other Relevant Orders    CBC auto differential    CBC auto differential    Thrombocytopenia       Oncology    Iron deficiency anemia due to chronic blood loss       GI    H/O splenectomy          Plan:         ITP:  --Critical Lab: platelet 2K  --Give N-Plate 3mcg/kg today  --Dexamethasone 40mg PO x5 days    BAILEY:  --Plan for IV iron repletion per plan    Follow-up:  Return to clinic on Monday with CBC

## 2020-10-21 ENCOUNTER — DOCUMENTATION ONLY (OUTPATIENT)
Dept: HEMATOLOGY/ONCOLOGY | Facility: CLINIC | Age: 25
End: 2020-10-21

## 2020-10-21 NOTE — PROGRESS NOTES
Hematology Nurse tried to contact pt regarding a missed appointment in the Department today, nurse attempted calling  phones numbers listed in epic, message left.  unsuccessful contact # 2

## 2020-10-21 NOTE — PROGRESS NOTES
Hematology Nurse tried to contact pt regarding a missed appointment in the Department today, nurse attempted calling  phones numbers listed in epic, no answer, message left to call office to reschedule her appointment.  unsuccessful contact # 1

## 2020-11-05 ENCOUNTER — TELEPHONE (OUTPATIENT)
Dept: HEMATOLOGY/ONCOLOGY | Facility: CLINIC | Age: 25
End: 2020-11-05

## 2020-11-05 NOTE — TELEPHONE ENCOUNTER
Attempted to reach the patient in regards to her appt with Dr. Calderon on 11/6/20. Patient is actually a patient of Dr. Cota but he does not have any openings, and I don't think patient wants to come to the [facility] location. Patient has scheduled her appts via my chart.

## 2020-11-20 ENCOUNTER — TELEPHONE (OUTPATIENT)
Dept: OBSTETRICS AND GYNECOLOGY | Facility: CLINIC | Age: 25
End: 2020-11-20

## 2020-11-20 NOTE — TELEPHONE ENCOUNTER
email has been sent out for pt to be scheduled correctly for nexplanon removal.         ----- Message from Don Sanches NP sent at 11/20/2020  3:58 PM CST -----  Please change pt's appoint type.  She is coming for nexplanon removal.    Don

## 2020-11-24 ENCOUNTER — PATIENT MESSAGE (OUTPATIENT)
Dept: HEMATOLOGY/ONCOLOGY | Facility: CLINIC | Age: 25
End: 2020-11-24

## 2021-04-29 ENCOUNTER — PATIENT MESSAGE (OUTPATIENT)
Dept: RESEARCH | Facility: HOSPITAL | Age: 26
End: 2021-04-29

## 2021-06-28 NOTE — ED PROVIDER NOTES
12 hr cc   SCRIBE #1 NOTE: I, Campos Willingham, am scribing for, and in the presence of, Hakeem Vieira MD. I have scribed the entire note.       History     Chief Complaint   Patient presents with    Shortness of Breath     patient reports shortness of breath since monday and vaginal bleeding with rash on her arms, dx with ITP     Review of patient's allergies indicates:   Allergen Reactions    Aspirin      Unable to take because of illness    Ibuprofen Other (See Comments)     Cannot take due to Blood Disorder         History of Present Illness     HPI    2019, 9:39 PM  History obtained from the patient      History of Present Illness: Mari Miller is a 23 y.o. female patient with a PMHx of ITP who presents to the Emergency Department for evaluation of SOB which onset gradually 3 days ago. Symptoms are constant and moderate in severity. Pt reports sxs are exacerbated with standing and sitting upright. Associated sxs include light-headedness, bleeding gums, and melena. Patient denies any fever, BLE edema, CP, cough, n/v, and all other sxs at this time. No prior Tx reported. Pt reports that she is currently menstruating. Pt denies any recent travel or long car trips. No further complaints or concerns at this time.         Arrival mode: Personal vehicle    PCP: Ifeanyi Tello NP        Past Medical History:  Past Medical History:   Diagnosis Date    Encounter for blood transfusion     Thrombocytopenia        Past Surgical History:  Past Surgical History:   Procedure Laterality Date     SECTION      x1    XI ROBOTIC SPLENECTOMY N/A 2018    Performed by Yobani Lara MD at [facility] OR         Family History:  History reviewed. No pertinent family history.    Social History:  Social History     Tobacco Use    Smoking status: Never Smoker    Smokeless tobacco: Never Used   Substance and Sexual Activity    Alcohol use: No     Frequency: Never    Drug use: No    Sexual activity: unknown         Review of Systems     Review of Systems   Constitutional: Negative for fever.   HENT: Negative for sore throat.         (+) bleeding gums     Respiratory: Positive for shortness of breath. Negative for cough.    Cardiovascular: Negative for chest pain and leg swelling.   Gastrointestinal: Positive for blood in stool (melena). Negative for nausea and vomiting.   Genitourinary: Negative for dysuria.   Musculoskeletal: Negative for back pain.   Skin: Negative for rash.   Neurological: Positive for light-headedness. Negative for weakness.   Hematological: Does not bruise/bleed easily.   All other systems reviewed and are negative.       Physical Exam     Initial Vitals [08/01/19 1948]   BP Pulse Resp Temp SpO2   129/81 (!) 150 20 98.5 °F (36.9 °C) 100 %      MAP       --          Physical Exam  Nursing Notes and Vital Signs Reviewed.  Constitutional: Patient is in no acute distress. Well-developed and well-nourished.  Head: Atraumatic. Normocephalic.  Eyes: PERRL. EOM intact. Conjunctivae are pale. No scleral icterus.  ENT: Mucous membranes are moist. Oropharynx is clear and symmetric.    Neck: Supple. Full ROM. No lymphadenopathy.  Cardiovascular: Tachycardic. Regular rhythm. No murmurs, rubs, or gallops. Distal pulses are 2+ and symmetric.  Pulmonary/Chest: No respiratory distress. Mild tachypnea. Clear to auscultation bilaterally. No wheezing or rales.  Abdominal: Soft and non-distended.  There is no tenderness.  No rebound, guarding, or rigidity. Good bowel sounds.  Genitourinary: No CVA tenderness  Musculoskeletal: Moves all extremities. No obvious deformities. No edema. No calf tenderness. Capillary refill is 2.5 seconds.  Skin: Warm and dry. Mild pallor. Scattered ecchymosis to the extremities. Scattered petechiae from 2-5 mm diffusely over extremities.  Neurological:  Alert, awake, and appropriate.  Normal speech.  No acute focal neurological deficits are appreciated.  Psychiatric: Normal affect. Good eye  "contact. Appropriate in content.     ED Course   Critical Care  Date/Time: 8/2/2019 2:46 AM  Performed by: Hakeem Vieira MD  Authorized by: Hakeem Vieira MD   Direct patient critical care time: 20 minutes  Additional history critical care time: 10 minutes  Ordering / reviewing critical care time: 10 minutes  Documentation critical care time: 10 minutes  Consulting other physicians critical care time: 10 minutes  Total critical care time (exclusive of procedural time) : 60 minutes  Critical care time was exclusive of separately billable procedures and treating other patients and teaching time.  Critical care was necessary to treat or prevent imminent or life-threatening deterioration of the following conditions: symptomatic anemia, acute ITP.  Critical care was time spent personally by me on the following activities: blood draw for specimens, development of treatment plan with patient or surrogate, discussions with consultants, interpretation of cardiac output measurements, evaluation of patient's response to treatment, examination of patient, obtaining history from patient or surrogate, ordering and performing treatments and interventions, ordering and review of laboratory studies, ordering and review of radiographic studies, pulse oximetry, re-evaluation of patient's condition and review of old charts.        ED Vital Signs:  Vitals:    08/01/19 1948 08/02/19 0230 08/02/19 0407 08/02/19 0408   BP: 129/81 118/70 (!) 152/78    Pulse: (!) 150 (!) 129 (!) 141    Resp: 20 (!) 22 (!) 22    Temp: 98.5 °F (36.9 °C)  98.8 °F (37.1 °C)    TempSrc: Oral  Oral    SpO2: 100% 98% 100%    Weight: 96.6 kg (213 lb)   93 kg (205 lb 0.4 oz)   Height: 5' 2" (1.575 m)       08/02/19 0423 08/02/19 0438 08/02/19 0453 08/02/19 0508   BP: 137/79 132/79 128/78 107/68   Pulse: (!) 134 (!) 134 (!) 137 (!) 154   Resp: (!) 22 (!) 22 20    Temp: 98.8 °F (37.1 °C) 98.8 °F (37.1 °C) 98.7 °F (37.1 °C)    TempSrc: Oral Oral Oral    SpO2: " 100% 100% 100% 100%   Weight:       Height:        08/02/19 0538 08/02/19 0608 08/02/19 0715 08/02/19 0809   BP: 130/60 122/71 130/60    Pulse: (!) 133 (!) 121 (!) 121    Resp:  20 14    Temp: 98.7 °F (37.1 °C) 98.7 °F (37.1 °C) 98.3 °F (36.8 °C)    TempSrc:  Oral Oral    SpO2: 99% 100% 100% 96%   Weight:       Height:           Abnormal Lab Results:  Labs Reviewed   COMPREHENSIVE METABOLIC PANEL - Abnormal; Notable for the following components:       Result Value    CO2 20 (*)     Alkaline Phosphatase 49 (*)     All other components within normal limits   URINALYSIS, REFLEX TO URINE CULTURE - Abnormal; Notable for the following components:    Appearance, UA Cloudy (*)     Specific Gravity, UA >=1.030 (*)     Occult Blood UA 3+ (*)     All other components within normal limits    Narrative:     Preferred Collection Type->Urine, Clean Catch   URINALYSIS MICROSCOPIC - Abnormal; Notable for the following components:    RBC, UA >100 (*)     All other components within normal limits    Narrative:     Preferred Collection Type->Urine, Clean Catch   CBC W/ AUTO DIFFERENTIAL - Abnormal; Notable for the following components:    WBC 37.73 (*)     RBC 2.10 (*)     Hemoglobin 5.0 (*)     Hematocrit 16.4 (*)     Mean Corpuscular Volume 78 (*)     Mean Corpuscular Hemoglobin 23.8 (*)     Mean Corpuscular Hemoglobin Conc 30.5 (*)     RDW 20.3 (*)     Platelets 4 (*)     Gran # (ANC) 30.9 (*)     Mono # 2.4 (*)     Gran% 84.2 (*)     Lymph% 11.4 (*)     Platelet Estimate Decreased (*)     All other components within normal limits    Narrative:     HGB, HCT, PLT critical result(s) called and verbal readback obtained   from nurse Layton Jimenez, 08/02/2019 02:35   HIV 1 / 2 ANTIBODY   APTT   PROTIME-INR   FIBRINOGEN   PREGNANCY TEST, URINE RAPID   TROPONIN I        All Lab Results:  Results for orders placed or performed during the hospital encounter of 08/01/19   HIV 1/2 Ag/Ab (4th Gen)   Result Value Ref Range    HIV 1/2 Ag/Ab  Negative Negative   Comprehensive metabolic panel   Result Value Ref Range    Sodium 137 136 - 145 mmol/L    Potassium 3.8 3.5 - 5.1 mmol/L    Chloride 108 95 - 110 mmol/L    CO2 20 (L) 23 - 29 mmol/L    Glucose 101 70 - 110 mg/dL    BUN, Bld 17 6 - 20 mg/dL    Creatinine 0.6 0.5 - 1.4 mg/dL    Calcium 8.7 8.7 - 10.5 mg/dL    Total Protein 6.4 6.0 - 8.4 g/dL    Albumin 3.7 3.5 - 5.2 g/dL    Total Bilirubin 0.1 0.1 - 1.0 mg/dL    Alkaline Phosphatase 49 (L) 55 - 135 U/L    AST 18 10 - 40 U/L    ALT 17 10 - 44 U/L    Anion Gap 9 8 - 16 mmol/L    eGFR if African American >60 >60 mL/min/1.73 m^2    eGFR if non African American >60 >60 mL/min/1.73 m^2   APTT   Result Value Ref Range    aPTT 22.2 21.0 - 32.0 sec   Protime-INR   Result Value Ref Range    Prothrombin Time 11.9 9.0 - 12.5 sec    INR 1.1 0.8 - 1.2   Fibrinogen   Result Value Ref Range    Fibrinogen 217 182 - 366 mg/dL   Pregnancy, urine rapid   Result Value Ref Range    Preg Test, Ur Negative    Urinalysis, Reflex to Urine Culture Urine, Clean Catch   Result Value Ref Range    Specimen UA Urine, Clean Catch     Color, UA Yellow Yellow, Straw, Tati    Appearance, UA Cloudy (A) Clear    pH, UA 6.0 5.0 - 8.0    Specific Gravity, UA >=1.030 (A) 1.005 - 1.030    Protein, UA Negative Negative    Glucose, UA Negative Negative    Ketones, UA Negative Negative    Bilirubin (UA) Negative Negative    Occult Blood UA 3+ (A) Negative    Nitrite, UA Negative Negative    Urobilinogen, UA Negative <2.0 EU/dL    Leukocytes, UA Negative Negative   Troponin I   Result Value Ref Range    Troponin I 0.007 0.000 - 0.026 ng/mL   Urinalysis Microscopic   Result Value Ref Range    RBC, UA >100 (H) 0 - 4 /hpf    Squam Epithel, UA 1 /hpf    Microscopic Comment SEE COMMENT    CBC auto differential   Result Value Ref Range    WBC 37.73 (H) 3.90 - 12.70 K/uL    RBC 2.10 (L) 4.00 - 5.40 M/uL    Hemoglobin 5.0 (LL) 12.0 - 16.0 g/dL    Hematocrit 16.4 (LL) 37.0 - 48.5 %    Mean Corpuscular  Volume 78 (L) 82 - 98 fL    Mean Corpuscular Hemoglobin 23.8 (L) 27.0 - 31.0 pg    Mean Corpuscular Hemoglobin Conc 30.5 (L) 32.0 - 36.0 g/dL    RDW 20.3 (H) 11.5 - 14.5 %    Platelets 4 (LL) 150 - 350 K/uL    MPV SEE COMMENT 9.2 - 12.9 fL    Gran # (ANC) 30.9 (H) 1.8 - 7.7 K/uL    Lymph # 4.3 1.0 - 4.8 K/uL    Mono # 2.4 (H) 0.3 - 1.0 K/uL    Eos # 0.0 0.0 - 0.5 K/uL    Baso # 0.11 0.00 - 0.20 K/uL    Gran% 84.2 (H) 38.0 - 73.0 %    Lymph% 11.4 (L) 18.0 - 48.0 %    Mono% 6.3 4.0 - 15.0 %    Eosinophil% 0.0 0.0 - 8.0 %    Basophil% 0.3 0.0 - 1.9 %    Platelet Estimate Decreased (A)     Aniso Moderate     Poik Moderate     Poly Moderate     Hypo Moderate     Ovalocytes Occasional     Target Cells Moderate     Stomatocytes Present     Spherocytes Occasional     Acanthocytes Present     Schistocytes Present     Differential Method Automated    CBC auto differential   Result Value Ref Range    WBC 32.30 (H) 3.90 - 12.70 K/uL    RBC 1.99 (L) 4.00 - 5.40 M/uL    Hemoglobin 4.9 (LL) 12.0 - 16.0 g/dL    Hematocrit 15.9 (LL) 37.0 - 48.5 %    Mean Corpuscular Volume 80 (L) 82 - 98 fL    Mean Corpuscular Hemoglobin 24.6 (L) 27.0 - 31.0 pg    Mean Corpuscular Hemoglobin Conc 30.8 (L) 32.0 - 36.0 g/dL    RDW 20.7 (H) 11.5 - 14.5 %    Platelets 12 (LL) 150 - 350 K/uL    MPV SEE COMMENT 9.2 - 12.9 fL   Basic metabolic panel   Result Value Ref Range    Sodium 134 (L) 136 - 145 mmol/L    Potassium 3.6 3.5 - 5.1 mmol/L    Chloride 106 95 - 110 mmol/L    CO2 20 (L) 23 - 29 mmol/L    Glucose 172 (H) 70 - 110 mg/dL    BUN, Bld 14 6 - 20 mg/dL    Creatinine 0.7 0.5 - 1.4 mg/dL    Calcium 8.6 (L) 8.7 - 10.5 mg/dL    Anion Gap 8 8 - 16 mmol/L    eGFR if African American >60 >60 mL/min/1.73 m^2    eGFR if non African American >60 >60 mL/min/1.73 m^2   Magnesium   Result Value Ref Range    Magnesium 1.6 1.6 - 2.6 mg/dL   TSH   Result Value Ref Range    TSH 0.775 0.400 - 4.000 uIU/mL   Type & Screen   Result Value Ref Range    Group & Rh O  POS     Indirect Richard POS          Imaging Results:  Imaging Results          X-Ray Chest PA And Lateral (Final result)  Result time 08/01/19 23:18:19    Final result by Flynn Holguin MD (08/01/19 23:18:19)                 Impression:      1.  Negative for acute process involving the chest.  Negative for interval change.      Electronically signed by: Flynn Holguin MD  Date:    08/01/2019  Time:    23:18             Narrative:    EXAMINATION:  XR CHEST PA AND LATERAL    CLINICAL HISTORY:  Shortness of breath    COMPARISON:  June 19, 2019    FINDINGS:  EKG leads and oxygen tubing overlie the chest.  The lungs are clear. The cardiac silhouette size is normal. The trachea is midline and the mediastinal width is normal. Negative for focal infiltrate, effusion or pneumothorax. Pulmonary vasculature is normal. Negative for osseous abnormalities.                                 The EKG was ordered, reviewed, and independently interpreted by the ED provider.  Interpretation time: 19:53  Rate: 152 BPM  Rhythm: sinus tachycardia  Interpretation: Nonspecific ST and T wave abnormality. No STEMI.           The Emergency Provider reviewed the vital signs and test results, which are outlined above.     ED Discussion     2:50 AM: Discussed pt's case with Dr. Cota (Hem/Onc) who recommends IVIG, dose of home medication 50-mg Promacta, and admission to Hospital Medicine.    3:08 AM: Discussed case with Ruth Yung NP (Hospital Medicine). Dr. Cedeno agrees with current care and management of pt and accepts admission.   Admitting Service: Hospital Medicine  Admitting Physician: Dr. Cedeno  Admit to: Inpatient Med Tele    3:10 AM: Re-evaluated pt. I have discussed test results, shared treatment plan, and the need for admission with patient and family at bedside. Pt and family express understanding at this time and agree with all information. All questions answered. Pt and family have no further questions or concerns at this time.  Pt is ready for admit.          ED Medication(s):  Medications   0.9%  NaCl infusion (for blood administration) (has no administration in time range)   eltrombopag tablet 75 mg (has no administration in time range)   ferrous sulfate EC tablet 325 mg (has no administration in time range)   sodium chloride 0.9% flush 10 mL (has no administration in time range)   ondansetron injection 4 mg (has no administration in time range)   promethazine (PHENERGAN) 6.25 mg in dextrose 5 % 50 mL IVPB (has no administration in time range)   acetaminophen tablet 650 mg (has no administration in time range)   lactated ringers infusion (has no administration in time range)   lactated ringers bolus 1,000 mL (0 mLs Intravenous Stopped 8/2/19 0228)   predniSONE tablet 60 mg (60 mg Oral Given 8/2/19 0227)   Immune Globulin G (IGG)-PRO-IGA 10 % injection (Privigen) 10 % injection 70 g ( Intravenous Rate/Dose Change 8/2/19 0600)   lactated ringers bolus 1,000 mL (1,000 mLs Intravenous New Bag 8/2/19 0534)       Current Discharge Medication List                    Medical Decision Making:   Clinical Tests:   Lab Tests: Ordered and Reviewed  Radiological Study: Ordered and Reviewed  Medical Tests: Ordered and Reviewed             Scribe Attestation:   Scribe #1: I performed the above scribed service and the documentation accurately describes the services I performed. I attest to the accuracy of the note.     Attending:   Physician Attestation Statement for Scribe #1: I, Hakeem Vieira MD, personally performed the services described in this documentation, as scribed by Campos Willingham, in my presence, and it is both accurate and complete.           Clinical Impression       ICD-10-CM ICD-9-CM   1. Acute ITP D69.3 287.31   2. Tachycardia R00.0 785.0   3. SOB (shortness of breath) R06.02 786.05   4. Anemia, unspecified type D64.9 285.9   5. Thrombocytopenia D69.6 287.5       Disposition:   Disposition: Admitted  Condition: Serious          Hakeem Vieira MD  08/02/19 0856

## 2021-09-29 NOTE — PATIENT INSTRUCTIONS
- N-plate 3mcg/kg today  - RV on Mon with CBC  - RV in 1 week for N-plate 4mcg/kg planned and lab  
Patient

## 2022-03-08 ENCOUNTER — PATIENT MESSAGE (OUTPATIENT)
Dept: PHARMACY | Facility: CLINIC | Age: 27
End: 2022-03-08
Payer: MEDICAID

## 2022-05-20 NOTE — SUBJECTIVE & OBJECTIVE
Interval History:  Mild shortness of breath and fatigue.    Review of Systems   Constitutional: Positive for fatigue. Negative for chills and fever.   HENT: Negative for congestion and sore throat.    Eyes: Negative for visual disturbance.   Respiratory: Positive for shortness of breath. Negative for cough and wheezing.    Cardiovascular: Negative for chest pain, palpitations and leg swelling.   Gastrointestinal: Negative for abdominal pain, blood in stool, constipation, diarrhea, nausea and vomiting.   Genitourinary: Negative for dysuria and hematuria.   Musculoskeletal: Negative for arthralgias and back pain.   Skin: Negative for rash and wound.   Neurological: Negative for dizziness, weakness, light-headedness and numbness.   Hematological: Negative for adenopathy.     Objective:     Vital Signs (Most Recent):  Temp: 98.3 °F (36.8 °C) (08/21/18 1938)  Pulse: 103 (08/21/18 1938)  Resp: 18 (08/21/18 1938)  BP: (!) 125/59 (08/21/18 1938)  SpO2: 100 % (08/21/18 1938) Vital Signs (24h Range):  Temp:  [98.1 °F (36.7 °C)-98.9 °F (37.2 °C)] 98.3 °F (36.8 °C)  Pulse:  [100-120] 103  Resp:  [16-20] 18  SpO2:  [99 %-100 %] 100 %  BP: (117-142)/(58-93) 125/59     Weight: 101.2 kg (223 lb 1.7 oz)  Body mass index is 40.81 kg/m².    Intake/Output Summary (Last 24 hours) at 8/21/2018 2007  Last data filed at 8/21/2018 1800  Gross per 24 hour   Intake 1922.84 ml   Output --   Net 1922.84 ml      Physical Exam   Constitutional: She is oriented to person, place, and time. She appears well-developed and well-nourished. No distress.   HENT:   Head: Normocephalic and atraumatic.   Mouth/Throat: Oropharynx is clear and moist.   Eyes: Conjunctivae and EOM are normal. Pupils are equal, round, and reactive to light.   Neck: Neck supple. No JVD present. No thyromegaly present.   Cardiovascular: Normal rate and regular rhythm. Exam reveals no gallop and no friction rub.   No murmur heard.  Pulmonary/Chest: Effort normal and breath sounds  Patient picked up by EMS going to [facility]. All belongings given.  Electronically signed by Eric Dobbs RN on 5/19/2022 at 10:55 PM normal. She has no wheezes. She has no rales.   Abdominal: Soft. Bowel sounds are normal. She exhibits no distension. There is no tenderness. There is no rebound and no guarding.   Musculoskeletal: Normal range of motion. She exhibits no edema or deformity.   Lymphadenopathy:     She has no cervical adenopathy.   Neurological: She is alert and oriented to person, place, and time. She has normal reflexes.   Skin: Skin is warm and dry. No rash noted.   Scattered pale lower extremity ecchymosis.   Psychiatric: She has a normal mood and affect. Her behavior is normal. Judgment and thought content normal.   Nursing note and vitals reviewed.      Significant Labs: All pertinent labs within the past 24 hours have been reviewed.    Significant Imaging: I have reviewed all pertinent imaging results/findings within the past 24 hours.

## 2025-03-08 NOTE — PROGRESS NOTES
Hematology Nurse tried to contact pt regarding a missed appointment in the Department today, nurse attempted calling  phones numbers listed in epic, no answer, message was left to call office   unsuccessful contact  #  [phone]   No

## (undated) DEVICE — SEE MEDLINE ITEM 157027

## (undated) DEVICE — EVACUATOR KIT SMOKE PLUME AWAY

## (undated) DEVICE — GLOVE 7.0 PROTEXIS PI BLUE

## (undated) DEVICE — POSITIONER HEAD DONUT 9IN FOAM

## (undated) DEVICE — STAPLER ENDOWRIST 45 WHITE XI

## (undated) DEVICE — SUT MONOCRYL 4.0 PS2 CP496G

## (undated) DEVICE — SUPPORT ULNA NERVE PROTECTOR

## (undated) DEVICE — TROCAR ENDOPATH XCEL 5X100MM

## (undated) DEVICE — SYR 10CC LUER LOCK

## (undated) DEVICE — DRAPE ABDOMINAL TIBURON 14X11

## (undated) DEVICE — IRRIGATOR ENDOWRIST XI SUCTION

## (undated) DEVICE — SUT SILK 3-0 BLK BR SH 30IN

## (undated) DEVICE — ADHESIVE DERMABOND ADVANCED

## (undated) DEVICE — APPLICATOR CHLORAPREP ORN 26ML

## (undated) DEVICE — SYR 3CC LUER LOC

## (undated) DEVICE — DECANTER VIAL ASEPTIC TRANSFER

## (undated) DEVICE — SEE MEDLINE ITEM 157117

## (undated) DEVICE — TUBING HEATED INSUFFLATOR

## (undated) DEVICE — MANIFOLD 4 PORT

## (undated) DEVICE — SEE MEDLINE ITEM 157131

## (undated) DEVICE — SEAL UNIVERSAL 5MM-8MM XI

## (undated) DEVICE — OBTURATOR BLADELESS 8MM XI CLR

## (undated) DEVICE — COVER TIP CURVED SCISSORS XI

## (undated) DEVICE — DRAPE ARM DAVINCI XI

## (undated) DEVICE — BAG INZII TISS RETRV 12/15MM

## (undated) DEVICE — SOL STRL WATER INJ 1000ML BG

## (undated) DEVICE — COVER OVERHEAD SURG LT BLUE

## (undated) DEVICE — SEE MEDLINE ITEM 152622

## (undated) DEVICE — CLIP HEMO-LOK MLX LARGE LF

## (undated) DEVICE — GLOVE PROTEXIS HYDROGEL SZ7

## (undated) DEVICE — ELECTRODE REM PLYHSV RETURN 9

## (undated) DEVICE — SOL NS 1000CC

## (undated) DEVICE — DRAPE COLUMN DAVINCI XI

## (undated) DEVICE — KIT ANTIFOG

## (undated) DEVICE — CLIP HEMO-LOK ML

## (undated) DEVICE — TROCAR ENDOPATH XCEL 8MM 10CM